# Patient Record
Sex: FEMALE | Race: WHITE | NOT HISPANIC OR LATINO | Employment: OTHER | URBAN - METROPOLITAN AREA
[De-identification: names, ages, dates, MRNs, and addresses within clinical notes are randomized per-mention and may not be internally consistent; named-entity substitution may affect disease eponyms.]

---

## 2020-08-20 ENCOUNTER — RESULTS ONLY (OUTPATIENT)
Dept: OTHER | Facility: CLINIC | Age: 75
End: 2020-08-20

## 2020-08-20 ENCOUNTER — VIRTUAL VISIT (OUTPATIENT)
Dept: TRANSPLANT | Facility: CLINIC | Age: 75
End: 2020-08-20
Attending: INTERNAL MEDICINE
Payer: COMMERCIAL

## 2020-08-20 ENCOUNTER — MEDICAL CORRESPONDENCE (OUTPATIENT)
Dept: TRANSPLANT | Facility: CLINIC | Age: 75
End: 2020-08-20

## 2020-08-20 VITALS
DIASTOLIC BLOOD PRESSURE: 78 MMHG | OXYGEN SATURATION: 96 % | WEIGHT: 167.5 LBS | TEMPERATURE: 97.6 F | SYSTOLIC BLOOD PRESSURE: 124 MMHG | HEART RATE: 90 BPM

## 2020-08-20 DIAGNOSIS — Z71.9 VISIT FOR COUNSELING: Primary | ICD-10-CM

## 2020-08-20 DIAGNOSIS — C92.01 ACUTE MYELOID LEUKEMIA IN REMISSION (H): Primary | ICD-10-CM

## 2020-08-20 PROCEDURE — 36415 COLL VENOUS BLD VENIPUNCTURE: CPT

## 2020-08-20 PROCEDURE — 81376 HLA II TYPING 1 LOCUS LR: CPT | Mod: XU | Performed by: INTERNAL MEDICINE

## 2020-08-20 PROCEDURE — 86828 HLA CLASS I&II ANTIBODY QUAL: CPT | Mod: XU | Performed by: INTERNAL MEDICINE

## 2020-08-20 PROCEDURE — 81378 HLA I & II TYPING HR: CPT | Performed by: INTERNAL MEDICINE

## 2020-08-20 PROCEDURE — G0463 HOSPITAL OUTPT CLINIC VISIT: HCPCS

## 2020-08-20 PROCEDURE — 86644 CMV ANTIBODY: CPT | Performed by: INTERNAL MEDICINE

## 2020-08-20 PROCEDURE — 86828 HLA CLASS I&II ANTIBODY QUAL: CPT | Performed by: INTERNAL MEDICINE

## 2020-08-20 PROCEDURE — 81378 HLA I & II TYPING HR: CPT | Mod: 59 | Performed by: INTERNAL MEDICINE

## 2020-08-20 PROCEDURE — 81370 HLA I & II TYPING LR: CPT | Performed by: INTERNAL MEDICINE

## 2020-08-20 PROCEDURE — 81382 HLA II TYPING 1 LOC HR: CPT | Mod: XU | Performed by: INTERNAL MEDICINE

## 2020-08-20 PROCEDURE — 81382 HLA II TYPING 1 LOC HR: CPT | Performed by: INTERNAL MEDICINE

## 2020-08-20 PROCEDURE — 86832 HLA CLASS I HIGH DEFIN QUAL: CPT | Performed by: INTERNAL MEDICINE

## 2020-08-20 RX ORDER — ATENOLOL 25 MG/1
TABLET ORAL
COMMUNITY
Start: 2020-03-27 | End: 2020-08-20

## 2020-08-20 RX ORDER — LEVOTHYROXINE SODIUM 88 UG/1
88 TABLET ORAL DAILY
Status: ON HOLD | COMMUNITY
Start: 2020-06-26 | End: 2020-10-28

## 2020-08-20 RX ORDER — POLYETHYLENE GLYCOL 3350 17 G/17G
1 POWDER, FOR SOLUTION ORAL DAILY
Status: ON HOLD | COMMUNITY
End: 2020-10-27

## 2020-08-20 RX ORDER — VENLAFAXINE 75 MG/1
75 TABLET ORAL DAILY
Status: ON HOLD | COMMUNITY
Start: 2019-04-09 | End: 2020-10-28

## 2020-08-20 RX ORDER — LANOLIN ALCOHOL/MO/W.PET/CERES
1000 CREAM (GRAM) TOPICAL DAILY
Status: ON HOLD | COMMUNITY
Start: 2020-06-19 | End: 2020-10-27

## 2020-08-20 ASSESSMENT — PAIN SCALES - GENERAL: PAINLEVEL: NO PAIN (0)

## 2020-08-20 NOTE — LETTER
8/20/2020         RE: Florencia Gao  [address] [place] [address]        Dear Colleague,    Thank you for referring your patient, Florencia Gao, to the [facility] BLOOD AND MARROW TRANSPLANT. Please see a copy of my visit note below.    /78   Pulse 90   Temp 97.6  F (36.4  C)   Wt 76 kg (167 lb 8 oz)   SpO2 96%   Wt Readings from Last 4 Encounters:   08/20/20 76 kg (167 lb 8 oz)     This is the first [facility] visit for this 75-year-old woman with a new diagnosis of AML.  Details are summarized from her outside records and information she provided today.  Briefly she was well until April or May of this year when she developed progressive fatigue and some easy bruising.  By mid June, pancytopenia and the diagnosis of AML was recognized. Initially Hb 8.1  WBC 1.4, Plts 29,000.    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  Flt 3 and NPM1 and CEBPalpha mutations were negative.    She was treated with idarubicin plus cytarabine (7+3) and achieved complete remission.  A day 14 marrow was empty and recovery of her counts at roughly 6 weeks --July 31 bone marrow was cellular with no morphologic signs of leukemia and the trisomy 8 was no longer detectable.  I do not have molecular data results from that remission marrow.    During her time in hospital she had strep mitis bacteremia but no other notable complications.  She had no other infections; did not lose much weight and had no mouth sores GI  respiratory or cardiac symptoms.  She now feels quite well.  Her current review of systems has no bone pain rash bleeding bruising ENT GI  respiratory cardiac symptoms.    Her past medical history includes hypothyroidism, sleep apnea with CPAP for the last 3 years.  A fracture of her right hip in 2010; a fracture of her right ankle in 2017; prediabetes and long-term use of antidepressants. There is also a notation of  granulomatous lung disease--not pursued today and no details are available to me now.    She smoked briefly (<2 pack years) and doesn't or rarely drink alcohol.    Her family history includes no one else with blood disorders or leukemia.  Her father  with prostate cancer; her mother and several other relatives had heart disease.    She has 2 living healthy sons; 1 other son  of a motorcycle accident and perhaps some excess use of alcohol.    She has 6 siblings :  4 brothers age 54-62 she reports as being healthy; and 2 sisters in their mid 70s are also healthy.  Tissue typing has not yet been completed.    Her current medications include Vitamin B12 given to her at hospital discharge for unclear reasons, levothyroxine  MiraLAX and venlafexine.    She is allergic to Bactrim (hives and itching) and Chlorhexidine (itchy rash).  She had extended sleepiness with codeine in the past,  but not a tiffanie allergy.    Her exam shows her comfortable conversant and in no distress.  Her vital signs are acceptable.  She had no skin rash or peripheral lymphadenopathy.  Her oropharynx was clear without mucosal changes.  Her lungs were clear and her heart tones are regular without a gallop.  Her abdomen was soft and nontender without rebound or hepatosplenomegaly.  She had no peripheral edema or bone tenderness at any site.  Cranial nerves were intact; deep tendon reflexes were 1-2+ in upper and lower extremities and heel toe gait were normal. Romberg testing was negative.    We spent an extended (total 60 minutes) time discussing the options of therapy for her now first remission AML with intermediate cytogenetic and molecular risk.  I told her that while serial cycles of consolidation therapy with cytarabine would be possible, the use of allotransplantation if she had an available matched donor, would likely provide incremental 15 to 20% greater leukemia free survival at 2 years or beyond; approaching 40%.  She has no  contraindications to proceeding with transplantation and many potential family donor options.    We reviewed the rationale and risks associated with allotransplantation including the hazards of reduced intensity conditioning with GI upset, infections, bleeding and need for transfusions and discussed at some length wumfy-wfsbam-vspr disease with its associated antileukemia effect.  I told her that approximately 20% of patients do not survive the first 6 months following an allo transplant but overall the chances of extended leukemia free of survival are improved with a matched sibling donor transplant than compared to other approaches.    She seemed strongly interested in transplantation.  We will try to identify  a family donor and all review the recommendations with her outside oncologist.  If a donor is found, we will likely bring her for formal transplant evaluation soon.    Yong Moncada MD    Professor of medicine    Again, thank you for allowing me to participate in the care of your patient.        Sincerely,        Yong Moncada MD

## 2020-08-20 NOTE — PROGRESS NOTES
/78   Pulse 90   Temp 97.6  F (36.4  C)   Wt 76 kg (167 lb 8 oz)   SpO2 96%   Wt Readings from Last 4 Encounters:   20 76 kg (167 lb 8 oz)     This is the first [facility] visit for this 75-year-old woman with a new diagnosis of AML.  Details are summarized from her outside records and information she provided today.  Briefly she was well until April or May of this year when she developed progressive fatigue and some easy bruising.  By mid , pancytopenia and the diagnosis of AML was recognized. Initially Hb 8.1  WBC 1.4, Plts 29,000.    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  Flt 3 and NPM1 and CEBPalpha mutations were negative.    She was treated with idarubicin plus cytarabine (7+3) and achieved complete remission.  A day 14 marrow was empty and recovery of her counts at roughly 6 weeks -- bone marrow was cellular with no morphologic signs of leukemia and the trisomy 8 was no longer detectable.  I do not have molecular data results from that remission marrow.    During her time in hospital she had strep mitis bacteremia but no other notable complications.  She had no other infections; did not lose much weight and had no mouth sores GI  respiratory or cardiac symptoms.  She now feels quite well.  Her current review of systems has no bone pain rash bleeding bruising ENT GI  respiratory cardiac symptoms.    Her past medical history includes hypothyroidism, sleep apnea with CPAP for the last 3 years.  A fracture of her right hip in ; a fracture of her right ankle in ; prediabetes and long-term use of antidepressants. There is also a notation of granulomatous lung disease--not pursued today and no details are available to me now.    She smoked briefly (<2 pack years) and doesn't or rarely drink alcohol.    Her family history includes no one else with blood disorders or leukemia.  Her father  with  prostate cancer; her mother and several other relatives had heart disease.    She has 2 living healthy sons; 1 other son  of a motorcycle accident and perhaps some excess use of alcohol.    She has 6 siblings :  4 brothers age 54-62 she reports as being healthy; and 2 sisters in their mid 70s are also healthy.  Tissue typing has not yet been completed.    Her current medications include Vitamin B12 given to her at hospital discharge for unclear reasons, levothyroxine  MiraLAX and venlafexine.    She is allergic to Bactrim (hives and itching) and Chlorhexidine (itchy rash).  She had extended sleepiness with codeine in the past,  but not a tiffanie allergy.    Her exam shows her comfortable conversant and in no distress.  Her vital signs are acceptable.  She had no skin rash or peripheral lymphadenopathy.  Her oropharynx was clear without mucosal changes.  Her lungs were clear and her heart tones are regular without a gallop.  Her abdomen was soft and nontender without rebound or hepatosplenomegaly.  She had no peripheral edema or bone tenderness at any site.  Cranial nerves were intact; deep tendon reflexes were 1-2+ in upper and lower extremities and heel toe gait were normal. Romberg testing was negative.    We spent an extended (total 60 minutes) time discussing the options of therapy for her now first remission AML with intermediate cytogenetic and molecular risk.  I told her that while serial cycles of consolidation therapy with cytarabine would be possible, the use of allotransplantation if she had an available matched donor, would likely provide incremental 15 to 20% greater leukemia free survival at 2 years or beyond; approaching 40%.  She has no contraindications to proceeding with transplantation and many potential family donor options.    We reviewed the rationale and risks associated with allotransplantation including the hazards of reduced intensity conditioning with GI upset, infections, bleeding and  need for transfusions and discussed at some length lfcfz-utxbfn-udny disease with its associated antileukemia effect.  I told her that approximately 20% of patients do not survive the first 6 months following an allo transplant but overall the chances of extended leukemia free of survival are improved with a matched sibling donor transplant than compared to other approaches.    She seemed strongly interested in transplantation.  We will try to identify  a family donor and all review the recommendations with her outside oncologist.  If a donor is found, we will likely bring her for formal transplant evaluation soon.    Yong Moncada MD    Professor of medicine

## 2020-08-20 NOTE — LETTER
8/20/2020         RE: Florencia Gao  [address] [place] [address]      /78   Pulse 90   Temp 97.6  F (36.4  C)   Wt 76 kg (167 lb 8 oz)   SpO2 96%   Wt Readings from Last 4 Encounters:   08/20/20 76 kg (167 lb 8 oz)     This is the first [facility] visit for this 75-year-old woman with a new diagnosis of AML.  Details are summarized from her outside records and information she provided today.  Briefly she was well until April or May of this year when she developed progressive fatigue and some easy bruising.  By mid June, pancytopenia and the diagnosis of AML was recognized. Initially Hb 8.1  WBC 1.4, Plts 29,000.    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  Flt 3 and NPM1 and CEBPalpha mutations were negative.    She was treated with idarubicin plus cytarabine (7+3) and achieved complete remission.  A day 14 marrow was empty and recovery of her counts at roughly 6 weeks --July 31 bone marrow was cellular with no morphologic signs of leukemia and the trisomy 8 was no longer detectable.  I do not have molecular data results from that remission marrow.    During her time in hospital she had strep mitis bacteremia but no other notable complications.  She had no other infections; did not lose much weight and had no mouth sores GI  respiratory or cardiac symptoms.  She now feels quite well.  Her current review of systems has no bone pain rash bleeding bruising ENT GI  respiratory cardiac symptoms.    Her past medical history includes hypothyroidism, sleep apnea with CPAP for the last 3 years.  A fracture of her right hip in 2010; a fracture of her right ankle in 2017; prediabetes and long-term use of antidepressants. There is also a notation of granulomatous lung disease--not pursued today and no details are available to me now.    She smoked briefly (<2 pack years) and doesn't or rarely drink alcohol.    Her family  history includes no one else with blood disorders or leukemia.  Her father  with prostate cancer; her mother and several other relatives had heart disease.    She has 2 living healthy sons; 1 other son  of a motorcycle accident and perhaps some excess use of alcohol.    She has 6 siblings :  4 brothers age 54-62 she reports as being healthy; and 2 sisters in their mid 70s are also healthy.  Tissue typing has not yet been completed.    Her current medications include Vitamin B12 given to her at hospital discharge for unclear reasons, levothyroxine  MiraLAX and venlafexine.    She is allergic to Bactrim (hives and itching) and Chlorhexidine (itchy rash).  She had extended sleepiness with codeine in the past,  but not a tiffanie allergy.    Her exam shows her comfortable conversant and in no distress.  Her vital signs are acceptable.  She had no skin rash or peripheral lymphadenopathy.  Her oropharynx was clear without mucosal changes.  Her lungs were clear and her heart tones are regular without a gallop.  Her abdomen was soft and nontender without rebound or hepatosplenomegaly.  She had no peripheral edema or bone tenderness at any site.  Cranial nerves were intact; deep tendon reflexes were 1-2+ in upper and lower extremities and heel toe gait were normal. Romberg testing was negative.    We spent an extended (total 60 minutes) time discussing the options of therapy for her now first remission AML with intermediate cytogenetic and molecular risk.  I told her that while serial cycles of consolidation therapy with cytarabine would be possible, the use of allotransplantation if she had an available matched donor, would likely provide incremental 15 to 20% greater leukemia free survival at 2 years or beyond; approaching 40%.  She has no contraindications to proceeding with transplantation and many potential family donor options.    We reviewed the rationale and risks associated with allotransplantation including  the hazards of reduced intensity conditioning with GI upset, infections, bleeding and need for transfusions and discussed at some length nmxru-bwsdtn-sfgx disease with its associated antileukemia effect.  I told her that approximately 20% of patients do not survive the first 6 months following an allo transplant but overall the chances of extended leukemia free of survival are improved with a matched sibling donor transplant than compared to other approaches.    She seemed strongly interested in transplantation.  We will try to identify  a family donor and all review the recommendations with her outside oncologist.  If a donor is found, we will likely bring her for formal transplant evaluation soon.    Yong Moncada MD    Professor of medicine

## 2020-08-20 NOTE — PROGRESS NOTES
"Blood and Marrow Transplant   New Transplant Visit with   Clinical     Florencia Gao  8/20/2020    New Transplant MD: Yong Moncada MD  New Transplant NC: Britany Finch RN    With whom do you live: alone    Relocation Requirement:   Florencia lives 36 minutes / 30.2 miles from [facility] and MD stated that patient will be required to relocate post-transplant.     Diagnosis: AML    Transplant Type: Allogeneic    Family Information  Next of Kin: Parmjit / Robles - sons    Siblings: 2 older sisters and 4 younger brothers; all live in [place] except for 1 brother in [place], [place]    Children: Parmjit (son; lives in [place], [place]) and Robles (son; lives in [place], [place]); per patient her sons do not communicate with each other - they \"hardly speak\"    Son's Girlfriend - Gaye Colvin [phone]; this is Robles's girlfriend of 12 years; patient would like Gaye to be her decision-maker in her HCD; she plans to complete a HCD with this information.     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     Source of Income: group home savings    Do you have concerns or questions about finances or insurance related to BMT?: None at this time. Florencia has a [employer] Advant[employer] product.     IMM will be required during hospitalization?: Yes    Have you completed a health care directive?: No - education provided and will send blank document in her NT packet.     Support:  Is there a network of people who are available to support you and/or your family?: yes - she did share several times during our discussion that she felt the caregiver requirement was a big ask of patients and their families.     Education Provided:   Caregiver Requirement/Role  BMT Packet provided  BMT Book provided  HCD information and blank document  Local lodging  [place]  Support resources  Description of Inpatient Unit    Comments: Florencia was alone on her BMT New " "transplant phone call. She asked that writer called her son's girlfriend after we finished our conversation stating that Gaye was not able to listen in when writer spoke with patient. Florencia shared that she was surprised to hear that she would have to have a 24 hour caregiver for 100 days post-transplant. She will need to work out who will be helping with this. It is of note that her 2 sons \"hardly speak\" and that patient will be listing her son Robles's girlfriend of 12 years as her HCD. Encouraged Florencia and Gaye to call with questions or concerns as they arise.     Ranjana RICHEY [facility]    Clinical   8/20/2020  [facility]  Adult Blood and Marrow Transplant Program  [address] [place] [id number]  [email or web address]  [email or web address]  Office: [phone]   Pager: [phone]        "

## 2020-08-20 NOTE — NURSING NOTE
Oncology Rooming Note    August 20, 2020 12:12 PM   Florencia Gao is a 75 year old female who presents for:    Chief Complaint   Patient presents with     New Patient     AML (acute myeloblastic leukemia)     Initial Vitals: /78   Pulse 90   Temp 97.6  F (36.4  C)   Wt 76 kg (167 lb 8 oz)   SpO2 96%  There is no height or weight on file to calculate BMI. There is no height or weight on file to calculate BSA.  No Pain (0) Comment: Data Unavailable   No LMP recorded. Patient is postmenopausal.  Allergies reviewed: Yes  Medications reviewed: Yes    Medications: Medication refills not needed today.  Pharmacy name entered into [facility]: [facility] [address] [place] [address] AT [facility] [address]    Clinical concerns: New patient Dr. Moncada was notified.      Santo Kenney MA

## 2020-08-20 NOTE — LETTER
"    8/20/2020         RE: Florencia Gao  [address] [place] [address]        Dear Colleague,    Thank you for referring your patient, Florencia Gao, to the [facility] BLOOD AND MARROW TRANSPLANT. Please see a copy of my visit note below.    Blood and Marrow Transplant   New Transplant Visit with   Clinical     Florencia Gao  8/20/2020    New Transplant MD: Yong Moncada MD  New Transplant NC: Britany Finch RN    With whom do you live: alone    Relocation Requirement:   Florencia lives [place] / 30.2 miles from [facility] and MD stated that patient will be required to relocate post-transplant.     Diagnosis: AML    Transplant Type: Allogeneic    Family Information  Next of Kin: Parmjit / Robles - sons    Siblings: 2 older sisters and 4 younger brothers; all live in [place] except for 1 brother in [place], [place]    Children: Parmjit (son; lives in [place], [place]) and Robles (son; lives in [place], [place]); per patient her sons do not communicate with each other - they \"hardly speak\"    Son's Girlfriend - Gaye Colvin [phone]; this is Robles's girlfriend of 12 years; patient would like Gaye to be her decision-maker in her HCD; she plans to complete a HCD with this information.     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     Source of Income: intermediate savings    Do you have concerns or questions about finances or insurance related to BMT?: None at this time. Florencia has a [employer] product.     IMM will be required during hospitalization?: Yes    Have you completed a health care directive?: No - education provided and will send blank document in her NT packet.     Support:  Is there a network of people who are available to support you and/or your family?: yes - she did share several times during our discussion that she felt the caregiver requirement was a big ask of patients and their families. " "    Education Provided:   Caregiver Requirement/Role  BMT Packet provided  BMT Book provided  HCD information and blank document  Local lodging  [place]  Support resources  Description of Inpatient Unit    Comments: Florencia was alone on her BMT New transplant phone call. She asked that writer called her son's girlfriend after we finished our conversation stating that Gaye was not able to listen in when writer spoke with patient. Florencia shared that she was surprised to hear that she would have to have a 24 hour caregiver for 100 days post-transplant. She will need to work out who will be helping with this. It is of note that her 2 sons \"hardly speak\" and that patient will be listing her son Robles's girlfriend of 12 years as her HCD. Encouraged Florencia and Gaye to call with questions or concerns as they arise.     Ranjana NOLASCO    Clinical   8/20/2020  [facility]  Adult Blood and Marrow Transplant Program  [address] [place] [address]  [email or web address]  [email or web address]  Office: [phone]   Pager: [phone]          Again, thank you for allowing me to participate in the care of your patient.        Sincerely,        GAURAV Bowman    "

## 2020-08-21 DIAGNOSIS — C92.01 ACUTE MYELOID LEUKEMIA IN REMISSION (H): Primary | ICD-10-CM

## 2020-08-21 LAB — CMV IGG SERPL QL IA: >8 AI (ref 0–0.8)

## 2020-08-25 LAB
A* LOCUS: NORMAL
ABTEST METHOD: NORMAL
B* LOCUS: NORMAL
B*: NORMAL
BW-1: NORMAL
C* LOCUS: NORMAL
C*: NORMAL
DPA1* LOCUS NMDP: NORMAL
DPA1* NMDP: NORMAL
DPA1*: NORMAL
DPA1*LOCUS: NORMAL
DPB1* LOCUS NMDP: NORMAL
DPB1* NMDP: NORMAL
DPB1*: NORMAL
DPB1*LOCUS: NORMAL
DQA1*LOCUS: NORMAL
DQB1* LOCUS: NORMAL
DQB1*: NORMAL
DRB1* LOCUS: NORMAL
DRB1*: NORMAL
DRB3* LOCUS: NORMAL
DRSSO TEST METHOD: NORMAL

## 2020-08-26 ENCOUNTER — APPOINTMENT (OUTPATIENT)
Dept: LAB | Facility: CLINIC | Age: 75
End: 2020-08-26
Attending: INTERNAL MEDICINE
Payer: COMMERCIAL

## 2020-08-26 LAB
SA1 CELL: NORMAL
SA1 COMMENTS: NORMAL
SA1 HI RISK ABY: NORMAL
SA1 MOD RISK ABY: NORMAL
SA1 TEST METHOD: NORMAL
SCR 1 TEST METHOD: NORMAL
SCR1 CELL: NORMAL
SCR1 COMMENTS: NORMAL
SCR1 RESULT: NORMAL
SCR2 CELL: NORMAL
SCR2 COMMENTS: NORMAL
SCR2 RESULT: NORMAL
SCR2 TEST METHOD: NORMAL

## 2020-08-26 PROCEDURE — 81370 HLA I & II TYPING LR: CPT | Mod: 59 | Performed by: INTERNAL MEDICINE

## 2020-08-26 PROCEDURE — 81376 HLA II TYPING 1 LOCUS LR: CPT | Mod: XU | Performed by: INTERNAL MEDICINE

## 2020-08-31 NOTE — PROGRESS NOTES
Met  With pt in clinic and reviewed with her the role of the nurse coordinator. I also gave her contact information for the bmt office if she has any further questions. Pt to have typing of the  Sibs done, hla done on pt as well. Cr

## 2020-09-01 ENCOUNTER — APPOINTMENT (OUTPATIENT)
Dept: LAB | Facility: CLINIC | Age: 75
End: 2020-09-01
Attending: INTERNAL MEDICINE
Payer: COMMERCIAL

## 2020-09-01 LAB
ASBTTEST METHOD: NORMAL
DRSBTTEST METHOD: NORMAL
SBTA* LOCUS NMDP: NORMAL
SBTA* LOCUS: NORMAL
SBTB* LOCUS NMDP: NORMAL
SBTB* LOCUS: NORMAL
SBTB*: NORMAL
SBTC* LOCUS NMDP: NORMAL
SBTC* LOCUS: NORMAL
SBTC* NMDP: NORMAL
SBTC*: NORMAL
SBTDQB1*: NORMAL
SBTDQB1*LOCUS NMDP: NORMAL
SBTDQB1*LOCUS: NORMAL
SBTDQB1*NMDP: NORMAL
SBTDRB1* LOCUS - FCIS: NORMAL
SBTDRB1* LOCUS NMDP: NORMAL
SBTDRB1*: NORMAL

## 2020-09-01 PROCEDURE — 81376 HLA II TYPING 1 LOCUS LR: CPT | Performed by: INTERNAL MEDICINE

## 2020-09-01 PROCEDURE — 81370 HLA I & II TYPING LR: CPT | Performed by: INTERNAL MEDICINE

## 2020-09-02 DIAGNOSIS — Z94.81 BONE MARROW TRANSPLANT STATUS (H): ICD-10-CM

## 2020-09-02 DIAGNOSIS — Z86.2 PERSONAL HISTORY OF DISEASES OF THE BLOOD AND BLOOD-FORMING ORGANS AND CERTAIN DISORDERS INVOLVING THE IMMUNE MECHANISM: ICD-10-CM

## 2020-09-02 DIAGNOSIS — C92.01 ACUTE MYELOID LEUKEMIA IN REMISSION (H): Primary | ICD-10-CM

## 2020-09-02 DIAGNOSIS — Z01.818 EXAMINATION PRIOR TO CHEMOTHERAPY: ICD-10-CM

## 2020-09-02 DIAGNOSIS — D70.4 CYCLIC NEUTROPENIA (H): ICD-10-CM

## 2020-09-02 DIAGNOSIS — Z11.59 ENCOUNTER FOR SCREENING FOR OTHER VIRAL DISEASES: ICD-10-CM

## 2020-09-04 ENCOUNTER — TELEPHONE (OUTPATIENT)
Dept: TRANSPLANT | Facility: CLINIC | Age: 75
End: 2020-09-04

## 2020-09-04 NOTE — TELEPHONE ENCOUNTER
Clinical   Blood and Marrow Transplant Service    Spoke with Florencia in preparation for her Work-up starting 9.8.2020. She is interested in being placed on the waiting list for [facility] apartments when she starts her Work-Up. We talked about how there are 14 people on the waiting list currently so it may be awhile before she is able to get an apartment - although it will also be awhile before she needs the apartment. Her #1 preference is [facility] [address] but this is not open yet and we are not sure when this will occur. Writer encouraged her to have a Plan B if there are no apartments available at [facility]. She said that she would think about options and talk with her family.      Ranjana RICHEY [facility]    Clinical   9/4/2020  [facility]  Adult Blood and Marrow Transplant Program  [address] [place] [address]  [email or web address]  [email or web address]  Office: [phone]   Pager: [phone]

## 2020-09-08 ENCOUNTER — OFFICE VISIT (OUTPATIENT)
Dept: TRANSPLANT | Facility: CLINIC | Age: 75
End: 2020-09-08
Attending: INTERNAL MEDICINE
Payer: COMMERCIAL

## 2020-09-08 ENCOUNTER — APPOINTMENT (OUTPATIENT)
Dept: LAB | Facility: CLINIC | Age: 75
End: 2020-09-08
Attending: INTERNAL MEDICINE
Payer: COMMERCIAL

## 2020-09-08 ENCOUNTER — MEDICAL CORRESPONDENCE (OUTPATIENT)
Dept: TRANSPLANT | Facility: CLINIC | Age: 75
End: 2020-09-08

## 2020-09-08 ENCOUNTER — ONCOLOGY VISIT (OUTPATIENT)
Dept: TRANSPLANT | Facility: CLINIC | Age: 75
End: 2020-09-08
Attending: PHYSICIAN ASSISTANT
Payer: COMMERCIAL

## 2020-09-08 ENCOUNTER — ANCILLARY PROCEDURE (OUTPATIENT)
Dept: CT IMAGING | Facility: CLINIC | Age: 75
End: 2020-09-08
Attending: INTERNAL MEDICINE

## 2020-09-08 ENCOUNTER — ANCILLARY PROCEDURE (OUTPATIENT)
Dept: GENERAL RADIOLOGY | Facility: CLINIC | Age: 75
End: 2020-09-08
Attending: INTERNAL MEDICINE

## 2020-09-08 ENCOUNTER — ANCILLARY PROCEDURE (OUTPATIENT)
Dept: CARDIOLOGY | Facility: CLINIC | Age: 75
End: 2020-09-08
Attending: INTERNAL MEDICINE

## 2020-09-08 VITALS
SYSTOLIC BLOOD PRESSURE: 139 MMHG | HEART RATE: 99 BPM | HEIGHT: 67 IN | OXYGEN SATURATION: 98 % | RESPIRATION RATE: 16 BRPM | DIASTOLIC BLOOD PRESSURE: 83 MMHG | TEMPERATURE: 97.8 F | WEIGHT: 171.3 LBS | BODY MASS INDEX: 26.89 KG/M2

## 2020-09-08 VITALS
OXYGEN SATURATION: 98 % | HEIGHT: 67 IN | TEMPERATURE: 97.8 F | SYSTOLIC BLOOD PRESSURE: 139 MMHG | BODY MASS INDEX: 26.87 KG/M2 | RESPIRATION RATE: 16 BRPM | HEART RATE: 99 BPM | WEIGHT: 171.2 LBS | DIASTOLIC BLOOD PRESSURE: 83 MMHG

## 2020-09-08 DIAGNOSIS — C92.01 ACUTE MYELOID LEUKEMIA IN REMISSION (H): Primary | ICD-10-CM

## 2020-09-08 DIAGNOSIS — C92.01 ACUTE MYELOID LEUKEMIA IN REMISSION (H): ICD-10-CM

## 2020-09-08 DIAGNOSIS — Z11.59 ENCOUNTER FOR SCREENING FOR OTHER VIRAL DISEASES: ICD-10-CM

## 2020-09-08 DIAGNOSIS — D70.4 CYCLIC NEUTROPENIA (H): ICD-10-CM

## 2020-09-08 DIAGNOSIS — Z86.2 PERSONAL HISTORY OF DISEASES OF THE BLOOD AND BLOOD-FORMING ORGANS AND CERTAIN DISORDERS INVOLVING THE IMMUNE MECHANISM: ICD-10-CM

## 2020-09-08 DIAGNOSIS — Z94.81 BONE MARROW TRANSPLANT STATUS (H): ICD-10-CM

## 2020-09-08 DIAGNOSIS — Z01.818 EXAMINATION PRIOR TO CHEMOTHERAPY: ICD-10-CM

## 2020-09-08 DIAGNOSIS — R82.90 UNSPECIFIED ABNORMAL FINDINGS IN URINE: ICD-10-CM

## 2020-09-08 LAB
ALBUMIN SERPL-MCNC: 3.9 G/DL (ref 3.4–5)
ALBUMIN UR-MCNC: NEGATIVE MG/DL
ALP SERPL-CCNC: 75 U/L (ref 40–150)
ALT SERPL W P-5'-P-CCNC: 29 U/L (ref 0–50)
ANION GAP SERPL CALCULATED.3IONS-SCNC: 6 MMOL/L (ref 3–14)
APPEARANCE UR: ABNORMAL
APTT PPP: 29 SEC (ref 22–37)
AST SERPL W P-5'-P-CCNC: 22 U/L (ref 0–45)
BACTERIA #/AREA URNS HPF: ABNORMAL /HPF
BASOPHILS # BLD AUTO: 0 10E9/L (ref 0–0.2)
BASOPHILS NFR BLD AUTO: 0.6 %
BILIRUB SERPL-MCNC: 0.4 MG/DL (ref 0.2–1.3)
BILIRUB UR QL STRIP: NEGATIVE
BUN SERPL-MCNC: 14 MG/DL (ref 7–30)
CALCIUM SERPL-MCNC: 9.3 MG/DL (ref 8.5–10.1)
CHLORIDE SERPL-SCNC: 101 MMOL/L (ref 94–109)
CO2 SERPL-SCNC: 26 MMOL/L (ref 20–32)
COLOR UR AUTO: YELLOW
CREAT SERPL-MCNC: 0.75 MG/DL (ref 0.52–1.04)
DIFFERENTIAL METHOD BLD: NORMAL
EOSINOPHIL # BLD AUTO: 0.3 10E9/L (ref 0–0.7)
EOSINOPHIL NFR BLD AUTO: 4.8 %
ERYTHROCYTE [DISTWIDTH] IN BLOOD BY AUTOMATED COUNT: 14.2 % (ref 10–15)
FERRITIN SERPL-MCNC: 285 NG/ML (ref 8–252)
GFR SERPL CREATININE-BSD FRML MDRD: 80 ML/MIN/{1.73_M2}
GLUCOSE SERPL-MCNC: 87 MG/DL (ref 70–99)
GLUCOSE UR STRIP-MCNC: NEGATIVE MG/DL
HCT VFR BLD AUTO: 41.1 % (ref 35–47)
HGB BLD-MCNC: 13.1 G/DL (ref 11.7–15.7)
HGB UR QL STRIP: ABNORMAL
HYALINE CASTS #/AREA URNS LPF: 3 /LPF (ref 0–2)
IMM GRANULOCYTES # BLD: 0 10E9/L (ref 0–0.4)
IMM GRANULOCYTES NFR BLD: 0.3 %
INR PPP: 0.98 (ref 0.86–1.14)
KETONES UR STRIP-MCNC: NEGATIVE MG/DL
LEUKOCYTE ESTERASE UR QL STRIP: ABNORMAL
LYMPHOCYTES # BLD AUTO: 1.5 10E9/L (ref 0.8–5.3)
LYMPHOCYTES NFR BLD AUTO: 24.4 %
MCH RBC QN AUTO: 31.2 PG (ref 26.5–33)
MCHC RBC AUTO-ENTMCNC: 31.9 G/DL (ref 31.5–36.5)
MCV RBC AUTO: 98 FL (ref 78–100)
MONOCYTES # BLD AUTO: 0.4 10E9/L (ref 0–1.3)
MONOCYTES NFR BLD AUTO: 5.7 %
MUCOUS THREADS #/AREA URNS LPF: PRESENT /LPF
NEUTROPHILS # BLD AUTO: 4 10E9/L (ref 1.6–8.3)
NEUTROPHILS NFR BLD AUTO: 64.2 %
NITRATE UR QL: NEGATIVE
NRBC # BLD AUTO: 0 10*3/UL
NRBC BLD AUTO-RTO: 0 /100
PH UR STRIP: 6 PH (ref 5–7)
PLATELET # BLD AUTO: 229 10E9/L (ref 150–450)
POTASSIUM SERPL-SCNC: 4 MMOL/L (ref 3.4–5.3)
PROT SERPL-MCNC: 8 G/DL (ref 6.8–8.8)
RBC # BLD AUTO: 4.2 10E12/L (ref 3.8–5.2)
RBC #/AREA URNS AUTO: 4 /HPF (ref 0–2)
SODIUM SERPL-SCNC: 134 MMOL/L (ref 133–144)
SOURCE: ABNORMAL
SP GR UR STRIP: 1.01 (ref 1–1.03)
SQUAMOUS #/AREA URNS AUTO: 1 /HPF (ref 0–1)
URATE SERPL-MCNC: 4.3 MG/DL (ref 2.6–6)
UROBILINOGEN UR STRIP-MCNC: 0 MG/DL (ref 0–2)
WBC # BLD AUTO: 6.3 10E9/L (ref 4–11)
WBC #/AREA URNS AUTO: 7 /HPF (ref 0–5)

## 2020-09-08 PROCEDURE — 87340 HEPATITIS B SURFACE AG IA: CPT | Performed by: INTERNAL MEDICINE

## 2020-09-08 PROCEDURE — 87086 URINE CULTURE/COLONY COUNT: CPT | Performed by: INTERNAL MEDICINE

## 2020-09-08 PROCEDURE — 86803 HEPATITIS C AB TEST: CPT | Performed by: INTERNAL MEDICINE

## 2020-09-08 PROCEDURE — 87389 HIV-1 AG W/HIV-1&-2 AB AG IA: CPT | Performed by: INTERNAL MEDICINE

## 2020-09-08 PROCEDURE — 93010 ELECTROCARDIOGRAM REPORT: CPT | Mod: ZP | Performed by: INTERNAL MEDICINE

## 2020-09-08 PROCEDURE — 86850 RBC ANTIBODY SCREEN: CPT | Performed by: INTERNAL MEDICINE

## 2020-09-08 PROCEDURE — 86900 BLOOD TYPING SEROLOGIC ABO: CPT | Performed by: INTERNAL MEDICINE

## 2020-09-08 PROCEDURE — 86696 HERPES SIMPLEX TYPE 2 TEST: CPT | Performed by: INTERNAL MEDICINE

## 2020-09-08 PROCEDURE — 86790 VIRUS ANTIBODY NOS: CPT | Performed by: INTERNAL MEDICINE

## 2020-09-08 PROCEDURE — 86870 RBC ANTIBODY IDENTIFICATION: CPT | Performed by: INTERNAL MEDICINE

## 2020-09-08 PROCEDURE — 81376 HLA II TYPING 1 LOCUS LR: CPT | Performed by: INTERNAL MEDICINE

## 2020-09-08 PROCEDURE — 86704 HEP B CORE ANTIBODY TOTAL: CPT | Performed by: INTERNAL MEDICINE

## 2020-09-08 PROCEDURE — 84550 ASSAY OF BLOOD/URIC ACID: CPT | Performed by: INTERNAL MEDICINE

## 2020-09-08 PROCEDURE — 87516 HEPATITIS B DNA AMP PROBE: CPT | Performed by: INTERNAL MEDICINE

## 2020-09-08 PROCEDURE — 87521 HEPATITIS C PROBE&RVRS TRNSC: CPT | Performed by: INTERNAL MEDICINE

## 2020-09-08 PROCEDURE — 81001 URINALYSIS AUTO W/SCOPE: CPT | Performed by: INTERNAL MEDICINE

## 2020-09-08 PROCEDURE — 82728 ASSAY OF FERRITIN: CPT | Performed by: INTERNAL MEDICINE

## 2020-09-08 PROCEDURE — 85025 COMPLETE CBC W/AUTO DIFF WBC: CPT | Performed by: INTERNAL MEDICINE

## 2020-09-08 PROCEDURE — 81265 STR MARKERS SPECIMEN ANAL: CPT | Performed by: INTERNAL MEDICINE

## 2020-09-08 PROCEDURE — 93005 ELECTROCARDIOGRAM TRACING: CPT

## 2020-09-08 PROCEDURE — 86780 TREPONEMA PALLIDUM: CPT | Performed by: INTERNAL MEDICINE

## 2020-09-08 PROCEDURE — 83021 HEMOGLOBIN CHROMOTOGRAPHY: CPT | Performed by: INTERNAL MEDICINE

## 2020-09-08 PROCEDURE — 86695 HERPES SIMPLEX TYPE 1 TEST: CPT | Performed by: INTERNAL MEDICINE

## 2020-09-08 PROCEDURE — 80053 COMPREHEN METABOLIC PANEL: CPT | Performed by: INTERNAL MEDICINE

## 2020-09-08 PROCEDURE — 86901 BLOOD TYPING SEROLOGIC RH(D): CPT | Performed by: INTERNAL MEDICINE

## 2020-09-08 PROCEDURE — 86644 CMV ANTIBODY: CPT | Performed by: INTERNAL MEDICINE

## 2020-09-08 PROCEDURE — 87798 DETECT AGENT NOS DNA AMP: CPT | Performed by: INTERNAL MEDICINE

## 2020-09-08 PROCEDURE — 81370 HLA I & II TYPING LR: CPT | Performed by: INTERNAL MEDICINE

## 2020-09-08 PROCEDURE — 87535 HIV-1 PROBE&REVERSE TRNSCRPJ: CPT | Performed by: INTERNAL MEDICINE

## 2020-09-08 PROCEDURE — 86665 EPSTEIN-BARR CAPSID VCA: CPT | Performed by: INTERNAL MEDICINE

## 2020-09-08 PROCEDURE — 85730 THROMBOPLASTIN TIME PARTIAL: CPT | Performed by: INTERNAL MEDICINE

## 2020-09-08 PROCEDURE — 85610 PROTHROMBIN TIME: CPT | Performed by: INTERNAL MEDICINE

## 2020-09-08 PROCEDURE — G0463 HOSPITAL OUTPT CLINIC VISIT: HCPCS | Mod: 25

## 2020-09-08 ASSESSMENT — PAIN SCALES - GENERAL
PAINLEVEL: NO PAIN (0)
PAINLEVEL: NO PAIN (0)

## 2020-09-08 ASSESSMENT — MIFFLIN-ST. JEOR
SCORE: 1313.06
SCORE: 1313.51

## 2020-09-08 NOTE — PROGRESS NOTES
[facility]  BMTCT OPEN VISIT    September 8, 2020    Florencia Gao is a 75 year old female undergoing evaluation prior to hematopoietic cell transplant or immune effector cell therapy.    Reason for BMTCT: AML    Recent chemotherapy: completed 7/31    Recent infections: no    Blood thinner use? If yes, why? No    Treatment for diabetes? No      Today, the patient notes the following symptoms:  Review Of Systems  Skin: positive for negative   Eyes: cataracts  Ears/Nose/Throat: negative  Respiratory: No shortness of breath, dyspnea on exertion, cough, or hemoptysis  Cardiovascular: negative  Gastrointestinal: negative  Genitourinary: urinary tract infection  Musculoskeletal: negative  Neurologic: negative  Psychiatric: anxiety, panic attacks--most recent 1 month ago  Hematologic/Lymphatic/Immunologic: positive for positive for AML  Endocrine: negative and thyroid disorder      Florencia Gao's History    No past medical history on file.    No past surgical history on file.    No family history on file.    Social History     Tobacco Use     Smoking status: Never Smoker     Smokeless tobacco: Never Used   Substance Use Topics     Alcohol use: Not on file           Florencia Gao's Medications and Allergies    Current Outpatient Medications   Medication     cyanocobalamin (VITAMIN B-12) 1000 MCG tablet     levothyroxine (SYNTHROID/LEVOTHROID) 88 MCG tablet     polyethylene glycol (MIRALAX) 17 g packet     venlafaxine (EFFEXOR) 75 MG tablet     No current facility-administered medications for this visit.           Allergies   Allergen Reactions     Sulfa Drugs Rash     Acetaminophen-Codeine Dizziness     could not sit up after surgery until off all narcotics, even Tylenol #3     Other Drug Allergy (See Comments) Rash     Chlorahexidine-surgical prep/rash           Physical Examination    Blood pressure 139/83, pulse 99, temperature 97.8  F (36.6  C), temperature source Oral, resp. rate 16, height 1.7 m  "(5' 6.93\"), weight 77.7 kg (171 lb 4.8 oz), SpO2 98 %.    Constitutional: healthy, alert and no distress  Head: Normocephalic. No masses, lesions, tenderness or abnormalities  ENT: ENT exam normal, no neck nodes or sinus tenderness  Cardiovascular: negative, PMI normal. No lifts, heaves, or thrills. RRR. No murmurs, clicks gallops or rub  Respiratory: negative, Percussion normal. Good diaphragmatic excursion. Lungs clear  Gastrointestinal: Abdomen soft, non-tender. BS normal. No masses, organomegaly  : Deferred  Musculoskeletal: extremities normal- no gross deformities noted, gait normal and normal muscle tone  Skin: no suspicious lesions or rashes  Neurologic: Gait normal. Reflexes normal and symmetric. Sensation grossly WNL.  Psychiatric: mentation appears normal and affect normal/bright  Hematologic/Lymphatic/Immunologic: Normal cervical lymph nodes        Frailty Screening    1. Weight loss: Have you lost >10 pounds (or >5% body weight) unintentionally over the last year? No      2. Exhaustion: How often in the past week did you feel that:  o I feel that everything I do is an effort : .Exhaustion: 0 = rarely or none of the time (<1 day)  o I feel I cannot get going: Exhaustion: 0 = rarely or none of the time (<1 day)    3. Weakness:  Hand  strength (measured by MA; calculate average): 23kg     Male BMI Frailty Criteria for  Male  Strength Female BMI Frailty Criteria for  Female  Strength   ?24 ?29 ?23 ?17   24.1 - 26 ?30 23.1 - 26 ?17.3   26.1 - 28 ?30 26.1 - 29 ?18   >28 ?32 >29 ?21     4. Slowness:  15 foot walk time (measured by MA): 5 seconds    Height Frailty Criteria for  15 Foot Walk Time   Men   ?173 cm ? 7 seconds    >173 cm  ? 6 seconds   Women   ?159 cm ? 7 seconds   >159 cm  ? 6 seconds     5. Physical activity:     *Please complete 2 calculations for kcal (see frailty worksheet for equations)     Energy expenditure for frailty: 485.6 kcal expended per week     Gender Frailty Criteria " for Low Physical Activity   Male <383 kcal/week   Female <270 kcal/weel     IPAQ score: 375 MET minutes per week       Florencia Gao met the following criteria for prefrailty (score 1-2) or frailty (score 3+): not frail  Frailty Score is: 0      Additional assessments not to be used in frailty calculation:   What types of physical activity can you tolerate? walking    Sit to stand test (time to complete 5 reps): 5 seconds    Standing balance in 10 seconds:  Record the Total number of seconds(0-30)--add a+b+c ( take best attempt for each)    First attempt: 10 seconds    Second attempt: 10 seconds    I have reviewed the diagnostic data, medications, frailty screening, and general processes prior to BMTCT.  I have notified the Primary BMT Physician/and or Attending Physician in the clinic of any issues. We also discussed in detail the database and biorepository research for which Florencia Gao is eligible. We discussed the potential risks and potential benefits of each of these protocols individually. We explained potential alternatives to the protocols discussed. We explained to the patient that participation is voluntary and that consent may be withdrawn at any time.       Consents Signed:    Blood transfusion consent form    Ethnicity form    [facility] database    ACE BMT study    N BMTCT Database    Present during the discussion was her son's girlfriend. Copies of the signed consent forms will be provided to the patient on admission. No procedures specific to any studies were performed prior to the patient signing the consent form.    Florencia Gao had the opportunity to ask questions, and I answered all of the questions to the best of my ability.      HAI Joel

## 2020-09-08 NOTE — PROGRESS NOTES
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    Teaching Topic: RN met with patient and completed rooming process including vitals, med and allergy rec. RN then also had patient sign the hippa form and then screening consent and these were placed in the bmt office folder. RN then also went through calender with patient and caregiver and provided copy. RN then also collected labs via venipuncture and sent to lab for processing. RN also had patient collect a UA and this was sent to lab as well. RN provided a 24 hour urine jug and instructions as well for patient and she verbalized understanding and agreed with plan. RN then had patient complete frailty assessment and MA completed the exercises with patient and completed EKG.     Person(s) involved in teaching: Patient  Motivation Level  Asks Questions: Yes  Eager to Learn: Yes  Cooperative: Yes  Receptive (willing/able to accept information): Yes  Any cultural factors/Protestant beliefs that may influence understanding or compliance? No    Patient demonstrates understanding of the following:  - Reason for the appointment, diagnosis and treatment plan: Yes  - Knowledge of proper use of medications and conditions for which they are ordered (with special attention to potential side effects or drug interactions): Yes  - Which situations necessitate calling provider and whom to contact: Yes    Teaching concerns addressed: all questions and concerns were answered for the patient    Proper use and care of (medical equipment, care aids, etc.) Yes  Pain management techniques: Yes  Patient instructed on hand hygiene: Yes  How and/when to  access community resources: Yes      Instructional Materials Used/Given:   All instructions were given to patient verbally and she agreed with the plan

## 2020-09-08 NOTE — NURSING NOTE
"Oncology Rooming Note    September 8, 2020 11:13 AM   Florencia Gao is a 75 year old female who presents for:    Chief Complaint   Patient presents with     RECHECK     pt here for work up pre-bmt for AML     Initial Vitals: /83   Pulse 99   Temp 97.8  F (36.6  C) (Oral)   Resp 16   Ht 1.7 m (5' 6.93\")   Wt 77.7 kg (171 lb 3.2 oz)   SpO2 98%   BMI 26.87 kg/m   Estimated body mass index is 26.87 kg/m  as calculated from the following:    Height as of this encounter: 1.7 m (5' 6.93\").    Weight as of this encounter: 77.7 kg (171 lb 3.2 oz). Body surface area is 1.92 meters squared.  No Pain (0) Comment: Data Unavailable   No LMP recorded. Patient is postmenopausal.  Allergies reviewed: Yes  Medications reviewed: Yes    Medications: Medication refills not needed today.  Pharmacy name entered into [facility]: [facility][id number] [address] AT [facility] [address]    Clinical concerns: none     GWEN MANCIA RN              "

## 2020-09-08 NOTE — LETTER
9/8/2020     RE: Florencia Gao  [address] [place] [address]    Dear Colleague,    Thank you for referring your patient, Florencia Gao, to the [facility] BLOOD AND MARROW TRANSPLANT. Please see a copy of my visit note below.    [facility]  BMTCT OPEN VISIT    September 8, 2020    Florencia Gao is a 75 year old female undergoing evaluation prior to hematopoietic cell transplant or immune effector cell therapy.    Reason for BMTCT: AML    Recent chemotherapy: completed 7/31    Recent infections: no    Blood thinner use? If yes, why? No    Treatment for diabetes? No      Today, the patient notes the following symptoms:  Review Of Systems  Skin: positive for negative   Eyes: cataracts  Ears/Nose/Throat: negative  Respiratory: No shortness of breath, dyspnea on exertion, cough, or hemoptysis  Cardiovascular: negative  Gastrointestinal: negative  Genitourinary: urinary tract infection  Musculoskeletal: negative  Neurologic: negative  Psychiatric: anxiety, panic attacks--most recent 1 month ago  Hematologic/Lymphatic/Immunologic: positive for positive for AML  Endocrine: negative and thyroid disorder      Florencia Gao's History    No past medical history on file.    No past surgical history on file.    No family history on file.    Social History     Tobacco Use     Smoking status: Never Smoker     Smokeless tobacco: Never Used   Substance Use Topics     Alcohol use: Not on file           Florencia Gao's Medications and Allergies    Current Outpatient Medications   Medication     cyanocobalamin (VITAMIN B-12) 1000 MCG tablet     levothyroxine (SYNTHROID/LEVOTHROID) 88 MCG tablet     polyethylene glycol (MIRALAX) 17 g packet     venlafaxine (EFFEXOR) 75 MG tablet     No current facility-administered medications for this visit.           Allergies   Allergen Reactions     Sulfa Drugs Rash     Acetaminophen-Codeine Dizziness     could not sit up after surgery until off all narcotics, even Tylenol  "#3     Other Drug Allergy (See Comments) Rash     Chlorahexidine-surgical prep/rash           Physical Examination    Blood pressure 139/83, pulse 99, temperature 97.8  F (36.6  C), temperature source Oral, resp. rate 16, height 1.7 m (5' 6.93\"), weight 77.7 kg (171 lb 4.8 oz), SpO2 98 %.    Constitutional: healthy, alert and no distress  Head: Normocephalic. No masses, lesions, tenderness or abnormalities  ENT: ENT exam normal, no neck nodes or sinus tenderness  Cardiovascular: negative, PMI normal. No lifts, heaves, or thrills. RRR. No murmurs, clicks gallops or rub  Respiratory: negative, Percussion normal. Good diaphragmatic excursion. Lungs clear  Gastrointestinal: Abdomen soft, non-tender. BS normal. No masses, organomegaly  : Deferred  Musculoskeletal: extremities normal- no gross deformities noted, gait normal and normal muscle tone  Skin: no suspicious lesions or rashes  Neurologic: Gait normal. Reflexes normal and symmetric. Sensation grossly WNL.  Psychiatric: mentation appears normal and affect normal/bright  Hematologic/Lymphatic/Immunologic: Normal cervical lymph nodes        Frailty Screening    1. Weight loss: Have you lost >10 pounds (or >5% body weight) unintentionally over the last year? No      2. Exhaustion: How often in the past week did you feel that:  o I feel that everything I do is an effort : .Exhaustion: 0 = rarely or none of the time (<1 day)  o I feel I cannot get going: Exhaustion: 0 = rarely or none of the time (<1 day)    3. Weakness:  Hand  strength (measured by MA; calculate average): 23kg     Male BMI Frailty Criteria for  Male  Strength Female BMI Frailty Criteria for  Female  Strength   ?24 ?29 ?23 ?17   24.1 - 26 ?30 23.1 - 26 ?17.3   26.1 - 28 ?30 26.1 - 29 ?18   >28 ?32 >29 ?21     4. Slowness:  15 foot walk time (measured by MA): 5 seconds    Height Frailty Criteria for  15 Foot Walk Time   Men   ?173 cm ? 7 seconds    >173 cm  ? 6 seconds   Women   ?159 cm ? 7 " seconds   >159 cm  ? 6 seconds     5. Physical activity:     *Please complete 2 calculations for kcal (see frailty worksheet for equations)     Energy expenditure for frailty: 485.6 kcal expended per week     Gender Frailty Criteria for Low Physical Activity   Male <383 kcal/week   Female <270 kcal/weel     IPAQ score: 375 MET minutes per week       Florencia Gao met the following criteria for prefrailty (score 1-2) or frailty (score 3+): not frail  Frailty Score is: 0      Additional assessments not to be used in frailty calculation:   What types of physical activity can you tolerate? walking    Sit to stand test (time to complete 5 reps): 5 seconds    Standing balance in 10 seconds:  Record the Total number of seconds(0-30)--add a+b+c ( take best attempt for each)    First attempt: 10 seconds    Second attempt: 10 seconds    I have reviewed the diagnostic data, medications, frailty screening, and general processes prior to BMTCT.  I have notified the Primary BMT Physician/and or Attending Physician in the clinic of any issues. We also discussed in detail the database and biorepository research for which Florencia Gao is eligible. We discussed the potential risks and potential benefits of each of these protocols individually. We explained potential alternatives to the protocols discussed. We explained to the patient that participation is voluntary and that consent may be withdrawn at any time.       Consents Signed:    Blood transfusion consent form    Ethnicity form    [facility] database    ACE BMT study    [facility] BMTCT Database    Present during the discussion was her son's girlfriend. Copies of the signed consent forms will be provided to the patient on admission. No procedures specific to any studies were performed prior to the patient signing the consent form.    Florencia Gao had the opportunity to ask questions, and I answered all of the questions to the best of my ability.    HAI Joel

## 2020-09-08 NOTE — PROGRESS NOTES
Radiation Oncology Consult  Patient comes in for initial consultation in the Radiation Oncology Department at the request of Dr. Moncada to determine eligibility for TBI prior to transplant.     History of Present Illness:  Florencia is a pleasant 75 year old female in no acute distress and is accompanied by her caregiver (son's girlfriend).  She noticed progressive bruising in April 2020.  She finally decided to get it checked out in June and was noted to be pancytopenic with hgb at 8.1, white count at 1.4 and platelets at 29,000.  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  She was negative for FLT3, NPM1 and CEBPA.  She started induction with 7+3.  Day 14 showed empty marrow.  When her counts recovered at about 6 weeks, bone marrow biopsy on 7/31 showed no leukemia and negative trisomy 8.  She tolerated treatment well.  She feels great now.  She will have a bone marrow biopsy today.    Her brother will be here donor.        Previous Radiation:  None    Previous Chemotherapy:  As above per HPI.      Pregnant: Not Applicable  No results found for: HCGQUANT  Implanted Cardiac Devices: No    Medications:  Current Outpatient Medications   Medication     cyanocobalamin (VITAMIN B-12) 1000 MCG tablet     levothyroxine (SYNTHROID/LEVOTHROID) 88 MCG tablet     polyethylene glycol (MIRALAX) 17 g packet     venlafaxine (EFFEXOR) 75 MG tablet     No current facility-administered medications for this visit.        Allergies:     Allergies   Allergen Reactions     Blood Transfusion Related (Informational Only)      Patient has a history of a clinically significant antibody against RBC antigens.  A delay in compatible RBCs may occur.     Sulfa Drugs Rash     Acetaminophen-Codeine Dizziness     could not sit up after surgery until off all narcotics, even Tylenol #3     Chlorhexidine Rash       Past Medical History:  Past Medical History:   Diagnosis Date     AML (acute myeloblastic  leukemia) (H)      Anxiety      Hypothyroidism 2000     Osteoporosis        Past Surgical History:  Past Surgical History:   Procedure Laterality Date     GYN SURGERY           ORTHOPEDIC SURGERY      back     ORTHOPEDIC SURGERY      ankle     ORTHOPEDIC SURGERY      hips     ORTHOPEDIC SURGERY      wrists       Family History:  family history includes Cerebrovascular Disease in her mother; Prostate Cancer in her father.        Social History:  Patient is  and lives in [place], [place].  Retired .  Has 3 children.    Pain Assessment 0-10:  Patient rates pain at a 0.    Review of Symptoms:  Constitutional: Negative for fever, chills, weight loss and malaise/fatigue.   Overall patient feels well.  HENT: Negative for nosebleeds, congestion, sore throat and neck pain.   Respiratory: Negative for cough, hemoptysis, sputum production, shortness of breath and wheezing.   Cardiovascular: Negative for chest pain, palpitations, claudication, leg swelling and PND.   Gastrointestinal: Negative for heartburn, nausea, vomiting, abdominal pain, diarrhea, constipation and blood in stool.   Genitourinary: Negative for dysuria.   Musculoskeletal: Negative for myalgias and joint pain.   Skin: Negative for itching and rash.   Neurological: Negative for dizziness, tingling, weakness and headaches.   Endo/Heme/Allergies: Does not bruise/bleed easily.   Psychiatric/Behavioral: Negative for depression and suicidal ideas. The patient is not nervous/anxious.     Physical Exam:  Pulse 80   Resp 18   Wt 77.1 kg (170 lb)   BMI 26.68 kg/m      GENERAL: Well-developed, well-nourished, globally oriented.   LUNGS: Clear to auscultation bilaterally.   CARDIOVASCULAR: Regular rate and rhythm without murmur.   ABDOMEN: Soft, nondistended, nontender, no hepatosplenomegaly.  EXTREMITIES: Without cyanosis, clubbing or edema. .   LYMPHATICS: No palpable supraclavicular, axillary, inguinal, femoral adenopathy.   NEUROLOGIC:  Alert and oriented.  Gait normal.     Labs:  CBC RESULTS:   Recent Labs   Lab Test 09/08/20  1151   WBC 6.3   RBC 4.20   HGB 13.1   HCT 41.1   MCV 98   MCH 31.2   MCHC 31.9   RDW 14.2          All pertinent labs, scans, and test results have been reviewed.    EDUCATION:  Patient given verbal and written education on TBI side effects, purpose of treatment and what the radiation experience will be like.  Patient expressed understanding and asked appropriate questions.        Assessment/Plan:  Patient currently undergoing work-up for sibling NMA transplant per protocol 2019-10 which calls for  cGy.    STAFF RADIATION ONCOLOGIST    I saw the patient who appears to be a suitable candidate for TBI prior to hematopoietic transplant per protocol.  Conditioning for this protocol is nonmyeloablative and includes chemotherapy and total body irradiation given in 1 treatment pf 200 cGy.      The total body will be treated with patient in incumbent position with compensators.    Patient has no radiation history.    Risks and benefits of TBI were discussed including the potential short term side effects but not limited to nausea, vomiting, mucositis, alopecia, and potential organ damage.  Also discussed potential long term side effects including but not limited to cataracts, sterility, chronic organ dysfunction, neurological effects, hormone insufficiencies, and secondary malignancies.    Patient and family were given a chance to ask questions.  They seem to understand risks and benefits of radiation conditioning prior to transplant.  Informed consent was obtained.  Simulation and measurements were performed under my direction.    If you have any questions or concerns please do not hesitate to contact me. Patient will be followed by BMT staff after transplant.      Jimmy Ntahan M.D.  Department of Radiation Oncology  [facility]     CC  Patient Care Team:  Yong Moncada MD as  PCP - General (Internal Medicine)  Ranjana Pinto LICSW as  ( - Clinical)  GILDA DUFFY

## 2020-09-08 NOTE — LETTER
9/8/2020    RE: Florencia Gao  [address] [place] [address]    Dear Colleague,    Thank you for referring your patient, Florencia Gao, to the [facility] BLOOD AND MARROW TRANSPLANT. Please see a copy of my visit note below.    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    Teaching Topic: RN met with patient and completed rooming process including vitals, med and allergy rec. RN then also had patient sign the hippa form and then screening consent and these were placed in the bmt office folder. RN then also went through calender with patient and caregiver and provided copy. RN then also collected labs via venipuncture and sent to lab for processing. RN also had patient collect a UA and this was sent to lab as well. RN provided a 24 hour urine jug and instructions as well for patient and she verbalized understanding and agreed with plan. RN then had patient complete frailty assessment and MA completed the exercises with patient and completed EKG.     Person(s) involved in teaching: Patient  Motivation Level  Asks Questions: Yes  Eager to Learn: Yes  Cooperative: Yes  Receptive (willing/able to accept information): Yes  Any cultural factors/Bahai beliefs that may influence understanding or compliance? No    Patient demonstrates understanding of the following:  - Reason for the appointment, diagnosis and treatment plan: Yes  - Knowledge of proper use of medications and conditions for which they are ordered (with special attention to potential side effects or drug interactions): Yes  - Which situations necessitate calling provider and whom to contact: Yes    Teaching  concerns addressed: all questions and concerns were answered for the patient    Proper use and care of (medical equipment, care aids, etc.) Yes  Pain management techniques: Yes  Patient instructed on hand hygiene: Yes  How and/when to access community resources: Yes    Instructional Materials Used/Given:   All instructions were given to patient verbally and she agreed with the plan    Again, thank you for allowing me to participate in the care of your patient.      Sincerely,      BMT Nurse

## 2020-09-08 NOTE — LETTER
9/8/2020     RE: Florencia JIANG Gao  [address] [place] [address]    Dear Colleague,    Thank you for referring your patient, Florencia Gao, to the [facility] BLOOD AND MARROW TRANSPLANT. Please see a copy of my visit note below.    .Please see Nursing Note.     Zenobia De Leon MA      Again, thank you for allowing me to participate in the care of your patient.        Sincerely,        BMT Nurse

## 2020-09-09 ENCOUNTER — VIRTUAL VISIT (OUTPATIENT)
Dept: TRANSPLANT | Facility: CLINIC | Age: 75
End: 2020-09-09
Attending: INTERNAL MEDICINE
Payer: COMMERCIAL

## 2020-09-09 DIAGNOSIS — C92.01 ACUTE MYELOID LEUKEMIA IN REMISSION (H): Primary | ICD-10-CM

## 2020-09-09 DIAGNOSIS — C92.01 ACUTE MYELOID LEUKEMIA IN REMISSION (H): ICD-10-CM

## 2020-09-09 LAB
ABO + RH BLD: ABNORMAL
ABO + RH BLD: ABNORMAL
BLD GP AB INVEST PLASRBC-IMP: ABNORMAL
BLD GP AB SCN SERPL QL: ABNORMAL
BLOOD BANK CMNT PATIENT-IMP: ABNORMAL
BLOOD BANK CMNT PATIENT-IMP: ABNORMAL
CMV IGG SERPL QL IA: >8 AI (ref 0–0.8)
EBV VCA IGG SER QL IA: >8 AI (ref 0–0.8)
HBV CORE AB SERPL QL IA: NONREACTIVE
HBV SURFACE AG SERPL QL IA: NONREACTIVE
HCV AB SERPL QL IA: NONREACTIVE
HIV 1+2 AB+HIV1 P24 AG SERPL QL IA: NONREACTIVE
HSV1 IGG SERPL QL IA: >8 AI (ref 0–0.8)
HSV2 IGG SERPL QL IA: <0.2 AI (ref 0–0.8)
HTLV I+II AB PATRN SER IB-IMP: NEGATIVE
INTERPRETATION ECG - MUSE: NORMAL
SPECIMEN EXP DATE BLD: ABNORMAL
T PALLIDUM AB SER QL: NONREACTIVE

## 2020-09-09 PROCEDURE — 81370 HLA I & II TYPING LR: CPT | Mod: TEL | Performed by: INTERNAL MEDICINE

## 2020-09-09 PROCEDURE — 81376 HLA II TYPING 1 LOCUS LR: CPT | Mod: TEL | Performed by: INTERNAL MEDICINE

## 2020-09-09 PROCEDURE — 40000268 ZZH STATISTIC NO CHARGES: Mod: TEL,ZF

## 2020-09-09 ASSESSMENT — ANXIETY QUESTIONNAIRES
2. NOT BEING ABLE TO STOP OR CONTROL WORRYING: NOT AT ALL
6. BECOMING EASILY ANNOYED OR IRRITABLE: NOT AT ALL
5. BEING SO RESTLESS THAT IT IS HARD TO SIT STILL: NOT AT ALL
3. WORRYING TOO MUCH ABOUT DIFFERENT THINGS: NOT AT ALL
7. FEELING AFRAID AS IF SOMETHING AWFUL MIGHT HAPPEN: NOT AT ALL
1. FEELING NERVOUS, ANXIOUS, OR ON EDGE: SEVERAL DAYS
GAD7 TOTAL SCORE: 1
IF YOU CHECKED OFF ANY PROBLEMS ON THIS QUESTIONNAIRE, HOW DIFFICULT HAVE THESE PROBLEMS MADE IT FOR YOU TO DO YOUR WORK, TAKE CARE OF THINGS AT HOME, OR GET ALONG WITH OTHER PEOPLE: NOT DIFFICULT AT ALL

## 2020-09-09 ASSESSMENT — PATIENT HEALTH QUESTIONNAIRE - PHQ9
SUM OF ALL RESPONSES TO PHQ QUESTIONS 1-9: 0
5. POOR APPETITE OR OVEREATING: NOT AT ALL

## 2020-09-09 NOTE — PROGRESS NOTES
Pharmacy Assessment - Pre-Stem Cell Transplant    Assessments & Recommendations:  1) CrCl estimated as 72 ml/min, assess if Fludarabine dose reduction is needed and allowed per protocol (recommend to reduce 20% for crcl <70 ml/min if allowed).  2) Per chart review patient reported to have pre-diabetes, not currently on medication. Monitor blood sugar closely with steroid use and utilize sliding scale insulin if needed.  3) If patient is randomized to Tac/MMF/Post transplant Cy arm, avoid systemic immunosuppressive agents from day -7 until 24 hours after the completion of the post-transplant cyclophosphamide (Day +5). This includes corticosteroids as anti-emetics.    History of Present Illness:  Florencia Gao is a 75 year old year old female diagnosed with AML.  She has been treated with 7+3 (Idarubicin).  She is now being work up for NMA Allo-Sib Stem Cell Transplant on protocol 2019-10, which utilizes Flu/Cy/TBI as a conditioning regimen and randomization for aGVH prevention option.    Pertinent labs/tests:  Viral Testing:  CMV(+) / HSV(+) / EBV(+)  Ejection Fraction: 60% (9/8/2020)  QTc: 424msec (9/8/2020)    Weights:   Wt Readings from Last 3 Encounters:   09/08/20 77.7 kg (171 lb 4.8 oz)   09/08/20 77.7 kg (171 lb 3.2 oz)   08/20/20 76 kg (167 lb 8 oz)   Ideal body weight: 61.4 kg (135 lb 7.2 oz)  Adjusted ideal body weight: 67.9 kg (149 lb 12.6 oz)  % IBW:  127%  There is no height or weight on file to calculate BMI.    Primary BMT Physician: Dr Moncada  BMT RN Coordinator: Snehal Reza    Past Medical History:  No past medical history on file.    Medication Allergies:  Allergies   Allergen Reactions     Blood Transfusion Related (Informational Only)      Patient has a history of a clinically significant antibody against RBC antigens.  A delay in compatible RBCs may occur.     Sulfa Drugs Rash     Acetaminophen-Codeine Dizziness     could not sit up after surgery until off all narcotics, even Tylenol #3      Chlorhexidine Rash       Current Medications (pre-admit):  Current Outpatient Medications   Medication Sig Dispense Refill     cyanocobalamin (VITAMIN B-12) 1000 MCG tablet Take 1,000 mcg by mouth daily        levothyroxine (SYNTHROID/LEVOTHROID) 88 MCG tablet Take 88 mcg by mouth daily        polyethylene glycol (MIRALAX) 17 g packet Take 1 packet by mouth daily       venlafaxine (EFFEXOR) 75 MG tablet Take 75 mg by mouth daily          Herbal Medication/Nutritional Supplements:  None reported    Smoking/Past Drug Use:  Denied    Nausea/Vomiting, Pain, or other issues:  Patient reports nausea well controlled in the past but does not remember which medication was most effective.  Patient denies pain and is not currently taking pain medications for breakthrough relief.    Summary:  I met with Florencia Gao and caregiver Gaye via telephone visit for approximately 40 minutes.  We discussed her current medications, transplant chemotherapy, immunosuppression, risk of infection/prophylactic antibiotics, and supportive care. We also reviewed allergies, future vaccinations, and pharmacy expectations post transplant. Florencia Gao and her caregiver participated in our conversation and voiced their understanding of the topics discussed. Thank you for allowing me to participate in the care of this patient.    Suha Seymour, AbimaelD

## 2020-09-09 NOTE — LETTER
9/9/2020     RE: Florencia Gao  [address] [place] [address]    Dear Colleague,    Thank you for referring your patient, Florencia Gao, to the [facility] BLOOD AND MARROW TRANSPLANT. Please see a copy of my visit note below.    Pharmacy Assessment - Pre-Stem Cell Transplant    Assessments & Recommendations:  1) CrCl estimated as 72 ml/min, assess if Fludarabine dose reduction is needed and allowed per protocol (recommend to reduce 20% for crcl <70 ml/min if allowed).  2) Per chart review patient reported to have pre-diabetes, not currently on medication. Monitor blood sugar closely with steroid use and utilize sliding scale insulin if needed.  3) If patient is randomized to Tac/MMF/Post transplant Cy arm, avoid systemic immunosuppressive agents from day -7 until 24 hours after the completion of the post-transplant cyclophosphamide (Day +5). This includes corticosteroids as anti-emetics.    History of Present Illness:  Florencia Gao is a 75 year old year old female diagnosed with AML.  She has been treated with 7+3 (Idarubicin).  She is now being work up for NMA Allo-Sib Stem Cell Transplant on protocol 2019-10, which utilizes Flu/Cy/TBI as a conditioning regimen and randomization for aGVH prevention option.    Pertinent labs/tests:  Viral Testing:  CMV(+) / HSV(+) / EBV(+)  Ejection Fraction: 60% (9/8/2020)  QTc: 424msec (9/8/2020)    Weights:   Wt Readings from Last 3 Encounters:   09/08/20 77.7 kg (171 lb 4.8 oz)   09/08/20 77.7 kg (171 lb 3.2 oz)   08/20/20 76 kg (167 lb 8 oz)   Ideal body weight: 61.4 kg (135 lb 7.2 oz)  Adjusted ideal body weight: 67.9 kg (149 lb 12.6 oz)  % IBW:  127%  There is no height or weight on file to calculate BMI.    Primary BMT Physician: Dr Moncada  BMT RN Coordinator: Snehal Reza    Past Medical History:  No past medical history on file.    Medication Allergies:  Allergies   Allergen Reactions     Blood Transfusion Related (Informational Only)      Patient has a  history of a clinically significant antibody against RBC antigens.  A delay in compatible RBCs may occur.     Sulfa Drugs Rash     Acetaminophen-Codeine Dizziness     could not sit up after surgery until off all narcotics, even Tylenol #3     Chlorhexidine Rash       Current Medications (pre-admit):  Current Outpatient Medications   Medication Sig Dispense Refill     cyanocobalamin (VITAMIN B-12) 1000 MCG tablet Take 1,000 mcg by mouth daily        levothyroxine (SYNTHROID/LEVOTHROID) 88 MCG tablet Take 88 mcg by mouth daily        polyethylene glycol (MIRALAX) 17 g packet Take 1 packet by mouth daily       venlafaxine (EFFEXOR) 75 MG tablet Take 75 mg by mouth daily          Herbal Medication/Nutritional Supplements:  None reported    Smoking/Past Drug Use:  Denied    Nausea/Vomiting, Pain, or other issues:  Patient reports nausea well controlled in the past but does not remember which medication was most effective.  Patient denies pain and is not currently taking pain medications for breakthrough relief.    Summary:  I met with Florencia APOLINAR Gao and caregiver Gaye via telephone visit for approximately 40 minutes.  We discussed her current medications, transplant chemotherapy, immunosuppression, risk of infection/prophylactic antibiotics, and supportive care. We also reviewed allergies, future vaccinations, and pharmacy expectations post transplant. Florencia Gao and her caregiver participated in our conversation and voiced their understanding of the topics discussed. Thank you for allowing me to participate in the care of this patient.    Suha Seymour, PharmD      Again, thank you for allowing me to participate in the care of your patient.        Sincerely,        BMT Pharm D, [facility]

## 2020-09-09 NOTE — LETTER
9/9/2020     RE: Florencia Gao  [address] [place] [address]    Dear Colleague,    Thank you for referring your patient, Florencia Gao, to the [facility] BLOOD AND MARROW TRANSPLANT. Please see a copy of my visit note below.    BMT Teaching Flowsheet    Florencia Gao is a 75 year old female with AML.    Teaching Topic: Allogenic Stem Cell Transplant Work Up Teaching    Person(s) involved in teaching: Patient and Caregiver  Motivation Level  Asks Questions: Yes  Eager to Learn: Yes  Cooperative: Yes  Receptive (willing/able to accept information): Yes  Any cultural factors/Worship beliefs that may influence understanding or compliance? No    Patient and Caregiver demonstrates understanding of the following:  - Reason for the appointment, diagnosis and treatment plan: Yes  - Knowledge of proper use of medications and conditions for which they are ordered (with special attention to potential side effects or drug interactions): Yes  - Which situations necessitate calling provider and whom to contact: Yes    Teaching concerns addressed: None were identifed.     Proper use and care of (medical equipment, care aids, etc.) NA  Pain management techniques: Yes  Patient instructed on hand hygiene: Yes  How and/when to access community resources: Yes    Infection Control:  Patient and Caregiver demonstrates understanding of the following:  Surgical procedure site care taught NA  Signs and symptoms of infection taught Yes  Wound care taught NA  Central venous catheter care taught NA    Instructional Materials Used/Given:   Patient was given and reviewed BMT Teaching Binder, including medication pamphlets, sample treatment calendars, consents, contact phone numbers, hospital and discharge guidelines.  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 Presented consents for 2019-10, 2019-31, and [facility] BMT CTN 1704. Patient verbalizes understanding of the material and was encouraged to call  with any additional questions.  She stated interest in all 3 studies.     Time spent with patient: 90 minutes.    Specific Concerns: NA    Again, thank you for allowing me to participate in the care of your patient.        Sincerely,        BMT Nurse Coordinator

## 2020-09-09 NOTE — PROGRESS NOTES
BMT Teaching Flowsheet    Florencia Gao is a 75 year old female with AML.    Teaching Topic: Allogenic Stem Cell Transplant Work Up Teaching    Person(s) involved in teaching: Patient and Caregiver  Motivation Level  Asks Questions: Yes  Eager to Learn: Yes  Cooperative: Yes  Receptive (willing/able to accept information): Yes  Any cultural factors/Zoroastrianism beliefs that may influence understanding or compliance? No    Patient and Caregiver demonstrates understanding of the following:  - Reason for the appointment, diagnosis and treatment plan: Yes  - Knowledge of proper use of medications and conditions for which they are ordered (with special attention to potential side effects or drug interactions): Yes  - Which situations necessitate calling provider and whom to contact: Yes    Teaching concerns addressed: None were identifed.     Proper use and care of (medical equipment, care aids, etc.) NA  Pain management techniques: Yes  Patient instructed on hand hygiene: Yes  How and/when to access community resources: Yes    Infection Control:  Patient and Caregiver demonstrates understanding of the following:  Surgical procedure site care taught NA  Signs and symptoms of infection taught Yes  Wound care taught NA  Central venous catheter care taught NA    Instructional Materials Used/Given:   Patient was given and reviewed BMT Teaching Binder, including medication pamphlets, sample treatment calendars, consents, contact phone numbers, hospital and discharge guidelines.  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 Presented consents for 2019-10, 2019-31, and [facility] BMT CTN 1704. Patient verbalizes understanding of the material and was encouraged to call with any additional questions.  She stated interest in all 3 studies.     Time spent with patient: 90 minutes.    Specific Concerns: NA

## 2020-09-09 NOTE — PROGRESS NOTES
Blood and Marrow Transplant   Psychosocial Assessment with   Clinical     Assessment completed on 9/9/20 via phone as part of the COVID 19 protocol. Assessment of living situation, support system, financial status, functional status, coping, stressors, need for resources and social work intervention provided as needed. Information for this assessment was provided by pt and pt's son's (Robles) long-term significant other (Gaye) report in addition to medical chart review and consultation with medical team.     Present at Assessment:   Patient: Florencia Gao  Son's (Robles) long-term (14yrs) significant other: Gaye Colvin  : ROSSI Pabon, LG  : ROSSI Sawant, [facility]    Diagnosis: Acute Myeloid Leukemia (AML)    Date of Diagnosis: 6/2020    Transplant type: Allogeneic     Donor: Related allogeneic donor stem cell transplant  Donor: Alfonso Law (brother)    Physician: Yong Moncada MD    Workup Nurse Coordinator: Snehal Reza RN  Primary Nurse Coordinator: Britany Finch RN    Social Workers: ROSSI Pabon, [facility]    Permanent Address:   [address] [place] [address] [place] [address]    Living Situation: Lives alone in [place], [place] (36 minutes/ 30.2 miles from [facility]). MD stated that patient will be required to relocate post-transplant.     Local Address:   [facility] or [facility].    Pt is on the [facility] wait list (currently #15) & is looking into other local lodging options. She is also interested in the [facility] if this becomes available. Florencia & family are agreeable to relocation request by MD, though they have also expressed they'd be interested in discussing further with MD a plan to potentially return closer to home w/ family in [place], [place] father out from transplant pending Pt's condition/recovery.   Addendum: On 9/9/20, SW talked w/ Dr. Moncada re: above relocation. Dr. Moncada is open to Pt returning home, but not before day 60 and will  "continue to assess pending Pt's recovery. Pt will continue to have these conversations w/ Dr. Moncada. SW notified patient.     Contact Information:  Pt's Cell Phone: [phone]  Son's sig other/Gaye's Cell Phone: [phone]  Son Robles Garber Cell Phone: [phone]    Presenting Information:  Florencia is a 75 year old female diagnosed with AML who presents for evaluation for Allogenic transplant at the [facility] Pt was accompanied to today's visit by her son's (Robles) long-term (14 yrs) significant other Gaye Colvin.     Decision Making: Self    Health Care Directive: Pt reports she has the form and is having discussions with their family regarding their health care wishes. Pt said she wants to complete her Healthcare Directive in the next few days. Pt reports she would like to designate Gaye Colvin to be her Health Care Agent and plans to complete this prior to admission. SW explained that since she does not have a healthcare directive, legally her two living sons would make decisions on her behalf, if she did not have capacity to make her own medical decisions. Pt understood. Pt has previously expressed concern that her sons do not communicate with each other (stating \"they hardly speak\").    Relationship Status:     Special Needs: Local Lodging Needed.     Family/Support System: Pt endorsed a strong support system including family, close friends, and her sanjeev community who will be available to support pt throughout transplant process.     Spouse: NA  Children: 2 living sons Robles ([place], [place]) & Parmjit ([place], [place]); Pt has a son Jacoby ( last year following a motorcycle accident)  Grandchildren: 3 grandchildren (ages 24, 13, 7)  Parents:   Siblings: 2 older sisters & 4 younger brothers; all live in [facility] [place] except for 1 brother lives in [place], [place].   Friends: Pt endorsed a good friend support system.    Caregiver: " LIANNA discussed with pt and pt's son's significant other the caregiver role and expectation at length. Pt is agreeable to having a full time caregiver for a minimum of 100 days until cleared by the BMT physician. Pt and pt's son's significant other(Gaye) confirmed understanding of the caregiver requirement. Pt's primary caregiver will be Son (Robles) and his long-term significant other (Gyae Colvin) with Pt's sister Martha and brother Silverio as a secondary caregivers. Caregiver education and resources provided. No caregiver concerns identified.     Caregiver Contact Information:  Son's sig other/Gaye's Cell Phone: [phone]  Son Robles Gao's Cell Phone: [phone]    Transportation Mode: Private vehicle. Pt is aware of driving restrictions post-BMT and the need for the caregiver is to drive until cleared to drive by the BMT physician. SW provided information on parking info and monthly parking pass options.     Insurance: Pt has [employer]vantage health insurance. Pt denied specific insurance concerns at this time. LIANNA reiterated information about the [employer]  should specific insurance questions arise as pt moves through transplant process. Future Insurance questions referred to [employer] : Natalia Goncalves (P: [phone]).    Sources of Income: Pt is supported by her custodial income. Pt denied anticipation of financial hardship related to BMT at this time. SW provided information on neelam options and encouraged pt to contact this SW for support should financial situation change.     Employment: Retired (10 yrs) .     Mental Health:  Pt reported a hx of anxiety and panic attacks. Pt is currently taking medication (for the last 2 years) and pt feels the medication is working well. LIANNA explained that it's not uncommon for patients going through transplant to experience symptoms of depression/anxiety. Pt endorsed history of panic attacks that have  "been experienced in times of high stress such as going through cancer treatment. She identified what that looks like for her and states she experiences chest pain. Pt reports coping mechanisms for panic attacks and anxiety includes her paul chi routine, deep breathing, and \"putting my head down.\" Pt believes she would be able to identify symptoms of anxiety throughout the transplant process.     PHQ-9:  Pt scored a 0/27 which indicates \"none\" on the depression severity scale. Pt endorses this is an accurate reflection of her emotional state.    GAD7:  Pt scored a 1/21 which indicates no sign of anxiety on the Generalized Anxiety Disorder Questionnaire. Pt endorses this is an accurate reflection of her emotional state. Pt discussed her hx of anxiety and feels that she has been able to effectively cope and manage her symptoms as they arise.     Chemical Use:   Tobacco: No  Alcohol: No current use; no concerns w/ abstinence from alcohol post-transplant  Marijuana: No  Other Drugs: No  Based on the information provided, there appear to be no specific risks or concerns identified at this time.     Trauma/Loss/Abuse History: Multiple losses associated with cancer diagnosis and treatment, including health, changes to physical appearance, etc.     Spirituality: Pt identified as Baptism and is a part of the [facility] in [place], [place]. She endorses that they are aware of her diagnosis and are supportive. SW explained that there are Chaplains on the unit and pt can request to meet with a  at anytime. Florencia is interested in meeting regularly with unit  and having reiki services. Pt would also like a blessing ceremony with the unit  on the day of her transplant.    Coping: Pt noted that she is currently feeling \"hopeful\". Pt shared that her main coping mechanisms are talking with family/friends, prayer/spiritual practices, crossword puzzles, playing games on her phone, and watching " "movies. SW and pt discussed additional positive coping mechanisms that pt can utilize while in the hospital. While hospitalized, pt plans to continue her paul chi routine, watch movies, and play games on her phone. Florencia also endorsed that she has found it \"helpful\" that her son's significant other (Gaye) is a nurse and her medical knowledge has been \"useful\" during this time.    Caregiver Coping: Caregiver did not note any concerns and endorsed that she has all the education needed at this time.     Education Provided: Transplant process expectations, Caregiver requirements, Caregiver self-care, Financial issues related to transplant, Financial resources/grants available, Common psychosocial stressors pre/post transplant, Support group(s) available, Hospital resources available, Web site information, Social Work role and Resources for grandchildren.    Interventions Provided: Psychosocial Support and Education     Assessment and Recommendations for Team:  Pt is a 75 year old female diagnosed with AML who is here undergoing preparation for a planned Allogenic transplant.     Pt is pleasant, calm and able to articulate concerns/coping mechanisms in an appropriate manner. During our meeting pt was alert, was interactive, and she presented appropriate memory and thought processes. Pt feels comfortable communicating with the medical team. Pt has a strong supportive network of family and friends who are involved. Pt has developed strong coping mechanisms. Pt and pt's caregivers will benefit from ongoing psychosocial support in regards to coping with the adjustment to the BMT process.     Pt has a strong support system and a confirmed caregiver plan. Pt verbalizes understanding of the transplant process and wanting to proceed. SW provided contact information and encouraged pt to contact SW with questions, concerns, resources and for support.    Per this assessment, SW did not identify any barriers to this patient " moving forward with transplant.    Important Information:   -Hx of anxiety and panic attacks; see mental section.    Follow up Planned:   Psychosocial support  Lodging referrals - Pt is on the [facility] Wait List (Currently #15)  Healthcare Directive - Follow-up inpatient  Spiritual Health referral - Pt would like a blessing ceremony day of transplant, regular  visits and Reiki while inpatient.    ROSSI Pabon, [facility]  Adult Blood & Marrow Transplant   Phone: [phone]  Pager: [phone]

## 2020-09-10 ENCOUNTER — APPOINTMENT (OUTPATIENT)
Dept: LAB | Facility: CLINIC | Age: 75
End: 2020-09-10
Attending: INTERNAL MEDICINE
Payer: COMMERCIAL

## 2020-09-10 ENCOUNTER — OFFICE VISIT (OUTPATIENT)
Dept: RADIATION ONCOLOGY | Facility: CLINIC | Age: 75
End: 2020-09-10
Attending: RADIOLOGY
Payer: COMMERCIAL

## 2020-09-10 ENCOUNTER — MEDICAL CORRESPONDENCE (OUTPATIENT)
Dept: TRANSPLANT | Facility: CLINIC | Age: 75
End: 2020-09-10

## 2020-09-10 ENCOUNTER — OFFICE VISIT (OUTPATIENT)
Dept: TRANSPLANT | Facility: CLINIC | Age: 75
End: 2020-09-10
Attending: PHYSICIAN ASSISTANT
Payer: COMMERCIAL

## 2020-09-10 ENCOUNTER — OFFICE VISIT (OUTPATIENT)
Dept: TRANSPLANT | Facility: CLINIC | Age: 75
End: 2020-09-10
Attending: NURSE PRACTITIONER
Payer: COMMERCIAL

## 2020-09-10 ENCOUNTER — ONCOLOGY VISIT (OUTPATIENT)
Dept: TRANSPLANT | Facility: CLINIC | Age: 75
End: 2020-09-10
Attending: INTERNAL MEDICINE
Payer: COMMERCIAL

## 2020-09-10 VITALS
HEART RATE: 78 BPM | WEIGHT: 170.19 LBS | OXYGEN SATURATION: 97 % | RESPIRATION RATE: 14 BRPM | SYSTOLIC BLOOD PRESSURE: 127 MMHG | TEMPERATURE: 98.2 F | DIASTOLIC BLOOD PRESSURE: 74 MMHG | BODY MASS INDEX: 26.71 KG/M2

## 2020-09-10 VITALS — WEIGHT: 170 LBS | HEART RATE: 80 BPM | BODY MASS INDEX: 26.68 KG/M2 | RESPIRATION RATE: 18 BRPM

## 2020-09-10 VITALS
OXYGEN SATURATION: 97 % | DIASTOLIC BLOOD PRESSURE: 80 MMHG | TEMPERATURE: 98.2 F | BODY MASS INDEX: 26.7 KG/M2 | HEART RATE: 94 BPM | RESPIRATION RATE: 16 BRPM | WEIGHT: 170.1 LBS | SYSTOLIC BLOOD PRESSURE: 124 MMHG

## 2020-09-10 DIAGNOSIS — C92.01 ACUTE MYELOID LEUKEMIA IN REMISSION (H): ICD-10-CM

## 2020-09-10 DIAGNOSIS — C92.01 ACUTE MYELOID LEUKEMIA IN REMISSION (H): Primary | ICD-10-CM

## 2020-09-10 DIAGNOSIS — Z94.81 BONE MARROW TRANSPLANT STATUS (H): ICD-10-CM

## 2020-09-10 DIAGNOSIS — Z11.59 ENCOUNTER FOR SCREENING FOR OTHER VIRAL DISEASES: ICD-10-CM

## 2020-09-10 DIAGNOSIS — Z86.2 PERSONAL HISTORY OF DISEASES OF THE BLOOD AND BLOOD-FORMING ORGANS AND CERTAIN DISORDERS INVOLVING THE IMMUNE MECHANISM: ICD-10-CM

## 2020-09-10 DIAGNOSIS — C92.00 AML (ACUTE MYELOBLASTIC LEUKEMIA) (H): Primary | ICD-10-CM

## 2020-09-10 DIAGNOSIS — D70.4 CYCLIC NEUTROPENIA (H): ICD-10-CM

## 2020-09-10 DIAGNOSIS — Z01.818 EXAMINATION PRIOR TO CHEMOTHERAPY: ICD-10-CM

## 2020-09-10 LAB
A* LOCUS: NORMAL
ABSSOP COMMENTS: NORMAL
ABTEST METHOD: NORMAL
B* LOCUS: NORMAL
B*: NORMAL
BACTERIA SPEC CULT: NO GROWTH
BASOPHILS # BLD AUTO: 0 10E9/L (ref 0–0.2)
BASOPHILS NFR BLD AUTO: 0.4 %
BW-1: NORMAL
C* LOCUS: NORMAL
C*: NORMAL
COLLECT DURATION TIME UR: 24 H
CREAT 24H UR-MRATE: 1.03 G/(24.H) (ref 0.8–1.8)
CREAT CL 24H UR+SERPL-VRATE: 99 ML/MIN
CREAT CL/1.73 SQ M 24H UR+SERPL-ARVRAT: 91 ML/MIN/1.7M2 (ref 100–160)
CREAT SERPL-MCNC: 0.72 MG/DL (ref 0.52–1.04)
CREAT SERPL-MCNC: 0.72 MG/DL (ref 0.52–1.04)
CREAT UR-MCNC: 51 MG/DL
DIFFERENTIAL METHOD BLD: NORMAL
DPA1* LOCUS NMDP: NORMAL
DPA1* NMDP: NORMAL
DPA1*: NORMAL
DPA1*LOCUS: NORMAL
DPB1* LOCUS NMDP: NORMAL
DPB1* NMDP: NORMAL
DPB1*: NORMAL
DPB1*LOCUS: NORMAL
DQA1*LOCUS: NORMAL
DQB1* LOCUS: NORMAL
DQB1*: NORMAL
DRB1* LOCUS: NORMAL
DRB1*: NORMAL
DRB3* LOCUS: NORMAL
DRSSO COMMENTS: NORMAL
DRSSO TEST METHOD: NORMAL
EOSINOPHIL # BLD AUTO: 0.3 10E9/L (ref 0–0.7)
EOSINOPHIL NFR BLD AUTO: 3.6 %
ERYTHROCYTE [DISTWIDTH] IN BLOOD BY AUTOMATED COUNT: 14.1 % (ref 10–15)
GFR SERPL CREATININE-BSD FRML MDRD: 82 ML/MIN/{1.73_M2}
HCT VFR BLD AUTO: 39.8 % (ref 35–47)
HEIGHT IN CM: 170 CM
HGB BLD-MCNC: 12.8 G/DL (ref 11.7–15.7)
IMM GRANULOCYTES # BLD: 0 10E9/L (ref 0–0.4)
IMM GRANULOCYTES NFR BLD: 0.3 %
LAB SCANNED RESULT: NORMAL
LYMPHOCYTES # BLD AUTO: 1.8 10E9/L (ref 0.8–5.3)
LYMPHOCYTES NFR BLD AUTO: 24.7 %
MCH RBC QN AUTO: 30.9 PG (ref 26.5–33)
MCHC RBC AUTO-ENTMCNC: 32.2 G/DL (ref 31.5–36.5)
MCV RBC AUTO: 96 FL (ref 78–100)
MONOCYTES # BLD AUTO: 0.5 10E9/L (ref 0–1.3)
MONOCYTES NFR BLD AUTO: 6.2 %
NEUTROPHILS # BLD AUTO: 4.8 10E9/L (ref 1.6–8.3)
NEUTROPHILS NFR BLD AUTO: 64.8 %
NRBC # BLD AUTO: 0 10*3/UL
NRBC BLD AUTO-RTO: 0 /100
PLATELET # BLD AUTO: 240 10E9/L (ref 150–450)
RBC # BLD AUTO: 4.14 10E12/L (ref 3.8–5.2)
SPECIMEN SOURCE: NORMAL
SPECIMEN VOL UR: 2010 ML
WBC # BLD AUTO: 7.4 10E9/L (ref 4–11)

## 2020-09-10 PROCEDURE — 88275 CYTOGENETICS 100-300: CPT | Performed by: INTERNAL MEDICINE

## 2020-09-10 PROCEDURE — 88237 TISSUE CULTURE BONE MARROW: CPT | Performed by: INTERNAL MEDICINE

## 2020-09-10 PROCEDURE — 40001005 ZZHCL STATISTIC FLOW >15 ABY TC 88189: Performed by: INTERNAL MEDICINE

## 2020-09-10 PROCEDURE — G0463 HOSPITAL OUTPT CLINIC VISIT: HCPCS | Mod: ZF

## 2020-09-10 PROCEDURE — 82570 ASSAY OF URINE CREATININE: CPT | Performed by: INTERNAL MEDICINE

## 2020-09-10 PROCEDURE — 82565 ASSAY OF CREATININE: CPT | Performed by: INTERNAL MEDICINE

## 2020-09-10 PROCEDURE — 88305 TISSUE EXAM BY PATHOLOGIST: CPT | Performed by: INTERNAL MEDICINE

## 2020-09-10 PROCEDURE — 86141 C-REACTIVE PROTEIN HS: CPT | Performed by: INTERNAL MEDICINE

## 2020-09-10 PROCEDURE — 00000161 ZZHCL STATISTIC H-SPHEME PROCESS B/S: Performed by: INTERNAL MEDICINE

## 2020-09-10 PROCEDURE — 88311 DECALCIFY TISSUE: CPT | Performed by: INTERNAL MEDICINE

## 2020-09-10 PROCEDURE — 40000611 ZZHCL STATISTIC MORPHOLOGY W/INTERP HEMEPATH TC 85060: Performed by: INTERNAL MEDICINE

## 2020-09-10 PROCEDURE — 00000095 ZZHCL STATISTIC CREATININE CLEARANCE: Performed by: INTERNAL MEDICINE

## 2020-09-10 PROCEDURE — 88280 CHROMOSOME KARYOTYPE STUDY: CPT | Performed by: INTERNAL MEDICINE

## 2020-09-10 PROCEDURE — 77290 THER RAD SIMULAJ FIELD CPLX: CPT | Performed by: NURSE PRACTITIONER

## 2020-09-10 PROCEDURE — 88313 SPECIAL STAINS GROUP 2: CPT | Performed by: INTERNAL MEDICINE

## 2020-09-10 PROCEDURE — 40000556 ZZH STATISTIC PERIPHERAL IV START W US GUIDANCE: Mod: ZF

## 2020-09-10 PROCEDURE — 00000345 ZZHCL STATISTIC REV BONE MARROW OUTSIDE SLIDES TC 88321: Performed by: INTERNAL MEDICINE

## 2020-09-10 PROCEDURE — 88185 FLOWCYTOMETRY/TC ADD-ON: CPT | Performed by: INTERNAL MEDICINE

## 2020-09-10 PROCEDURE — G0463 HOSPITAL OUTPT CLINIC VISIT: HCPCS

## 2020-09-10 PROCEDURE — 88161 CYTOPATH SMEAR OTHER SOURCE: CPT | Performed by: INTERNAL MEDICINE

## 2020-09-10 PROCEDURE — 81050 URINALYSIS VOLUME MEASURE: CPT | Performed by: INTERNAL MEDICINE

## 2020-09-10 PROCEDURE — 25000128 H RX IP 250 OP 636: Mod: ZF | Performed by: NURSE PRACTITIONER

## 2020-09-10 PROCEDURE — 38222 DX BONE MARROW BX & ASPIR: CPT | Mod: ZF

## 2020-09-10 PROCEDURE — 40000951 ZZHCL STATISTIC BONE MARROW INTERP TC 85097: Performed by: INTERNAL MEDICINE

## 2020-09-10 PROCEDURE — 88264 CHROMOSOME ANALYSIS 20-25: CPT | Performed by: INTERNAL MEDICINE

## 2020-09-10 PROCEDURE — 88271 CYTOGENETICS DNA PROBE: CPT | Performed by: INTERNAL MEDICINE

## 2020-09-10 PROCEDURE — 40000795 ZZHCL STATISTIC DNA PROCESS AND HOLD: Performed by: INTERNAL MEDICINE

## 2020-09-10 PROCEDURE — 85025 COMPLETE CBC W/AUTO DIFF WBC: CPT | Performed by: NURSE PRACTITIONER

## 2020-09-10 PROCEDURE — 88184 FLOWCYTOMETRY/ TC 1 MARKER: CPT | Performed by: INTERNAL MEDICINE

## 2020-09-10 RX ADMIN — MIDAZOLAM HYDROCHLORIDE 2 MG: 2 INJECTION, SOLUTION INTRAMUSCULAR; INTRAVENOUS at 13:05

## 2020-09-10 ASSESSMENT — PAIN SCALES - GENERAL
PAINLEVEL: NO PAIN (0)

## 2020-09-10 ASSESSMENT — ANXIETY QUESTIONNAIRES: GAD7 TOTAL SCORE: 1

## 2020-09-10 NOTE — LETTER
9/10/2020         RE: Florencia Gao  [address] [place] [address]        Dear Colleague,    Thank you for referring your patient, Florencia Gao, to the [facility] BLOOD AND MARROW TRANSPLANT. Please see a copy of my visit note below.    Unable to obtain LP.  Pt with hx of discectomy L4-5.  Reschedule under fluroscopy.  NC & schedulers notified    Mague Winters    Again, thank you for allowing me to participate in the care of your patient.        Sincerely,        BMT Advanced Practice Provider

## 2020-09-10 NOTE — PROGRESS NOTES
I saw Florencia Hesterson to present the ACE BMT study (MT 2019-31). I discussed the study in detail with her, and discussed the randomization in one of 4 arms of the study. Each arm of the study was then discussed in detail. I explained to her that participation in the study is voluntary and that she could withdraw at any time. She expressed interest in participation in the study and all questions were answered. The patient expressed understanding the study which was confirmed using the teach back method. She signed the consent for the study and the screening consent for the 4 m walk test was already signed (4.33 seconds).    Maximiliano Mendes MD on 9/10/2020 at 12:39 PM

## 2020-09-10 NOTE — NURSING NOTE
"Oncology Rooming Note    September 10, 2020 11:41 AM   Florencia Gao is a 75 year old female who presents for:    No chief complaint on file.    Initial Vitals: /80   Pulse 94   Temp 98.2  F (36.8  C)   Resp 16   Wt 77.2 kg (170 lb 1.6 oz)   SpO2 97%   BMI 26.70 kg/m   Estimated body mass index is 26.7 kg/m  as calculated from the following:    Height as of 9/8/20: 1.7 m (5' 6.93\").    Weight as of this encounter: 77.2 kg (170 lb 1.6 oz). Body surface area is 1.91 meters squared.  No Pain (0) Comment: Data Unavailable   No LMP recorded. Patient is postmenopausal.  Allergies reviewed: Yes  Medications reviewed: Yes    Medications: Medication refills not needed today.  Pharmacy name entered into [employer] [place]: [id number] [address] AT [facility] [address]    Clinical concerns: No new concerns today. Maximiliano Mendes was notified.      Santo Kenney MA              "

## 2020-09-10 NOTE — NURSING NOTE
Provider unable to preform LP during this visit.  Pt remains stable, VSS.  PIV removed and pt discharged to home, ambulatory, in care of female caregiver.

## 2020-09-10 NOTE — LETTER
9/10/2020         RE: Florencia Gao  [address] [place] [address]        Dear Colleague,    Thank you for referring your patient, Florencia Gao, to the [facility] BLOOD AND MARROW TRANSPLANT. Please see a copy of my visit note below.    I saw Florencia Gao to present the ACE BMT study (MT 2019-31). I discussed the study in detail with her, and discussed the randomization in one of 4 arms of the study. Each arm of the study was then discussed in detail. I explained to her that participation in the study is voluntary and that she could withdraw at any time. She expressed interest in participation in the study and all questions were answered. The patient expressed understanding the study which was confirmed using the teach back method. She signed the consent for the study and the screening consent for the 4 m walk test was already signed (4.33 seconds).    Maximiliano Mendes MD on 9/10/2020 at 12:39 PM              Again, thank you for allowing me to participate in the care of your patient.        Sincerely,        BMT DOM

## 2020-09-10 NOTE — PROGRESS NOTES
Unable to obtain LP.  Pt with hx of discectomy L4-5.  Reschedule under fluroscopy.  NC & schedulers notified    Mague Winters

## 2020-09-10 NOTE — LETTER
9/10/2020         RE: Florencia Gao  [address] [place] [address]        Dear Colleague,    Thank you for referring your patient, Florencia Gao, to the [facility] BLOOD AND MARROW TRANSPLANT. Please see a copy of my visit note below.    BMT ONC Adult Bone Marrow Biopsy Procedure Note  September 10, 2020  /80   Pulse 94   Temp 98.2  F (36.8  C)   Resp 16   Wt 77.2 kg (170 lb 3.1 oz)   SpO2 97%   BMI 26.71 kg/m       Learning needs assessment complete within 12 months? YES    DIAGNOSIS: AML     PROCEDURE: Unilateral Bone Marrow Biopsy and Unilateral Aspirate    [place] [facility] [place]    Patient s identification was positively verified by verbal identification and invasive procedure safety checklist was completed. Informed consent was obtained. Following the administration of Midazolam 2mg as pre-medication, patient was placed in the prone position and prepped and draped in a sterile manner. Approximately 10 cc of 1% Lidocaine was used over the left posterior iliac spine. Following this a 3 mm incision was made. Trephine bone marrow core(s) was (were) obtained from the IC. Bone marrow aspirates were obtained from the LPIC. Aspirates were sent for morphology, immunophenotyping, cytogenetics and molecular diagnostics . A total of approximately 20 ml of marrow was aspirated. Following this procedure a sterile dressing was applied to the bone marrow biopsy site(s). The patient was placed in the supine position to maintain pressure on the biopsy site. Post-procedure wound care instructions were given.     Complications: NO    Pre-procedural pain: 0 out of 10 on the numeric pain rating scale.     Procedural pain: 2 out of 10 on the numeric pain rating scale.     Post-procedural pain assessment: 0 out of 10 on the numeric pain rating scale.     Interventions: NO    Length of procedure:20 minutes or less      Procedure performed by: Mague Winters      Again, thank you for allowing me to  participate in the care of your patient.        Sincerely,        [facility] BONE MARROW BIOPSY

## 2020-09-10 NOTE — PROGRESS NOTES
BMT ONC Adult Bone Marrow Biopsy Procedure Note  September 10, 2020  /80   Pulse 94   Temp 98.2  F (36.8  C)   Resp 16   Wt 77.2 kg (170 lb 3.1 oz)   SpO2 97%   BMI 26.71 kg/m       Learning needs assessment complete within 12 months? YES    DIAGNOSIS: AML     PROCEDURE: Unilateral Bone Marrow Biopsy and Unilateral Aspirate    LOCATION: [facility] [place] [facility]nd [facility]    Patient s identification was positively verified by verbal identification and invasive procedure safety checklist was completed. Informed consent was obtained. Following the administration of Midazolam 2mg as pre-medication, patient was placed in the prone position and prepped and draped in a sterile manner. Approximately 10 cc of 1% Lidocaine was used over the left posterior iliac spine. Following this a 3 mm incision was made. Trephine bone marrow core(s) was (were) obtained from the LPIC. Bone marrow aspirates were obtained from the LPIC. Aspirates were sent for morphology, immunophenotyping, cytogenetics and molecular diagnostics . A total of approximately 20 ml of marrow was aspirated. Following this procedure a sterile dressing was applied to the bone marrow biopsy site(s). The patient was placed in the supine position to maintain pressure on the biopsy site. Post-procedure wound care instructions were given.     Complications: NO    Pre-procedural pain: 0 out of 10 on the numeric pain rating scale.     Procedural pain: 2 out of 10 on the numeric pain rating scale.     Post-procedural pain assessment: 0 out of 10 on the numeric pain rating scale.     Interventions: NO    Length of procedure:20 minutes or less      Procedure performed by: Mague Winters

## 2020-09-10 NOTE — NURSING NOTE
Procedural consents discussed and pt's signature obtained.  Provider order received to administer Versed 2mg IVP as premed for BMBX.  Pt currently alert and oriented to plan of care.  Pt with new PIV placed, by Vascular Access RN.  Pt lying prone in stretcher.  Provider at bedside, call light w/in reach.  Caregiver at bedside.

## 2020-09-10 NOTE — NURSING NOTE
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 Diagnoses of Examination prior to chemotherapy , Bone marrow transplant status (H) , Personal history of diseases of the blood and blood-forming organs and certain disorders involving the immune mechanism , Encounter for screening for other viral diseases , and Cyclic neutropenia (H)  were also pertinent to this visit.    Teaching Topic: post Lumbar Puncture [id number]  Person(s) involved in teaching: Patient  Motivation Level  Asks Questions: Yes  Eager to Learn: Yes  Cooperative: Yes  Receptive (willing/able to accept information): Yes    Patient demonstrates understanding of the following:  - Reason for the appointment, diagnosis and treatment plan: Yes  - Knowledge of proper use of medications and conditions for which they are ordered (with special attention to potential side effects or drug interactions): NA  - Which situations necessitate calling provider and whom to contact: Yes    Teaching concerns addressed: reviewed post procedure instructions and potential adverse effects and actions to take if develops headache    Proper use and care of (medical equipment, care aids, etc.) NA  Pain management techniques: NA  Patient instructed on hand hygiene: Yes  How and/when to access community resources: Yes    Infection Control:  Patient demonstrates understanding of the following:  Surgical procedure site care taught NA  Signs and symptoms of infection taught NA  Wound care taught NA  Central venous catheter care taught NA    Instructional Materials Used/Given: verbal    Time spent with patient: 10 minutes.    Specific Concerns: NA

## 2020-09-10 NOTE — NURSING NOTE
Upon completion of BMBX, pt placed supine in stretcher.  PT awake and alert.  Denies acute pain at procedural site.  Pt provided with juice.  Will have pt lie on back for 30 min and RN to assess BMBX procedure site and dressing.  Pt with call light w/in reach.  Caregiver at bedside.

## 2020-09-10 NOTE — LETTER
9/10/2020         RE: Florencia Gao  [address] [place] [address]        Dear Colleague,    Thank you for referring your patient, Florencia Gao, to the RADIATION ONCOLOGY CLINIC. Please see a copy of my visit note below.      Radiation Oncology Consult  Patient comes in for initial consultation in the Radiation Oncology Department at the request of Dr. Moncada to determine eligibility for TBI prior to transplant.     History of Present Illness:  Florencia is a pleasant 75 year old female in no acute distress and is accompanied by her caregiver (son's girlfriend).  She noticed progressive bruising in April 2020.  She finally decided to get it checked out in June and was noted to be pancytopenic with hgb at 8.1, white count at 1.4 and platelets at 29,000.  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  She was negative for FLT3, NPM1 and CEBPA.  She started induction with 7+3.  Day 14 showed empty marrow.  When her counts recovered at about 6 weeks, bone marrow biopsy on 7/31 showed no leukemia and negative trisomy 8.  She tolerated treatment well.  She feels great now.  She will have a bone marrow biopsy today.    Her brother will be here donor.        Previous Radiation:  None    Previous Chemotherapy:  As above per HPI.      Pregnant: Not Applicable  No results found for: HCGQUANT  Implanted Cardiac Devices: No    Medications:  Current Outpatient Medications   Medication     cyanocobalamin (VITAMIN B-12) 1000 MCG tablet     levothyroxine (SYNTHROID/LEVOTHROID) 88 MCG tablet     polyethylene glycol (MIRALAX) 17 g packet     venlafaxine (EFFEXOR) 75 MG tablet     No current facility-administered medications for this visit.        Allergies:     Allergies   Allergen Reactions     Blood Transfusion Related (Informational Only)      Patient has a history of a clinically significant antibody against RBC antigens.  A delay in compatible RBCs may occur.     Sulfa Drugs  Rash     Acetaminophen-Codeine Dizziness     could not sit up after surgery until off all narcotics, even Tylenol #3     Chlorhexidine Rash       Past Medical History:  Past Medical History:   Diagnosis Date     AML (acute myeloblastic leukemia) (H)      Anxiety      Hypothyroidism 2000     Osteoporosis        Past Surgical History:  Past Surgical History:   Procedure Laterality Date     GYN SURGERY           ORTHOPEDIC SURGERY      back     ORTHOPEDIC SURGERY      ankle     ORTHOPEDIC SURGERY      hips     ORTHOPEDIC SURGERY      wrists       Family History:  family history includes Cerebrovascular Disease in her mother; Prostate Cancer in her father.        Social History:  Patient is  and lives in [place], [place].  Retired .  Has 3 children.    Pain Assessment 0-10:  Patient rates pain at a 0.    Review of Symptoms:  Constitutional: Negative for fever, chills, weight loss and malaise/fatigue.   Overall patient feels well.  HENT: Negative for nosebleeds, congestion, sore throat and neck pain.   Respiratory: Negative for cough, hemoptysis, sputum production, shortness of breath and wheezing.   Cardiovascular: Negative for chest pain, palpitations, claudication, leg swelling and PND.   Gastrointestinal: Negative for heartburn, nausea, vomiting, abdominal pain, diarrhea, constipation and blood in stool.   Genitourinary: Negative for dysuria.   Musculoskeletal: Negative for myalgias and joint pain.   Skin: Negative for itching and rash.   Neurological: Negative for dizziness, tingling, weakness and headaches.   Endo/Heme/Allergies: Does not bruise/bleed easily.   Psychiatric/Behavioral: Negative for depression and suicidal ideas. The patient is not nervous/anxious.     Physical Exam:  Pulse 80   Resp 18   Wt 77.1 kg (170 lb)   BMI 26.68 kg/m      GENERAL: Well-developed, well-nourished, globally oriented.   LUNGS: Clear to auscultation bilaterally.   CARDIOVASCULAR: Regular rate and  rhythm without murmur.   ABDOMEN: Soft, nondistended, nontender, no hepatosplenomegaly.  EXTREMITIES: Without cyanosis, clubbing or edema. .   LYMPHATICS: No palpable supraclavicular, axillary, inguinal, femoral adenopathy.   NEUROLOGIC: Alert and oriented.  Gait normal.     Labs:  CBC RESULTS:   Recent Labs   Lab Test 09/08/20  1151   WBC 6.3   RBC 4.20   HGB 13.1   HCT 41.1   MCV 98   MCH 31.2   MCHC 31.9   RDW 14.2          All pertinent labs, scans, and test results have been reviewed.    EDUCATION:  Patient given verbal and written education on TBI side effects, purpose of treatment and what the radiation experience will be like.  Patient expressed understanding and asked appropriate questions.        Assessment/Plan:  Patient currently undergoing work-up for sibling NMA transplant per protocol 2019-10 which calls for  cGy.    STAFF RADIATION ONCOLOGIST    I saw the patient who appears to be a suitable candidate for TBI prior to hematopoietic transplant per protocol.  Conditioning for this protocol is nonmyeloablative and includes chemotherapy and total body irradiation given in 1 treatment pf 200 cGy.      The total body will be treated with patient in incumbent position with compensators.    Patient has no radiation history.    Risks and benefits of TBI were discussed including the potential short term side effects but not limited to nausea, vomiting, mucositis, alopecia, and potential organ damage.  Also discussed potential long term side effects including but not limited to cataracts, sterility, chronic organ dysfunction, neurological effects, hormone insufficiencies, and secondary malignancies.    Patient and family were given a chance to ask questions.  They seem to understand risks and benefits of radiation conditioning prior to transplant.  Informed consent was obtained.  Simulation and measurements were performed under my direction.    If you have any questions or concerns please do not  hesitate to contact me. Patient will be followed by BMT staff after transplant.      Jimmy Nathan M.D.  Department of Radiation Oncology  [facility]     CC  Patient Care Team:  Yong Duffy MD as PCP - General (Internal Medicine)  Ranjana Pinto LICSW as  ( - Clinical)  YONG DUFFY      Again, thank you for allowing me to participate in the care of your patient.        Sincerely,        Jimmy Nathan MD

## 2020-09-10 NOTE — NURSING NOTE
PT remained supine for >30 min following completion of BMBX. PT alert and oriented upon this RN entering the room.  Vitals stable at this time.  Left illiac crest procedure site assessed, dressing CDI.  Plan to proceed with LP.  Provider at bedside.  PT able to follow commands and comply with verbal directions.

## 2020-09-11 ENCOUNTER — AMBULATORY - HEALTHEAST (OUTPATIENT)
Dept: INTERNAL MEDICINE | Facility: CLINIC | Age: 75
End: 2020-09-11

## 2020-09-11 DIAGNOSIS — C92.01 ACUTE MYELOID LEUKEMIA IN REMISSION (H): ICD-10-CM

## 2020-09-11 DIAGNOSIS — Z11.59 ENCOUNTER FOR SCREENING FOR OTHER VIRAL DISEASES: Primary | ICD-10-CM

## 2020-09-11 DIAGNOSIS — Z11.59 ENCOUNTER FOR SCREENING FOR OTHER VIRAL DISEASES: ICD-10-CM

## 2020-09-11 LAB
COPATH REPORT: NORMAL
CRP SERPL HS-MCNC: 4.8 MG/L
MPX SERIES: NONREACTIVE
T CRUZI AB SER DONR QL: NONREACTIVE
WNV RNA SPEC QL NAA+PROBE: NONREACTIVE

## 2020-09-12 ENCOUNTER — AMBULATORY - HEALTHEAST (OUTPATIENT)
Dept: FAMILY MEDICINE | Facility: CLINIC | Age: 75
End: 2020-09-12

## 2020-09-12 DIAGNOSIS — Z11.59 ENCOUNTER FOR SCREENING FOR OTHER VIRAL DISEASES: ICD-10-CM

## 2020-09-13 NOTE — TELEPHONE ENCOUNTER
DIAGNOSIS: ALLO DOUBLE LUMEN TUNEELED CATH TELEPHONE VISIT   DATE RECEIVED: 9.14.20   NOTES STATUS DETAILS   OFFICE NOTE from referring provider Internal 9.2.20, 8.20.20  Dr. Yong Moncada  [facility] BMT   OFFICE NOTE from other specialist N/A    DISCHARGE SUMMARY from hospital N/A    DISCHARGE REPORT from the ER N/A    OPERATIVE REPORT N/A    MEDICATION LIST Internal    XRAYS (IMAGES & REPORTS) Internal 9.8.20  CT Chest  Xray Chest   PATHOLOGY  Slides & report N/A

## 2020-09-14 ENCOUNTER — VIRTUAL VISIT (OUTPATIENT)
Dept: TRANSPLANT | Facility: CLINIC | Age: 75
End: 2020-09-14
Attending: INTERNAL MEDICINE
Payer: COMMERCIAL

## 2020-09-14 ENCOUNTER — COMMUNICATION - HEALTHEAST (OUTPATIENT)
Dept: SCHEDULING | Facility: CLINIC | Age: 75
End: 2020-09-14

## 2020-09-14 ENCOUNTER — PRE VISIT (OUTPATIENT)
Dept: INTERVENTIONAL RADIOLOGY/VASCULAR | Facility: CLINIC | Age: 75
End: 2020-09-14

## 2020-09-14 ENCOUNTER — VIRTUAL VISIT (OUTPATIENT)
Dept: INTERVENTIONAL RADIOLOGY/VASCULAR | Facility: CLINIC | Age: 75
End: 2020-09-14
Attending: INTERNAL MEDICINE

## 2020-09-14 DIAGNOSIS — C92.01 ACUTE MYELOID LEUKEMIA IN REMISSION (H): ICD-10-CM

## 2020-09-14 NOTE — LETTER
2020       RE: Florencia Gao  [address] [place] [address]     Dear Colleague,    Thank you for referring your patient, Florencia Gao, to the [facility] INTERVENTIONAL RADIOLOGY at [facility]. Please see a copy of my visit note below.  __________________________________________________  Interventional Radiology Consult    First Name: Florencia  Age: 75 year old   Referring Physician: Dr. Bloom   REASON FOR REFERRAL: education and evaluation for tunneled catheter placement  Patient is undergoing w/u for allogeneic BMT using  related donor (brother)    HPI:  This is a patient who noticed progressive bruising in 2020.  She finally decided to get it checked out in  and was noted to be pancytopenic with hgb at 8.1, white count at 1.4 and platelets at 29,000.  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  She was negative for FLT3, NPM1 and CEBPA.  She started induction with 7+3.  Day 14 showed empty marrow.  When her counts recovered at about 6 weeks, bone marrow biopsy on  showed no leukemia and negative trisomy 8.   She is now here undergoing work-up for allogeneic BMT, using a related donor.    LINE HX:  2020:  PICC x 2.  One in each arm.  The first one got infected.    PAST MEDICAL HISTORY:   Past Medical History:   Diagnosis Date     AML (acute myeloblastic leukemia) (H)      Anxiety      Hypothyroidism 2000     Osteoporosis        PAST SURGICAL HISTORY:   Past Surgical History:   Procedure Laterality Date     GYN SURGERY           ORTHOPEDIC SURGERY      back     ORTHOPEDIC SURGERY      ankle     ORTHOPEDIC SURGERY      hips     ORTHOPEDIC SURGERY      wrists     FAMILY HISTORY:   Family History   Problem Relation Age of Onset     Cerebrovascular Disease Mother      Prostate Cancer Father      SOCIAL HISTORY:   Social History     Tobacco Use     Smoking status: Never Smoker     Smokeless tobacco:  Never Used   Substance Use Topics     Alcohol use: Not on file     PROBLEM LIST:   Patient Active Problem List    Diagnosis Date Noted     AML (acute myeloblastic leukemia) (H) 06/24/2020     Priority: Medium     MEDICATIONS:   Prescription Medications as of 9/14/2020       Rx Number Disp Refills Start End Last Dispensed Date Next Fill Date Owning Pharmacy    cyanocobalamin (VITAMIN B-12) 1000 MCG tablet    6/19/2020    [id number] - [place], [place] - [address] [facility] [address]    Sig: Take 1,000 mcg by mouth daily     Class: Historical    Route: Oral    levothyroxine (SYNTHROID/LEVOTHROID) 88 MCG tablet    6/26/2020    [id number] - [place], [place] - [address] AT [facility] [address]    Sig: Take 88 mcg by mouth daily     Class: Historical    Route: Oral    polyethylene glycol (MIRALAX) 17 g packet        [id number] - [place], [place] - [address] [facility] [address]    Sig: Take 1 packet by mouth daily    Class: Historical    Route: Oral    venlafaxine (EFFEXOR) 75 MG tablet    4/9/2019    [id number] - [place], [place] - [address] [facility] [address]    Sig: Take 75 mg by mouth daily     Class: Historical    Route: Oral        ALLERGIES:    Allergies   Allergen Reactions     Blood Transfusion Related (Informational Only)      Patient has a history of a clinically significant antibody against RBC antigens.  A delay in compatible RBCs may occur.     Sulfa Drugs Rash     Acetaminophen-Codeine Dizziness     could not sit up after surgery until off all narcotics, even Tylenol #3     Chlorhexidine Rash     VITALS: There were no vitals taken for this visit.    ROS:negativeSkin: negative  Musculoskeletal: negative  Neurologic: negative  Psychiatric: negative    Physical Examination: virtual visit  Pleasant, oriented    RESULTS:  BMP RESULTS:  Lab Results   Component Value Date     09/08/2020    POTASSIUM 4.0  09/08/2020    CHLORIDE 101 09/08/2020    CO2 26 09/08/2020    ANIONGAP 6 09/08/2020    GLC 87 09/08/2020    BUN 14 09/08/2020    CR 0.72 09/10/2020    GFRESTIMATED 82 09/10/2020    GFRESTBLACK >90 09/10/2020    SANDRA 9.3 09/08/2020        CBC RESULTS:  Lab Results   Component Value Date    WBC 7.4 09/10/2020    RBC 4.14 09/10/2020    HGB 12.8 09/10/2020    HCT 39.8 09/10/2020    MCV 96 09/10/2020    MCH 30.9 09/10/2020    MCHC 32.2 09/10/2020    RDW 14.1 09/10/2020     09/10/2020       INR/PTT:  Lab Results   Component Value Date    INR 0.98 09/08/2020    PTT 29 09/08/2020         ASSESSMENT/PLAN:  Type of catheter: 9.5 Fr.  Double lumen tunneled power Padilla     Preferred Location: Right  Internal Jugular Vein     Platelet count is   Lab Results   Component Value Date     09/10/2020    , and coagulation factors are   Lab Results   Component Value Date    INR 0.98 09/08/2020    ,  Lab Results   Component Value Date    PTT 29 09/08/2020   . The patient is at low risk for bleeding during the procedure.    PROVIDER NOTE:  The tunneled catheter placement procedure and its risks including but not limited to bleeding, infection, fibrin sheath formation and blood clots was explained to Florencia and her friend, Gaye..    CONSENT: Affirmation of informed written consent was not obtained.     INSTRUCTIONS/GUIDELINES:   Eating and drinking restriction guidelines were reviewed., Anti-bacterial scrub was given and instructions for its use were reviewed to decrease the risk of infection on the day of the procedure., I explained the expected length of time the tunneled catheter could remain in place., I explained that when they went to the [facility] they would be taught about exit site dressing care, flushing of the lumens and how to care for the catheter when bathing., The role of Interventional Radiology was reviewed. and The principles of infection control were reviewed.     Again, thank you for  allowing me to participate in the care of your patient.  Sincerely,    Talya Caceres MS, APRN, CNS, CRN  Clinical Nurse Specialist  Interventional Radiology  [phone] (voice mail)  [phone] (pager)    CC  Patient Care Team:  Yong Moncada MD as PCP - General (Internal Medicine)  FRANCISCO BOND

## 2020-09-14 NOTE — PATIENT INSTRUCTIONS
Tunneled Catheter Placement Instructions    1. The BMT clinic will let you know the day of the procedure, where to report to and the time to arrive.    2. No solid foods or milk products for 6 hours prior to the procedure    3. You may have clear liquids up to 2 hours prior to the procedure (water, apple juice, broth, coffee or tea without milk or sugar, jell-o, white grape juice)    4. Anti-bacterial scrub  -Two times the day before the procedure, and once the day of the  procedure  -Think of a big square:  mid chest to ear lobes, both sides of the chest and neck area  -Use a clean washcloth each time.  Wet the washcloth.  Place a capful of the scrub on the wet washcloth and rub it all in, wash the area and leave it on for 5 minutes    -After 5 minutes, rinse off the washcloth, then rinse off the skin and pat the skin dry with a clean towel  -Or if you prefer, you may wash the scrub off in the shower  -No lotion or powders on the neck or chest area the day before or the day of the procedure.  But you may use deodorant.    Talya Caceres, CNS  Interventional Radiology

## 2020-09-14 NOTE — PROGRESS NOTES
Florencia is a 74 yo female who is being evaluated via a billable telephone visit.       Please call patient on Home phone.      The patient has been notified of following:      This telephone visit will be conducted via a call between you and your physician/provider. We have found that certain health care needs can be provided without the need for a physical exam.  This service lets us provide the care you need with a short phone conversation.  If a prescription is necessary we can send it directly to your pharmacy.  If lab work is needed we can place an order for that and you can then stop by our lab to have the test done at a later time.     Telephone visits are billed at different rates depending on your insurance coverage. During this emergency period, for some insurers they may be billed the same as an in-person visit.  Please reach out to your insurance provider with any questions.     If during the course of the call the physician/provider feels a telephone visit is not appropriate, you will not be charged for this service.     Physician has received verbal consent for a Telephone Visit from the patient? Yes     How would you like to obtain your AVS?  Mail to home  And send e-mail to Gaye    Call started at  9:10 AM  Call ended at  9:30 AM  __________________________________________________  Interventional Radiology Consult    First Name: Florencia  Age: 75 year old   Referring Physician: Dr. 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  She finally decided to get it checked out in June and was noted to be pancytopenic with hgb at 8.1, white count at 1.4 and platelets at 29,000.  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  She was negative for FLT3, NPM1 and CEBPA.  She started  induction with 7+3.  Day 14 showed empty marrow.  When her counts recovered at about 6 weeks, bone marrow biopsy on  showed no leukemia and negative trisomy 8.   She is now here undergoing work-up for allogeneic BMT, using a related donor.    LINE HX:  2020:  PICC x 2.  One in each arm.  The first one got infected.    PAST MEDICAL HISTORY:   Past Medical History:   Diagnosis Date     AML (acute myeloblastic leukemia) (H)      Anxiety      Hypothyroidism      Osteoporosis      PAST SURGICAL HISTORY:   Past Surgical History:   Procedure Laterality Date     GYN SURGERY           ORTHOPEDIC SURGERY      back     ORTHOPEDIC SURGERY      ankle     ORTHOPEDIC SURGERY      hips     ORTHOPEDIC SURGERY      wrists     FAMILY HISTORY:   Family History   Problem Relation Age of Onset     Cerebrovascular Disease Mother      Prostate Cancer Father      SOCIAL HISTORY:   Social History     Tobacco Use     Smoking status: Never Smoker     Smokeless tobacco: Never Used   Substance Use Topics     Alcohol use: Not on file     PROBLEM LIST:   Patient Active Problem List    Diagnosis Date Noted     AML (acute myeloblastic leukemia) (H) 2020     Priority: Medium     MEDICATIONS:   Prescription Medications as of 2020       Rx Number Disp Refills Start End Last Dispensed Date Next Fill Date Owning Pharmacy    cyanocobalamin (VITAMIN B-12) 1000 MCG tablet    2020    [id number]  [place], [place] - [address] AT [facility] [address]    Sig: Take 1,000 mcg by mouth daily     Class: Historical    Route: Oral    levothyroxine (SYNTHROID/LEVOTHROID) 88 MCG tablet    2020    [facility] #[id number]  [place], [place] - [address] AT [facility] [address]    Sig: Take 88 mcg by mouth daily     Class: Historical    Route: Oral    polyethylene glycol (MIRALAX) 17 g packet        [facility] #[id number]  [place], [place] - [address] AT [facility] [address]    Sig:  Take 1 packet by mouth daily    Class: Historical    Route: Oral    venlafaxine (EFFEXOR) 75 MG tablet    4/9/2019    [id number] [place] - [facility] AT [facility] [address]    Sig: Take 75 mg by mouth daily     Class: Historical    Route: Oral        ALLERGIES:    Allergies   Allergen Reactions     Blood Transfusion Related (Informational Only)      Patient has a history of a clinically significant antibody against RBC antigens.  A delay in compatible RBCs may occur.     Sulfa Drugs Rash     Acetaminophen-Codeine Dizziness     could not sit up after surgery until off all narcotics, even Tylenol #3     Chlorhexidine Rash     VITALS: There were no vitals taken for this visit.    ROS:negativeSkin: negative  Musculoskeletal: negative  Neurologic: negative  Psychiatric: negative    Physical Examination: virtual visit  Pleasant, oriented    RESULTS:  BMP RESULTS:  Lab Results   Component Value Date     09/08/2020    POTASSIUM 4.0 09/08/2020    CHLORIDE 101 09/08/2020    CO2 26 09/08/2020    ANIONGAP 6 09/08/2020    GLC 87 09/08/2020    BUN 14 09/08/2020    CR 0.72 09/10/2020    GFRESTIMATED 82 09/10/2020    GFRESTBLACK >90 09/10/2020    SANDRA 9.3 09/08/2020        CBC RESULTS:  Lab Results   Component Value Date    WBC 7.4 09/10/2020    RBC 4.14 09/10/2020    HGB 12.8 09/10/2020    HCT 39.8 09/10/2020    MCV 96 09/10/2020    MCH 30.9 09/10/2020    MCHC 32.2 09/10/2020    RDW 14.1 09/10/2020     09/10/2020       INR/PTT:  Lab Results   Component Value Date    INR 0.98 09/08/2020    PTT 29 09/08/2020         ASSESSMENT/PLAN:  Type of catheter: 9.5 Fr.  Double lumen tunneled power Padilla     Preferred Location: Right  Internal Jugular Vein     Platelet count is   Lab Results   Component Value Date     09/10/2020    , and coagulation factors are   Lab Results   Component Value Date    INR 0.98 09/08/2020    ,  Lab Results   Component Value Date    PTT 29 09/08/2020   . The  patient is at low risk for bleeding during the procedure.    PROVIDER NOTE:  The tunneled catheter placement procedure and its risks including but not limited to bleeding, infection, fibrin sheath formation and blood clots was explained to Florencia and her friend, Gaye..    CONSENT: Affirmation of informed written consent was not obtained.     INSTRUCTIONS/GUIDELINES:   Eating and drinking restriction guidelines were reviewed., Anti-bacterial scrub was given and instructions for its use were reviewed to decrease the risk of infection on the day of the procedure., I explained the expected length of time the tunneled catheter could remain in place., I explained that when they [facility] they would be taught about exit site dressing care, flushing of the lumens and how to care for the catheter when bathing., The role of Interventional Radiology was reviewed. and The principles of infection control were reviewed.     Talya Caceres MS, APRN, CNS, CRN  Clinical Nurse Specialist  Interventional Radiology  [phone] (voice mail)  [phone] (pager)    CC  Patient Care Team:  Yong Moncada MD as PCP - General (Internal Medicine)  Ranjana Pinto LICSW as  ( - Clinical)  FRANCISCO BOND

## 2020-09-14 NOTE — LETTER
"9/14/2020     RE: Florencia Gao  [address] [place] [address]    Dear Colleague,    Thank you for referring your patient, Florencia Gao, to the [facility] BLOOD AND MARROW TRANSPLANT. Please see a copy of my visit note below.    CLINICAL NUTRITION SERVICES    Florencia is a 75 year old who is being evaluated via a billable telephone visit.       The patient has been notified of following:      \"This telephone visit will be conducted via a call between you and your physician/provider. We have found that certain health care needs can be provided without the need for a physical exam.  This service lets us provide the care you need with a short phone conversation.  If a prescription is necessary we can send it directly to your pharmacy.  If lab work is needed we can place an order for that and you can then stop by our lab to have the test done at a later time.     Telephone visits are billed at different rates depending on your insurance coverage. During this emergency period, for some insurers they may be billed the same as an in-person visit.  Please reach out to your insurance provider with any questions.     If during the course of the call the physician/provider feels a telephone visit is not appropriate, you will not be charged for this service.\"     Patient has given verbal consent for Telephone visit?  Yes    REASON FOR ASSESSMENT  Florencia Gao is a/an 75 year old female assessed by the dietitian for a nutrition consult for education prior to a bone marrow transplant.   Referring Provider: Yong Moncada MD    PMH: AML, plan for related allogeneic donor stem cell transplant    NUTRITION HISTORY  Information obtained from patient.   -Following a regular diet at home.   -Pt reports that her appetite has been very good. She usually eats 2-3 meals a day and has a couple snacks. She reports trying to eat less red meat recently.      Recall:   B: Eggs and toast or oatmeal, blueberries, and nuts  L: " "Leftovers or canned salmon/tuna sandwich   D: Spaghetti w/ spaghetti squash and veggie burger or other easy to make dinners   Sn: Lays, hummus w/ carrots, ice cream, cheese sticks     LABS   Ketones urine 9/8: negative     MEDICATIONS  Vit B12 1,000 mcg daily     ANTHROPOMETRICS  Height: 1.7 m (5' 6.93\")   IBW: 61 kg (127%)   BMI: Overweight BMI 25-29.9  Weight History: Pt reports a UBW of 170 lbs and no changes in the past year.   Wt Readings from Last 10 Encounters:   09/10/20 77.2 kg (170 lb 3.1 oz)   09/10/20 77.2 kg (170 lb 1.6 oz)   09/10/20 77.1 kg (170 lb)   09/08/20 77.7 kg (171 lb 4.8 oz)   09/08/20 77.7 kg (171 lb 3.2 oz)   08/20/20 76 kg (167 lb 8 oz)     Dosing Weight: 65 kg (adjusted based on most recent wt of 77.2 kg on 9/10 and IBW of 61 kg)     ASSESSED NUTRITION NEEDS  Estimated Energy Needs: 7643-7904 kcals/day (25 - 30 kcals/kg)  Justification: Maintenance  Estimated Protein Needs: 50-65 grams protein/day (0.8 - 1 grams of pro/kg)  Justification: Maintenance  Estimated Fluid Needs: (1 mL/kcal)   Justification: Maintenance    PHYSICAL FINDINGS  See malnutrition section below.    MALNUTRITION  % Intake: Decreased intake does not meet criteria  % Weight Loss: None noted  Subcutaneous Fat Loss: Unable to assess  Muscle Loss: Unable to assess  Fluid Accumulation/Edema: Unable to assess  Malnutrition Diagnosis: Unable to determine due to no NFPA.     NUTRITION DIAGNOSIS  Food- and nutrition-related knowledge deficit related to no previous education on nutrition changes associated with a bone marrow transplant as evidenced by MD consult and pt verbalization      INTERVENTIONS  Implementation  Nutrition Education:   Provided education on how to cope with the side effects of potential upcoming bone marrow transplant. Encouraged optimize current healthy state to maintain weight prior to transplant. Discussed how to fortify meals and snacks with more calories and protein. Reviewed oral nutrition supplements " and snack options available for consumption during inpatient stay. Reviewed guidelines for food safety during neutropenia phase and while taking immunosuppression medications. Answered all pt's current questions about nutrition.     Provided handouts (via mail per pt request):  Nutrition Care and Bone Marrow Transplant and Food Safety for Transplant Recipients (booklet)    Goals  1. Maintain weight >170 Ibs throughout SCT course.   2. Verbalized 2-3 ways to maintain nutrition status throughout SCT course.     Monitoring/Evaluation  Progress toward goals will be monitored and evaluated per protocol.    Patient Understanding: Good  Expected Compliance: Good  Follow Up: PRN-pt provided with RD contact information if needs arise.     Phone call duration: 15 minutes    Dayanara Aquino RD, LD  5C/BMT RD pager [phone]

## 2020-09-14 NOTE — PROGRESS NOTES
"CLINICAL NUTRITION SERVICES    Florencia is a 75 year old who is being evaluated via a billable telephone visit.       The patient has been notified of following:      \"This telephone visit will be conducted via a call between you and your physician/provider. We have found that certain health care needs can be provided without the need for a physical exam.  This service lets us provide the care you need with a short phone conversation.  If a prescription is necessary we can send it directly to your pharmacy.  If lab work is needed we can place an order for that and you can then stop by our lab to have the test done at a later time.     Telephone visits are billed at different rates depending on your insurance coverage. During this emergency period, for some insurers they may be billed the same as an in-person visit.  Please reach out to your insurance provider with any questions.     If during the course of the call the physician/provider feels a telephone visit is not appropriate, you will not be charged for this service.\"     Patient has given verbal consent for Telephone visit?  Yes    REASON FOR ASSESSMENT  Florencia Gao is a/an 75 year old female assessed by the dietitian for a nutrition consult for education prior to a bone marrow transplant.   Referring Provider: Yong Moncada MD    PMH: AML, plan for related allogeneic donor stem cell transplant    NUTRITION HISTORY  Information obtained from patient.   -Following a regular diet at home.   -Pt reports that her appetite has been very good. She usually eats 2-3 meals a day and has a couple snacks. She reports trying to eat less red meat recently.      Recall:   B: Eggs and toast or oatmeal, blueberries, and nuts  L: Leftovers or canned salmon/tuna sandwich   D: Spaghetti w/ spaghetti squash and veggie burger or other easy to make dinners   Sn: Lays, hummus w/ carrots, ice cream, cheese sticks     LABS   Ketones urine 9/8: negative     MEDICATIONS  Vit B12 " "1,000 mcg daily     ANTHROPOMETRICS  Height: 1.7 m (5' 6.93\")   IBW: 61 kg (127%)   BMI: Overweight BMI 25-29.9  Weight History: Pt reports a UBW of 170 lbs and no changes in the past year.   Wt Readings from Last 10 Encounters:   09/10/20 77.2 kg (170 lb 3.1 oz)   09/10/20 77.2 kg (170 lb 1.6 oz)   09/10/20 77.1 kg (170 lb)   09/08/20 77.7 kg (171 lb 4.8 oz)   09/08/20 77.7 kg (171 lb 3.2 oz)   08/20/20 76 kg (167 lb 8 oz)     Dosing Weight: 65 kg (adjusted based on most recent wt of 77.2 kg on 9/10 and IBW of 61 kg)     ASSESSED NUTRITION NEEDS  Estimated Energy Needs: 1177-5451 kcals/day (25 - 30 kcals/kg)  Justification: Maintenance  Estimated Protein Needs: 50-65 grams protein/day (0.8 - 1 grams of pro/kg)  Justification: Maintenance  Estimated Fluid Needs: (1 mL/kcal)   Justification: Maintenance    PHYSICAL FINDINGS  See malnutrition section below.    MALNUTRITION  % Intake: Decreased intake does not meet criteria  % Weight Loss: None noted  Subcutaneous Fat Loss: Unable to assess  Muscle Loss: Unable to assess  Fluid Accumulation/Edema: Unable to assess  Malnutrition Diagnosis: Unable to determine due to no NFPA.     NUTRITION DIAGNOSIS  Food- and nutrition-related knowledge deficit related to no previous education on nutrition changes associated with a bone marrow transplant as evidenced by MD consult and pt verbalization      INTERVENTIONS  Implementation  Nutrition Education:   Provided education on how to cope with the side effects of potential upcoming bone marrow transplant. Encouraged optimize current healthy state to maintain weight prior to transplant. Discussed how to fortify meals and snacks with more calories and protein. Reviewed oral nutrition supplements and snack options available for consumption during inpatient stay. Reviewed guidelines for food safety during neutropenia phase and while taking immunosuppression medications. Answered all pt's current questions about nutrition.     Provided " handouts (via mail per pt request):  Nutrition Care and Bone Marrow Transplant and Food Safety for Transplant Recipients (booklet)    Goals  1. Maintain weight >170 Ibs throughout SCT course.   2. Verbalized 2-3 ways to maintain nutrition status throughout SCT course.     Monitoring/Evaluation  Progress toward goals will be monitored and evaluated per protocol.    Patient Understanding: Good  Expected Compliance: Good  Follow Up: PRN-pt provided with RD contact information if needs arise.     Phone call duration: 15 minutes    Dayanara Aquino RD, LD  5C/BMT RD pager [phone]

## 2020-09-14 NOTE — LETTER
9/14/2020       RE: Florencia Gao  [address] [place] [address]     Dear Colleague,    Thank you for referring your patient, Florencia Gao, to the [facility] INTERVENTIONAL RADIOLOGY at [facility]. Please see a copy of my visit note below.    Florencia is a 76 yo female who is being evaluated via a billable telephone visit.       Please call patient on Home phone.      The patient has been notified of following:      This telephone visit will be conducted via a call between you and your physician/provider. We have found that certain health care needs can be provided without the need for a physical exam.  This service lets us provide the care you need with a short phone conversation.  If a prescription is necessary we can send it directly to your pharmacy.  If lab work is needed we can place an order for that and you can then stop by our lab to have the test done at a later time.     Telephone visits are billed at different rates depending on your insurance coverage. During this emergency period, for some insurers they may be billed the same as an in-person visit.  Please reach out to your insurance provider with any questions.     If during the course of the call the physician/provider feels a telephone visit is not appropriate, you will not be charged for this service.     Physician has received verbal consent for a Telephone Visit from the patient? Yes     How would you like to obtain your AVS?  Mail to home  And send e-mail to Gaye    Call started at  9:10 AM  Call ended at  9:30 AM  __________________________________________________  Interventional Radiology Consult    First Name: Florencia  Age: 75 year old   Referring Physician: Dr. 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  She finally decided to get it checked out in  and was noted to be pancytopenic with hgb at 8.1, white count at 1.4 and platelets at 29,000.  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  She was negative for FLT3, NPM1 and CEBPA.  She started induction with 7+3.  Day 14 showed empty marrow.  When her counts recovered at about 6 weeks, bone marrow biopsy on  showed no leukemia and negative trisomy 8.   She is now here undergoing work-up for allogeneic BMT, using a related donor.    LINE HX:  2020:  PICC x 2.  One in each arm.  The first one got infected.    PAST MEDICAL HISTORY:   Past Medical History:   Diagnosis Date     AML (acute myeloblastic leukemia) (H)      Anxiety      Hypothyroidism      Osteoporosis      PAST SURGICAL HISTORY:   Past Surgical History:   Procedure Laterality Date     GYN SURGERY           ORTHOPEDIC SURGERY      back     ORTHOPEDIC SURGERY      ankle     ORTHOPEDIC SURGERY      hips     ORTHOPEDIC SURGERY      wrists     FAMILY HISTORY:   Family History   Problem Relation Age of Onset     Cerebrovascular Disease Mother      Prostate Cancer Father      SOCIAL HISTORY:   Social History     Tobacco Use     Smoking status: Never Smoker     Smokeless tobacco: Never Used   Substance Use Topics     Alcohol use: Not on file     PROBLEM LIST:   Patient Active Problem List    Diagnosis Date Noted     AML (acute myeloblastic leukemia) (H) 2020     Priority: Medium     MEDICATIONS:   Prescription Medications as of 2020       Rx Number Disp Refills Start End Last Dispensed Date Next Fill Date Owning Pharmacy    cyanocobalamin (VITAMIN B-12) 1000 MCG tablet    2020    [id number] - [place], [place] - [address] [place] [address] AT [facility] [address]    Sig: Take 1,000 mcg by mouth daily     Class: Historical    Route: Oral    levothyroxine (SYNTHROID/LEVOTHROID) 88 MCG tablet    2020    [facility]  #[id number] - [place], [place] - [address] AT [facility] [address]    Sig: Take 88 mcg by mouth daily     Class: Historical    Route: Oral    polyethylene glycol (MIRALAX) 17 g packet        [id number] - [place], [place] - [address] AT [facility] [address]    Sig: Take 1 packet by mouth daily    Class: Historical    Route: Oral    venlafaxine (EFFEXOR) 75 MG tablet    4/9/2019    [facility][id number] - [place], [place] - [address] AT [facility] [address]    Sig: Take 75 mg by mouth daily     Class: Historical    Route: Oral        ALLERGIES:    Allergies   Allergen Reactions     Blood Transfusion Related (Informational Only)      Patient has a history of a clinically significant antibody against RBC antigens.  A delay in compatible RBCs may occur.     Sulfa Drugs Rash     Acetaminophen-Codeine Dizziness     could not sit up after surgery until off all narcotics, even Tylenol #3     Chlorhexidine Rash     VITALS: There were no vitals taken for this visit.    ROS:negativeSkin: negative  Musculoskeletal: negative  Neurologic: negative  Psychiatric: negative    Physical Examination: virtual visit  Pleasant, oriented    RESULTS:  BMP RESULTS:  Lab Results   Component Value Date     09/08/2020    POTASSIUM 4.0 09/08/2020    CHLORIDE 101 09/08/2020    CO2 26 09/08/2020    ANIONGAP 6 09/08/2020    GLC 87 09/08/2020    BUN 14 09/08/2020    CR 0.72 09/10/2020    GFRESTIMATED 82 09/10/2020    GFRESTBLACK >90 09/10/2020    SANDRA 9.3 09/08/2020        CBC RESULTS:  Lab Results   Component Value Date    WBC 7.4 09/10/2020    RBC 4.14 09/10/2020    HGB 12.8 09/10/2020    HCT 39.8 09/10/2020    MCV 96 09/10/2020    MCH 30.9 09/10/2020    MCHC 32.2 09/10/2020    RDW 14.1 09/10/2020     09/10/2020       INR/PTT:  Lab Results   Component Value Date    INR 0.98 09/08/2020    PTT 29 09/08/2020         ASSESSMENT/PLAN:  Type of catheter: 9.5 Fr.  Double lumen tunneled power Padilla      Preferred Location: Right  Internal Jugular Vein     Platelet count is   Lab Results   Component Value Date     09/10/2020    , and coagulation factors are   Lab Results   Component Value Date    INR 0.98 09/08/2020    ,  Lab Results   Component Value Date    PTT 29 09/08/2020   . The patient is at low risk for bleeding during the procedure.    PROVIDER NOTE:  The tunneled catheter placement procedure and its risks including but not limited to bleeding, infection, fibrin sheath formation and blood clots was explained to Florencia and her friend, Gaye..    CONSENT: Affirmation of informed written consent was not obtained.     INSTRUCTIONS/GUIDELINES:   Eating and drinking restriction guidelines were reviewed., Anti-bacterial scrub was given and instructions for its use were reviewed to decrease the risk of infection on the day of the procedure., I explained the expected length of time the tunneled catheter could remain in place., I explained that when they went to the [facility] they would be taught about exit site dressing care, flushing of the lumens and how to care for the catheter when bathing., The role of Interventional Radiology was reviewed. and The principles of infection control were reviewed.     Talya Caceres MS, APRN, CNS, CRN  Clinical Nurse Specialist  Interventional Radiology  [phone] (voice mail)  [phone] (pager)    CC  Patient Care Team:  Yong Moncada MD as PCP - General (Internal Medicine)  Ranjana Pinto LICSW as  ( - Clinical)  FRANCISCO BOND    Again, thank you for allowing me to participate in the care of your patient.      Sincerely,    MARY Schofield CNS

## 2020-09-15 ENCOUNTER — TELEPHONE (OUTPATIENT)
Dept: INFECTIOUS DISEASES | Facility: CLINIC | Age: 75
End: 2020-09-15

## 2020-09-15 DIAGNOSIS — C92.00 AML (ACUTE MYELOBLASTIC LEUKEMIA) (H): Primary | ICD-10-CM

## 2020-09-15 LAB
COPATH REPORT: NORMAL
DLCOCOR-%PRED-PRE: 102 %
DLCOCOR-PRE: 21.73 ML/MIN/MMHG
DLCOUNC-%PRED-PRE: 101 %
DLCOUNC-PRE: 21.32 ML/MIN/MMHG
DLCOUNC-PRED: 21.09 ML/MIN/MMHG
ERV-%PRED-PRE: 154 %
ERV-PRE: 1.07 L
ERV-PRED: 0.69 L
EXPTIME-PRE: 6.66 SEC
FEF2575-%PRED-PRE: 185 %
FEF2575-PRE: 3.48 L/SEC
FEF2575-PRED: 1.88 L/SEC
FEFMAX-%PRED-PRE: 124 %
FEFMAX-PRE: 7.3 L/SEC
FEFMAX-PRED: 5.84 L/SEC
FEV1-%PRED-PRE: 133 %
FEV1-PRE: 3.09 L
FEV1FEV6-PRE: 82 %
FEV1FEV6-PRED: 79 %
FEV1FVC-PRE: 83 %
FEV1FVC-PRED: 77 %
FEV1SVC-PRE: 79 %
FEV1SVC-PRED: 69 %
FIFMAX-PRE: 4.46 L/SEC
FRCPLETH-%PRED-PRE: 111 %
FRCPLETH-PRE: 3.21 L
FRCPLETH-PRED: 2.87 L
FVC-%PRED-PRE: 123 %
FVC-PRE: 3.73 L
FVC-PRED: 3.03 L
IC-%PRED-PRE: 107 %
IC-PRE: 2.86 L
IC-PRED: 2.65 L
RVPLETH-%PRED-PRE: 95 %
RVPLETH-PRE: 2.13 L
RVPLETH-PRED: 2.24 L
TLCPLETH-%PRED-PRE: 112 %
TLCPLETH-PRE: 6.07 L
TLCPLETH-PRED: 5.4 L
VA-%PRED-PRE: 102 %
VA-PRE: 5.33 L
VC-%PRED-PRE: 117 %
VC-PRE: 3.94 L
VC-PRED: 3.35 L

## 2020-09-15 NOTE — PROGRESS NOTES
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 Pretransplant marrow on 9/10/2020 shows a stable CR1. She will receive a JCARLOS MRD allo-HCT on 2019-10, randomized to GVHD prophylaxis on CTN 1703.    Her past medical history  1. Hypothyroidism  2. Sleep apnea with CPAP  3. Prediabetes  4. Depression  5. Granulomatous lung disease     She smoked briefly (<2 pack years) and doesn't or rarely drink alcohol.     Her family history includes no one else with blood disorders or leukemia.  Her father  with prostate cancer; her mother and several other relatives had heart disease.    She has 2 living healthy sons; 1 other son  of a motorcycle accident and perhaps some excess use of alcohol.    She has 6 siblings:  4 brothers age 54-62 she reports as being healthy; and 2 sisters in their mid 70s are also healthy.      Bone Marrow Workup Results:  Blood Counts Recent Labs   Lab Test 09/10/20  1311   WBC 7.4   ANEU 4.8   ALYM 1.8   ANA ROSA 0.5   AEOS 0.3   HGB 12.8   HCT 39.8         Bone Marrow CR1   Blood Type Recent Labs   Lab Test 20  1151   ABO B      Chemistries Recent Labs   Lab Test 09/10/20  1311  20  1151   NA  --   --  134   POTASSIUM  --   --  4.0   CHLORIDE  --   --  101   CO2  --   --  26   BUN  --   --  14   CR 0.72   < > 0.75    < > = values in this interval not displayed.      Liver Tests Recent Labs   Lab Test 20  1151   BILITOTAL 0.4   ALKPHOS 75   AST 22   ALT 29      Chest X-Ray: No acute cardiopulmonary disease.      Chest CT 1. No acute airspace disease.  2. Sequela of prior granulomatous disease.   PFTs FVC%  FVC-%Pred-Pre  123        FEV1%  FEV1-%Pred-Pre  133        DLCO%  DLCOcor-%Pred-Pre  102       ECHO or MUGA: ECHO:  60-65%     EKG NSR   Serologies: CMV +, EBV +, HSV +    HBV  HCV HIV WNV by VERONICA nonreactive     Family History:   Family History   Problem Relation Age of Onset     Cerebrovascular Disease Mother      Prostate Cancer Father        Social History:   Social History     Socioeconomic History     Marital status:      Spouse name: Not on file     Number of children: Not on file     Years of education: Not on file     Highest education level: Not on file   Occupational History     Not on file   Social Needs     Financial resource strain: Not on file     Food insecurity     Worry: Not on file     Inability: Not on file     Transportation needs     Medical: Not on file     Non-medical: Not on file   Tobacco Use     Smoking status: Never Smoker     Smokeless tobacco: Never Used   Substance and Sexual Activity     Alcohol use: Not on file     Drug use: Not on file     Sexual activity: Not on file   Lifestyle     Physical activity     Days per week: Not on file     Minutes per session: Not on file     Stress: Not on file   Relationships     Social connections     Talks on phone: Not on file     Gets together: Not on file     Attends Rastafari service: Not on file     Active member of club or organization: Not on file     Attends meetings of clubs or organizations: Not on file     Relationship status: Not on file     Intimate partner violence     Fear of current or ex partner: Not on file     Emotionally abused: Not on file     Physically abused: Not on file     Forced sexual activity: Not on file   Other Topics Concern     Parent/sibling w/ CABG, MI or angioplasty before 65F 55M? Not Asked   Social History Narrative     Not on file       Past Medical History:   Past Medical History:   Diagnosis Date     AML (acute myeloblastic leukemia) (H)      Anxiety      Hypothyroidism      Osteoporosis         Past Surgical History:   Past Surgical History:   Procedure Laterality Date     GYN SURGERY           ORTHOPEDIC SURGERY      back     ORTHOPEDIC SURGERY      ankle      ORTHOPEDIC SURGERY      hips     ORTHOPEDIC SURGERY      wrists       Allergies:   Allergies   Allergen Reactions     Blood Transfusion Related (Informational Only)      Patient has a history of a clinically significant antibody against RBC antigens.  A delay in compatible RBCs may occur.     Sulfa Drugs Rash     Acetaminophen-Codeine Dizziness     could not sit up after surgery until off all narcotics, even Tylenol #3     Chlorhexidine Rash       Home Medications      Prior to Admission medications    Medication Sig Start Date End Date Taking? Authorizing Provider   cyanocobalamin (VITAMIN B-12) 1000 MCG tablet Take 1,000 mcg by mouth daily  6/19/20   Reported, Patient   levothyroxine (SYNTHROID/LEVOTHROID) 88 MCG tablet Take 88 mcg by mouth daily  6/26/20   Reported, Patient   polyethylene glycol (MIRALAX) 17 g packet Take 1 packet by mouth daily    Reported, Patient   venlafaxine (EFFEXOR) 75 MG tablet Take 75 mg by mouth daily  4/9/19   Reported, Patient       Review of Systems    Review of Systems:  CONSTITUTIONAL: NEGATIVE for fever, chills, change in weight  INTEGUMENTARY/SKIN: NEGATIVE for worrisome rashes, moles or lesions  EYES: NEGATIVE for vision changes or irritation  ENT/MOUTH: NEGATIVE for ear, mouth and throat problems  RESP: NEGATIVE for significant cough or SOB  BREAST: NEGATIVE for masses, tenderness or discharge  CV: NEGATIVE for chest pain, palpitations or peripheral edema  GI: NEGATIVE for nausea, abdominal pain, heartburn, or change in bowel habits  : NEGATIVE for frequency, dysuria, or hematuria  MUSCULOSKELETAL: NEGATIVE for significant arthralgias or myalgia  NEURO: NEGATIVE for weakness, dizziness or paresthesias  ENDOCRINE: NEGATIVE for temperature intolerance, skin/hair changes  HEME/ALLERGY: NEGATIVE for bleeding problems  PSYCHIATRIC: NEGATIVE for changes in mood or affect    PHYSICAL EXAM      Weight     Wt Readings from Last 3 Encounters:   09/10/20 77.2 kg (170 lb 3.1 oz)    09/10/20 77.2 kg (170 lb 1.6 oz)   09/10/20 77.1 kg (170 lb)          There were no vitals taken for this visit.     General: NAD   Eyes: LINH, sclera anicteric   Nose/Mouth/Throat: OP clear, buccal mucosa moist, no ulcerations   Lungs: CTA bilaterally  Cardiovascular: RRR, no M/R/G   Abdominal/Rectal: +BS, soft, NT, ND, No HSM   Lymphatics: No edema  Skin: No rashes or petechaie  Neuro: A&O   Additional Findings: Padilla site NT, no drainage.    ASSESSMENT BY SYSTEMS   Florencia Gao is a 75 year old female with AML in CR1, to proceed with a JCARLOS MRD allo-HCT on 2019-10, randomized to GVHD prophylaxis on CTN 1703. Granulomatous nodules (subcm) seen on CT chest. Awaiting follow up labs from ID, including a quant Gold. No history of TB or relevant exposures to TB. 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 No active infections. Otherwise, clear to proceed while awaiting recs from ID.      BMT and Cell Therapy Informed Consent Discussion  In today's visit, we discussed in detail the research for which Florencia Gao is eligible. We discussed the potential risks and potential benefits of each protocol individually. We explained potential alternatives to the protocols discussed. We explained to the patient that participation is voluntary and that consent may be withdrawn at any time.     The patient completed the last round of treatment on 9/2020.    HCT-CI score: 2 (DM and depression). We counseled the patient about the impact of this on the risk of treatment related and overall mortality. The score fit within treatment protocol eligibility criteria.    Karnofsky performance score: 100       Active infections:  PENDING.  Granulomatous nodules (subcm) seen on CT chest. Awaiting follow up labs from ID, including a quant Gold. No history of TB or relevant exposures to TB. 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 No active infections.    Reproductive status: What methods of birth control does the patient plan to use during the treatment period  beginning with conditioning and ending with the discontinuation of immune suppression (indicate with an X all that apply):  _X_ The patient is confirmed to be sterile or post-menopausal  __ Sexual abstinence  __ Condoms  __ Implants  __ Injectables  __ Oral contraceptives  __ Intrauterine devices (IUD)  __ Other (describe)    The patient received appropriate reproductive counseling and agreed with the need for effective contraception during the treatment procedures.      Dental health suitable to proceed: Yes    After our detailed discussion above, the patient signed the following consents for treatment and protocols:    [id number]    [facility]     The patient will receive a signed copy of the consents. The patient had opportunity to ask questions that were answered to the best of my ability and to the patient's apparent satisfaction.    Maximiliano Mendes MD

## 2020-09-15 NOTE — TELEPHONE ENCOUNTER
[facility] Call Center    Phone Message    May a detailed message be left on voicemail: yes     Reason for Call: Appointment Intake    Referring Provider Name: Yong Moncada MD in  BMT    Diagnosis and/or Symptoms: AML (acute myeloblastic leukemia) (H) [C92.00]      Please call back Matthew at BMT Dept so he can coordinate the appt. BMT wants the pt to be seen tomorrow afternoon or Thursday before 2:30 for virtual or in clinic appt for transplant clearance.     Per guideline send encounter for review.     Action Taken: Message routed to:  [facility] ([facility]): id    Travel Screening: Not Applicable

## 2020-09-16 ENCOUNTER — ANCILLARY PROCEDURE (OUTPATIENT)
Dept: GENERAL RADIOLOGY | Facility: CLINIC | Age: 75
End: 2020-09-16
Attending: NURSE PRACTITIONER

## 2020-09-16 VITALS
HEART RATE: 75 BPM | DIASTOLIC BLOOD PRESSURE: 82 MMHG | OXYGEN SATURATION: 97 % | TEMPERATURE: 97.9 F | RESPIRATION RATE: 18 BRPM | SYSTOLIC BLOOD PRESSURE: 118 MMHG

## 2020-09-16 DIAGNOSIS — C92.01 ACUTE MYELOID LEUKEMIA IN REMISSION (H): ICD-10-CM

## 2020-09-16 DIAGNOSIS — C92.00 AML (ACUTE MYELOBLASTIC LEUKEMIA) (H): ICD-10-CM

## 2020-09-16 LAB
GLUCOSE CSF-MCNC: 49 MG/DL (ref 40–70)
PROT CSF-MCNC: 53 MG/DL (ref 15–60)

## 2020-09-16 PROCEDURE — 88184 FLOWCYTOMETRY/ TC 1 MARKER: CPT | Performed by: INTERNAL MEDICINE

## 2020-09-16 PROCEDURE — 88185 FLOWCYTOMETRY/TC ADD-ON: CPT | Performed by: INTERNAL MEDICINE

## 2020-09-16 PROCEDURE — 00000102 ZZHCL STATISTIC CYTO WRIGHT STAIN TC: Performed by: INTERNAL MEDICINE

## 2020-09-16 PROCEDURE — 40001004 ZZHCL STATISTIC FLOW INT 9-15 ABY TC 88188: Performed by: INTERNAL MEDICINE

## 2020-09-16 PROCEDURE — 88108 CYTOPATH CONCENTRATE TECH: CPT | Mod: XU | Performed by: INTERNAL MEDICINE

## 2020-09-16 PROCEDURE — 84157 ASSAY OF PROTEIN OTHER: CPT | Performed by: INTERNAL MEDICINE

## 2020-09-16 PROCEDURE — 82945 GLUCOSE OTHER FLUID: CPT | Performed by: INTERNAL MEDICINE

## 2020-09-16 PROCEDURE — 89050 BODY FLUID CELL COUNT: CPT | Performed by: INTERNAL MEDICINE

## 2020-09-16 RX ORDER — LIDOCAINE HYDROCHLORIDE 10 MG/ML
30 INJECTION, SOLUTION EPIDURAL; INFILTRATION; INTRACAUDAL; PERINEURAL ONCE
Status: COMPLETED | OUTPATIENT
Start: 2020-09-16 | End: 2020-09-16

## 2020-09-16 RX ADMIN — LIDOCAINE HYDROCHLORIDE 5 ML: 10 INJECTION, SOLUTION EPIDURAL; INFILTRATION; INTRACAUDAL; PERINEURAL at 13:55

## 2020-09-16 NOTE — PROGRESS NOTES
Pt did well with the procedure, no complications.  AVSS.  Pt escorted to waiting room.    Aundrea Nolasco, BS, BSN, RN, PHN

## 2020-09-16 NOTE — DISCHARGE INSTRUCTIONS
Radiology Discharge Instructions After Your Lumbar Puncture         AFTER YOU GO HOME      Relax and take it easy for 24 hours    You may resume normal activity tomorrow    You may remove the bandage on your back in the evening or the next morning    Drink at least 4 glasses of extra fluid today if not on a fluid restriction    Do not submerge injection site in water for 24 hours, (no baths. pools, hot tubs).  Showers are OK.           DO NOT drive or operate machinery at home or at work for at least 24 hours    If you develop a headache you can take over the counter medicines and lay down.  You can try drinking caffeine-coffee, soda.                   CALL YOU PRIMARY PHYSICIAN IF:    You develop a severe headache    You develop nausea or vomiting     You develop a temperature of 101  or greater

## 2020-09-16 NOTE — PROGRESS NOTES
Interventional Radiology Brief Post Procedure Note    Procedure: IR Lumbar Puncture    Proceduralist: Jorge William PA-C    Assistant: None    Time Out: Prior to the start of the procedure and with procedural staff participation, I verbally confirmed the patient s identity using two indicators, relevant allergies, that the procedure was appropriate and matched the consent or emergent situation, and that the correct equipment/implants were available. Immediately prior to starting the procedure I conducted the Time Out with the procedural staff and re-confirmed the patient s name, procedure, and site/side. (The Joint Commission universal protocol was followed.)  Yes    Medications   Medication Event Details Admin User Admin Time       Sedation: None. Local Anesthestic used    Findings: Completed image guided diagnostic lumbar puncture. Primary puncture was performed at L5/S1 and a total of 4 ml was collected before flow stopped. Several techniques were performed to restore flow without success. Lab was called and we needed additional fluid to run all the tests. A second puncture was performed at L3/L4 and an additional 8 ml of fluid was collected. Patient tolerated the procedure well. No immediate complication.     Estimated Blood Loss: Minimal    Fluoroscopy Time:  2.0 minute(s)    SPECIMENS: Fluid and/or tissue for laboratory analysis    Complications: 1. None     Condition: Stable    Plan: Follow-up per primary team.     Comments: See dictated procedure note for full details.    Jorge William PA-C

## 2020-09-17 ENCOUNTER — MEDICAL CORRESPONDENCE (OUTPATIENT)
Dept: TRANSPLANT | Facility: CLINIC | Age: 75
End: 2020-09-17

## 2020-09-17 ENCOUNTER — RESEARCH ENCOUNTER (OUTPATIENT)
Dept: TRANSPLANT | Facility: CLINIC | Age: 75
End: 2020-09-17

## 2020-09-17 ENCOUNTER — VIRTUAL VISIT (OUTPATIENT)
Dept: INFECTIOUS DISEASES | Facility: CLINIC | Age: 75
End: 2020-09-17
Attending: STUDENT IN AN ORGANIZED HEALTH CARE EDUCATION/TRAINING PROGRAM
Payer: COMMERCIAL

## 2020-09-17 ENCOUNTER — ALLIED HEALTH/NURSE VISIT (OUTPATIENT)
Dept: TRANSPLANT | Facility: CLINIC | Age: 75
End: 2020-09-17
Attending: INTERNAL MEDICINE
Payer: COMMERCIAL

## 2020-09-17 ENCOUNTER — PRE VISIT (OUTPATIENT)
Dept: INFECTIOUS DISEASES | Facility: CLINIC | Age: 75
End: 2020-09-17

## 2020-09-17 VITALS
HEIGHT: 67 IN | DIASTOLIC BLOOD PRESSURE: 72 MMHG | TEMPERATURE: 97.5 F | WEIGHT: 171.5 LBS | RESPIRATION RATE: 16 BRPM | OXYGEN SATURATION: 96 % | HEART RATE: 103 BPM | BODY MASS INDEX: 26.92 KG/M2 | SYSTOLIC BLOOD PRESSURE: 127 MMHG

## 2020-09-17 DIAGNOSIS — C92.01 ACUTE MYELOID LEUKEMIA IN REMISSION (H): ICD-10-CM

## 2020-09-17 DIAGNOSIS — C92.01 ACUTE MYELOID LEUKEMIA IN REMISSION (H): Primary | ICD-10-CM

## 2020-09-17 DIAGNOSIS — Z01.818 ENCOUNTER FOR PRE-TRANSPLANT EVALUATION FOR STEM CELL TRANSPLANT: ICD-10-CM

## 2020-09-17 LAB
COPATH REPORT: NORMAL
CRYPTOC AG SPEC QL: NORMAL
SPECIMEN SOURCE: NORMAL

## 2020-09-17 PROCEDURE — 36415 COLL VENOUS BLD VENIPUNCTURE: CPT | Mod: ZF

## 2020-09-17 PROCEDURE — G0463 HOSPITAL OUTPT CLINIC VISIT: HCPCS | Mod: ZF

## 2020-09-17 PROCEDURE — 87305 ASPERGILLUS AG IA: CPT | Performed by: STUDENT IN AN ORGANIZED HEALTH CARE EDUCATION/TRAINING PROGRAM

## 2020-09-17 PROCEDURE — 86612 BLASTOMYCES ANTIBODY: CPT | Performed by: STUDENT IN AN ORGANIZED HEALTH CARE EDUCATION/TRAINING PROGRAM

## 2020-09-17 PROCEDURE — 87899 AGENT NOS ASSAY W/OPTIC: CPT | Performed by: STUDENT IN AN ORGANIZED HEALTH CARE EDUCATION/TRAINING PROGRAM

## 2020-09-17 PROCEDURE — 86481 TB AG RESPONSE T-CELL SUSP: CPT | Performed by: STUDENT IN AN ORGANIZED HEALTH CARE EDUCATION/TRAINING PROGRAM

## 2020-09-17 ASSESSMENT — PAIN SCALES - GENERAL
PAINLEVEL: NO PAIN (0)
PAINLEVEL: NO PAIN (0)

## 2020-09-17 ASSESSMENT — MIFFLIN-ST. JEOR: SCORE: 1314.43

## 2020-09-17 NOTE — NURSING NOTE
"Oncology Rooming Note    September 17, 2020 2:31 PM   Florencia Gao is a 75 year old female who presents for:    Chief Complaint   Patient presents with     RECHECK     BRYAN CLOSE- AML (acute myeloblastic leukemia)     Initial Vitals: /72 (BP Location: Right arm, Patient Position: Sitting, Cuff Size: Adult Regular)   Pulse 103   Temp 97.5  F (36.4  C) (Oral)   Resp 16   Ht 1.7 m (5' 6.93\")   Wt 77.8 kg (171 lb 8 oz)   SpO2 96%   BMI 26.92 kg/m   Estimated body mass index is 26.92 kg/m  as calculated from the following:    Height as of this encounter: 1.7 m (5' 6.93\").    Weight as of this encounter: 77.8 kg (171 lb 8 oz). Body surface area is 1.92 meters squared.  No Pain (0) Comment: Data Unavailable   No LMP recorded. Patient is postmenopausal.  Allergies reviewed: Yes  Medications reviewed: Yes    Medications: Medication refills not needed today.  Pharm[facility] entered into [facility]: [id number] - [place], [place] - [address] [place] [address] AT [facility] [address]    Clinical concerns: N/A      Shey Hernandez CMA              "

## 2020-09-17 NOTE — LETTER
9/17/2020       RE: Florencia Gao  [address] [place] [address]     Dear Colleague,    Thank you for referring your patient, Florencia Gao, to the [facility] AND INFECTIOUS DISEASES at [facility]. Please see a copy of my visit note below.    [facility]  Transplant Infectious Disease Clinic Note:  New Patient     Patient:  Florencia Gao, Date of birth 1945, Medical record number [id number]  Date of Visit:  09/17/2020  Consult requested by Dr. Mendes for evaluation of calcified granulomas on Chest CT/pre-transplant eval.         Assessment and Recommendations:   Recommendations:  - Check Quantiferon TB test  - Check for Histoplasma antibodies and Blastomyces antibodies  - Check serum Cryptococcal antigen  - Check Aspergillus Galactomannan  - Provided test results (Crypto, Quant TB and Galactomannan) negative, no objections from ID standpoint in proceeding for transplant     Assessment:  74 y/o female with recent diagnosis of AML s/p 7+3, now in remission, who is being worked up for allogeneic BMT. Referred to ID for pre-transplant evaluation/clearance because of calcified granulomas seen on Chest imaging.    Infectious Disease issues include:    #Calcified Granulomas on Chest CT/Concern for Previous Granulomatous Infectious Process:  Patient's pre-transplant Chest CT with numerous scattered calcified granulomas in the lung fields, along with calcified hilar lymph nodes and calcified granulomas in the non-enlarged spleen. No evidence of acute airspace disease. Patient asymptomatic from a respiratory standpoint, has no fevers/weight loss or systemic symptoms to suggest active/ongoing infection. No history of TB exposure or risk factors for TB or diagnosis/treatment for latent TB. No Hx travel to areas endemic for Cocci. Differentials include old histoplasmosis vs TB vs other endemic mycoses such as Blasto. Would evaluate  for TB with Quantiferon Gold TB test and rule out Cryptococcal disease (although low suspicion). Would also check for fungal biomarkers, although patient has not had history of prolonged neutropenia  Although specific primary prophylaxis for histoplasmosis is not recommended, patient likely to be on azole therapy such as Posaconazole in the post transplant setting which should provide prophylactic coverage    - QTc interval: 424 msec (20)  - Pneumocystis prophylaxis: None at present  - Bacterial prophylaxis: Non-neutropenic, will need prophylaxis when neutropenic  - Fungal prophylaxis: Per protocol post-transplant  - Serostatus & viral prophylaxis: CMV R+ (higher risk of reactivation), EBV +. Consider Letermovir prophylaxis in high risk patients post transplant  - Immunization status: Has received Tdap, Pneumococcal and Zoster vaccination  - Isolation status:  Good hand hygiene.    Dejan Wesley MD. Pager [phone]         History of the Infectious Disease lllness:     76 y/o female, PMHx hypothyroidism, ABEL (on CPAP x 3 years), depression, extensive surgical history (R hip fracture , R ankle fracture , . Cataract surgery), with recent diagnosis of AML s/p 7+3 who is being worked up for allogeneic BMT. Referred to ID for pre-transplant evaluation/clearance.     Patient was apparently healthy until Apri/May 2020, started noticing progressive fatigue and bruising. After evaluation in , diagnosed with pancytopenia and AML after a bone marrow biopsy. Treated with 7+3 (Idarubicin and Cytarabine) and achieved complete remission, with recovery of her counts. Hospitalization was c/b strep mitis bacteremia, neutropenic fever but no other infections complications. Now planned for allogeneic BMT.    As part of pre-transplant work-up, patient underwent Chest CT on 20 which showed no evidence of acute airspace disease but numerous scattered calcified granulomas in the lung fields, along with  calcified hilar lymph nodes and calcified granulomas in the non-enlarged spleen - sequelae of prior granulomatous disease. Referred to ID for evaluation.    At present, patient reports to be doing well overall. Denies fevers, chills, rigors. No weight loss, night sweats. No sinus pressure. No cough/SOB. NO GI/ symptoms. No infectious complications since her episode of bacteremia.    ID exposure/social history:  - [place] in [place], has lived near [place], [place] for the last 35 years. Has not consistently lived in any other state in the country, although she mentions going to [place] in the luis the last 2 years  - International travel - [place], [place], [place]. Has not traveled to [place], [place] or  [place]  - Has not travelled to or lived in [place]  ([facility], [place], [place], [place])  - Spent most of her life working as a . Also held jobs including being a , food stamps officer etc. No jobs involving dust/construction sites  - Had a dog as a pert previously (over 10 years ago). No birds as pets. No exotic animals/reptiles as pets  - Lives home by herself  - Hobbies involve reading, alexandra. Does not have a yard - does not garden, have indoor potted plants  - Smoked for around 2 years when she was in her 20s.  None since. Alcohol use very rarely on social occasions. No use of recreational drugs  - No sick contacts  - No exposure to anyone with TB. No family members diagnosed with or treated for TB. Patient reports having TB skin tests in past, presumably negative. Never been treated for/diagnosed with latent TB. Never incarcerated.    She is allergic to Bactrim (hives and itching) and Chlorhexidine (itchy rash). She had extended sleepiness with codeine in the past,  but not a tiffanie allergy.    Review of Systems:  CONSTITUTIONAL:  No fevers or chills. No night sweats. No weight loss.   EYES: negative for  icterus or acute vision changes.   ENT:  negative for hearing loss, tinnitus or sore throat. No sinus pain/pressure. No post-nasal drip.  RESPIRATORY:  negative for cough, sputum, dyspnea  CARDIOVASCULAR:  negative for chest pain, heart palpitations  GASTROINTESTINAL:  negative for nausea, vomiting, diarrhea or constipation  GENITOURINARY:  negative for dysuria or hematuria. Negative for frequency, urgency, flank pain.  HEME:  No easy bruising or bleeding  INTEGUMENT:  negative for rash or pruritus  NEURO:  Negative for headache or tremor.    Past Medical History:   Diagnosis Date     AML (acute myeloblastic leukemia) (H)      Anxiety      Hypothyroidism      Osteoporosis        Past Surgical History:   Procedure Laterality Date     GYN SURGERY           ORTHOPEDIC SURGERY      back     ORTHOPEDIC SURGERY      ankle     ORTHOPEDIC SURGERY      hips     ORTHOPEDIC SURGERY      wrists       Family History   Problem Relation Age of Onset     Cerebrovascular Disease Mother      Prostate Cancer Father      Her family history includes no one else with blood disorders or leukemia.  Her father  with prostate cancer; her mother and several other relatives had heart disease.    She has 2 living healthy sons; 1 other son  of a motorcycle accident and perhaps some excess use of alcohol.    She has 6 siblings :  4 brothers age 54-62 she reports as being healthy; and 2 sisters in their mid 70s are also healthy.  Tissue typing has not yet been completed.    Social History     Social History Narrative     Not on file     Social History     Tobacco Use     Smoking status: Never Smoker     Smokeless tobacco: Never Used   Substance Use Topics     Alcohol use: None     Drug use: None       Immunization history reviewed.  Up to date with annual influenza vaccines, Hepatitis A, PCV13, PPSV23, Tdap and Zoster vaccines (incl Shingrix)    Patient Active Problem List   Diagnosis     AML (acute myeloblastic leukemia) (H)        Outpatient Medications Marked as Taking for the 9/17/20 encounter (Virtual Visit) with Dejan Wesley MD   Medication Sig     cyanocobalamin (VITAMIN B-12) 1000 MCG tablet Take 1,000 mcg by mouth daily      levothyroxine (SYNTHROID/LEVOTHROID) 88 MCG tablet Take 88 mcg by mouth daily      polyethylene glycol (MIRALAX) 17 g packet Take 1 packet by mouth daily     venlafaxine (EFFEXOR) 75 MG tablet Take 75 mg by mouth daily        Allergies   Allergen Reactions     Blood Transfusion Related (Informational Only)      Patient has a history of a clinically significant antibody against RBC antigens.  A delay in compatible RBCs may occur.     Sulfa Drugs Rash     Acetaminophen-Codeine Dizziness     could not sit up after surgery until off all narcotics, even Tylenol #3     Chlorhexidine Rash              Physical Exam:     There were no vitals taken for this visit.  Wt Readings from Last 4 Encounters:   09/10/20 77.2 kg (170 lb 3.1 oz)   09/10/20 77.2 kg (170 lb 1.6 oz)   09/10/20 77.1 kg (170 lb)   09/08/20 77.7 kg (171 lb 4.8 oz)       Exam:  Unable to perform physical exam as visit was conducted via Telephone         Laboratory Data:     Inflammatory Markers    Recent Labs   Lab Test 09/10/20  1311   CRP 4.8       Immune Globulin Studies   No lab results found.    Metabolic Studies    Recent Labs   Lab Test 09/10/20  1311 09/10/20  0823 09/08/20  1151   NA  --   --  134   POTASSIUM  --   --  4.0   CHLORIDE  --   --  101   CO2  --   --  26   ANIONGAP  --   --  6   BUN  --   --  14   CR 0.72 0.72 0.75   GFRESTIMATED 82  --  80   GLC  --   --  87   SANDRA  --   --  9.3   URIC  --   --  4.3       Hepatic Studies    Recent Labs   Lab Test 09/08/20  1151   BILITOTAL 0.4   ALKPHOS 75   PROTTOTAL 8.0   ALBUMIN 3.9   AST 22   ALT 29     Hematology Studies     Recent Labs   Lab Test 09/10/20  1311 09/08/20  1151   WBC 7.4 6.3   ANEU 4.8 4.0   ALYM 1.8 1.5   ANA ROSA 0.5 0.4   AEOS 0.3 0.3   HGB 12.8 13.1   HCT 39.8 41.1   PLT  240 229       Urine Studies     Recent Labs   Lab Test 09/08/20  1110   URINEPH 6.0   NITRITE Negative   LEUKEST Small*   WBCU 7*       Medication levels  No lab results found.    Invalid input(s): AMIK    CSF testing     Recent Labs   Lab Test 09/16/20  1417   CWBC 2   CRBC 20*   CGLU 49   CTP 53       Microbiology:  Last Culture results with specimen source  Culture Micro   Date Value Ref Range Status   09/08/2020 No growth  Final    Specimen Description   Date Value Ref Range Status   09/08/2020 Midstream Urine  Final        Quantiferon testing   Recent Labs   Lab Test 09/10/20  1311 09/08/20  1151   LYMPH 24.7 24.4       Hepatitis B Testing     Recent Labs   Lab Test 09/08/20  1151   HBCAB Nonreactive   HEPBANG Nonreactive     Was the last Hepatitis B E antigen positive?   No results found for: HBEAGN     Hepatitis C Antibody   Date Value Ref Range Status   09/08/2020 Nonreactive NR^Nonreactive Final     Comment:     Assay performance characteristics have not been established for newborns,   infants, and children         CMV Antibody IgG   Date Value Ref Range Status   09/08/2020 >8.0 (H) 0.0 - 0.8 AI Final     Comment:     Positive  Antibody index (AI) values reflect qualitative changes in antibody   concentration that cannot be directly associated with clinical condition or   disease state.     08/20/2020 >8.0 (H) 0.0 - 0.8 AI Final     Comment:     Positive  Antibody index (AI) values reflect qualitative changes in antibody   concentration that cannot be directly associated with clinical condition or   disease state.       EBV Capsid Antibody IgG   Date Value Ref Range Status   09/08/2020 >8.0 (H) 0.0 - 0.8 AI Final     Comment:     Positive, suggests recent or past exposure  Antibody index (AI) values reflect qualitative changes in antibody   concentration that cannot be directly associated with clinical condition or   disease state.       Herpes Simplex Virus Type 1 IgG   Date Value Ref Range Status  "  09/08/2020 >8.0 (H) 0.0 - 0.8 AI Final     Comment:     Positive.  IgG antibody to HSV-1 detected.  Antibody index (AI) values reflect qualitative changes in antibody   concentration that cannot be directly associated with clinical condition or   disease state.       Herpes Simplex Virus Type 2 IgG   Date Value Ref Range Status   09/08/2020 <0.2 0.0 - 0.8 AI Final     Comment:     No HSV-2 IgG antibodies detected.  Antibody index (AI) values reflect qualitative changes in antibody   concentration that cannot be directly associated with clinical condition or   disease state.       Imaging:    CT Chest w/o Contrast (9/8/20):  FINDINGS:      Lungs:  No pleural effusion or pneumothorax. No focal consolidation. Numerous  scattered calcified granulomas. Linear pleural-parenchymal scarring in  the lateral left upper lobe and lateral right upper lobe. The central  tracheobronchial tree is clear. No bronchial wall thickening or  bronchiectasis.     Chest:   Normal heart size. No pericardial effusion. Normal caliber ascending  aorta and main pulmonary artery. No thoracic lymphadenopathy.  Calcified hilar lymph nodes.     Upper abdomen: Limited evaluation of the upper abdomen. Calcified  granuloma in the nonenlarged spleen.     Bones: No acute or aggressive osseus abnormality.                                                                      IMPRESSION:   1. No acute airspace disease.  2. Sequela of prior granulomatous disease.        Florencia Gao is a 75 year old female who is being evaluated via a billable telephone visit.      The patient has been notified of following:     \"This telephone visit will be conducted via a call between you and your physician/provider. We have found that certain health care needs can be provided without the need for a physical exam.  This service lets us provide the care you need with a short phone conversation.  If a prescription is necessary we can send it directly to your pharmacy.  " "If lab work is needed we can place an order for that and you can then stop by our lab to have the test done at a later time.    Telephone visits are billed at different rates depending on your insurance coverage. During this emergency period, for some insurers they may be billed the same as an in-person visit.  Please reach out to your insurance provider with any questions.    If during the course of the call the physician/provider feels a telephone visit is not appropriate, you will not be charged for this service.\"    Patient has given verbal consent for Telephone visit?  Yes    What phone number would you like to be contacted at? [phone]    How would you like to obtain your AVS? Mail a copy    Phone call duration: 25 minutes    MICHAEL Rodrigez      "

## 2020-09-17 NOTE — LETTER
2020         RE: Florencia Gao  [address] [place] [address]        Dear Colleague,    Thank you for referring your patient, Florencia Gao, to the [facility] BLOOD AND MARROW TRANSPLANT. Please see a copy of my visit note below.      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 Pretransplant marrow on 9/10/2020 shows a stable CR1. She will receive a JCARLOS MRD allo-HCT on 2019-10, randomized to GVHD prophylaxis on CTN 1703.    Her past medical history  1. Hypothyroidism  2. Sleep apnea with CPAP  3. Prediabetes  4. Depression  5. Granulomatous lung disease     She smoked briefly (<2 pack years) and doesn't or rarely drink alcohol.     Her family history includes no one else with blood disorders or leukemia.  Her father  with prostate cancer; her mother and several other relatives had heart disease.    She has 2 living healthy sons; 1 other son  of a motorcycle accident and perhaps some excess use of alcohol.    She has 6 siblings:  4 brothers age 54-62 she reports as being healthy; and 2 sisters in their mid 70s are also healthy.      Bone Marrow Workup Results:  Blood Counts Recent Labs   Lab Test 09/10/20  1311   WBC 7.4   ANEU 4.8   ALYM 1.8   ANA ROSA 0.5   AEOS 0.3   HGB 12.8   HCT 39.8         Bone Marrow CR1   Blood Type Recent Labs   Lab Test 20  1151   ABO B      Chemistries Recent Labs   Lab Test 09/10/20  1311  20  1151   NA  --   --  134   POTASSIUM  --   --  4.0   CHLORIDE  --   --  101   CO2  --   --  26   BUN  --   --  14   CR 0.72   < > 0.75    < > = values in this interval not displayed.      Liver Tests Recent Labs   Lab Test 20  1151   BILITOTAL 0.4   ALKPHOS 75   AST 22   ALT 29      Chest X-Ray: No acute cardiopulmonary disease.      Chest CT 1. No acute  airspace disease.  2. Sequela of prior granulomatous disease.   PFTs FVC%  FVC-%Pred-Pre  123        FEV1%  FEV1-%Pred-Pre  133        DLCO%  DLCOcor-%Pred-Pre  102       ECHO or MUGA: ECHO:  60-65%     EKG NSR   Serologies: CMV +, EBV +, HSV +    HBV HCV HIV WNV by VERONICA nonreactive     Family History:   Family History   Problem Relation Age of Onset     Cerebrovascular Disease Mother      Prostate Cancer Father        Social History:   Social History     Socioeconomic History     Marital status:      Spouse name: Not on file     Number of children: Not on file     Years of education: Not on file     Highest education level: Not on file   Occupational History     Not on file   Social Needs     Financial resource strain: Not on file     Food insecurity     Worry: Not on file     Inability: Not on file     Transportation needs     Medical: Not on file     Non-medical: Not on file   Tobacco Use     Smoking status: Never Smoker     Smokeless tobacco: Never Used   Substance and Sexual Activity     Alcohol use: Not on file     Drug use: Not on file     Sexual activity: Not on file   Lifestyle     Physical activity     Days per week: Not on file     Minutes per session: Not on file     Stress: Not on file   Relationships     Social connections     Talks on phone: Not on file     Gets together: Not on file     Attends Religion service: Not on file     Active member of club or organization: Not on file     Attends meetings of clubs or organizations: Not on file     Relationship status: Not on file     Intimate partner violence     Fear of current or ex partner: Not on file     Emotionally abused: Not on file     Physically abused: Not on file     Forced sexual activity: Not on file   Other Topics Concern     Parent/sibling w/ CABG, MI or angioplasty before 65F 55M? Not Asked   Social History Narrative     Not on file       Past Medical History:   Past Medical History:   Diagnosis Date     AML (acute myeloblastic  leukemia) (H)      Anxiety      Hypothyroidism      Osteoporosis         Past Surgical History:   Past Surgical History:   Procedure Laterality Date     GYN SURGERY           ORTHOPEDIC SURGERY      back     ORTHOPEDIC SURGERY      ankle     ORTHOPEDIC SURGERY      hips     ORTHOPEDIC SURGERY      wrists       Allergies:   Allergies   Allergen Reactions     Blood Transfusion Related (Informational Only)      Patient has a history of a clinically significant antibody against RBC antigens.  A delay in compatible RBCs may occur.     Sulfa Drugs Rash     Acetaminophen-Codeine Dizziness     could not sit up after surgery until off all narcotics, even Tylenol #3     Chlorhexidine Rash       Home Medications      Prior to Admission medications    Medication Sig Start Date End Date Taking? Authorizing Provider   cyanocobalamin (VITAMIN B-12) 1000 MCG tablet Take 1,000 mcg by mouth daily  20   Reported, Patient   levothyroxine (SYNTHROID/LEVOTHROID) 88 MCG tablet Take 88 mcg by mouth daily  20   Reported, Patient   polyethylene glycol (MIRALAX) 17 g packet Take 1 packet by mouth daily    Reported, Patient   venlafaxine (EFFEXOR) 75 MG tablet Take 75 mg by mouth daily  19   Reported, Patient       Review of Systems    Review of Systems:  CONSTITUTIONAL: NEGATIVE for fever, chills, change in weight  INTEGUMENTARY/SKIN: NEGATIVE for worrisome rashes, moles or lesions  EYES: NEGATIVE for vision changes or irritation  ENT/MOUTH: NEGATIVE for ear, mouth and throat problems  RESP: NEGATIVE for significant cough or SOB  BREAST: NEGATIVE for masses, tenderness or discharge  CV: NEGATIVE for chest pain, palpitations or peripheral edema  GI: NEGATIVE for nausea, abdominal pain, heartburn, or change in bowel habits  : NEGATIVE for frequency, dysuria, or hematuria  MUSCULOSKELETAL: NEGATIVE for significant arthralgias or myalgia  NEURO: NEGATIVE for weakness, dizziness or paresthesias  ENDOCRINE: NEGATIVE for  temperature intolerance, skin/hair changes  HEME/ALLERGY: NEGATIVE for bleeding problems  PSYCHIATRIC: NEGATIVE for changes in mood or affect    PHYSICAL EXAM      Weight     Wt Readings from Last 3 Encounters:   09/10/20 77.2 kg (170 lb 3.1 oz)   09/10/20 77.2 kg (170 lb 1.6 oz)   09/10/20 77.1 kg (170 lb)          There were no vitals taken for this visit.     General: NAD   Eyes: LINH, sclera anicteric   Nose/Mouth/Throat: OP clear, buccal mucosa moist, no ulcerations   Lungs: CTA bilaterally  Cardiovascular: RRR, no M/R/G   Abdominal/Rectal: +BS, soft, NT, ND, No HSM   Lymphatics: No edema  Skin: No rashes or petechaie  Neuro: A&O   Additional Findings: Padilla site NT, no drainage.    ASSESSMENT BY SYSTEMS   Florencia Gao is a 75 year old female with AML in CR1, to proceed with a JCARLOS MRD allo-HCT on 2019-10, randomized to GVHD prophylaxis on CTN 1703. Granulomatous nodules (subcm) seen on CT chest. Awaiting follow up labs from ID, including a quant Gold. No history of TB or relevant exposures to TB. 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 No active infections. Otherwise, clear to proceed while awaiting recs from ID.      BMT and Cell Therapy Informed Consent Discussion  In today's visit, we discussed in detail the research for which Florencia Gao is eligible. We discussed the potential risks and potential benefits of each protocol individually. We explained potential alternatives to the protocols discussed. We explained to the patient that participation is voluntary and that consent may be withdrawn at any time.     The patient completed the last round of treatment on 9/2020.    HCT-CI score: 2 (DM and depression). We counseled the patient about the impact of this on the risk of treatment related and overall mortality. The score fit within treatment protocol eligibility criteria.    Karnofsky performance score: 100       Active infections:  PENDING.  Granulomatous nodules (subcm) seen on CT chest. Awaiting follow up  labs from ID, including a quant Gold. No history of TB or relevant exposures to TB. 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 No active infections.    Reproductive status: What methods of birth control does the patient plan to use during the treatment period beginning with conditioning and ending with the discontinuation of immune suppression (indicate with an X all that apply):  _X_ The patient is confirmed to be sterile or post-menopausal  __ Sexual abstinence  __ Condoms  __ Implants  __ Injectables  __ Oral contraceptives  __ Intrauterine devices (IUD)  __ Other (describe)    The patient received appropriate reproductive counseling and agreed with the need for effective contraception during the treatment procedures.      Dental health suitable to proceed: Yes    After our detailed discussion above, the patient signed the following consents for treatment and protocols:    1801     1703    Local     The patient will receive a signed copy of the consents. The patient had opportunity to ask questions that were answered to the best of my ability and to the patient's apparent satisfaction.    Maximiliano Mendes MD

## 2020-09-17 NOTE — PROGRESS NOTES
[facility]  Transplant Infectious Disease Clinic Note:  New Patient     Patient:  Florencia Gao, Date of birth 1945, Medical record number [id number]  Date of Visit:  09/17/2020  Consult requested by Dr. Mendes for evaluation of calcified granulomas on Chest CT/pre-transplant eval.         Assessment and Recommendations:   Recommendations:  - Check Quantiferon TB test  - Check for Histoplasma antibodies and Blastomyces antibodies  - Check serum Cryptococcal antigen  - Check Aspergillus Galactomannan  - Provided test results (Crypto, Quant TB and Galactomannan) negative, no objections from ID standpoint in proceeding for transplant     Assessment:  74 y/o female with recent diagnosis of AML s/p 7+3, now in remission, who is being worked up for allogeneic BMT. Referred to ID for pre-transplant evaluation/clearance because of calcified granulomas seen on Chest imaging.    Infectious Disease issues include:    #Calcified Granulomas on Chest CT/Concern for Previous Granulomatous Infectious Process:  Patient's pre-transplant Chest CT with numerous scattered calcified granulomas in the lung fields, along with calcified hilar lymph nodes and calcified granulomas in the non-enlarged spleen. No evidence of acute airspace disease. Patient asymptomatic from a respiratory standpoint, has no fevers/weight loss or systemic symptoms to suggest active/ongoing infection. No history of TB exposure or risk factors for TB or diagnosis/treatment for latent TB. No Hx travel to areas endemic for Cocci. Differentials include old histoplasmosis vs TB vs other endemic mycoses such as Blasto. Would evaluate for TB with Quantiferon Gold TB test and rule out Cryptococcal disease (although low suspicion). Would also check for fungal biomarkers, although patient has not had history of prolonged neutropenia  Specific primary prophylaxis for histoplasmosis is not recommended, incidence of reactivation even in  post-transplant setting low    - QTc interval: 424 msec (20)  - Pneumocystis prophylaxis: None at present  - Bacterial prophylaxis: Non-neutropenic, will need prophylaxis when neutropenic  - Fungal prophylaxis: Per protocol post-transplant  - Serostatus & viral prophylaxis: CMV R+ (higher risk of reactivation), EBV +. Consider Letermovir prophylaxis in high risk patients post transplant  - Immunization status: Has received Tdap, Pneumococcal and Zoster vaccination  - Isolation status:  Good hand hygiene.    Dejan Wesley MD. Pager [phone]         History of the Infectious Disease lllness:     74 y/o female, PMHx hypothyroidism, ABEL (on CPAP x 3 years), depression, extensive surgical history (R hip fracture , R ankle fracture , . Cataract surgery), with recent diagnosis of AML s/p 7+3 who is being worked up for allogeneic BMT. Referred to ID for pre-transplant evaluation/clearance.     Patient was apparently healthy until Apri/May 2020, started noticing progressive fatigue and bruising. After evaluation in , diagnosed with pancytopenia and AML after a bone marrow biopsy. Treated with 7+3 (Idarubicin and Cytarabine) and achieved complete remission, with recovery of her counts. Hospitalization was c/b strep mitis bacteremia, neutropenic fever but no other infections complications. Now planned for allogeneic BMT.    As part of pre-transplant work-up, patient underwent Chest CT on 20 which showed no evidence of acute airspace disease but numerous scattered calcified granulomas in the lung fields, along with calcified hilar lymph nodes and calcified granulomas in the non-enlarged spleen - sequelae of prior granulomatous disease. Referred to ID for evaluation.    At present, patient reports to be doing well overall. Denies fevers, chills, rigors. No weight loss, night sweats. No sinus pressure. No cough/SOB. NO GI/ symptoms. No infectious complications since her episode of  bacteremia.    ID exposure/social history:  - [place] in [place], has lived near [place], [place] for the last 35 years. Has not consistently lived in any other state in the country, although she mentions going to [place] in the luis the last 2 years  - International travel - [place], [place], [place]. Has not traveled to [place], [place] or  [place]  - Has not travelled to or lived in [place] ([facility], [place], [place], [place]  - Spent most of her life working as a . Also held jobs including being a , food stamps officer etc. No jobs involving dust/construction sites  - Had a dog as a pert previously (over 10 years ago). No birds as pets. No exotic animals/reptiles as pets  - Lives home by herself  - Hobbies involve reading, alexandra. Does not have a yard - does not garden, have indoor potted plants  - Smoked for around 2 years when she was in her 20s.  None since. Alcohol use very rarely on social occasions. No use of recreational drugs  - No sick contacts  - No exposure to anyone with TB. No family members diagnosed with or treated for TB. Patient reports having TB skin tests in past, presumably negative. Never been treated for/diagnosed with latent TB. Never incarcerated.    She is allergic to Bactrim (hives and itching) and Chlorhexidine (itchy rash). She had extended sleepiness with codeine in the past,  but not a tiffanie allergy.    Review of Systems:  CONSTITUTIONAL:  No fevers or chills. No night sweats. No weight loss.   EYES: negative for icterus or acute vision changes.   ENT:  negative for hearing loss, tinnitus or sore throat. No sinus pain/pressure. No post-nasal drip.  RESPIRATORY:  negative for cough, sputum, dyspnea  CARDIOVASCULAR:  negative for chest pain, heart palpitations  GASTROINTESTINAL:  negative for nausea, vomiting, diarrhea or constipation  GENITOURINARY:  negative for dysuria or  hematuria. Negative for frequency, urgency, flank pain.  HEME:  No easy bruising or bleeding  INTEGUMENT:  negative for rash or pruritus  NEURO:  Negative for headache or tremor.    Past Medical History:   Diagnosis Date     AML (acute myeloblastic leukemia) (H)      Anxiety      Hypothyroidism      Osteoporosis        Past Surgical History:   Procedure Laterality Date     GYN SURGERY           ORTHOPEDIC SURGERY      back     ORTHOPEDIC SURGERY      ankle     ORTHOPEDIC SURGERY      hips     ORTHOPEDIC SURGERY      wrists       Family History   Problem Relation Age of Onset     Cerebrovascular Disease Mother      Prostate Cancer Father      Her family history includes no one else with blood disorders or leukemia.  Her father  with prostate cancer; her mother and several other relatives had heart disease.    She has 2 living healthy sons; 1 other son  of a motorcycle accident and perhaps some excess use of alcohol.    She has 6 siblings :  4 brothers age 54-62 she reports as being healthy; and 2 sisters in their mid 70s are also healthy.  Tissue typing has not yet been completed.    Social History     Social History Narrative     Not on file     Social History     Tobacco Use     Smoking status: Never Smoker     Smokeless tobacco: Never Used   Substance Use Topics     Alcohol use: None     Drug use: None       Immunization history reviewed.  Up to date with annual influenza vaccines, Hepatitis A, PCV13, PPSV23, Tdap and Zoster vaccines (incl Shingrix)    Patient Active Problem List   Diagnosis     AML (acute myeloblastic leukemia) (H)       Outpatient Medications Marked as Taking for the 20 encounter (Virtual Visit) with Dejan Wesley MD   Medication Sig     cyanocobalamin (VITAMIN B-12) 1000 MCG tablet Take 1,000 mcg by mouth daily      levothyroxine (SYNTHROID/LEVOTHROID) 88 MCG tablet Take 88 mcg by mouth daily      polyethylene glycol (MIRALAX) 17 g packet Take 1 packet by mouth  daily     venlafaxine (EFFEXOR) 75 MG tablet Take 75 mg by mouth daily        Allergies   Allergen Reactions     Blood Transfusion Related (Informational Only)      Patient has a history of a clinically significant antibody against RBC antigens.  A delay in compatible RBCs may occur.     Sulfa Drugs Rash     Acetaminophen-Codeine Dizziness     could not sit up after surgery until off all narcotics, even Tylenol #3     Chlorhexidine Rash              Physical Exam:     There were no vitals taken for this visit.  Wt Readings from Last 4 Encounters:   09/10/20 77.2 kg (170 lb 3.1 oz)   09/10/20 77.2 kg (170 lb 1.6 oz)   09/10/20 77.1 kg (170 lb)   09/08/20 77.7 kg (171 lb 4.8 oz)       Exam:  Unable to perform physical exam as visit was conducted via Telephone         Laboratory Data:     Inflammatory Markers    Recent Labs   Lab Test 09/10/20  1311   CRP 4.8       Immune Globulin Studies   No lab results found.    Metabolic Studies    Recent Labs   Lab Test 09/10/20  1311 09/10/20  0823 09/08/20  1151   NA  --   --  134   POTASSIUM  --   --  4.0   CHLORIDE  --   --  101   CO2  --   --  26   ANIONGAP  --   --  6   BUN  --   --  14   CR 0.72 0.72 0.75   GFRESTIMATED 82  --  80   GLC  --   --  87   SANDRA  --   --  9.3   URIC  --   --  4.3       Hepatic Studies    Recent Labs   Lab Test 09/08/20  1151   BILITOTAL 0.4   ALKPHOS 75   PROTTOTAL 8.0   ALBUMIN 3.9   AST 22   ALT 29     Hematology Studies     Recent Labs   Lab Test 09/10/20  1311 09/08/20  1151   WBC 7.4 6.3   ANEU 4.8 4.0   ALYM 1.8 1.5   ANA ROSA 0.5 0.4   AEOS 0.3 0.3   HGB 12.8 13.1   HCT 39.8 41.1    229       Urine Studies     Recent Labs   Lab Test 09/08/20  1110   URINEPH 6.0   NITRITE Negative   LEUKEST Small*   WBCU 7*       Medication levels  No lab results found.    Invalid input(s): AMIK    CSF testing     Recent Labs   Lab Test 09/16/20  1417   CWBC 2   CRBC 20*   CGLU 49   CTP 53       Microbiology:  Last Culture results with specimen  source  Culture Micro   Date Value Ref Range Status   09/08/2020 No growth  Final    Specimen Description   Date Value Ref Range Status   09/08/2020 Midstream Urine  Final        Quantiferon testing   Recent Labs   Lab Test 09/10/20  1311 09/08/20  1151   LYMPH 24.7 24.4       Hepatitis B Testing     Recent Labs   Lab Test 09/08/20  1151   HBCAB Nonreactive   HEPBANG Nonreactive     Was the last Hepatitis B E antigen positive?   No results found for: HBEAGN     Hepatitis C Antibody   Date Value Ref Range Status   09/08/2020 Nonreactive NR^Nonreactive Final     Comment:     Assay performance characteristics have not been established for newborns,   infants, and children         CMV Antibody IgG   Date Value Ref Range Status   09/08/2020 >8.0 (H) 0.0 - 0.8 AI Final     Comment:     Positive  Antibody index (AI) values reflect qualitative changes in antibody   concentration that cannot be directly associated with clinical condition or   disease state.     08/20/2020 >8.0 (H) 0.0 - 0.8 AI Final     Comment:     Positive  Antibody index (AI) values reflect qualitative changes in antibody   concentration that cannot be directly associated with clinical condition or   disease state.       EBV Capsid Antibody IgG   Date Value Ref Range Status   09/08/2020 >8.0 (H) 0.0 - 0.8 AI Final     Comment:     Positive, suggests recent or past exposure  Antibody index (AI) values reflect qualitative changes in antibody   concentration that cannot be directly associated with clinical condition or   disease state.       Herpes Simplex Virus Type 1 IgG   Date Value Ref Range Status   09/08/2020 >8.0 (H) 0.0 - 0.8 AI Final     Comment:     Positive.  IgG antibody to HSV-1 detected.  Antibody index (AI) values reflect qualitative changes in antibody   concentration that cannot be directly associated with clinical condition or   disease state.       Herpes Simplex Virus Type 2 IgG   Date Value Ref Range Status   09/08/2020 <0.2 0.0 - 0.8 AI  "Final     Comment:     No HSV-2 IgG antibodies detected.  Antibody index (AI) values reflect qualitative changes in antibody   concentration that cannot be directly associated with clinical condition or   disease state.       Imaging:    CT Chest w/o Contrast (9/8/20):  FINDINGS:      Lungs:  No pleural effusion or pneumothorax. No focal consolidation. Numerous  scattered calcified granulomas. Linear pleural-parenchymal scarring in  the lateral left upper lobe and lateral right upper lobe. The central  tracheobronchial tree is clear. No bronchial wall thickening or  bronchiectasis.     Chest:   Normal heart size. No pericardial effusion. Normal caliber ascending  aorta and main pulmonary artery. No thoracic lymphadenopathy.  Calcified hilar lymph nodes.     Upper abdomen: Limited evaluation of the upper abdomen. Calcified  granuloma in the nonenlarged spleen.     Bones: No acute or aggressive osseus abnormality.                                                                      IMPRESSION:   1. No acute airspace disease.  2. Sequela of prior granulomatous disease.        Florencia Gao is a 75 year old female who is being evaluated via a billable telephone visit.      The patient has been notified of following:     \"This telephone visit will be conducted via a call between you and your physician/provider. We have found that certain health care needs can be provided without the need for a physical exam.  This service lets us provide the care you need with a short phone conversation.  If a prescription is necessary we can send it directly to your pharmacy.  If lab work is needed we can place an order for that and you can then stop by our lab to have the test done at a later time.    Telephone visits are billed at different rates depending on your insurance coverage. During this emergency period, for some insurers they may be billed the same as an in-person visit.  Please reach out to your insurance provider with " "any questions.    If during the course of the call the physician/provider feels a telephone visit is not appropriate, you will not be charged for this service.\"    Patient has given verbal consent for Telephone visit?  Yes    What phone number would you like to be contacted at? [phone]    How would you like to obtain your AVS? Mail a copy    Phone call duration: 25 minutes    MICHAEL Rodrigez        "

## 2020-09-18 LAB
APPEARANCE CSF: CLEAR
COLOR CSF: COLORLESS
COPATH REPORT: NORMAL
GALACTOMANNAN AG SERPL QL IA: NEGATIVE
GALACTOMANNAN AG SERPL-ACNC: 0.04
RBC # CSF MANUAL: 20 /UL (ref 0–2)
TUBE # CSF: 4 #
WBC # CSF MANUAL: 2 /UL (ref 0–5)

## 2020-09-18 NOTE — PATIENT INSTRUCTIONS
Have ordered blood tests to check for exposure to certain infections    Please call us if any questions or concerns

## 2020-09-18 NOTE — PROGRESS NOTES
[id number] / [id number]: Study Visit Note   Subject name: Florencia Gao     Visit: Baseline    Did the study visit occur within the appropriate window allowed by the protocol? yes    Assessments performed in person:   - MOCA version 2 - score 29/30   - 4 meter walk test (seconds) - 5.7, 5.6   -  strength (kg) - 26.6, 23.8, 25.7      I have personally interviewed Florencia Gao. Florencia Gao was given the opportunity to ask any trial related questions. Please see provider progress note for physical exam and other clinical information.    Brook HAYS Arcadio    [id number]: Study Visit Note   Subject name: Florencia Gao     Visit: Baseline    Did the study visit occur within the appropriate window allowed by the protocol? Yes    Assessments:  - [facility] - all questions answered correctly  - 6 minute walk test - 335 meters; baseline dyspnea & fatigue 6 each emily scale, baseline hr 68bpm; end dyspnea & fatigue 13 each emily scale, end hr 106bpm  - 4 meter walk test (seconds) - 5.7, 5.6 (practice 5.35)  -  strength (kg) - 26.6, 23.8, 25.7  - up and go - 9.90s    I have personally interviewed Florencia Gao. Florencia Gao was given the opportunity to ask any trial related questions. Please see provider progress note for physical exam and other clinical information.    Brook Ervin

## 2020-09-20 LAB
GAMMA INTERFERON BACKGROUND BLD IA-ACNC: 0.05 IU/ML
M TB IFN-G CD4+ BCKGRND COR BLD-ACNC: 9.95 IU/ML
M TB TUBERC IFN-G BLD QL: NEGATIVE
MITOGEN IGNF BCKGRD COR BLD-ACNC: 0.02 IU/ML
MITOGEN IGNF BCKGRD COR BLD-ACNC: 0.05 IU/ML

## 2020-09-22 DIAGNOSIS — C92.01 ACUTE MYELOID LEUKEMIA IN REMISSION (H): Primary | ICD-10-CM

## 2020-09-22 LAB — B DERMAT AB SER QL ID: NORMAL

## 2020-09-28 DIAGNOSIS — C92.01 ACUTE MYELOID LEUKEMIA IN REMISSION (H): Primary | ICD-10-CM

## 2020-09-29 ENCOUNTER — CARE COORDINATION (OUTPATIENT)
Dept: TRANSPLANT | Facility: CLINIC | Age: 75
End: 2020-09-29

## 2020-09-29 ENCOUNTER — TELEPHONE (OUTPATIENT)
Dept: INTERVENTIONAL RADIOLOGY/VASCULAR | Facility: CLINIC | Age: 75
End: 2020-09-29

## 2020-09-29 DIAGNOSIS — C92.01 ACUTE MYELOID LEUKEMIA IN REMISSION (H): ICD-10-CM

## 2020-09-29 LAB
COPATH REPORT: NORMAL
LABORATORY COMMENT REPORT: NORMAL
SARS-COV-2 RNA SPEC QL NAA+PROBE: NEGATIVE
SARS-COV-2 RNA SPEC QL NAA+PROBE: NORMAL
SPECIMEN SOURCE: NORMAL
SPECIMEN SOURCE: NORMAL

## 2020-09-29 PROCEDURE — 38207 CRYOPRESERVE STEM CELLS: CPT | Performed by: INTERNAL MEDICINE

## 2020-09-29 PROCEDURE — U0003 INFECTIOUS AGENT DETECTION BY NUCLEIC ACID (DNA OR RNA); SEVERE ACUTE RESPIRATORY SYNDROME CORONAVIRUS 2 (SARS-COV-2) (CORONAVIRUS DISEASE [COVID-19]), AMPLIFIED PROBE TECHNIQUE, MAKING USE OF HIGH THROUGHPUT TECHNOLOGIES AS DESCRIBED BY CMS-2020-01-R: HCPCS | Performed by: INTERNAL MEDICINE

## 2020-09-29 NOTE — PROGRESS NOTES
Notified patient of admission for BMT on 9/30. Discussed NPO status for line placement tomorrow and use of surgical scrub. She has an allergy to hibicleanse. She was given povidone iodine and asked to use that x2 today and x1 tomorrow. She will report to [facility] waiting room @ 0700 with admission to 5C after. She states she feels well, covid testing is negative. Denies any questions.

## 2020-09-30 ENCOUNTER — APPOINTMENT (OUTPATIENT)
Dept: MEDSURG UNIT | Facility: CLINIC | Age: 75
DRG: 014 | End: 2020-09-30
Attending: INTERNAL MEDICINE
Payer: COMMERCIAL

## 2020-09-30 ENCOUNTER — HOSPITAL ENCOUNTER (INPATIENT)
Facility: CLINIC | Age: 75
LOS: 28 days | Discharge: HOME | DRG: 014 | End: 2020-10-28
Attending: INTERNAL MEDICINE | Admitting: INTERNAL MEDICINE
Payer: COMMERCIAL

## 2020-09-30 ENCOUNTER — APPOINTMENT (OUTPATIENT)
Dept: INTERVENTIONAL RADIOLOGY/VASCULAR | Facility: CLINIC | Age: 75
DRG: 014 | End: 2020-09-30
Attending: INTERNAL MEDICINE
Payer: COMMERCIAL

## 2020-09-30 DIAGNOSIS — C92.01 AML (ACUTE MYELOID LEUKEMIA) IN REMISSION (H): ICD-10-CM

## 2020-09-30 DIAGNOSIS — Z94.81 STATUS POST BONE MARROW TRANSPLANT (H): ICD-10-CM

## 2020-09-30 DIAGNOSIS — C92.01 ACUTE MYELOID LEUKEMIA IN REMISSION (H): Primary | ICD-10-CM

## 2020-09-30 LAB
ABO + RH BLD: ABNORMAL
ABO + RH BLD: ABNORMAL
ALBUMIN SERPL-MCNC: 4.1 G/DL (ref 3.4–5)
ALP SERPL-CCNC: 79 U/L (ref 40–150)
ALT SERPL W P-5'-P-CCNC: 38 U/L (ref 0–50)
ANION GAP SERPL CALCULATED.3IONS-SCNC: 4 MMOL/L (ref 3–14)
ANION GAP SERPL CALCULATED.3IONS-SCNC: 4 MMOL/L (ref 3–14)
APTT PPP: 29 SEC (ref 22–37)
AST SERPL W P-5'-P-CCNC: 27 U/L (ref 0–45)
BASOPHILS # BLD AUTO: 0 10E9/L (ref 0–0.2)
BASOPHILS NFR BLD AUTO: 0.5 %
BILIRUB SERPL-MCNC: 0.3 MG/DL (ref 0.2–1.3)
BLD GP AB INVEST PLASRBC-IMP: ABNORMAL
BLD GP AB SCN SERPL QL: ABNORMAL
BLOOD BANK CMNT PATIENT-IMP: ABNORMAL
BLOOD BANK CMNT PATIENT-IMP: ABNORMAL
BUN SERPL-MCNC: 11 MG/DL (ref 7–30)
BUN SERPL-MCNC: 14 MG/DL (ref 7–30)
CALCIUM SERPL-MCNC: 9.2 MG/DL (ref 8.5–10.1)
CALCIUM SERPL-MCNC: 9.4 MG/DL (ref 8.5–10.1)
CHLORIDE SERPL-SCNC: 103 MMOL/L (ref 94–109)
CHLORIDE SERPL-SCNC: 104 MMOL/L (ref 94–109)
CO2 SERPL-SCNC: 27 MMOL/L (ref 20–32)
CO2 SERPL-SCNC: 29 MMOL/L (ref 20–32)
COPATH REPORT: NORMAL
COPATH REPORT: NORMAL
CREAT SERPL-MCNC: 0.75 MG/DL (ref 0.52–1.04)
CREAT SERPL-MCNC: 0.75 MG/DL (ref 0.52–1.04)
DIFFERENTIAL METHOD BLD: NORMAL
EOSINOPHIL # BLD AUTO: 0.1 10E9/L (ref 0–0.7)
EOSINOPHIL NFR BLD AUTO: 1.7 %
ERYTHROCYTE [DISTWIDTH] IN BLOOD BY AUTOMATED COUNT: 13.2 % (ref 10–15)
ERYTHROCYTE [DISTWIDTH] IN BLOOD BY AUTOMATED COUNT: 13.2 % (ref 10–15)
GFR SERPL CREATININE-BSD FRML MDRD: 79 ML/MIN/{1.73_M2}
GFR SERPL CREATININE-BSD FRML MDRD: 79 ML/MIN/{1.73_M2}
GLUCOSE SERPL-MCNC: 102 MG/DL (ref 70–99)
GLUCOSE SERPL-MCNC: 107 MG/DL (ref 70–99)
HCT VFR BLD AUTO: 38.3 % (ref 35–47)
HCT VFR BLD AUTO: 39 % (ref 35–47)
HGB BLD-MCNC: 12.6 G/DL (ref 11.7–15.7)
HGB BLD-MCNC: 12.8 G/DL (ref 11.7–15.7)
IMM GRANULOCYTES # BLD: 0 10E9/L (ref 0–0.4)
IMM GRANULOCYTES NFR BLD: 0.2 %
INR PPP: 0.97 (ref 0.86–1.14)
INR PPP: 0.98 (ref 0.86–1.14)
LYMPHOCYTES # BLD AUTO: 1.7 10E9/L (ref 0.8–5.3)
LYMPHOCYTES NFR BLD AUTO: 29.2 %
MAGNESIUM SERPL-MCNC: 2.2 MG/DL (ref 1.6–2.3)
MCH RBC QN AUTO: 30.6 PG (ref 26.5–33)
MCH RBC QN AUTO: 30.8 PG (ref 26.5–33)
MCHC RBC AUTO-ENTMCNC: 32.8 G/DL (ref 31.5–36.5)
MCHC RBC AUTO-ENTMCNC: 32.9 G/DL (ref 31.5–36.5)
MCV RBC AUTO: 93 FL (ref 78–100)
MCV RBC AUTO: 94 FL (ref 78–100)
MONOCYTES # BLD AUTO: 0.5 10E9/L (ref 0–1.3)
MONOCYTES NFR BLD AUTO: 8.2 %
NEUTROPHILS # BLD AUTO: 3.5 10E9/L (ref 1.6–8.3)
NEUTROPHILS NFR BLD AUTO: 60.2 %
NRBC # BLD AUTO: 0 10*3/UL
NRBC BLD AUTO-RTO: 0 /100
PLATELET # BLD AUTO: 209 10E9/L (ref 150–450)
PLATELET # BLD AUTO: 233 10E9/L (ref 150–450)
POTASSIUM SERPL-SCNC: 3.9 MMOL/L (ref 3.4–5.3)
POTASSIUM SERPL-SCNC: 4 MMOL/L (ref 3.4–5.3)
PROT SERPL-MCNC: 8.4 G/DL (ref 6.8–8.8)
RBC # BLD AUTO: 4.09 10E12/L (ref 3.8–5.2)
RBC # BLD AUTO: 4.18 10E12/L (ref 3.8–5.2)
SODIUM SERPL-SCNC: 134 MMOL/L (ref 133–144)
SODIUM SERPL-SCNC: 137 MMOL/L (ref 133–144)
SPECIMEN EXP DATE BLD: ABNORMAL
URATE SERPL-MCNC: 4.6 MG/DL (ref 2.6–6)
WBC # BLD AUTO: 4.5 10E9/L (ref 4–11)
WBC # BLD AUTO: 5.9 10E9/L (ref 4–11)

## 2020-09-30 PROCEDURE — 25000128 H RX IP 250 OP 636: Performed by: PHYSICIAN ASSISTANT

## 2020-09-30 PROCEDURE — 87081 CULTURE SCREEN ONLY: CPT | Performed by: PHYSICIAN ASSISTANT

## 2020-09-30 PROCEDURE — 27210732 ZZH ACCESSORY CR1

## 2020-09-30 PROCEDURE — 36558 INSERT TUNNELED CV CATH: CPT | Mod: LT

## 2020-09-30 PROCEDURE — 99001 SPECIMEN HANDLING PT-LAB: CPT | Performed by: INTERNAL MEDICINE

## 2020-09-30 PROCEDURE — 0JH63XZ INSERTION OF TUNNELED VASCULAR ACCESS DEVICE INTO CHEST SUBCUTANEOUS TISSUE AND FASCIA, PERCUTANEOUS APPROACH: ICD-10-PCS | Performed by: RADIOLOGY

## 2020-09-30 PROCEDURE — 86901 BLOOD TYPING SEROLOGIC RH(D): CPT | Performed by: PHYSICIAN ASSISTANT

## 2020-09-30 PROCEDURE — 25000125 ZZHC RX 250: Performed by: PHYSICIAN ASSISTANT

## 2020-09-30 PROCEDURE — C1751 CATH, INF, PER/CENT/MIDLINE: HCPCS

## 2020-09-30 PROCEDURE — 40000172 ZZH STATISTIC PROCEDURE PREP ONLY

## 2020-09-30 PROCEDURE — 85025 COMPLETE CBC W/AUTO DIFF WBC: CPT | Performed by: PHYSICIAN ASSISTANT

## 2020-09-30 PROCEDURE — 86900 BLOOD TYPING SEROLOGIC ABO: CPT | Performed by: PHYSICIAN ASSISTANT

## 2020-09-30 PROCEDURE — 76937 US GUIDE VASCULAR ACCESS: CPT

## 2020-09-30 PROCEDURE — 25000128 H RX IP 250 OP 636: Performed by: RADIOLOGY

## 2020-09-30 PROCEDURE — 02H633Z INSERTION OF INFUSION DEVICE INTO RIGHT ATRIUM, PERCUTANEOUS APPROACH: ICD-10-PCS | Performed by: RADIOLOGY

## 2020-09-30 PROCEDURE — 25000125 ZZHC RX 250: Performed by: RADIOLOGY

## 2020-09-30 PROCEDURE — 86870 RBC ANTIBODY IDENTIFICATION: CPT | Performed by: PHYSICIAN ASSISTANT

## 2020-09-30 PROCEDURE — 84550 ASSAY OF BLOOD/URIC ACID: CPT | Performed by: PHYSICIAN ASSISTANT

## 2020-09-30 PROCEDURE — 85730 THROMBOPLASTIN TIME PARTIAL: CPT | Performed by: PHYSICIAN ASSISTANT

## 2020-09-30 PROCEDURE — 27211193 ZZ H WOUND GLUE CR1

## 2020-09-30 PROCEDURE — 3E04305 INTRODUCTION OF OTHER ANTINEOPLASTIC INTO CENTRAL VEIN, PERCUTANEOUS APPROACH: ICD-10-PCS | Performed by: INTERNAL MEDICINE

## 2020-09-30 PROCEDURE — 25800030 ZZH RX IP 258 OP 636: Performed by: PHYSICIAN ASSISTANT

## 2020-09-30 PROCEDURE — 25800030 ZZH RX IP 258 OP 636: Performed by: INTERNAL MEDICINE

## 2020-09-30 PROCEDURE — 80048 BASIC METABOLIC PNL TOTAL CA: CPT | Performed by: RADIOLOGY

## 2020-09-30 PROCEDURE — 86850 RBC ANTIBODY SCREEN: CPT | Performed by: PHYSICIAN ASSISTANT

## 2020-09-30 PROCEDURE — 20600000 ZZH R&B BMT

## 2020-09-30 PROCEDURE — 85610 PROTHROMBIN TIME: CPT | Performed by: RADIOLOGY

## 2020-09-30 PROCEDURE — 25000132 ZZH RX MED GY IP 250 OP 250 PS 637: Performed by: PHYSICIAN ASSISTANT

## 2020-09-30 PROCEDURE — 83735 ASSAY OF MAGNESIUM: CPT | Performed by: PHYSICIAN ASSISTANT

## 2020-09-30 PROCEDURE — 25000128 H RX IP 250 OP 636: Performed by: INTERNAL MEDICINE

## 2020-09-30 PROCEDURE — 27210908 ZZH NEEDLE CR4

## 2020-09-30 PROCEDURE — 40000937 ZZHCL STATISTIC RESEARCH KIT COLLECTION: Performed by: INTERNAL MEDICINE

## 2020-09-30 PROCEDURE — 85027 COMPLETE CBC AUTOMATED: CPT | Performed by: RADIOLOGY

## 2020-09-30 PROCEDURE — 25800025 ZZH RX 258: Performed by: PHYSICIAN ASSISTANT

## 2020-09-30 PROCEDURE — 85610 PROTHROMBIN TIME: CPT | Performed by: PHYSICIAN ASSISTANT

## 2020-09-30 PROCEDURE — 80053 COMPREHEN METABOLIC PANEL: CPT | Performed by: PHYSICIAN ASSISTANT

## 2020-09-30 PROCEDURE — C1769 GUIDE WIRE: HCPCS

## 2020-09-30 RX ORDER — DEXTROSE MONOHYDRATE 25 G/50ML
25-50 INJECTION, SOLUTION INTRAVENOUS
Status: DISCONTINUED | OUTPATIENT
Start: 2020-09-30 | End: 2020-10-05

## 2020-09-30 RX ORDER — CEFAZOLIN SODIUM 2 G/100ML
2 INJECTION, SOLUTION INTRAVENOUS
Status: COMPLETED | OUTPATIENT
Start: 2020-09-30 | End: 2020-09-30

## 2020-09-30 RX ORDER — POTASSIUM CHLORIDE 29.8 MG/ML
20 INJECTION INTRAVENOUS
Status: DISCONTINUED | OUTPATIENT
Start: 2020-09-30 | End: 2020-10-28

## 2020-09-30 RX ORDER — POTASSIUM CHLORIDE 750 MG/1
20-40 TABLET, EXTENDED RELEASE ORAL
Status: DISCONTINUED | OUTPATIENT
Start: 2020-09-30 | End: 2020-10-28

## 2020-09-30 RX ORDER — POTASSIUM CL/LIDO/0.9 % NACL 10MEQ/0.1L
10 INTRAVENOUS SOLUTION, PIGGYBACK (ML) INTRAVENOUS
Status: DISCONTINUED | OUTPATIENT
Start: 2020-09-30 | End: 2020-10-28

## 2020-09-30 RX ORDER — NICOTINE POLACRILEX 4 MG
15-30 LOZENGE BUCCAL
Status: DISCONTINUED | OUTPATIENT
Start: 2020-09-30 | End: 2020-10-05

## 2020-09-30 RX ORDER — FUROSEMIDE 10 MG/ML
10 INJECTION INTRAMUSCULAR; INTRAVENOUS
Status: DISPENSED | OUTPATIENT
Start: 2020-10-10 | End: 2020-10-12

## 2020-09-30 RX ORDER — HEPARIN SODIUM,PORCINE 10 UNIT/ML
5-10 VIAL (ML) INTRAVENOUS
Status: DISCONTINUED | OUTPATIENT
Start: 2020-09-30 | End: 2020-09-30

## 2020-09-30 RX ORDER — MAGNESIUM SULFATE HEPTAHYDRATE 40 MG/ML
2 INJECTION, SOLUTION INTRAVENOUS DAILY PRN
Status: DISCONTINUED | OUTPATIENT
Start: 2020-09-30 | End: 2020-10-28

## 2020-09-30 RX ORDER — DEXTROSE MONOHYDRATE 50 MG/ML
10-20 INJECTION, SOLUTION INTRAVENOUS
Status: DISCONTINUED | OUTPATIENT
Start: 2020-10-12 | End: 2020-10-28 | Stop reason: HOSPADM

## 2020-09-30 RX ORDER — DIPHENHYDRAMINE HCL 25 MG
25-50 CAPSULE ORAL EVERY 6 HOURS PRN
Status: DISCONTINUED | OUTPATIENT
Start: 2020-09-30 | End: 2020-10-28 | Stop reason: HOSPADM

## 2020-09-30 RX ORDER — ONDANSETRON 8 MG/1
8 TABLET, FILM COATED ORAL EVERY 8 HOURS
Status: DISCONTINUED | OUTPATIENT
Start: 2020-10-10 | End: 2020-10-03

## 2020-09-30 RX ORDER — VORICONAZOLE 200 MG/1
200 TABLET, FILM COATED ORAL EVERY 12 HOURS SCHEDULED
Status: DISCONTINUED | OUTPATIENT
Start: 2020-10-14 | End: 2020-10-28 | Stop reason: HOSPADM

## 2020-09-30 RX ORDER — SODIUM CHLORIDE 9 MG/ML
INJECTION, SOLUTION INTRAVENOUS CONTINUOUS
Status: DISCONTINUED | OUTPATIENT
Start: 2020-09-30 | End: 2020-10-05

## 2020-09-30 RX ORDER — PANTOPRAZOLE SODIUM 40 MG/1
40 TABLET, DELAYED RELEASE ORAL DAILY
Status: DISCONTINUED | OUTPATIENT
Start: 2020-09-30 | End: 2020-10-12

## 2020-09-30 RX ORDER — HEPARIN SODIUM,PORCINE 10 UNIT/ML
5-10 VIAL (ML) INTRAVENOUS
Status: DISCONTINUED | OUTPATIENT
Start: 2020-09-30 | End: 2020-10-28 | Stop reason: HOSPADM

## 2020-09-30 RX ORDER — ACETAMINOPHEN 325 MG/1
325-650 TABLET ORAL EVERY 4 HOURS PRN
Status: DISCONTINUED | OUTPATIENT
Start: 2020-09-30 | End: 2020-10-28 | Stop reason: HOSPADM

## 2020-09-30 RX ORDER — POTASSIUM CHLORIDE 1.5 G/1.58G
20-40 POWDER, FOR SOLUTION ORAL
Status: DISCONTINUED | OUTPATIENT
Start: 2020-09-30 | End: 2020-10-28

## 2020-09-30 RX ORDER — VORICONAZOLE 200 MG/1
200 TABLET, FILM COATED ORAL EVERY 12 HOURS SCHEDULED
Status: DISCONTINUED | OUTPATIENT
Start: 2020-10-07 | End: 2020-09-30

## 2020-09-30 RX ORDER — HEPARIN SODIUM,PORCINE 10 UNIT/ML
5-10 VIAL (ML) INTRAVENOUS EVERY 24 HOURS
Status: DISCONTINUED | OUTPATIENT
Start: 2020-09-30 | End: 2020-09-30

## 2020-09-30 RX ORDER — POTASSIUM CHLORIDE 7.45 MG/ML
10 INJECTION INTRAVENOUS
Status: DISCONTINUED | OUTPATIENT
Start: 2020-09-30 | End: 2020-10-28

## 2020-09-30 RX ORDER — PROCHLORPERAZINE MALEATE 5 MG
5-10 TABLET ORAL EVERY 6 HOURS PRN
Status: DISCONTINUED | OUTPATIENT
Start: 2020-09-30 | End: 2020-10-09

## 2020-09-30 RX ORDER — DIPHENHYDRAMINE HCL 25 MG
25 CAPSULE ORAL ONCE
Status: DISCONTINUED | OUTPATIENT
Start: 2020-10-07 | End: 2020-10-07

## 2020-09-30 RX ORDER — LANOLIN ALCOHOL/MO/W.PET/CERES
1000 CREAM (GRAM) TOPICAL DAILY
Status: DISCONTINUED | OUTPATIENT
Start: 2020-09-30 | End: 2020-10-08

## 2020-09-30 RX ORDER — NALOXONE HYDROCHLORIDE 0.4 MG/ML
.1-.4 INJECTION, SOLUTION INTRAMUSCULAR; INTRAVENOUS; SUBCUTANEOUS
Status: DISCONTINUED | OUTPATIENT
Start: 2020-09-30 | End: 2020-10-28 | Stop reason: HOSPADM

## 2020-09-30 RX ORDER — FUROSEMIDE 10 MG/ML
20 INJECTION INTRAMUSCULAR; INTRAVENOUS EVERY 8 HOURS PRN
Status: DISPENSED | OUTPATIENT
Start: 2020-10-10 | End: 2020-10-12

## 2020-09-30 RX ORDER — POLYETHYLENE GLYCOL 3350 17 G/17G
17 POWDER, FOR SOLUTION ORAL DAILY
Status: DISCONTINUED | OUTPATIENT
Start: 2020-09-30 | End: 2020-10-07

## 2020-09-30 RX ORDER — HEPARIN SODIUM (PORCINE) LOCK FLUSH IV SOLN 100 UNIT/ML 100 UNIT/ML
500 SOLUTION INTRAVENOUS ONCE
Status: COMPLETED | OUTPATIENT
Start: 2020-09-30 | End: 2020-09-30

## 2020-09-30 RX ORDER — LIDOCAINE 40 MG/G
CREAM TOPICAL
Status: DISCONTINUED | OUTPATIENT
Start: 2020-09-30 | End: 2020-10-28 | Stop reason: HOSPADM

## 2020-09-30 RX ORDER — URSODIOL 300 MG/1
300 CAPSULE ORAL 3 TIMES DAILY
Status: DISCONTINUED | OUTPATIENT
Start: 2020-09-30 | End: 2020-10-28 | Stop reason: HOSPADM

## 2020-09-30 RX ORDER — ACETAMINOPHEN 325 MG/1
650 TABLET ORAL ONCE
Status: DISCONTINUED | OUTPATIENT
Start: 2020-10-07 | End: 2020-10-07

## 2020-09-30 RX ORDER — FUROSEMIDE 10 MG/ML
20 INJECTION INTRAMUSCULAR; INTRAVENOUS EVERY 8 HOURS PRN
Status: DISPENSED | OUTPATIENT
Start: 2020-10-01 | End: 2020-10-02

## 2020-09-30 RX ORDER — FLUMAZENIL 0.1 MG/ML
0.2 INJECTION, SOLUTION INTRAVENOUS
Status: DISCONTINUED | OUTPATIENT
Start: 2020-09-30 | End: 2020-10-05

## 2020-09-30 RX ORDER — ACETAMINOPHEN 325 MG/1
650 TABLET ORAL EVERY 6 HOURS PRN
Status: DISCONTINUED | OUTPATIENT
Start: 2020-09-30 | End: 2020-10-28 | Stop reason: HOSPADM

## 2020-09-30 RX ORDER — LORAZEPAM 0.5 MG/1
.5-1 TABLET ORAL EVERY 4 HOURS PRN
Status: DISCONTINUED | OUTPATIENT
Start: 2020-09-30 | End: 2020-10-28 | Stop reason: HOSPADM

## 2020-09-30 RX ORDER — HEPARIN SODIUM,PORCINE 10 UNIT/ML
5 VIAL (ML) INTRAVENOUS EVERY 24 HOURS
Status: DISCONTINUED | OUTPATIENT
Start: 2020-09-30 | End: 2020-10-28 | Stop reason: HOSPADM

## 2020-09-30 RX ORDER — FUROSEMIDE 10 MG/ML
10 INJECTION INTRAMUSCULAR; INTRAVENOUS
Status: DISPENSED | OUTPATIENT
Start: 2020-10-01 | End: 2020-10-02

## 2020-09-30 RX ORDER — LEVOTHYROXINE SODIUM 88 UG/1
88 TABLET ORAL DAILY
Status: DISCONTINUED | OUTPATIENT
Start: 2020-09-30 | End: 2020-10-28 | Stop reason: HOSPADM

## 2020-09-30 RX ORDER — FENTANYL CITRATE 50 UG/ML
25-50 INJECTION, SOLUTION INTRAMUSCULAR; INTRAVENOUS EVERY 5 MIN PRN
Status: DISCONTINUED | OUTPATIENT
Start: 2020-09-30 | End: 2020-10-05

## 2020-09-30 RX ORDER — MAGNESIUM SULFATE HEPTAHYDRATE 40 MG/ML
4 INJECTION, SOLUTION INTRAVENOUS EVERY 4 HOURS PRN
Status: DISCONTINUED | OUTPATIENT
Start: 2020-09-30 | End: 2020-10-28

## 2020-09-30 RX ORDER — LEVOFLOXACIN 250 MG/1
250 TABLET, FILM COATED ORAL
Status: DISCONTINUED | OUTPATIENT
Start: 2020-10-06 | End: 2020-10-27

## 2020-09-30 RX ORDER — ACYCLOVIR 800 MG/1
800 TABLET ORAL
Status: DISCONTINUED | OUTPATIENT
Start: 2020-10-03 | End: 2020-10-12

## 2020-09-30 RX ORDER — VENLAFAXINE 75 MG/1
75 TABLET ORAL DAILY
Status: DISCONTINUED | OUTPATIENT
Start: 2020-09-30 | End: 2020-10-28 | Stop reason: HOSPADM

## 2020-09-30 RX ORDER — LORAZEPAM 2 MG/ML
.5-1 INJECTION INTRAMUSCULAR EVERY 4 HOURS PRN
Status: DISCONTINUED | OUTPATIENT
Start: 2020-09-30 | End: 2020-10-28 | Stop reason: HOSPADM

## 2020-09-30 RX ORDER — ONDANSETRON 8 MG/1
8 TABLET, FILM COATED ORAL EVERY 8 HOURS
Status: DISCONTINUED | OUTPATIENT
Start: 2020-10-01 | End: 2020-10-03

## 2020-09-30 RX ORDER — MEPERIDINE HYDROCHLORIDE 25 MG/ML
25-50 INJECTION INTRAMUSCULAR; INTRAVENOUS; SUBCUTANEOUS
Status: ACTIVE | OUTPATIENT
Start: 2020-10-07 | End: 2020-10-08

## 2020-09-30 RX ADMIN — SODIUM CHLORIDE: 9 INJECTION, SOLUTION INTRAVENOUS at 07:27

## 2020-09-30 RX ADMIN — POLYETHYLENE GLYCOL 3350 17 G: 17 POWDER, FOR SOLUTION ORAL at 20:18

## 2020-09-30 RX ADMIN — DEXTROSE AND SODIUM CHLORIDE 1000 ML: 5; 450 INJECTION, SOLUTION INTRAVENOUS at 22:04

## 2020-09-30 RX ADMIN — MICAFUNGIN SODIUM 100 MG: 50 INJECTION, POWDER, LYOPHILIZED, FOR SOLUTION INTRAVENOUS at 22:04

## 2020-09-30 RX ADMIN — VENLAFAXINE 75 MG: 75 TABLET ORAL at 13:57

## 2020-09-30 RX ADMIN — URSODIOL 300 MG: 300 CAPSULE ORAL at 13:57

## 2020-09-30 RX ADMIN — MIDAZOLAM 2 MG: 1 INJECTION INTRAMUSCULAR; INTRAVENOUS at 08:41

## 2020-09-30 RX ADMIN — SODIUM CHLORIDE, PRESERVATIVE FREE 40 UNITS: 5 INJECTION INTRAVENOUS at 08:50

## 2020-09-30 RX ADMIN — URSODIOL 300 MG: 300 CAPSULE ORAL at 20:18

## 2020-09-30 RX ADMIN — LIDOCAINE HYDROCHLORIDE 10 ML: 10 INJECTION, SOLUTION EPIDURAL; INFILTRATION; INTRACAUDAL; PERINEURAL at 08:41

## 2020-09-30 RX ADMIN — Medication 5 ML: at 10:31

## 2020-09-30 RX ADMIN — POTASSIUM CHLORIDE 20 MEQ: 750 TABLET, EXTENDED RELEASE ORAL at 14:53

## 2020-09-30 RX ADMIN — PANTOPRAZOLE SODIUM 40 MG: 40 TABLET, DELAYED RELEASE ORAL at 13:57

## 2020-09-30 RX ADMIN — SODIUM CHLORIDE 1 BAG: 0.9 INJECTION, SOLUTION INTRAVENOUS at 08:42

## 2020-09-30 RX ADMIN — Medication 5 ML: at 13:58

## 2020-09-30 RX ADMIN — CEFAZOLIN SODIUM 2 G: 2 INJECTION, SOLUTION INTRAVENOUS at 07:27

## 2020-09-30 RX ADMIN — FENTANYL CITRATE 50 MCG: 50 INJECTION, SOLUTION INTRAMUSCULAR; INTRAVENOUS at 08:41

## 2020-09-30 ASSESSMENT — PAIN DESCRIPTION - DESCRIPTORS
DESCRIPTORS: ACHING;TENDER
DESCRIPTORS: ACHING;TENDER

## 2020-09-30 ASSESSMENT — MIFFLIN-ST. JEOR
SCORE: 1293.3
SCORE: 1313.85

## 2020-09-30 ASSESSMENT — ACTIVITIES OF DAILY LIVING (ADL)
ADLS_ACUITY_SCORE: 10

## 2020-09-30 NOTE — PROGRESS NOTES
"SPIRITUAL HEALTH SERVICES  [facility] ([place]) 5C  REFERRAL SOURCE: Pt request with admission     Pt shared that she is [place] in [place] and is \"[place] in [place].\" Florencia speaks of a [place] of family and friends that lovingly support her journey, offering prayers and encouragement. Florencia shares feelings of being \"blessed\" in her life, adding \"its important to have a positive attitude.\" She would like a blessing for her transplant and has various templates from  to guide her blessing request for transplant day.     PLAN:  will continue to follow, support and arrange blessing for her transplant.     Rev. Chel Claudio MDiv, [facility]  Staff    Pager [phone]  * [facility] remains available 24/7 for emergent requests/referrals, either by having the switchboard page the on-call  or by entering an ASAP/STAT consult in Epic (this will also page the on-call ).*   "

## 2020-09-30 NOTE — PROGRESS NOTES
Pt arrives to 2a, with family member, for tunneled line placement. H&P is up to date. Consent is signed.

## 2020-09-30 NOTE — H&P
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     Transplant Essential Data:  Diagnosis AMLM1 Acute myelogeneous leukemia, M1, myeloblastic  HCT Type Allogeneic    Prep Regimen Fludarabine  Cytoxan  TBI   Donor Source Sibling     GVHD Prophylaxis Post transplant cytoxan  Tac/MMF  Clinical Trials ACE     Oncology Treatment History:   Florencia Gao was well until April or May 2020 when she developed progressive fatigue and some easy bruising.  By mid June, pancytopenia and the diagnosis of AML was recognized. Initially Hb 8.1  WBC 1.4, Plts 29,000.    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  Flt 3 and NPM1 and CEBPalpha mutations were negative.     She was treated with idarubicin plus cytarabine (7+3) and achieved complete remission.  A day 14 marrow was empty and recovery of her counts at roughly 6 weeks --July 31 bone marrow was cellular with no morphologic signs of leukemia and the trisomy 8 was no longer detectable.   Treatment/Chemotherapy Number of Cycles Date Range Outcomes & Complications   7+3 1 6/18- Day 14 bmbx no blast but no count recovery. Dya+28 bmbx CR       Bone Marrow Workup Results:  Blood Counts Recent Labs   Lab Test 09/30/20  1033   WBC 5.9   ANEU 3.5   ALYM 1.7   ANA ROSA 0.5   AEOS 0.1   HGB 12.8   HCT 39.0         Bone Marrow 9/16/20: Bone marrow, posterior iliac crest, left decalcified trephine biopsy:    - No morphologic or immunophenotypic evidence of recurrent/persistent   acute myeloid leukemia (see comment)     - Normocellular marrow for age (20-30%) with trilineage hematopoiesis     - No increase in blasts and no overt dysplasia     fferential     COMMENT:   Flow  was negative as well.    Blood Type Recent Labs   Lab Test 20  1033   ABO B neg      Chemistries Recent Labs   Lab Test 20  1033      POTASSIUM 3.9   CHLORIDE 104   CO2 29   BUN 11   CR 0.75      Liver Tests Recent Labs   Lab Test 20  1033   BILITOTAL 0.3   ALKPHOS 79   AST 27   ALT 38      Chest X-Ray: 20:                                                                  IMPRESSION: No acute cardiopulmonary disease.   Chest CT 20: IMPRESSION:   1. No acute airspace disease.  2. Sequela of prior granulomatous disease   PFTs FVC%  Recent Labs   Lab Test 20  0835    123       FEV1%  Recent Labs   Lab Test 20  0835    133       DLCO%  Recent Labs   Lab Test 20  0835   [id number] 102      ECHO or MUGA: ECHO: 20  Left Ventricle  Global and regional left ventricular function is normal with an EF of 60-65%.  Left ventricular size is normal. Relative wall thickness is increased  consistent with concentric remodeling. Left ventricular diastolic function is  normal. Global peak LV longitudinal strain is averaged at -21.6%. This is  within reported normal limits (normal <-18%).   EKG 20: NSR   Serologies: CMV +, EBV +, HSV+ Hep b/C neg, HIV neg, HTLV1 and II neg, MPX neg, [place] neg, Trypanosoma cruzi neg.          I have assessed all abnormal lab values for their clinical significance and any values considered clinically significant have been addressed in the assessment and plan    Family History:   Family History   Problem Relation Age of Onset     Cerebrovascular Disease Mother      Prostate Cancer Father    She has 2 living healthy sons; 1 other son  of a motorcycle accident and perhaps some excess use of alcohol.    She has 6 siblings:  4 brothers age 54-62 she reports as being healthy; and 2 sisters in their mid 70s are also healthy.    Social History:   Social History     Socioeconomic History     Marital status:      Spouse name: Not on file      Number of children: Not on file     Years of education: Not on file     Highest education level: Not on file   Occupational History     Not on file   Social Needs     Financial resource strain: Not on file     Food insecurity     Worry: Not on file     Inability: Not on file     Transportation needs     Medical: Not on file     Non-medical: Not on file   Tobacco Use     Smoking status: Never Smoker     Smokeless tobacco: Never Used   Substance and Sexual Activity     Alcohol use: Not Currently     Drug use: Never     Sexual activity: Not on file   Lifestyle     Physical activity     Days per week: Not on file     Minutes per session: Not on file     Stress: Not on file   Relationships     Social connections     Talks on phone: Not on file     Gets together: Not on file     Attends Scientologist service: Not on file     Active member of club or organization: Not on file     Attends meetings of clubs or organizations: Not on file     Relationship status: Not on file     Intimate partner violence     Fear of current or ex partner: Not on file     Emotionally abused: Not on file     Physically abused: Not on file     Forced sexual activity: Not on file   Other Topics Concern     Parent/sibling w/ CABG, MI or angioplasty before 65F 55M? Not Asked   Social History Narrative     Not on file       Past Medical History:   Past Medical History:   Diagnosis Date     AML (acute myeloblastic leukemia) (H)      Anxiety      Hypothyroidism      Osteoporosis         Past Surgical History:   Past Surgical History:   Procedure Laterality Date     GYN SURGERY           IR CVC TUNNEL PLACEMENT > 5 YRS OF AGE  2020     ORTHOPEDIC SURGERY      back     ORTHOPEDIC SURGERY      ankle     ORTHOPEDIC SURGERY      hips     ORTHOPEDIC SURGERY      wrists       Allergies:   Allergies   Allergen Reactions     Blood Transfusion Related (Informational Only)      Patient has a history of a clinically significant antibody against  "RBC antigens.  A delay in compatible RBCs may occur.     Sulfa Drugs Rash     Acetaminophen-Codeine Dizziness     could not sit up after surgery until off all narcotics, even Tylenol #3     Chlorhexidine Rash       Home Medications      Prior to Admission medications    Medication Sig Start Date End Date Taking? Authorizing Provider   cyanocobalamin (VITAMIN B-12) 1000 MCG tablet Take 1,000 mcg by mouth daily  6/19/20  Yes Reported, Patient   levothyroxine (SYNTHROID/LEVOTHROID) 88 MCG tablet Take 88 mcg by mouth daily  6/26/20  Yes Reported, Patient   polyethylene glycol (MIRALAX) 17 g packet Take 1 packet by mouth daily   Yes Reported, Patient   venlafaxine (EFFEXOR) 75 MG tablet Take 75 mg by mouth daily  4/9/19  Yes Reported, Patient       Review of Systems    Review of Systems:  CONSTITUTIONAL: NEGATIVE for fever, chills, change in weight  INTEGUMENTARY/SKIN: NEGATIVE for worrisome rashes, moles or lesions  EYES: NEGATIVE for vision changes or irritation  ENT/MOUTH: NEGATIVE for ear, mouth and throat problems  RESP: NEGATIVE for significant cough or SOB  BREAST: NEGATIVE for masses, tenderness or discharge  CV: NEGATIVE for chest pain, palpitations or peripheral edema  GI: NEGATIVE for nausea, abdominal pain, heartburn, or change in bowel habits  : NEGATIVE for frequency, dysuria, or hematuria  MUSCULOSKELETAL: NEGATIVE for significant arthralgias or myalgia  NEURO: NEGATIVE for weakness, dizziness or paresthesias  ENDOCRINE: NEGATIVE for temperature intolerance, skin/hair changes  HEME/ALLERGY: NEGATIVE for bleeding problems  PSYCHIATRIC: NEGATIVE for changes in mood or affect    PHYSICAL EXAM      Weight     Wt Readings from Last 3 Encounters:   09/30/20 76.9 kg (169 lb 9.6 oz)   09/17/20 77.8 kg (171 lb 8 oz)   09/10/20 77.2 kg (170 lb 3.1 oz)        KPS: 100    /70   Pulse 80   Temp 98.6  F (37  C) (Oral)   Resp 16   Ht 1.68 m (5' 6.14\")   Wt 76.9 kg (169 lb 9.6 oz)   SpO2 95%   BMI 27.26 " kg/m       General: NAD   Eyes: LINH, sclera anicteric   Nose/Mouth/Throat: OP clear, buccal mucosa moist, no ulcerations   Lungs: CTA bilaterally  Cardiovascular: RRR, no M/R/G   Abdominal/Rectal: +BS, soft, NT, ND, No HSM   Lymphatics: No edema  Skin: No rashes or petechaie  Neuro: A&O   Additional Findings: Padilla site NT, no drainage. Bruising and swelling at guide wire site.     Acute GVHD Score:  No relevant yet.     ASSESSMENT BY SYSTEMS   Florencia Gao is a 75 year old female with AML in CR1, admitted for JCARLOS Allo sib pbsct per protocol 2019-10, randomized to Post transplant cytoxan/Tac/MMF/    1.  BMT:   Day -7 - line placement.   Day -6: cytoxan and fludarabine  Day -5 to day -2: Fludarabine  Day -1: TBI  Day 0: Transplant     Allopurinol through day 0. Flush and pre-meds prior to transplant. GCSF starts d+5 and cont to until ANC>1500 x3 consecutive days. Re-stage per protocol.    2.  HEME: Keep Hgb>8 and plts>10K.   - Of note has significant antibodies to RBCs- cane cause delay in transfusion availability. Premeds tylenol and benadryl.                             3.  ID: Afebrile.   Cont Levo,Linda/vori, and HD ACV (CMV+, HSV+, EBV+) prophy.   Pjp prophy to start day+28 - IV pentamdine vs dapsone as sulfa allergy.                                         4.  GI:   Miralax daily   Zofran and dexamethasone prior to chemo to prevent N/V. Ativan and compazine available PRN break-through symptoms.   Protonix for GI prophy.   Ursodiol for VOD prophy.    5.  GVHD Prophylaxis:   Day +3 and day+4 cytoxan  Day+5 start tac and MMF     6.  FEN/Renal: Monitor creat and lytes. Replete lytes PRN per SS. Monitor weight, I+O, lytes per protocol with IVF flush.    7. Hypothyroidism  Continue synthroid 88mcg     8.) Psych: Situational Depression continue effexor 75mg daily.     Florencia Gao received signed copies of her consent forms in printed format.    Violeta Barone     The patient was seen and examined by  me separately from the midlevel provider. The note reflects our mutual assessment and plans and were approved by me personally.   I personally reviewed today's lab results vital signs and radiology results.    Each point of the assessment and plan were reviewed by the midlevel and me and either endorsed by me or were my added decisions.    My pertinent physical findings today are:  Afebrile, clear lungs and no rash. No URI sxs.    My assessment and plan are:  74 yo admitted for NMA allo PBSC for first remission AML.  Chemo orders for preparative regimen and post tx cytoxan reviewed with pharm D and signed. Monitor for SIADH.    Hernando Alcala M.D.

## 2020-09-30 NOTE — PROGRESS NOTES
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     Interventions: Clinical  (CSW) met with Pt to assess coping, provide supportive counseling and assist with resources as needed. Pt shared that she is currently doing well and settling into her room. Pt's primary caregiver Gaye was present at her bedside. Her primary concern for SW at this time is the [facility] waitlist. SW communicated w/ them that she is currently #5 on the waitlist. Gaye reports she has several other options for local housing that they have looked into if they do not get into the [facility] upon discharge.   CSW provided empathic listening, validation of concerns, and encouragement. CSW encouraged Pt to contact CSW for support, questions and/or resources.     Assessment: Pt presented as pleasant and open to SW visit.  Pt appears to be coping appropriately at this time. Pt processed that she is concerned about local housing plans upon discharge, but is appreciative of Gaye's assistance/taking lead on this concern. Pt continues to be supported by her son's long-term significant other Gaye.     Plan: CSW will continue to provide supportive counseling and assistance with resources as needed. CSW will continue to collaborate with multidisciplinary team regarding Pt's plan of care.     ROSSI Pabon, [facility]  Adult Blood & Marrow Transplant   Phone: [phone]  Pager: [phone]

## 2020-09-30 NOTE — PROCEDURES
[facility], [place]    Procedure: IR Procedure Note    Date/Time: 9/30/2020 8:52 AM  Performed by: Salomon Kraus MD  Authorized by: Salomon Kraus MD     UNIVERSAL PROTOCOL   Site Marked: NA  Prior Images Obtained and Reviewed:  Yes  Required items: Required blood products, implants, devices and special equipment available    Patient identity confirmed:  Verbally with patient, arm band, provided demographic data and hospital-assigned identification number  Patient was reevaluated immediately before administering moderate or deep sedation or anesthesia  Confirmation Checklist:  Patient's identity using two indicators, relevant allergies, procedure was appropriate and matched the consent or emergent situation and correct equipment/implants were available  Time out: Immediately prior to the procedure a time out was called    Universal Protocol: the Joint Commission Universal Protocol was followed    Preparation: Patient was prepped and draped in usual sterile fashion           ANESTHESIA    Anesthesia: Local infiltration  Local Anesthetic:  Lidocaine 1% without epinephrine      SEDATION    Patient Sedated: Yes    Sedation Type:  Moderate (conscious) sedation  Vital signs: Vital signs monitored during sedation    Fluoroscopy Time: 0 minute(s)  See dictated procedure note for full details.  Findings: -successful placement of a rij DL sim.     Specimens: none    Complications: None    Condition: Stable    Plan: -CVC ready for immediate use.     PROCEDURE   Patient Tolerance:  Patient tolerated the procedure well with no immediate complications    Length of time physician/provider present for 1:1 monitoring during sedation: 15

## 2020-09-30 NOTE — PROGRESS NOTES
Patient Name: Florencia Gao  Medical Record Number: [id number]  Today's Date: 9/30/2020    Procedure: tunnel line placement  Proceduralist:     Procedure Start: 0830  Procedure end: 0855  Sedation medications administered: 2mg versed 50mcg fentanyl     Report given to: LUZ ELENA Hwang RN     Pt arrived to IR room 2 from 2A accompanied by Makenna /daughter in law. Consent reviewed. Pt denies any questions or concerns regarding procedure. Pt positioned supine and monitored per protocol. Pt tolerated procedure without any noted complications. Pt transferred to LUZ ELENA/Makenna took pt belongings to room on 5C from 2A.

## 2020-09-30 NOTE — PLAN OF CARE
Patient admitted to: 5C  Admitted from: Home  Arrived by: Private car  Reason for admission: PBSCT  Patient accompanied by: Family  Belongings: With patient  Teaching: Unit routine/expectations  Skin double check completed by: Mally Dawkins    Cytoxan IVF flush to begin tonight.  KCL 20meq replaced.  Randy has small amount of blood, patient denies any discomfort.      Problem: Adult Inpatient Plan of Care  Goal: Plan of Care Review  Outcome: No Change     Problem: Adult Inpatient Plan of Care  Goal: Optimal Comfort and Wellbeing  Outcome: No Change     Problem: Adjustment to Transplant (Stem Cell/Bone Marrow Transplant)  Goal: Optimal Coping with Transplant  Outcome: No Change     Problem: Fatigue (Stem Cell/Bone Marrow Transplant)  Goal: Energy Level Supports Daily Activity  Outcome: No Change     Problem: Hematologic Alteration (Stem Cell/Bone Marrow Transplant)  Goal: Blood Counts Within Acceptable Range  Outcome: No Change     Problem: Infection Risk (Stem Cell/Bone Marrow Transplant)  Goal: Absence of Infection Signs/Symptoms  Outcome: No Change     Problem: Nutrition Intake Altered (Stem Cell/Bone Marrow Transplant)  Goal: Optimal Nutrition Intake  Outcome: No Change

## 2020-10-01 ENCOUNTER — APPOINTMENT (OUTPATIENT)
Dept: PHYSICAL THERAPY | Facility: CLINIC | Age: 75
DRG: 014 | End: 2020-10-01
Attending: PHYSICIAN ASSISTANT
Payer: COMMERCIAL

## 2020-10-01 LAB
ALBUMIN SERPL-MCNC: 3.4 G/DL (ref 3.4–5)
ALP SERPL-CCNC: 72 U/L (ref 40–150)
ALT SERPL W P-5'-P-CCNC: 22 U/L (ref 0–50)
ANION GAP SERPL CALCULATED.3IONS-SCNC: 1 MMOL/L (ref 3–14)
AST SERPL W P-5'-P-CCNC: 16 U/L (ref 0–45)
BASOPHILS # BLD AUTO: 0 10E9/L (ref 0–0.2)
BASOPHILS NFR BLD AUTO: 0.6 %
BILIRUB SERPL-MCNC: 0.2 MG/DL (ref 0.2–1.3)
BUN SERPL-MCNC: 14 MG/DL (ref 7–30)
CALCIUM SERPL-MCNC: 8.7 MG/DL (ref 8.5–10.1)
CHLORIDE SERPL-SCNC: 105 MMOL/L (ref 94–109)
CMV DNA SPEC NAA+PROBE-ACNC: NORMAL [IU]/ML
CMV DNA SPEC NAA+PROBE-LOG#: NORMAL {LOG_IU}/ML
CO2 SERPL-SCNC: 31 MMOL/L (ref 20–32)
CREAT SERPL-MCNC: 0.74 MG/DL (ref 0.52–1.04)
DIFFERENTIAL METHOD BLD: NORMAL
EOSINOPHIL # BLD AUTO: 0.2 10E9/L (ref 0–0.7)
EOSINOPHIL NFR BLD AUTO: 3 %
ERYTHROCYTE [DISTWIDTH] IN BLOOD BY AUTOMATED COUNT: 13.2 % (ref 10–15)
GFR SERPL CREATININE-BSD FRML MDRD: 81 ML/MIN/{1.73_M2}
GLUCOSE SERPL-MCNC: 105 MG/DL (ref 70–99)
HCT VFR BLD AUTO: 35.9 % (ref 35–47)
HGB BLD-MCNC: 11.7 G/DL (ref 11.7–15.7)
IMM GRANULOCYTES # BLD: 0 10E9/L (ref 0–0.4)
IMM GRANULOCYTES NFR BLD: 0.4 %
LYMPHOCYTES # BLD AUTO: 1.7 10E9/L (ref 0.8–5.3)
LYMPHOCYTES NFR BLD AUTO: 32.1 %
MCH RBC QN AUTO: 30.8 PG (ref 26.5–33)
MCHC RBC AUTO-ENTMCNC: 32.6 G/DL (ref 31.5–36.5)
MCV RBC AUTO: 95 FL (ref 78–100)
MONOCYTES # BLD AUTO: 0.6 10E9/L (ref 0–1.3)
MONOCYTES NFR BLD AUTO: 10.9 %
NEUTROPHILS # BLD AUTO: 2.8 10E9/L (ref 1.6–8.3)
NEUTROPHILS NFR BLD AUTO: 53 %
NRBC # BLD AUTO: 0 10*3/UL
NRBC BLD AUTO-RTO: 0 /100
PLATELET # BLD AUTO: 198 10E9/L (ref 150–450)
POTASSIUM SERPL-SCNC: 3.6 MMOL/L (ref 3.4–5.3)
POTASSIUM SERPL-SCNC: 4 MMOL/L (ref 3.4–5.3)
PROT SERPL-MCNC: 6.8 G/DL (ref 6.8–8.8)
RBC # BLD AUTO: 3.8 10E12/L (ref 3.8–5.2)
SODIUM SERPL-SCNC: 137 MMOL/L (ref 133–144)
SODIUM SERPL-SCNC: 137 MMOL/L (ref 133–144)
SPECIMEN SOURCE: NORMAL
WBC # BLD AUTO: 5.3 10E9/L (ref 4–11)

## 2020-10-01 PROCEDURE — 97530 THERAPEUTIC ACTIVITIES: CPT | Mod: GP

## 2020-10-01 PROCEDURE — 250N000013 HC RX MED GY IP 250 OP 250 PS 637: Performed by: PHYSICIAN ASSISTANT

## 2020-10-01 PROCEDURE — 85025 COMPLETE CBC W/AUTO DIFF WBC: CPT | Performed by: PHYSICIAN ASSISTANT

## 2020-10-01 PROCEDURE — 250N000011 HC RX IP 250 OP 636: Performed by: INTERNAL MEDICINE

## 2020-10-01 PROCEDURE — 97110 THERAPEUTIC EXERCISES: CPT | Mod: GP

## 2020-10-01 PROCEDURE — 99233 SBSQ HOSP IP/OBS HIGH 50: CPT | Performed by: INTERNAL MEDICINE

## 2020-10-01 PROCEDURE — 84132 ASSAY OF SERUM POTASSIUM: CPT | Performed by: PHYSICIAN ASSISTANT

## 2020-10-01 PROCEDURE — 250N000011 HC RX IP 250 OP 636: Performed by: PHYSICIAN ASSISTANT

## 2020-10-01 PROCEDURE — 84295 ASSAY OF SERUM SODIUM: CPT | Performed by: PHYSICIAN ASSISTANT

## 2020-10-01 PROCEDURE — 80053 COMPREHEN METABOLIC PANEL: CPT | Performed by: PHYSICIAN ASSISTANT

## 2020-10-01 PROCEDURE — 258N000001 HC RX 258: Performed by: PHYSICIAN ASSISTANT

## 2020-10-01 PROCEDURE — 206N000001 HC R&B BMT UMMC

## 2020-10-01 PROCEDURE — 258N000003 HC RX IP 258 OP 636: Performed by: INTERNAL MEDICINE

## 2020-10-01 PROCEDURE — 97161 PT EVAL LOW COMPLEX 20 MIN: CPT | Mod: GP

## 2020-10-01 RX ADMIN — POTASSIUM CHLORIDE 20 MEQ: 750 TABLET, EXTENDED RELEASE ORAL at 09:34

## 2020-10-01 RX ADMIN — URSODIOL 300 MG: 300 CAPSULE ORAL at 09:34

## 2020-10-01 RX ADMIN — ONDANSETRON HYDROCHLORIDE 8 MG: 8 TABLET, FILM COATED ORAL at 09:35

## 2020-10-01 RX ADMIN — LEVOTHYROXINE SODIUM 88 MCG: 0.09 TABLET ORAL at 09:35

## 2020-10-01 RX ADMIN — LORAZEPAM 0.5 MG: 0.5 TABLET ORAL at 22:00

## 2020-10-01 RX ADMIN — DEXTROSE AND SODIUM CHLORIDE 1000 ML: 5; 450 INJECTION, SOLUTION INTRAVENOUS at 06:50

## 2020-10-01 RX ADMIN — URSODIOL 300 MG: 300 CAPSULE ORAL at 20:18

## 2020-10-01 RX ADMIN — DEXTROSE AND SODIUM CHLORIDE 1000 ML: 5; 450 INJECTION, SOLUTION INTRAVENOUS at 14:11

## 2020-10-01 RX ADMIN — ONDANSETRON HYDROCHLORIDE 8 MG: 8 TABLET, FILM COATED ORAL at 15:56

## 2020-10-01 RX ADMIN — DEXTROSE AND SODIUM CHLORIDE 1000 ML: 5; 450 INJECTION, SOLUTION INTRAVENOUS at 19:33

## 2020-10-01 RX ADMIN — FUROSEMIDE 20 MG: 10 INJECTION, SOLUTION INTRAVENOUS at 19:31

## 2020-10-01 RX ADMIN — MICAFUNGIN SODIUM 100 MG: 50 INJECTION, POWDER, LYOPHILIZED, FOR SOLUTION INTRAVENOUS at 21:52

## 2020-10-01 RX ADMIN — URSODIOL 300 MG: 300 CAPSULE ORAL at 14:10

## 2020-10-01 RX ADMIN — DEXTROSE AND SODIUM CHLORIDE 1000 ML: 5; 450 INJECTION, SOLUTION INTRAVENOUS at 06:49

## 2020-10-01 RX ADMIN — MESNA 3000 MG: 100 INJECTION, SOLUTION INTRAVENOUS at 09:40

## 2020-10-01 RX ADMIN — VENLAFAXINE 75 MG: 75 TABLET ORAL at 14:10

## 2020-10-01 RX ADMIN — FLUDARABINE PHOSPHATE 50 MG: 25 INJECTION, SOLUTION INTRAVENOUS at 09:57

## 2020-10-01 RX ADMIN — PANTOPRAZOLE SODIUM 40 MG: 40 TABLET, DELAYED RELEASE ORAL at 09:34

## 2020-10-01 RX ADMIN — FUROSEMIDE 10 MG: 10 INJECTION, SOLUTION INTRAVENOUS at 14:20

## 2020-10-01 RX ADMIN — CYCLOPHOSPHAMIDE 3000 MG: 2 INJECTION, POWDER, FOR SOLUTION INTRAVENOUS; ORAL at 11:28

## 2020-10-01 RX ADMIN — CYANOCOBALAMIN TAB 1000 MCG 1000 MCG: 1000 TAB at 09:35

## 2020-10-01 RX ADMIN — ONDANSETRON HYDROCHLORIDE 8 MG: 8 TABLET, FILM COATED ORAL at 23:19

## 2020-10-01 RX ADMIN — LORAZEPAM 0.5 MG: 0.5 TABLET ORAL at 21:52

## 2020-10-01 RX ADMIN — POLYETHYLENE GLYCOL 3350 17 G: 17 POWDER, FOR SOLUTION ORAL at 20:18

## 2020-10-01 RX ADMIN — POTASSIUM CHLORIDE 20 MEQ: 750 TABLET, EXTENDED RELEASE ORAL at 17:40

## 2020-10-01 ASSESSMENT — ACTIVITIES OF DAILY LIVING (ADL)
ADLS_ACUITY_SCORE: 10

## 2020-10-01 ASSESSMENT — MIFFLIN-ST. JEOR
SCORE: 1320.52
SCORE: 1298.29

## 2020-10-01 NOTE — PLAN OF CARE
"1900-0730: /79 (BP Location: Right arm)   Pulse 72   Temp 96.2  F (35.7  C) (Axillary)   Resp 16   Ht 1.68 m (5' 6.14\")   Wt 76.9 kg (169 lb 9.6 oz)   SpO2 97%   BMI 27.26 kg/m      VSS, wears home CPAP at night. Cytoxan flush started at 2200 yesterday. CVC site tender, scant amount dried blood at site. Declined intervention. Needs potassium 20 mEq po. Fludarabine and Cytoxan this morning.      Problem: Adult Inpatient Plan of Care  Goal: Rounds/Family Conference  Outcome: No Change     Problem: Adjustment to Transplant (Stem Cell/Bone Marrow Transplant)  Goal: Optimal Coping with Transplant  Outcome: No Change     Problem: Bladder Irritation (Stem Cell/Bone Marrow Transplant)  Goal: Symptom-Free Urinary Elimination  Outcome: No Change     Problem: Diarrhea (Stem Cell/Bone Marrow Transplant)  Goal: Diarrhea Symptom Control  Outcome: No Change     Problem: Fatigue (Stem Cell/Bone Marrow Transplant)  Goal: Energy Level Supports Daily Activity  Outcome: No Change     Problem: Hematologic Alteration (Stem Cell/Bone Marrow Transplant)  Goal: Blood Counts Within Acceptable Range  Outcome: No Change     "

## 2020-10-01 NOTE — PLAN OF CARE
Florencia received her day -6 Fludarabine and Cytoxan through her central line with positive blood return.  She tolerated them well.  No nausea or emesis.  Continue post Cytoxan flush until 10/2/20 at 1330.  I>O by 1700 at shift change, 10 mg lasix given as this is a new medication for her.  Please reassess in a couple hours and give an additional 10 mg if needed.      Problem: Adult Inpatient Plan of Care  Goal: Plan of Care Review  Outcome: No Change

## 2020-10-01 NOTE — PLAN OF CARE
OT/5C: HOLD. Per PT, pt is mobilizing IND and performing all ADLs IND. OT will hold per BMT protocol and PT will follow for strength/endurance. OT will follow from periphery and initiate as indicated.

## 2020-10-01 NOTE — PROGRESS NOTES
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      Diagnosis AMLM1 Acute myelogeneous leukemia, M1, myeloblastic  HCT Type Allogeneic    Prep Regimen Fludarabine  Cytoxan  TBI   Donor Source Sibling    GVHD Prophylaxis Post transplant cytoxan  Tac/MMF  Primary BMT Provider ACE      INTERVAL  HISTORY     Florencia is feeling good- anxious but ready to start chemo. Feeling well. No complaints. Line is mildly sore but not too bothersome- she slept well.     Review of Systems: 10 point ROS negative except as noted above.  # Pain Assessment:  Current Pain Score 10/1/2020   Patient currently in pain? denies   Pain descriptors -   - Florencia is experiencing pain due to central line placement. Pain management was discussed and the plan was created in a collaborative fashion.  Florencia's response to the current recommendations: engaged  - PRn tylenol +/- ice/heat packs.     Scheduled Medications  See MAR    PHYSICAL EXAM     Weight In/Out     Wt Readings from Last 3 Encounters:   10/01/20 77.4 kg (170 lb 11.2 oz)   09/17/20 77.8 kg (171 lb 8 oz)   09/10/20 77.2 kg (170 lb 3.1 oz)      I/O last 3 completed shifts:  In: 3129 [P.O.:1100; I.V.:2029]  Out: 3250 [Urine:3250]       KPS:  100    /57 (BP Location: Right arm)   Pulse 70   Temp 97.2  F (36.2  C) (Axillary)   Resp 16   Ht 1.68 m (5' 6.14\")   Wt 77.4 kg (170 lb 11.2 oz)   SpO2 100%   BMI 27.43 kg/m     General: NAD   Eyes: : LINH, sclera anicteric   Nose/Mouth/Throat: OP clear, buccal mucosa moist, no ulcerations   Lungs: CTA bilaterally  Cardiovascular: RRR, no M/R/G   Abdominal/Rectal: +BS, soft, NT, ND, No HSM   Lymphatics: no edema  Skin: no rashes or petechaie  Neuro: A&O   Additional Findings: Padilla site NT, no drainage.    Current aGVHD staging:  Skin 0, UGI 0, LGI 0, Liver 0 (keep in note through day +180, delete if auto)    LABS AND IMAGING - " PAST 24 HOURS     Results for orders placed or performed during the hospital encounter of 09/30/20 (from the past 24 hour(s))   Vancomycin Resistant Enterococcus (VRE) Culture    Specimen: Stool; Feces   Result Value Ref Range    Specimen Description Feces     Special Requests Specimen collected in eSwab transport (white cap)     Culture Micro Culture negative monitoring continues    CBC with platelets differential   Result Value Ref Range    WBC 5.3 4.0 - 11.0 10e9/L    RBC Count 3.80 3.8 - 5.2 10e12/L    Hemoglobin 11.7 11.7 - 15.7 g/dL    Hematocrit 35.9 35.0 - 47.0 %    MCV 95 78 - 100 fl    MCH 30.8 26.5 - 33.0 pg    MCHC 32.6 31.5 - 36.5 g/dL    RDW 13.2 10.0 - 15.0 %    Platelet Count 198 150 - 450 10e9/L    Diff Method Automated Method     % Neutrophils 53.0 %    % Lymphocytes 32.1 %    % Monocytes 10.9 %    % Eosinophils 3.0 %    % Basophils 0.6 %    % Immature Granulocytes 0.4 %    Nucleated RBCs 0 0 /100    Absolute Neutrophil 2.8 1.6 - 8.3 10e9/L    Absolute Lymphocytes 1.7 0.8 - 5.3 10e9/L    Absolute Monocytes 0.6 0.0 - 1.3 10e9/L    Absolute Eosinophils 0.2 0.0 - 0.7 10e9/L    Absolute Basophils 0.0 0.0 - 0.2 10e9/L    Abs Immature Granulocytes 0.0 0 - 0.4 10e9/L    Absolute Nucleated RBC 0.0    Comprehensive metabolic panel   Result Value Ref Range    Sodium 137 133 - 144 mmol/L    Potassium 4.0 3.4 - 5.3 mmol/L    Chloride 105 94 - 109 mmol/L    Carbon Dioxide 31 20 - 32 mmol/L    Anion Gap 1 (L) 3 - 14 mmol/L    Glucose 105 (H) 70 - 99 mg/dL    Urea Nitrogen 14 7 - 30 mg/dL    Creatinine 0.74 0.52 - 1.04 mg/dL    GFR Estimate 81 >60 mL/min/[1.73_m2]    GFR Estimate If Black >90 >60 mL/min/[1.73_m2]    Calcium 8.7 8.5 - 10.1 mg/dL    Bilirubin Total 0.2 0.2 - 1.3 mg/dL    Albumin 3.4 3.4 - 5.0 g/dL    Protein Total 6.8 6.8 - 8.8 g/dL    Alkaline Phosphatase 72 40 - 150 U/L    ALT 22 0 - 50 U/L    AST 16 0 - 45 U/L       I have assessed all abnormal lab values for their clinical significance and any  values considered clinically significant have been addressed in the assessment and plan  OVERALL PLAN   Florencia Gao is a 75 year old female with AML in CR1, admitted for JCARLOS Allo sib pbsct per protocol 2019-10, randomized to Post transplant cytoxan/Tac/MMF.     1.  BMT:   Day -7 - line placement.   Day -6: cytoxan and fludarabine  Day -5 to day -2: Fludarabine  Day -1: TBI  Day 0: Transplant      Allopurinol through day 0. Flush and pre-meds prior to transplant. GCSF starts d+5 and cont to until ANC>1500 x3 consecutive days. Re-stage per protocol.     2.  HEME: Keep Hgb>8 and plts>10K.   - Of note has significant antibodies to RBCs- cane cause delay in transfusion availability. Premeds tylenol and benadryl.                             3.  ID: Afebrile.   Cont Levo,Linda/vori, and HD ACV (CMV+, HSV+, EBV+) prophy.   Pjp prophy to start day+28 - IV pentamdine vs dapsone as sulfa allergy.                                         4.  GI:   Miralax daily- monitor for loose stools  - Per protocol no dex. Monitor closely for nausea and vomiting.    Zofran  to chemo to prevent N/V. Ativan and compazine available PRN break-through symptoms.   Protonix for GI prophy.   Ursodiol for VOD prophy.     5.  GVHD Prophylaxis:   Day +3 and day+4 cytoxan  Day+5 start tac and MMF      6.  FEN/Renal: Monitor creat and lytes. Replete lytes PRN per SS. Monitor weight, I+O, lytes per protocol with IVF flush.     7. Hypothyroidism  Continue synthroid 88mcg      8.) Psych: Situational Depression continue effexor 75mg daily.      Flornecia Gao received signed copies of her consent forms in printed format.     Violeta Barone PA-C     The patient was seen and examined by me separately from the midlevel provider. The note reflects our mutual assessment and plans and were approved by me personally.   I personally reviewed today's lab results vital signs and radiology results.     Each point of the assessment and plan were reviewed by  the midlevel and me and either endorsed by me or were my added decisions.     My pertinent physical findings today are:  Afebrile, clear lungs and no rash. No URI sxs.     My assessment and plan are:  74 yo admitted for NMA allo PBSC for first remission AML.  Chemo orders for preparative regimen and post tx cytoxan reviewed with pharm D and signed. Monitor for SIADH. Monitor oral intake.     Hernando Alcala M.D.

## 2020-10-01 NOTE — PROGRESS NOTES
"   10/01/20 1500   Below is a list of statements that other people with your illness have said are important. Please [place] or steff one number per line to indicate your response as it applies to the past 7 days.   I feel fatigued 0 - Not at all   I feel weak all over 0 - Not at all   I feel listless (\"washed out\") 0 - Not at all   I feel tired 1 - A little bit   I have trouble starting things because I am tired 1 - A little bit   I have trouble finishing things because I am tired 1 - A little bit   I have energy 3 - Quite a bit   I am able to do my usual activities 3 - Quite a bit   I need to sleep during the day 1 - A little bit   I am too tired to eat 0 - Not at all   I need help doing my usual activities 0 - Not at all   I am frustrated by being too tired to do the things I want to do 0 - Not at all   I have to limit my social activity because I am tired  0 - Not at all   FACIT-Fatigue Subscale Scoring   HI7 Item calculation 4   HI12 Item Calculation 4   An1 Item Calculation 4   An2 Item Calculation 3   An3 Item Calculation 3   An4 Item Calculation 3   An5 Item Calculation 3   An7 Item Calculation 3   An8 Item Calculation 3   An12 Item Calculation 4   An14 Item Calculation 4   An15 Item Calculation 4   An16 Item Calculation 4   Total Item Calculations Sum 46   Item Calculated Sum multiplied by 13 598   FACIT Fatigue Subscale (score out of 52). The higher the score, the better the QOL. 46     "

## 2020-10-01 NOTE — PROGRESS NOTES
10/01/20 1400   Quick Adds   Type of Visit Initial PT Evaluation   Living Environment   People in home alone   Current Living Arrangements   (duplex)   Home Accessibility stairs to enter home   Number of Stairs, Main Entrance   (2 + 6 stairs to enter with single rail)   Transportation Anticipated car, drives self   Living Environment Comments Pt lives in [place], upper level of duplex   Self-Care   Usual Activity Tolerance good   Current Activity Tolerance good   Regular Exercise Yes  (Used to go to [facility] prior to covid, avid Silver Sneakers)   Equipment Currently Used at Home none   Activity/Exercise/Self-Care Comment IND   General Information   Onset of Illness/Injury or Date of Surgery 09/30/20   Referring Physician Violeta Barone PA-C   Patient/Family Therapy Goals Statement (PT) Maintain strength during admission   Pertinent History of Current Problem (include personal factors and/or comorbidities that impact the POC) Day -6 allo BMT for AML dx   Existing Precautions/Restrictions immunosuppressed   Cognition   Orientation Status (Cognition) oriented x 4   Affect/Mental Status (Cognition) WNL   Follows Commands (Cognition) WNL   Pain Assessment   Patient Currently in Pain No   Integumentary/Edema   Integumentary/Edema no deficits were identifed   Posture    Posture Forward head position   Range of Motion (ROM)   ROM Quick Adds ROM WNL   Strength   Manual Muscle Testing Quick Adds Strength WNL   Bed Mobility   Comment (Bed Mobility) IND   Transfers   Transfer Safety Comments IND   Gait/Stairs (Locomotion)   E[place] Level (Gait) independent   Balance   Balance no deficits were identified   Clinical Impression   Criteria for Skilled Therapeutic Intervention yes, treatment indicated   PT Diagnosis (PT) At risk for deconditioning 2/2 medical/BMT admission   Influenced by the following impairments Medical   Functional limitations due to impairments Generalized weakness/fatigue   Clinical Presentation  "Stable/Uncomplicated   Clinical Presentation Rationale Clinical judgement   Clinical Decision Making (Complexity) low complexity   Therapy Frequency (PT) 2x/week   Predicted Duration of Therapy Intervention (days/wks) 5 weeks   Planned Therapy Interventions (PT) gait training;balance training;strengthening   Anticipated Equipment Needs at Discharge (PT)   (none)   Risk & Benefits of therapy have been explained evaluation/treatment results reviewed;care plan/treatment goals reviewed;risks/benefits reviewed;patient   Clinical Impression Comments Upcoming BMT, will follow while IP to prevent medical related deconditioning   PT Discharge Planning    PT Discharge Recommendation (DC Rec) home   PT Rationale for DC Rec Level of assist pending progress post BMT   PT Brief overview of current status  Currently IND, upcoming BMT   [facility] AM-PAC TM \"6 Clicks\"   2016, Trustees of [facility], under license to [employer].  All rights reserved.   6 Clicks Short Forms Basic Mobility Inpatient Short Form   [facility] AM-PAC  \"6 Clicks\" V.2 Basic Mobility Inpatient Short Form   1. Turning from your back to your side while in a flat bed without using bedrails? 4 - None   2. Moving from lying on your back to sitting on the side of a flat bed without using bedrails? 4 - None   3. Moving to and from a bed to a chair (including a wheelchair)? 4 - None   4. Standing up from a chair using your arms (e.g., wheelchair, or bedside chair)? 4 - None   5. To walk in hospital room? 4 - None   6. Climbing 3-5 steps with a railing? 4 - None   Basic Mobility Raw Score (Score out of 24.Lower scores equate to lower levels of function) 24   Total Evaluation Time   Total Evaluation Time (Minutes) 7     "

## 2020-10-01 NOTE — PLAN OF CARE
30-Second Sit to Stand Test:  The test is conducted with a straight back chair, without armrests, with a 17-inch seat height.    Patient Score (score =0 if must use arms) 7 reps    The 30 Second Sit to Stand Test is considered a test of fall risk.  Data from [place] [employer], cosponsored by [place] Dept of Health:  If must use arms = High Fall Risk regardless of reps  8 or less times = High Fall Risk   9 to 12 times = Moderate Risk  13 or more times = Low Risk    The 30 Second Sit to Stand Test is also considered a test of leg strength and endurance.   Normative Data from Gary et al,. 2001  Age                 Reps: Men        Reps: Women  60-64                14-19                       12-17                               65-69                12-18                       11-16                    70-74                12-17                       10-15              75-79                11-17                       10-15                    80-84                10-15                         9-14  85-89                 8-14                          8-13  90-94                 7-12                          4-11    Assessment (rationale for performing, application to patient s function & care plan): Pre-auto BMT  (Physical Therapist: Minutes billed as physical performance)

## 2020-10-02 ENCOUNTER — RESEARCH ENCOUNTER (OUTPATIENT)
Dept: TRANSPLANT | Facility: CLINIC | Age: 75
End: 2020-10-02

## 2020-10-02 LAB
ANION GAP SERPL CALCULATED.3IONS-SCNC: 7 MMOL/L (ref 3–14)
BACTERIA SPEC CULT: NORMAL
BASOPHILS # BLD AUTO: 0 10E9/L (ref 0–0.2)
BASOPHILS NFR BLD AUTO: 0.3 %
BUN SERPL-MCNC: 7 MG/DL (ref 7–30)
CALCIUM SERPL-MCNC: 8.2 MG/DL (ref 8.5–10.1)
CHLORIDE SERPL-SCNC: 96 MMOL/L (ref 94–109)
CO2 SERPL-SCNC: 27 MMOL/L (ref 20–32)
CREAT SERPL-MCNC: 0.62 MG/DL (ref 0.52–1.04)
DIFFERENTIAL METHOD BLD: ABNORMAL
EOSINOPHIL # BLD AUTO: 0.1 10E9/L (ref 0–0.7)
EOSINOPHIL NFR BLD AUTO: 0.9 %
ERYTHROCYTE [DISTWIDTH] IN BLOOD BY AUTOMATED COUNT: 12.8 % (ref 10–15)
GFR SERPL CREATININE-BSD FRML MDRD: 89 ML/MIN/{1.73_M2}
GLUCOSE SERPL-MCNC: 129 MG/DL (ref 70–99)
HCT VFR BLD AUTO: 33.9 % (ref 35–47)
HGB BLD-MCNC: 11.3 G/DL (ref 11.7–15.7)
IMM GRANULOCYTES # BLD: 0 10E9/L (ref 0–0.4)
IMM GRANULOCYTES NFR BLD: 0.4 %
LYMPHOCYTES # BLD AUTO: 0.6 10E9/L (ref 0.8–5.3)
LYMPHOCYTES NFR BLD AUTO: 8.1 %
Lab: NORMAL
MAGNESIUM SERPL-MCNC: 1.6 MG/DL (ref 1.6–2.3)
MCH RBC QN AUTO: 30.9 PG (ref 26.5–33)
MCHC RBC AUTO-ENTMCNC: 33.3 G/DL (ref 31.5–36.5)
MCV RBC AUTO: 93 FL (ref 78–100)
MONOCYTES # BLD AUTO: 0.5 10E9/L (ref 0–1.3)
MONOCYTES NFR BLD AUTO: 6.6 %
NEUTROPHILS # BLD AUTO: 6.5 10E9/L (ref 1.6–8.3)
NEUTROPHILS NFR BLD AUTO: 83.7 %
NRBC # BLD AUTO: 0 10*3/UL
NRBC BLD AUTO-RTO: 0 /100
PLATELET # BLD AUTO: 185 10E9/L (ref 150–450)
POTASSIUM SERPL-SCNC: 3.7 MMOL/L (ref 3.4–5.3)
POTASSIUM SERPL-SCNC: 3.7 MMOL/L (ref 3.4–5.3)
RBC # BLD AUTO: 3.66 10E12/L (ref 3.8–5.2)
SODIUM SERPL-SCNC: 130 MMOL/L (ref 133–144)
SODIUM SERPL-SCNC: 136 MMOL/L (ref 133–144)
SPECIMEN SOURCE: NORMAL
WBC # BLD AUTO: 7.8 10E9/L (ref 4–11)

## 2020-10-02 PROCEDURE — 258N000001 HC RX 258: Performed by: PHYSICIAN ASSISTANT

## 2020-10-02 PROCEDURE — 250N000013 HC RX MED GY IP 250 OP 250 PS 637: Performed by: PHYSICIAN ASSISTANT

## 2020-10-02 PROCEDURE — 258N000003 HC RX IP 258 OP 636: Performed by: PHYSICIAN ASSISTANT

## 2020-10-02 PROCEDURE — 84132 ASSAY OF SERUM POTASSIUM: CPT | Performed by: PHYSICIAN ASSISTANT

## 2020-10-02 PROCEDURE — 84295 ASSAY OF SERUM SODIUM: CPT | Performed by: PHYSICIAN ASSISTANT

## 2020-10-02 PROCEDURE — 99233 SBSQ HOSP IP/OBS HIGH 50: CPT | Performed by: INTERNAL MEDICINE

## 2020-10-02 PROCEDURE — 85025 COMPLETE CBC W/AUTO DIFF WBC: CPT | Performed by: PHYSICIAN ASSISTANT

## 2020-10-02 PROCEDURE — 80048 BASIC METABOLIC PNL TOTAL CA: CPT | Performed by: PHYSICIAN ASSISTANT

## 2020-10-02 PROCEDURE — 83735 ASSAY OF MAGNESIUM: CPT | Performed by: PHYSICIAN ASSISTANT

## 2020-10-02 PROCEDURE — 250N000011 HC RX IP 250 OP 636: Performed by: PHYSICIAN ASSISTANT

## 2020-10-02 PROCEDURE — 258N000003 HC RX IP 258 OP 636: Performed by: INTERNAL MEDICINE

## 2020-10-02 PROCEDURE — 206N000001 HC R&B BMT UMMC

## 2020-10-02 PROCEDURE — 250N000011 HC RX IP 250 OP 636: Performed by: INTERNAL MEDICINE

## 2020-10-02 PROCEDURE — 250N000011 HC RX IP 250 OP 636: Performed by: RADIOLOGY

## 2020-10-02 RX ORDER — SODIUM CHLORIDE 9 MG/ML
INJECTION, SOLUTION INTRAVENOUS CONTINUOUS
Status: ACTIVE | OUTPATIENT
Start: 2020-10-02 | End: 2020-10-02

## 2020-10-02 RX ADMIN — MICAFUNGIN SODIUM 100 MG: 50 INJECTION, POWDER, LYOPHILIZED, FOR SOLUTION INTRAVENOUS at 20:35

## 2020-10-02 RX ADMIN — POLYETHYLENE GLYCOL 3350 17 G: 17 POWDER, FOR SOLUTION ORAL at 20:12

## 2020-10-02 RX ADMIN — FLUDARABINE PHOSPHATE 50 MG: 25 INJECTION, SOLUTION INTRAVENOUS at 09:39

## 2020-10-02 RX ADMIN — FUROSEMIDE 10 MG: 10 INJECTION, SOLUTION INTRAVENOUS at 01:05

## 2020-10-02 RX ADMIN — ONDANSETRON HYDROCHLORIDE 8 MG: 8 TABLET, FILM COATED ORAL at 17:00

## 2020-10-02 RX ADMIN — LEVOTHYROXINE SODIUM 88 MCG: 0.09 TABLET ORAL at 09:40

## 2020-10-02 RX ADMIN — URSODIOL 300 MG: 300 CAPSULE ORAL at 14:51

## 2020-10-02 RX ADMIN — VENLAFAXINE 75 MG: 75 TABLET ORAL at 14:51

## 2020-10-02 RX ADMIN — ONDANSETRON HYDROCHLORIDE 8 MG: 8 TABLET, FILM COATED ORAL at 09:40

## 2020-10-02 RX ADMIN — DEXTROSE AND SODIUM CHLORIDE 1000 ML: 5; 450 INJECTION, SOLUTION INTRAVENOUS at 01:41

## 2020-10-02 RX ADMIN — MAGNESIUM SULFATE 2 G: 2 INJECTION INTRAVENOUS at 05:16

## 2020-10-02 RX ADMIN — POTASSIUM CHLORIDE 20 MEQ: 29.8 INJECTION, SOLUTION INTRAVENOUS at 06:16

## 2020-10-02 RX ADMIN — URSODIOL 300 MG: 300 CAPSULE ORAL at 20:12

## 2020-10-02 RX ADMIN — SODIUM CHLORIDE: 9 INJECTION, SOLUTION INTRAVENOUS at 11:41

## 2020-10-02 RX ADMIN — ONDANSETRON HYDROCHLORIDE 8 MG: 8 TABLET, FILM COATED ORAL at 23:44

## 2020-10-02 RX ADMIN — URSODIOL 300 MG: 300 CAPSULE ORAL at 09:40

## 2020-10-02 RX ADMIN — PANTOPRAZOLE SODIUM 40 MG: 40 TABLET, DELAYED RELEASE ORAL at 09:40

## 2020-10-02 RX ADMIN — PROCHLORPERAZINE EDISYLATE 5 MG: 5 INJECTION INTRAMUSCULAR; INTRAVENOUS at 01:40

## 2020-10-02 RX ADMIN — POTASSIUM CHLORIDE 20 MEQ: 750 TABLET, EXTENDED RELEASE ORAL at 19:03

## 2020-10-02 RX ADMIN — CYANOCOBALAMIN TAB 1000 MCG 1000 MCG: 1000 TAB at 09:40

## 2020-10-02 RX ADMIN — Medication 5 ML: at 23:45

## 2020-10-02 ASSESSMENT — ACTIVITIES OF DAILY LIVING (ADL)
ADLS_ACUITY_SCORE: 10

## 2020-10-02 ASSESSMENT — MIFFLIN-ST. JEOR: SCORE: 1306.91

## 2020-10-02 NOTE — PROGRESS NOTES
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      Diagnosis AMLM1 Acute myelogeneous leukemia, M1, myeloblastic  HCT Type Allogeneic    Prep Regimen Fludarabine  Cytoxan  TBI   Donor Source Sibling    GVHD Prophylaxis Post transplant cytoxan  Tac/MMF  Primary BMT Provider ACE      INTERVAL  HISTORY     Florencia had some sudden on set nausea/emesis overnight. Feel much better this morning. She is not looking forward to post transplant cytoxan. She does think she'll be able to eat breakfast. No cough, sob or throat pain. No infectious symptoms.     Review of Systems: 10 point ROS negative except as noted above.  # Pain Assessment:  Current Pain Score 10/2/2020   Patient currently in pain? denies   Pain descriptors -   - Florencia is experiencing pain due to central line placement. Pain management was discussed and the plan was created in a collaborative fashion.  Florencia's response to the current recommendations: engaged  - PRn tylenol +/- ice/heat packs.     Scheduled Medications  See MAR    PHYSICAL EXAM     Weight In/Out     Wt Readings from Last 3 Encounters:   10/02/20 78.3 kg (172 lb 9.6 oz)   09/17/20 77.8 kg (171 lb 8 oz)   09/10/20 77.2 kg (170 lb 3.1 oz)      I/O last 3 completed shifts:  In: 6977 [P.O.:1140; I.V.:4235; IV Piggyback:1602]  Out: 5630 [Urine:5550; Emesis/NG output:80]       KPS:  100    /63   Pulse 72   Temp 97.9  F (36.6  C) (Oral)   Resp 16   Ht 1.68 m (5' 6.14\")   Wt 78.3 kg (172 lb 9.6 oz)   SpO2 99%   BMI 27.74 kg/m     General: NAD   Eyes:  LINH, sclera anicteric   Nose/Mouth/Throat: OP clear, buccal mucosa moist, no ulcerations   Lungs: CTA bilaterally  Cardiovascular: RRR, no M/R/G   Abdominal/Rectal: +BS, soft, NT, ND, No HSM   Lymphatics: no edema  Skin: no rashes or petechaie  Neuro: A&O   Additional Findings: Padilla site NT, no drainage.    10/2/20 Current " aGVHD staging:  Skin 0, UGI 0, LGI 0, Liver 0 (keep in note through day +180, delete if auto)    LABS AND IMAGING - PAST 24 HOURS     Results for orders placed or performed during the hospital encounter of 09/30/20 (from the past 24 hour(s))   Sodium   Result Value Ref Range    Sodium 137 133 - 144 mmol/L   Potassium   Result Value Ref Range    Potassium 3.6 3.4 - 5.3 mmol/L   Basic metabolic panel   Result Value Ref Range    Sodium 130 (L) 133 - 144 mmol/L    Potassium 3.7 3.4 - 5.3 mmol/L    Chloride 96 94 - 109 mmol/L    Carbon Dioxide 27 20 - 32 mmol/L    Anion Gap 7 3 - 14 mmol/L    Glucose 129 (H) 70 - 99 mg/dL    Urea Nitrogen 7 7 - 30 mg/dL    Creatinine 0.62 0.52 - 1.04 mg/dL    GFR Estimate 89 >60 mL/min/[1.73_m2]    GFR Estimate If Black >90 >60 mL/min/[1.73_m2]    Calcium 8.2 (L) 8.5 - 10.1 mg/dL   CBC with platelets differential   Result Value Ref Range    WBC 7.8 4.0 - 11.0 10e9/L    RBC Count 3.66 (L) 3.8 - 5.2 10e12/L    Hemoglobin 11.3 (L) 11.7 - 15.7 g/dL    Hematocrit 33.9 (L) 35.0 - 47.0 %    MCV 93 78 - 100 fl    MCH 30.9 26.5 - 33.0 pg    MCHC 33.3 31.5 - 36.5 g/dL    RDW 12.8 10.0 - 15.0 %    Platelet Count 185 150 - 450 10e9/L    Diff Method Automated Method     % Neutrophils 83.7 %    % Lymphocytes 8.1 %    % Monocytes 6.6 %    % Eosinophils 0.9 %    % Basophils 0.3 %    % Immature Granulocytes 0.4 %    Nucleated RBCs 0 0 /100    Absolute Neutrophil 6.5 1.6 - 8.3 10e9/L    Absolute Lymphocytes 0.6 (L) 0.8 - 5.3 10e9/L    Absolute Monocytes 0.5 0.0 - 1.3 10e9/L    Absolute Eosinophils 0.1 0.0 - 0.7 10e9/L    Absolute Basophils 0.0 0.0 - 0.2 10e9/L    Abs Immature Granulocytes 0.0 0 - 0.4 10e9/L    Absolute Nucleated RBC 0.0    Magnesium   Result Value Ref Range    Magnesium 1.6 1.6 - 2.3 mg/dL     I have assessed all abnormal lab values for their clinical significance and any values considered clinically significant have been addressed in the assessment and plan  OVERALL PLAN   Florencia JIANG  Murray is a 75 year old female with AML in CR1, admitted for JCARLOS Allo sib pbsct per protocol 2019-10, randomized to Post transplant cytoxan/Tac/MMF.      1.  BMT:   Day -7 - line placement.   Day -6: cytoxan and fludarabine  Day -5 to day -2: Fludarabine  Day -1: TBI  Day 0: Transplant   Allopurinol through day 0. Flush and pre-meds prior to transplant. GCSF starts d+5 and cont to until ANC>1500 x3 consecutive days. Re-stage per protocol.     2.  HEME: Keep Hgb>8 and plts>10K.   - Of note has significant antibodies to RBCs- cane cause delay in transfusion availability. Premeds tylenol and benadryl.                             3.  ID: Afebrile.   Cont Levo,Linda/vori, and HD ACV (CMV+, HSV+, EBV+) prophy.   Pjp prophy to start day+28 - IV pentamdine vs dapsone as sulfa allergy.                                         4.  GI: N/V overnight- hopeful now that completed cytoxan this will be improved.   Miralax daily- monitor for loose stools- no complaint of this to date.   - Per protocol no dex/steroids**   Zofran  to chemo to prevent N/V. Ativan and compazine available PRN break-through symptoms.   Protonix for GI prophy.   Ursodiol for VOD prophy.     5.  GVHD Prophylaxis:   Day +3 and day+4 cytoxan  Day+5 start tac and MMF      6.  FEN/Renal: Monitor creat and lytes.   NA: 130 (137) - likely SIADH due to post transplant cytoxan  Replete lytes PRN per SS. Monitor weight, I+O, lytes per protocol with IVF flush.     7. Hypothyroidism  Continue synthroid 88mcg      8.) Psych: Situational Depression continue effexor 75mg daily.      Florencia Gao received signed copies of her consent forms in printed format.     Violeta Barone PA-C     The patient was seen and examined by me separately from the midlevel provider. The note reflects our mutual assessment and plans and were approved by me personally.   I personally reviewed today's lab results vital signs and radiology results.     Each point of the assessment and  plan were reviewed by the midlevel and me and either endorsed by me or were my added decisions.     My pertinent physical findings today are:  Afebrile, clear lungs and no rash. No URI sxs.     My assessment and plan are:  74 yo admitted for NMA allo PBSC for first remission AML.  Chemo orders for preparative regimen and post tx cytoxan reviewed with pharm D and signed. Monitor for SIADH. Monitor oral intake. Na 130: switch infusion to NS.   Hernando Alcala M.D.

## 2020-10-02 NOTE — PLAN OF CARE
VSS. Uses CPAP during sleep. Had 2 small emesis last night. Ativan given and immediately patient vomitted including the Ativan. Another dose of Ativan given. Compazine given x1 and provided relief from nausea. On Cytoxan flush. Lasix 20mg was given during shift change due to increase in weight= 2.3kgs. Had good response. Lasix 10mg given x1. Had 1x incontinent stool while vomiting. Complained of lightheadedness when she was vomiting. Bedside commode provided and on bed alarm. Up with stand-by assist to commode. [place] infusing. Patient denies lightheadedness or dizziness and reports that she feels much better this morning. Magnesium 2g given. Potassium infusing.  Problem: Adult Inpatient Plan of Care  Goal: Plan of Care Review  Outcome: No Change     Problem: Adult Inpatient Plan of Care  Goal: Patient-Specific Goal (Individualization)  Outcome: No Change     Problem: Adult Inpatient Plan of Care  Goal: Absence of Hospital-Acquired Illness or Injury  Outcome: No Change     Problem: Adult Inpatient Plan of Care  Goal: Absence of Hospital-Acquired Illness or Injury  Intervention: Identify and Manage Fall Risk  Recent Flowsheet Documentation  Taken 10/1/2020 2000 by Princess Cassie Parra RN  Safety Promotion/Fall Prevention:    clutter free environment maintained    fall prevention program maintained    lighting adjusted    nonskid shoes/slippers when out of bed    safety round/check completed    supervised activity    toileting scheduled     Problem: Adult Inpatient Plan of Care  Goal: Absence of Hospital-Acquired Illness or Injury  Intervention: Prevent VTE (venous thromboembolism)  Recent Flowsheet Documentation  Taken 10/1/2020 2000 by Princess Cassie Parra RN  VTE Prevention/Management:    ambulation promoted    bleeding precautions maintained    bleeding risk assessed    fluids promoted     Problem: Adult Inpatient Plan of Care  Goal: Optimal Comfort and Wellbeing  Outcome: No Change     Problem: Adult Inpatient  Plan of Care  Goal: Readiness for Transition of Care  Outcome: No Change     Problem: Adult Inpatient Plan of Care  Goal: Rounds/Family Conference  Outcome: No Change     Problem: Adjustment to Transplant (Stem Cell/Bone Marrow Transplant)  Goal: Optimal Coping with Transplant  Outcome: No Change     Problem: Bladder Irritation (Stem Cell/Bone Marrow Transplant)  Goal: Symptom-Free Urinary Elimination  Outcome: No Change  Intervention: Monitor and Manage Bladder Irritation  Recent Flowsheet Documentation  Taken 10/1/2020 2000 by Princess Cassie Parra RN  Hyperhydration Management: fluids provided     Problem: Bladder Irritation (Stem Cell/Bone Marrow Transplant)  Goal: Symptom-Free Urinary Elimination  Intervention: Monitor and Manage Bladder Irritation  Recent Flowsheet Documentation  Taken 10/1/2020 2000 by Princess Cassie Parra RN  Hyperhydration Management: fluids provided     Problem: Diarrhea (Stem Cell/Bone Marrow Transplant)  Goal: Diarrhea Symptom Control  Outcome: No Change  Intervention: Manage Diarrhea  Recent Flowsheet Documentation  Taken 10/1/2020 2000 by Princess Cassie Parra RN  Fluid/Electrolyte Management: fluids provided     Problem: Diarrhea (Stem Cell/Bone Marrow Transplant)  Goal: Diarrhea Symptom Control  Intervention: Manage Diarrhea  Recent Flowsheet Documentation  Taken 10/1/2020 2000 by Princess Cassie Parra RN  Fluid/Electrolyte Management: fluids provided     Problem: Fatigue (Stem Cell/Bone Marrow Transplant)  Goal: Energy Level Supports Daily Activity  Outcome: No Change     Problem: Hematologic Alteration (Stem Cell/Bone Marrow Transplant)  Goal: Blood Counts Within Acceptable Range  Outcome: No Change     Problem: Hypersensitivity Reaction (Stem Cell/Bone Marrow Transplant)  Goal: Absence of Hypersensitivity Reaction  Outcome: No Change     Problem: Infection Risk (Stem Cell/Bone Marrow Transplant)  Goal: Absence of Infection Signs/Symptoms  Outcome: No Change     Problem:  Mucositis (Stem Cell/Bone Marrow Transplant)  Goal: Mucous Membrane Health and Integrity  Outcome: No Change  Intervention: Maintain Oral Mucous Membrane Integrity  Recent Flowsheet Documentation  Taken 10/1/2020 2000 by Princess Cassie Parra RN  Oral Care: teeth brushed     Problem: Mucositis (Stem Cell/Bone Marrow Transplant)  Goal: Mucous Membrane Health and Integrity  Intervention: Maintain Oral Mucous Membrane Integrity  Recent Flowsheet Documentation  Taken 10/1/2020 2000 by Princess Cassie Parra RN  Oral Care: teeth brushed     Problem: Nausea and Vomiting (Stem Cell/Bone Marrow Transplant)  Goal: Nausea and Vomiting Symptom Relief  Outcome: No Change     Problem: Nutrition Intake Altered (Stem Cell/Bone Marrow Transplant)  Goal: Optimal Nutrition Intake  Outcome: No Change

## 2020-10-02 NOTE — CONSULTS
BRIEF SOCIAL WORK NOTE:    Social work consulted for housing/lodging needs. SW met w/ Pt and primary contact Gaye on 9/30/20 to discuss local lodging needs (please see notes for further details). As of 10/2/20, Pt is currently #3 on the [place] waitlist. SW will continue to follow and communicate w/ Pt/family re: housing/lodging needs. SW consult acknowledged and completed at this time. Please re-consult SW as needed.    ROSSI Pabon, [facility]  Adult Blood & Marrow Transplant   Phone: [phone]  Pager: [phone]

## 2020-10-02 NOTE — PLAN OF CARE
Patient afebrile, OVSS. Denies n/v this shift. On scheduled Zofran. Reports being very tired today. Denies any dizziness/lightheadedness. She is SBA. Uses bedside commode at hs. Q 2 hrs voiding parameters for cytoxan  met. No lasix given. Flush ended at 1330 this afternoon. Na was low this am. Changed flush to NS from D51/2NS. Awaiting for Na/K results this afternoon. Received Fludarabine and tolerated the infusion well. Continue with POC.      1600 Lab Results:  Na - 137  K+ - 3.7 - replaced.    Problem: Adult Inpatient Plan of Care  Goal: Plan of Care Review  Outcome: No Change     Problem: Adult Inpatient Plan of Care  Goal: Patient-Specific Goal (Individualization)  Outcome: No Change     Problem: Adult Inpatient Plan of Care  Goal: Absence of Hospital-Acquired Illness or Injury  Outcome: No Change

## 2020-10-02 NOTE — PROGRESS NOTES
[id number] - ACE-BMT/Seamless: Study Visit Note   Subject name: Florencia Gao     Visit: baseline (post-randomization)    Did the study visit occur within the appropriate window allowed by the protocol? yes    Since the last study visit, she has been doing well.     I communicated with the patient that she was randomized to the Clinical Management of Comorbidities (CMC) and Supportive Palliative Care (SPC) arm of the study. I dispensed appropriate study materials to Florencia.    Her enrollments SPC NEST13+ survey was administered and the results were communicated to the palliative care team.    Florencia Gao was given the opportunity to ask any trial related questions.    Please see provider progress note for physical exam and other clinical information. Labs were reviewed - any significant lab values were addressed and reviewed.    Brook Ervin

## 2020-10-03 LAB
ABO + RH BLD: ABNORMAL
ABO + RH BLD: ABNORMAL
ANION GAP SERPL CALCULATED.3IONS-SCNC: 3 MMOL/L (ref 3–14)
BASOPHILS # BLD AUTO: 0 10E9/L (ref 0–0.2)
BASOPHILS NFR BLD AUTO: 0.2 %
BLD GP AB SCN SERPL QL: ABNORMAL
BLOOD BANK CMNT PATIENT-IMP: ABNORMAL
BLOOD BANK CMNT PATIENT-IMP: ABNORMAL
BUN SERPL-MCNC: 5 MG/DL (ref 7–30)
CALCIUM SERPL-MCNC: 8.6 MG/DL (ref 8.5–10.1)
CHLORIDE SERPL-SCNC: 104 MMOL/L (ref 94–109)
CO2 SERPL-SCNC: 28 MMOL/L (ref 20–32)
CREAT SERPL-MCNC: 0.68 MG/DL (ref 0.52–1.04)
DIFFERENTIAL METHOD BLD: ABNORMAL
EOSINOPHIL # BLD AUTO: 0.2 10E9/L (ref 0–0.7)
EOSINOPHIL NFR BLD AUTO: 3.3 %
ERYTHROCYTE [DISTWIDTH] IN BLOOD BY AUTOMATED COUNT: 12.9 % (ref 10–15)
GFR SERPL CREATININE-BSD FRML MDRD: 87 ML/MIN/{1.73_M2}
GLUCOSE SERPL-MCNC: 99 MG/DL (ref 70–99)
HCT VFR BLD AUTO: 35.5 % (ref 35–47)
HGB BLD-MCNC: 11.7 G/DL (ref 11.7–15.7)
IMM GRANULOCYTES # BLD: 0 10E9/L (ref 0–0.4)
IMM GRANULOCYTES NFR BLD: 0.2 %
LYMPHOCYTES # BLD AUTO: 0.1 10E9/L (ref 0.8–5.3)
LYMPHOCYTES NFR BLD AUTO: 1.2 %
MAGNESIUM SERPL-MCNC: 2.3 MG/DL (ref 1.6–2.3)
MCH RBC QN AUTO: 30.7 PG (ref 26.5–33)
MCHC RBC AUTO-ENTMCNC: 33 G/DL (ref 31.5–36.5)
MCV RBC AUTO: 93 FL (ref 78–100)
MONOCYTES # BLD AUTO: 0.2 10E9/L (ref 0–1.3)
MONOCYTES NFR BLD AUTO: 2.6 %
NEUTROPHILS # BLD AUTO: 5.3 10E9/L (ref 1.6–8.3)
NEUTROPHILS NFR BLD AUTO: 92.5 %
NRBC # BLD AUTO: 0 10*3/UL
NRBC BLD AUTO-RTO: 0 /100
PLATELET # BLD AUTO: 161 10E9/L (ref 150–450)
POTASSIUM SERPL-SCNC: 4.2 MMOL/L (ref 3.4–5.3)
RBC # BLD AUTO: 3.81 10E12/L (ref 3.8–5.2)
RESEARCH KIT COLLECTION: NORMAL
RESEARCH KIT COLLECTION: NORMAL
SODIUM SERPL-SCNC: 135 MMOL/L (ref 133–144)
SPECIMEN EXP DATE BLD: ABNORMAL
WBC # BLD AUTO: 5.7 10E9/L (ref 4–11)

## 2020-10-03 PROCEDURE — 85025 COMPLETE CBC W/AUTO DIFF WBC: CPT | Performed by: PHYSICIAN ASSISTANT

## 2020-10-03 PROCEDURE — 250N000013 HC RX MED GY IP 250 OP 250 PS 637: Performed by: PHYSICIAN ASSISTANT

## 2020-10-03 PROCEDURE — 86850 RBC ANTIBODY SCREEN: CPT | Performed by: PHYSICIAN ASSISTANT

## 2020-10-03 PROCEDURE — 250N000011 HC RX IP 250 OP 636: Performed by: INTERNAL MEDICINE

## 2020-10-03 PROCEDURE — 206N000001 HC R&B BMT UMMC

## 2020-10-03 PROCEDURE — 86900 BLOOD TYPING SEROLOGIC ABO: CPT | Performed by: PHYSICIAN ASSISTANT

## 2020-10-03 PROCEDURE — 83735 ASSAY OF MAGNESIUM: CPT | Performed by: PHYSICIAN ASSISTANT

## 2020-10-03 PROCEDURE — 258N000003 HC RX IP 258 OP 636: Performed by: INTERNAL MEDICINE

## 2020-10-03 PROCEDURE — 80048 BASIC METABOLIC PNL TOTAL CA: CPT | Performed by: PHYSICIAN ASSISTANT

## 2020-10-03 PROCEDURE — 99233 SBSQ HOSP IP/OBS HIGH 50: CPT | Performed by: INTERNAL MEDICINE

## 2020-10-03 PROCEDURE — 250N000011 HC RX IP 250 OP 636: Performed by: RADIOLOGY

## 2020-10-03 PROCEDURE — 86901 BLOOD TYPING SEROLOGIC RH(D): CPT | Performed by: PHYSICIAN ASSISTANT

## 2020-10-03 RX ORDER — GRANISETRON HYDROCHLORIDE 1 MG/1
1 TABLET, FILM COATED ORAL EVERY 12 HOURS SCHEDULED
Status: COMPLETED | OUTPATIENT
Start: 2020-10-03 | End: 2020-10-07

## 2020-10-03 RX ORDER — LORATADINE 10 MG/1
10 TABLET ORAL ONCE
Status: DISCONTINUED | OUTPATIENT
Start: 2020-10-03 | End: 2020-10-17

## 2020-10-03 RX ORDER — GRANISETRON HYDROCHLORIDE 1 MG/1
1 TABLET, FILM COATED ORAL EVERY 12 HOURS SCHEDULED
Status: DISCONTINUED | OUTPATIENT
Start: 2020-10-10 | End: 2020-10-08

## 2020-10-03 RX ADMIN — URSODIOL 300 MG: 300 CAPSULE ORAL at 14:21

## 2020-10-03 RX ADMIN — GRANISETRON HYDROCHLORIDE 1 MG: 1 TABLET, FILM COATED ORAL at 20:20

## 2020-10-03 RX ADMIN — PANTOPRAZOLE SODIUM 40 MG: 40 TABLET, DELAYED RELEASE ORAL at 09:00

## 2020-10-03 RX ADMIN — ACYCLOVIR 800 MG: 800 TABLET ORAL at 22:06

## 2020-10-03 RX ADMIN — PROCHLORPERAZINE MALEATE 10 MG: 5 TABLET ORAL at 09:00

## 2020-10-03 RX ADMIN — MICAFUNGIN SODIUM 100 MG: 50 INJECTION, POWDER, LYOPHILIZED, FOR SOLUTION INTRAVENOUS at 20:21

## 2020-10-03 RX ADMIN — URSODIOL 300 MG: 300 CAPSULE ORAL at 09:00

## 2020-10-03 RX ADMIN — ACYCLOVIR 800 MG: 800 TABLET ORAL at 18:17

## 2020-10-03 RX ADMIN — CYANOCOBALAMIN TAB 1000 MCG 1000 MCG: 1000 TAB at 09:00

## 2020-10-03 RX ADMIN — VENLAFAXINE 75 MG: 75 TABLET ORAL at 14:21

## 2020-10-03 RX ADMIN — Medication 5 ML: at 22:06

## 2020-10-03 RX ADMIN — POLYETHYLENE GLYCOL 3350 17 G: 17 POWDER, FOR SOLUTION ORAL at 20:20

## 2020-10-03 RX ADMIN — URSODIOL 300 MG: 300 CAPSULE ORAL at 20:20

## 2020-10-03 RX ADMIN — GRANISETRON HYDROCHLORIDE 1 MG: 1 TABLET, FILM COATED ORAL at 10:28

## 2020-10-03 RX ADMIN — ACYCLOVIR 800 MG: 800 TABLET ORAL at 11:34

## 2020-10-03 RX ADMIN — Medication 5 ML: at 11:34

## 2020-10-03 RX ADMIN — FLUDARABINE PHOSPHATE 50 MG: 25 INJECTION, SOLUTION INTRAVENOUS at 08:56

## 2020-10-03 RX ADMIN — ACYCLOVIR 800 MG: 800 TABLET ORAL at 09:00

## 2020-10-03 RX ADMIN — LEVOTHYROXINE SODIUM 88 MCG: 0.09 TABLET ORAL at 09:00

## 2020-10-03 RX ADMIN — ACYCLOVIR 800 MG: 800 TABLET ORAL at 14:21

## 2020-10-03 ASSESSMENT — ACTIVITIES OF DAILY LIVING (ADL)
ADLS_ACUITY_SCORE: 10

## 2020-10-03 ASSESSMENT — PAIN DESCRIPTION - DESCRIPTORS: DESCRIPTORS: DULL;HEADACHE

## 2020-10-03 ASSESSMENT — MIFFLIN-ST. JEOR: SCORE: 1291.03

## 2020-10-03 NOTE — PROGRESS NOTES
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      Diagnosis AMLM1 Acute myelogeneous leukemia, M1, myeloblastic  HCT Type Allogeneic    Prep Regimen Fludarabine  Cytoxan  TBI   Donor Source Sibling    GVHD Prophylaxis Post transplant cytoxan  Tac/MMF  Primary BMT Provider ACE      INTERVAL  HISTORY     Florencia feels pretty well this morning. No further nausea/emesis since completion of cytoxan. No diarrhea. Has a mild dull headache. No hx of headaches. No red flag symptoms. Has some mild PND which is not new. No fevers, chills or other infectious symptoms.     Review of Systems: 8 point ROS negative except as noted above.  # Pain Assessment:  Current Pain Score 10/3/2020   Patient currently in pain? yes   Pain descriptors Dull;Headache     PHYSICAL EXAM     Weight In/Out     Wt Readings from Last 3 Encounters:   10/03/20 76.7 kg (169 lb 1.6 oz)   09/17/20 77.8 kg (171 lb 8 oz)   09/10/20 77.2 kg (170 lb 3.1 oz)      I/O last 3 completed shifts:  In: 2467 [P.O.:950; I.V.:1360; IV Piggyback:157]  Out: 4550 [Urine:4550]       KPS:  100    /67 (BP Location: Right arm)   Pulse 114   Temp 96.4  F (35.8  C) (Axillary)   Resp 16   Ht 1.68 m (5' 6.14\")   Wt 76.7 kg (169 lb 1.6 oz)   SpO2 96%   BMI 27.18 kg/m     General: NAD   Eyes:  LINH, sclera anicteric   Lungs: CTA bilaterally  Cardiovascular: RRR, no M/R/G   Abdominal/Rectal: +BS, soft, NT, ND  Lymphatics: no edema  Skin: no rashes or petechaie  Neuro: A&O   Additional Findings: Padilla site NT, no drainage.    10/2/20 Current aGVHD staging:  Skin 0, UGI 0, LGI 0, Liver 0 (keep in note through day +180, delete if auto)    LABS AND IMAGING - PAST 24 HOURS     Results for orders placed or performed during the hospital encounter of 09/30/20 (from the past 24 hour(s))   Sodium   Result Value Ref Range    Sodium 136 133 - 144 mmol/L   Potassium "   Result Value Ref Range    Potassium 3.7 3.4 - 5.3 mmol/L   Basic metabolic panel   Result Value Ref Range    Sodium 135 133 - 144 mmol/L    Potassium 4.2 3.4 - 5.3 mmol/L    Chloride 104 94 - 109 mmol/L    Carbon Dioxide 28 20 - 32 mmol/L    Anion Gap 3 3 - 14 mmol/L    Glucose 99 70 - 99 mg/dL    Urea Nitrogen 5 (L) 7 - 30 mg/dL    Creatinine 0.68 0.52 - 1.04 mg/dL    GFR Estimate 87 >60 mL/min/[1.73_m2]    GFR Estimate If Black >90 >60 mL/min/[1.73_m2]    Calcium 8.6 8.5 - 10.1 mg/dL   CBC with platelets differential   Result Value Ref Range    WBC 5.7 4.0 - 11.0 10e9/L    RBC Count 3.81 3.8 - 5.2 10e12/L    Hemoglobin 11.7 11.7 - 15.7 g/dL    Hematocrit 35.5 35.0 - 47.0 %    MCV 93 78 - 100 fl    MCH 30.7 26.5 - 33.0 pg    MCHC 33.0 31.5 - 36.5 g/dL    RDW 12.9 10.0 - 15.0 %    Platelet Count 161 150 - 450 10e9/L    Diff Method Automated Method     % Neutrophils 92.5 %    % Lymphocytes 1.2 %    % Monocytes 2.6 %    % Eosinophils 3.3 %    % Basophils 0.2 %    % Immature Granulocytes 0.2 %    Nucleated RBCs 0 0 /100    Absolute Neutrophil 5.3 1.6 - 8.3 10e9/L    Absolute Lymphocytes 0.1 (L) 0.8 - 5.3 10e9/L    Absolute Monocytes 0.2 0.0 - 1.3 10e9/L    Absolute Eosinophils 0.2 0.0 - 0.7 10e9/L    Absolute Basophils 0.0 0.0 - 0.2 10e9/L    Abs Immature Granulocytes 0.0 0 - 0.4 10e9/L    Absolute Nucleated RBC 0.0    ABO/Rh type and screen   Result Value Ref Range    ABO B     RH(D) Neg     Antibody Screen Pos (A)     Test Valid Only At          [facility]    Specimen Expires 10/06/2020    Magnesium   Result Value Ref Range    Magnesium 2.3 1.6 - 2.3 mg/dL     I have assessed all abnormal lab values for their clinical significance and any values considered clinically significant have been addressed in the assessment and plan  OVERALL PLAN   Florencia Gao is a 75 year old female with AML in CR1, admitted for JCARLOS Allo sib pbsct per protocol 2019-10, randomized to Post  transplant cytoxan/Tac/MMF.      1.  BMT:   Day -7 - line placement.   Day -6: cytoxan and fludarabine  Day -5 to day -2: Fludarabine  Day -1: TBI  Day 0: Transplant   Allopurinol through day 0. Flush and pre-meds prior to transplant. GCSF starts d+5 and cont to until ANC>1500 x3 consecutive days. Re-stage per protocol.     2.  HEME: Keep Hgb>8 and plts>10K.   - Of note has significant antibodies to RBCs- can cause delay in transfusion availability. Premeds tylenol and benadryl.                             3.  ID: Afebrile.   Cont Levo,Linda/vori, and HD ACV (CMV+, HSV+, EBV+) prophy.   Pjp prophy to start day+28 - IV pentamdine vs dapsone as sulfa allergy.                                         4.  GI:   N/V overnight following cytoxan. Resolved.   Zofran  to chemo to prevent N/V. Change to kytril d/t mild headaches possibly related to zofran.    Ativan and compazine available PRN break-through symptoms.   Miralax daily- monitor for loose stools- no complaint of this to date.   - Per protocol no dex/steroids**   Protonix for GI prophy.   Ursodiol for VOD prophy.     5.  GVHD Prophylaxis:   Day +3 and day+4 cytoxan  Day+5 start tac and MMF      6.  FEN/Renal: Monitor creat and lytes.   NA: 130 (137) - likely SIADH due to pre-transplant cytoxan. Now resolved.   Replete lytes PRN per SS. Monitor weight, I+O, lytes per protocol with IVF flush.     7. Hypothyroidism  Continue synthroid 88mcg      8.) Psych: Situational Depression continue effexor 75mg daily.      Denzel Magallon PA-C  [phone]     The patient was seen and examined by me separately from the midlevel provider. The note reflects our mutual assessment and plans and were approved by me personally.   I personally reviewed today's lab results vital signs and radiology results.     Each point of the assessment and plan were reviewed by the midlevel and me and either endorsed by me or were my added decisions.     My pertinent physical findings today are:  Afebrile,  clear lungs and no rash. No URI sxs.     My assessment and plan are:  74 yo admitted for NMA allo PBSC for first remission AML.  Chemo orders for preparative regimen and post tx cytoxan reviewed with pharm D and signed. Monitor for SIADH. Monitor oral intake. Na 130: switch infusion to NS. Stop zofran, start Kytril. fo lin.  Hernando Alcala M.D.

## 2020-10-03 NOTE — PLAN OF CARE
"Patient reports feeling nauseous this morning. Compazine given with relief. Zofran changed to Kytril due to patient complaining of headache since last night. Tolerated Fludarabine infusion. She complains of having increasing nasal drainage this afternoon. Lung sounds clear, denies cough, no other symptoms reported. Kevyn paged and ordered Claritin but pt refused stating \"I'm feeling a little better now\". Afebrile. OVSS. Fludarabine again tomorrow. Continue with POC.       Problem: Adult Inpatient Plan of Care  Goal: Plan of Care Review  Outcome: No Change     Problem: Adult Inpatient Plan of Care  Goal: Patient-Specific Goal (Individualization)  Outcome: No Change     Problem: Adult Inpatient Plan of Care  Goal: Absence of Hospital-Acquired Illness or Injury  Outcome: No Change     Problem: Adult Inpatient Plan of Care  Goal: Absence of Hospital-Acquired Illness or Injury  Intervention: Identify and Manage Fall Risk  Recent Flowsheet Documentation  Taken 10/3/2020 0800 by Juan Knott RN  Safety Promotion/Fall Prevention:   clutter free environment maintained   fall prevention program maintained     Problem: Adult Inpatient Plan of Care  Goal: Absence of Hospital-Acquired Illness or Injury  Intervention: Prevent VTE (venous thromboembolism)  Recent Flowsheet Documentation  Taken 10/3/2020 0800 by Juan Knott RN  VTE Prevention/Management:   ambulation promoted   bleeding precautions maintained     Problem: Adult Inpatient Plan of Care  Goal: Optimal Comfort and Wellbeing  Outcome: No Change     "

## 2020-10-03 NOTE — PLAN OF CARE
VSS. Uses CPAP during sleep. Saturating well. Denies nausea, vomiting and SOB. Had some dull headache but patient declined any medication. Patient is eating well. Up independently and voiding freely. For Fludarabine today.  Problem: Adult Inpatient Plan of Care  Goal: Plan of Care Review  Outcome: No Change     Problem: Adult Inpatient Plan of Care  Goal: Patient-Specific Goal (Individualization)  Outcome: No Change     Problem: Adult Inpatient Plan of Care  Goal: Absence of Hospital-Acquired Illness or Injury  Outcome: No Change     Problem: Adult Inpatient Plan of Care  Goal: Absence of Hospital-Acquired Illness or Injury  Intervention: Identify and Manage Fall Risk  Recent Flowsheet Documentation  Taken 10/2/2020 2000 by Princess Cassie Parra RN  Safety Promotion/Fall Prevention:    clutter free environment maintained    fall prevention program maintained    lighting adjusted    nonskid shoes/slippers when out of bed    safety round/check completed    supervised activity     Problem: Adult Inpatient Plan of Care  Goal: Absence of Hospital-Acquired Illness or Injury  Intervention: Prevent VTE (venous thromboembolism)  Recent Flowsheet Documentation  Taken 10/2/2020 2000 by Princess Cassie Parra RN  VTE Prevention/Management:    ambulation promoted    bleeding precautions maintained    bleeding risk assessed    fluids promoted     Problem: Adult Inpatient Plan of Care  Goal: Optimal Comfort and Wellbeing  Outcome: No Change     Problem: Adult Inpatient Plan of Care  Goal: Readiness for Transition of Care  Outcome: No Change     Problem: Adult Inpatient Plan of Care  Goal: Rounds/Family Conference  Outcome: No Change     Problem: Adjustment to Transplant (Stem Cell/Bone Marrow Transplant)  Goal: Optimal Coping with Transplant  Outcome: No Change     Problem: Bladder Irritation (Stem Cell/Bone Marrow Transplant)  Goal: Symptom-Free Urinary Elimination  Outcome: No Change  Intervention: Monitor and Manage Bladder  Irritation  Recent Flowsheet Documentation  Taken 10/3/2020 0336 by Princess Cassie Parra, RN  Pain Management Interventions: medication offered but refused  Taken 10/2/2020 2000 by Princess Cassie Parra RN  Hyperhydration Management: fluids provided     Problem: Bladder Irritation (Stem Cell/Bone Marrow Transplant)  Goal: Symptom-Free Urinary Elimination  Intervention: Monitor and Manage Bladder Irritation  Recent Flowsheet Documentation  Taken 10/3/2020 0336 by Princess Cassie Parra RN  Pain Management Interventions: medication offered but refused  Taken 10/2/2020 2000 by Princess Cassie Parra RN  Hyperhydration Management: fluids provided     Problem: Diarrhea (Stem Cell/Bone Marrow Transplant)  Goal: Diarrhea Symptom Control  Outcome: No Change  Intervention: Manage Diarrhea  Recent Flowsheet Documentation  Taken 10/2/2020 2000 by Princess Cassie Parra RN  Fluid/Electrolyte Management: fluids provided     Problem: Diarrhea (Stem Cell/Bone Marrow Transplant)  Goal: Diarrhea Symptom Control  Intervention: Manage Diarrhea  Recent Flowsheet Documentation  Taken 10/2/2020 2000 by Princess Cassie Parra RN  Fluid/Electrolyte Management: fluids provided     Problem: Fatigue (Stem Cell/Bone Marrow Transplant)  Goal: Energy Level Supports Daily Activity  Outcome: No Change     Problem: Hematologic Alteration (Stem Cell/Bone Marrow Transplant)  Goal: Blood Counts Within Acceptable Range  Outcome: No Change     Problem: Hypersensitivity Reaction (Stem Cell/Bone Marrow Transplant)  Goal: Absence of Hypersensitivity Reaction  Outcome: No Change     Problem: Infection Risk (Stem Cell/Bone Marrow Transplant)  Goal: Absence of Infection Signs/Symptoms  Outcome: No Change     Problem: Infection Risk (Stem Cell/Bone Marrow Transplant)  Goal: Absence of Infection Signs/Symptoms  Outcome: No Change     Problem: Mucositis (Stem Cell/Bone Marrow Transplant)  Goal: Mucous Membrane Health and Integrity  Outcome: No  Change  Intervention: Maintain Oral Mucous Membrane Integrity  Recent Flowsheet Documentation  Taken 10/2/2020 2000 by Princess Cassie Parra, RN  Oral Care: oral rinse provided     Problem: Mucositis (Stem Cell/Bone Marrow Transplant)  Goal: Mucous Membrane Health and Integrity  Intervention: Maintain Oral Mucous Membrane Integrity  Recent Flowsheet Documentation  Taken 10/2/2020 2000 by Princess Cassie Parra, RN  Oral Care: oral rinse provided     Problem: Nausea and Vomiting (Stem Cell/Bone Marrow Transplant)  Goal: Nausea and Vomiting Symptom Relief  Outcome: No Change     Problem: Nutrition Intake Altered (Stem Cell/Bone Marrow Transplant)  Goal: Optimal Nutrition Intake  Outcome: No Change

## 2020-10-04 LAB
ANION GAP SERPL CALCULATED.3IONS-SCNC: 4 MMOL/L (ref 3–14)
BASOPHILS # BLD AUTO: 0 10E9/L (ref 0–0.2)
BASOPHILS NFR BLD AUTO: 0.4 %
BUN SERPL-MCNC: 9 MG/DL (ref 7–30)
CALCIUM SERPL-MCNC: 9 MG/DL (ref 8.5–10.1)
CHLORIDE SERPL-SCNC: 101 MMOL/L (ref 94–109)
CO2 SERPL-SCNC: 30 MMOL/L (ref 20–32)
CREAT SERPL-MCNC: 0.65 MG/DL (ref 0.52–1.04)
DIFFERENTIAL METHOD BLD: ABNORMAL
EOSINOPHIL # BLD AUTO: 0.2 10E9/L (ref 0–0.7)
EOSINOPHIL NFR BLD AUTO: 8 %
ERYTHROCYTE [DISTWIDTH] IN BLOOD BY AUTOMATED COUNT: 13 % (ref 10–15)
GFR SERPL CREATININE-BSD FRML MDRD: 88 ML/MIN/{1.73_M2}
GLUCOSE SERPL-MCNC: 92 MG/DL (ref 70–99)
HCT VFR BLD AUTO: 36.2 % (ref 35–47)
HGB BLD-MCNC: 11.7 G/DL (ref 11.7–15.7)
IMM GRANULOCYTES # BLD: 0 10E9/L (ref 0–0.4)
IMM GRANULOCYTES NFR BLD: 0.4 %
LYMPHOCYTES # BLD AUTO: 0 10E9/L (ref 0.8–5.3)
LYMPHOCYTES NFR BLD AUTO: 1.7 %
MCH RBC QN AUTO: 30.3 PG (ref 26.5–33)
MCHC RBC AUTO-ENTMCNC: 32.3 G/DL (ref 31.5–36.5)
MCV RBC AUTO: 94 FL (ref 78–100)
MONOCYTES # BLD AUTO: 0 10E9/L (ref 0–1.3)
MONOCYTES NFR BLD AUTO: 0.4 %
NEUTROPHILS # BLD AUTO: 2.1 10E9/L (ref 1.6–8.3)
NEUTROPHILS NFR BLD AUTO: 89.1 %
NRBC # BLD AUTO: 0 10*3/UL
NRBC BLD AUTO-RTO: 0 /100
PLATELET # BLD AUTO: 156 10E9/L (ref 150–450)
POTASSIUM SERPL-SCNC: 3.9 MMOL/L (ref 3.4–5.3)
RBC # BLD AUTO: 3.86 10E12/L (ref 3.8–5.2)
SODIUM SERPL-SCNC: 135 MMOL/L (ref 133–144)
WBC # BLD AUTO: 2.4 10E9/L (ref 4–11)

## 2020-10-04 PROCEDURE — 250N000013 HC RX MED GY IP 250 OP 250 PS 637: Performed by: PHYSICIAN ASSISTANT

## 2020-10-04 PROCEDURE — 99233 SBSQ HOSP IP/OBS HIGH 50: CPT | Performed by: INTERNAL MEDICINE

## 2020-10-04 PROCEDURE — 206N000001 HC R&B BMT UMMC

## 2020-10-04 PROCEDURE — 250N000011 HC RX IP 250 OP 636: Performed by: RADIOLOGY

## 2020-10-04 PROCEDURE — 80048 BASIC METABOLIC PNL TOTAL CA: CPT | Performed by: PHYSICIAN ASSISTANT

## 2020-10-04 PROCEDURE — 258N000003 HC RX IP 258 OP 636: Performed by: INTERNAL MEDICINE

## 2020-10-04 PROCEDURE — 85025 COMPLETE CBC W/AUTO DIFF WBC: CPT | Performed by: PHYSICIAN ASSISTANT

## 2020-10-04 PROCEDURE — 250N000011 HC RX IP 250 OP 636: Performed by: INTERNAL MEDICINE

## 2020-10-04 RX ADMIN — CYANOCOBALAMIN TAB 1000 MCG 1000 MCG: 1000 TAB at 08:54

## 2020-10-04 RX ADMIN — ACYCLOVIR 800 MG: 800 TABLET ORAL at 11:51

## 2020-10-04 RX ADMIN — VENLAFAXINE 75 MG: 75 TABLET ORAL at 14:58

## 2020-10-04 RX ADMIN — PROCHLORPERAZINE MALEATE 10 MG: 5 TABLET ORAL at 14:56

## 2020-10-04 RX ADMIN — POLYETHYLENE GLYCOL 3350 17 G: 17 POWDER, FOR SOLUTION ORAL at 19:51

## 2020-10-04 RX ADMIN — URSODIOL 300 MG: 300 CAPSULE ORAL at 19:51

## 2020-10-04 RX ADMIN — ACYCLOVIR 800 MG: 800 TABLET ORAL at 08:54

## 2020-10-04 RX ADMIN — GRANISETRON HYDROCHLORIDE 1 MG: 1 TABLET, FILM COATED ORAL at 19:53

## 2020-10-04 RX ADMIN — Medication 5 ML: at 11:51

## 2020-10-04 RX ADMIN — FLUDARABINE PHOSPHATE 50 MG: 25 INJECTION, SOLUTION INTRAVENOUS at 08:56

## 2020-10-04 RX ADMIN — ACYCLOVIR 800 MG: 800 TABLET ORAL at 14:56

## 2020-10-04 RX ADMIN — ACYCLOVIR 800 MG: 800 TABLET ORAL at 17:58

## 2020-10-04 RX ADMIN — GRANISETRON HYDROCHLORIDE 1 MG: 1 TABLET, FILM COATED ORAL at 08:54

## 2020-10-04 RX ADMIN — URSODIOL 300 MG: 300 CAPSULE ORAL at 08:54

## 2020-10-04 RX ADMIN — MICAFUNGIN SODIUM 100 MG: 50 INJECTION, POWDER, LYOPHILIZED, FOR SOLUTION INTRAVENOUS at 19:51

## 2020-10-04 RX ADMIN — LEVOTHYROXINE SODIUM 88 MCG: 0.09 TABLET ORAL at 08:54

## 2020-10-04 RX ADMIN — PANTOPRAZOLE SODIUM 40 MG: 40 TABLET, DELAYED RELEASE ORAL at 08:54

## 2020-10-04 RX ADMIN — ACYCLOVIR 800 MG: 800 TABLET ORAL at 21:49

## 2020-10-04 RX ADMIN — URSODIOL 300 MG: 300 CAPSULE ORAL at 14:56

## 2020-10-04 RX ADMIN — POTASSIUM CHLORIDE 20 MEQ: 750 TABLET, EXTENDED RELEASE ORAL at 08:54

## 2020-10-04 RX ADMIN — PROCHLORPERAZINE MALEATE 5 MG: 5 TABLET ORAL at 21:48

## 2020-10-04 RX ADMIN — Medication 5 ML: at 21:48

## 2020-10-04 RX ADMIN — Medication 5 ML: at 03:38

## 2020-10-04 ASSESSMENT — ACTIVITIES OF DAILY LIVING (ADL)
ADLS_ACUITY_SCORE: 10

## 2020-10-04 ASSESSMENT — MIFFLIN-ST. JEOR: SCORE: 1286.04

## 2020-10-04 NOTE — PLAN OF CARE
"/61 (BP Location: Right arm)   Pulse 78   Temp 98.1  F (36.7  C) (Axillary)   Resp 16   Ht 1.68 m (5' 6.14\")   Wt 76.7 kg (169 lb 1.6 oz)   SpO2 94%   BMI 27.18 kg/m    Pt's AVSS, no c/o pain or nausea all shift. Pt is up independently and voiding good amount. Padilla intact and hep locked. Fludarabine to be given today. Pt had uneventful night. Am lab stable with Hgb 11.7, Plt 156 and WBC of 2.4. Pt does need oral K+ with morning medication results was 3.9. Keep monitoring pt as ordered and notify MD with any new changes.    Problem: Adult Inpatient Plan of Care  Goal: Plan of Care Review  Outcome: No Change  Goal: Patient-Specific Goal (Individualization)  Outcome: No Change  Goal: Absence of Hospital-Acquired Illness or Injury  Outcome: No Change  Intervention: Identify and Manage Fall Risk  Recent Flowsheet Documentation  Taken 10/4/2020 0000 by Dave Denny RN  Safety Promotion/Fall Prevention: lighting adjusted  Taken 10/3/2020 2026 by Dave Denny RN  Safety Promotion/Fall Prevention: lighting adjusted  Goal: Optimal Comfort and Wellbeing  Outcome: No Change  Goal: Rounds/Family Conference  Outcome: No Change     "

## 2020-10-04 NOTE — PROGRESS NOTES
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      Diagnosis AMLM1 Acute myelogeneous leukemia, M1, myeloblastic  HCT Type Allogeneic    Prep Regimen Fludarabine  Cytoxan  TBI   Donor Source Sibling    GVHD Prophylaxis Post transplant cytoxan  Tac/MMF  Primary BMT Provider ACE      INTERVAL  HISTORY     Florencia feels better today compared to yesterday. Less nausea as she gets further out from cytoxan and headaches better with switching zofran to kytril. Appetite is lower than normal but eating small amounts throughout the day. Taking miralax for some constipation. Having a reaction to dressing adhesive.     Review of Systems: 8 point ROS negative except as noted above.  # Pain Assessment:  Current Pain Score 10/4/2020   Patient currently in pain? denies   Pain descriptors -     PHYSICAL EXAM     Weight In/Out     Wt Readings from Last 3 Encounters:   10/04/20 76.2 kg (168 lb)   09/17/20 77.8 kg (171 lb 8 oz)   09/10/20 77.2 kg (170 lb 3.1 oz)      I/O last 3 completed shifts:  In: 292 [P.O.:120; I.V.:60; IV Piggyback:112]  Out: 2050 [Urine:2050]       KPS:  100    /70 (BP Location: Right arm)   Pulse 85   Temp 97.9  F (36.6  C) (Axillary)   Resp 18   Ht 1.68 m (5' 6.14\")   Wt 76.2 kg (168 lb)   SpO2 95%   BMI 27.00 kg/m     General: NAD   Eyes:  LINH, sclera anicteric   Mouth: Oral mucosa moist without ulceration.   Lungs: CTA bilaterally  Cardiovascular: RRR, no M/R/G   Abdominal/Rectal: +BS, soft, NT, ND  Lymphatics: no edema  Skin: erythematous rash under dressing on right chest wall, no tenderness. No other rashes or petechaie  Neuro: A&O   Additional Findings: Padilla site NT, no drainage.    10/2/20 Current aGVHD staging:  Skin 0, UGI 0, LGI 0, Liver 0 (keep in note through day +180, delete if auto)    LABS AND IMAGING - PAST 24 HOURS     Results for orders placed or performed during the " hospital encounter of 09/30/20 (from the past 24 hour(s))   Basic metabolic panel   Result Value Ref Range    Sodium 135 133 - 144 mmol/L    Potassium 3.9 3.4 - 5.3 mmol/L    Chloride 101 94 - 109 mmol/L    Carbon Dioxide 30 20 - 32 mmol/L    Anion Gap 4 3 - 14 mmol/L    Glucose 92 70 - 99 mg/dL    Urea Nitrogen 9 7 - 30 mg/dL    Creatinine 0.65 0.52 - 1.04 mg/dL    GFR Estimate 88 >60 mL/min/[1.73_m2]    GFR Estimate If Black >90 >60 mL/min/[1.73_m2]    Calcium 9.0 8.5 - 10.1 mg/dL   CBC with platelets differential   Result Value Ref Range    WBC 2.4 (L) 4.0 - 11.0 10e9/L    RBC Count 3.86 3.8 - 5.2 10e12/L    Hemoglobin 11.7 11.7 - 15.7 g/dL    Hematocrit 36.2 35.0 - 47.0 %    MCV 94 78 - 100 fl    MCH 30.3 26.5 - 33.0 pg    MCHC 32.3 31.5 - 36.5 g/dL    RDW 13.0 10.0 - 15.0 %    Platelet Count 156 150 - 450 10e9/L    Diff Method Automated Method     % Neutrophils 89.1 %    % Lymphocytes 1.7 %    % Monocytes 0.4 %    % Eosinophils 8.0 %    % Basophils 0.4 %    % Immature Granulocytes 0.4 %    Nucleated RBCs 0 0 /100    Absolute Neutrophil 2.1 1.6 - 8.3 10e9/L    Absolute Lymphocytes 0.0 (L) 0.8 - 5.3 10e9/L    Absolute Monocytes 0.0 0.0 - 1.3 10e9/L    Absolute Eosinophils 0.2 0.0 - 0.7 10e9/L    Absolute Basophils 0.0 0.0 - 0.2 10e9/L    Abs Immature Granulocytes 0.0 0 - 0.4 10e9/L    Absolute Nucleated RBC 0.0      I have assessed all abnormal lab values for their clinical significance and any values considered clinically significant have been addressed in the assessment and plan  OVERALL PLAN   Florencia Gao is a 75 year old female with AML in CR1, admitted for JCARLOS Allo sib pbsct per protocol 2019-10, randomized to Post transplant cytoxan/Tac/MMF.      1.  BMT:   Day -7 - line placement.   Day -6: cytoxan and fludarabine  Day -5 to day -2: Fludarabine  Day -1: TBI  Day 0: Transplant   Allopurinol through day 0. Flush and pre-meds prior to transplant. GCSF starts d+5 and cont to until ANC>1500 x3 consecutive  days. Re-stage per protocol.     2.  HEME: Keep Hgb>8 and plts>10K.   - Of note has significant antibodies to RBCs- can cause delay in transfusion availability. Premeds tylenol and benadryl.                             3.  ID: Afebrile.   Cont Levo,Linda/vori, and HD ACV (CMV+, HSV+, EBV+) prophy.   Pjp prophy to start day+28 - IV pentamdine vs dapsone as sulfa allergy.                                         4.  GI:   N/V overnight following cytoxan. Resolved.   Zofran  to chemo to prevent N/V. Changed zofran to kytril d/t mild headaches 10/3    Ativan and compazine available PRN break-through symptoms.   Miralax daily- monitor for loose stools- no complaint of this to date.   - Per protocol no dex/steroids**   Protonix for GI prophy.   Ursodiol for VOD prophy.     5.  GVHD Prophylaxis:   Day +3 and day+4 cytoxan  Day+5 start tac and MMF      6.  FEN/Renal: Monitor creat and lytes.   NA: 130 (137) - likely SIADH due to pre-transplant cytoxan. Now resolved.   Replete lytes PRN per SS. Monitor weight, I+O, lytes per protocol with IVF flush.     7. Hypothyroidism  Continue synthroid 88mcg      8.) Psych: Situational Depression continue effexor 75mg daily.     Line: change dressing 10/4 and use alternative dressing d/t reaction to adhesive. Biopatch contains chlorhexidine and pt does have chlorhexidine allergy however most pts with chlorhexidine allergy still tolerate this. Monitor witch change in adhesive dressing. May need different biopatch if continues to be an issue.      Denzel Magallon PA-C  [phone]     The patient was seen and examined by me separately from the midlevel provider. The note reflects our mutual assessment and plans and were approved by me personally.   I personally reviewed today's lab results vital signs and radiology results.     Each point of the assessment and plan were reviewed by the midlevel and me and either endorsed by me or were my added decisions.     My pertinent physical findings today  are:  Afebrile, clear lungs and no rash. No URI sxs.     My assessment and plan are:  74 yo admitted for NMA allo PBSC for first remission AML.  Chemo orders for preparative regimen and post tx cytoxan reviewed with pharm D and signed. Monitor for SIADH. Monitor oral intake. Na 130: switch infusion to NS. Stop zofran, start Kytril. for ha. Dressing chenge for Padilla exit site for rash.    Hernando Alcala M.D.

## 2020-10-04 NOTE — PLAN OF CARE
AOVSS. Potassium replaced. No recheck needed. C/o nausea- compazine x1 with relief. Tolerated Fludarabine infusion well. Had medium BM. Ate some few slices of peaches, soda crackers (15 packets), Boost and applesauce. Dressing changed. Had some redness on corners. Up independent. Voiding adequately. Continue with POC.        Problem: Adult Inpatient Plan of Care  Goal: Plan of Care Review  Outcome: No Change     Problem: Adult Inpatient Plan of Care  Goal: Patient-Specific Goal (Individualization)  Outcome: No Change     Problem: Adult Inpatient Plan of Care  Goal: Absence of Hospital-Acquired Illness or Injury  Outcome: No Change     Problem: Adult Inpatient Plan of Care  Goal: Absence of Hospital-Acquired Illness or Injury  Intervention: Identify and Manage Fall Risk  Recent Flowsheet Documentation  Taken 10/4/2020 0900 by Juan Knott, RN  Safety Promotion/Fall Prevention:   clutter free environment maintained   fall prevention program maintained

## 2020-10-05 ENCOUNTER — AMBULATORY - HEALTHEAST (OUTPATIENT)
Dept: OTHER | Facility: CLINIC | Age: 75
End: 2020-10-05

## 2020-10-05 ENCOUNTER — DOCUMENTATION ONLY (OUTPATIENT)
Dept: OTHER | Facility: CLINIC | Age: 75
End: 2020-10-05

## 2020-10-05 ENCOUNTER — APPOINTMENT (OUTPATIENT)
Dept: PHYSICAL THERAPY | Facility: CLINIC | Age: 75
DRG: 014 | End: 2020-10-05
Attending: INTERNAL MEDICINE
Payer: COMMERCIAL

## 2020-10-05 LAB
ALBUMIN SERPL-MCNC: 3.2 G/DL (ref 3.4–5)
ALP SERPL-CCNC: 72 U/L (ref 40–150)
ALT SERPL W P-5'-P-CCNC: 19 U/L (ref 0–50)
ANION GAP SERPL CALCULATED.3IONS-SCNC: 6 MMOL/L (ref 3–14)
AST SERPL W P-5'-P-CCNC: 16 U/L (ref 0–45)
BASOPHILS # BLD AUTO: 0.1 10E9/L (ref 0–0.2)
BASOPHILS NFR BLD AUTO: 4.3 %
BILIRUB DIRECT SERPL-MCNC: <0.1 MG/DL (ref 0–0.2)
BILIRUB SERPL-MCNC: 0.3 MG/DL (ref 0.2–1.3)
BUN SERPL-MCNC: 9 MG/DL (ref 7–30)
CALCIUM SERPL-MCNC: 9 MG/DL (ref 8.5–10.1)
CHLORIDE SERPL-SCNC: 98 MMOL/L (ref 94–109)
CO2 SERPL-SCNC: 30 MMOL/L (ref 20–32)
CREAT SERPL-MCNC: 0.58 MG/DL (ref 0.52–1.04)
DIFFERENTIAL METHOD BLD: ABNORMAL
EOSINOPHIL # BLD AUTO: 0.2 10E9/L (ref 0–0.7)
EOSINOPHIL NFR BLD AUTO: 13 %
ERYTHROCYTE [DISTWIDTH] IN BLOOD BY AUTOMATED COUNT: 12.8 % (ref 10–15)
GFR SERPL CREATININE-BSD FRML MDRD: >90 ML/MIN/{1.73_M2}
GLUCOSE SERPL-MCNC: 97 MG/DL (ref 70–99)
HCT VFR BLD AUTO: 35.2 % (ref 35–47)
HGB BLD-MCNC: 11.6 G/DL (ref 11.7–15.7)
INR PPP: 0.95 (ref 0.86–1.14)
LYMPHOCYTES # BLD AUTO: 0 10E9/L (ref 0.8–5.3)
LYMPHOCYTES NFR BLD AUTO: 0 %
MAGNESIUM SERPL-MCNC: 2.1 MG/DL (ref 1.6–2.3)
MCH RBC QN AUTO: 30.6 PG (ref 26.5–33)
MCHC RBC AUTO-ENTMCNC: 33 G/DL (ref 31.5–36.5)
MCV RBC AUTO: 93 FL (ref 78–100)
MONOCYTES # BLD AUTO: 0 10E9/L (ref 0–1.3)
MONOCYTES NFR BLD AUTO: 0 %
NEUTROPHILS # BLD AUTO: 1.3 10E9/L (ref 1.6–8.3)
NEUTROPHILS NFR BLD AUTO: 82.7 %
PHOSPHATE SERPL-MCNC: 3.6 MG/DL (ref 2.5–4.5)
PLATELET # BLD AUTO: 142 10E9/L (ref 150–450)
POTASSIUM SERPL-SCNC: 3.9 MMOL/L (ref 3.4–5.3)
PROT SERPL-MCNC: 6.7 G/DL (ref 6.8–8.8)
RBC # BLD AUTO: 3.79 10E12/L (ref 3.8–5.2)
RBC MORPH BLD: NORMAL
SODIUM SERPL-SCNC: 134 MMOL/L (ref 133–144)
WBC # BLD AUTO: 1.6 10E9/L (ref 4–11)

## 2020-10-05 PROCEDURE — 250N000013 HC RX MED GY IP 250 OP 250 PS 637: Performed by: PHYSICIAN ASSISTANT

## 2020-10-05 PROCEDURE — 99233 SBSQ HOSP IP/OBS HIGH 50: CPT | Performed by: INTERNAL MEDICINE

## 2020-10-05 PROCEDURE — 250N000011 HC RX IP 250 OP 636: Performed by: INTERNAL MEDICINE

## 2020-10-05 PROCEDURE — 206N000001 HC R&B BMT UMMC

## 2020-10-05 PROCEDURE — 97110 THERAPEUTIC EXERCISES: CPT | Mod: GP

## 2020-10-05 PROCEDURE — 258N000003 HC RX IP 258 OP 636: Performed by: INTERNAL MEDICINE

## 2020-10-05 PROCEDURE — 84100 ASSAY OF PHOSPHORUS: CPT | Performed by: PHYSICIAN ASSISTANT

## 2020-10-05 PROCEDURE — 85025 COMPLETE CBC W/AUTO DIFF WBC: CPT | Performed by: PHYSICIAN ASSISTANT

## 2020-10-05 PROCEDURE — 83735 ASSAY OF MAGNESIUM: CPT | Performed by: PHYSICIAN ASSISTANT

## 2020-10-05 PROCEDURE — 85610 PROTHROMBIN TIME: CPT | Performed by: PHYSICIAN ASSISTANT

## 2020-10-05 PROCEDURE — 82248 BILIRUBIN DIRECT: CPT | Performed by: PHYSICIAN ASSISTANT

## 2020-10-05 PROCEDURE — 250N000011 HC RX IP 250 OP 636: Performed by: RADIOLOGY

## 2020-10-05 PROCEDURE — 80053 COMPREHEN METABOLIC PANEL: CPT | Performed by: PHYSICIAN ASSISTANT

## 2020-10-05 RX ADMIN — PROCHLORPERAZINE MALEATE 5 MG: 5 TABLET ORAL at 09:33

## 2020-10-05 RX ADMIN — URSODIOL 300 MG: 300 CAPSULE ORAL at 14:15

## 2020-10-05 RX ADMIN — PANTOPRAZOLE SODIUM 40 MG: 40 TABLET, DELAYED RELEASE ORAL at 09:34

## 2020-10-05 RX ADMIN — ACYCLOVIR 800 MG: 800 TABLET ORAL at 18:19

## 2020-10-05 RX ADMIN — MICAFUNGIN SODIUM 100 MG: 50 INJECTION, POWDER, LYOPHILIZED, FOR SOLUTION INTRAVENOUS at 21:05

## 2020-10-05 RX ADMIN — PROCHLORPERAZINE MALEATE 5 MG: 5 TABLET ORAL at 15:52

## 2020-10-05 RX ADMIN — URSODIOL 300 MG: 300 CAPSULE ORAL at 21:05

## 2020-10-05 RX ADMIN — URSODIOL 300 MG: 300 CAPSULE ORAL at 09:34

## 2020-10-05 RX ADMIN — Medication 5 ML: at 03:33

## 2020-10-05 RX ADMIN — POTASSIUM CHLORIDE 20 MEQ: 750 TABLET, EXTENDED RELEASE ORAL at 09:36

## 2020-10-05 RX ADMIN — GRANISETRON HYDROCHLORIDE 1 MG: 1 TABLET, FILM COATED ORAL at 09:34

## 2020-10-05 RX ADMIN — FLUDARABINE PHOSPHATE 50 MG: 25 INJECTION, SOLUTION INTRAVENOUS at 10:11

## 2020-10-05 RX ADMIN — Medication 5 ML: at 09:41

## 2020-10-05 RX ADMIN — LEVOTHYROXINE SODIUM 88 MCG: 0.09 TABLET ORAL at 09:34

## 2020-10-05 RX ADMIN — POLYETHYLENE GLYCOL 3350 17 G: 17 POWDER, FOR SOLUTION ORAL at 21:05

## 2020-10-05 RX ADMIN — CYANOCOBALAMIN TAB 1000 MCG 1000 MCG: 1000 TAB at 09:34

## 2020-10-05 RX ADMIN — VENLAFAXINE 75 MG: 75 TABLET ORAL at 14:15

## 2020-10-05 RX ADMIN — GRANISETRON HYDROCHLORIDE 1 MG: 1 TABLET, FILM COATED ORAL at 21:05

## 2020-10-05 RX ADMIN — ACYCLOVIR 800 MG: 800 TABLET ORAL at 14:15

## 2020-10-05 RX ADMIN — ACYCLOVIR 800 MG: 800 TABLET ORAL at 21:05

## 2020-10-05 RX ADMIN — ACYCLOVIR 800 MG: 800 TABLET ORAL at 12:03

## 2020-10-05 RX ADMIN — PROCHLORPERAZINE MALEATE 5 MG: 5 TABLET ORAL at 21:24

## 2020-10-05 RX ADMIN — ACYCLOVIR 800 MG: 800 TABLET ORAL at 09:35

## 2020-10-05 ASSESSMENT — MIFFLIN-ST. JEOR: SCORE: 1282.87

## 2020-10-05 ASSESSMENT — ACTIVITIES OF DAILY LIVING (ADL)
ADLS_ACUITY_SCORE: 10

## 2020-10-05 NOTE — PROGRESS NOTES
"SPIRITUAL HEALTH SERVICES  [facility] ([place]) 5C  REFERRAL SOURCE: Follow-up     Pt shared \"I'm doing well. We've made some adjustments and I'm doing well.\" She talked about her positive outlook on life and the power of prayer as 2 factors that support her ability to cope with her disease and hospital stay. She chose her blessing for Wed.    Provided pt with supportive listening and conversation.     PLAN: Will return on Wed and provide Florencia with her transplant blessing.     Rev. Chel Claudio MDiv, [facility]  Staff    Pager [phone]  * [facility] remains available 24/7 for emergent requests/referrals, either by having the switchboard page the on-call  or by entering an ASAP/STAT consult in Epic (this will also page the on-call ).*   "

## 2020-10-05 NOTE — PLAN OF CARE
"/56 (BP Location: Right arm)   Pulse 75   Temp 96.9  F (36.1  C) (Axillary)   Resp 16   Ht 1.68 m (5' 6.14\")   Wt 76.2 kg (168 lb)   SpO2 98%   BMI 27.00 kg/m    Pt's AVSS, no c/o pain or nausea during the shift. Compazine x1 was given. Pt is up independently in room and voiding good amount. Padilla intact and and hep locked. Am lab stable with Hgb at 11.6, Plt 142, WBC 1.6 and K+ of 3.9. Pt does need oral K+ with morning meds. Pt had uneventful night. Keep monitoring and notify MD with any new changes.   Problem: Adult Inpatient Plan of Care  Goal: Plan of Care Review  Outcome: No Change  Goal: Patient-Specific Goal (Individualization)  Outcome: No Change  Goal: Absence of Hospital-Acquired Illness or Injury  Outcome: No Change  Goal: Optimal Comfort and Wellbeing  Outcome: No Change  Goal: Readiness for Transition of Care  Outcome: No Change  Goal: Rounds/Family Conference  Outcome: No Change     "

## 2020-10-05 NOTE — PROGRESS NOTES
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      Diagnosis AMLM1 Acute myelogeneous leukemia, M1, myeloblastic  HCT Type Allogeneic    Prep Regimen Fludarabine  Cytoxan  TBI   Donor Source Sibling    GVHD Prophylaxis Post transplant cytoxan  Tac/MMF  Primary BMT Provider ACE      INTERVAL  HISTORY     Florencia has nausea today.  No vomiting.  Her skin near/under RAC dressing is still inflamed.  No other rash.  No bleeding.  No fever. Taking miralax for some constipation.     Review of Systems: 8 point ROS negative except as noted above.    PHYSICAL EXAM     Weight In/Out     Wt Readings from Last 3 Encounters:   10/04/20 76.2 kg (168 lb)   09/17/20 77.8 kg (171 lb 8 oz)   09/10/20 77.2 kg (170 lb 3.1 oz)      I/O last 3 completed shifts:  In: 1422 [P.O.:1310; IV Piggyback:112]  Out: 2975 [Urine:2975]       KPS:  100    /56 (BP Location: Right arm)   Pulse 75   Temp 96.9  F (36.1  C) (Axillary)   Resp 16   Ht 1.68 m (5' 6.14\")   Wt 76.2 kg (168 lb)   SpO2 98%   BMI 27.00 kg/m     General: NAD   Eyes:  LINH, sclera anicteric   Mouth: Oral mucosa moist without ulceration.   Lungs: CTA bilaterally  Cardiovascular: RRR, no M/R/G   Abdominal/Rectal: +BS, soft, NT, ND  Lymphatics: no edema  Skin: erythematous rash under dressing on right chest wall, no tenderness, c/w contact dermatitis.  Area at insertion site (site of bio-glue) also appears to be inflamed--non-tender, no drainage. No other rashes or petechaie  Neuro: A&O   Additional Findings: Padilla site NT, no drainage.    10/5/20 Current aGVHD staging:  Skin 0, UGI 0, LGI 0, Liver 0 (keep in note through day +180, delete if auto)    LABS AND IMAGING - PAST 24 HOURS     Lab Results   Component Value Date    WBC 1.6 (L) 10/05/2020    ANEU 1.3 (L) 10/05/2020    HGB 11.6 (L) 10/05/2020    HCT 35.2 10/05/2020     (L) 10/05/2020     " 10/05/2020    POTASSIUM 3.9 10/05/2020    CHLORIDE 98 10/05/2020    CO2 30 10/05/2020    GLC 97 10/05/2020    BUN 9 10/05/2020    CR 0.58 10/05/2020    MAG 2.1 10/05/2020    INR 0.95 10/05/2020     I have assessed all abnormal lab values for their clinical significance and any values considered clinically significant have been addressed in the assessment and plan  OVERALL PLAN   Florencia Gao is a 75 year old female with AML in CR1, admitted for JCARLOS Allo sib pbsct per protocol 2019-10, randomized to Post transplant cytoxan/Tac/MMF.  Day -2     1.  BMT:   Day -7 - line placement.   Day -6: cytoxan and fludarabine  Day -5 to day -2: Fludarabine  Day -1: TBI   Day 0: Transplant   Allopurinol through day 0. Flush and pre-meds prior to transplant. GCSF starts d+5 and cont to until ANC>1500 x3 consecutive days. Re-stage per protocol.     2.  HEME: Keep Hgb>8 and plts>10K.   - Of note has significant antibodies to RBCs- can cause delay in transfusion availability. Premeds tylenol and benadryl.                             3.  ID: Afebrile.   Cont Levo, Linda/vori, and HD ACV (CMV+, HSV+, EBV+) prophy.   Pjp prophy to start day+28 - IV pentamdine vs dapsone as sulfa allergy.                                         4.  GI: nausea.  Zofran  to chemo to prevent N/V. Changed zofran to kytril d/t mild headaches 10/3    - Ativan and compazine available PRN break-through symptoms.   Miralax daily- monitor for loose stools- no complaint of this to date.   - Per protocol no dex/steroids**   Protonix for GI prophy.    Ursodiol for VOD prophy.     5.  GVHD Prophylaxis:   Day +3 and day+4 cytoxan  Day+5 start tac and MMF      6.  FEN/Renal: Monitor creat and lytes.   - HypoNa:  Resolved.  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   - Replete lytes PRN per SS. Monitor weight, I+O, lytes per protocol with IVF flush.     7. Hypothyroidism  Continue synthroid 88mcg      8. Psych: Situational Depression continue effexor 75mg daily.      9.  Line: change dressing 10/4 and use alternative dressing d/t reaction to adhesive. Biopatch contains chlorhexidine and pt does have chlorhexidine allergy however most pts with chlorhexidine allergy still tolerate this. Monitor witch change in adhesive dressing. May need different biopatch if continues to be an issue.   - try paper dressing today  - monitor area of insertion (under bio-glue)     Nina Wyatt pa-c  [phone]       The patient was seen and examined by me separately from the midlevel provider. The note reflects our mutual assessment and plans and were approved by me personally.   I personally reviewed today's lab results vital signs and radiology results.     Each point of the assessment and plan were reviewed by the midlevel and me and either endorsed by me or were my added decisions.     My pertinent physical findings today are:  Afebrile, clear lungs and no rash. No URI sxs.     My assessment and plan are:  74 yo admitted for NMA allo PBSC for first remission AML.  Chemo orders for preparative regimen and post tx cytoxan reviewed with pharm D and signed. Monitor for SIADH. Monitor oral intake. Na 130: switch infusion to NS. Stop zofran, start Kytril. for ha. Dressing chenge for Padilla exit site for rash. EOC consult. Paper ddressing.    Hernando Alcala M.D.

## 2020-10-05 NOTE — PLAN OF CARE
"/70 (BP Location: Right arm)   Pulse 74   Temp 96.7  F (35.9  C) (Axillary)   Resp 16   Ht 1.68 m (5' 6.14\")   Wt 75.9 kg (167 lb 4.8 oz)   SpO2 96%   BMI 26.89 kg/m      Neuro: A&Ox4.   Cardiac: Afebrile. AVSS.   Respiratory: Sats 96% on RA.  GI/: Voiding spontaneously. No BM  Diet/appetite: C/O nausea this morning. No emesis. Requested po compazine before breakfast. Ate a bowl of cereal and drank 1 carton of milk after.   Activity: Up in room independently   Pain: Denies   Skin: No new deficits noted.  LDA's: Right chest CVC double lumens HL    Replaced K+ 3.9 with po potassium 20meq.  Re check tomorrow am.     Plan: Continue with POC. Notify primary team with changes.      "

## 2020-10-06 ENCOUNTER — APPOINTMENT (OUTPATIENT)
Dept: RADIATION ONCOLOGY | Facility: CLINIC | Age: 75
DRG: 014 | End: 2020-10-06
Attending: RADIOLOGY
Payer: COMMERCIAL

## 2020-10-06 ENCOUNTER — ONCOLOGY VISIT (OUTPATIENT)
Dept: RADIATION ONCOLOGY | Facility: CLINIC | Age: 75
End: 2020-10-06

## 2020-10-06 LAB
ABO + RH BLD: ABNORMAL
ABO + RH BLD: ABNORMAL
ALBUMIN SERPL-MCNC: 3.3 G/DL (ref 3.4–5)
ALP SERPL-CCNC: 80 U/L (ref 40–150)
ALT SERPL W P-5'-P-CCNC: 20 U/L (ref 0–50)
ANION GAP SERPL CALCULATED.3IONS-SCNC: 4 MMOL/L (ref 3–14)
AST SERPL W P-5'-P-CCNC: 18 U/L (ref 0–45)
BASOPHILS # BLD AUTO: 0 10E9/L (ref 0–0.2)
BASOPHILS NFR BLD AUTO: 2 %
BILIRUB SERPL-MCNC: 0.3 MG/DL (ref 0.2–1.3)
BLD GP AB SCN SERPL QL: ABNORMAL
BLOOD BANK CMNT PATIENT-IMP: ABNORMAL
BLOOD BANK CMNT PATIENT-IMP: ABNORMAL
BUN SERPL-MCNC: 11 MG/DL (ref 7–30)
CALCIUM SERPL-MCNC: 9 MG/DL (ref 8.5–10.1)
CHLORIDE SERPL-SCNC: 99 MMOL/L (ref 94–109)
CO2 SERPL-SCNC: 32 MMOL/L (ref 20–32)
CREAT SERPL-MCNC: 0.64 MG/DL (ref 0.52–1.04)
DIFFERENTIAL METHOD BLD: ABNORMAL
EOSINOPHIL # BLD AUTO: 0.1 10E9/L (ref 0–0.7)
EOSINOPHIL NFR BLD AUTO: 13.3 %
ERYTHROCYTE [DISTWIDTH] IN BLOOD BY AUTOMATED COUNT: 12.7 % (ref 10–15)
GFR SERPL CREATININE-BSD FRML MDRD: 88 ML/MIN/{1.73_M2}
GLUCOSE SERPL-MCNC: 87 MG/DL (ref 70–99)
HCT VFR BLD AUTO: 35.6 % (ref 35–47)
HGB BLD-MCNC: 11.7 G/DL (ref 11.7–15.7)
IMM GRANULOCYTES # BLD: 0 10E9/L (ref 0–0.4)
IMM GRANULOCYTES NFR BLD: 0 %
LYMPHOCYTES # BLD AUTO: 0 10E9/L (ref 0.8–5.3)
LYMPHOCYTES NFR BLD AUTO: 3.1 %
MAGNESIUM SERPL-MCNC: 2.2 MG/DL (ref 1.6–2.3)
MCH RBC QN AUTO: 30.3 PG (ref 26.5–33)
MCHC RBC AUTO-ENTMCNC: 32.9 G/DL (ref 31.5–36.5)
MCV RBC AUTO: 92 FL (ref 78–100)
MONOCYTES # BLD AUTO: 0 10E9/L (ref 0–1.3)
MONOCYTES NFR BLD AUTO: 1 %
NEUTROPHILS # BLD AUTO: 0.8 10E9/L (ref 1.6–8.3)
NEUTROPHILS NFR BLD AUTO: 80.6 %
NRBC # BLD AUTO: 0 10*3/UL
NRBC BLD AUTO-RTO: 0 /100
PHOSPHATE SERPL-MCNC: 6.2 MG/DL (ref 2.5–4.5)
PLATELET # BLD AUTO: 137 10E9/L (ref 150–450)
PLATELET # BLD EST: ABNORMAL 10*3/UL
POTASSIUM SERPL-SCNC: 4.1 MMOL/L (ref 3.4–5.3)
PROT SERPL-MCNC: 6.7 G/DL (ref 6.8–8.8)
RBC # BLD AUTO: 3.86 10E12/L (ref 3.8–5.2)
SODIUM SERPL-SCNC: 134 MMOL/L (ref 133–144)
SPECIMEN EXP DATE BLD: ABNORMAL
WBC # BLD AUTO: 1 10E9/L (ref 4–11)

## 2020-10-06 PROCEDURE — 77331 SPECIAL RADIATION DOSIMETRY: CPT | Performed by: RADIOLOGY

## 2020-10-06 PROCEDURE — 84100 ASSAY OF PHOSPHORUS: CPT | Performed by: PHYSICIAN ASSISTANT

## 2020-10-06 PROCEDURE — 77431 RADIATION THERAPY MANAGEMENT: CPT | Performed by: RADIOLOGY

## 2020-10-06 PROCEDURE — 77412 RADIATION TX DELIVERY LVL 3: CPT | Performed by: RADIOLOGY

## 2020-10-06 PROCEDURE — 250N000013 HC RX MED GY IP 250 OP 250 PS 637: Performed by: PHYSICIAN ASSISTANT

## 2020-10-06 PROCEDURE — 80053 COMPREHEN METABOLIC PANEL: CPT | Performed by: PHYSICIAN ASSISTANT

## 2020-10-06 PROCEDURE — 77332 RADIATION TREATMENT AID(S): CPT | Performed by: RADIOLOGY

## 2020-10-06 PROCEDURE — 87081 CULTURE SCREEN ONLY: CPT | Performed by: PHYSICIAN ASSISTANT

## 2020-10-06 PROCEDURE — 85025 COMPLETE CBC W/AUTO DIFF WBC: CPT | Performed by: PHYSICIAN ASSISTANT

## 2020-10-06 PROCEDURE — 77336 RADIATION PHYSICS CONSULT: CPT | Performed by: RADIOLOGY

## 2020-10-06 PROCEDURE — 86900 BLOOD TYPING SEROLOGIC ABO: CPT | Performed by: PHYSICIAN ASSISTANT

## 2020-10-06 PROCEDURE — 83735 ASSAY OF MAGNESIUM: CPT | Performed by: PHYSICIAN ASSISTANT

## 2020-10-06 PROCEDURE — 86901 BLOOD TYPING SEROLOGIC RH(D): CPT | Performed by: PHYSICIAN ASSISTANT

## 2020-10-06 PROCEDURE — 258N000003 HC RX IP 258 OP 636: Performed by: INTERNAL MEDICINE

## 2020-10-06 PROCEDURE — 99233 SBSQ HOSP IP/OBS HIGH 50: CPT | Performed by: INTERNAL MEDICINE

## 2020-10-06 PROCEDURE — 206N000001 HC R&B BMT UMMC

## 2020-10-06 PROCEDURE — 999N001121 HC STATISTIC RESEARCH KIT COLLECTION: Performed by: INTERNAL MEDICINE

## 2020-10-06 PROCEDURE — 250N000011 HC RX IP 250 OP 636: Performed by: RADIOLOGY

## 2020-10-06 PROCEDURE — 250N000011 HC RX IP 250 OP 636: Performed by: INTERNAL MEDICINE

## 2020-10-06 PROCEDURE — 86850 RBC ANTIBODY SCREEN: CPT | Performed by: PHYSICIAN ASSISTANT

## 2020-10-06 RX ORDER — SODIUM CHLORIDE 9 MG/ML
INJECTION, SOLUTION INTRAVENOUS CONTINUOUS
Status: DISCONTINUED | OUTPATIENT
Start: 2020-10-07 | End: 2020-10-12

## 2020-10-06 RX ADMIN — ACYCLOVIR 800 MG: 800 TABLET ORAL at 17:04

## 2020-10-06 RX ADMIN — Medication 5 ML: at 00:46

## 2020-10-06 RX ADMIN — VENLAFAXINE 75 MG: 75 TABLET ORAL at 14:05

## 2020-10-06 RX ADMIN — CYANOCOBALAMIN TAB 1000 MCG 1000 MCG: 1000 TAB at 08:19

## 2020-10-06 RX ADMIN — Medication 5 ML: at 21:58

## 2020-10-06 RX ADMIN — PROCHLORPERAZINE MALEATE 5 MG: 5 TABLET ORAL at 11:34

## 2020-10-06 RX ADMIN — LEVOTHYROXINE SODIUM 88 MCG: 0.09 TABLET ORAL at 08:19

## 2020-10-06 RX ADMIN — Medication 5 ML: at 03:46

## 2020-10-06 RX ADMIN — URSODIOL 300 MG: 300 CAPSULE ORAL at 08:15

## 2020-10-06 RX ADMIN — URSODIOL 300 MG: 300 CAPSULE ORAL at 19:47

## 2020-10-06 RX ADMIN — ACYCLOVIR 800 MG: 800 TABLET ORAL at 21:56

## 2020-10-06 RX ADMIN — MICAFUNGIN SODIUM 100 MG: 50 INJECTION, POWDER, LYOPHILIZED, FOR SOLUTION INTRAVENOUS at 19:45

## 2020-10-06 RX ADMIN — ACYCLOVIR 800 MG: 800 TABLET ORAL at 11:29

## 2020-10-06 RX ADMIN — GRANISETRON HYDROCHLORIDE 1 MG: 1 TABLET, FILM COATED ORAL at 19:48

## 2020-10-06 RX ADMIN — PANTOPRAZOLE SODIUM 40 MG: 40 TABLET, DELAYED RELEASE ORAL at 08:14

## 2020-10-06 RX ADMIN — LEVOFLOXACIN 250 MG: 250 TABLET, FILM COATED ORAL at 11:29

## 2020-10-06 RX ADMIN — PROCHLORPERAZINE MALEATE 5 MG: 5 TABLET ORAL at 21:56

## 2020-10-06 RX ADMIN — ACYCLOVIR 800 MG: 800 TABLET ORAL at 08:14

## 2020-10-06 RX ADMIN — POLYETHYLENE GLYCOL 3350 17 G: 17 POWDER, FOR SOLUTION ORAL at 19:48

## 2020-10-06 RX ADMIN — URSODIOL 300 MG: 300 CAPSULE ORAL at 14:05

## 2020-10-06 RX ADMIN — PROCHLORPERAZINE MALEATE 5 MG: 5 TABLET ORAL at 15:52

## 2020-10-06 RX ADMIN — GRANISETRON HYDROCHLORIDE 1 MG: 1 TABLET, FILM COATED ORAL at 08:19

## 2020-10-06 RX ADMIN — ACYCLOVIR 800 MG: 800 TABLET ORAL at 14:05

## 2020-10-06 ASSESSMENT — ACTIVITIES OF DAILY LIVING (ADL)
ADLS_ACUITY_SCORE: 10

## 2020-10-06 ASSESSMENT — MIFFLIN-ST. JEOR: SCORE: 1283.78

## 2020-10-06 NOTE — PROGRESS NOTES
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      Diagnosis AMLM1 Acute myelogeneous leukemia, M1, myeloblastic  HCT Type Allogeneic    Prep Regimen Fludarabine  Cytoxan  TBI   Donor Source Sibling    GVHD Prophylaxis Post transplant cytoxan  Tac/MMF  Primary BMT Provider ACE      INTERVAL  HISTORY     Florencia has a queasy stomach but better after Kytril. Irritation under and around dressing is continuing to be bothersome whenever anything touching it. No vomiting or diarrhea. No fevers, chills, or infectious symptoms.     Review of Systems: 8 point ROS negative except as noted above.    PHYSICAL EXAM     Weight In/Out     Wt Readings from Last 3 Encounters:   10/06/20 76 kg (167 lb 8 oz)   09/17/20 77.8 kg (171 lb 8 oz)   09/10/20 77.2 kg (170 lb 3.1 oz)      I/O last 3 completed shifts:  In: 560 [P.O.:460; I.V.:100]  Out: 2180 [Urine:2180]       KPS:  100    /70 (BP Location: Right arm)   Pulse 75   Temp 97.4  F (36.3  C) (Axillary)   Resp 16   Ht 1.68 m (5' 6.14\")   Wt 76 kg (167 lb 8 oz)   SpO2 93%   BMI 26.92 kg/m     General: NAD   Eyes:  LINH, sclera anicteric   Mouth: Oral mucosa moist without ulceration.   Lungs: CTA bilaterally  Cardiovascular: RRR, no M/R/G   Abdominal/Rectal: +BS, soft, NT, ND  Lymphatics: no edema  Skin: erythematous rash under dressing on right chest wall, no tenderness, c/w contact dermatitis.  Area at insertion site (site of bio-glue) also appears to be inflamed--non-tender, no drainage. No other rashes or petechaie  Neuro: A&O   Additional Findings: Padilla site NT, no drainage.    10/5/20 Current aGVHD staging:  Skin 0, UGI 0, LGI 0, Liver 0 (keep in note through day +180, delete if auto)    LABS AND IMAGING - PAST 24 HOURS     Lab Results   Component Value Date    WBC 1.0 (L) 10/06/2020    ANEU 0.8 (L) 10/06/2020    HGB 11.7 10/06/2020    HCT 35.6 10/06/2020 "     (L) 10/06/2020     10/06/2020    POTASSIUM 4.1 10/06/2020    CHLORIDE 99 10/06/2020    CO2 32 10/06/2020    GLC 87 10/06/2020    BUN 11 10/06/2020    CR 0.64 10/06/2020    MAG 2.2 10/06/2020    INR 0.95 10/05/2020     I have assessed all abnormal lab values for their clinical significance and any values considered clinically significant have been addressed in the assessment and plan  OVERALL PLAN   Florencia Gao is a 75 year old female with AML in CR1, admitted for JCARLOS Allo sib pbsct per protocol 2019-10, randomized to Post transplant cytoxan/Tac/MMF.  Day -1     1.  BMT:   Day -7 - line placement.   Day -6: cytoxan and fludarabine  Day -5 to day -2: Fludarabine  Day -1: TBI   Day 0: Transplant   Allopurinol through day 0.   Flush and pre-meds prior to transplant. Donor B pos and recipient B neg. Transplant at 10:30am tomorrow.   GCSF starts d+5 and cont to until ANC>1500 x3 consecutive days. Re-stage per protocol.     2.  HEME: Keep Hgb>8 and plts>10K.   - Of note has significant antibodies to RBCs- can cause delay in transfusion availability. Premeds tylenol and benadryl.                             3.  ID: Afebrile.   Cont Levo, Linda/vori, and HD ACV (CMV+, HSV+, EBV+) prophy.   Pjp prophy to start day+28 - IV pentamdine vs dapsone as sulfa allergy.                                         4.  GI: nausea.  Zofran  to chemo to prevent N/V. Changed zofran to kytril d/t mild headaches 10/3    - Ativan and compazine available PRN break-through symptoms.   Miralax daily- monitor for loose stools- no complaint of this to date.   - Per protocol no dex/steroids**   Protonix for GI prophy.    Ursodiol for VOD prophy.     5.  GVHD Prophylaxis:   Day +3 and day+4 cytoxan  Day+5 start tac and MMF      6.  FEN/Renal: Monitor creat and lytes.   - HypoNa:  Resolved.  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 Consider changing flush to NS for post transplant cytoxan.   - Replete lytes PRN per SS. Monitor  weight, I+O, lytes per protocol with IVF flush.     7. Hypothyroidism  Continue synthroid 88mcg      8. Psych: Situational Depression continue effexor 75mg daily.     9.  Line: change dressing 10/4 and use alternative dressing d/t reaction to adhesive. Biopatch contains chlorhexidine and pt does have chlorhexidine allergy however most pts with chlorhexidine allergy still tolerate this. Monitor witch change in adhesive dressing. May need different biopatch if continues to be an issue.   - trying paper dressing  - monitor area of insertion (under bio-glue)  - wound consult today. Consider steroid cream or benadryl cream (pt wants to wait until wound nurse weighs in)    Denzel Magallon PA-C  [phone]       The patient was seen and examined by me separately from the midlevel provider. The note reflects our mutual assessment and plans and were approved by me personally.   I personally reviewed today's lab results vital signs and radiology results.     Each point of the assessment and plan were reviewed by the midlevel and me and either endorsed by me or were my added decisions.     My pertinent physical findings today are:  Afebrile, clear lungs and no rash. No URI sxs.     My assessment and plan are:  74 yo admitted for NMA allo PBSC for first remission AML.  Chemo orders for preparative regimen and post tx cytoxan reviewed with pharm D and signed. Monitor for SIADH. Monitor oral intake. Na 130: switch infusion to NS. Stop zofran, start Kytril. for ha. Dressing chenge for Padilla exit site for rash. EOC consult. Paper ddressing.    Hernando Alcala M.D.

## 2020-10-06 NOTE — PLAN OF CARE
Problem: Adult Inpatient Plan of Care  Goal: Plan of Care Review  Outcome: No Change  Flowsheets (Taken 10/6/2020 3797)  Plan of Care Reviewed With: patient    TBI this morning, jose enrique well. Has been resting comfortably, up ab marissa in her room this afternoon. Garvin site bruised and skin irritated, wound consult sent. Denies pain. No nausea but taking antiemetics as prevention. VSS Transplant tomorrow with flush to start 2hr before. Stool/urine for research sent. VRE sent. Continue POC.

## 2020-10-06 NOTE — PLAN OF CARE
"/66   Pulse 70   Temp 96.7  F (35.9  C) (Axillary)   Resp 16   Ht 1.68 m (5' 6.14\")   Wt 75.9 kg (167 lb 4.8 oz)   SpO2 95%   BMI 26.89 kg/m       VSS on RA sating>95%. Good UO, BMx0, denies n/v and pain. Up independently, contact dermatitis at CVC site, WOC consult needs to be ordered. Research labs (blood) sent, still needing urine and stool. No replacements this morning, Continue with POC.      Problem: Adult Inpatient Plan of Care  Goal: Plan of Care Review  10/6/2020 0530 by Melanie Centeno, RN  Outcome: No Change     Problem: Adult Inpatient Plan of Care  Goal: Patient-Specific Goal (Individualization)  Outcome: No Change     Problem: Adult Inpatient Plan of Care  Goal: Absence of Hospital-Acquired Illness or Injury  10/6/2020 0530 by Melanie Centeno, RN  Outcome: No Change     Problem: Adult Inpatient Plan of Care  Goal: Absence of Hospital-Acquired Illness or Injury  Intervention: Identify and Manage Fall Risk  Recent Flowsheet Documentation  Taken 10/5/2020 2100 by Melanie Centeno, CASSIDY  Safety Promotion/Fall Prevention:   clutter free environment maintained   fall prevention program maintained   lighting adjusted   nonskid shoes/slippers when out of bed   safety round/check completed     Problem: Adult Inpatient Plan of Care  Goal: Absence of Hospital-Acquired Illness or Injury  Intervention: Prevent VTE (venous thromboembolism)  Recent Flowsheet Documentation  Taken 10/5/2020 2100 by Melanie Centeno, RN  VTE Prevention/Management:   ambulation promoted   bleeding risk assessed   dorsiflexion/plantar flexion performed   fluids promoted   AROM (active range of motion) performed     Problem: Adult Inpatient Plan of Care  Goal: Optimal Comfort and Wellbeing  10/6/2020 0530 by Melanie Centeno, RN  Outcome: No Change     Problem: Adult Inpatient Plan of Care  Goal: Readiness for Transition of Care  10/6/2020 0530 by Melanie Centeno, RN  Outcome: No Change     Problem: Adult Inpatient Plan of Care  Goal: " Rounds/Family Conference  10/6/2020 0530 by Melanie Centeno RN  Outcome: No Change     Problem: Adjustment to Transplant (Stem Cell/Bone Marrow Transplant)  Goal: Optimal Coping with Transplant  10/6/2020 0530 by Melanie Centeno RN  Outcome: No Change     Problem: Bladder Irritation (Stem Cell/Bone Marrow Transplant)  Goal: Symptom-Free Urinary Elimination  10/6/2020 0530 by Melanie Centeno, RN  Outcome: No Change     Problem: Diarrhea (Stem Cell/Bone Marrow Transplant)  Goal: Diarrhea Symptom Control  10/6/2020 0530 by Melanie Centeno RN  Outcome: No Change     Problem: Fatigue (Stem Cell/Bone Marrow Transplant)  Goal: Energy Level Supports Daily Activity  10/6/2020 0530 by Melanie Centeno RN  Outcome: No Change     Problem: Hematologic Alteration (Stem Cell/Bone Marrow Transplant)  Goal: Blood Counts Within Acceptable Range  10/6/2020 0525 by Melanie Centeno RN  Outcome: No Change     Problem: Hypersensitivity Reaction (Stem Cell/Bone Marrow Transplant)  Goal: Absence of Hypersensitivity Reaction  10/6/2020 0530 by Melanie Centeno RN  Outcome: No Change     Problem: Infection Risk (Stem Cell/Bone Marrow Transplant)  Goal: Absence of Infection Signs/Symptoms  10/6/2020 0530 by Melanie Centeno RN  Outcome: No Change     Problem: Mucositis (Stem Cell/Bone Marrow Transplant)  Goal: Mucous Membrane Health and Integrity  10/6/2020 0530 by Melanie Centeno RN  Outcome: No Change     Problem: Nausea and Vomiting (Stem Cell/Bone Marrow Transplant)  Goal: Nausea and Vomiting Symptom Relief  10/6/2020 0530 by Melanie Centeno RN  Outcome: No Change     Problem: Nutrition Intake Altered (Stem Cell/Bone Marrow Transplant)  Goal: Optimal Nutrition Intake  10/6/2020 0530 by Melanie Centeno, RN  Outcome: No Change

## 2020-10-06 NOTE — PROGRESS NOTES
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    Interventions: Clinical  (CSW) met with Pt to assess coping, provide supportive counseling and assist with resources as needed. Pt shared that she is overall doing well.  Pt processed her experience with radiation. Pt denies current symptoms of anxiety or depression. Pt identifies a strong support system and reflected on feeling thankful for them. SW reviewed w/ Pt that she is currently #1 on the [employer] waitlist and Pt anticipates she will likely take that apartment when it opens as it is her top preference for relocation.  Pt reports she doesn't mind check-in visits via phone to decrease exposure. CSW provided empathic listening, validation of concerns, and encouragement. CSW encouraged Pt to contact CSW for support, questions and/or resources.     Assessment: Pt presented as pleasant and engaged.  Pt appears to be coping appropriately at this time. Pt continues to be supported by her family.     Plan: CSW will continue to provide supportive counseling and assistance with resources as needed. CSW will continue to collaborate with multidisciplinary team regarding Pt's plan of care.     ROSSI Pabon, [facility]  Adult Blood & Marrow Transplant   Phone: [phone]  Pager: [phone]

## 2020-10-07 LAB
ANION GAP SERPL CALCULATED.3IONS-SCNC: 3 MMOL/L (ref 3–14)
BASOPHILS # BLD AUTO: 0 10E9/L (ref 0–0.2)
BASOPHILS NFR BLD AUTO: 2 %
BUN SERPL-MCNC: 11 MG/DL (ref 7–30)
CALCIUM SERPL-MCNC: 8.8 MG/DL (ref 8.5–10.1)
CHLORIDE SERPL-SCNC: 98 MMOL/L (ref 94–109)
CO2 SERPL-SCNC: 31 MMOL/L (ref 20–32)
CREAT SERPL-MCNC: 0.65 MG/DL (ref 0.52–1.04)
DIFFERENTIAL METHOD BLD: ABNORMAL
EOSINOPHIL # BLD AUTO: 0.1 10E9/L (ref 0–0.7)
EOSINOPHIL NFR BLD AUTO: 13 %
ERYTHROCYTE [DISTWIDTH] IN BLOOD BY AUTOMATED COUNT: 12.5 % (ref 10–15)
GFR SERPL CREATININE-BSD FRML MDRD: 88 ML/MIN/{1.73_M2}
GLUCOSE SERPL-MCNC: 89 MG/DL (ref 70–99)
HCT VFR BLD AUTO: 34.7 % (ref 35–47)
HGB BLD-MCNC: 11.5 G/DL (ref 11.7–15.7)
LYMPHOCYTES # BLD AUTO: 0 10E9/L (ref 0.8–5.3)
LYMPHOCYTES NFR BLD AUTO: 4 %
MAGNESIUM SERPL-MCNC: 2.1 MG/DL (ref 1.6–2.3)
MCH RBC QN AUTO: 30.2 PG (ref 26.5–33)
MCHC RBC AUTO-ENTMCNC: 33.1 G/DL (ref 31.5–36.5)
MCV RBC AUTO: 91 FL (ref 78–100)
MONOCYTES # BLD AUTO: 0 10E9/L (ref 0–1.3)
MONOCYTES NFR BLD AUTO: 0 %
NEUTROPHILS # BLD AUTO: 0.4 10E9/L (ref 1.6–8.3)
NEUTROPHILS NFR BLD AUTO: 81 %
PLATELET # BLD AUTO: 119 10E9/L (ref 150–450)
PLATELET # BLD EST: ABNORMAL 10*3/UL
POTASSIUM SERPL-SCNC: 4 MMOL/L (ref 3.4–5.3)
RBC # BLD AUTO: 3.81 10E12/L (ref 3.8–5.2)
RBC MORPH BLD: NORMAL
SODIUM SERPL-SCNC: 132 MMOL/L (ref 133–144)
WBC # BLD AUTO: 0.5 10E9/L (ref 4–11)

## 2020-10-07 PROCEDURE — 30243Y2 TRANSFUSION OF ALLOGENEIC RELATED HEMATOPOIETIC STEM CELLS INTO CENTRAL VEIN, PERCUTANEOUS APPROACH: ICD-10-PCS | Performed by: INTERNAL MEDICINE

## 2020-10-07 PROCEDURE — 815N000001 HC ACQUISITION BONE MARROW RELATED DONOR

## 2020-10-07 PROCEDURE — 85025 COMPLETE CBC W/AUTO DIFF WBC: CPT | Performed by: PHYSICIAN ASSISTANT

## 2020-10-07 PROCEDURE — 250N000011 HC RX IP 250 OP 636: Performed by: PHYSICIAN ASSISTANT

## 2020-10-07 PROCEDURE — 250N000011 HC RX IP 250 OP 636: Performed by: INTERNAL MEDICINE

## 2020-10-07 PROCEDURE — 99233 SBSQ HOSP IP/OBS HIGH 50: CPT | Performed by: INTERNAL MEDICINE

## 2020-10-07 PROCEDURE — 258N000003 HC RX IP 258 OP 636: Performed by: PHYSICIAN ASSISTANT

## 2020-10-07 PROCEDURE — 258N000003 HC RX IP 258 OP 636: Performed by: INTERNAL MEDICINE

## 2020-10-07 PROCEDURE — 250N000011 HC RX IP 250 OP 636: Performed by: RADIOLOGY

## 2020-10-07 PROCEDURE — 83735 ASSAY OF MAGNESIUM: CPT | Performed by: PHYSICIAN ASSISTANT

## 2020-10-07 PROCEDURE — 80048 BASIC METABOLIC PNL TOTAL CA: CPT | Performed by: PHYSICIAN ASSISTANT

## 2020-10-07 PROCEDURE — 250N000013 HC RX MED GY IP 250 OP 250 PS 637: Performed by: PHYSICIAN ASSISTANT

## 2020-10-07 PROCEDURE — 206N000001 HC R&B BMT UMMC

## 2020-10-07 RX ORDER — ACETAMINOPHEN 325 MG/1
650 TABLET ORAL ONCE
Status: COMPLETED | OUTPATIENT
Start: 2020-10-07 | End: 2020-10-07

## 2020-10-07 RX ORDER — DIPHENHYDRAMINE HCL 25 MG
25 CAPSULE ORAL ONCE
Status: COMPLETED | OUTPATIENT
Start: 2020-10-07 | End: 2020-10-07

## 2020-10-07 RX ORDER — POLYETHYLENE GLYCOL 3350 17 G/17G
17 POWDER, FOR SOLUTION ORAL DAILY PRN
Status: DISCONTINUED | OUTPATIENT
Start: 2020-10-07 | End: 2020-10-28 | Stop reason: HOSPADM

## 2020-10-07 RX ADMIN — URSODIOL 300 MG: 300 CAPSULE ORAL at 14:36

## 2020-10-07 RX ADMIN — PROCHLORPERAZINE MALEATE 10 MG: 5 TABLET ORAL at 09:29

## 2020-10-07 RX ADMIN — ACYCLOVIR 800 MG: 800 TABLET ORAL at 22:13

## 2020-10-07 RX ADMIN — MICAFUNGIN SODIUM 100 MG: 50 INJECTION, POWDER, LYOPHILIZED, FOR SOLUTION INTRAVENOUS at 20:41

## 2020-10-07 RX ADMIN — ACYCLOVIR 800 MG: 800 TABLET ORAL at 17:14

## 2020-10-07 RX ADMIN — GRANISETRON HYDROCHLORIDE 1 MG: 1 TABLET, FILM COATED ORAL at 20:41

## 2020-10-07 RX ADMIN — ACYCLOVIR 800 MG: 800 TABLET ORAL at 09:30

## 2020-10-07 RX ADMIN — LORAZEPAM 0.5 MG: 2 INJECTION INTRAMUSCULAR; INTRAVENOUS at 11:29

## 2020-10-07 RX ADMIN — DIPHENHYDRAMINE HYDROCHLORIDE 25 MG: 25 CAPSULE ORAL at 10:43

## 2020-10-07 RX ADMIN — Medication 5 ML: at 05:08

## 2020-10-07 RX ADMIN — LEVOFLOXACIN 250 MG: 250 TABLET, FILM COATED ORAL at 14:36

## 2020-10-07 RX ADMIN — VENLAFAXINE 75 MG: 75 TABLET ORAL at 14:37

## 2020-10-07 RX ADMIN — GRANISETRON HYDROCHLORIDE 1 MG: 1 TABLET, FILM COATED ORAL at 09:31

## 2020-10-07 RX ADMIN — Medication 5 ML: at 14:48

## 2020-10-07 RX ADMIN — POTASSIUM CHLORIDE 20 MEQ: 29.8 INJECTION, SOLUTION INTRAVENOUS at 14:37

## 2020-10-07 RX ADMIN — Medication 5 ML: at 22:13

## 2020-10-07 RX ADMIN — LEVOTHYROXINE SODIUM 88 MCG: 0.09 TABLET ORAL at 09:31

## 2020-10-07 RX ADMIN — PANTOPRAZOLE SODIUM 40 MG: 40 TABLET, DELAYED RELEASE ORAL at 09:31

## 2020-10-07 RX ADMIN — PROCHLORPERAZINE MALEATE 5 MG: 5 TABLET ORAL at 17:14

## 2020-10-07 RX ADMIN — URSODIOL 300 MG: 300 CAPSULE ORAL at 09:30

## 2020-10-07 RX ADMIN — SODIUM CHLORIDE: 9 INJECTION, SOLUTION INTRAVENOUS at 09:33

## 2020-10-07 RX ADMIN — URSODIOL 300 MG: 300 CAPSULE ORAL at 20:41

## 2020-10-07 RX ADMIN — ACETAMINOPHEN 650 MG: 325 TABLET, FILM COATED ORAL at 10:43

## 2020-10-07 RX ADMIN — ACYCLOVIR 800 MG: 800 TABLET ORAL at 14:37

## 2020-10-07 ASSESSMENT — ACTIVITIES OF DAILY LIVING (ADL)
ADLS_ACUITY_SCORE: 10

## 2020-10-07 ASSESSMENT — MIFFLIN-ST. JEOR: SCORE: 1282.41

## 2020-10-07 NOTE — PLAN OF CARE
Pt complained of nausea before meal and requested and took Compazine 5 mg po; afebrile; vital signs are stable; she gets her transplant tomorrow and gets iv flush 2 hours before transplant. Denied any pain; monitor pt closely and continue plan of care.    Problem: Adult Inpatient Plan of Care  Goal: Plan of Care Review  Outcome: No Change  Flowsheets (Taken 10/6/2020 6335)  Plan of Care Reviewed With: patient     Problem: Adult Inpatient Plan of Care  Goal: Patient-Specific Goal (Individualization)  Outcome: No Change     Problem: Adult Inpatient Plan of Care  Goal: Absence of Hospital-Acquired Illness or Injury  Intervention: Identify and Manage Fall Risk  Recent Flowsheet Documentation  Taken 10/6/2020 6404 by Shital Martinez, RN  Safety Promotion/Fall Prevention: activity supervised     Problem: Adult Inpatient Plan of Care  Goal: Optimal Comfort and Wellbeing  Outcome: No Change     Problem: Adjustment to Transplant (Stem Cell/Bone Marrow Transplant)  Goal: Optimal Coping with Transplant  Outcome: No Change     Problem: Bladder Irritation (Stem Cell/Bone Marrow Transplant)  Goal: Symptom-Free Urinary Elimination  Intervention: Monitor and Manage Bladder Irritation  Recent Flowsheet Documentation  Taken 10/6/2020 1658 by Shital Martinez, RN  Hyperhydration Management: fluids provided     Problem: Nausea and Vomiting (Stem Cell/Bone Marrow Transplant)  Goal: Nausea and Vomiting Symptom Relief  Outcome: No Change     Problem: Nutrition Intake Altered (Stem Cell/Bone Marrow Transplant)  Goal: Optimal Nutrition Intake  Outcome: No Change

## 2020-10-07 NOTE — PLAN OF CARE
AVSS on room air. Pt denies pain. Given compazine this morning for nausea before breakfast with good relief. Given ativan during transplant for nausea. Given compazine before dinner to prevent nausea. Independent in room and cooperative of cares. Transplant finished at noon, flush until 2200.  Pt was able to eat a bowl of cereal for breakfast; had saltines, a cup of cashews, and applesauce for lunch; and had 2 cups of chicken noodle soup with saltines for dinner.    Problem: Adult Inpatient Plan of Care  Goal: Plan of Care Review  Outcome: No Change  Flowsheets (Taken 10/7/2020 2881)  Plan of Care Reviewed With:   patient   caregiver  Progress: no change  Goal: Absence of Hospital-Acquired Illness or Injury  Outcome: No Change  Goal: Optimal Comfort and Wellbeing  Outcome: No Change     Problem: Adjustment to Transplant (Stem Cell/Bone Marrow Transplant)  Goal: Optimal Coping with Transplant  Outcome: Improving     Problem: Fatigue (Stem Cell/Bone Marrow Transplant)  Goal: Energy Level Supports Daily Activity  Outcome: Declining     Problem: Nausea and Vomiting (Stem Cell/Bone Marrow Transplant)  Goal: Nausea and Vomiting Symptom Relief  Outcome: Improving     Problem: Nutrition Intake Altered (Stem Cell/Bone Marrow Transplant)  Goal: Optimal Nutrition Intake  Outcome: No Change

## 2020-10-07 NOTE — PROCEDURES
Radiation Oncology:  [facility] [address] [place] [address]     AME ROSENTHAL  [facility] Rec #: [id number]  Diagnosis: C92.01 Acute myeloblastic leukemia, in remission        Total Body Irradiation Physician CHAN Note  October 06, 2020             Under my direction a Single fraction of 200cGy TBI was delivered after the set up   parameters were checked in the treatment position in the treatment room.  The chart and   set up information were reviewed.  The patient's questions were answered.     Objective:  Patient appeared relaxed and ready for TBI.  Nausea well controlled  Assessment:    Tolerated the  TBI.    Plan:       Proceed as per BMT program      Treatment Summary:  Radiation Oncology - Course: 1 Protocol: 2019-10  Treatment Site Current Dose Modality From To Elapsed Days Fx.  1 TBI      200 cGy 18 X 10/06/2020 10/06/2020   1                I have checked the following parameters prior to the first treatment:  Patient Identification  Protocol  SSD, Correct placement of the field, correct body position  Prescription thickness  Compensator and beam spoiler placement  Dose prescription, dose rate and energy  OSLs were ordered for verification during the treatment     SONIDO Brooks MD

## 2020-10-07 NOTE — PROGRESS NOTES
Type of transplant: Donor: Allogeneic - Related  Product:      BMT INFUSION DOCUMENTATION (last 48 hours)      BMT/Cellular Product Infusion     Row Name 10/07/20 0700                [REMOVED] Product 10/07/20 0830 HPC, Apheresis    Product Details Product Release Date: 10/07/20  -NB Product Release Time: 0830 -JL Product Type: HPC, Apheresis  -NB DIN: [id number]  -NB Product Description Code: [id number]  -NB Volume Dispensed (mL): 136 mL  -NB Completion Date (RN to complete): 10/07/20  -AD Completion Time (RN to complete): 1135  -AD, start 1118     Checked by (Patient RN)  Eloisa Jane RN  -JF       Checked by (Witness)  Annabelle COTTER       Product Volume Infused (mL)  136 mL  -JF       Flush Volume (mL)  40 mL  -JF       Volume Dispensed (mL)  --          [REMOVED] Product 10/07/20 0830 HPC, Apheresis    Product Details Product Release Date: 10/07/20  -NB Product Release Time: 0830 -JL Product Type: HPC, Apheresis  -NB DIN: [id number]  -NB Product Description Code: [id number]  -NB Volume Dispensed (mL): 136 mL  -NB Completion Date (RN to complete): 10/07/20  -AD Completion Time (RN to complete): 1147  -AD, start 1139     Checked by (Patient RN)  Eloisa Jane RN  -JF       Checked by (Witness)  Annabelle COTTER       Product Volume Infused (mL)  136 mL  -JF       Flush Volume (mL)  50 mL  -JF       Volume Dispensed (mL)  --          [REMOVED] Product 10/07/20 0830 HPC, Apheresis    Product Details Product Release Date: 10/07/20  -NB Product Release Time: 0830 -JL Product Type: HPC, Apheresis  -NB DIN: [id number]  -NB Product Description Code: [id number]  -NB Volume Dispensed (mL): 136 mL  -NB Completion Date (RN to complete): 10/07/20  -AD Completion Time (RN to complete): 1203  -AD, start 1153     Checked by (Patient RN)  Maia Obrien RN  -AD       Checked by (Witness)  Annabelle COTTER       Product Volume Infused (mL)  136 mL  -JF       Flush Volume (mL)  80 mL  -AD       Volume Dispensed (mL)  --          User Key  (r) = Recorded By, (t) = Taken By, (c) = Cosigned By    Initials Name Effective Dates    Maia Zaragoza RN 09/16/20 -     Eloisa Joe RN 04/30/15 -     Annabelle Randhawa 04/29/14 -     Miracle Sullivan 04/29/14 -         Preparation: RN Documentation  Patient was premedicated as ordered: yes  Line Type: central line, right  Patient Stable Prior to Infusion: yes  Time Infusion Started: 1118  Teaching: educated on signs/symptoms of reaction and what to expect.  Tolerated/Reaction: had nausea during first bag and given 0.5mg ativan with good relief  Immediate suspected transfusion reaction to the product: none  Did patient have prior history of similar signs/symptoms during this hospitalization?: no  Symptoms during/after infusion: other (comment)(nausea)  Did the patient tolerate the infusion well: yes  Medications and treatment for symptoms: ativan 0.5mg  Did the symptoms resolve?: yes  Enter comments if clots, leaks, broken bag, infusion delays, other issues with bag/infusion: NA  Flush until: 2200  Plan: continue to monitor

## 2020-10-07 NOTE — PROGRESS NOTES
CLINICAL NUTRITION SERVICES - ASSESSMENT NOTE     Nutrition Prescription    RECOMMENDATIONS FOR MDs/PROVIDERS TO ORDER:  Encourage PO intake     Malnutrition Status:    Unable to determine due to no nutrition focused physical assessment at this time. Pt is at risk for malnutrition.     Recommendations already ordered by Registered Dietitian (RD):  - Calorie counts  - Nutrition education     Future/Additional Recommendations:  1. Monitor PO intake and need for scheduled nutrition supplements.     2. If TPN becomes POC s/p BMT course recommend:  --Use dosing weight 64 kg  --Begin CPN, goal volume 960 ml/day (or max concentrated per PharmD) with initial 135g Dex daily (459kcal, GIR 1.5), 95g AA daily (380 kcal), and 250 ml 20% IV lipids daily.  Micro/Rx: Infuvite and MTE5  --ONLY once pt receives ~100% of initial continuous PN volume with K+/Mg++/Phos WNL, advance PN dex by 50-55 g every 1-2 days (pending lytes/Glu and Pharm D/RD discretion) to initial goal of 250g Dex (850 kcal) to increase provisions to 1730 kcals (27 kcal/kg/day), 1.5 g PRO/kg/day, GIR 2.7 with 29% kcals from Fat.     REASON FOR ASSESSMENT  Florencia Gao is a/an 75 year old female assessed by the dietitian for LOS    PMH: AML, admit for JCARLOS Allo Sib PBSCT     NUTRITION HISTORY  Pt last seen by outpatient RD on 9/14. At that time pt reported a good appetite and was eating 2-3 meals a day + snacks.        Recall from 9/14:   B: Eggs and toast or oatmeal, blueberries, and nuts  L: Leftovers or canned salmon/tuna sandwich   D: Spaghetti w/ spaghetti squash and veggie burger or other easy to make dinners   Sn: Lays, hummus w/ carrots, ice cream, cheese sticks    Writer spoke w/ Florencia briefly. She reports that her appetite has decreased recently and she has had nausea.      CURRENT NUTRITION ORDERS  Diet: High Kcal/High Protein, PRN supplements   Intake/Tolerance: Florencia reports eating cereal, milk, muffin, and peaches today. Variable intake per  "flowsheet. Per meal ordering system pt's meals meeting <75% nutrition needs.     LABS  Labs reviewed  Na 132 (L)  Phos 6.2 (H)    MEDICATIONS  Medications reviewed  [place]  Vit B12  Cellcept  Protonix  Miralax    ANTHROPOMETRICS  Height: 168 cm (5' 6.142\")  Most Recent Weight: 75.8 kg (167 lb 3.2 oz)    IBW: 59.4 kg (128%)   BMI: Overweight BMI 25-29.9  Weight History: Weight stable.   Wt Readings from Last 10 Encounters:   10/07/20 75.8 kg (167 lb 3.2 oz)   09/17/20 77.8 kg (171 lb 8 oz)   09/10/20 77.2 kg (170 lb 3.1 oz)   09/10/20 77.2 kg (170 lb 1.6 oz)   09/10/20 77.1 kg (170 lb)   09/08/20 77.7 kg (171 lb 4.8 oz)   09/08/20 77.7 kg (171 lb 3.2 oz)   08/20/20 76 kg (167 lb 8 oz)     Dosing Weight: 64 kg (adjusted based on dry weight of 75.8 kg on 10/7 and IBW of 59.4 kg)     ASSESSED NUTRITION NEEDS  Estimated Energy Needs: 5063-7709 kcals/day (25 - 30 kcals/kg)  Justification: Maintenance  Estimated Protein Needs: 75-95 grams protein/day (1.2 - 1.5 grams of pro/kg)  Justification: Increased needs w/ BMT  Estimated Fluid Needs: (1 mL/kcal)   Justification: Maintenance    PHYSICAL FINDINGS  See malnutrition section below.    MALNUTRITION  % Intake: < 75% for > 7 days (non-severe)  % Weight Loss: None noted  Subcutaneous Fat Loss: Unable to assess- pt curled under blankets, writer did not disturb   Muscle Loss: Unable to assess- pt curled under blankets, writer did not disturb   Fluid Accumulation/Edema: None noted per flowsheet   Malnutrition Diagnosis: Unable to determine due to no nutrition focused physical assessment at this time. Pt at risk for malnutrition.     NUTRITION DIAGNOSIS  Inadequate oral intake related to decreased appetite 2/2 nausea as evidenced by pt report.        INTERVENTIONS  Implementation  Nutrition Education: Discussed current PO/appetite and role of RD. Encouraged small frequent meals and soft/bland foods given nausea. Discussed menu and supplement options.   Collaboration with other " providers: 5C rounds  Medical food supplement therapy: Provided nutrition supplement handout. Pt is planning on ordering and trying a magic cup later today. Pt would like to keep nutrition supplement orders PRN.      Goals  Patient to consume % of nutritionally adequate meal trays TID, or the equivalent with supplements/snacks.     Monitoring/Evaluation  Progress toward goals will be monitored and evaluated per protocol.    Dayanara Aquino RD, LD  5C/BMT RD pager [phone]

## 2020-10-07 NOTE — PROGRESS NOTES
SPIRITUAL HEALTH SERVICES  [facility] ([place]) 5C  REFERRAL SOURCE: Pt request    Provided pt with a blessing for her transplant as requested by Florencia. Florecnia's dtr-in-law was present.      PLAN: Will continue to follow and support as needed.     Rev. Chel Claudio MDiv, [facility]  Staff    Pager [phone]  * [facility] remains available 24/7 for emergent requests/referrals, either by having the switchboard page the on-call  or by entering an ASAP/STAT consult in Epic (this will also page the on-call ).*

## 2020-10-07 NOTE — PLAN OF CARE
"Assumed cares from 23:00 to 07:30    Blood pressure 127/63, pulse 72, temperature 98  F (36.7  C), temperature source Axillary, resp. rate 16, height 1.68 m (5' 6.14\"), weight 76 kg (167 lb 8 oz), SpO2 95 %.    A/O x 4. Pt denies any pain/N/V/D. Pt had uneventful night and offered no complaints. Low potassium level this morning, 4.0 and she said she would to take po with morning medications. Pt is Day 0 today and she will need pre transplant flush 2  hours before transplant at 10:00 AM.Continue to monitor pt, give support and follow plan of care.      Problem: Adult Inpatient Plan of Care  Goal: Plan of Care Review  Outcome: No Change     Problem: Adult Inpatient Plan of Care  Goal: Patient-Specific Goal (Individualization)  Outcome: No Change     Problem: Adult Inpatient Plan of Care  Goal: Absence of Hospital-Acquired Illness or Injury  Outcome: No Change  Intervention: Identify and Manage Fall Risk  Recent Flowsheet Documentation  Taken 10/7/2020 0100 by Priya Koch RN  Safety Promotion/Fall Prevention:    nonskid shoes/slippers when out of bed    lighting adjusted  Intervention: Prevent VTE (venous thromboembolism)  Recent Flowsheet Documentation  Taken 10/7/2020 0100 by Priya Koch RN  VTE Prevention/Management: ambulation promoted     Problem: Adult Inpatient Plan of Care  Goal: Absence of Hospital-Acquired Illness or Injury  Intervention: Prevent VTE (venous thromboembolism)  Recent Flowsheet Documentation  Taken 10/7/2020 0100 by Priya Koch RN  VTE Prevention/Management: ambulation promoted     Problem: Fatigue (Stem Cell/Bone Marrow Transplant)  Goal: Energy Level Supports Daily Activity  Outcome: No Change     Problem: Hematologic Alteration (Stem Cell/Bone Marrow Transplant)  Goal: Blood Counts Within Acceptable Range  Outcome: No Change     Problem: Nausea and Vomiting (Stem Cell/Bone Marrow Transplant)  Goal: Nausea and Vomiting Symptom Relief  Outcome: No Change     Problem: " Nutrition Intake Altered (Stem Cell/Bone Marrow Transplant)  Goal: Optimal Nutrition Intake  Outcome: No Change

## 2020-10-07 NOTE — PROCEDURES
BMT/Cellular Allogeneic Product Infusion       Patient Vitals for the past 24 hrs:   Temp Temp src Pulse Resp BP   10/06/20 1220 96.3  F (35.7  C) Axillary 79 16 125/70   10/06/20 1539 96.1  F (35.6  C) Axillary 95 16 126/81   10/06/20 1930 97.9  F (36.6  C) Axillary 87 16 126/67   10/07/20 0020 97.4  F (36.3  C) Axillary 80 16 122/83   10/07/20 0335 98  F (36.7  C) Axillary 72 16 127/63   10/07/20 0822 96.7  F (35.9  C) Axillary 88 16 127/72         BMT INFUSION DOCUMENTATION (last 48 hours)      BMT/Cellular Product Infusion     Row Name                  Product 10/07/20 0830 HPC, Apheresis    Product Details Product Release Date: 10/07/20  -NB Product Release Time: 0830 -JL Product Type: HPC, Apheresis  -NB DIN: [id number]  -NB Product Description Code: [id number]  -NB Volume Dispensed (mL): 136 mL  -NB    Checked by (Patient RN)  --       Checked by (Witness)  --       Product Volume Infused (mL)  --       Flush Volume (mL)  --       Volume Dispensed (mL)  --          Product 10/07/20 0830 HPC, Apheresis    Product Details Product Release Date: 10/07/20  -NB Product Release Time: 0830 -JL Product Type: HPC, Apheresis  -NB DIN: [id number]  -NB Product Description Code: [id number]  -NB Volume Dispensed (mL): 136 mL  -NB    Checked by (Patient RN)  --       Checked by (Witness)  --       Product Volume Infused (mL)  --       Flush Volume (mL)  --       Volume Dispensed (mL)  --          Product 10/07/20 0830 HPC, Apheresis    Product Details Product Release Date: 10/07/20  -NB Product Release Time: 0830 -JL Product Type: HPC, Apheresis  -NB DIN: [id number]  -NB Product Description Code: [id number]  -NB Volume Dispensed (mL): 136 mL  -NB    Checked by (Patient RN)  --       Checked by (Witness)  --       Product Volume Infused (mL)  --       Flush Volume (mL)  --       Volume Dispensed (mL)  --         User Key  (r) = Recorded By, (t) = Taken By, (c) = Cosigned By    Initials Name Effective Dates    JL  StacyAnnabelle APOLINAR 04/29/14 -     NB Miracle Rider APOLINAR 04/29/14 -         Allogeneic Donor Eligibility Determination and Summary of Records: Eligible        Type of Infusion: Allogeneic      Baseline Pre-Infusion Evaluation (to be completed by Provider):   Dyspnea: Grade 0 - none  Hypoxia: Grade 0 - not present  Fever: Grade 0 - afebrile  Chills: Grade 0 - none  Febrile Neutropenia: Grade 0 - not present  Sinus Bradycardia: Grade 0 - none  Hypertension: Grade 0 - none  Hypotension: Grade 0 - none  Chest Pain: Grade 0 - none  Bronchospasm: Grade 0 - none  Pain: Grade 0 - none  Rash: Grade 0 - None  Neurologic Specify: none    If adverse reactions, events or complications occur (fever greater than 2 degrees fahrenheit increase, and severe reactions of the following types: chills, dyspnea, bronchospasm, hyper/hypotension, hypoxia, bradycardia, chest pain, back/flank pain, hypoxia, and any other reaction deemed severe or life threatening; any instance of product bag breakage or unusual product appearance)    Any other events that are >= grade 3, then immediately contact the BMT Attending physician, the Cell Therapy Laboratory Medical Director (pager [phone]) and the Cell Therapy Laboratory ([phone]).  After midnight, holidays & weekends contact the [facility] on the appropriate campus ([facility] [place]: [phone]; [facility] [place]: [phone]).    Denzel Magallon PA-C

## 2020-10-07 NOTE — PROGRESS NOTES
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      Diagnosis AMLM1 Acute myelogeneous leukemia, M1, myeloblastic  HCT Type Allogeneic    Prep Regimen Fludarabine  Cytoxan  TBI   Donor Source Sibling    GVHD Prophylaxis Post transplant cytoxan  Tac/MMF  Primary BMT Provider ACE      INTERVAL  HISTORY     Florencia feels well this morning. Is slightly nervous about transplant. Had some nausea yesterday but managed with compazine as needed. Stools are loosening. Area around dressing still irritated but not worse.   No fevers, chills, or infectious symptoms.     Review of Systems: 8 point ROS negative except as noted above.    PHYSICAL EXAM     Weight In/Out     Wt Readings from Last 3 Encounters:   10/07/20 75.8 kg (167 lb 3.2 oz)   09/17/20 77.8 kg (171 lb 8 oz)   09/10/20 77.2 kg (170 lb 3.1 oz)      I/O last 3 completed shifts:  In: 580 [P.O.:580]  Out: 1000 [Urine:1000]       KPS:  100    /72 (BP Location: Right arm)   Pulse 88   Temp 96.7  F (35.9  C) (Axillary)   Resp 16   Ht 1.68 m (5' 6.14\")   Wt 75.8 kg (167 lb 3.2 oz)   SpO2 94%   BMI 26.87 kg/m     General: NAD   Eyes:  LINH, sclera anicteric   Lungs: CTA bilaterally  Cardiovascular: RRR, no M/R/G   Abdominal/Rectal: +BS, soft, NT, ND  Lymphatics: no edema  Skin: erythematous rash under/around dressing on right chest wall, no tenderness, c/w contact dermatitis.  Area at insertion site (site of bio-glue) also appears to be inflamed--non-tender, no drainage. No other rashes or petechaie  Neuro: A&O   Additional Findings: Padilla site NT, no drainage.    10/5/20 Current aGVHD staging:  Skin 0, UGI 0, LGI 0, Liver 0 (keep in note through day +180, delete if auto)    LABS AND IMAGING - PAST 24 HOURS     Lab Results   Component Value Date    WBC 0.5 (LL) 10/07/2020    ANEU 0.4 (LL) 10/07/2020    HGB 11.5 (L) 10/07/2020    HCT 34.7 (L) 10/07/2020 "     (L) 10/07/2020     (L) 10/07/2020    POTASSIUM 4.0 10/07/2020    CHLORIDE 98 10/07/2020    CO2 31 10/07/2020    GLC 89 10/07/2020    BUN 11 10/07/2020    CR 0.65 10/07/2020    MAG 2.1 10/07/2020    INR 0.95 10/05/2020     I have assessed all abnormal lab values for their clinical significance and any values considered clinically significant have been addressed in the assessment and plan  OVERALL PLAN   Florencia Gao is a 75 year old female with AML in CR1, admitted for JCARLOS Allo sib pbsct per protocol 2019-10, randomized to Post transplant cytoxan/Tac/MMF.  Day 0     1.  BMT:   Day -7 - line placement.   Day -6: cytoxan and fludarabine  Day -5 to day -2: Fludarabine  Day -1: TBI   Day 0: Transplant   Allopurinol through day 0.   Flush and pre-meds prior to transplant. Donor B pos and recipient B neg. Transplant at 10:30am today.   GCSF starts d+5 and cont to until ANC>1500 x3 consecutive days. Re-stage per protocol.     2.  HEME: Keep Hgb>8 and plts>10K.   - Of note has significant antibodies to RBCs- can cause delay in transfusion availability. Premeds tylenol and benadryl.                             3.  ID: Afebrile.   Cont Levo, Linda/vori, and HD ACV (CMV+, HSV+, EBV+) prophy.   Pjp prophy to start day+28 - IV pentamdine vs dapsone as sulfa allergy.                                         4.  GI: nausea.  Zofran  to chemo to prevent N/V. Changed zofran to kytril d/t mild headaches 10/3    - Ativan and compazine available PRN break-through symptoms.   - Change miralax to prn d/t stools loosening.   - Per protocol no dex/steroids**   Protonix for GI prophy.    Ursodiol for VOD prophy.     5.  GVHD Prophylaxis:   Day +3 and day+4 cytoxan  Day+5 start tac and MMF      6.  FEN/Renal: Monitor creat and lytes.   - HypoNa:  Resolved.  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 Consider changing flush to NS for post transplant cytoxan.   - Replete lytes PRN per SS. Monitor weight, I+O, lytes per  protocol with IVF flush.     7. Hypothyroidism  Continue synthroid 88mcg      8. Psych: Situational Depression continue effexor 75mg daily.     9.  Line: irritation/rash under and around dressing. Pt with allergy to chlorhexidine. Removed biopatch and trial of different dressing.   - Wound consulted 10/6. Awaiting recs  - pt would like to wait until wound sees her to consider topical steroid or benadryl cream.    Dispo: patient will remain admitted through neutropenia and subsequent engraftment.     Denzel Magallon PA-C  [phone]    Bone Marrow Transplant (5C) Attending Progress Note    I am assuming care for this patient after receiving sign-out from Dr. Alcala yesterday. He updated me on all issues and plan for smooth transition of care.      The patient was discussed on morning rounds with the nurses, and mid-level provider, and was seen and examined by me. All labs and imaging were reviewed. I reviewed events over the last 24 hours including vitals and flow sheets. I agree with the above note and have been responsible for the care plan and interpretation of progress. Overall, the patient is a 76 y/o with AML/MDS and today is her transplant day.    Procedure: I was present this morning for unit identification, education regarding the infusion including side effects. The allo sib graft was infused without complications at a dose of ~4.2 x 10**6 cells/kg.    There is no evidence of mucositis. There is no adenopathy on exam and lungs are clear. There is no LE edema and no rash.Some irritation over the catheter site. Labs show a creatinine of 0.65, hg 11.5,  and . Platelets are still adequate.    I review the risks and benefits of chemotherapy and the goals for transplant including toxicities. He is well informed, and consent was signed in clinic. We discussed expectations for the next several days.    Dr Chito Bautista will be assuming care tomorrow and I updated her regarding this patient and the plan  for a smooth transition of care.      Richy Quigley MD

## 2020-10-08 ENCOUNTER — ONCOLOGY VISIT (OUTPATIENT)
Dept: RADIATION ONCOLOGY | Facility: CLINIC | Age: 75
End: 2020-10-08

## 2020-10-08 LAB
ALBUMIN SERPL-MCNC: 3.2 G/DL (ref 3.4–5)
ALP SERPL-CCNC: 69 U/L (ref 40–150)
ALT SERPL W P-5'-P-CCNC: 22 U/L (ref 0–50)
ANION GAP SERPL CALCULATED.3IONS-SCNC: 3 MMOL/L (ref 3–14)
AST SERPL W P-5'-P-CCNC: 38 U/L (ref 0–45)
BACTERIA SPEC CULT: NORMAL
BASOPHILS # BLD AUTO: 0 10E9/L (ref 0–0.2)
BASOPHILS NFR BLD AUTO: 0 %
BILIRUB SERPL-MCNC: 0.2 MG/DL (ref 0.2–1.3)
BUN SERPL-MCNC: 8 MG/DL (ref 7–30)
CALCIUM SERPL-MCNC: 8.4 MG/DL (ref 8.5–10.1)
CHLORIDE SERPL-SCNC: 106 MMOL/L (ref 94–109)
CMV DNA SPEC NAA+PROBE-ACNC: NORMAL [IU]/ML
CMV DNA SPEC NAA+PROBE-LOG#: NORMAL {LOG_IU}/ML
CO2 SERPL-SCNC: 28 MMOL/L (ref 20–32)
CREAT SERPL-MCNC: 0.57 MG/DL (ref 0.52–1.04)
DIFFERENTIAL METHOD BLD: ABNORMAL
EOSINOPHIL # BLD AUTO: 0.1 10E9/L (ref 0–0.7)
EOSINOPHIL NFR BLD AUTO: 15.1 %
ERYTHROCYTE [DISTWIDTH] IN BLOOD BY AUTOMATED COUNT: 12.4 % (ref 10–15)
GFR SERPL CREATININE-BSD FRML MDRD: >90 ML/MIN/{1.73_M2}
GLUCOSE SERPL-MCNC: 86 MG/DL (ref 70–99)
HCT VFR BLD AUTO: 32.5 % (ref 35–47)
HGB BLD-MCNC: 10.9 G/DL (ref 11.7–15.7)
LYMPHOCYTES # BLD AUTO: 0.1 10E9/L (ref 0.8–5.3)
LYMPHOCYTES NFR BLD AUTO: 20.6 %
Lab: NORMAL
MAGNESIUM SERPL-MCNC: 2.2 MG/DL (ref 1.6–2.3)
MCH RBC QN AUTO: 30.8 PG (ref 26.5–33)
MCHC RBC AUTO-ENTMCNC: 33.5 G/DL (ref 31.5–36.5)
MCV RBC AUTO: 92 FL (ref 78–100)
MONOCYTES # BLD AUTO: 0 10E9/L (ref 0–1.3)
MONOCYTES NFR BLD AUTO: 3.2 %
NEUTROPHILS # BLD AUTO: 0.4 10E9/L (ref 1.6–8.3)
NEUTROPHILS NFR BLD AUTO: 61.1 %
PHOSPHATE SERPL-MCNC: 2.8 MG/DL (ref 2.5–4.5)
PLATELET # BLD AUTO: 83 10E9/L (ref 150–450)
POTASSIUM SERPL-SCNC: 4.2 MMOL/L (ref 3.4–5.3)
PROT SERPL-MCNC: 6.3 G/DL (ref 6.8–8.8)
RBC # BLD AUTO: 3.54 10E12/L (ref 3.8–5.2)
RBC MORPH BLD: NORMAL
SODIUM SERPL-SCNC: 138 MMOL/L (ref 133–144)
SPECIMEN SOURCE: NORMAL
SPECIMEN SOURCE: NORMAL
WBC # BLD AUTO: 0.6 10E9/L (ref 4–11)

## 2020-10-08 PROCEDURE — 206N000001 HC R&B BMT UMMC

## 2020-10-08 PROCEDURE — 250N000013 HC RX MED GY IP 250 OP 250 PS 637: Performed by: PHYSICIAN ASSISTANT

## 2020-10-08 PROCEDURE — 99233 SBSQ HOSP IP/OBS HIGH 50: CPT | Performed by: INTERNAL MEDICINE

## 2020-10-08 PROCEDURE — 84100 ASSAY OF PHOSPHORUS: CPT | Performed by: PHYSICIAN ASSISTANT

## 2020-10-08 PROCEDURE — 80053 COMPREHEN METABOLIC PANEL: CPT | Performed by: PHYSICIAN ASSISTANT

## 2020-10-08 PROCEDURE — 250N000011 HC RX IP 250 OP 636: Performed by: RADIOLOGY

## 2020-10-08 PROCEDURE — 99254 IP/OBS CNSLTJ NEW/EST MOD 60: CPT | Performed by: PEDIATRICS

## 2020-10-08 PROCEDURE — 85025 COMPLETE CBC W/AUTO DIFF WBC: CPT | Performed by: PHYSICIAN ASSISTANT

## 2020-10-08 PROCEDURE — 258N000003 HC RX IP 258 OP 636: Performed by: INTERNAL MEDICINE

## 2020-10-08 PROCEDURE — 83735 ASSAY OF MAGNESIUM: CPT | Performed by: PHYSICIAN ASSISTANT

## 2020-10-08 PROCEDURE — 250N000011 HC RX IP 250 OP 636: Performed by: INTERNAL MEDICINE

## 2020-10-08 RX ORDER — GRANISETRON HYDROCHLORIDE 1 MG/1
1 TABLET, FILM COATED ORAL EVERY 12 HOURS SCHEDULED
Status: DISCONTINUED | OUTPATIENT
Start: 2020-10-08 | End: 2020-10-12

## 2020-10-08 RX ORDER — PROCHLORPERAZINE MALEATE 5 MG
5 TABLET ORAL 3 TIMES DAILY
Status: DISCONTINUED | OUTPATIENT
Start: 2020-10-09 | End: 2020-10-12

## 2020-10-08 RX ADMIN — GRANISETRON HYDROCHLORIDE 1 MG: 1 TABLET ORAL at 19:41

## 2020-10-08 RX ADMIN — URSODIOL 300 MG: 300 CAPSULE ORAL at 13:36

## 2020-10-08 RX ADMIN — Medication 5 ML: at 22:54

## 2020-10-08 RX ADMIN — Medication 5 ML: at 09:15

## 2020-10-08 RX ADMIN — LEVOTHYROXINE SODIUM 88 MCG: 0.09 TABLET ORAL at 09:15

## 2020-10-08 RX ADMIN — URSODIOL 300 MG: 300 CAPSULE ORAL at 19:41

## 2020-10-08 RX ADMIN — PANTOPRAZOLE SODIUM 40 MG: 40 TABLET, DELAYED RELEASE ORAL at 09:15

## 2020-10-08 RX ADMIN — ACYCLOVIR 800 MG: 800 TABLET ORAL at 22:54

## 2020-10-08 RX ADMIN — ACYCLOVIR 800 MG: 800 TABLET ORAL at 13:36

## 2020-10-08 RX ADMIN — ACYCLOVIR 800 MG: 800 TABLET ORAL at 09:15

## 2020-10-08 RX ADMIN — Medication 5 ML: at 03:38

## 2020-10-08 RX ADMIN — VENLAFAXINE 75 MG: 75 TABLET ORAL at 13:36

## 2020-10-08 RX ADMIN — LEVOFLOXACIN 250 MG: 250 TABLET, FILM COATED ORAL at 09:15

## 2020-10-08 RX ADMIN — MICAFUNGIN SODIUM 100 MG: 50 INJECTION, POWDER, LYOPHILIZED, FOR SOLUTION INTRAVENOUS at 19:41

## 2020-10-08 RX ADMIN — ACYCLOVIR 800 MG: 800 TABLET ORAL at 18:23

## 2020-10-08 RX ADMIN — URSODIOL 300 MG: 300 CAPSULE ORAL at 09:15

## 2020-10-08 RX ADMIN — PROCHLORPERAZINE MALEATE 5 MG: 5 TABLET ORAL at 09:15

## 2020-10-08 RX ADMIN — ACYCLOVIR 800 MG: 800 TABLET ORAL at 11:31

## 2020-10-08 RX ADMIN — GRANISETRON HYDROCHLORIDE 1 MG: 1 TABLET ORAL at 11:31

## 2020-10-08 ASSESSMENT — ACTIVITIES OF DAILY LIVING (ADL)
ADLS_ACUITY_SCORE: 10

## 2020-10-08 ASSESSMENT — MIFFLIN-ST. JEOR: SCORE: 1283.32

## 2020-10-08 NOTE — CONSULTS
[facility]  Palliative Care Consultation Note    Patient: Florencia Gao  Date of Admission:  9/30/2020    Requesting Clinician / Team: BMT  Reason for consult: Symptom management  Patient and family support    Recommendations:    Patient reports spiritual support to be very key for her, and very appreciative of chaplaincy support she is already received; would be very helpful to continue      Patient's symptoms including anxiety, nausea, and constipation appear well managed currently both in the medical care she is receiving from her providers and from support she is receiving including from social work.  No new current recommendations      Pt's health care directive has not yet been uploaded; if does not upload in coming days may need to repeat process      Thank you for the opportunity to participate in the care of this patient and family. Our team: will continue to follow. the patient at least weekly both inpatient and outpatient, but are very happy to see her as frequently as would be useful for symptom management or any other reason.  Please do not hesitate to reach out to our team pager below.  We note that she already has excellent  and social work support; and so will not plan to have these team members participate in care unless this would be helpful.         Yong Villela MD  Palliative Care  pager [phone]      During regular M-F work hours -- if you are not sure who specifically to contact -- please contact us by sending a text page to our team consult pager at [phone].    After regular work hours and on weekends/holidays, you can call our answering service at [phone]. Also, who's on call for us is available in [facility] [employer].       I paged HAI Ya to discuss late in day, and will plan to re-page in AM    Assessments:  Florencia Gao is a 75 year old female with a history of general anxiety disorder, chronic  constipation, was recently diagnosed with AML.  She received and R ICE alow sib PB SCT, day +1.  She has has had some nausea, which seems to be responding well to prescribed meds and is manageable.  She reports her anxiety is well controlled.  She is appreciative of support from the unit  and reports this to be quite helpful.   She voices an appreciation for all of the great support she has already received    Today, the patient was seen for:  AML  Emotional support  Comprehensive palliative care initial evaluation  General anxiety disorder  Constipation    Prognosis, Goals, & Planning:      Functional Status just prior to hospitalization: 0 (Fully active, able to carry on all activities without restriction)      Prognosis, Goals, and/or Advance Care Planning were addressed today: Yes.   Reports that has ACP documents on file (validation is uploaded, ACP documents not yet uploaded.  Notes that her daughter in law is her HCA, as she is a PICU nurse.          Summary/Comments:       Patient's decision making preferences: shared with support from loved ones          Patient has decision-making capacity today for complex decisions: Yes            I have concerns about the patient/family's health literacy today: No           Patient has a completed Health Care Directive: Reportedly yes, but not available to us currently.      Code status: Full Code    Coping, Meaning, & Spirituality:   Mood, coping, and/or meaning in the context of serious illness were addressed today: Yes  Summary/Comments:     Social:     Living situation: Lives in a duplex in [facility] where she has resided for 35 years.  Lives in the upper unit her landlord lives below her.  Miner in [place] for 4 months here.  Her friend Chel Milligan also has a unit down there.    Key family / caregivers: She has 2 to surviving sons.  One lives in [place] and another lives elsewhere in [place].  Daughter in law is a nurse in PICU; and a good resource.   Close friend Chel also a goode resource.      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  taught for 20 years.   Masters degree    Substance use history: smoked x 2 years in 20s, none since.  No illiciits.   Rare EtOH use    Financial concerns: denies    Current in-home services: none    Spiritual history:Has become much more spiritual and Confucianist the last several years thanks to her Friend Chel, who is a .  Attends a [employer] while in [place], and a [employer] while in [place].  Finds this to a key, and increasingly important, source.    Enjoys reaching, crocheting.   No difficulty with her ADLs        History of Present Illness:  History gathered today from: patient, EMR    Florencia Gao is a 75-year-old woman with a history of anxiety and longstanding issues of constipation who is admitted here for a bone marrow transplant.   She felt very well until late spring 2020 when she noticed increasing fatigue and some easy bruising.  At first she felt this was simply due to lifestyle changes related to the COVID-19 pandemic but pursued work-up at the urging of her friend and was recognized to have pancytopenia and AML in June 2020 with 45% bone marrow blasts.  She was treated with idarubicin plus cytarabine and achieved a complete remission.      She presented 7 days ago to start her transplant admission and has been treated with Cytoxan and fludarabine and had a line placed.  Yesterday she received her BMT.  Generally thinks she thinks things have been going well and have been been more tolerable than she had feared.  Her main symptom has been nausea.  She was initially started on Zofran but did have some mild headaches and so was switched to Kytril scheduled.  She seemed to have some significant nausea and vomiting with Cytoxan.  She felt that Compazine is been very helpful for this.  She did have some nausea prior to receiving chemotherapy and reports that Ativan seem to be  quite helpful for this.  Overall she feels her nausea is very well controlled and does not feel change is needed.    Reports that she is been diagnosed with anxiety for some time and previously said that depression that she does not feel that she is depressed.  She notes that she has many activities that she enjoys.  She notes that her diagnosis of nausea actually started when she started having musculoskeletal pains in her left shoulder and with some probing a physician felt that anxiety was likely main contributor.  When she was started on Effexor she noted that the symptoms seem to resolve.  She generally feels her anxiety has been well controlled even through this difficult process.  She has not take any PRN medications for anxiety.    Reports that otherwise her body feels really good.  Sleep has been good.  Does still have some fatigue but finds it to be tolerable and does not majorly interfere with ADLs.  She has been working on the treadmill that is in the room.  Bowels have been moving well and if not been problematic.  No pain.  Sleep has been good.      Key Palliative Symptom Data:  # Pain severity the last 12 hours: none  # Dyspnea severity the last 12 hours: none  # Nausea severity the last 12 hours: low  # Anxiety severity the last 12 hours: low    Patient is on opioids: assessed and bowels ok/no needed changes to plan of care today.    ROS:  Comprehensive ROS is reviewed and is negative except as here & per HPI:      Past Medical History:  Past Medical History:   Diagnosis Date     AML (acute myeloblastic leukemia) (H)      Anxiety      Hypothyroidism 2000     Osteoporosis    History of being diagnosed with depression, feels well managed on Effexor.  Aches and pains have been a key indicator of her mental health not being well in past; but has felt well for some time  History of constipation; on medications chronically; more recently has had loose stools, and so now this is a prn.      Past Surgical  History:  Past Surgical History:   Procedure Laterality Date     GYN SURGERY           IR CVC TUNNEL PLACEMENT > 5 YRS OF AGE  2020     ORTHOPEDIC SURGERY      back     ORTHOPEDIC SURGERY      ankle     ORTHOPEDIC SURGERY      hips     ORTHOPEDIC SURGERY      wrists         Family History:  Family History   Problem Relation Age of Onset     Cerebrovascular Disease Mother      Prostate Cancer Father         Allergies:  Allergies   Allergen Reactions     Blood Transfusion Related (Informational Only)      Patient has a history of a clinically significant antibody against RBC antigens.  A delay in compatible RBCs may occur.     Sulfa Drugs Rash     Acetaminophen-Codeine Dizziness     could not sit up after surgery until off all narcotics, even Tylenol #3     Chlorhexidine Rash        Medications:  I have reviewed this patient's medication profile and medications from this hospitalization.   Noted:  Kytril 1 mg every 12 hours  Compazine 5 mg orally 3 times daily  Effexor 75 mg daily  Protonix 40mg PO daily    Tylenol as needed -- given x 1 yesterday  Lorazepam 0.5 to 1 mg every 4 hours anxiety, vomiting, sleep (noted to be first-line for nausea vomiting).  Used for sleep when Ambien has not been ordered-- given x 1 yesterday  Meperidine 25 to 50 mg once PRN chills, rigors  MiraLAX 17 g daily PRN -- given on 10/5 and 10/6  Compazine 5 to 10 mg every 6 hours as needed orally or IV-- given x 1 today, x 2 yesterday, x 3 two days ago    Physical Exam:  Vital Signs: Temp: 98.2  F (36.8  C) Temp src: Axillary BP: 125/68 Pulse: 85   Resp: 16 SpO2: 98 % O2 Device: None (Room air)    Weight: 167 lbs 6.4 oz  Exam limited per patient request.  She noted that she is both neutropenic and in the context of COVID she prefers as few examinations as possible.  We agree that interview should be from more than 6 feet away, and masked    General: Reading the newspaper entering the sun when I enter the room.  Throughout  discussion she is sitting on the edge of her bed, perhaps mildly anxious   H EENT: No scleral icterus.  No obvious oral lesions; patient places on a mask when I enter the room.  Cardiovascular: Patient appears pink, well-perfused  Pulmonary: Normal work of breathing, normal rate of breathing  Abdomen: No obvious distention  Neuro: Alert, oriented, fully participates in discussion.  No obvious focal lesions  Skin: No visible rashes or lesions  MSK: No visible abnormalities    Data reviewed:  Recent imaging reviewed, my comments on pertinents:   No recent pertinent imaging    Recent lab data reviewed, my comments on pertinents:   WBC 0.6, 0.4 absolute neutrophils  CMP notable for total protein 6.3, albumin 3.2.  Total calcium low but corrects to normal ionized calcium

## 2020-10-08 NOTE — PLAN OF CARE
"/68 (BP Location: Right arm)   Pulse 85   Temp 98.2  F (36.8  C) (Axillary)   Resp 16   Ht 1.68 m (5' 6.14\")   Wt 75.9 kg (167 lb 6.4 oz)   SpO2 98%   BMI 26.90 kg/m      0700 - 1930: Afebrile, VSS. No c/o pain, vomiting or diarrhea. Nausea managed well with scheduled kytril and compazine X1. Ambulating in room, showered and linens changed. PO intake good. Currently Hep locked and caps changed. Continue to monitor, following plan of care.     Problem: Adult Inpatient Plan of Care  Goal: Plan of Care Review  Outcome: No Change     Problem: Fatigue (Stem Cell/Bone Marrow Transplant)  Goal: Energy Level Supports Daily Activity  Outcome: No Change     Problem: Nausea and Vomiting (Stem Cell/Bone Marrow Transplant)  Goal: Nausea and Vomiting Symptom Relief  Outcome: No Change     "

## 2020-10-08 NOTE — PLAN OF CARE
"/51 (BP Location: Left arm)   Pulse 83   Temp 97.6  F (36.4  C) (Axillary)   Resp 16   Ht 1.68 m (5' 6.14\")   Wt 75.8 kg (167 lb 3.2 oz)   SpO2 96%   BMI 26.87 kg/m       VSS on RA sating>95%. Good UO, BMx0, denies n/v and pain. Up independently, post transplant flush stopped at 2200. No replacements this morning, will start asa counts today, continue with POC.      Problem: Adult Inpatient Plan of Care  Goal: Plan of Care Review  Outcome: No Change     Problem: Adult Inpatient Plan of Care  Goal: Patient-Specific Goal (Individualization)  Outcome: No Change     Problem: Adult Inpatient Plan of Care  Goal: Absence of Hospital-Acquired Illness or Injury  Outcome: No Change     Problem: Adult Inpatient Plan of Care  Goal: Absence of Hospital-Acquired Illness or Injury  Intervention: Identify and Manage Fall Risk  Recent Flowsheet Documentation  Taken 10/7/2020 2036 by Melanie Centeno RN  Safety Promotion/Fall Prevention:   clutter free environment maintained   fall prevention program maintained   nonskid shoes/slippers when out of bed   safety round/check completed     Problem: Adult Inpatient Plan of Care  Goal: Optimal Comfort and Wellbeing  Outcome: No Change     Problem: Adult Inpatient Plan of Care  Goal: Readiness for Transition of Care  Outcome: No Change     Problem: Adult Inpatient Plan of Care  Goal: Rounds/Family Conference  Outcome: No Change     Problem: Adjustment to Transplant (Stem Cell/Bone Marrow Transplant)  Goal: Optimal Coping with Transplant  Outcome: No Change     Problem: Bladder Irritation (Stem Cell/Bone Marrow Transplant)  Goal: Symptom-Free Urinary Elimination  Outcome: No Change     Problem: Diarrhea (Stem Cell/Bone Marrow Transplant)  Goal: Diarrhea Symptom Control  Outcome: No Change     Problem: Fatigue (Stem Cell/Bone Marrow Transplant)  Goal: Energy Level Supports Daily Activity  Outcome: No Change     Problem: Hematologic Alteration (Stem Cell/Bone Marrow " Transplant)  Goal: Blood Counts Within Acceptable Range  Outcome: No Change     Problem: Hypersensitivity Reaction (Stem Cell/Bone Marrow Transplant)  Goal: Absence of Hypersensitivity Reaction  Outcome: No Change     Problem: Infection Risk (Stem Cell/Bone Marrow Transplant)  Goal: Absence of Infection Signs/Symptoms  Outcome: No Change     Problem: Mucositis (Stem Cell/Bone Marrow Transplant)  Goal: Mucous Membrane Health and Integrity  Outcome: No Change     Problem: Nausea and Vomiting (Stem Cell/Bone Marrow Transplant)  Goal: Nausea and Vomiting Symptom Relief  Outcome: No Change     Problem: Nutrition Intake Altered (Stem Cell/Bone Marrow Transplant)  Goal: Optimal Nutrition Intake  Outcome: No Change

## 2020-10-08 NOTE — PROCEDURES
BMT Post Infusion Documentation    Data   Patient Vitals for the past 72 hrs:   Temp Temp src Pulse Resp BP   10/05/20 1200 96.7  F (35.9  C) Axillary 74 16 134/70   10/05/20 1547 97.5  F (36.4  C) Axillary 85 16 133/82   10/05/20 1642 -- -- 76 -- 137/72   10/05/20 1935 98.1  F (36.7  C) Axillary 76 16 135/74   10/06/20 0042 97.6  F (36.4  C) Axillary 80 16 118/73   10/06/20 0347 96.7  F (35.9  C) Axillary 70 16 118/66   10/06/20 0725 97.4  F (36.3  C) Axillary 75 16 118/70   10/06/20 1220 96.3  F (35.7  C) Axillary 79 16 125/70   10/06/20 1539 96.1  F (35.6  C) Axillary 95 16 126/81   10/06/20 1930 97.9  F (36.6  C) Axillary 87 16 126/67   10/07/20 0020 97.4  F (36.3  C) Axillary 80 16 122/83   10/07/20 0335 98  F (36.7  C) Axillary 72 16 127/63   10/07/20 0822 96.7  F (35.9  C) Axillary 88 16 127/72   10/07/20 1110 97.9  F (36.6  C) Oral 89 16 122/65   10/07/20 1139 97  F (36.1  C) Axillary 78 16 132/70   10/07/20 1148 96.4  F (35.8  C) Axillary 73 16 133/66   10/07/20 1200 96  F (35.6  C) Axillary 80 16 133/67   10/07/20 1309 97.6  F (36.4  C) Axillary 71 16 121/63   10/07/20 1533 97.5  F (36.4  C) Axillary 72 16 127/58   10/07/20 1910 98.2  F (36.8  C) Axillary 86 16 116/59   10/07/20 2215 96.8  F (36  C) Axillary 73 16 127/67   10/08/20 0336 97.6  F (36.4  C) Axillary 83 16 119/51   10/08/20 0749 98  F (36.7  C) Axillary 95 16 130/72        BMT INFUSION DOCUMENTATION (last 24 hours)      BMT/Cellular Product Infusion     Row Name                  Cell Therapy Documentation    Product Release Date  --       Recipient Study ID  --       Donor  --       Donor MRN/ID  --       Donor ABO/Rh  --       Allogeneic Donor Eligibility Determination and Summary of Records  --       Type of Infusion  --       Total Volume Dispensed (mL)  --       Total NC Dose  --       Total CD34 Dose  --       Total CD3 dose  --       Total NC Dose Left in Storage  --       Comments for Product Issues  --       Donor ABO/Rh  --        Product Types  --       Product Numbers  --       Product Types and Numbers  --       Volume  --       ABO Mismatch  --       ZZTotal NC Dose  --       ZZTotal CD34 Dose  --       ZZTotal NC Dose Left in Storage  --          [REMOVED] Product 10/07/20 0830 HPC, Apheresis    Product Details Product Release Date: 10/07/20  -NB Product Release Time: 0830 -JL Product Type: HPC, Apheresis  -NB DIN: [id number]  -NB Product Description Code: [id number]  -NB Volume Dispensed (mL): 136 mL  -NB Completion Date (RN to complete): 10/07/20  -AD Completion Time (RN to complete): 1135  -AD, start 1118     Checked by (Patient RN)  --       Checked by (Witness)  --       Product Volume Infused (mL)  --       Flush Volume (mL)  --       Volume Dispensed (mL)  --          [REMOVED] Product 10/07/20 0830 HPC, Apheresis    Product Details Product Release Date: 10/07/20  -NB Product Release Time: 0830 -JL Product Type: HPC, Apheresis  -NB DIN: [id number]  -NB Product Description Code: [id number]  -NB Volume Dispensed (mL): 136 mL  -NB Completion Date (RN to complete): 10/07/20  -AD Completion Time (RN to complete): 1147  -AD, start 1139     Checked by (Patient RN)  --       Checked by (Witness)  --       Product Volume Infused (mL)  --       Flush Volume (mL)  --       Volume Dispensed (mL)  --          [REMOVED] Product 10/07/20 0830 HPC, Apheresis    Product Details Product Release Date: 10/07/20  -NB Product Release Time: 0830 -JL Product Type: HPC, Apheresis  -NB DIN: [id number]  -NB Product Description Code: [id number]  -NB Volume Dispensed (mL): 136 mL  -NB Completion Date (RN to complete): 10/07/20  -AD Completion Time (RN to complete): 1203  -AD, start 1153     Checked by (Patient RN)  --       Checked by (Witness)  --       Product Volume Infused (mL)  --       Flush Volume (mL)  --       Volume Dispensed (mL)  --          RN Documentation    Patient was premedicated as ordered  --       Line Type  --       Patient  Stable Prior to Infusion  --       Time Infusion Started  --       Checked by (Patient RN)  --       Checked by (RN 2)  --       Broken Bag?  --       Immediate suspected transfusion reaction to the product  --       Time Infusion Stopped  --       Total Flush Volume (mL)  --       Checked by (Witness)  --       Date Infusion Started  --       Date Infusion Stopped  --       Volume Infused (mL)  --       Total Volume Infused (cc)  --          Patient tolerance of product infusion    Immediate suspected transfusion reaction to the product  --       Did patient have prior history of similar signs/symptoms during this hospitalization?  --       Symptoms during/after infusion  --       Did the patient tolerate the infusion well  --       Medications and treatment for symptoms  --       Did the symptoms resolve?  --       Enter comments if clots, leaks, broken bag, infusion delays, other issues with bag/infusion  --       Describe symptoms  --         User Key  (r) = Recorded By, (t) = Taken By, (c) = Cosigned By    Initials Name Effective Dates    Maia Zaragoza RN 09/16/20 -     Annabelle Randhawa 04/29/14 -     Miracle Sullivan 04/29/14 -             Post-Infusion Evaluation:   Infusion Related Reaction: Grade 0 - none  Dyspnea: Grade 0 - none  Hypoxia: Grade 0 - not present  Fever: Grade 0 - afebrile  Chills: Grade 0 - none  Febrile Neutropenia: Grade 0 - not present  Sinus Bradycardia: Grade 0 - none  Hypertension: Grade 1 - prehypertension (systolic -139 mm Hg or diastolic BP 80-89 mm Hg)  Hypotension: Grade 0 - none  Chest Pain: Grade 0 - none  Bronchospasm: Grade 0 - none  Pain: Grade 0 - none  Rash: Grade 0 - None  Neurologic Specify: none    If this was a cord blood transplant, was more than one cord blood unit infused? N/A    Denzel Magallon PA-C

## 2020-10-08 NOTE — PROGRESS NOTES
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      Diagnosis AMLM1 Acute myelogeneous leukemia, M1, myeloblastic  HCT Type Allogeneic    Prep Regimen Fludarabine  Cytoxan  TBI   Donor Source Sibling    GVHD Prophylaxis Post transplant cytoxan  Tac/MMF  Primary BMT Provider ACE      INTERVAL  HISTORY     Tolerated transplant well with minimal complications. Got some ativan during the transplant which helped for nausea. Also received some compazine yesterday for nausea. No vomiting. Area under dressing is improving but still red and irritative. No fevers, chills, or infectious symptoms.      Review of Systems: 8 point ROS negative except as noted above.    PHYSICAL EXAM     Weight In/Out     Wt Readings from Last 3 Encounters:   10/08/20 75.9 kg (167 lb 6.4 oz)   09/17/20 77.8 kg (171 lb 8 oz)   09/10/20 77.2 kg (170 lb 3.1 oz)      I/O last 3 completed shifts:  In: 2835.5 [P.O.:390; I.V.:2037.5; Blood:408]  Out: 4250 [Urine:4250]       KPS:  100    /72 (BP Location: Right arm)   Pulse 95   Temp 98  F (36.7  C) (Axillary)   Resp 16   Ht 1.68 m (5' 6.14\")   Wt 75.9 kg (167 lb 6.4 oz)   SpO2 95%   BMI 26.90 kg/m     General: NAD   Eyes:  LINH, sclera anicteric   Lungs: CTA bilaterally  Cardiovascular: RRR, no M/R/G   Abdominal/Rectal: +BS, soft, NT, ND  Lymphatics: no edema  Skin: erythematous rash under/around dressing on right chest wall, no tenderness, c/w contact dermatitis.  Area at insertion site (site of bio-glue) also appears to be inflamed--non-tender, no drainage. No other rashes or petechaie  Neuro: A&O   Additional Findings: Padilla site NT, no drainage.    10/5/20 Current aGVHD staging:  Skin 0, UGI 0, LGI 0, Liver 0 (keep in note through day +180, delete if auto)    LABS AND IMAGING - PAST 24 HOURS     Lab Results   Component Value Date    WBC 0.6 (LL) 10/08/2020    ANEU 0.4 (LL) " 10/08/2020    HGB 10.9 (L) 10/08/2020    HCT 32.5 (L) 10/08/2020    PLT 83 (L) 10/08/2020     10/08/2020    POTASSIUM 4.2 10/08/2020    CHLORIDE 106 10/08/2020    CO2 28 10/08/2020    GLC 86 10/08/2020    BUN 8 10/08/2020    CR 0.57 10/08/2020    MAG 2.2 10/08/2020    INR 0.95 10/05/2020     I have assessed all abnormal lab values for their clinical significance and any values considered clinically significant have been addressed in the assessment and plan  OVERALL PLAN   Florencia Gao is a 75 year old female with AML in CR1, admitted for JCARLOS Allo sib pbsct per protocol 2019-10, randomized to Post transplant cytoxan/Tac/MMF.  Day +1     1.  BMT: Day +1  Day -7 - line placement.   Day -6: cytoxan and fludarabine  Day -5 to day -2: Fludarabine  Day -1: TBI   Day 0: Transplant   Allopurinol through day 0.   Flush and pre-meds prior to transplant. Donor B pos and recipient B neg.   GCSF starts d+5 and cont to until ANC>1500 x3 consecutive days. Re-stage per protocol.     2.  HEME: Keep Hgb>8 and plts>10K.   - Of note has significant antibodies to RBCs- can cause delay in transfusion availability. Premeds tylenol and benadryl.                             3.  ID: Afebrile.   Cont Levo, Linda/vori, and HD ACV (CMV+, HSV+, EBV+) prophy.   Pjp prophy to start day+28 - IV pentamdine vs dapsone as sulfa allergy.                                         4.  GI: nausea.  Zofran to chemo to prevent N/V. Changed zofran to kytril d/t mild headaches 10/3. Continue scheduled kytril and add compazine tid tomorrow as pt had significant nausea/vomiting with cytoxan.    - Ativan and compazine available PRN break-through symptoms.   - Change miralax to prn d/t stools loosening.   - Per protocol no dex/steroids**   Protonix for GI prophy.    Ursodiol for VOD prophy.     5.  GVHD Prophylaxis:   Day +3 and day+4 cytoxan  Day+5 start tac and MMF      6.  FEN/Renal: Monitor creat and lytes.   - HypoNa:  Resolved.  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 Consider changing flush to NS for post transplant cytoxan 10/10-10/11.   - Replete lytes PRN per SS. Monitor weight, I+O, lytes per protocol with IVF flush.  - Calorie counts starting 10/8     7. Hypothyroidism  Continue synthroid 88mcg      8. Psych: Situational Depression continue effexor 75mg daily.     9.  Line: irritation/rash under and around dressing. Pt with allergy to chlorhexidine. Removed biopatch and trial of different dressing.   - Wound consulted 10/6. Awaiting recs  - pt would like to wait until wound sees her to consider topical steroid or benadryl cream.    10. Research: Pt consented to MT2019-31 (Seamless randomized tiral to alleviate morbidity and mortality) for which she was randomized to the palliative care management arm. Palliative care consult placed 10/8 per research recs.     Dispo: patient will remain admitted through neutropenia and subsequent engraftment.     Denzel Magallon PA-C  [phone]    BMT Staff:  The patient was discussed on morning rounds with the nurses, mid level provider, and house staff and seen and examined by me. All labs and imaging were reviewed. I reviewed events over the last 24 hours including vitals and flow sheets. I agree with the above note and have been responsible for the care plan and interpretation of progress.     Subjective:  Reports feeling overall well, mild nausea that is well controlled on Compazine and Kytril, overall decreased p.o. intake and reports starting to have low stools, no abd pain, no F/C/NS, no  complaints, no URI or other respiratory symptoms, no HA/LH/Dizziness.      Vitals reviewed, labs reviewed  PE:  NAD, Alert, oriented x3  HEENT: moist mmm, no oral ulcers  Heart: RRR  Lungs: CTAB  Abdomen: +BS, soft non tender, non distended  LE: no edema  Skin: no rashes, erythema bruising and skin irritation at the right upper chest wall at the site of Padilla insertion and old dressing.     Assessment and Plan:    75-year-old  female with AML in CR 1 admitted for break allogeneic matched sibling donor peripheral blood allogeneic metabolic cell transplantation on CTN 1703 randomized to PtCy/Tac/MMF, prep with fludarabine Cytoxan and TBI.  1. Heme/BMT: Day +1, transfusion as needed  2. ID: Afebrile  3. CV: no active issues  4. GI/ FEN: monitor regimen related toxicity, adjusting antiemetics, encourage fluid PO intake, I/O and daily weights  5. Renal: euvolemic, hyponatremia resolved  6. PPx: Levo, donato/vori, ACV  7.  Hypothyroidism on replacement, situational depression on Effexor, PT: encourage ambulation      Chito Bautista MD

## 2020-10-08 NOTE — PLAN OF CARE
PT 5C: Cancel, pt declines session this afternoon despite encouragement due to fatigue, PT will reschedule.

## 2020-10-09 ENCOUNTER — TELEPHONE (OUTPATIENT)
Dept: TRANSPLANT | Facility: CLINIC | Age: 75
End: 2020-10-09

## 2020-10-09 LAB
ABO + RH BLD: ABNORMAL
ABO + RH BLD: ABNORMAL
ANION GAP SERPL CALCULATED.3IONS-SCNC: 3 MMOL/L (ref 3–14)
BLD GP AB SCN SERPL QL: ABNORMAL
BLOOD BANK CMNT PATIENT-IMP: ABNORMAL
BLOOD BANK CMNT PATIENT-IMP: ABNORMAL
BUN SERPL-MCNC: 5 MG/DL (ref 7–30)
CALCIUM SERPL-MCNC: 8.8 MG/DL (ref 8.5–10.1)
CHLORIDE SERPL-SCNC: 103 MMOL/L (ref 94–109)
CO2 SERPL-SCNC: 29 MMOL/L (ref 20–32)
CREAT SERPL-MCNC: 0.54 MG/DL (ref 0.52–1.04)
DIFFERENTIAL METHOD BLD: ABNORMAL
ERYTHROCYTE [DISTWIDTH] IN BLOOD BY AUTOMATED COUNT: 12.2 % (ref 10–15)
GFR SERPL CREATININE-BSD FRML MDRD: >90 ML/MIN/{1.73_M2}
GLUCOSE SERPL-MCNC: 92 MG/DL (ref 70–99)
HCT VFR BLD AUTO: 32.1 % (ref 35–47)
HGB BLD-MCNC: 10.7 G/DL (ref 11.7–15.7)
MAGNESIUM SERPL-MCNC: 2 MG/DL (ref 1.6–2.3)
MCH RBC QN AUTO: 30.3 PG (ref 26.5–33)
MCHC RBC AUTO-ENTMCNC: 33.3 G/DL (ref 31.5–36.5)
MCV RBC AUTO: 91 FL (ref 78–100)
PLATELET # BLD AUTO: 60 10E9/L (ref 150–450)
POTASSIUM SERPL-SCNC: 3.9 MMOL/L (ref 3.4–5.3)
RBC # BLD AUTO: 3.53 10E12/L (ref 3.8–5.2)
SODIUM SERPL-SCNC: 135 MMOL/L (ref 133–144)
SPECIMEN EXP DATE BLD: ABNORMAL
WBC # BLD AUTO: 0.3 10E9/L (ref 4–11)

## 2020-10-09 PROCEDURE — 250N000013 HC RX MED GY IP 250 OP 250 PS 637: Performed by: PHYSICIAN ASSISTANT

## 2020-10-09 PROCEDURE — 250N000011 HC RX IP 250 OP 636: Performed by: PHYSICIAN ASSISTANT

## 2020-10-09 PROCEDURE — 85027 COMPLETE CBC AUTOMATED: CPT

## 2020-10-09 PROCEDURE — 250N000011 HC RX IP 250 OP 636: Performed by: RADIOLOGY

## 2020-10-09 PROCEDURE — 258N000003 HC RX IP 258 OP 636: Performed by: INTERNAL MEDICINE

## 2020-10-09 PROCEDURE — 86900 BLOOD TYPING SEROLOGIC ABO: CPT | Performed by: PHYSICIAN ASSISTANT

## 2020-10-09 PROCEDURE — 258N000003 HC RX IP 258 OP 636: Performed by: PHYSICIAN ASSISTANT

## 2020-10-09 PROCEDURE — 206N000001 HC R&B BMT UMMC

## 2020-10-09 PROCEDURE — 99233 SBSQ HOSP IP/OBS HIGH 50: CPT | Performed by: INTERNAL MEDICINE

## 2020-10-09 PROCEDURE — 86901 BLOOD TYPING SEROLOGIC RH(D): CPT | Performed by: PHYSICIAN ASSISTANT

## 2020-10-09 PROCEDURE — 250N000011 HC RX IP 250 OP 636: Performed by: INTERNAL MEDICINE

## 2020-10-09 PROCEDURE — 80048 BASIC METABOLIC PNL TOTAL CA: CPT | Performed by: PHYSICIAN ASSISTANT

## 2020-10-09 PROCEDURE — 83735 ASSAY OF MAGNESIUM: CPT | Performed by: PHYSICIAN ASSISTANT

## 2020-10-09 PROCEDURE — 86850 RBC ANTIBODY SCREEN: CPT | Performed by: PHYSICIAN ASSISTANT

## 2020-10-09 RX ORDER — SODIUM CHLORIDE 9 MG/ML
INJECTION, SOLUTION INTRAVENOUS CONTINUOUS
Status: ACTIVE | OUTPATIENT
Start: 2020-10-09 | End: 2020-10-12

## 2020-10-09 RX ADMIN — URSODIOL 300 MG: 300 CAPSULE ORAL at 13:54

## 2020-10-09 RX ADMIN — ACYCLOVIR 800 MG: 800 TABLET ORAL at 18:18

## 2020-10-09 RX ADMIN — SODIUM CHLORIDE: 9 INJECTION, SOLUTION INTRAVENOUS at 22:03

## 2020-10-09 RX ADMIN — LEVOFLOXACIN 250 MG: 250 TABLET, FILM COATED ORAL at 11:08

## 2020-10-09 RX ADMIN — GRANISETRON HYDROCHLORIDE 1 MG: 1 TABLET ORAL at 20:09

## 2020-10-09 RX ADMIN — Medication 5 ML: at 17:26

## 2020-10-09 RX ADMIN — MAGNESIUM SULFATE 2 G: 2 INJECTION INTRAVENOUS at 04:45

## 2020-10-09 RX ADMIN — PROCHLORPERAZINE MALEATE 5 MG: 5 TABLET ORAL at 13:54

## 2020-10-09 RX ADMIN — VENLAFAXINE 75 MG: 75 TABLET ORAL at 13:54

## 2020-10-09 RX ADMIN — GRANISETRON HYDROCHLORIDE 1 MG: 1 TABLET ORAL at 08:33

## 2020-10-09 RX ADMIN — PROCHLORPERAZINE MALEATE 5 MG: 5 TABLET ORAL at 20:08

## 2020-10-09 RX ADMIN — ACYCLOVIR 800 MG: 800 TABLET ORAL at 08:33

## 2020-10-09 RX ADMIN — Medication 5 ML: at 03:13

## 2020-10-09 RX ADMIN — ACYCLOVIR 800 MG: 800 TABLET ORAL at 11:08

## 2020-10-09 RX ADMIN — URSODIOL 300 MG: 300 CAPSULE ORAL at 20:08

## 2020-10-09 RX ADMIN — ACYCLOVIR 800 MG: 800 TABLET ORAL at 23:12

## 2020-10-09 RX ADMIN — URSODIOL 300 MG: 300 CAPSULE ORAL at 08:33

## 2020-10-09 RX ADMIN — LEVOTHYROXINE SODIUM 88 MCG: 0.09 TABLET ORAL at 08:30

## 2020-10-09 RX ADMIN — POTASSIUM CHLORIDE 20 MEQ: 750 TABLET, EXTENDED RELEASE ORAL at 04:44

## 2020-10-09 RX ADMIN — PANTOPRAZOLE SODIUM 40 MG: 40 TABLET, DELAYED RELEASE ORAL at 08:33

## 2020-10-09 RX ADMIN — LORAZEPAM 0.5 MG: 2 INJECTION INTRAMUSCULAR; INTRAVENOUS at 17:17

## 2020-10-09 RX ADMIN — PROCHLORPERAZINE MALEATE 5 MG: 5 TABLET ORAL at 08:33

## 2020-10-09 RX ADMIN — MICAFUNGIN SODIUM 100 MG: 50 INJECTION, POWDER, LYOPHILIZED, FOR SOLUTION INTRAVENOUS at 20:04

## 2020-10-09 RX ADMIN — Medication 5 ML: at 05:50

## 2020-10-09 RX ADMIN — ACYCLOVIR 800 MG: 800 TABLET ORAL at 13:54

## 2020-10-09 ASSESSMENT — ACTIVITIES OF DAILY LIVING (ADL)
ADLS_ACUITY_SCORE: 10

## 2020-10-09 ASSESSMENT — MIFFLIN-ST. JEOR: SCORE: 1280.6

## 2020-10-09 NOTE — PROGRESS NOTES
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      Diagnosis AMLM1 Acute myelogeneous leukemia, M1, myeloblastic  HCT Type Allogeneic    Prep Regimen Fludarabine  Cytoxan  TBI   Donor Source Sibling    GVHD Prophylaxis Post transplant cytoxan  Tac/MMF  Primary BMT Provider ACE      INTERVAL  HISTORY     Still with some intermittent nausea that is controlled with compazine. Stools are somewhat loose but not diarrhea, did not take miralax yesterday. Had a twinge of stomach pain this morning but spontaneously subsided. No fevers, chills, or infectious symptoms. No other new complaints.     Review of Systems: 8 point ROS negative except as noted above.    PHYSICAL EXAM     Weight In/Out     Wt Readings from Last 3 Encounters:   10/09/20 75.7 kg (166 lb 12.8 oz)   09/17/20 77.8 kg (171 lb 8 oz)   09/10/20 77.2 kg (170 lb 3.1 oz)      I/O last 3 completed shifts:  In: 500 [P.O.:350; I.V.:150]  Out: 3200 [Urine:3200]       KPS:  100    /73 (BP Location: Right arm)   Pulse 86   Temp 97.9  F (36.6  C) (Axillary)   Resp 16   Ht 1.68 m (5' 6.14\")   Wt 75.7 kg (166 lb 12.8 oz)   SpO2 96%   BMI 26.81 kg/m     General: NAD   Eyes:  LINH, sclera anicteric   Lungs: CTA bilaterally  Cardiovascular: RRR, no M/R/G   Abdominal/Rectal: +BS, soft, NT, ND  Lymphatics: no edema  Skin: erythematous rash under/around dressing on right chest wall, no tenderness, c/w contact dermatitis.  Area at insertion site (site of bio-glue) also appears to be inflamed--non-tender, no drainage. No other rashes or petechaie  Neuro: A&O   Additional Findings: Padilla site NT, no drainage.    10/5/20 Current aGVHD staging:  Skin 0, UGI 0, LGI 0, Liver 0 (keep in note through day +180, delete if auto)    LABS AND IMAGING - PAST 24 HOURS     Lab Results   Component Value Date    WBC 0.3 (LL) 10/09/2020    ANEU 0.4 (LL) 10/08/2020    HGB " 10.7 (L) 10/09/2020    HCT 32.1 (L) 10/09/2020    PLT 60 (L) 10/09/2020     10/09/2020    POTASSIUM 3.9 10/09/2020    CHLORIDE 103 10/09/2020    CO2 29 10/09/2020    GLC 92 10/09/2020    BUN 5 (L) 10/09/2020    CR 0.54 10/09/2020    MAG 2.0 10/09/2020    INR 0.95 10/05/2020     I have assessed all abnormal lab values for their clinical significance and any values considered clinically significant have been addressed in the assessment and plan  OVERALL PLAN   Florencia Gao is a 75 year old female with AML in CR1, admitted for JCARLOS Allo sib pbsct per protocol 2019-10, randomized to Post transplant cytoxan/Tac/MMF.  Day +2     1.  BMT: Cy/flu/TBI prep.   Allopurinol given through day 0.   Received 4.19 x10(6) CD34/kg. Donor B pos and recipient B neg.   GCSF starts d+5 and cont to until ANC>1500 x3 consecutive days. Re-stage per protocol.     2.  HEME: Keep Hgb>8 and plts>10K.   - Of note has significant antibodies to RBCs- can cause delay in transfusion availability. Premeds tylenol and benadryl.                             3.  ID: Afebrile.   Cont Levo, Linda (vori to start day +7), and HD ACV (CMV+, HSV+, EBV+) prophy.   Pjp prophy to start day+28 - IV pentamdine vs dapsone as sulfa allergy.                                         4.  GI: nausea.  Zofran to chemo to prevent N/V. Changed zofran to kytril d/t headaches. Continue scheduled kytril and add compazine tid today in preparation for cytoxan over the weekend (had significant nausea/vomiting with it during prep).    - Ativan available PRN break-through symptoms.   - Change miralax to prn d/t stools loosening.   - Per protocol no dex/steroids**   Protonix for GI prophy.    Ursodiol for VOD prophy.     5.  GVHD Prophylaxis:   Day +3 and day+4 cytoxan  Day+5 start tac and MMF      6.  FEN/Renal: Monitor creat and lytes.   - HypoNa:  Resolved.  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 Will empirically horn flush to NS for post transplant cytoxan  10/10-10/11.   - Replete lytes PRN per SS. Monitor weight, I+O, lytes per protocol with IVF flush.  - Calorie counts starting 10/8     7. Hypothyroidism  Continue synthroid 88mcg      8. Psych: Situational Depression continue effexor 75mg daily.     9.  Line: irritation/rash under and around dressing. Pt with allergy to chlorhexidine. Removed biopatch and trial of different dressing.   - Wound consulted 10/6. Awaiting recs  - pt would like to wait until wound sees her to consider topical steroid or benadryl cream.    10. Research: Pt consented to MT2019-31 (Seamless randomized tiral to alleviate morbidity and mortality) for which she was randomized to the palliative care management arm. Palliative care consult placed 10/8 per research recs- see note dated 10/8.     Dispo: patient will remain admitted through neutropenia and subsequent engraftment.     Denzel Magallon PA-C  [phone]    BMT Staff:  The patient was discussed on morning rounds with the nurses, mid level provider, and house staff and seen and examined by me. All labs and imaging were reviewed. I reviewed events over the last 24 hours including vitals and flow sheets. I agree with the above note and have been responsible for the care plan and interpretation of progress.     Subjective:  Reports feeling overall well, mild nausea that is well controlled on current antiemetics, overall decreased p.o. intake and reports starting to have loose stools, no abd pain, no F/C/NS, no  complaints, no URI or other respiratory symptoms, no HA/LH/Dizziness.      Vitals reviewed, labs reviewed  PE:  NAD, Alert, oriented x3  HEENT: moist mmm, no oral ulcers  Heart: RRR  Lungs: CTAB  Abdomen: +BS, soft non tender, non distended  LE: no edema  Skin: no rashes, erythema bruising and skin irritation at the right upper chest wall at the site of Padilla insertion and old dressing.     Assessment and Plan:    75-year-old female with AML in CR 1 admitted for break allogeneic matched  sibling donor peripheral blood allogeneic metabolic cell transplantation on CTN 1703 randomized to PtCy/Tac/MMF, prep with fludarabine Cytoxan and TBI.  1. Heme/BMT: Day +2, transfusion as needed  2. ID: Afebrile  3. CV: no active issues  4. GI/ FEN: monitor regimen related toxicity, adjusting antiemetics, encourage fluid PO intake, I/O and daily weights  5. Renal: euvolemic, hyponatremia resolved  6. PPx: Levo, donato/vori, ACV  7.  Hypothyroidism on replacement, situational depression on Effexor, PT: encourage ambulation, wound care consult    Chito Bautista MD

## 2020-10-09 NOTE — TELEPHONE ENCOUNTER
[id number] / ACE-BMT: Study Visit Note   Subject name: Florencia Gao     Visit type: follow-up phone call regarding ACE-BMT palliative care study intervention.    Since the last study visit, Florencia has been doing well.     Today I clarified with lForencia and her caregiver, Gaye, that the NEST13+ can be collected remotely. The NEST13+ is a survey with 13 questions that will serve as a guide care for the palliative care team. I informed Florencia that I will call on Thursday for the next 9 weeks to administer the survey.    Florencia Gao was given the opportunity to ask any trial related questions, and her questions were addressed.    Please see provider progress note for physical exam and other clinical information.     Brook Ervin

## 2020-10-09 NOTE — PROGRESS NOTES
"Calorie Count    Intake recorded for: 10/8/2020  Total Kcals: 479 Total Protein: 12g    Kcals from Hospital Food: 479   Protein: 12g    Kcals from Outside Food (average):0 Protein: 0g    # Meals Ordered from Kitchen: 2 meals     # Meals Recorded: 1 recorded (100% butter, Cheerios, 8oz whole milk, peaches, blueberry muffin)    # Supplements Recorded: no intake recorded     Epic food record shows \"75%\" intake @ 1645, but no note stating what was eaten at this time. Not enough information to accurately calculate nutritionals.           "

## 2020-10-09 NOTE — PLAN OF CARE
Pt afebrile, OVSS on RA. Pt denies pain, SOB. Pt reports stomach discomfort, intermittent nausea. 1 episode of sudden nausea and vomiting, IV ativan given with good effect. Increased frequency if stools and per pt, consistency is looser. Oral intake is fair. Plan for cytoxan tomorrow. Pt is up ind.     Problem: Adult Inpatient Plan of Care  Goal: Plan of Care Review  Outcome: No Change     Problem: Adult Inpatient Plan of Care  Goal: Optimal Comfort and Wellbeing  Outcome: No Change     Problem: Adjustment to Transplant (Stem Cell/Bone Marrow Transplant)  Goal: Optimal Coping with Transplant  Outcome: No Change     Problem: Diarrhea (Stem Cell/Bone Marrow Transplant)  Goal: Diarrhea Symptom Control  Outcome: No Change     Problem: Fatigue (Stem Cell/Bone Marrow Transplant)  Goal: Energy Level Supports Daily Activity  Outcome: No Change     Problem: Hematologic Alteration (Stem Cell/Bone Marrow Transplant)  Goal: Blood Counts Within Acceptable Range  Outcome: No Change     Problem: Infection Risk (Stem Cell/Bone Marrow Transplant)  Goal: Absence of Infection Signs/Symptoms  Outcome: No Change     Problem: Nausea and Vomiting (Stem Cell/Bone Marrow Transplant)  Goal: Nausea and Vomiting Symptom Relief  Outcome: No Change     Problem: Adult Inpatient Plan of Care  Goal: Absence of Hospital-Acquired Illness or Injury  Intervention: Identify and Manage Fall Risk  Recent Flowsheet Documentation  Taken 10/9/2020 0800 by Sana Petersen RN  Safety Promotion/Fall Prevention: fall prevention program maintained  Intervention: Prevent VTE (venous thromboembolism)  Recent Flowsheet Documentation  Taken 10/9/2020 0800 by Sana Petersen RN  VTE Prevention/Management:   ambulation promoted   bleeding risk assessed   fluids promoted     Problem: Bladder Irritation (Stem Cell/Bone Marrow Transplant)  Goal: Symptom-Free Urinary Elimination  Intervention: Monitor and Manage Bladder Irritation  Recent Flowsheet Documentation  Taken  10/9/2020 0800 by Sana Petersen RN  Hyperhydration Management: fluids provided

## 2020-10-09 NOTE — CONSULTS
SBAR: WOC asked to assess irritation from biopatch and dressing.  Vascular access can assess and answer questions regarding IV and central line dressings.  Spoke with medical team. WIll sign off.

## 2020-10-10 LAB
ANION GAP SERPL CALCULATED.3IONS-SCNC: 5 MMOL/L (ref 3–14)
BUN SERPL-MCNC: 4 MG/DL (ref 7–30)
C DIFF TOX B STL QL: NEGATIVE
CALCIUM SERPL-MCNC: 8.1 MG/DL (ref 8.5–10.1)
CHLORIDE SERPL-SCNC: 106 MMOL/L (ref 94–109)
CO2 SERPL-SCNC: 26 MMOL/L (ref 20–32)
CREAT SERPL-MCNC: 0.54 MG/DL (ref 0.52–1.04)
DIFFERENTIAL METHOD BLD: ABNORMAL
ERYTHROCYTE [DISTWIDTH] IN BLOOD BY AUTOMATED COUNT: 12.1 % (ref 10–15)
GFR SERPL CREATININE-BSD FRML MDRD: >90 ML/MIN/{1.73_M2}
GLUCOSE SERPL-MCNC: 87 MG/DL (ref 70–99)
HCT VFR BLD AUTO: 29.7 % (ref 35–47)
HGB BLD-MCNC: 10 G/DL (ref 11.7–15.7)
MAGNESIUM SERPL-MCNC: 2.2 MG/DL (ref 1.6–2.3)
MCH RBC QN AUTO: 30.8 PG (ref 26.5–33)
MCHC RBC AUTO-ENTMCNC: 33.7 G/DL (ref 31.5–36.5)
MCV RBC AUTO: 91 FL (ref 78–100)
PLATELET # BLD AUTO: 57 10E9/L (ref 150–450)
POTASSIUM SERPL-SCNC: 3.5 MMOL/L (ref 3.4–5.3)
POTASSIUM SERPL-SCNC: 3.8 MMOL/L (ref 3.4–5.3)
RBC # BLD AUTO: 3.25 10E12/L (ref 3.8–5.2)
SODIUM SERPL-SCNC: 136 MMOL/L (ref 133–144)
SODIUM SERPL-SCNC: 137 MMOL/L (ref 133–144)
SPECIMEN SOURCE: NORMAL
WBC # BLD AUTO: 0.3 10E9/L (ref 4–11)

## 2020-10-10 PROCEDURE — 80048 BASIC METABOLIC PNL TOTAL CA: CPT | Performed by: PHYSICIAN ASSISTANT

## 2020-10-10 PROCEDURE — 83735 ASSAY OF MAGNESIUM: CPT | Performed by: PHYSICIAN ASSISTANT

## 2020-10-10 PROCEDURE — 84132 ASSAY OF SERUM POTASSIUM: CPT | Performed by: PHYSICIAN ASSISTANT

## 2020-10-10 PROCEDURE — 250N000011 HC RX IP 250 OP 636: Performed by: PHYSICIAN ASSISTANT

## 2020-10-10 PROCEDURE — 85027 COMPLETE CBC AUTOMATED: CPT

## 2020-10-10 PROCEDURE — 258N000003 HC RX IP 258 OP 636: Performed by: PHYSICIAN ASSISTANT

## 2020-10-10 PROCEDURE — 99233 SBSQ HOSP IP/OBS HIGH 50: CPT | Performed by: INTERNAL MEDICINE

## 2020-10-10 PROCEDURE — 258N000003 HC RX IP 258 OP 636: Performed by: INTERNAL MEDICINE

## 2020-10-10 PROCEDURE — 250N000011 HC RX IP 250 OP 636: Performed by: INTERNAL MEDICINE

## 2020-10-10 PROCEDURE — 84295 ASSAY OF SERUM SODIUM: CPT | Performed by: PHYSICIAN ASSISTANT

## 2020-10-10 PROCEDURE — 250N000013 HC RX MED GY IP 250 OP 250 PS 637: Performed by: PHYSICIAN ASSISTANT

## 2020-10-10 PROCEDURE — 206N000001 HC R&B BMT UMMC

## 2020-10-10 PROCEDURE — 87493 C DIFF AMPLIFIED PROBE: CPT | Performed by: HOSPITALIST

## 2020-10-10 RX ADMIN — PROCHLORPERAZINE MALEATE 5 MG: 5 TABLET ORAL at 19:57

## 2020-10-10 RX ADMIN — POTASSIUM CHLORIDE 20 MEQ: 29.8 INJECTION, SOLUTION INTRAVENOUS at 05:57

## 2020-10-10 RX ADMIN — CYCLOPHOSPHAMIDE 3000 MG: 2 INJECTION, POWDER, FOR SOLUTION INTRAVENOUS; ORAL at 09:56

## 2020-10-10 RX ADMIN — URSODIOL 300 MG: 300 CAPSULE ORAL at 14:09

## 2020-10-10 RX ADMIN — GRANISETRON HYDROCHLORIDE 1 MG: 1 TABLET ORAL at 08:15

## 2020-10-10 RX ADMIN — SODIUM CHLORIDE: 9 INJECTION, SOLUTION INTRAVENOUS at 05:57

## 2020-10-10 RX ADMIN — ACYCLOVIR 800 MG: 800 TABLET ORAL at 14:09

## 2020-10-10 RX ADMIN — URSODIOL 300 MG: 300 CAPSULE ORAL at 08:14

## 2020-10-10 RX ADMIN — SODIUM CHLORIDE: 9 INJECTION, SOLUTION INTRAVENOUS at 17:42

## 2020-10-10 RX ADMIN — MICAFUNGIN SODIUM 100 MG: 50 INJECTION, POWDER, LYOPHILIZED, FOR SOLUTION INTRAVENOUS at 19:57

## 2020-10-10 RX ADMIN — LEVOTHYROXINE SODIUM 88 MCG: 0.09 TABLET ORAL at 08:14

## 2020-10-10 RX ADMIN — PROCHLORPERAZINE MALEATE 5 MG: 5 TABLET ORAL at 14:09

## 2020-10-10 RX ADMIN — ACYCLOVIR 800 MG: 800 TABLET ORAL at 22:29

## 2020-10-10 RX ADMIN — LEVOFLOXACIN 250 MG: 250 TABLET, FILM COATED ORAL at 11:15

## 2020-10-10 RX ADMIN — ACYCLOVIR 800 MG: 800 TABLET ORAL at 08:14

## 2020-10-10 RX ADMIN — PROCHLORPERAZINE MALEATE 5 MG: 5 TABLET ORAL at 08:14

## 2020-10-10 RX ADMIN — MESNA 3000 MG: 100 INJECTION, SOLUTION INTRAVENOUS at 08:11

## 2020-10-10 RX ADMIN — URSODIOL 300 MG: 300 CAPSULE ORAL at 19:57

## 2020-10-10 RX ADMIN — FUROSEMIDE 20 MG: 10 INJECTION, SOLUTION INTRAVENOUS at 17:02

## 2020-10-10 RX ADMIN — GRANISETRON HYDROCHLORIDE 1 MG: 1 TABLET ORAL at 19:57

## 2020-10-10 RX ADMIN — PANTOPRAZOLE SODIUM 40 MG: 40 TABLET, DELAYED RELEASE ORAL at 08:14

## 2020-10-10 RX ADMIN — ACYCLOVIR 800 MG: 800 TABLET ORAL at 18:04

## 2020-10-10 RX ADMIN — VENLAFAXINE 75 MG: 75 TABLET ORAL at 14:09

## 2020-10-10 RX ADMIN — POTASSIUM CHLORIDE 20 MEQ: 29.8 INJECTION, SOLUTION INTRAVENOUS at 18:36

## 2020-10-10 RX ADMIN — ACYCLOVIR 800 MG: 800 TABLET ORAL at 11:15

## 2020-10-10 ASSESSMENT — ACTIVITIES OF DAILY LIVING (ADL)
ADLS_ACUITY_SCORE: 12

## 2020-10-10 ASSESSMENT — MIFFLIN-ST. JEOR
SCORE: 1302.83
SCORE: 1289.22

## 2020-10-10 NOTE — PLAN OF CARE
Pt afebrile, OVSS on RA. Pt denies pain except for abdominal discomfort and cramping before a bowel movement. Multiple loose stools this shift, they have increased in frequency and urgency. C diff test ordered, still needs to be collected. Cytoxan infused and pt tolerated well. Flush is infusing at 225 ml/hr, including mesna. Flush ends at 1200 tomorrow. Pt is meeting voiding parameters. 20 mg lasix given for shift total I>O, great UOP response. 1600 K: 3.5, 20 meq replaced.  Fair intake. Pt denies nausea, SOB. Pt is up ind.   Problem: Adult Inpatient Plan of Care  Goal: Plan of Care Review  Outcome: No Change     Problem: Adult Inpatient Plan of Care  Goal: Optimal Comfort and Wellbeing  Outcome: No Change     Problem: Adjustment to Transplant (Stem Cell/Bone Marrow Transplant)  Goal: Optimal Coping with Transplant  Outcome: No Change     Problem: Hematologic Alteration (Stem Cell/Bone Marrow Transplant)  Goal: Blood Counts Within Acceptable Range  Outcome: No Change     Problem: Nausea and Vomiting (Stem Cell/Bone Marrow Transplant)  Goal: Nausea and Vomiting Symptom Relief  Outcome: No Change     Problem: Nutrition Intake Altered (Stem Cell/Bone Marrow Transplant)  Goal: Optimal Nutrition Intake  Outcome: No Change     Problem: Diarrhea (Stem Cell/Bone Marrow Transplant)  Goal: Diarrhea Symptom Control  Outcome: Declining  Intervention: Manage Diarrhea  Recent Flowsheet Documentation  Taken 10/10/2020 0800 by Sana Petersen, RN  Fluid/Electrolyte Management: fluids provided

## 2020-10-10 NOTE — PROGRESS NOTES
The patient was discussed on morning rounds with the nurses, mid level provider, and house staff and seen and examined by me. All labs and imaging were reviewed. I reviewed events over the last 24 hours including vitals and flow sheets. I agree with the above note and have been responsible for the care plan and interpretation of progress.     Subjective:  Reports feeling overall well, controlled nausea, no abd pain, no F/C/NS, no  complaints, no URI or other respiratory symptoms, no HA/LH/Dizziness.      Vitals reviewed, labs reviewed  PE:  NAD, Alert, oriented x3  HEENT: moist mmm, no oral ulcers  Heart: RRR  Lungs: CTAB  Abdomen: +BS, soft non tender, non distended  LE: no edema  Skin: no rashes, erythema bruising and skin irritation at the right upper chest wall at the site of Padilla insertion and old dressing.     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  1. Heme/BMT: Day +3, transfusion as needed  2. ID: Afebrile  3. CV: no active issues  4. GI/ FEN: monitor regimen related toxicity, adjusting antiemetics, encourage fluid PO intake, I/O and daily weights  5. Renal: euvolemic, hyponatremia resolved  6. PPx: Levo, donato/vori, ACV  7.  Hypothyroidism on replacement, situational depression on Effexor, PT: encourage ambulation     Chito Bautista MD

## 2020-10-10 NOTE — PROGRESS NOTES
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      Diagnosis AMLM1 Acute myelogeneous leukemia, M1, myeloblastic  HCT Type Allogeneic    Prep Regimen Fludarabine  Cytoxan  TBI   Donor Source Sibling    GVHD Prophylaxis Post transplant cytoxan  Tac/MMF  Primary BMT Provider ACE      INTERVAL  HISTORY     Patient had nausea and vomiting yesterday. Today, feeling better and will attempt to eat breakfast. Continues to have slight itching around the port side. Mentions that she is allergic to multiple agents.    No fevers, chills, or infectious symptoms. No other new complaints.     Review of Systems: 8 point ROS negative except as noted above.    PHYSICAL EXAM     Weight In/Out     Wt Readings from Last 3 Encounters:   10/09/20 75.7 kg (166 lb 12.8 oz)   09/17/20 77.8 kg (171 lb 8 oz)   09/10/20 77.2 kg (170 lb 3.1 oz)      I/O last 3 completed shifts:  In: 2410 [P.O.:800; I.V.:1610]  Out: 2300 [Urine:2300]       KPS:  100    /60 (BP Location: Right arm)   Pulse 67   Temp 97.7  F (36.5  C) (Axillary)   Resp 18   Ht 1.68 m (5' 6.14\")   Wt 75.7 kg (166 lb 12.8 oz)   SpO2 96%   BMI 26.81 kg/m     General: NAD   Eyes:  LINH, sclera anicteric   Lungs: CTA bilaterally  Cardiovascular: RRR, no M/R/G   Abdominal/Rectal: +BS, soft, NT, ND  Lymphatics: no edema  Skin: erythematous rash under/around dressing on right chest wall, no tenderness, c/w contact dermatitis.   no drainage Not tender to palpation. . No other rashes or petechaie  Neuro: A&O       10/5/20 Current aGVHD staging:  Skin 0, UGI 0, LGI 0, Liver 0 (keep in note through day +180, delete if auto)    LABS AND IMAGING - PAST 24 HOURS     Lab Results   Component Value Date    WBC 0.3 (LL) 10/10/2020    ANEU 0.4 (LL) 10/08/2020    HGB 10.0 (L) 10/10/2020    HCT 29.7 (L) 10/10/2020    PLT 57 (L) 10/10/2020     10/10/2020    POTASSIUM 3.8 " 10/10/2020    CHLORIDE 106 10/10/2020    CO2 26 10/10/2020    GLC 87 10/10/2020    BUN 4 (L) 10/10/2020    CR 0.54 10/10/2020    MAG 2.2 10/10/2020    INR 0.95 10/05/2020     I have assessed all abnormal lab values for their clinical significance and any values considered clinically significant have been addressed in the assessment and plan  OVERALL PLAN   Florencia Gao is a 75 year old female with AML in CR1, admitted for JCARLOS Allo sib pbsct per protocol 2019-10, randomized to Post transplant cytoxan/Tac/MMF.  Day +3     1.  BMT: Cy/flu/TBI prep.   Allopurinol given through day 0.   Received 4.19 x10(6) CD34/kg. Donor B pos and recipient B neg.   GCSF starts d+5 and cont to until ANC>1500 x3 consecutive days. Re-stage per protocol.     2.  HEME: Keep Hgb>8 and plts>10K.   - Of note has significant antibodies to RBCs- can cause delay in transfusion availability. Premeds tylenol and benadryl.                             3.  ID: Afebrile.   Cont Levo, Linda (vori to start day +7), and HD ACV (CMV+, HSV+, EBV+) prophy.   Pjp prophy to start day+28 - IV pentamdine vs dapsone as sulfa allergy.                                         4.  GI: nausea.  Zofran to chemo to prevent N/V. Changed zofran to kytril d/t headaches. Continue scheduled kytril and add compazine tid  in preparation for cytoxan over the weekend (had significant nausea/vomiting with it during prep).    - Ativan available PRN break-through symptoms.   - Change miralax to prn d/t stools loosening.   - Per protocol no dex/steroids**   Protonix for GI prophy.    Ursodiol for VOD prophy.     5.  GVHD Prophylaxis:   Day +3 and day+4 cytoxan  Day+5 start tac and MMF      6.  FEN/Renal: Monitor creat and lytes.   - HypoNa:  Resolved.  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 Flush changed to NS for post transplant cytoxan 10/10-10/11.   - Replete lytes PRN per SS. Monitor weight, I+O, lytes per protocol with IVF flush.  - Calorie counts starting 10/8     7.  Hypothyroidism  Continue synthroid 88mcg      8. Psych: Situational Depression continue effexor 75mg daily.     9.  Line: irritation/rash under and around dressing. Pt with allergy to chlorhexidine. Removed biopatch and trial of different dressing.   - Wound consulted 10/6. Awaiting recs. Nursing will follow up with wound care.   - pt would like to wait until wound sees her to consider topical steroid or benadryl cream.    10. Research: Pt consented to MT2019-31 (Seamless randomized tiral to alleviate morbidity and mortality) for which she was randomized to the palliative care management arm. Palliative care consult placed 10/8 per research recs- see note dated 10/8.     Dispo: patient will remain admitted through neutropenia and subsequent engraftment.       BMT Staff:  The patient was discussed on morning rounds with the nurses, mid level provider, and house staff and seen and examined by me. All labs and imaging were reviewed. I reviewed events over the last 24 hours including vitals and flow sheets. I agree with the above note and have been responsible for the care plan and interpretation of progress.     Subjective:  Reports feeling overall well, controlled nausea, no abd pain, no F/C/NS, no  complaints, no URI or other respiratory symptoms, no HA/LH/Dizziness.      Vitals reviewed, labs reviewed  PE:  NAD, Alert, oriented x3  HEENT: moist mmm, no oral ulcers  Heart: RRR  Lungs: CTAB  Abdomen: +BS, soft non tender, non distended  LE: no edema  Skin: no rashes, erythema bruising and skin irritation at the right upper chest wall at the site of Padilla insertion and old dressing.     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  1. Heme/BMT: Day +3, transfusion as needed  2. ID: Afebrile  3. CV: no active  issues  4. GI/ FEN: monitor regimen related toxicity, adjusting antiemetics, encourage fluid PO intake, I/O and daily weights  5. Renal: euvolemic, hyponatremia resolved  6. PPx: Levo, donato/vori, ACV  7.  Hypothyroidism on replacement, situational depression on Effexor, PT: encourage ambulation     Chito Bautista MD

## 2020-10-10 NOTE — PLAN OF CARE
Afebrile. OVSS. Denies pain. Nausea managed with scheduled kytril & compazine, no prns needed. Cytoxan flush started at 2200, infusing at 225 ml/hr. Potassium 3.8, 20 mEq IV replaced. No other replacements. Planning for cytoxan today. Ate some apple sauce and crackers last night. Voiding adequately. No loose stools overnight. Up independently. Continue plan of care.         Problem: Adult Inpatient Plan of Care  Goal: Plan of Care Review  Outcome: No Change  Flowsheets (Taken 10/10/2020 0660)  Plan of Care Reviewed With: patient  Progress: no change     Problem: Adult Inpatient Plan of Care  Goal: Patient-Specific Goal (Individualization)  Outcome: No Change     Problem: Adult Inpatient Plan of Care  Goal: Absence of Hospital-Acquired Illness or Injury  Outcome: No Change     Problem: Adult Inpatient Plan of Care  Goal: Optimal Comfort and Wellbeing  Outcome: No Change     Problem: Adult Inpatient Plan of Care  Goal: Readiness for Transition of Care  Outcome: No Change     Problem: Adult Inpatient Plan of Care  Goal: Rounds/Family Conference  Outcome: No Change     Problem: Adjustment to Transplant (Stem Cell/Bone Marrow Transplant)  Goal: Optimal Coping with Transplant  Outcome: No Change     Problem: Bladder Irritation (Stem Cell/Bone Marrow Transplant)  Goal: Symptom-Free Urinary Elimination  Outcome: No Change     Problem: Diarrhea (Stem Cell/Bone Marrow Transplant)  Goal: Diarrhea Symptom Control  Outcome: No Change     Problem: Fatigue (Stem Cell/Bone Marrow Transplant)  Goal: Energy Level Supports Daily Activity  Outcome: No Change     Problem: Hematologic Alteration (Stem Cell/Bone Marrow Transplant)  Goal: Blood Counts Within Acceptable Range  Outcome: No Change     Problem: Hypersensitivity Reaction (Stem Cell/Bone Marrow Transplant)  Goal: Absence of Hypersensitivity Reaction  Outcome: No Change     Problem: Infection Risk (Stem Cell/Bone Marrow Transplant)  Goal: Absence of Infection  Signs/Symptoms  Outcome: No Change     Problem: Mucositis (Stem Cell/Bone Marrow Transplant)  Goal: Mucous Membrane Health and Integrity  Outcome: No Change     Problem: Nausea and Vomiting (Stem Cell/Bone Marrow Transplant)  Goal: Nausea and Vomiting Symptom Relief  Outcome: No Change     Problem: Nutrition Intake Altered (Stem Cell/Bone Marrow Transplant)  Goal: Optimal Nutrition Intake  Outcome: No Change

## 2020-10-10 NOTE — PROGRESS NOTES
Calorie Count  Intake recorded for: 10/9  Total Kcals: 526 Total Protein: 19g  Kcals from Hospital Food: 0   Protein: 0g  Kcals from Outside Food (average):526 Protein: 19g  # Meals Ordered from Kitchen: 1 meal ordered from kitchen.  # Meals Recorded: 1 meal (100% cashews, chicken noodle soup, applesauce - all from outside of hospital)  # Supplements Recorded: No intake recorded.

## 2020-10-10 NOTE — PROGRESS NOTES
Stop time on MAR & chart I & O  Chemo drug: Cytoxan  Tolerated: pt tolerated infusion well, blood return present before and after infusion.   Plan: Continue to flush.  Lab: Na, K: Na: 136, K: 3.5  Wt/intervention: weight up to 77.8 kg    Cytoxan Primer  Cytoxan infused at 1000  Mesna infusing at _45_ and ends_0800 tomorrow_  Flush infusing at _180_ and ends _1200 tomorrow _

## 2020-10-10 NOTE — PROCEDURES
Radiotherapy Treatment Summary          Date of Report:  2020             PATIENT: AME ROSENTHAL  MEDICAL RECORD NO: [id number]    : 1945     DIAGNOSIS: C92.01 Acute myeloblastic leukemia, in remission     INTENT OF RADIOTHERAPY:  Part of conditioning regimen for stem cell transplantation        Details of the treatments summarized below are found in records kept in the Department of Radiation Oncology at [facility].  Treatment Summary:  Radiation Oncology - Course: 1 Protocol: 2019-10  Treatment Site     Dose           Modality From To Elapsed Days Fx.  1 TBI           200 cGy 18 X 10/06/2020 10/06/2020   1             Dose per Fraction:  200 cGy    Total Dose:  200 cGy                  COMMENTS:   Patient tolerated total body radiation without any acute toxicity.     PAIN MANAGEMENT:   Patient did not require any pain management related to total body radiation.  Pain otherwise managed   by inpatient team.     FOLLOW UP PLAN: Total Body Irradiation was well tolerated.  After discharge from the hospital   the patient will be followed in the Bone Marrow Transplant Clinic.        Nurse Practitioner    Staff Physician  MICHELLE Cole M.D.     Physicist   Lilliana Roe, PhD     CC:   Yong Moncada MD                                    Radiation Oncology:  [facility] [address] [place] [address]

## 2020-10-11 LAB
ANION GAP SERPL CALCULATED.3IONS-SCNC: 6 MMOL/L (ref 3–14)
BUN SERPL-MCNC: 3 MG/DL (ref 7–30)
CALCIUM SERPL-MCNC: 7.7 MG/DL (ref 8.5–10.1)
CHLORIDE SERPL-SCNC: 101 MMOL/L (ref 94–109)
CO2 SERPL-SCNC: 25 MMOL/L (ref 20–32)
CREAT SERPL-MCNC: 0.52 MG/DL (ref 0.52–1.04)
DIFFERENTIAL METHOD BLD: ABNORMAL
ERYTHROCYTE [DISTWIDTH] IN BLOOD BY AUTOMATED COUNT: 11.9 % (ref 10–15)
GFR SERPL CREATININE-BSD FRML MDRD: >90 ML/MIN/{1.73_M2}
GLUCOSE SERPL-MCNC: 88 MG/DL (ref 70–99)
HCT VFR BLD AUTO: 29.9 % (ref 35–47)
HGB BLD-MCNC: 10.1 G/DL (ref 11.7–15.7)
MCH RBC QN AUTO: 30.1 PG (ref 26.5–33)
MCHC RBC AUTO-ENTMCNC: 33.8 G/DL (ref 31.5–36.5)
MCV RBC AUTO: 89 FL (ref 78–100)
PLATELET # BLD AUTO: 41 10E9/L (ref 150–450)
POTASSIUM SERPL-SCNC: 3.1 MMOL/L (ref 3.4–5.3)
POTASSIUM SERPL-SCNC: 3.4 MMOL/L (ref 3.4–5.3)
RBC # BLD AUTO: 3.35 10E12/L (ref 3.8–5.2)
RESEARCH KIT COLLECTION: NORMAL
SODIUM SERPL-SCNC: 132 MMOL/L (ref 133–144)
SODIUM SERPL-SCNC: 132 MMOL/L (ref 133–144)
WBC # BLD AUTO: 0.3 10E9/L (ref 4–11)

## 2020-10-11 PROCEDURE — 80048 BASIC METABOLIC PNL TOTAL CA: CPT | Performed by: PHYSICIAN ASSISTANT

## 2020-10-11 PROCEDURE — 206N000001 HC R&B BMT UMMC

## 2020-10-11 PROCEDURE — 258N000003 HC RX IP 258 OP 636: Performed by: PHYSICIAN ASSISTANT

## 2020-10-11 PROCEDURE — 250N000013 HC RX MED GY IP 250 OP 250 PS 637: Performed by: PHYSICIAN ASSISTANT

## 2020-10-11 PROCEDURE — 258N000003 HC RX IP 258 OP 636: Performed by: INTERNAL MEDICINE

## 2020-10-11 PROCEDURE — 84132 ASSAY OF SERUM POTASSIUM: CPT | Performed by: PHYSICIAN ASSISTANT

## 2020-10-11 PROCEDURE — 250N000013 HC RX MED GY IP 250 OP 250 PS 637: Performed by: INTERNAL MEDICINE

## 2020-10-11 PROCEDURE — 85027 COMPLETE CBC AUTOMATED: CPT

## 2020-10-11 PROCEDURE — 99233 SBSQ HOSP IP/OBS HIGH 50: CPT | Mod: GC | Performed by: INTERNAL MEDICINE

## 2020-10-11 PROCEDURE — 250N000011 HC RX IP 250 OP 636: Performed by: INTERNAL MEDICINE

## 2020-10-11 PROCEDURE — 250N000011 HC RX IP 250 OP 636: Performed by: PHYSICIAN ASSISTANT

## 2020-10-11 PROCEDURE — 84295 ASSAY OF SERUM SODIUM: CPT | Performed by: PHYSICIAN ASSISTANT

## 2020-10-11 RX ORDER — LOPERAMIDE HCL 2 MG
2 CAPSULE ORAL 4 TIMES DAILY PRN
Status: DISCONTINUED | OUTPATIENT
Start: 2020-10-11 | End: 2020-10-28 | Stop reason: HOSPADM

## 2020-10-11 RX ADMIN — MESNA 3000 MG: 100 INJECTION, SOLUTION INTRAVENOUS at 07:53

## 2020-10-11 RX ADMIN — LORAZEPAM 0.5 MG: 2 INJECTION INTRAMUSCULAR; INTRAVENOUS at 00:30

## 2020-10-11 RX ADMIN — PROCHLORPERAZINE MALEATE 5 MG: 5 TABLET ORAL at 20:15

## 2020-10-11 RX ADMIN — SODIUM CHLORIDE: 9 INJECTION, SOLUTION INTRAVENOUS at 14:33

## 2020-10-11 RX ADMIN — LORAZEPAM 0.5 MG: 2 INJECTION INTRAMUSCULAR; INTRAVENOUS at 10:28

## 2020-10-11 RX ADMIN — PROCHLORPERAZINE MALEATE 5 MG: 5 TABLET ORAL at 14:24

## 2020-10-11 RX ADMIN — ACYCLOVIR 800 MG: 800 TABLET ORAL at 11:44

## 2020-10-11 RX ADMIN — LEVOTHYROXINE SODIUM 88 MCG: 0.09 TABLET ORAL at 07:53

## 2020-10-11 RX ADMIN — LEVOFLOXACIN 250 MG: 250 TABLET, FILM COATED ORAL at 11:44

## 2020-10-11 RX ADMIN — URSODIOL 300 MG: 300 CAPSULE ORAL at 20:15

## 2020-10-11 RX ADMIN — LORAZEPAM 0.5 MG: 2 INJECTION INTRAMUSCULAR; INTRAVENOUS at 22:13

## 2020-10-11 RX ADMIN — LOPERAMIDE HYDROCHLORIDE 2 MG: 2 CAPSULE ORAL at 00:25

## 2020-10-11 RX ADMIN — SODIUM CHLORIDE: 9 INJECTION, SOLUTION INTRAVENOUS at 04:29

## 2020-10-11 RX ADMIN — CYCLOPHOSPHAMIDE 3000 MG: 2 INJECTION, POWDER, FOR SOLUTION INTRAVENOUS; ORAL at 10:29

## 2020-10-11 RX ADMIN — MICAFUNGIN SODIUM 100 MG: 50 INJECTION, POWDER, LYOPHILIZED, FOR SOLUTION INTRAVENOUS at 20:15

## 2020-10-11 RX ADMIN — VENLAFAXINE 75 MG: 75 TABLET ORAL at 14:24

## 2020-10-11 RX ADMIN — PANTOPRAZOLE SODIUM 40 MG: 40 TABLET, DELAYED RELEASE ORAL at 07:53

## 2020-10-11 RX ADMIN — FUROSEMIDE 10 MG: 10 INJECTION, SOLUTION INTRAVENOUS at 10:20

## 2020-10-11 RX ADMIN — ACYCLOVIR 800 MG: 800 TABLET ORAL at 18:18

## 2020-10-11 RX ADMIN — GRANISETRON HYDROCHLORIDE 1 MG: 1 TABLET ORAL at 20:15

## 2020-10-11 RX ADMIN — PROCHLORPERAZINE MALEATE 5 MG: 5 TABLET ORAL at 07:53

## 2020-10-11 RX ADMIN — ACYCLOVIR 800 MG: 800 TABLET ORAL at 22:08

## 2020-10-11 RX ADMIN — LOPERAMIDE HYDROCHLORIDE 2 MG: 2 CAPSULE ORAL at 17:07

## 2020-10-11 RX ADMIN — POTASSIUM CHLORIDE 20 MEQ: 29.8 INJECTION, SOLUTION INTRAVENOUS at 19:00

## 2020-10-11 RX ADMIN — LOPERAMIDE HYDROCHLORIDE 2 MG: 2 CAPSULE ORAL at 09:22

## 2020-10-11 RX ADMIN — GRANISETRON HYDROCHLORIDE 1 MG: 1 TABLET ORAL at 07:53

## 2020-10-11 RX ADMIN — URSODIOL 300 MG: 300 CAPSULE ORAL at 07:53

## 2020-10-11 RX ADMIN — ACYCLOVIR 800 MG: 800 TABLET ORAL at 07:53

## 2020-10-11 RX ADMIN — LORAZEPAM 0.5 MG: 2 INJECTION INTRAMUSCULAR; INTRAVENOUS at 14:24

## 2020-10-11 RX ADMIN — FUROSEMIDE 10 MG: 10 INJECTION, SOLUTION INTRAVENOUS at 20:17

## 2020-10-11 RX ADMIN — URSODIOL 300 MG: 300 CAPSULE ORAL at 14:24

## 2020-10-11 RX ADMIN — POTASSIUM CHLORIDE 20 MEQ: 29.8 INJECTION, SOLUTION INTRAVENOUS at 22:08

## 2020-10-11 RX ADMIN — ACYCLOVIR 800 MG: 800 TABLET ORAL at 14:24

## 2020-10-11 RX ADMIN — POTASSIUM CHLORIDE 20 MEQ: 29.8 INJECTION, SOLUTION INTRAVENOUS at 04:30

## 2020-10-11 ASSESSMENT — ACTIVITIES OF DAILY LIVING (ADL)
ADLS_ACUITY_SCORE: 12

## 2020-10-11 ASSESSMENT — MIFFLIN-ST. JEOR
SCORE: 1291.94
SCORE: 1302

## 2020-10-11 NOTE — PLAN OF CARE
Afebrile. OVSS. Denies pain. Intermittently nauseated, controlled with scheduled antiemetics and ativan x1. Continues to have loose stools, c. Diff negative, imodium given x1. Cytoxan flush, patient has been meeting voiding parameters. She will receive one more dose of cytoxan this morning. Potassium 3.4, 20 mEq infused. Sodium lower, 132. Using bedside commode overnight. Up independently. Continue plan of care.           Problem: Adult Inpatient Plan of Care  Goal: Plan of Care Review  Outcome: No Change  Flowsheets (Taken 10/11/2020 0607)  Plan of Care Reviewed With: patient  Progress: no change     Problem: Adult Inpatient Plan of Care  Goal: Patient-Specific Goal (Individualization)  Outcome: No Change     Problem: Adult Inpatient Plan of Care  Goal: Absence of Hospital-Acquired Illness or Injury  Outcome: No Change     Problem: Adult Inpatient Plan of Care  Goal: Optimal Comfort and Wellbeing  Outcome: No Change     Problem: Adult Inpatient Plan of Care  Goal: Readiness for Transition of Care  Outcome: No Change     Problem: Adult Inpatient Plan of Care  Goal: Rounds/Family Conference  Outcome: No Change     Problem: Adjustment to Transplant (Stem Cell/Bone Marrow Transplant)  Goal: Optimal Coping with Transplant  Outcome: No Change     Problem: Bladder Irritation (Stem Cell/Bone Marrow Transplant)  Goal: Symptom-Free Urinary Elimination  Outcome: No Change     Problem: Diarrhea (Stem Cell/Bone Marrow Transplant)  Goal: Diarrhea Symptom Control  Outcome: No Change     Problem: Fatigue (Stem Cell/Bone Marrow Transplant)  Goal: Energy Level Supports Daily Activity  Outcome: No Change     Problem: Hematologic Alteration (Stem Cell/Bone Marrow Transplant)  Goal: Blood Counts Within Acceptable Range  Outcome: No Change     Problem: Hypersensitivity Reaction (Stem Cell/Bone Marrow Transplant)  Goal: Absence of Hypersensitivity Reaction  Outcome: No Change     Problem: Infection Risk (Stem Cell/Bone Marrow  Transplant)  Goal: Absence of Infection Signs/Symptoms  Outcome: No Change     Problem: Mucositis (Stem Cell/Bone Marrow Transplant)  Goal: Mucous Membrane Health and Integrity  Outcome: No Change     Problem: Nausea and Vomiting (Stem Cell/Bone Marrow Transplant)  Goal: Nausea and Vomiting Symptom Relief  Outcome: No Change     Problem: Nutrition Intake Altered (Stem Cell/Bone Marrow Transplant)  Goal: Optimal Nutrition Intake  Outcome: No Change

## 2020-10-11 NOTE — PROGRESS NOTES
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      Diagnosis AMLM1 Acute myelogeneous leukemia, M1, myeloblastic  HCT Type Allogeneic    Prep Regimen Fludarabine  Cytoxan  TBI   Donor Source Sibling    GVHD Prophylaxis Post transplant cytoxan  Tac/MMF  Primary BMT Provider ACE      INTERVAL  HISTORY     Feeling better today.  Itching around port site and incision.  No fevers, chills, nausea, vomiting, SOB, or abdominal pain.  Ordering food.  No LE swelling.  No mouth sores.       Review of Systems: 8 point ROS negative except as noted above.    PHYSICAL EXAM     Weight In/Out     Wt Readings from Last 3 Encounters:   10/10/20 77.9 kg (171 lb 11.2 oz)   09/17/20 77.8 kg (171 lb 8 oz)   09/10/20 77.2 kg (170 lb 3.1 oz)      I/O last 3 completed shifts:  In: 6370 [P.O.:600; I.V.:4235; IV Piggyback:1535]  Out: 6375 [Urine:6325; Stool:50]       KPS:  100    /67 (BP Location: Right arm)   Pulse 71   Temp 97.6  F (36.4  C) (Axillary)   Resp 16   Ht 1.68 m (5' 6.14\")   Wt 77.9 kg (171 lb 11.2 oz)   SpO2 96%   BMI 27.59 kg/m     General: NAD   Eyes:  LINH, sclera anicteric   Lungs: CTA bilaterally, normal WOB on RA   Cardiovascular: RRR, no M/R/G   Abdominal/Rectal: +BS, soft, NT, ND  Lymphatics: no edema b/l LE   Skin: erythematous rash under/around dressing on right chest wall, no tenderness, c/w contact dermatitis.   no drainage Not tender to palpation. No other rashes or petechaie  Neuro: A&O, CNII-XII grossly intact, normal speech and mentation  10/5/20 Current aGVHD staging:  Skin 0, UGI 0, LGI 0, Liver 0 (keep in note through day +180, delete if auto)    LABS AND IMAGING - PAST 24 HOURS     Lab Results   Component Value Date    WBC 0.3 (LL) 10/11/2020    ANEU 0.4 (LL) 10/08/2020    HGB 10.1 (L) 10/11/2020    HCT 29.9 (L) 10/11/2020    PLT 41 (LL) 10/11/2020     (L) 10/11/2020    POTASSIUM " 3.4 10/11/2020    CHLORIDE 101 10/11/2020    CO2 25 10/11/2020    GLC 88 10/11/2020    BUN 3 (L) 10/11/2020    CR 0.52 10/11/2020    MAG 2.2 10/10/2020    INR 0.95 10/05/2020     I have assessed all abnormal lab values for their clinical significance and any values considered clinically significant have been addressed in the assessment and plan  OVERALL PLAN   Florencia Gao is a 75 year old female with AML in CR1, admitted for JCARLOS Allo sib pbsct per protocol 2019-10, randomized to Post transplant cytoxan/Tac/MMF.  Day +4.      1.  BMT: Cy/flu/TBI prep.   Allopurinol given through day 0.   Received 4.19 x10(6) CD34/kg. Donor B pos and recipient B neg.   GCSF starts d+5 and cont to until ANC>1500 x3 consecutive days. Re-stage per protocol.     2.  HEME: Keep Hgb>8 and plts>10K.   - Of note has significant antibodies to RBCs- can cause delay in transfusion availability. Premeds tylenol and benadryl.                             3.  ID: Afebrile.   Cont Levo, Linda (vori to start day +7), and HD ACV (CMV+, HSV+, EBV+) prophy.   Pjp prophy to start day+28 - IV pentamdine vs dapsone as sulfa allergy.                                         4.  GI: nausea.  Zofran to chemo to prevent N/V. Changed zofran to kytril d/t headaches. Continue scheduled kytril and add compazine tid  in preparation for cytoxan over the weekend (had significant nausea/vomiting with it during prep).    - Ativan available PRN break-through symptoms.   - Change miralax to prn d/t stools loosening.   - **Per protocol no dex/steroids**   Protonix for GI prophy.    Ursodiol for VOD prophy.     5.  GVHD Prophylaxis:   Day +3 and day+4 cytoxan  Day+5 start tac and MMF      6.  FEN/Renal: Monitor creat and lytes.   - HypoNa:  Resolved.  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 Flush changed to NS for post transplant cytoxan 10/10-10/11.   - Replete lytes PRN per SS. Monitor weight, I+O, lytes per protocol with IVF flush.  - Calorie counts starting  10/8     7. Hypothyroidism  Continue synthroid 88mcg      8. Psych: Situational Depression continue effexor 75mg daily.     9.  Line: irritation/rash under and around dressing. Pt with allergy to chlorhexidine. Removed biopatch and trial of different dressing.   - Wound consulted 10/6. Awaiting recs. Nursing will follow up with wound care.   - pt would like to wait until wound sees her to consider topical steroid or benadryl cream.    10. Research: Pt consented to MT2019-31 (Seamless randomized tiral to alleviate morbidity and mortality) for which she was randomized to the palliative care management arm. Palliative care consult placed 10/8 per research recs- see note dated 10/8.     Dispo: patient will remain admitted through neutropenia and subsequent engraftment.     Patient seen and discussed with Dr. Nilay Bautista.     Nader Aponte MD, PhD  Hematology/Oncology Fellow  Pager: [phone]    BMT Staff:  The patient was discussed on morning rounds with the nurses, mid level provider, and house staff and seen and examined by me. All labs and imaging were reviewed. I reviewed events over the last 24 hours including vitals and flow sheets. I agree with the above note and have been responsible for the care plan and interpretation of progress.     Subjective:  Reports feeling overall well, controlled nausea, no abd pain, no F/C/NS, no  complaints, no URI or other respiratory symptoms, no HA/LH/Dizziness.      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  1. Heme/BMT: Day +4, transfusion as needed  2. ID: Afebrile  3. CV: no active issues  4. GI/ FEN: monitor regimen related toxicity, adjusting antiemetics, encourage fluid PO intake, I/O and daily weights  5. Renal: euvolemic, mild hyponatremia  6. PPx: Levo, donato/vori, ACV  7.  Hypothyroidism on replacement, situational depression on Effexor, PT: encourage ambulation     Chito Bautista MD

## 2020-10-11 NOTE — PROGRESS NOTES
Calorie Count  Intake recorded for: 10/10  Total Kcals: 736 Total Protein: 27g  Kcals from Hospital Food: 736  Protein: 27g  Kcals from Outside Food (average):0 Protein: 0g  # Meals Ordered from Kitchen: 1 meal ordered.  # Meals Recorded: 1 meal recorded. (100% cherrios w/ 1% milk, blueberry muffin, 25% pancake)  # Supplements Recorded: 100% Boost Plus

## 2020-10-12 ENCOUNTER — DOCUMENTATION ONLY (OUTPATIENT)
Dept: PHARMACY | Facility: CLINIC | Age: 75
End: 2020-10-12

## 2020-10-12 ENCOUNTER — DOCUMENTATION ONLY (OUTPATIENT)
Dept: OTHER | Facility: CLINIC | Age: 75
End: 2020-10-12

## 2020-10-12 ENCOUNTER — AMBULATORY - HEALTHEAST (OUTPATIENT)
Dept: OTHER | Facility: CLINIC | Age: 75
End: 2020-10-12

## 2020-10-12 LAB
ABO + RH BLD: ABNORMAL
ABO + RH BLD: ABNORMAL
ALBUMIN SERPL-MCNC: 2.8 G/DL (ref 3.4–5)
ALP SERPL-CCNC: 62 U/L (ref 40–150)
ALT SERPL W P-5'-P-CCNC: 15 U/L (ref 0–50)
ANION GAP SERPL CALCULATED.3IONS-SCNC: 4 MMOL/L (ref 3–14)
AST SERPL W P-5'-P-CCNC: 14 U/L (ref 0–45)
BILIRUB DIRECT SERPL-MCNC: <0.1 MG/DL (ref 0–0.2)
BILIRUB SERPL-MCNC: 0.2 MG/DL (ref 0.2–1.3)
BLD GP AB SCN SERPL QL: ABNORMAL
BLOOD BANK CMNT PATIENT-IMP: ABNORMAL
BLOOD BANK CMNT PATIENT-IMP: ABNORMAL
BUN SERPL-MCNC: 2 MG/DL (ref 7–30)
CALCIUM SERPL-MCNC: 7.8 MG/DL (ref 8.5–10.1)
CHLORIDE SERPL-SCNC: 105 MMOL/L (ref 94–109)
CO2 SERPL-SCNC: 26 MMOL/L (ref 20–32)
CREAT SERPL-MCNC: 0.55 MG/DL (ref 0.52–1.04)
DIFFERENTIAL METHOD BLD: ABNORMAL
ERYTHROCYTE [DISTWIDTH] IN BLOOD BY AUTOMATED COUNT: 11.9 % (ref 10–15)
GFR SERPL CREATININE-BSD FRML MDRD: >90 ML/MIN/{1.73_M2}
GLUCOSE SERPL-MCNC: 91 MG/DL (ref 70–99)
HCT VFR BLD AUTO: 27.4 % (ref 35–47)
HGB BLD-MCNC: 9.5 G/DL (ref 11.7–15.7)
INR PPP: 1.09 (ref 0.86–1.14)
LACTATE BLD-SCNC: 0.7 MMOL/L (ref 0.7–2)
MAGNESIUM SERPL-MCNC: 1.5 MG/DL (ref 1.6–2.3)
MAGNESIUM SERPL-MCNC: 2.8 MG/DL (ref 1.6–2.3)
MCH RBC QN AUTO: 30.6 PG (ref 26.5–33)
MCHC RBC AUTO-ENTMCNC: 34.7 G/DL (ref 31.5–36.5)
MCV RBC AUTO: 88 FL (ref 78–100)
PHOSPHATE SERPL-MCNC: 1.7 MG/DL (ref 2.5–4.5)
PHOSPHATE SERPL-MCNC: 2 MG/DL (ref 2.5–4.5)
PLATELET # BLD AUTO: 30 10E9/L (ref 150–450)
POTASSIUM SERPL-SCNC: 3.6 MMOL/L (ref 3.4–5.3)
POTASSIUM SERPL-SCNC: 3.8 MMOL/L (ref 3.4–5.3)
PROT SERPL-MCNC: 5.9 G/DL (ref 6.8–8.8)
RBC # BLD AUTO: 3.1 10E12/L (ref 3.8–5.2)
SODIUM SERPL-SCNC: 136 MMOL/L (ref 133–144)
SODIUM SERPL-SCNC: 136 MMOL/L (ref 133–144)
SPECIMEN EXP DATE BLD: ABNORMAL
WBC # BLD AUTO: 0.1 10E9/L (ref 4–11)

## 2020-10-12 PROCEDURE — 99233 SBSQ HOSP IP/OBS HIGH 50: CPT | Performed by: INTERNAL MEDICINE

## 2020-10-12 PROCEDURE — 250N000013 HC RX MED GY IP 250 OP 250 PS 637: Performed by: PHYSICIAN ASSISTANT

## 2020-10-12 PROCEDURE — 84295 ASSAY OF SERUM SODIUM: CPT | Performed by: PHYSICIAN ASSISTANT

## 2020-10-12 PROCEDURE — 85027 COMPLETE CBC AUTOMATED: CPT

## 2020-10-12 PROCEDURE — 86850 RBC ANTIBODY SCREEN: CPT | Performed by: PHYSICIAN ASSISTANT

## 2020-10-12 PROCEDURE — 83735 ASSAY OF MAGNESIUM: CPT | Performed by: INTERNAL MEDICINE

## 2020-10-12 PROCEDURE — 250N000009 HC RX 250: Performed by: PHYSICIAN ASSISTANT

## 2020-10-12 PROCEDURE — 258N000003 HC RX IP 258 OP 636: Performed by: INTERNAL MEDICINE

## 2020-10-12 PROCEDURE — 250N000011 HC RX IP 250 OP 636: Performed by: PHYSICIAN ASSISTANT

## 2020-10-12 PROCEDURE — 250N000011 HC RX IP 250 OP 636: Performed by: INTERNAL MEDICINE

## 2020-10-12 PROCEDURE — 80053 COMPREHEN METABOLIC PANEL: CPT | Performed by: PHYSICIAN ASSISTANT

## 2020-10-12 PROCEDURE — 84132 ASSAY OF SERUM POTASSIUM: CPT | Performed by: PHYSICIAN ASSISTANT

## 2020-10-12 PROCEDURE — 82248 BILIRUBIN DIRECT: CPT | Performed by: PHYSICIAN ASSISTANT

## 2020-10-12 PROCEDURE — 258N000003 HC RX IP 258 OP 636: Performed by: PHYSICIAN ASSISTANT

## 2020-10-12 PROCEDURE — 85025 COMPLETE CBC W/AUTO DIFF WBC: CPT | Performed by: PHYSICIAN ASSISTANT

## 2020-10-12 PROCEDURE — 206N000001 HC R&B BMT UMMC

## 2020-10-12 PROCEDURE — 86901 BLOOD TYPING SEROLOGIC RH(D): CPT | Performed by: PHYSICIAN ASSISTANT

## 2020-10-12 PROCEDURE — C9113 INJ PANTOPRAZOLE SODIUM, VIA: HCPCS | Performed by: PHYSICIAN ASSISTANT

## 2020-10-12 PROCEDURE — 84100 ASSAY OF PHOSPHORUS: CPT | Performed by: PHYSICIAN ASSISTANT

## 2020-10-12 PROCEDURE — 85610 PROTHROMBIN TIME: CPT | Performed by: PHYSICIAN ASSISTANT

## 2020-10-12 PROCEDURE — 83605 ASSAY OF LACTIC ACID: CPT | Performed by: INTERNAL MEDICINE

## 2020-10-12 PROCEDURE — 86900 BLOOD TYPING SEROLOGIC ABO: CPT | Performed by: PHYSICIAN ASSISTANT

## 2020-10-12 PROCEDURE — 83735 ASSAY OF MAGNESIUM: CPT | Performed by: PHYSICIAN ASSISTANT

## 2020-10-12 RX ORDER — GRANISETRON HYDROCHLORIDE 1 MG/ML
1 INJECTION INTRAVENOUS 2 TIMES DAILY
Status: DISCONTINUED | OUTPATIENT
Start: 2020-10-12 | End: 2020-10-21

## 2020-10-12 RX ORDER — OLANZAPINE 2.5 MG/1
2.5 TABLET, FILM COATED ORAL AT BEDTIME
Status: DISCONTINUED | OUTPATIENT
Start: 2020-10-12 | End: 2020-10-12

## 2020-10-12 RX ADMIN — POTASSIUM CHLORIDE 20 MEQ: 29.8 INJECTION, SOLUTION INTRAVENOUS at 04:48

## 2020-10-12 RX ADMIN — SODIUM CHLORIDE: 9 INJECTION, SOLUTION INTRAVENOUS at 03:08

## 2020-10-12 RX ADMIN — LORAZEPAM 0.5 MG: 2 INJECTION INTRAMUSCULAR; INTRAVENOUS at 13:18

## 2020-10-12 RX ADMIN — Medication 400 MCG: at 20:24

## 2020-10-12 RX ADMIN — LORAZEPAM 0.5 MG: 2 INJECTION INTRAMUSCULAR; INTRAVENOUS at 20:51

## 2020-10-12 RX ADMIN — DEXTROSE MONOHYDRATE 20 ML: 50 INJECTION, SOLUTION INTRAVENOUS at 20:24

## 2020-10-12 RX ADMIN — PROCHLORPERAZINE EDISYLATE 5 MG: 5 INJECTION INTRAMUSCULAR; INTRAVENOUS at 23:25

## 2020-10-12 RX ADMIN — LEVOFLOXACIN 250 MG: 250 TABLET, FILM COATED ORAL at 10:05

## 2020-10-12 RX ADMIN — POTASSIUM PHOSPHATE, MONOBASIC AND POTASSIUM PHOSPHATE, DIBASIC 20 MMOL: 224; 236 INJECTION, SOLUTION INTRAVENOUS at 10:05

## 2020-10-12 RX ADMIN — Medication 2.5 MG: at 21:15

## 2020-10-12 RX ADMIN — PROCHLORPERAZINE EDISYLATE 5 MG: 5 INJECTION INTRAMUSCULAR; INTRAVENOUS at 14:43

## 2020-10-12 RX ADMIN — URSODIOL 300 MG: 300 CAPSULE ORAL at 08:47

## 2020-10-12 RX ADMIN — PROCHLORPERAZINE EDISYLATE 5 MG: 5 INJECTION INTRAMUSCULAR; INTRAVENOUS at 10:04

## 2020-10-12 RX ADMIN — ACYCLOVIR SODIUM 700 MG: 50 INJECTION, SOLUTION INTRAVENOUS at 18:05

## 2020-10-12 RX ADMIN — GRANISETRON HYDROCHLORIDE 1 MG: 1 INJECTION INTRAVENOUS at 08:45

## 2020-10-12 RX ADMIN — PANTOPRAZOLE SODIUM 40 MG: 40 INJECTION, POWDER, FOR SOLUTION INTRAVENOUS at 10:04

## 2020-10-12 RX ADMIN — POTASSIUM CHLORIDE 20 MEQ: 29.8 INJECTION, SOLUTION INTRAVENOUS at 18:04

## 2020-10-12 RX ADMIN — POTASSIUM PHOSPHATE, MONOBASIC AND POTASSIUM PHOSPHATE, DIBASIC 15 MMOL: 224; 236 INJECTION, SOLUTION INTRAVENOUS at 21:25

## 2020-10-12 RX ADMIN — URSODIOL 300 MG: 300 CAPSULE ORAL at 20:24

## 2020-10-12 RX ADMIN — TACROLIMUS 97 MCG/HR: 5 INJECTION, SOLUTION INTRAVENOUS at 08:49

## 2020-10-12 RX ADMIN — VENLAFAXINE 75 MG: 75 TABLET ORAL at 14:32

## 2020-10-12 RX ADMIN — MAGNESIUM SULFATE IN WATER 4 G: 40 INJECTION, SOLUTION INTRAVENOUS at 06:16

## 2020-10-12 RX ADMIN — LEVOTHYROXINE SODIUM 88 MCG: 0.09 TABLET ORAL at 08:47

## 2020-10-12 RX ADMIN — GRANISETRON HYDROCHLORIDE 1 MG: 1 INJECTION INTRAVENOUS at 20:24

## 2020-10-12 RX ADMIN — TACROLIMUS 97 MCG/HR: 5 INJECTION, SOLUTION INTRAVENOUS at 20:24

## 2020-10-12 RX ADMIN — MYCOPHENOLATE MOFETIL 1000 MG: 500 INJECTION, POWDER, LYOPHILIZED, FOR SOLUTION INTRAVENOUS at 06:15

## 2020-10-12 RX ADMIN — MYCOPHENOLATE MOFETIL 1000 MG: 500 INJECTION, POWDER, LYOPHILIZED, FOR SOLUTION INTRAVENOUS at 14:32

## 2020-10-12 RX ADMIN — ACYCLOVIR SODIUM 700 MG: 50 INJECTION, SOLUTION INTRAVENOUS at 11:14

## 2020-10-12 RX ADMIN — MICAFUNGIN SODIUM 100 MG: 50 INJECTION, POWDER, LYOPHILIZED, FOR SOLUTION INTRAVENOUS at 20:24

## 2020-10-12 RX ADMIN — URSODIOL 300 MG: 300 CAPSULE ORAL at 14:32

## 2020-10-12 RX ADMIN — MYCOPHENOLATE MOFETIL 1000 MG: 500 INJECTION, POWDER, LYOPHILIZED, FOR SOLUTION INTRAVENOUS at 21:12

## 2020-10-12 ASSESSMENT — ACTIVITIES OF DAILY LIVING (ADL)
ADLS_ACUITY_SCORE: 12

## 2020-10-12 NOTE — PLAN OF CARE
Pt afebrile, OVSS on RA. Cytoxan infused, tolerated well. Flush and mesna continuously infusing, pt is meeting voiding parameters. 10 mg lasix this morning for 1 missed void. Ativan x2, emesis x1. Imodium x2, diarrhea improving. K 3.1, 20 meq infusing and will need 20 meq more. Na 132. Pt is up ind. No further complaints.     Problem: Adult Inpatient Plan of Care  Goal: Plan of Care Review  Outcome: No Change  Goal: Optimal Comfort and Wellbeing  Outcome: No Change     Problem: Diarrhea (Stem Cell/Bone Marrow Transplant)  Goal: Diarrhea Symptom Control  Outcome: No Change  Intervention: Manage Diarrhea  Recent Flowsheet Documentation  Taken 10/11/2020 0800 by Sana Petersen RN  Fluid/Electrolyte Management: fluids provided     Problem: Hematologic Alteration (Stem Cell/Bone Marrow Transplant)  Goal: Blood Counts Within Acceptable Range  Outcome: No Change     Problem: Nausea and Vomiting (Stem Cell/Bone Marrow Transplant)  Goal: Nausea and Vomiting Symptom Relief  Outcome: No Change     Problem: Nutrition Intake Altered (Stem Cell/Bone Marrow Transplant)  Goal: Optimal Nutrition Intake  Outcome: No Change

## 2020-10-12 NOTE — PROGRESS NOTES
Prior Authorization Approval    mycophenolate 500mg tablets  Date Initiated: 10/12/2020  Date Completed: 10/12/2020  Prior Auth Type: B vs D                Status: Approved    Effective Date: 09/12/2020 - 12/31/2099  Copay: 18.00     Filling Pharmacy: [facility] - [place], [place] - [address] [place] [address]    Insurance: MED[employer] - Phone [phone] Fax [phone]  ID: [id number]  Case Number: [id number]   Submitted Via: Abi Jon  [facility] Liaison  Ph: [phone] Page: [phone]

## 2020-10-12 NOTE — PROGRESS NOTES
Stop time on MAR & chart I & O  Chemo drug: Cytoxan  Tolerated: tolerated well. Blood return present before and after.   Plan: Continue to flush   Lab: Na: 132  K: 3.1      Cytoxan Primer  Cytoxan infused at 1000  Mesna infusing at _45_ and ends_0800 tomorrow _  Flush infusing at _225_ and ends 1200 tomrrow __

## 2020-10-12 NOTE — PROGRESS NOTES
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      Diagnosis AMLM1 Acute myelogeneous leukemia, M1, myeloblastic  HCT Type Allogeneic    Prep Regimen Fludarabine  Cytoxan  TBI   Donor Source Sibling    GVHD Prophylaxis Post transplant cytoxan  Tac/MMF  Primary BMT Provider ACE      INTERVAL  HISTORY   Quite nauseated this morning, would like some pills switched to IV. No vomiting. Loose stools seem better to resolved. No abdominal pain. Blowing her nose due to PND this morning. No SOB or congestions. No mouth pain or throat pain.   Review of Systems: 8 point ROS negative except as noted above.    PHYSICAL EXAM     Weight In/Out     Wt Readings from Last 3 Encounters:   10/11/20 77.8 kg (171 lb 8.3 oz)   09/17/20 77.8 kg (171 lb 8 oz)   09/10/20 77.2 kg (170 lb 3.1 oz)      I/O last 3 completed shifts:  In: 5318 [I.V.:4238; IV Piggyback:1080]  Out: 5650 [Urine:5650]       KPS:  100    /72 (BP Location: Right arm)   Pulse 83   Temp 98.1  F (36.7  C) (Axillary)   Resp 18   Ht 1.68 m (5' 6.14\")   Wt 77.8 kg (171 lb 8.3 oz)   SpO2 95%   BMI 27.57 kg/m     General: NAD   Eyes:  LINH, sclera anicteric   Lungs: CTA bilaterally, normal WOB on RA   Cardiovascular: RRR, no M/R/G   Abdominal/Rectal: +BS, soft, NT, ND  Lymphatics: no edema b/l LE   Skin: erythematous rash under/around dressing on right chest wall, no tenderness, c/w contact dermatitis.  Not tender to palpation. No other rashes or petechaie (not examined 10/12)  Neuro: A&O x3  10/5/20 Current aGVHD staging:  Skin 0, UGI 0, LGI 0, Liver 0 (keep in note through day +180, delete if auto)    LABS AND IMAGING - PAST 24 HOURS     Lab Results   Component Value Date    WBC 0.1 (LL) 10/12/2020    ANEU 0.4 (LL) 10/08/2020    HGB 9.5 (L) 10/12/2020    HCT 27.4 (L) 10/12/2020    PLT 30 (LL) 10/12/2020     10/12/2020    POTASSIUM 3.6 10/12/2020    " CHLORIDE 105 10/12/2020    CO2 26 10/12/2020    GLC 91 10/12/2020    BUN 2 (L) 10/12/2020    CR 0.55 10/12/2020    MAG 1.5 (L) 10/12/2020    INR 1.09 10/12/2020     I have assessed all abnormal lab values for their clinical significance and any values considered clinically significant have been addressed in the assessment and plan  OVERALL PLAN   Florencia Gao is a 75 year old female with AML in CR1, admitted for JCARLOS Allo sib pbsct per protocol 2019-10, randomized to Post transplant cytoxan/Tac/MMF.  Day +5.      1.  BMT: Cy/flu/TBI prep.   Allopurinol given through day 0.   Received 4.19 x10(6) CD34/kg. Donor B pos and recipient B neg.   GCSF starts d+5 and cont to until ANC>1500 x3 consecutive days. Re-stage per protocol.     2.  HEME: Keep Hgb>8 and plts>10K.   - Of note has significant antibodies to RBCs- can cause delay in transfusion availability. Premeds tylenol and benadryl.                             3.  ID: Afebrile.   - Post nasal drip 10/12- consider RVP if develops other symptoms or spikes a fever.   Cont Levo, Linda (vori to start day +7), and HD ACV (CMV+, HSV+, EBV+) prophy. delay starting voriconazole if pt gets emend for nausea tomorrow.   Pjp prophy to start day+28 - IV pentamdine vs dapsone as sulfa allergy.                                         4.  GI: nausea.  Nausea: headaches with zofran. Significant nausea/vomiting with cytoxan. Scheduled kytril and comopazine tid. Changed some meds to IV 10/12 d/t nausea with pills. Add zyprexa 2.5mg ODT at bedtime.    - Ativan available PRN break-through symptoms.   - Change miralax to prn d/t stools loosening.   - **Per protocol no dex/steroids**   Protonix for GI prophy.    Ursodiol for VOD prophy.     5.  GVHD Prophylaxis:   Day +3 and day+4 cytoxan  Day+5 start tac and MMF, tac level 10/14/20     6.  FEN/Renal:   - Cr and lytes stable.    - HypoNa secondary to cytoxan. Resolved.   - Replete lytes PRN per SS. Monitor weight, I+O, lytes per protocol  with IVF flush.  - Calorie counts starting 10/8- approximately 700 calories. Monitor for the need for TPN.      7. Hypothyroidism  Continue synthroid 88mcg      8. Psych: Situational Depression continue effexor 75mg daily.     9.  Line: irritation/rash under and around dressing. Pt with allergy to chlorhexidine. Removed biopatch and trial of different dressing.     10. Research: Pt consented to [id number] (Seamless randomized tiral to alleviate morbidity and mortality) for which she was randomized to the palliative care management arm. Palliative care consult placed 10/8 per research recs- see note dated 10/8.     Dispo: patient will remain admitted through neutropenia and subsequent engraftment.     Denzel Magallon PA-C  [phone]    BMT Staff:  The patient was discussed on morning rounds with the nurses, mid level provider, and house staff and seen and examined by me. All labs and imaging were reviewed. I reviewed events over the last 24 hours including vitals and flow sheets. I agree with the above note and have been responsible for the care plan and interpretation of progress.     Subjective:  Reports feeling overall tired, reports nausea without vomiting, no abd pain, no F/C/NS, no  complaints, no URI or other respiratory symptoms, no HA/LH/Dizziness.      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  1. Heme/BMT: Day +5, transfusion as needed  2. ID: Afebrile  3. CV: no active  issues  4. GI/ FEN: monitor regimen related toxicity, adjusting antiemetics, encourage fluid PO intake, I/O and daily weights  5. Renal: euvolemic, mild hyponatremia resolved, mild hypokalemia replacement as needed  6. PPx: Levo, donato/vori, ACV  7.  Hypothyroidism on replacement, situational depression on Effexor, PT: encourage ambulation     Chito Bautista MD

## 2020-10-12 NOTE — PLAN OF CARE
"BP (!) 143/86 (BP Location: Right arm)   Pulse 110   Temp 98.3  F (36.8  C) (Axillary)   Resp 18   Ht 1.68 m (5' 6.14\")   Wt 77.8 kg (171 lb 8.3 oz)   SpO2 97%   BMI 27.57 kg/m      S- Admitted for preparative regimen prior to transplant.   B- AML s/p allo sib tx day +5.   A- Avss on RA. Alert & oriented. Pt up independently to BR. Pt reports that her nausea has become worse today. All antiemetics have been changed to IV - other meds that can be changed to IV have been changed. Continues to use aromatherapy and sea bands. Nausea only slightly improved and decreased with this med regimen. PRN ativan 0.5mg IV x 1, scheduled compazine 5mg x 2, scheduled kytril 1mg x 1. Calorie counts continue - only a few bites of applesauce today d/t nausea. Tac gtt started today @ 96mcg/hr (4.9ml/hr). Mag recheck - 2.8. NaK this after noon 136 & 3.8 - will receive 20mEq of K. Phos replaced with 20mmol - 2hr post recheck_____ - will need phos and mag rechecked in the AM again.   R/plan- Explore other options for management of nausea. Continue with POC.     Problem: Adult Inpatient Plan of Care  Goal: Plan of Care Review  Outcome: No Change     Problem: Adult Inpatient Plan of Care  Goal: Patient-Specific Goal (Individualization)  Outcome: No Change     Problem: Adjustment to Transplant (Stem Cell/Bone Marrow Transplant)  Goal: Optimal Coping with Transplant  Outcome: Change based on patient need/priority     Problem: Diarrhea (Stem Cell/Bone Marrow Transplant)  Goal: Diarrhea Symptom Control  Intervention: Manage Diarrhea  Recent Flowsheet Documentation  Taken 10/12/2020 0900 by Concetta Nicolas RN  Fluid/Electrolyte Management: fluids provided     "

## 2020-10-12 NOTE — PLAN OF CARE
Afebrile. OVSS. Denies pain. Ativan x1 for nausea. Cytoxan flush infusing. Lasix given x1 but otherwise has been meeting voiding parameters. Flush will finish today. Potassium replaced last night, recheck this morning 3.6, 20 meq infused. Magnesium 1.5, 4g infusing recheck at 10 am. K+ phos 1.7, 20 mmol ordered (in med fridge). Did not eat dinner last night due to no appetite. Diarrhea improving, no stools during shift. Up independently. Continue plan of care.         Problem: Adult Inpatient Plan of Care  Goal: Plan of Care Review  Outcome: No Change  Flowsheets (Taken 10/12/2020 0635)  Plan of Care Reviewed With: patient  Progress: no change     Problem: Adult Inpatient Plan of Care  Goal: Patient-Specific Goal (Individualization)  Outcome: No Change     Problem: Adult Inpatient Plan of Care  Goal: Absence of Hospital-Acquired Illness or Injury  Outcome: No Change     Problem: Adult Inpatient Plan of Care  Goal: Optimal Comfort and Wellbeing  Outcome: No Change     Problem: Adult Inpatient Plan of Care  Goal: Readiness for Transition of Care  Outcome: No Change     Problem: Adult Inpatient Plan of Care  Goal: Rounds/Family Conference  Outcome: No Change     Problem: Adjustment to Transplant (Stem Cell/Bone Marrow Transplant)  Goal: Optimal Coping with Transplant  Outcome: No Change     Problem: Bladder Irritation (Stem Cell/Bone Marrow Transplant)  Goal: Symptom-Free Urinary Elimination  Outcome: No Change     Problem: Fatigue (Stem Cell/Bone Marrow Transplant)  Goal: Energy Level Supports Daily Activity  Outcome: No Change     Problem: Hematologic Alteration (Stem Cell/Bone Marrow Transplant)  Goal: Blood Counts Within Acceptable Range  Outcome: No Change     Problem: Hypersensitivity Reaction (Stem Cell/Bone Marrow Transplant)  Goal: Absence of Hypersensitivity Reaction  Outcome: No Change     Problem: Infection Risk (Stem Cell/Bone Marrow Transplant)  Goal: Absence of Infection Signs/Symptoms  Outcome: No  Change     Problem: Mucositis (Stem Cell/Bone Marrow Transplant)  Goal: Mucous Membrane Health and Integrity  Outcome: No Change     Problem: Nausea and Vomiting (Stem Cell/Bone Marrow Transplant)  Goal: Nausea and Vomiting Symptom Relief  Outcome: No Change     Problem: Nutrition Intake Altered (Stem Cell/Bone Marrow Transplant)  Goal: Optimal Nutrition Intake  Outcome: No Change     Problem: Diarrhea (Stem Cell/Bone Marrow Transplant)  Goal: Diarrhea Symptom Control  Outcome: Improving

## 2020-10-12 NOTE — PLAN OF CARE
PT: Cx, attempted to see pt this morning however pt declining due to not feeling well, requesting PT check back tomorrow.

## 2020-10-12 NOTE — PROGRESS NOTES
Prior Authorization Approval    tacrolimus 1mg capsules  Date Initiated: 10/12/2020  Date Completed: 10/12/2020  Prior Auth Type: B vs D                Status: Approved    Effective Date: 09/12/2020 - 12/31/2099  Copay: 18.00     Filling Pharmacy: [facility] - [place], [place] - [address] [place] [address]    Insurance: MEDICA - Phone [phone] Fax [phone]  ID: [id number]  Case Number: [id number]   Submitted Via: Abi Jon  [facility] Liaison  Ph: [phone] Page: [phone]

## 2020-10-13 LAB
ANION GAP SERPL CALCULATED.3IONS-SCNC: 5 MMOL/L (ref 3–14)
BUN SERPL-MCNC: 2 MG/DL (ref 7–30)
CALCIUM SERPL-MCNC: 8.5 MG/DL (ref 8.5–10.1)
CHLORIDE SERPL-SCNC: 106 MMOL/L (ref 94–109)
CO2 SERPL-SCNC: 25 MMOL/L (ref 20–32)
CREAT SERPL-MCNC: 0.5 MG/DL (ref 0.52–1.04)
DIFFERENTIAL METHOD BLD: ABNORMAL
ERYTHROCYTE [DISTWIDTH] IN BLOOD BY AUTOMATED COUNT: 12 % (ref 10–15)
GFR SERPL CREATININE-BSD FRML MDRD: >90 ML/MIN/{1.73_M2}
GLUCOSE SERPL-MCNC: 92 MG/DL (ref 70–99)
HCT VFR BLD AUTO: 29.7 % (ref 35–47)
HGB BLD-MCNC: 10.2 G/DL (ref 11.7–15.7)
MAGNESIUM SERPL-MCNC: 2.3 MG/DL (ref 1.6–2.3)
MCH RBC QN AUTO: 30.6 PG (ref 26.5–33)
MCHC RBC AUTO-ENTMCNC: 34.3 G/DL (ref 31.5–36.5)
MCV RBC AUTO: 89 FL (ref 78–100)
PHOSPHATE SERPL-MCNC: 2.3 MG/DL (ref 2.5–4.5)
PLATELET # BLD AUTO: 21 10E9/L (ref 150–450)
POTASSIUM SERPL-SCNC: 3.8 MMOL/L (ref 3.4–5.3)
RBC # BLD AUTO: 3.33 10E12/L (ref 3.8–5.2)
SODIUM SERPL-SCNC: 136 MMOL/L (ref 133–144)
WBC # BLD AUTO: 0.1 10E9/L (ref 4–11)

## 2020-10-13 PROCEDURE — 99233 SBSQ HOSP IP/OBS HIGH 50: CPT | Performed by: INTERNAL MEDICINE

## 2020-10-13 PROCEDURE — 250N000011 HC RX IP 250 OP 636: Performed by: INTERNAL MEDICINE

## 2020-10-13 PROCEDURE — 83735 ASSAY OF MAGNESIUM: CPT | Performed by: PHYSICIAN ASSISTANT

## 2020-10-13 PROCEDURE — 85027 COMPLETE CBC AUTOMATED: CPT

## 2020-10-13 PROCEDURE — C9113 INJ PANTOPRAZOLE SODIUM, VIA: HCPCS | Performed by: PHYSICIAN ASSISTANT

## 2020-10-13 PROCEDURE — 84100 ASSAY OF PHOSPHORUS: CPT | Performed by: PHYSICIAN ASSISTANT

## 2020-10-13 PROCEDURE — 250N000013 HC RX MED GY IP 250 OP 250 PS 637: Performed by: PHYSICIAN ASSISTANT

## 2020-10-13 PROCEDURE — 250N000011 HC RX IP 250 OP 636: Performed by: PHYSICIAN ASSISTANT

## 2020-10-13 PROCEDURE — 250N000009 HC RX 250: Performed by: PHYSICIAN ASSISTANT

## 2020-10-13 PROCEDURE — 206N000001 HC R&B BMT UMMC

## 2020-10-13 PROCEDURE — 250N000013 HC RX MED GY IP 250 OP 250 PS 637: Performed by: INTERNAL MEDICINE

## 2020-10-13 PROCEDURE — 258N000003 HC RX IP 258 OP 636: Performed by: PHYSICIAN ASSISTANT

## 2020-10-13 PROCEDURE — 258N000003 HC RX IP 258 OP 636: Performed by: INTERNAL MEDICINE

## 2020-10-13 PROCEDURE — 80048 BASIC METABOLIC PNL TOTAL CA: CPT | Performed by: PHYSICIAN ASSISTANT

## 2020-10-13 RX ADMIN — PANTOPRAZOLE SODIUM 40 MG: 40 INJECTION, POWDER, FOR SOLUTION INTRAVENOUS at 08:37

## 2020-10-13 RX ADMIN — POTASSIUM CHLORIDE 20 MEQ: 29.8 INJECTION, SOLUTION INTRAVENOUS at 06:30

## 2020-10-13 RX ADMIN — LEVOTHYROXINE SODIUM 88 MCG: 0.09 TABLET ORAL at 08:37

## 2020-10-13 RX ADMIN — LEVOFLOXACIN 250 MG: 250 TABLET, FILM COATED ORAL at 10:11

## 2020-10-13 RX ADMIN — LORAZEPAM 1 MG: 2 INJECTION INTRAMUSCULAR; INTRAVENOUS at 06:30

## 2020-10-13 RX ADMIN — LORAZEPAM 0.5 MG: 2 INJECTION INTRAMUSCULAR; INTRAVENOUS at 20:58

## 2020-10-13 RX ADMIN — FOSAPREPITANT 150 MG: 150 INJECTION, POWDER, LYOPHILIZED, FOR SOLUTION INTRAVENOUS at 12:49

## 2020-10-13 RX ADMIN — TACROLIMUS 97 MCG/HR: 5 INJECTION, SOLUTION INTRAVENOUS at 20:58

## 2020-10-13 RX ADMIN — ACYCLOVIR SODIUM 700 MG: 50 INJECTION, SOLUTION INTRAVENOUS at 04:13

## 2020-10-13 RX ADMIN — DEXTROSE MONOHYDRATE 20 ML: 50 INJECTION, SOLUTION INTRAVENOUS at 20:58

## 2020-10-13 RX ADMIN — LORAZEPAM 1 MG: 2 INJECTION INTRAMUSCULAR; INTRAVENOUS at 01:20

## 2020-10-13 RX ADMIN — GRANISETRON HYDROCHLORIDE 1 MG: 1 INJECTION INTRAVENOUS at 19:52

## 2020-10-13 RX ADMIN — Medication 400 MCG: at 20:58

## 2020-10-13 RX ADMIN — MYCOPHENOLATE MOFETIL 1000 MG: 500 INJECTION, POWDER, LYOPHILIZED, FOR SOLUTION INTRAVENOUS at 14:47

## 2020-10-13 RX ADMIN — DEXTROSE MONOHYDRATE 20 ML: 50 INJECTION, SOLUTION INTRAVENOUS at 20:57

## 2020-10-13 RX ADMIN — MYCOPHENOLATE MOFETIL 1000 MG: 500 INJECTION, POWDER, LYOPHILIZED, FOR SOLUTION INTRAVENOUS at 22:20

## 2020-10-13 RX ADMIN — VENLAFAXINE 75 MG: 75 TABLET ORAL at 14:47

## 2020-10-13 RX ADMIN — MYCOPHENOLATE MOFETIL 1000 MG: 500 INJECTION, POWDER, LYOPHILIZED, FOR SOLUTION INTRAVENOUS at 06:30

## 2020-10-13 RX ADMIN — GRANISETRON HYDROCHLORIDE 1 MG: 1 INJECTION INTRAVENOUS at 08:37

## 2020-10-13 RX ADMIN — POTASSIUM PHOSPHATE, MONOBASIC AND POTASSIUM PHOSPHATE, DIBASIC 15 MMOL: 224; 236 INJECTION, SOLUTION INTRAVENOUS at 10:11

## 2020-10-13 RX ADMIN — ACYCLOVIR SODIUM 700 MG: 50 INJECTION, SOLUTION INTRAVENOUS at 10:11

## 2020-10-13 RX ADMIN — LOPERAMIDE HYDROCHLORIDE 2 MG: 2 CAPSULE ORAL at 21:33

## 2020-10-13 RX ADMIN — ACYCLOVIR SODIUM 700 MG: 50 INJECTION, SOLUTION INTRAVENOUS at 19:52

## 2020-10-13 RX ADMIN — URSODIOL 300 MG: 300 CAPSULE ORAL at 14:47

## 2020-10-13 RX ADMIN — Medication 2.5 MG: at 22:19

## 2020-10-13 RX ADMIN — URSODIOL 300 MG: 300 CAPSULE ORAL at 08:37

## 2020-10-13 RX ADMIN — URSODIOL 300 MG: 300 CAPSULE ORAL at 19:52

## 2020-10-13 RX ADMIN — MICAFUNGIN SODIUM 100 MG: 50 INJECTION, POWDER, LYOPHILIZED, FOR SOLUTION INTRAVENOUS at 20:58

## 2020-10-13 RX ADMIN — PROCHLORPERAZINE EDISYLATE 5 MG: 5 INJECTION INTRAMUSCULAR; INTRAVENOUS at 22:20

## 2020-10-13 ASSESSMENT — ACTIVITIES OF DAILY LIVING (ADL)
ADLS_ACUITY_SCORE: 12
ADLS_ACUITY_SCORE: 13
ADLS_ACUITY_SCORE: 12
ADLS_ACUITY_SCORE: 13
ADLS_ACUITY_SCORE: 12
ADLS_ACUITY_SCORE: 13

## 2020-10-13 ASSESSMENT — MIFFLIN-ST. JEOR: SCORE: 1278.79

## 2020-10-13 NOTE — PLAN OF CARE
"AVSS on home CPAP. Pt reports continuous nausea, requested ativan \"round the clock,\" given x3 overnight. Pt slept well between cares and reports minimal breakthrough nausea with interventions. 15mmol phos replaced overnight, recheck requires an additional 15mmol. 20mEq potassium infusing. No other replacements needed. Continue to monitor.     Problem: Nutrition Intake Altered (Stem Cell/Bone Marrow Transplant)  Goal: Optimal Nutrition Intake  Outcome: No Change     Problem: Adult Inpatient Plan of Care  Goal: Optimal Comfort and Wellbeing  Outcome: Improving     Problem: Nausea and Vomiting (Stem Cell/Bone Marrow Transplant)  Goal: Nausea and Vomiting Symptom Relief  Outcome: Improving     "

## 2020-10-13 NOTE — PROGRESS NOTES
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      Diagnosis AMLM1 Acute myelogeneous leukemia, M1, myeloblastic  HCT Type Allogeneic    Prep Regimen Fludarabine  Cytoxan  TBI   Donor Source Sibling    GVHD Prophylaxis Post transplant cytoxan  Tac/MMF  Primary BMT Provider ACE      INTERVAL  HISTORY   Per nursing notes nausea was very bothersome yesterday. Florencia is sleepy this morning after getting IV ativan but states her nausea is \"better\". No abdominal pain or diarrhea. Did not eat anything yesterday.     Review of Systems: 8 point ROS negative except as noted above.    PHYSICAL EXAM     Weight In/Out     Wt Readings from Last 3 Encounters:   10/13/20 75.5 kg (166 lb 6.4 oz)   09/17/20 77.8 kg (171 lb 8 oz)   09/10/20 77.2 kg (170 lb 3.1 oz)      I/O last 3 completed shifts:  In: 3991.3 [I.V.:3991.3]  Out: 4775 [Urine:4775]       KPS:  100    /66 (BP Location: Right arm)   Pulse 82   Temp 97  F (36.1  C) (Axillary)   Resp 18   Ht 1.68 m (5' 6.14\")   Wt 75.5 kg (166 lb 6.4 oz)   SpO2 95%   BMI 26.74 kg/m     General: NAD   Eyes:  LINH, sclera anicteric. Oral mucosa moist without ulceration.   Lungs: CTA bilaterally, normal WOB on RA   Cardiovascular: RRR, no M/R/G   Abdominal/Rectal: +BS, soft, NT, ND  Lymphatics: no edema b/l LE   Skin: mild residual irritation on superior border of dressing from previous reaction. Non- tender.   Neuro: A&O x3  Line: right CVC.   10/5/20 Current aGVHD staging:  Skin 0, UGI 0, LGI 0, Liver 0 (keep in note through day +180, delete if auto)    LABS AND IMAGING - PAST 24 HOURS     Lab Results   Component Value Date    WBC 0.1 (LL) 10/13/2020    ANEU 0.4 (LL) 10/08/2020    HGB 10.2 (L) 10/13/2020    HCT 29.7 (L) 10/13/2020    PLT 21 (LL) 10/13/2020     10/13/2020    POTASSIUM 3.8 10/13/2020    CHLORIDE 106 10/13/2020    CO2 25 10/13/2020    GLC 92 " 10/13/2020    BUN 2 (L) 10/13/2020    CR 0.50 (L) 10/13/2020    MAG 2.3 10/13/2020    INR 1.09 10/12/2020     I have assessed all abnormal lab values for their clinical significance and any values considered clinically significant have been addressed in the assessment and plan  OVERALL PLAN   Florencia Gao is a 75 year old female with AML in CR1, admitted for JCARLOS Allo sib pbsct per protocol 2019-10, randomized to Post transplant cytoxan/Tac/MMF.  Day +6.      1.  BMT: Cy/flu/TBI prep.   Allopurinol given through day 0.   Received 4.19 x10(6) CD34/kg. Donor B pos and recipient B neg.   GCSF started d+5 and cont to until ANC>1500 x3 consecutive days. Re-stage per protocol.     2.  HEME: Keep Hgb>8 and plts>10K.   - Of note has significant antibodies to RBCs- can cause delay in transfusion availability. Premeds tylenol and benadryl.                             3.  ID: Afebrile.   Cont Levo, Linda (vori to start day +7), and HD ACV (CMV+, HSV+, EBV+) prophy.   Pjp prophy to start day+28 - IV pentamdine vs dapsone as sulfa allergy.                                         4.  GI: nausea.  Nausea: headaches with zofran. Significant nausea/vomiting with cytoxan. Scheduled kytril and comopazine tid. Changed some meds to IV 10/12 d/t nausea with pills. Added zyprexa 2.5mg ODT at bedtime. Give emend 10/13.   - Ativan available PRN break-through symptoms.   - **Per protocol no dex/steroids**   Protonix for GI prophy.    Ursodiol for VOD prophy.     5.  GVHD Prophylaxis:   Day +3 and day+4 cytoxan  Day+5 start tac and MMF, tac level 10/14/20 prior to starting voriconazole.      6.  FEN/Renal:   - Cr and lytes stable.    - HypoNa secondary to cytoxan. Resolved.   - Replete lytes PRN per SS. Monitor weight, I+O, lytes per protocol with IVF flush.  - Anorexia and malnutrition in the context of acute illness: Calorie counts starting 10/8- eating minimally. Anticipate pt will require TPN.      7. Hypothyroidism  Continue synthroid  88mcg      8. Psych: Situational Depression continue effexor 75mg daily.     9.  Line: irritation/rash under and around dressing. Pt with allergy to chlorhexidine. Removed biopatch and trial of different dressing. Improved.      10. Research: Pt consented to [id number] (Seamless randomized tiral to alleviate morbidity and mortality) for which she was randomized to the palliative care management arm. Palliative care consult placed 10/8 per research recs- see note dated 10/8.     Dispo: patient will remain admitted through neutropenia and subsequent engraftment.     Denzel Magallon PA-C  [phone]    BMT Staff:  The patient was discussed on morning rounds with the nurses, mid level provider, and house staff and seen and examined by me. All labs and imaging were reviewed. I reviewed events over the last 24 hours including vitals and flow sheets. I agree with the above note and have been responsible for the care plan and interpretation of progress.     Subjective:  Reports better control of nausea this afternoon, no vomiting, no abd pain, no F/C/NS, no  complaints, no URI or other respiratory symptoms, no HA/LH/Dizziness.      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  1. Heme/BMT: Day +6, transfusion as needed  2. ID: Afebrile  3. CV: no active issues  4. GI/ FEN: monitor regimen related toxicity, adjusting antiemetics, encourage fluid PO intake, I/O and daily  weights, start calorie counts and consider TPN  5. Renal: euvolemic, mild hyponatremia resolved, mild hypokalemia replacement as needed  6. PPx: Levo, donato/vori, ACV  7.  Hypothyroidism on replacement, situational depression on Effexor, PT: encourage ambulation     Chito Bautista MD

## 2020-10-13 NOTE — PLAN OF CARE
"/77 (BP Location: Right arm)   Pulse 94   Temp 97.6  F (36.4  C) (Axillary)   Resp 18   Ht 1.68 m (5' 6.14\")   Wt 75.5 kg (166 lb 6.4 oz)   SpO2 96%   BMI 26.74 kg/m      S- Admitted for preparative regimen prior to transplant.   B- AML s/p allo sib tx day +6.   A- Avss on RA. Pt oriented but extremely sleepy all day today - likely from ativan/zyprexa at HS which has had lasting residual effects. Bed alarm on for safety - pt verbalized understanding d/t her drowsiness/general weakness. Pt up with SBA to shower. Emend added to med regimen today. Pt reports that nausea is better controlled today. Minimal food intake (1 pudding, 1 apple sauce, 1/4 of her chicken noodle soup). No BM today. Voiding spontaneously with good UOP. Tac gtt continues at 97mcg/hr (4.9ml/hr). Phos 15mmol replaced - recheck in the AM.   R/plan- Continue with POC.         Problem: Adult Inpatient Plan of Care  Goal: Plan of Care Review  Outcome: No Change     Problem: Adult Inpatient Plan of Care  Goal: Patient-Specific Goal (Individualization)  Outcome: No Change     Problem: Adult Inpatient Plan of Care  Goal: Absence of Hospital-Acquired Illness or Injury  Outcome: No Change     Problem: Adult Inpatient Plan of Care  Goal: Optimal Comfort and Wellbeing  Outcome: No Change     Problem: Fatigue (Stem Cell/Bone Marrow Transplant)  Goal: Energy Level Supports Daily Activity  Outcome: No Change     "

## 2020-10-13 NOTE — PROGRESS NOTES
Calorie Count  Intake recorded for: 10/12  Total Kcals: 0 Total Protein: 0g  Kcals from Hospital Food: 0   Protein: 0g  Kcals from Outside Food (average):0 Protein: 0g  # Meals Recorded: no meals ordered from kitchen, no intake recorded.   # Supplements Recorded: no intake recorded.

## 2020-10-14 LAB
ALBUMIN SERPL-MCNC: 2.8 G/DL (ref 3.4–5)
ALP SERPL-CCNC: 59 U/L (ref 40–150)
ALT SERPL W P-5'-P-CCNC: 15 U/L (ref 0–50)
ANION GAP SERPL CALCULATED.3IONS-SCNC: 5 MMOL/L (ref 3–14)
AST SERPL W P-5'-P-CCNC: 16 U/L (ref 0–45)
BILIRUB SERPL-MCNC: 0.3 MG/DL (ref 0.2–1.3)
BUN SERPL-MCNC: 4 MG/DL (ref 7–30)
CALCIUM SERPL-MCNC: 8.2 MG/DL (ref 8.5–10.1)
CHLORIDE SERPL-SCNC: 105 MMOL/L (ref 94–109)
CMV DNA SPEC NAA+PROBE-ACNC: NORMAL [IU]/ML
CMV DNA SPEC NAA+PROBE-LOG#: NORMAL {LOG_IU}/ML
CO2 SERPL-SCNC: 27 MMOL/L (ref 20–32)
CREAT SERPL-MCNC: 0.54 MG/DL (ref 0.52–1.04)
DIFFERENTIAL METHOD BLD: ABNORMAL
ERYTHROCYTE [DISTWIDTH] IN BLOOD BY AUTOMATED COUNT: 11.9 % (ref 10–15)
GFR SERPL CREATININE-BSD FRML MDRD: >90 ML/MIN/{1.73_M2}
GLUCOSE SERPL-MCNC: 87 MG/DL (ref 70–99)
HCT VFR BLD AUTO: 28.2 % (ref 35–47)
HGB BLD-MCNC: 9.5 G/DL (ref 11.7–15.7)
LACTATE BLD-SCNC: 1.3 MMOL/L (ref 0.7–2)
MAGNESIUM SERPL-MCNC: 1.6 MG/DL (ref 1.6–2.3)
MCH RBC QN AUTO: 30.2 PG (ref 26.5–33)
MCHC RBC AUTO-ENTMCNC: 33.7 G/DL (ref 31.5–36.5)
MCV RBC AUTO: 90 FL (ref 78–100)
PHOSPHATE SERPL-MCNC: 2.7 MG/DL (ref 2.5–4.5)
PLATELET # BLD AUTO: 16 10E9/L (ref 150–450)
POTASSIUM SERPL-SCNC: 3.7 MMOL/L (ref 3.4–5.3)
PROT SERPL-MCNC: 6 G/DL (ref 6.8–8.8)
RBC # BLD AUTO: 3.15 10E12/L (ref 3.8–5.2)
SODIUM SERPL-SCNC: 136 MMOL/L (ref 133–144)
SPECIMEN SOURCE: NORMAL
TACROLIMUS BLD-MCNC: 10 UG/L (ref 5–15)
TME LAST DOSE: NORMAL H
WBC # BLD AUTO: 0.1 10E9/L (ref 4–11)

## 2020-10-14 PROCEDURE — 250N000013 HC RX MED GY IP 250 OP 250 PS 637: Performed by: PHYSICIAN ASSISTANT

## 2020-10-14 PROCEDURE — 85027 COMPLETE CBC AUTOMATED: CPT

## 2020-10-14 PROCEDURE — 258N000003 HC RX IP 258 OP 636: Performed by: STUDENT IN AN ORGANIZED HEALTH CARE EDUCATION/TRAINING PROGRAM

## 2020-10-14 PROCEDURE — 99233 SBSQ HOSP IP/OBS HIGH 50: CPT | Performed by: INTERNAL MEDICINE

## 2020-10-14 PROCEDURE — C9113 INJ PANTOPRAZOLE SODIUM, VIA: HCPCS | Performed by: PHYSICIAN ASSISTANT

## 2020-10-14 PROCEDURE — 258N000003 HC RX IP 258 OP 636: Performed by: PHYSICIAN ASSISTANT

## 2020-10-14 PROCEDURE — 250N000011 HC RX IP 250 OP 636: Performed by: INTERNAL MEDICINE

## 2020-10-14 PROCEDURE — 250N000011 HC RX IP 250 OP 636: Performed by: STUDENT IN AN ORGANIZED HEALTH CARE EDUCATION/TRAINING PROGRAM

## 2020-10-14 PROCEDURE — 83605 ASSAY OF LACTIC ACID: CPT | Performed by: INTERNAL MEDICINE

## 2020-10-14 PROCEDURE — 84100 ASSAY OF PHOSPHORUS: CPT | Performed by: PHYSICIAN ASSISTANT

## 2020-10-14 PROCEDURE — 83735 ASSAY OF MAGNESIUM: CPT | Performed by: PHYSICIAN ASSISTANT

## 2020-10-14 PROCEDURE — 250N000011 HC RX IP 250 OP 636: Performed by: PHYSICIAN ASSISTANT

## 2020-10-14 PROCEDURE — 206N000001 HC R&B BMT UMMC

## 2020-10-14 PROCEDURE — 87581 M.PNEUMON DNA AMP PROBE: CPT | Performed by: PHYSICIAN ASSISTANT

## 2020-10-14 PROCEDURE — 250N000013 HC RX MED GY IP 250 OP 250 PS 637: Performed by: INTERNAL MEDICINE

## 2020-10-14 PROCEDURE — 80053 COMPREHEN METABOLIC PANEL: CPT | Performed by: PHYSICIAN ASSISTANT

## 2020-10-14 PROCEDURE — 87486 CHLMYD PNEUM DNA AMP PROBE: CPT | Performed by: PHYSICIAN ASSISTANT

## 2020-10-14 PROCEDURE — 87633 RESP VIRUS 12-25 TARGETS: CPT | Performed by: PHYSICIAN ASSISTANT

## 2020-10-14 PROCEDURE — 250N000009 HC RX 250: Performed by: PHYSICIAN ASSISTANT

## 2020-10-14 PROCEDURE — 80197 ASSAY OF TACROLIMUS: CPT | Performed by: PHYSICIAN ASSISTANT

## 2020-10-14 RX ORDER — DIPHENHYDRAMINE HCL 25 MG
25 CAPSULE ORAL
Status: DISCONTINUED | OUTPATIENT
Start: 2020-10-14 | End: 2020-10-28 | Stop reason: HOSPADM

## 2020-10-14 RX ORDER — LORATADINE 10 MG/1
10 TABLET ORAL DAILY
Status: DISCONTINUED | OUTPATIENT
Start: 2020-10-14 | End: 2020-10-28 | Stop reason: HOSPADM

## 2020-10-14 RX ADMIN — MYCOPHENOLATE MOFETIL 1000 MG: 500 INJECTION, POWDER, LYOPHILIZED, FOR SOLUTION INTRAVENOUS at 13:52

## 2020-10-14 RX ADMIN — VENLAFAXINE 75 MG: 75 TABLET ORAL at 13:52

## 2020-10-14 RX ADMIN — LEVOTHYROXINE SODIUM 88 MCG: 0.09 TABLET ORAL at 08:32

## 2020-10-14 RX ADMIN — URSODIOL 300 MG: 300 CAPSULE ORAL at 13:52

## 2020-10-14 RX ADMIN — LORATADINE 10 MG: 10 TABLET ORAL at 10:47

## 2020-10-14 RX ADMIN — GRANISETRON HYDROCHLORIDE 1 MG: 1 INJECTION INTRAVENOUS at 19:43

## 2020-10-14 RX ADMIN — PROCHLORPERAZINE EDISYLATE 5 MG: 5 INJECTION INTRAMUSCULAR; INTRAVENOUS at 22:08

## 2020-10-14 RX ADMIN — ACYCLOVIR SODIUM 700 MG: 50 INJECTION, SOLUTION INTRAVENOUS at 18:12

## 2020-10-14 RX ADMIN — LORAZEPAM 0.5 MG: 2 INJECTION INTRAMUSCULAR; INTRAVENOUS at 16:07

## 2020-10-14 RX ADMIN — URSODIOL 300 MG: 300 CAPSULE ORAL at 19:43

## 2020-10-14 RX ADMIN — VORICONAZOLE 200 MG: 200 TABLET, FILM COATED ORAL at 19:43

## 2020-10-14 RX ADMIN — GRANISETRON HYDROCHLORIDE 1 MG: 1 INJECTION INTRAVENOUS at 08:32

## 2020-10-14 RX ADMIN — LEVOFLOXACIN 250 MG: 250 TABLET, FILM COATED ORAL at 10:47

## 2020-10-14 RX ADMIN — DIPHENHYDRAMINE HYDROCHLORIDE 25 MG: 25 CAPSULE ORAL at 18:17

## 2020-10-14 RX ADMIN — TACROLIMUS 88 MCG/HR: 5 INJECTION, SOLUTION INTRAVENOUS at 19:43

## 2020-10-14 RX ADMIN — Medication 400 MCG: at 19:43

## 2020-10-14 RX ADMIN — MYCOPHENOLATE MOFETIL 1000 MG: 500 INJECTION, POWDER, LYOPHILIZED, FOR SOLUTION INTRAVENOUS at 22:08

## 2020-10-14 RX ADMIN — ACYCLOVIR SODIUM 700 MG: 50 INJECTION, SOLUTION INTRAVENOUS at 10:47

## 2020-10-14 RX ADMIN — MAGNESIUM SULFATE 2 G: 2 INJECTION INTRAVENOUS at 05:57

## 2020-10-14 RX ADMIN — PANTOPRAZOLE SODIUM 40 MG: 40 INJECTION, POWDER, FOR SOLUTION INTRAVENOUS at 08:32

## 2020-10-14 RX ADMIN — Medication 2.5 MG: at 22:08

## 2020-10-14 RX ADMIN — DEXTROSE MONOHYDRATE 20 ML: 50 INJECTION, SOLUTION INTRAVENOUS at 19:43

## 2020-10-14 RX ADMIN — MYCOPHENOLATE MOFETIL 1000 MG: 500 INJECTION, POWDER, LYOPHILIZED, FOR SOLUTION INTRAVENOUS at 05:57

## 2020-10-14 RX ADMIN — URSODIOL 300 MG: 300 CAPSULE ORAL at 08:32

## 2020-10-14 RX ADMIN — PROCHLORPERAZINE EDISYLATE 5 MG: 5 INJECTION INTRAMUSCULAR; INTRAVENOUS at 10:47

## 2020-10-14 RX ADMIN — ACYCLOVIR SODIUM 700 MG: 50 INJECTION, SOLUTION INTRAVENOUS at 03:16

## 2020-10-14 RX ADMIN — MAGNESIUM SULFATE 2 G: 2 INJECTION INTRAVENOUS at 16:10

## 2020-10-14 ASSESSMENT — ACTIVITIES OF DAILY LIVING (ADL)
ADLS_ACUITY_SCORE: 12

## 2020-10-14 ASSESSMENT — MIFFLIN-ST. JEOR: SCORE: 1288.76

## 2020-10-14 NOTE — PROGRESS NOTES
Calorie Count  Intake recorded for: 10/13  Total Kcals: 210 Total Protein: 7g  Kcals from Hospital Food: 210  Protein: 7g  Kcals from Outside Food (average):0 Protein: 0g  # Meals Ordered from Kitchen: 2 meals  # Meals Recorded: 1 meal (First - 100% pudding, 25% applesauce, chicken noodle soup w/ crackers, wheat roll w/ butter)  # Supplements Recorded: 0

## 2020-10-14 NOTE — PROGRESS NOTES
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     Interventions: Clinical  (CSW) met with Pt to assess coping, provide supportive counseling and assist with resources as needed. Pt shared that is doing well.  Pt processed that she had severe nausea the past several days, but is feeling better this morning. She discussed limiting getting out of bed on those days, but plans to \"make the most of the days\" she's feeling well. SW discussed mental health and coping. Pt denies symptoms of anxiety and reports her paul chi routine has been very helpful for her throughout her hospitalization. Pt discussed trying to limit the amount of visitors in her room for COVID precautions and declined reiki therapy at this time.   CSW provided empathic listening, validation of concerns, and encouragement. CSW encouraged Pt to contact CSW for support, questions and/or resources.     Assessment: Pt presented as pleasant and calm.  Pt appears to be coping appropriately at this time. Pt continues to be supported by her family.     Plan: CSW will continue to provide supportive counseling and assistance with resources as needed. CSW will continue to collaborate with multidisciplinary team regarding Pt's plan of care.     ROSSI Pabon, [facility]  Adult Blood & Marrow Transplant   Phone: [phone]  Pager: [phone]  "

## 2020-10-14 NOTE — PROGRESS NOTES
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      Diagnosis AMLM1 Acute myelogeneous leukemia, M1, myeloblastic  HCT Type Allogeneic    Prep Regimen Fludarabine  Cytoxan  TBI   Donor Source Sibling    GVHD Prophylaxis Post transplant cytoxan  Tac/MMF  Primary BMT Provider ACE      INTERVAL  HISTORY   Pt is up and in the chair this morning. Nausea is much better following emend yesterday. Actually ate tomato soup and peanut butter crackers yesterday which sat really well. Diarrhea is currently resolved. No abdominal pain. Has some PND for the last couple days. No hx of allergies. No sore throat, or cough.   Review of Systems: 8 point ROS negative except as noted above.    PHYSICAL EXAM     Weight In/Out     Wt Readings from Last 3 Encounters:   10/14/20 76.5 kg (168 lb 9.6 oz)   09/17/20 77.8 kg (171 lb 8 oz)   09/10/20 77.2 kg (170 lb 3.1 oz)      I/O last 3 completed shifts:  In: 2224.6 [P.O.:320; I.V.:1904.6]  Out: 2450 [Urine:2450]       KPS:  100    /75 (BP Location: Right arm)   Pulse 86   Temp 97.2  F (36.2  C) (Axillary)   Resp 16   Ht 1.68 m (5' 6.14\")   Wt 76.5 kg (168 lb 9.6 oz)   SpO2 97%   BMI 27.10 kg/m     General: NAD   Eyes:  LINH, sclera anicteric. Oral mucosa moist without ulceration or erythema.   Lungs: CTA bilaterally, normal WOB on RA   Cardiovascular: RRR, no M/R/G   Abdominal/Rectal: +BS, soft, NT, ND  Lymphatics: no edema b/l LE   Skin: mild residual irritation on superior border of dressing from previous reaction. Non- tender.   Neuro: A&O x3  Line: right CVC.   10/5/20 Current aGVHD staging:  Skin 0, UGI 0, LGI 0, Liver 0 (keep in note through day +180, delete if auto)    LABS AND IMAGING - PAST 24 HOURS     Lab Results   Component Value Date    WBC 0.1 (LL) 10/14/2020    ANEU 0.4 (LL) 10/08/2020    HGB 9.5 (L) 10/14/2020    HCT 28.2 (L) 10/14/2020    PLT 16 (LL) " 10/14/2020     10/14/2020    POTASSIUM 3.7 10/14/2020    CHLORIDE 105 10/14/2020    CO2 27 10/14/2020    GLC 87 10/14/2020    BUN 4 (L) 10/14/2020    CR 0.54 10/14/2020    MAG 1.6 10/14/2020    INR 1.09 10/12/2020     I have assessed all abnormal lab values for their clinical significance and any values considered clinically significant have been addressed in the assessment and plan  OVERALL PLAN   Florencia Gao is a 75 year old female with AML in CR1, admitted for JCARLOS Allo sib pbsct per protocol 2019-10, randomized to Post transplant cytoxan/Tac/MMF.  Day +7.      1.  BMT: Cy/flu/TBI prep.   Allopurinol given through day 0.   Received 4.19 x10(6) CD34/kg. Donor B pos and recipient B neg.   GCSF started d+5 and cont to until ANC>1500 x3 consecutive days. Re-stage per protocol.     2.  HEME: Keep Hgb>8 and plts>10K.   - Of note has significant antibodies to RBCs- can cause delay in transfusion availability. Premeds tylenol and benadryl.                             3.  ID: Afebrile.   RVP 10/14 d/t post-nasal drip. Start Claritin.   Cont Levo, Linda (vori to start day +7), and HD ACV (CMV+, HSV+, EBV+) prophy.   Pjp prophy to start day+28 - IV pentamdine vs dapsone as sulfa allergy.                                         4.  GI: nausea.  Nausea: headaches with zofran. Significant nausea/vomiting with cytoxan. Scheduled kytril and comopazine tid. Changed some meds to IV 10/12 d/t nausea with pills. Added zyprexa 2.5mg ODT at bedtime. s/p emend 10/13.   - Ativan available PRN break-through symptoms.   - **Per protocol no dex/steroids**   Protonix for GI prophy.    Ursodiol for VOD prophy.     5.  GVHD Prophylaxis:   Day +3 and day+4 cytoxan  Day+5 start tac and MMF, tac level today, will adjust as needed for starting voriconazole.      6.  FEN/Renal:   - Cr and lytes stable.    - HypoNa secondary to cytoxan. Resolved.   - Replete lytes PRN per SS. Monitor weight, I+O, lytes per protocol with IVF flush.  -  Anorexia and malnutrition in the context of acute illness: Calorie counts starting 10/8- eating minimally. Slightly better 10/14- monitor.      7. Hypothyroidism  Continue synthroid 88mcg      8. Psych: Situational Depression continue effexor 75mg daily.     9.  Line: irritation/rash under and around dressing. Pt with allergy to chlorhexidine. Removed biopatch and trial of different dressing. Improved.      10. Research: Pt consented to MT2019-31 (Seamless randomized tiral to alleviate morbidity and mortality) for which she was randomized to the palliative care management arm. Palliative care consult placed 10/8 per research recs- see note dated 10/8.     Dispo: patient will remain admitted through neutropenia and subsequent engraftment.     Denzel Magallon PA-C  [phone]    BMT Staff:  The patient was discussed on morning rounds with the nurses, mid level provider, and house staff and seen and examined by me. All labs and imaging were reviewed. I reviewed events over the last 24 hours including vitals and flow sheets. I agree with the above note and have been responsible for the care plan and interpretation of progress.     Subjective:  Reports feeling well today, better control of nausea today, no vomiting, no abd pain, no F/C/NS, no  complaints, new hip/bony pains mild, mild post nasal drip but no other respiratory symptoms, no HA/LH/Dizziness.      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  1. Heme/BMT: Day +7, transfusion as needed  2. ID: Afebrile  3. CV: no active issues  4. GI/ FEN: monitor regimen related toxicity, adjusting antiemetics, encourage fluid PO intake, I/O and daily weights, start calorie counts and consider TPN  5. Renal: euvolemic, mild hyponatremia resolved, mild hypokalemia replacement as needed  6. PPx: Levo, donato/vori, ACV  7.  Hypothyroidism on replacement, situational depression on Effexor, PT: encourage ambulation     Chito Bautista MD

## 2020-10-14 NOTE — PROGRESS NOTES
CLINICAL NUTRITION SERVICES - REASSESSMENT NOTE     Nutrition Prescription    RECOMMENDATIONS FOR MDs/PROVIDERS TO ORDER:  If pt unable to meet 60% minimum nutrition needs (945 kcal and 45 g PRO) over the next 3 days recommend starting TPN.     Malnutrition Status:    Unable to determine due to no nutrition focused physical assessment at this time. Pt is at risk for malnutrition.    Recommendations already ordered by Registered Dietitian (RD):  Reordered calorie counts     Future/Additional Recommendations:  1. Monitor PO intake.    2. If TPN becomes POC s/p BMT course recommend:  --Use dosing weight 63 kg  --Begin CPN, goal volume 960 ml/day (or max concentrated per PharmD) with initial 135g Dex daily (459kcal, GIR 1.5), 95g AA daily (380 kcal), and 250 ml 20% IV lipids daily.  Micro/Rx: Infuvite and MTE5  --ONLY once pt receives ~100% of initial continuous PN volume with K+/Mg++/Phos WNL, advance PN dex by 50-55 g every 1-2 days (pending lytes/Glu and Pharm D/RD discretion) to initial goal of 250g Dex (850 kcal) to increase provisions to 1730 kcals (27 kcal/kg/day), 1.5 g PRO/kg/day, GIR 2.7 with 29% kcals from Fat.     EVALUATION OF THE PROGRESS TOWARD GOALS   Diet: High Kcal/High Protein, PRN supplements  Calorie counts     Intake:   5-day calorie count average providing 390 kcals (6 kcal/kg) and 13 g PRO (0.2 g/kg), which meets 24% of estimated energy needs & 17% of estimated protein needs.   10/13: 210 kcal and 7 g PRO  10/12: 0 kcal and 0 g PRO  10/11: Not recorded   10/10: 736 kcal and 27 g PRO  10/9: 526 kcal and 19 g PRO  10/8: 479 kcal and 12 g PRO    Florencia reports that she is feeling a little better today although she still feels nauseous. She had just started to eat her breakfast while writer visited. Florencia reports that she tried Boost and would like to continue the PRN nutrition supplement order but doesn't want any scheduled supplements.      NEW FINDINGS   Weight: 76.5 kg on 10/14, weight loss of  1.5 kg (1.9%) since admit. Dry weight of 75.5 kg on 10/13, updated dosing weight.     Labs: reviewed    Meds:   Cellcept   Protonix  Compazine   Tacrolimus     GI: Nausea improving per chart review     MALNUTRITION  % Intake: </= 50% for >/= 5 days (severe)  % Weight Loss: Weight loss does not meet criteria  Subcutaneous Fat Loss: Unable to assess  Muscle Loss: Unable to assess  Fluid Accumulation/Edema: Unable to assess  Malnutrition Diagnosis: Unable to determine due to no NFPA. Pt at risk for malnutrition.   *Pt declined nutrition focused physical assessment at this time.     Previous Goals   Patient to consume % of nutritionally adequate meal trays TID, or the equivalent with supplements/snacks.  Evaluation: Not met    Previous Nutrition Diagnosis  Inadequate oral intake related to decreased appetite 2/2 nausea as evidenced by pt report.      Evaluation: No change    CURRENT NUTRITION DIAGNOSIS  Inadequate oral intake related to decreased appetite 2/2 nausea as evidenced by pt report and calorie counts showing intake meeting <50% nutrition needs.     INTERVENTIONS  Implementation  Nutrition Education: Discussed current PO intake and nausea. Encouraged small frequent meals of soft/bland foods. Discussed nutrition supplement options.   Collaboration with other providers: 5C rounds     Goals  Total avg nutritional intake to meet a minimum of 25 kcal/kg and 1.2 g PRO/kg daily (per dosing wt 63 kg).    Monitoring/Evaluation  Progress toward goals will be monitored and evaluated per protocol.    Dayanara Aquino RD, LD  5C/BMT RD pager [phone]

## 2020-10-14 NOTE — PLAN OF CARE
Afebrile, VSS. Using home CPAP overnight. Pt denies N/V and SOB. Pt had 1 loose stool and abdominal cramping last evening, PRN imodium x1. Tac infusing at 4.9ml/hr, red lumen TKO. Cap and line changed on purple line last evening. Pt on asa counts, nothing to eat or drink this shift. Continue with plan of care.       Problem: Adult Inpatient Plan of Care  Goal: Plan of Care Review  Outcome: No Change     Problem: Adult Inpatient Plan of Care  Goal: Patient-Specific Goal (Individualization)  Outcome: No Change     Problem: Adult Inpatient Plan of Care  Goal: Absence of Hospital-Acquired Illness or Injury  Outcome: No Change

## 2020-10-14 NOTE — PLAN OF CARE
Pt afebrile, OVSS on RA. Pt denies pain, SOB. Nausea intermittent but well controlled with the scheduled antiemetics, IV ativan x1. Pt reports post nasal drip, claritin started. Resp panel sent to lab, pending results. Mag replacement was clamped, restarted this shift. Sepsis triggered, Lactic acid 1.3. Pt is up ind.      Problem: Adult Inpatient Plan of Care  Goal: Plan of Care Review  Outcome: No Change  Goal: Optimal Comfort and Wellbeing  Outcome: No Change     Problem: Nausea and Vomiting (Stem Cell/Bone Marrow Transplant)  Goal: Nausea and Vomiting Symptom Relief  Outcome: No Change  Intervention: Prevent and Manage Nausea and Vomiting  Recent Flowsheet Documentation  Taken 10/14/2020 0800 by Sana Petersen RN  Nausea/Vomiting Interventions: antiemetic     Problem: Nutrition Intake Altered (Stem Cell/Bone Marrow Transplant)  Goal: Optimal Nutrition Intake  Outcome: No Change

## 2020-10-14 NOTE — PROGRESS NOTES
"Discussed with Pharmacy regarding decreasing Tacrolimus infusion to 88 mcg/hr as pending initiation of Voriconazole will increase circulating Tacorlimus. Last Tacrolimus level 10 and dubbed \"sweet spot\" per pharmacy recommendations. Order changed to 88 mcg/hr.      "

## 2020-10-15 ENCOUNTER — TELEPHONE (OUTPATIENT)
Dept: TRANSPLANT | Facility: CLINIC | Age: 75
End: 2020-10-15

## 2020-10-15 LAB
ABO + RH BLD: ABNORMAL
ABO + RH BLD: ABNORMAL
ALBUMIN SERPL-MCNC: 2.8 G/DL (ref 3.4–5)
ALP SERPL-CCNC: 57 U/L (ref 40–150)
ALT SERPL W P-5'-P-CCNC: 12 U/L (ref 0–50)
ANION GAP SERPL CALCULATED.3IONS-SCNC: 4 MMOL/L (ref 3–14)
AST SERPL W P-5'-P-CCNC: 11 U/L (ref 0–45)
BILIRUB SERPL-MCNC: 0.3 MG/DL (ref 0.2–1.3)
BLD GP AB SCN SERPL QL: ABNORMAL
BLOOD BANK CMNT PATIENT-IMP: ABNORMAL
BLOOD BANK CMNT PATIENT-IMP: ABNORMAL
BUN SERPL-MCNC: 2 MG/DL (ref 7–30)
C PNEUM DNA SPEC QL NAA+PROBE: NOT DETECTED
CALCIUM SERPL-MCNC: 8.5 MG/DL (ref 8.5–10.1)
CHLORIDE SERPL-SCNC: 103 MMOL/L (ref 94–109)
CO2 SERPL-SCNC: 29 MMOL/L (ref 20–32)
CREAT SERPL-MCNC: 0.54 MG/DL (ref 0.52–1.04)
DIFFERENTIAL METHOD BLD: ABNORMAL
ERYTHROCYTE [DISTWIDTH] IN BLOOD BY AUTOMATED COUNT: 11.8 % (ref 10–15)
FLUAV H1 2009 PAND RNA SPEC QL NAA+PROBE: NOT DETECTED
FLUAV H1 RNA SPEC QL NAA+PROBE: NOT DETECTED
FLUAV H3 RNA SPEC QL NAA+PROBE: NOT DETECTED
FLUAV RNA SPEC QL NAA+PROBE: NOT DETECTED
FLUBV RNA SPEC QL NAA+PROBE: NOT DETECTED
GFR SERPL CREATININE-BSD FRML MDRD: >90 ML/MIN/{1.73_M2}
GLUCOSE SERPL-MCNC: 81 MG/DL (ref 70–99)
HADV DNA SPEC QL NAA+PROBE: NOT DETECTED
HCOV PNL SPEC NAA+PROBE: NOT DETECTED
HCT VFR BLD AUTO: 26.8 % (ref 35–47)
HGB BLD-MCNC: 8.9 G/DL (ref 11.7–15.7)
HMPV RNA SPEC QL NAA+PROBE: NOT DETECTED
HPIV1 RNA SPEC QL NAA+PROBE: NOT DETECTED
HPIV2 RNA SPEC QL NAA+PROBE: NOT DETECTED
HPIV3 RNA SPEC QL NAA+PROBE: NOT DETECTED
HPIV4 RNA SPEC QL NAA+PROBE: NOT DETECTED
M PNEUMO DNA SPEC QL NAA+PROBE: NOT DETECTED
MAGNESIUM SERPL-MCNC: 1.8 MG/DL (ref 1.6–2.3)
MCH RBC QN AUTO: 29.2 PG (ref 26.5–33)
MCHC RBC AUTO-ENTMCNC: 33.2 G/DL (ref 31.5–36.5)
MCV RBC AUTO: 88 FL (ref 78–100)
MICROBIOLOGIST REVIEW: NORMAL
PLATELET # BLD AUTO: 12 10E9/L (ref 150–450)
POTASSIUM SERPL-SCNC: 3.6 MMOL/L (ref 3.4–5.3)
PROT SERPL-MCNC: 5.6 G/DL (ref 6.8–8.8)
RBC # BLD AUTO: 3.05 10E12/L (ref 3.8–5.2)
RSV RNA SPEC QL NAA+PROBE: NOT DETECTED
RSV RNA SPEC QL NAA+PROBE: NOT DETECTED
RV+EV RNA SPEC QL NAA+PROBE: NOT DETECTED
SODIUM SERPL-SCNC: 136 MMOL/L (ref 133–144)
SPECIMEN EXP DATE BLD: ABNORMAL
TACROLIMUS BLD-MCNC: 15.2 UG/L (ref 5–15)
TME LAST DOSE: ABNORMAL H
WBC # BLD AUTO: 0.1 10E9/L (ref 4–11)

## 2020-10-15 PROCEDURE — 250N000009 HC RX 250: Performed by: PHYSICIAN ASSISTANT

## 2020-10-15 PROCEDURE — 250N000011 HC RX IP 250 OP 636: Performed by: PHYSICIAN ASSISTANT

## 2020-10-15 PROCEDURE — 250N000013 HC RX MED GY IP 250 OP 250 PS 637: Performed by: PHYSICIAN ASSISTANT

## 2020-10-15 PROCEDURE — 80197 ASSAY OF TACROLIMUS: CPT | Performed by: PHYSICIAN ASSISTANT

## 2020-10-15 PROCEDURE — 258N000003 HC RX IP 258 OP 636: Performed by: INTERNAL MEDICINE

## 2020-10-15 PROCEDURE — 83735 ASSAY OF MAGNESIUM: CPT | Performed by: INTERNAL MEDICINE

## 2020-10-15 PROCEDURE — 85025 COMPLETE CBC W/AUTO DIFF WBC: CPT | Performed by: PHYSICIAN ASSISTANT

## 2020-10-15 PROCEDURE — C9113 INJ PANTOPRAZOLE SODIUM, VIA: HCPCS | Performed by: PHYSICIAN ASSISTANT

## 2020-10-15 PROCEDURE — 99001 SPECIMEN HANDLING PT-LAB: CPT | Performed by: INTERNAL MEDICINE

## 2020-10-15 PROCEDURE — 99233 SBSQ HOSP IP/OBS HIGH 50: CPT | Performed by: INTERNAL MEDICINE

## 2020-10-15 PROCEDURE — 258N000003 HC RX IP 258 OP 636: Performed by: PHYSICIAN ASSISTANT

## 2020-10-15 PROCEDURE — 250N000011 HC RX IP 250 OP 636: Performed by: INTERNAL MEDICINE

## 2020-10-15 PROCEDURE — 85027 COMPLETE CBC AUTOMATED: CPT

## 2020-10-15 PROCEDURE — 86901 BLOOD TYPING SEROLOGIC RH(D): CPT | Performed by: PHYSICIAN ASSISTANT

## 2020-10-15 PROCEDURE — 80053 COMPREHEN METABOLIC PANEL: CPT | Performed by: PHYSICIAN ASSISTANT

## 2020-10-15 PROCEDURE — 206N000001 HC R&B BMT UMMC

## 2020-10-15 PROCEDURE — 86900 BLOOD TYPING SEROLOGIC ABO: CPT | Performed by: PHYSICIAN ASSISTANT

## 2020-10-15 PROCEDURE — 86850 RBC ANTIBODY SCREEN: CPT | Performed by: PHYSICIAN ASSISTANT

## 2020-10-15 PROCEDURE — 83735 ASSAY OF MAGNESIUM: CPT | Performed by: PHYSICIAN ASSISTANT

## 2020-10-15 PROCEDURE — 999N001121 HC STATISTIC RESEARCH KIT COLLECTION: Performed by: INTERNAL MEDICINE

## 2020-10-15 PROCEDURE — 250N000013 HC RX MED GY IP 250 OP 250 PS 637: Performed by: INTERNAL MEDICINE

## 2020-10-15 RX ORDER — CLINDAMYCIN PHOSPHATE 10 MG/G
GEL TOPICAL 2 TIMES DAILY
Status: DISCONTINUED | OUTPATIENT
Start: 2020-10-15 | End: 2020-10-19

## 2020-10-15 RX ADMIN — CLINDAMYCIN PHOSPHATE: 10 GEL TOPICAL at 11:29

## 2020-10-15 RX ADMIN — PROCHLORPERAZINE EDISYLATE 5 MG: 5 INJECTION INTRAMUSCULAR; INTRAVENOUS at 15:39

## 2020-10-15 RX ADMIN — GRANISETRON HYDROCHLORIDE 1 MG: 1 INJECTION INTRAVENOUS at 20:29

## 2020-10-15 RX ADMIN — VORICONAZOLE 200 MG: 200 TABLET, FILM COATED ORAL at 07:54

## 2020-10-15 RX ADMIN — GRANISETRON HYDROCHLORIDE 1 MG: 1 INJECTION INTRAVENOUS at 07:54

## 2020-10-15 RX ADMIN — MAGNESIUM SULFATE 2 G: 2 INJECTION INTRAVENOUS at 07:07

## 2020-10-15 RX ADMIN — LEVOTHYROXINE SODIUM 88 MCG: 0.09 TABLET ORAL at 07:54

## 2020-10-15 RX ADMIN — PROCHLORPERAZINE EDISYLATE 5 MG: 5 INJECTION INTRAMUSCULAR; INTRAVENOUS at 10:27

## 2020-10-15 RX ADMIN — MYCOPHENOLATE MOFETIL 1000 MG: 500 INJECTION, POWDER, LYOPHILIZED, FOR SOLUTION INTRAVENOUS at 13:42

## 2020-10-15 RX ADMIN — URSODIOL 300 MG: 300 CAPSULE ORAL at 07:54

## 2020-10-15 RX ADMIN — ACYCLOVIR SODIUM 700 MG: 50 INJECTION, SOLUTION INTRAVENOUS at 03:05

## 2020-10-15 RX ADMIN — LEVOFLOXACIN 250 MG: 250 TABLET, FILM COATED ORAL at 10:27

## 2020-10-15 RX ADMIN — URSODIOL 300 MG: 300 CAPSULE ORAL at 20:29

## 2020-10-15 RX ADMIN — VORICONAZOLE 200 MG: 200 TABLET, FILM COATED ORAL at 20:29

## 2020-10-15 RX ADMIN — PROCHLORPERAZINE EDISYLATE 5 MG: 5 INJECTION INTRAMUSCULAR; INTRAVENOUS at 21:49

## 2020-10-15 RX ADMIN — Medication 2.5 MG: at 21:48

## 2020-10-15 RX ADMIN — PANTOPRAZOLE SODIUM 40 MG: 40 INJECTION, POWDER, FOR SOLUTION INTRAVENOUS at 07:54

## 2020-10-15 RX ADMIN — VENLAFAXINE 75 MG: 75 TABLET ORAL at 13:46

## 2020-10-15 RX ADMIN — URSODIOL 300 MG: 300 CAPSULE ORAL at 13:46

## 2020-10-15 RX ADMIN — MYCOPHENOLATE MOFETIL 1000 MG: 500 INJECTION, POWDER, LYOPHILIZED, FOR SOLUTION INTRAVENOUS at 05:59

## 2020-10-15 RX ADMIN — ACYCLOVIR SODIUM 700 MG: 50 INJECTION, SOLUTION INTRAVENOUS at 17:34

## 2020-10-15 RX ADMIN — DEXTROSE MONOHYDRATE 20 ML: 50 INJECTION, SOLUTION INTRAVENOUS at 21:06

## 2020-10-15 RX ADMIN — CLINDAMYCIN PHOSPHATE: 10 GEL TOPICAL at 20:32

## 2020-10-15 RX ADMIN — MYCOPHENOLATE MOFETIL 1000 MG: 500 INJECTION, POWDER, LYOPHILIZED, FOR SOLUTION INTRAVENOUS at 21:49

## 2020-10-15 RX ADMIN — LORATADINE 10 MG: 10 TABLET ORAL at 07:54

## 2020-10-15 RX ADMIN — DEXTROSE MONOHYDRATE 20 ML: 50 INJECTION, SOLUTION INTRAVENOUS at 20:13

## 2020-10-15 RX ADMIN — Medication 400 MCG: at 20:24

## 2020-10-15 RX ADMIN — TACROLIMUS 80 MCG/HR: 5 INJECTION, SOLUTION INTRAVENOUS at 20:16

## 2020-10-15 RX ADMIN — POTASSIUM CHLORIDE 20 MEQ: 29.8 INJECTION, SOLUTION INTRAVENOUS at 05:59

## 2020-10-15 RX ADMIN — ACYCLOVIR SODIUM 700 MG: 50 INJECTION, SOLUTION INTRAVENOUS at 11:26

## 2020-10-15 ASSESSMENT — ACTIVITIES OF DAILY LIVING (ADL)
ADLS_ACUITY_SCORE: 12

## 2020-10-15 ASSESSMENT — MIFFLIN-ST. JEOR: SCORE: 1301.01

## 2020-10-15 NOTE — PROGRESS NOTES
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      Diagnosis AMLM1 Acute myelogeneous leukemia, M1, myeloblastic  HCT Type Allogeneic    Prep Regimen Fludarabine  Cytoxan  TBI   Donor Source Sibling    GVHD Prophylaxis Post transplant cytoxan  Tac/MMF  Primary BMT Provider ACE      INTERVAL  HISTORY   Feels well this morning. The zyprexa seems to really help her nausea. Was able to eat a decent amount the last two days. No vomiting, diarrhea, or abdominal discomfort. PND better with claritin. Has a rash on her chest that showed up yesterday. Rash is itchy.      Review of Systems: 8 point ROS negative except as noted above.    PHYSICAL EXAM     Weight In/Out     Wt Readings from Last 3 Encounters:   10/14/20 76.5 kg (168 lb 9.6 oz)   09/17/20 77.8 kg (171 lb 8 oz)   09/10/20 77.2 kg (170 lb 3.1 oz)      I/O last 3 completed shifts:  In: 1304.9 [P.O.:400; I.V.:904.9]  Out: 3275 [Urine:3275]       KPS:  100    /64 (BP Location: Right arm)   Pulse 80   Temp 97.4  F (36.3  C) (Axillary)   Resp 18   Ht 1.68 m (5' 6.14\")   Wt 76.5 kg (168 lb 9.6 oz)   SpO2 96%   BMI 27.10 kg/m     General: NAD   Eyes:  LINH, sclera anicteric. Oral mucosa moist without ulceration or erythema.   Lungs: CTA bilaterally, normal WOB on RA   Cardiovascular: RRR, no M/R/G   Abdominal/Rectal: +BS, soft, NT, ND  Lymphatics: no edema b/l LE   Skin: rash on central chest, appears follicular, not raised. Small area of involvement on central back. No other rashes or petechiae. Non- tender.   Neuro: A&O x3  Line: right CVC.   10/5/20 Current aGVHD staging:  Skin 0, UGI 0, LGI 0, Liver 0 (keep in note through day +180, delete if auto)    LABS AND IMAGING - PAST 24 HOURS     Lab Results   Component Value Date    WBC 0.1 (LL) 10/15/2020    ANEU 0.4 (LL) 10/08/2020    HGB 8.9 (L) 10/15/2020    HCT 26.8 (L) 10/15/2020    PLT 12 (LL) " 10/15/2020     10/15/2020    POTASSIUM 3.6 10/15/2020    CHLORIDE 103 10/15/2020    CO2 29 10/15/2020    GLC 81 10/15/2020    BUN 2 (L) 10/15/2020    CR 0.54 10/15/2020    MAG 1.8 10/15/2020    INR 1.09 10/12/2020     I have assessed all abnormal lab values for their clinical significance and any values considered clinically significant have been addressed in the assessment and plan  OVERALL PLAN   Florencia Gao is a 75 year old female with AML in CR1, admitted for JCARLOS Allo sib pbsct per protocol 2019-10, randomized to Post transplant cytoxan/Tac/MMF.  Day +8.      1.  BMT: Cy/flu/TBI prep.   Allopurinol given through day 0.   Received 4.19 x10(6) CD34/kg. Donor B pos and recipient B neg.   GCSF started d+5 and cont to until ANC>1500 x3 consecutive days. Re-stage per protocol.     2.  HEME: Keep Hgb>8 and plts>10K.   - Of note has significant antibodies to RBCs- can cause delay in transfusion availability. Premeds tylenol and benadryl.                             3.  ID: Afebrile.   RVP 10/14 d/t post-nasal drip-pending. Started Claritin.   Cont Levo, Vori (started 10/14), and HD ACV (CMV+, HSV+, EBV+) prophy.   Pjp prophy to start day+28 - IV pentamdine vs dapsone as sulfa allergy.                                         4.  GI: nausea.  Nausea: headaches with zofran. Significant nausea/vomiting with cytoxan. Scheduled kytril and comopazine tid. Changed some meds to IV 10/12 d/t nausea with pills. Added zyprexa 2.5mg ODT at bedtime 10/12. s/p emend 10/13.   - Ativan available PRN break-through symptoms.   - **Per protocol no dex/steroids**   Protonix for GI prophy.    Ursodiol for VOD prophy.     5.  GVHD Prophylaxis:   Day +3 and day+4 cytoxan  Day+5 start tac and MMF, tac level 10.0, decreased to 88mcg/hr d/t voriconazole starting. Repeat level tomorrow.       6.  FEN/Renal:   - Cr and lytes stable.    - HypoNa secondary to cytoxan. Resolved.   - Replete lytes PRN per SS. Monitor weight, I+O, lytes per  protocol with IVF flush.  - Anorexia and malnutrition in the context of acute illness: Calorie counts starting 10/8- eating minimally. Slightly better- monitor.      7. Hypothyroidism  Continue synthroid 88mcg      8. Psych: Situational Depression continue effexor 75mg daily.     9.  Skin:   - Follicular rash 10/15 on chest: start clindamycin gel   - Previous irritation/rash under and around CVC dressing. Pt with allergy to chlorhexidine. Removed biopatch and trial of different dressing. Improved.      10. Research: Pt consented to MT2019-31 (Seamless randomized tiral to alleviate morbidity and mortality) for which she was randomized to the palliative care management arm. Palliative care consult placed 10/8 per research recs- see note dated 10/8.     Dispo: patient will remain admitted through neutropenia and subsequent engraftment.     Denzel Magallon PA-C  [phone]    BMT Staff:  The patient was discussed on morning rounds with the nurses, mid level provider, and house staff and seen and examined by me. All labs and imaging were reviewed. I reviewed events over the last 24 hours including vitals and flow sheets. I agree with the above note and have been responsible for the care plan and interpretation of progress.     Subjective:  Reports feeling well today, better control of nausea, no vomiting, no abd pain, no F/C/NS, no  complaints, hip/bony pains mild, mild post nasal drip but no other respiratory symptoms, no HA/LH/Dizziness.      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  1. Heme/BMT: Day +8, transfusion as needed, Pancytopenia secondary to Chemo   2. ID: Afebrile  3. CV: no active issues  4. GI/ FEN: monitor regimen related toxicity, adjusting antiemetics, encourage fluid PO intake, I/O and daily weights, start calorie counts and consider TPN, Mild Protein-Calorie Malnutrition   5. Renal: euvolemic, mild hyponatremia resolved, mild hypokalemia replacement as needed  6. PPx: Levo, donato/vori, ACV  7.  Hypothyroidism on replacement, situational depression on Effexor, PT: encourage ambulation     Chito Bautista MD

## 2020-10-15 NOTE — PROGRESS NOTES
[facility]  Palliative Care Daily Progress Note       Recommendations & Counseling       Nausea - well controlled with zyprexa. Continue compazine, zyprexa and Ganisetron. When nausea well controlled for several days in a row, would try to stop the compazine first as this could be making her tired during the day.     Anxiety: Florencia feels her anxiety is well controlled. Continue PTA effexor 75mg every day.     Recommend changing lorazepam to PO only and changing from q4hrs PRN to TID PRN. Pt not using very often and because she is eating well, no need for IV.     Continue spiritual support with . Florencia finds this very helpful.         Assessments          Florencia Gao is a 75 year old female with a history of general anxiety disorder, chronic constipation, was recently diagnosed with AML.  She received reduced intensity conditioning followed by allo sib peripheral blood stem cell transplantation and randomized to post transplant cytoxan/Tac/MMF/.  palliative care following for support.      Today, the patient was seen for:  AML  Emotional support  General anxiety disorder  nausea  Anxiety     Prognosis, Goals, or Advance Care Planning was addressed today with: No.     Mood, coping, and/or meaning in the context of serious illness were addressed today: Yes.              Interval History:     Chart review/discussion with unit or clinical team members:   No acute events over night.     Per patient or family/caregivers today:  Did not sleep well last night but catching naps. Feels tired.  Nausea controlled well. Eating well. Enjoying food here.   treatment - platelets low so just got a platelet transfusion. Hasn't been doing PT as much as she should but trying.   Spirits and mood is good. Talks to her sister everyday and best friend who is a  and has a brother she can talk with and many friends she talks with and texts with.   Her daughter-in-law is  an RN and is her designated visitor and wonderful   Has  visits with Chel and that has been wonderful.   Anxiety is controlled. Taking effexor everyday and that control anxiety.   Naturally optimistic and feels that is helping her tremendously and the power of prayer and medical team is so supportive.     Key Palliative Symptoms:   Pain location: had hemorrhoid issue and that is still a problem, with pain.  Sore today in rectum and cream helping.   # Pain severity the last 12 hours: low  # Dyspnea severity the last 12 hours: none  # Nausea severity the last 12 hours: none  # Anxiety severity the last 12 hours: low             Review of Systems:     Besides above, a complete 10+ ROS was reviewed and is unremarkable           Medications:     I have reviewed this patient's medication profile and medications during this hospitalization.    Noted meds:    Granisetron 1mg IV BID  Olanzapine 2.5mg at bedtime  Compazine 5mg TID    Effexor 75mg every day    lorazepam 0.5-1mg IV or PO q4hrs PRN              Physical Exam:   Vitals were reviewed  Temp: 98.4  F (36.9  C) Temp src: Oral BP: 111/57 Pulse: 86   Resp: 16 SpO2: 97 % O2 Device: None (Room air)    Gen: voice clear, strong, appropriate, engaged, sensorium intact             Data Reviewed:     Reviewed recent labs and pertinent imaging        Thank you for the opportunity to continue to participate in the care of this patient and family.  Please feel free to contact on-call palliative provider with any emergent needs.  We can be reached via team pager [phone] (answered 8-4:30 Monday-Friday); after-hours answering service ([phone]);     Violeta Rodriguez MD / Palliative Medicine / Pager [phone] / [facility] Inpatient Team Consult Pager [phone] (answered 8am-430pm M-F) - ok to text page via [place] / After-Hours Answering Service [phone] / Palliative Clinic in the [facility] [place] [facility] [place] [phone] (scheduling); [phone]  (triage).  Total time spent was 20 minutes by telephone.   >50% of time was spent counseling and/or coordination of care regarding symptom management and support.

## 2020-10-15 NOTE — PLAN OF CARE
Afebrile. OVSS. Denies pain. Nausea controlled with scheduled antiemetics. Tac gtt infusing at 4.4 ml/hr. Patient has a new pink/red rash across her upper chest, complaints of itchiness, MD notified and prn benadryl ordered. Potassium 3.6, 20 meq infused. Magnesium 1.8, 2g infusing. Research blood/urine collected and sent to lab. Research stool still needs to be collected. Ate some vanilla ice cream last night. Up independently. Continue plan of care.         Problem: Adult Inpatient Plan of Care  Goal: Plan of Care Review  Outcome: No Change  Flowsheets (Taken 10/15/2020 0537)  Plan of Care Reviewed With: patient     Problem: Adult Inpatient Plan of Care  Goal: Patient-Specific Goal (Individualization)  Outcome: No Change     Problem: Adult Inpatient Plan of Care  Goal: Absence of Hospital-Acquired Illness or Injury  Outcome: No Change     Problem: Adult Inpatient Plan of Care  Goal: Optimal Comfort and Wellbeing  Outcome: No Change     Problem: Adult Inpatient Plan of Care  Goal: Readiness for Transition of Care  Outcome: No Change     Problem: Adult Inpatient Plan of Care  Goal: Rounds/Family Conference  Outcome: No Change     Problem: Diarrhea (Stem Cell/Bone Marrow Transplant)  Goal: Diarrhea Symptom Control  Outcome: No Change     Problem: Fatigue (Stem Cell/Bone Marrow Transplant)  Goal: Energy Level Supports Daily Activity  Outcome: No Change     Problem: Hematologic Alteration (Stem Cell/Bone Marrow Transplant)  Goal: Blood Counts Within Acceptable Range  Outcome: No Change     Problem: Hypersensitivity Reaction (Stem Cell/Bone Marrow Transplant)  Goal: Absence of Hypersensitivity Reaction  Outcome: No Change     Problem: Infection Risk (Stem Cell/Bone Marrow Transplant)  Goal: Absence of Infection Signs/Symptoms  Outcome: No Change     Problem: Mucositis (Stem Cell/Bone Marrow Transplant)  Goal: Mucous Membrane Health and Integrity  Outcome: No Change     Problem: Nausea and Vomiting (Stem Cell/Bone Marrow  Transplant)  Goal: Nausea and Vomiting Symptom Relief  Outcome: No Change     Problem: Nutrition Intake Altered (Stem Cell/Bone Marrow Transplant)  Goal: Optimal Nutrition Intake  Outcome: No Change

## 2020-10-15 NOTE — TELEPHONE ENCOUNTER
MT2019-31: Study Visit Note   Subject name: Florencia Gao     Visit: week 1 SPC/[facility] intervention    Did the study visit occur within the appropriate window allowed by the protocol? yes    Since the last study visit, She has been doing well.    Today Florencia and I reviewed the NEST13+ for the [facility]PC/[facility] interventional arm of ACE-BMT/MT2019-19.     NEXT 13+ Responses as follows:  1. How much of a financial hardship is your illness for you or your family? 0 - none  2. How much trouble do you have getting the medical care you need? 0 - none  3. How often is there someone to confide in? 10 - always  4. How much do you need help with things like getting meals or getting to the doctor? 0 - not at all  5. How much does this illness seem senseless and meaningless? 7 - somewhat meaningless  6. How much does [employer]an belief or your spiritual life contribute to your sense of purpose? 10 - a great deal  7. How much have you settled your relationship with the people close to you? 7 - mostly  8. Since your illness, how much do you live life with a special sense of purpose? 7 - mostly  9. How much do you suffer from physical symptoms such as pain, shortness of breath, fatigue, bowel or urination problems? 5 - about half the time  10. How often do you feel confused, anxious, or depressed? 3 - sometimes  11. How much do you feel your doctors and nurses respects yo as an individual? 9 - a lot, but not not completely  12. How clear is the information from us about what to expect regarding your illness? 9 - a lot, but not not completely  13. How much do you feel that the medical care you are getting fits with your goals? 10 - completely    Florencia Gao was given the opportunity to ask any trial related questions.    Please see provider progress note for physical exam and other clinical information. Labs were reviewed - any significant lab values were addressed and reviewed per [facility] arm.    Brook Ervin

## 2020-10-15 NOTE — PROGRESS NOTES
Calorie Count  Intake recorded for: 10/14  Total Kcals: 595 Total Protein: 15g  Kcals from Hospital Food: 595  Protein: 15g  Kcals from Outside Food (average):0 Protein: 0g  # Meals Ordered from Kitchen: 2 meals   # Meals Recorded: 2 meals (First - 50% milk, banana, cream of wheat w/ sugar, blueberry muffin)      (Second - 100% ice cream, less than 25% tuna salad sandwich, potato soup, chocolate chip cookie)  # Supplements Recorded: 0

## 2020-10-15 NOTE — PLAN OF CARE
Florencia says this is the first day she has not been nauseated since getting her chemotherapy.  She ate a big breakfast (pancake, peaches, yogurt, cereal and milk) and is now on calorie counts.   No stool.  Newer chest rash is less itchy today, a few spots were noted on her back and upper thighs, started on clinda gel BID.  Tacrolimus drip infusing as ordered, level pending.      Problem: Adult Inpatient Plan of Care  Goal: Plan of Care Review  Outcome: Improving

## 2020-10-16 LAB
ANION GAP SERPL CALCULATED.3IONS-SCNC: 7 MMOL/L (ref 3–14)
BLD PROD TYP BPU: NORMAL
BLD PROD TYP BPU: NORMAL
BLD UNIT ID BPU: 0
BLOOD PRODUCT CODE: NORMAL
BPU ID: NORMAL
BUN SERPL-MCNC: 5 MG/DL (ref 7–30)
CALCIUM SERPL-MCNC: 8.8 MG/DL (ref 8.5–10.1)
CHLORIDE SERPL-SCNC: 104 MMOL/L (ref 94–109)
CO2 SERPL-SCNC: 25 MMOL/L (ref 20–32)
CREAT SERPL-MCNC: 0.49 MG/DL (ref 0.52–1.04)
DIFFERENTIAL METHOD BLD: ABNORMAL
ERYTHROCYTE [DISTWIDTH] IN BLOOD BY AUTOMATED COUNT: 11.7 % (ref 10–15)
GFR SERPL CREATININE-BSD FRML MDRD: >90 ML/MIN/{1.73_M2}
GLUCOSE SERPL-MCNC: 98 MG/DL (ref 70–99)
HCT VFR BLD AUTO: 30.6 % (ref 35–47)
HGB BLD-MCNC: 10.1 G/DL (ref 11.7–15.7)
LACTATE BLD-SCNC: 2.5 MMOL/L (ref 0.7–2)
MAGNESIUM SERPL-MCNC: 1.4 MG/DL (ref 1.6–2.3)
MAGNESIUM SERPL-MCNC: 2.9 MG/DL (ref 1.6–2.3)
MCH RBC QN AUTO: 29.2 PG (ref 26.5–33)
MCHC RBC AUTO-ENTMCNC: 33 G/DL (ref 31.5–36.5)
MCV RBC AUTO: 88 FL (ref 78–100)
NUM BPU REQUESTED: 1
PLATELET # BLD AUTO: 4 10E9/L (ref 150–450)
POTASSIUM SERPL-SCNC: 3.9 MMOL/L (ref 3.4–5.3)
RBC # BLD AUTO: 3.46 10E12/L (ref 3.8–5.2)
SODIUM SERPL-SCNC: 136 MMOL/L (ref 133–144)
TACROLIMUS BLD-MCNC: 12.6 UG/L (ref 5–15)
TME LAST DOSE: NORMAL H
TRANSFUSION STATUS PATIENT QL: NORMAL
TRANSFUSION STATUS PATIENT QL: NORMAL
WBC # BLD AUTO: 0.1 10E9/L (ref 4–11)

## 2020-10-16 PROCEDURE — 258N000003 HC RX IP 258 OP 636: Performed by: PHYSICIAN ASSISTANT

## 2020-10-16 PROCEDURE — 250N000011 HC RX IP 250 OP 636: Performed by: PHYSICIAN ASSISTANT

## 2020-10-16 PROCEDURE — P9037 PLATE PHERES LEUKOREDU IRRAD: HCPCS | Performed by: PHYSICIAN ASSISTANT

## 2020-10-16 PROCEDURE — 999N000157 HC STATISTIC RCP TIME EA 10 MIN

## 2020-10-16 PROCEDURE — C9113 INJ PANTOPRAZOLE SODIUM, VIA: HCPCS | Performed by: PHYSICIAN ASSISTANT

## 2020-10-16 PROCEDURE — 85027 COMPLETE CBC AUTOMATED: CPT

## 2020-10-16 PROCEDURE — 250N000011 HC RX IP 250 OP 636: Performed by: INTERNAL MEDICINE

## 2020-10-16 PROCEDURE — 83605 ASSAY OF LACTIC ACID: CPT | Performed by: INTERNAL MEDICINE

## 2020-10-16 PROCEDURE — 80197 ASSAY OF TACROLIMUS: CPT | Performed by: PHYSICIAN ASSISTANT

## 2020-10-16 PROCEDURE — 83735 ASSAY OF MAGNESIUM: CPT | Performed by: INTERNAL MEDICINE

## 2020-10-16 PROCEDURE — 206N000001 HC R&B BMT UMMC

## 2020-10-16 PROCEDURE — 250N000013 HC RX MED GY IP 250 OP 250 PS 637: Performed by: PHYSICIAN ASSISTANT

## 2020-10-16 PROCEDURE — 87081 CULTURE SCREEN ONLY: CPT | Performed by: PHYSICIAN ASSISTANT

## 2020-10-16 PROCEDURE — 250N000013 HC RX MED GY IP 250 OP 250 PS 637: Performed by: INTERNAL MEDICINE

## 2020-10-16 PROCEDURE — 99233 SBSQ HOSP IP/OBS HIGH 50: CPT | Performed by: INTERNAL MEDICINE

## 2020-10-16 PROCEDURE — 99232 SBSQ HOSP IP/OBS MODERATE 35: CPT | Performed by: INTERNAL MEDICINE

## 2020-10-16 PROCEDURE — 83735 ASSAY OF MAGNESIUM: CPT | Performed by: PHYSICIAN ASSISTANT

## 2020-10-16 PROCEDURE — 258N000003 HC RX IP 258 OP 636: Performed by: INTERNAL MEDICINE

## 2020-10-16 PROCEDURE — 80048 BASIC METABOLIC PNL TOTAL CA: CPT | Performed by: PHYSICIAN ASSISTANT

## 2020-10-16 PROCEDURE — 999N001121 HC STATISTIC RESEARCH KIT COLLECTION: Performed by: INTERNAL MEDICINE

## 2020-10-16 PROCEDURE — 99233 SBSQ HOSP IP/OBS HIGH 50: CPT | Performed by: PHYSICIAN ASSISTANT

## 2020-10-16 PROCEDURE — 250N000009 HC RX 250: Performed by: PHYSICIAN ASSISTANT

## 2020-10-16 RX ADMIN — CLINDAMYCIN PHOSPHATE: 10 GEL TOPICAL at 21:12

## 2020-10-16 RX ADMIN — TACROLIMUS 80 MCG/HR: 5 INJECTION, SOLUTION INTRAVENOUS at 20:24

## 2020-10-16 RX ADMIN — POTASSIUM CHLORIDE 10 MEQ: 7.46 INJECTION, SOLUTION INTRAVENOUS at 09:28

## 2020-10-16 RX ADMIN — CLINDAMYCIN PHOSPHATE: 10 GEL TOPICAL at 07:51

## 2020-10-16 RX ADMIN — PROCHLORPERAZINE EDISYLATE 5 MG: 5 INJECTION INTRAMUSCULAR; INTRAVENOUS at 09:43

## 2020-10-16 RX ADMIN — VENLAFAXINE 75 MG: 75 TABLET ORAL at 13:12

## 2020-10-16 RX ADMIN — MYCOPHENOLATE MOFETIL 1000 MG: 500 INJECTION, POWDER, LYOPHILIZED, FOR SOLUTION INTRAVENOUS at 05:45

## 2020-10-16 RX ADMIN — URSODIOL 300 MG: 300 CAPSULE ORAL at 13:12

## 2020-10-16 RX ADMIN — GRANISETRON HYDROCHLORIDE 1 MG: 1 INJECTION INTRAVENOUS at 07:44

## 2020-10-16 RX ADMIN — MYCOPHENOLATE MOFETIL 1000 MG: 500 INJECTION, POWDER, LYOPHILIZED, FOR SOLUTION INTRAVENOUS at 22:18

## 2020-10-16 RX ADMIN — ACYCLOVIR SODIUM 700 MG: 50 INJECTION, SOLUTION INTRAVENOUS at 10:56

## 2020-10-16 RX ADMIN — PANTOPRAZOLE SODIUM 40 MG: 40 INJECTION, POWDER, FOR SOLUTION INTRAVENOUS at 07:44

## 2020-10-16 RX ADMIN — ACYCLOVIR SODIUM 700 MG: 50 INJECTION, SOLUTION INTRAVENOUS at 01:39

## 2020-10-16 RX ADMIN — LORATADINE 10 MG: 10 TABLET ORAL at 07:43

## 2020-10-16 RX ADMIN — Medication 2.5 MG: at 21:23

## 2020-10-16 RX ADMIN — GRANISETRON HYDROCHLORIDE 1 MG: 1 INJECTION INTRAVENOUS at 20:05

## 2020-10-16 RX ADMIN — MYCOPHENOLATE MOFETIL 1000 MG: 500 INJECTION, POWDER, LYOPHILIZED, FOR SOLUTION INTRAVENOUS at 13:12

## 2020-10-16 RX ADMIN — DIPHENHYDRAMINE HYDROCHLORIDE 25 MG: 25 CAPSULE ORAL at 08:13

## 2020-10-16 RX ADMIN — ACYCLOVIR SODIUM 700 MG: 50 INJECTION, SOLUTION INTRAVENOUS at 17:04

## 2020-10-16 RX ADMIN — ACETAMINOPHEN 650 MG: 325 TABLET, FILM COATED ORAL at 08:12

## 2020-10-16 RX ADMIN — PROCHLORPERAZINE EDISYLATE 5 MG: 5 INJECTION INTRAMUSCULAR; INTRAVENOUS at 22:18

## 2020-10-16 RX ADMIN — SODIUM CHLORIDE 1000 ML: 9 INJECTION, SOLUTION INTRAVENOUS at 10:53

## 2020-10-16 RX ADMIN — DEXTROSE MONOHYDRATE 10 ML: 50 INJECTION, SOLUTION INTRAVENOUS at 20:02

## 2020-10-16 RX ADMIN — LEVOFLOXACIN 250 MG: 250 TABLET, FILM COATED ORAL at 09:43

## 2020-10-16 RX ADMIN — PROCHLORPERAZINE EDISYLATE 5 MG: 5 INJECTION INTRAMUSCULAR; INTRAVENOUS at 15:35

## 2020-10-16 RX ADMIN — DEXTROSE MONOHYDRATE 20 ML: 50 INJECTION, SOLUTION INTRAVENOUS at 21:12

## 2020-10-16 RX ADMIN — LEVOTHYROXINE SODIUM 88 MCG: 0.09 TABLET ORAL at 07:43

## 2020-10-16 RX ADMIN — POLYETHYLENE GLYCOL 3350 17 G: 17 POWDER, FOR SOLUTION ORAL at 21:19

## 2020-10-16 RX ADMIN — URSODIOL 300 MG: 300 CAPSULE ORAL at 07:44

## 2020-10-16 RX ADMIN — VORICONAZOLE 200 MG: 200 TABLET, FILM COATED ORAL at 20:05

## 2020-10-16 RX ADMIN — POTASSIUM CHLORIDE 10 MEQ: 7.46 INJECTION, SOLUTION INTRAVENOUS at 10:36

## 2020-10-16 RX ADMIN — POLYETHYLENE GLYCOL 3350 17 G: 17 POWDER, FOR SOLUTION ORAL at 01:39

## 2020-10-16 RX ADMIN — URSODIOL 300 MG: 300 CAPSULE ORAL at 20:05

## 2020-10-16 RX ADMIN — Medication 400 MCG: at 20:02

## 2020-10-16 RX ADMIN — MAGNESIUM SULFATE IN WATER 4 G: 40 INJECTION, SOLUTION INTRAVENOUS at 07:44

## 2020-10-16 RX ADMIN — VORICONAZOLE 200 MG: 200 TABLET, FILM COATED ORAL at 07:44

## 2020-10-16 ASSESSMENT — ACTIVITIES OF DAILY LIVING (ADL)
ADLS_ACUITY_SCORE: 12
ADLS_ACUITY_SCORE: 12
ADLS_ACUITY_SCORE: 10
ADLS_ACUITY_SCORE: 12

## 2020-10-16 ASSESSMENT — MIFFLIN-ST. JEOR: SCORE: 1297.38

## 2020-10-16 NOTE — PROGRESS NOTES
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      Diagnosis AMLM1 Acute myelogeneous leukemia, M1, myeloblastic  HCT Type Allogeneic    Prep Regimen Fludarabine  Cytoxan  TBI   Donor Source Sibling    GVHD Prophylaxis Post transplant cytoxan  Tac/MMF  Primary BMT Provider ACE      INTERVAL  HISTORY   Feels well this morning. Strained to have bowel movement overnight and had a small amount of bleeding from her hemorrhoid afterwards. Nausea was very well controlled yesterday. Had some abdominal cramping last night but none this morning. Eating and drinking okay. Rash on her chest is improving and itching is better with benadryl.     Review of Systems: 8 point ROS negative except as noted above.    PHYSICAL EXAM     Weight In/Out     Wt Readings from Last 3 Encounters:   10/16/20 77.3 kg (170 lb 8 oz)   09/17/20 77.8 kg (171 lb 8 oz)   09/10/20 77.2 kg (170 lb 3.1 oz)      I/O last 3 completed shifts:  In: 2631.4 [P.O.:1420; I.V.:1211.4]  Out: 3950 [Urine:3950]       KPS:  100    /57   Pulse 86   Temp 98.4  F (36.9  C) (Oral)   Resp 16   Ht 1.68 m (5' 6.14\")   Wt 77.3 kg (170 lb 8 oz)   SpO2 97%   BMI 27.40 kg/m     General: NAD   Eyes:  LINH, sclera anicteric.   Lungs: CTA bilaterally, normal WOB on RA   Cardiovascular: RRR, no M/R/G   Abdominal/Rectal: +BS, soft, NT, ND  Lymphatics: no edema b/l LE   Skin: follicular rash on central chest. Small area of involvement on central back. No other rashes or petechiae. Non- tender.   Neuro: A&O x3  Line: right CVC.   10/5/20 Current aGVHD staging:  Skin 0, UGI 0, LGI 0, Liver 0 (keep in note through day +180, delete if auto)    LABS AND IMAGING - PAST 24 HOURS     Lab Results   Component Value Date    WBC 0.1 (LL) 10/16/2020    ANEU 0.4 (LL) 10/08/2020    HGB 10.1 (L) 10/16/2020    HCT 30.6 (L) 10/16/2020    PLT 4 (LL) 10/16/2020     10/16/2020 "    POTASSIUM 3.9 10/16/2020    CHLORIDE 104 10/16/2020    CO2 25 10/16/2020    GLC 98 10/16/2020    BUN 5 (L) 10/16/2020    CR 0.49 (L) 10/16/2020    MAG 1.4 (L) 10/16/2020    INR 1.09 10/12/2020     I have assessed all abnormal lab values for their clinical significance and any values considered clinically significant have been addressed in the assessment and plan  OVERALL PLAN   Florencia Gao is a 75 year old female with AML in CR1, admitted for JCARLOS Allo sib pbsct per protocol 2019-10, randomized to Post transplant cytoxan/Tac/MMF.  Day +9.      1.  BMT: Cy/flu/TBI prep.   Allopurinol given through day 0.   Received 4.19 x10(6) CD34/kg. Donor B pos and recipient B neg.   GCSF started d+5 and cont to until ANC>1500 x3 consecutive days. Re-stage per protocol.     2.  HEME: Keep Hgb>8 and plts>10K.   - 1u plts. Monitor hemorrhoidal bleeding and increase as indicated.   - Of note has significant antibodies to RBCs- can cause delay in transfusion availability. Premeds tylenol and benadryl.                             3.  ID: Afebrile.   RVP 10/14 d/t post-nasal drip-pending. Started Claritin.   Cont Levo, Vori (started 10/14), and HD ACV (CMV+, HSV+, EBV+) prophy.   Pjp prophy to start day+28 - IV pentamdine vs dapsone as sulfa allergy.                                         4.  GI: nausea.  Nausea: headaches with zofran. Significant nausea/vomiting with cytoxan. Scheduled kytril and comopazine tid. Changed some meds to IV 10/12 d/t nausea with pills. Added zyprexa 2.5mg ODT at bedtime 10/12. s/p emend 10/13.   - Ativan available PRN break-through symptoms.   - **Per protocol no dex/steroids**   Protonix for GI prophy.    Ursodiol for VOD prophy.     5.  GVHD Prophylaxis:   Day +3 and day+4 cytoxan  Day+5 start tac and MMF, tac level 10.0, decreased to 88mcg/hr d/t voriconazole starting. Repeat level 15.2, level decreased to 80 mcg/hr 10/15.       6.  FEN/Renal: 1L NS d/t elevated lactic acid. No evidence of  infection.   - Cr and lytes stable.    - HypoNa secondary to cytoxan. Resolved.   - Replete lytes PRN per SS. Monitor weight, I+O, lytes per protocol with IVF flush.  - Anorexia and malnutrition in the context of acute illness: Calorie counts starting 10/8- eating minimally. Slightly better- monitor.      7. Hypothyroidism  Continue synthroid 88mcg      8. Psych: Situational Depression continue effexor 75mg daily.     9.  Skin:   - Follicular rash 10/15 on chest: started clindamycin gel. Benadryl prn for itching.   - Previous irritation/rash under and around CVC dressing. Pt with allergy to chlorhexidine. Removed biopatch and trial of different dressing. Improved.      10. Research: Pt consented to MT2019-31 (Seamless randomized tiral to alleviate morbidity and mortality) for which she was randomized to the palliative care management arm. Palliative care consult placed 10/8 per research recs- see note dated 10/8.     Dispo: patient will remain admitted through neutropenia and subsequent engraftment.     Denzel Magallon PA-C  [phone]    BMT Staff:  The patient was discussed on morning rounds with the nurses, mid level provider, and house staff and seen and examined by me. All labs and imaging were reviewed. I reviewed events over the last 24 hours including vitals and flow sheets. I agree with the above note and have been responsible for the care plan and interpretation of progress.     Subjective:  Reports feeling well today, has good appetite as of yesterday, good control of nausea, no vomiting, no abd pain, no F/C/NS, no  complaints, hip/bony pains mild, resolved post nasal drip with no other respiratory symptoms, no HA/LH/Dizziness.      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  1. Heme/BMT: Day +9, transfusion as needed, Pancytopenia secondary to Chemo   2. ID: Afebrile  3. CV: no active issues  4. GI/ FEN: monitor regimen related toxicity, adjusting antiemetics, encourage fluid PO intake, I/O and daily weights, start calorie counts and consider TPN, Mild Protein-Calorie Malnutrition   5. Renal: euvolemic, mild hyponatremia resolved, mild hypokalemia replacement as needed  6. PPx: Levo, donato/vori, ACV  7.  Hypothyroidism on replacement, situational depression on Effexor, PT: encourage ambulation     Chito Bautista MD

## 2020-10-16 NOTE — PLAN OF CARE
2254-2950  VSS. A&Ox4. Up independently in room. Voiding. No BM. Tolerating reg diet, on asa counts. R internal jugular CVC. Tac level 15.2, Tac gtt decreased to 80mcg/hr. Chest rash improving per pt, less itchy. Clindamycin gel applied.

## 2020-10-16 NOTE — PLAN OF CARE
"/68 (BP Location: Right arm)   Pulse 102   Temp 97.9  F (36.6  C) (Axillary)   Resp 16   Ht 1.68 m (5' 6.14\")   Wt 77.3 kg (170 lb 8 oz)   SpO2 97%   BMI 27.40 kg/m      Shift: 5294-9120  Reason for Admission: AML Day +9 allo transplant   VS: VSS on RA  Neuros: A/Ox4, able to make needs known  GI/: No BMs this shift, voiding adequately   Nutrition: Tolerating high asa/protein diet. On calorie counts  Drains/Lines: R. CVC 2L- TKO  Activity: Pt up ad marissa. Pt has been refusing PT for a week now, states she will work with PT on Monday.  Pain/Nausea: Denies pain. C/o of intermittent nausea- scheduled IV Compazine given  Skin: Rash on chest is improving- applied clindamycin gel  Labs: Replaced K+, awaiting AM recheck. Replaced Mg++, recheck was 2.9. Gave 1 unit platelets, pre-medicated with Tylenol and Benadryl   New this shift: RRT called d/t Lactic Acid of 2.5, 1L NS bolus given  Plan of care: Will continue to monitor and follow POC.       Problem: Adult Inpatient Plan of Care  Goal: Plan of Care Review  Outcome: No Change     Problem: Adult Inpatient Plan of Care  Goal: Absence of Hospital-Acquired Illness or Injury  Outcome: No Change     Problem: Adult Inpatient Plan of Care  Goal: Optimal Comfort and Wellbeing  Outcome: No Change     Problem: Adult Inpatient Plan of Care  Goal: Readiness for Transition of Care  Outcome: No Change     Problem: Diarrhea (Stem Cell/Bone Marrow Transplant)  Goal: Diarrhea Symptom Control  Outcome: No Change        "

## 2020-10-16 NOTE — PROGRESS NOTES
Calorie Count  Intake recorded for: 10/15  Total Kcals: 1475 Total Protein: 51g  Kcals from Hospital Food: 1335  Protein: 43g  Kcals from Outside Food (average):140 Protein: 8g  # Meals Ordered from Kitchen: 2 meals + food from outside the hospital   # Meals Recorded: 3 meals (First - 100% chicken florentine pizza, apple pie, ice cream)      (Second - 100% raisin bran w/ whole milk, 75% peaches, 50% pancake w/ butter & syrup)      (Third - 75% Noosa yogurt from outside the yogurt)  # Supplements Recorded: 0

## 2020-10-16 NOTE — PLAN OF CARE
Shift: 7986-6061    Status: Day +9 s/p Allo PBSCT  Neuro: Alert and oriented x4, able to make needs known, calls appropriately  Cardiac/VS: VSS ex tachycardia after ambulating to and from bathroom this morning   Respiratory: RA, home CPAP overnight   GI: Denies N/V, LBM 10/16  Diet/Appetite: Regular with asa counts  : Voids spontaneously, adequate urine output overnight   Activity: Up independently   Pain: Denies pain  Skin: No new deficits   LDA(s): R chest port w/ TKO fluids in red lumen, Tac gtt and IVF in purple lumen      Changes this shift: triggered SIRS this AM, lactic pending       Plan: Continue plan of care

## 2020-10-16 NOTE — PLAN OF CARE
5C-PT: CXL: Pt declining therapy today. CXL PT. Has not been seen by PT since 10.5. Please encourage OOB mobility as appropriate.

## 2020-10-16 NOTE — PROGRESS NOTES
10/16/20 1000   Call Information   Date of Call 10/16/20   Time of Call 0953   Name of person requesting the team Brandon SALAS   Title of person requesting team RN   RRT Arrival time 0957   Time RRT ended 1003   Reason for call   Type of RRT Adult   Primary reason for call Sepsis suspected   Sepsis Suspected Elevated Lactate level;BMT/Oncology patient with WBC<0.5 or >12 or ANC <500   Was patient transferred from the ED, ICU, or PACU within last 24 hours prior to RRT call? No   SBAR   Situation LA 2.5   Background recent BMT for AML   Notable History/Conditions Cancer   Assessment AVSS. pt is alert and oriented   Interventions Fluid bolus   Patient Outcome   Patient Outcome Stabilized on unit   RRT Team   Attending/Primary/Covering Physician Valeria Bustillos   Date Attending Physician notified 10/16/20   Time Attending Physician notified 0953   Lead RN Myriam SALAS   RT Екатерина   Post RRT Intervention Assessment   Post RRT Assessment Stable/Improved   Date Follow Up Done 10/16/20   Time Follow Up Done 1200

## 2020-10-16 NOTE — PROGRESS NOTES
Rapid Response Team Note    Assessment   In assessment a rapid response was called on Florencia Gao due to lactic acidosis.     This presentation is likely due to dehydration and worsened by the comorbidities listed above. The patient is NOT critically ill at this time.    Sepsis Evaluation   NO EVIDENCE OF SEPSIS at this time.  Vital sign, physical exam, and lab findings are likely due to dehydration.    Plan   -Give 0.9% NS 1L of fluid  -Recheck lactate in 4hr    Disposition: The patient will remain on the current unit. We will continue to monitor this patient closely..    The BMT primary team was able to be reached and they are in agreement with the above plan.    Reassessment   Reassessment and plan follow-up will be performed by the primary team. The current agreed upon plan for reassessment/follow-up includes bedside exam and will be completed in 1 hour.    Time Spent on this Encounter   Total Critical Care time spent by me, excluding procedures, was 30 minutes.    HAI Greenwood  [facility] RRT AMCOM Job Code Contact #[phone]    Hospital Course   Admission Diagnosis: AML bmt  AML (acute myeloid leukemia) in remission (H)     Brief Summary of events leading to rapid response:   A rapid response was called for Florencia Gao due to lactic acidosis triggered by sepsis/SIRS BPA at 10:00  10/16/20.      The patients is not known to have an infection.    Significant Comorbidities:   Day 9 of JCARLOS allo sib PBSCT for AML    Medications   Scheduled     sodium chloride 0.9%  1,000 mL Intravenous Once     acyclovir (ZOVIRAX) IV  10 mg/kg (Adjusted) Intravenous Q8H     clindamycin   Topical BID     dextrose 5% water  10-20 mL Intracatheter Daily at 8 pm     dextrose 5% water  10-20 mL Intracatheter Daily at 8 pm     filgrastim (NEUPOGEN/GRANIX) intravenous  5 mcg/kg (Treatment Plan Recorded) Intravenous Daily at 8 pm     granisetron  1 mg Intravenous BID     heparin lock flush  5 mL Intravenous Q24H      levofloxacin  250 mg Oral Daily at 10 am     levothyroxine  88 mcg Oral Daily     loratadine  10 mg Oral Daily     loratadine  10 mg Oral Once     mycophenolate mofetil  1,000 mg Intravenous Q8H BMT     OLANZapine zydis  2.5 mg Oral At Bedtime     pantoprazole (PROTONIX) IV  40 mg Intravenous Daily with breakfast     prochlorperazine  5 mg Intravenous TID     ursodiol  300 mg Oral TID     venlafaxine  75 mg Oral Daily     voriconazole  200 mg Oral Q12H JASMYNE      PRN   acetaminophen, acetaminophen, acetaminophen, ceFEPIme (MAXIPIME) IV, diphenhydrAMINE, diphenhydrAMINE, heparin lock flush, lidocaine 4%, lidocaine (buffered or not buffered), loperamide, LORazepam **OR** LORazepam, magnesium sulfate, magnesium sulfate, naloxone, polyethylene glycol, potassium chloride, potassium chloride with lidocaine, potassium chloride, potassium chloride, potassium chloride, potassium phosphate (KPHOS) in D5W IV, potassium phosphate (KPHOS) in D5W IV, potassium phosphate (KPHOS) in D5W IV, potassium phosphate (KPHOS) in D5W IV, sodium chloride (PF)   Allergies   Allergies   Allergen Reactions     Blood Transfusion Related (Informational Only)      Patient has a history of a clinically significant antibody against RBC antigens.  A delay in compatible RBCs may occur.     Sulfa Drugs Rash     Acetaminophen-Codeine Dizziness     could not sit up after surgery until off all narcotics, even Tylenol #3     Chlorhexidine Rash        Physical Exam   Temp: 98.6  F (37  C) Temp  Min: 96.6  F (35.9  C)  Max: 98.6  F (37  C)  Resp: 16 Resp  Min: 16  Max: 16  SpO2: 95 % SpO2  Min: 95 %  Max: 99 %  Pulse: 87 Pulse  Min: 81  Max: 121    No data recorded  BP: 99/48 Systolic (24hrs), Av , Min:99 , Max:148   Diastolic (24hrs), Av, Min:46, Max:87     I/Os: I/O last 3 completed shifts:  In: 2631.4 [P.O.:1420; I.V.:1211.4]  Out: 3950 [Urine:3950]     Exam:   General: chronically ill appearing  Mental Status: AAOx4.  Cardiac: regular rate and  rhythm  Respiratory: breathing comfortably on room air, no adventitious sounds to bilateral auscultation    Significant Results and Procedures   Lactic Acid:   Recent Labs   Lab Test 10/16/20  0606 10/14/20  1219 10/12/20  1552   LACTS 2.5* 1.3 0.7     CBC:   Recent Labs   Lab Test 10/16/20  0610 10/15/20  0308 10/14/20  0325   WBC 0.1* 0.1* 0.1*   HGB 10.1* 8.9* 9.5*   HCT 30.6* 26.8* 28.2*   PLT 4* 12* 16*

## 2020-10-17 LAB
ANION GAP SERPL CALCULATED.3IONS-SCNC: 5 MMOL/L (ref 3–14)
BUN SERPL-MCNC: 4 MG/DL (ref 7–30)
CALCIUM SERPL-MCNC: 8.4 MG/DL (ref 8.5–10.1)
CHLORIDE SERPL-SCNC: 106 MMOL/L (ref 94–109)
CO2 SERPL-SCNC: 28 MMOL/L (ref 20–32)
CREAT SERPL-MCNC: 0.56 MG/DL (ref 0.52–1.04)
DIFFERENTIAL METHOD BLD: ABNORMAL
ERYTHROCYTE [DISTWIDTH] IN BLOOD BY AUTOMATED COUNT: 11.8 % (ref 10–15)
GFR SERPL CREATININE-BSD FRML MDRD: >90 ML/MIN/{1.73_M2}
GLUCOSE SERPL-MCNC: 82 MG/DL (ref 70–99)
HCT VFR BLD AUTO: 24.6 % (ref 35–47)
HGB BLD-MCNC: 8.2 G/DL (ref 11.7–15.7)
LACTATE BLD-SCNC: 1.5 MMOL/L (ref 0.7–2)
MAGNESIUM SERPL-MCNC: 1.6 MG/DL (ref 1.6–2.3)
MCH RBC QN AUTO: 29.8 PG (ref 26.5–33)
MCHC RBC AUTO-ENTMCNC: 33.3 G/DL (ref 31.5–36.5)
MCV RBC AUTO: 90 FL (ref 78–100)
PLATELET # BLD AUTO: 33 10E9/L (ref 150–450)
POTASSIUM SERPL-SCNC: 3.7 MMOL/L (ref 3.4–5.3)
RBC # BLD AUTO: 2.75 10E12/L (ref 3.8–5.2)
RESEARCH KIT COLLECTION: NORMAL
SODIUM SERPL-SCNC: 139 MMOL/L (ref 133–144)
WBC # BLD AUTO: 0 10E9/L (ref 4–11)

## 2020-10-17 PROCEDURE — 250N000013 HC RX MED GY IP 250 OP 250 PS 637: Performed by: PHYSICIAN ASSISTANT

## 2020-10-17 PROCEDURE — 99233 SBSQ HOSP IP/OBS HIGH 50: CPT | Performed by: INTERNAL MEDICINE

## 2020-10-17 PROCEDURE — 250N000011 HC RX IP 250 OP 636: Performed by: PHYSICIAN ASSISTANT

## 2020-10-17 PROCEDURE — 250N000013 HC RX MED GY IP 250 OP 250 PS 637: Performed by: INTERNAL MEDICINE

## 2020-10-17 PROCEDURE — 83605 ASSAY OF LACTIC ACID: CPT | Performed by: INTERNAL MEDICINE

## 2020-10-17 PROCEDURE — 206N000001 HC R&B BMT UMMC

## 2020-10-17 PROCEDURE — 80048 BASIC METABOLIC PNL TOTAL CA: CPT | Performed by: PHYSICIAN ASSISTANT

## 2020-10-17 PROCEDURE — 258N000003 HC RX IP 258 OP 636: Performed by: PHYSICIAN ASSISTANT

## 2020-10-17 PROCEDURE — 250N000011 HC RX IP 250 OP 636: Performed by: INTERNAL MEDICINE

## 2020-10-17 PROCEDURE — C9113 INJ PANTOPRAZOLE SODIUM, VIA: HCPCS | Performed by: PHYSICIAN ASSISTANT

## 2020-10-17 PROCEDURE — 83735 ASSAY OF MAGNESIUM: CPT | Performed by: PHYSICIAN ASSISTANT

## 2020-10-17 PROCEDURE — 85027 COMPLETE CBC AUTOMATED: CPT

## 2020-10-17 PROCEDURE — 250N000009 HC RX 250: Performed by: PHYSICIAN ASSISTANT

## 2020-10-17 PROCEDURE — 258N000003 HC RX IP 258 OP 636: Performed by: INTERNAL MEDICINE

## 2020-10-17 RX ORDER — BENZOCAINE/MENTHOL 6 MG-10 MG
LOZENGE MUCOUS MEMBRANE 2 TIMES DAILY
Status: DISCONTINUED | OUTPATIENT
Start: 2020-10-17 | End: 2020-10-28 | Stop reason: HOSPADM

## 2020-10-17 RX ADMIN — DEXTROSE MONOHYDRATE 20 ML: 50 INJECTION, SOLUTION INTRAVENOUS at 20:07

## 2020-10-17 RX ADMIN — URSODIOL 300 MG: 300 CAPSULE ORAL at 08:25

## 2020-10-17 RX ADMIN — VORICONAZOLE 200 MG: 200 TABLET, FILM COATED ORAL at 19:55

## 2020-10-17 RX ADMIN — CLINDAMYCIN PHOSPHATE: 10 GEL TOPICAL at 08:26

## 2020-10-17 RX ADMIN — ACYCLOVIR SODIUM 700 MG: 50 INJECTION, SOLUTION INTRAVENOUS at 11:22

## 2020-10-17 RX ADMIN — TACROLIMUS 80 MCG/HR: 5 INJECTION, SOLUTION INTRAVENOUS at 20:08

## 2020-10-17 RX ADMIN — CLINDAMYCIN PHOSPHATE: 10 GEL TOPICAL at 20:06

## 2020-10-17 RX ADMIN — LEVOTHYROXINE SODIUM 88 MCG: 0.09 TABLET ORAL at 08:25

## 2020-10-17 RX ADMIN — MYCOPHENOLATE MOFETIL 1000 MG: 500 INJECTION, POWDER, LYOPHILIZED, FOR SOLUTION INTRAVENOUS at 22:14

## 2020-10-17 RX ADMIN — PANTOPRAZOLE SODIUM 40 MG: 40 INJECTION, POWDER, FOR SOLUTION INTRAVENOUS at 08:25

## 2020-10-17 RX ADMIN — URSODIOL 300 MG: 300 CAPSULE ORAL at 19:55

## 2020-10-17 RX ADMIN — VORICONAZOLE 200 MG: 200 TABLET, FILM COATED ORAL at 08:24

## 2020-10-17 RX ADMIN — DEXTROSE MONOHYDRATE 10 ML: 50 INJECTION, SOLUTION INTRAVENOUS at 19:55

## 2020-10-17 RX ADMIN — GRANISETRON HYDROCHLORIDE 1 MG: 1 INJECTION INTRAVENOUS at 08:25

## 2020-10-17 RX ADMIN — PROCHLORPERAZINE EDISYLATE 5 MG: 5 INJECTION INTRAMUSCULAR; INTRAVENOUS at 11:23

## 2020-10-17 RX ADMIN — GRANISETRON HYDROCHLORIDE 1 MG: 1 INJECTION INTRAVENOUS at 19:59

## 2020-10-17 RX ADMIN — ACYCLOVIR SODIUM 700 MG: 50 INJECTION, SOLUTION INTRAVENOUS at 02:47

## 2020-10-17 RX ADMIN — MYCOPHENOLATE MOFETIL 1000 MG: 500 INJECTION, POWDER, LYOPHILIZED, FOR SOLUTION INTRAVENOUS at 05:15

## 2020-10-17 RX ADMIN — Medication 400 MCG: at 19:55

## 2020-10-17 RX ADMIN — Medication 2.5 MG: at 22:13

## 2020-10-17 RX ADMIN — LEVOFLOXACIN 250 MG: 250 TABLET, FILM COATED ORAL at 11:22

## 2020-10-17 RX ADMIN — LORATADINE 10 MG: 10 TABLET ORAL at 08:25

## 2020-10-17 RX ADMIN — URSODIOL 300 MG: 300 CAPSULE ORAL at 14:48

## 2020-10-17 RX ADMIN — POLYETHYLENE GLYCOL 3350 17 G: 17 POWDER, FOR SOLUTION ORAL at 19:56

## 2020-10-17 RX ADMIN — HYDROCORTISONE: 1 CREAM TOPICAL at 13:30

## 2020-10-17 RX ADMIN — MAGNESIUM SULFATE 2 G: 2 INJECTION INTRAVENOUS at 05:15

## 2020-10-17 RX ADMIN — ACYCLOVIR SODIUM 700 MG: 50 INJECTION, SOLUTION INTRAVENOUS at 17:36

## 2020-10-17 RX ADMIN — MYCOPHENOLATE MOFETIL 1000 MG: 500 INJECTION, POWDER, LYOPHILIZED, FOR SOLUTION INTRAVENOUS at 14:48

## 2020-10-17 RX ADMIN — POTASSIUM CHLORIDE 20 MEQ: 29.8 INJECTION, SOLUTION INTRAVENOUS at 06:48

## 2020-10-17 RX ADMIN — VENLAFAXINE 75 MG: 75 TABLET ORAL at 14:48

## 2020-10-17 ASSESSMENT — ACTIVITIES OF DAILY LIVING (ADL)
ADLS_ACUITY_SCORE: 10

## 2020-10-17 ASSESSMENT — MIFFLIN-ST. JEOR: SCORE: 1301.47

## 2020-10-17 NOTE — PROGRESS NOTES
Calorie Count  Intake recorded for: 10/16  Total Kcals: 1613 Total Protein: 52g  Kcals from Hospital Food: 1613   Protein: 52g  Kcals from Outside Food (average):0 Protein: 0g  # Meals Ordered from Kitchen: 3 meals ordered.  # Meals Recorded: First: 100% raisin bran cereal, whole milk, orange juice. Second: 100% tomato soup, crackers,peanut butter, fruit loops, 66% apple pie. Third: 100% chicken florentine pizza, inocencio food cake, mini wheat cereal.   # Supplements Recorded: 0

## 2020-10-17 NOTE — PLAN OF CARE
VSS. Afebrile. Pt denies pain this shift. Reports fatigue. Pt did shower in evening and linen changed. Denies nausea. Up independently. Replace magnesium and potassium this am, recheck tomorrow. Continue plan of care.     Problem: Adult Inpatient Plan of Care  Goal: Plan of Care Review  Outcome: No Change  Goal: Patient-Specific Goal (Individualization)  Outcome: No Change  Goal: Absence of Hospital-Acquired Illness or Injury  Outcome: No Change  Intervention: Identify and Manage Fall Risk  Recent Flowsheet Documentation  Taken 10/16/2020 2100 by Jaylyn Noe RN  Safety Promotion/Fall Prevention:    fall prevention program maintained    lighting adjusted    nonskid shoes/slippers when out of bed    safety round/check completed  Intervention: Prevent VTE (venous thromboembolism)  Recent Flowsheet Documentation  Taken 10/16/2020 2100 by Jaylyn Noe RN  VTE Prevention/Management:    ambulation promoted    bleeding risk assessed    fluids promoted    AROM (active range of motion) performed  Goal: Optimal Comfort and Wellbeing  Outcome: No Change  Goal: Readiness for Transition of Care  Outcome: No Change  Goal: Rounds/Family Conference  Outcome: No Change     Problem: Bladder Irritation (Stem Cell/Bone Marrow Transplant)  Goal: Symptom-Free Urinary Elimination  Outcome: No Change  Intervention: Monitor and Manage Bladder Irritation  Recent Flowsheet Documentation  Taken 10/16/2020 2100 by Jaylyn Noe, RN  Hyperhydration Management: fluids provided     Problem: Diarrhea (Stem Cell/Bone Marrow Transplant)  Goal: Diarrhea Symptom Control  Outcome: No Change  Intervention: Manage Diarrhea  Recent Flowsheet Documentation  Taken 10/16/2020 2100 by Jaylyn Noe, RN  Fluid/Electrolyte Management: fluids provided     Problem: Fatigue (Stem Cell/Bone Marrow Transplant)  Goal: Energy Level Supports Daily Activity  Outcome: No Change     Problem: Hematologic Alteration (Stem Cell/Bone Marrow Transplant)  Goal: Blood Counts Within  Acceptable Range  Outcome: No Change     Problem: Hypersensitivity Reaction (Stem Cell/Bone Marrow Transplant)  Goal: Absence of Hypersensitivity Reaction  Outcome: No Change     Problem: Infection Risk (Stem Cell/Bone Marrow Transplant)  Goal: Absence of Infection Signs/Symptoms  Outcome: No Change  Intervention: Prevent and Manage Infection  Recent Flowsheet Documentation  Taken 10/16/2020 2100 by Jaylyn Noe, RN  Isolation Precautions:    cytotoxic precautions maintained    protective environment maintained     Problem: Mucositis (Stem Cell/Bone Marrow Transplant)  Goal: Mucous Membrane Health and Integrity  Outcome: No Change     Problem: Nausea and Vomiting (Stem Cell/Bone Marrow Transplant)  Goal: Nausea and Vomiting Symptom Relief  Outcome: No Change  Intervention: Prevent and Manage Nausea and Vomiting  Recent Flowsheet Documentation  Taken 10/16/2020 2100 by Jaylyn Noe, RN  Nausea/Vomiting Interventions: antiemetic     Problem: Nutrition Intake Altered (Stem Cell/Bone Marrow Transplant)  Goal: Optimal Nutrition Intake  Outcome: No Change

## 2020-10-17 NOTE — PROGRESS NOTES
BMT Staff:  The patient was discussed on morning rounds with the nurses, mid level provider, and house staff and seen and examined by me. All labs and imaging were reviewed. I reviewed events over the last 24 hours including vitals and flow sheets. I agree with the above note and have been responsible for the care plan and interpretation of progress.     Subjective:  Reports feeling well today, good appetite, good control of nausea, no vomiting, no abd pain, no F/C/NS, no  complaints, hip/bony pains mild, resolved post nasal drip with no other respiratory symptoms, no HA/LH/Dizziness.      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  1. Heme/BMT: Day +10, transfusion as needed  2. ID: Afebrile  3. CV: no active issues  4. GI/ FEN: monitor regimen related toxicity, adjusting antiemetics, encourage fluid PO intake, I/O and daily weights, calorie counts and consider TPN, Mild Protein-Calorie Malnutrition   5. Renal: euvolemic, mild hyponatremia resolved  6. PPx: Levo, donato/vori, ACV  7.  Hypothyroidism on replacement, situational depression on Effexor, PT: encourage ambulation     Chito Bautista MD

## 2020-10-17 NOTE — PLAN OF CARE
"/79 (BP Location: Right arm)   Pulse 104   Temp 99.1  F (37.3  C) (Axillary)   Resp 16   Ht 1.68 m (5' 6.14\")   Wt 77.7 kg (171 lb 6.4 oz)   SpO2 97%   BMI 27.55 kg/m        S: No acute events 0700 -1900    B:  75 year old female, currently day +10 of JCARLOS Allo Sib PBSCT for AML (IDH2) in CR1    A: Afebrile, VSS, 97% on RA. Endorses rectal pain with bowel movements - patient received tucks pads and steroid cream. Patient is up independently voiding, adequately. Patient declined 4 pm dose of compazine as it makes her fatigued and patient slept most of the day. Patient received no PRN's. Follicular rash present on chest which patient reports is \"itchy\". Calorie counts in place. Patient declined shower on day shift, endorses that she would like to take a shower this evening.    R: No further nursing concerns, continue plan of care.  "

## 2020-10-18 LAB
ABO + RH BLD: ABNORMAL
ABO + RH BLD: ABNORMAL
ANION GAP SERPL CALCULATED.3IONS-SCNC: 5 MMOL/L (ref 3–14)
BACTERIA SPEC CULT: NORMAL
BLD GP AB SCN SERPL QL: ABNORMAL
BLOOD BANK CMNT PATIENT-IMP: ABNORMAL
BLOOD BANK CMNT PATIENT-IMP: ABNORMAL
BUN SERPL-MCNC: 5 MG/DL (ref 7–30)
CALCIUM SERPL-MCNC: 8.5 MG/DL (ref 8.5–10.1)
CHLORIDE SERPL-SCNC: 103 MMOL/L (ref 94–109)
CO2 SERPL-SCNC: 27 MMOL/L (ref 20–32)
CREAT SERPL-MCNC: 0.64 MG/DL (ref 0.52–1.04)
DIFFERENTIAL METHOD BLD: ABNORMAL
ERYTHROCYTE [DISTWIDTH] IN BLOOD BY AUTOMATED COUNT: 11.8 % (ref 10–15)
GFR SERPL CREATININE-BSD FRML MDRD: 88 ML/MIN/{1.73_M2}
GLUCOSE SERPL-MCNC: 88 MG/DL (ref 70–99)
HCT VFR BLD AUTO: 25.2 % (ref 35–47)
HGB BLD-MCNC: 8.3 G/DL (ref 11.7–15.7)
LACTATE BLD-SCNC: 0.9 MMOL/L (ref 0.7–2)
Lab: NORMAL
MAGNESIUM SERPL-MCNC: 1.5 MG/DL (ref 1.6–2.3)
MAGNESIUM SERPL-MCNC: 2.3 MG/DL (ref 1.6–2.3)
MCH RBC QN AUTO: 29.3 PG (ref 26.5–33)
MCHC RBC AUTO-ENTMCNC: 32.9 G/DL (ref 31.5–36.5)
MCV RBC AUTO: 89 FL (ref 78–100)
PLATELET # BLD AUTO: 17 10E9/L (ref 150–450)
POTASSIUM SERPL-SCNC: 4.1 MMOL/L (ref 3.4–5.3)
RBC # BLD AUTO: 2.83 10E12/L (ref 3.8–5.2)
SODIUM SERPL-SCNC: 136 MMOL/L (ref 133–144)
SPECIMEN EXP DATE BLD: ABNORMAL
SPECIMEN SOURCE: NORMAL
WBC # BLD AUTO: 0 10E9/L (ref 4–11)

## 2020-10-18 PROCEDURE — 250N000009 HC RX 250: Performed by: PHYSICIAN ASSISTANT

## 2020-10-18 PROCEDURE — 83735 ASSAY OF MAGNESIUM: CPT | Performed by: PHYSICIAN ASSISTANT

## 2020-10-18 PROCEDURE — 258N000003 HC RX IP 258 OP 636: Performed by: PHYSICIAN ASSISTANT

## 2020-10-18 PROCEDURE — 206N000001 HC R&B BMT UMMC

## 2020-10-18 PROCEDURE — 80048 BASIC METABOLIC PNL TOTAL CA: CPT | Performed by: PHYSICIAN ASSISTANT

## 2020-10-18 PROCEDURE — 86850 RBC ANTIBODY SCREEN: CPT | Performed by: PHYSICIAN ASSISTANT

## 2020-10-18 PROCEDURE — 250N000011 HC RX IP 250 OP 636: Performed by: PHYSICIAN ASSISTANT

## 2020-10-18 PROCEDURE — 85027 COMPLETE CBC AUTOMATED: CPT

## 2020-10-18 PROCEDURE — 83605 ASSAY OF LACTIC ACID: CPT | Performed by: INTERNAL MEDICINE

## 2020-10-18 PROCEDURE — 250N000011 HC RX IP 250 OP 636: Performed by: INTERNAL MEDICINE

## 2020-10-18 PROCEDURE — 250N000013 HC RX MED GY IP 250 OP 250 PS 637: Performed by: INTERNAL MEDICINE

## 2020-10-18 PROCEDURE — C9113 INJ PANTOPRAZOLE SODIUM, VIA: HCPCS | Performed by: PHYSICIAN ASSISTANT

## 2020-10-18 PROCEDURE — 258N000003 HC RX IP 258 OP 636: Performed by: INTERNAL MEDICINE

## 2020-10-18 PROCEDURE — 86901 BLOOD TYPING SEROLOGIC RH(D): CPT | Performed by: PHYSICIAN ASSISTANT

## 2020-10-18 PROCEDURE — 250N000013 HC RX MED GY IP 250 OP 250 PS 637: Performed by: PHYSICIAN ASSISTANT

## 2020-10-18 PROCEDURE — 83735 ASSAY OF MAGNESIUM: CPT | Performed by: INTERNAL MEDICINE

## 2020-10-18 PROCEDURE — 99233 SBSQ HOSP IP/OBS HIGH 50: CPT | Performed by: INTERNAL MEDICINE

## 2020-10-18 PROCEDURE — 86900 BLOOD TYPING SEROLOGIC ABO: CPT | Performed by: PHYSICIAN ASSISTANT

## 2020-10-18 RX ORDER — PROCHLORPERAZINE MALEATE 5 MG
5 TABLET ORAL EVERY 6 HOURS PRN
Status: DISCONTINUED | OUTPATIENT
Start: 2020-10-18 | End: 2020-10-28 | Stop reason: HOSPADM

## 2020-10-18 RX ADMIN — VORICONAZOLE 200 MG: 200 TABLET, FILM COATED ORAL at 08:22

## 2020-10-18 RX ADMIN — Medication 400 MCG: at 19:48

## 2020-10-18 RX ADMIN — ACYCLOVIR SODIUM 700 MG: 50 INJECTION, SOLUTION INTRAVENOUS at 09:42

## 2020-10-18 RX ADMIN — DEXTROSE MONOHYDRATE 20 ML: 50 INJECTION, SOLUTION INTRAVENOUS at 19:54

## 2020-10-18 RX ADMIN — URSODIOL 300 MG: 300 CAPSULE ORAL at 19:45

## 2020-10-18 RX ADMIN — ACYCLOVIR SODIUM 700 MG: 50 INJECTION, SOLUTION INTRAVENOUS at 17:55

## 2020-10-18 RX ADMIN — MYCOPHENOLATE MOFETIL 1000 MG: 500 INJECTION, POWDER, LYOPHILIZED, FOR SOLUTION INTRAVENOUS at 06:07

## 2020-10-18 RX ADMIN — LEVOFLOXACIN 250 MG: 250 TABLET, FILM COATED ORAL at 09:42

## 2020-10-18 RX ADMIN — URSODIOL 300 MG: 300 CAPSULE ORAL at 08:21

## 2020-10-18 RX ADMIN — MYCOPHENOLATE MOFETIL 1000 MG: 500 INJECTION, POWDER, LYOPHILIZED, FOR SOLUTION INTRAVENOUS at 22:45

## 2020-10-18 RX ADMIN — DEXTROSE MONOHYDRATE 10 ML: 50 INJECTION, SOLUTION INTRAVENOUS at 19:47

## 2020-10-18 RX ADMIN — VENLAFAXINE 75 MG: 75 TABLET ORAL at 13:39

## 2020-10-18 RX ADMIN — MAGNESIUM SULFATE IN WATER 4 G: 40 INJECTION, SOLUTION INTRAVENOUS at 06:08

## 2020-10-18 RX ADMIN — HYDROCORTISONE: 1 CREAM TOPICAL at 08:28

## 2020-10-18 RX ADMIN — PROCHLORPERAZINE EDISYLATE 5 MG: 5 INJECTION INTRAMUSCULAR; INTRAVENOUS at 15:01

## 2020-10-18 RX ADMIN — LEVOTHYROXINE SODIUM 88 MCG: 0.09 TABLET ORAL at 08:22

## 2020-10-18 RX ADMIN — CLINDAMYCIN PHOSPHATE: 10 GEL TOPICAL at 19:55

## 2020-10-18 RX ADMIN — URSODIOL 300 MG: 300 CAPSULE ORAL at 13:39

## 2020-10-18 RX ADMIN — ACYCLOVIR SODIUM 700 MG: 50 INJECTION, SOLUTION INTRAVENOUS at 02:49

## 2020-10-18 RX ADMIN — HYDROCORTISONE: 1 CREAM TOPICAL at 22:48

## 2020-10-18 RX ADMIN — LORATADINE 10 MG: 10 TABLET ORAL at 08:21

## 2020-10-18 RX ADMIN — VORICONAZOLE 200 MG: 200 TABLET, FILM COATED ORAL at 19:45

## 2020-10-18 RX ADMIN — TACROLIMUS 80 MCG/HR: 5 INJECTION, SOLUTION INTRAVENOUS at 19:54

## 2020-10-18 RX ADMIN — GRANISETRON HYDROCHLORIDE 1 MG: 1 INJECTION INTRAVENOUS at 08:23

## 2020-10-18 RX ADMIN — MYCOPHENOLATE MOFETIL 1000 MG: 500 INJECTION, POWDER, LYOPHILIZED, FOR SOLUTION INTRAVENOUS at 13:39

## 2020-10-18 RX ADMIN — CLINDAMYCIN PHOSPHATE: 10 GEL TOPICAL at 08:29

## 2020-10-18 RX ADMIN — POLYETHYLENE GLYCOL 3350 17 G: 17 POWDER, FOR SOLUTION ORAL at 19:45

## 2020-10-18 RX ADMIN — PANTOPRAZOLE SODIUM 40 MG: 40 INJECTION, POWDER, FOR SOLUTION INTRAVENOUS at 08:22

## 2020-10-18 RX ADMIN — Medication 2.5 MG: at 22:45

## 2020-10-18 RX ADMIN — GRANISETRON HYDROCHLORIDE 1 MG: 1 INJECTION INTRAVENOUS at 19:46

## 2020-10-18 ASSESSMENT — ACTIVITIES OF DAILY LIVING (ADL)
ADLS_ACUITY_SCORE: 10

## 2020-10-18 ASSESSMENT — MIFFLIN-ST. JEOR: SCORE: 1299.2

## 2020-10-18 NOTE — PROGRESS NOTES
Calorie Count  Intake recorded for: 10/17  Total Kcals: 491 Total Protein: 22g  Kcals from Hospital Food: 491  Protein: 22g  Kcals from Outside Food (average):0 Protein: 0g  # Meals Ordered from Kitchen: 2 meal ordered.  # Meals Recorded: 2 meals recorded. (First - 100% orange, 25% blueberry muffin)       (Second - 100% banana, crumb cod w/ tartar sauce, 75% green beans)  # Supplements Recorded: 0

## 2020-10-18 NOTE — PROGRESS NOTES
"BMT Daily Progress Note   10/17/20    Patient ID:  Florencia Gao is a 75 year old female, currently day +10of JCARLOS Allo Sib PBSCT for AML (IDH2) in CR1.      Diagnosis AMLM1 Acute myelogeneous leukemia, M1, myeloblastic  HCT Type Allogeneic    Prep Regimen Fludarabine  Cytoxan  TBI   Donor Source Sibling    GVHD Prophylaxis Post transplant cytoxan  Tac/MMF  Primary BMT Provider ACE      INTERVAL  HISTORY   Feels well this morning. Afebrile - biggest complain was hemorroidal irritation. No rectal bleeding. Eat and drinking okay. Motivated to be more active.      Review of Systems: 8 point ROS negative except as noted above.    PHYSICAL EXAM     Weight In/Out     Wt Readings from Last 3 Encounters:   10/17/20 77.7 kg (171 lb 6.4 oz)   09/17/20 77.8 kg (171 lb 8 oz)   09/10/20 77.2 kg (170 lb 3.1 oz)      I/O last 3 completed shifts:  In: 2665 [P.O.:780; I.V.:1885]  Out: 1650 [Urine:1650]       KPS:  100    /63 (BP Location: Right arm)   Pulse 89   Temp 98.3  F (36.8  C) (Axillary)   Resp 16   Ht 1.68 m (5' 6.14\")   Wt 77.7 kg (171 lb 6.4 oz)   SpO2 96%   BMI 27.55 kg/m     General: NAD   Eyes:  LINH, sclera anicteric.   Lungs: CTA bilaterally, normal WOB on RA   Cardiovascular: RRR, no M/R/G   Abdominal/Rectal: +BS, soft, NT, ND  Rectal exam: Large irritated/inflammed hemrroid at ~3oclock. Not exquistly tender no induration or concern for abscess. No skin breakdown noticed.   Lymphatics: no edema b/l LE   Skin: follicular rash on central chest. Small area of involvement on central back. No other rashes or petechiae. Non- tender.   Neuro: A&O x3  Line: right CVC.   10/5/20 Current aGVHD staging:  Skin 0, UGI 0, LGI 0, Liver 0 (keep in note through day +180, delete if auto)    LABS AND IMAGING - PAST 24 HOURS     Lab Results   Component Value Date    WBC 0.0 (LL) 10/18/2020    ANEU 0.4 (LL) 10/08/2020    HGB 8.3 (L) 10/18/2020    HCT 25.2 (L) 10/18/2020    PLT 17 (LL) 10/18/2020     10/18/2020    " POTASSIUM 4.1 10/18/2020    CHLORIDE 103 10/18/2020    CO2 27 10/18/2020    GLC 88 10/18/2020    BUN 5 (L) 10/18/2020    CR 0.64 10/18/2020    MAG 1.5 (L) 10/18/2020    INR 1.09 10/12/2020     I have assessed all abnormal lab values for their clinical significance and any values considered clinically significant have been addressed in the assessment and plan  OVERALL PLAN   Florencia Gao is a 75 year old female with AML in CR1, admitted for JCARLOS Allo sib pbsct per protocol 2019-10, randomized to Post transplant cytoxan/Tac/MMF.  Day +10.      1.  BMT: Cy/flu/TBI prep.   Allopurinol given through day 0.   Received 4.19 x10(6) CD34/kg. Donor B pos and recipient B neg.   GCSF started d+5 and cont to until ANC>1500 x3 consecutive days. Re-stage per protocol.     2.  HEME: Keep Hgb>8 and plts>10K.   - No transfusion today   - Of note has significant antibodies to RBCs- can cause delay in transfusion availability. Premeds tylenol and benadryl.                             3.  ID: Afebrile.   RVP 10/14 d/t post-nasal drip-pending. Started Claritin.   Cont Levo, Vori (started 10/14), and HD ACV (CMV+, HSV+, EBV+) prophy.   Pjp prophy to start day+28 - IV pentamdine vs dapsone as sulfa allergy.                                         4.  GI: nausea.  Nausea: headaches with zofran. Significant nausea/vomiting with cytoxan. Scheduled kytril and comopazine tid. Changed some meds to IV 10/12 d/t nausea with pills. Added zyprexa 2.5mg ODT at bedtime 10/12. s/p emend 10/13.\  - Hemorroids: tucks prn, barrier cream and start BID hydrocortisone cream topically.    - Ativan available PRN break-through symptoms.   - **Per protocol no dex/steroids**   Protonix for GI prophy.    Ursodiol for VOD prophy.     5.  GVHD Prophylaxis:   Day +3 and day+4 cytoxan  Day+5 start tac and MMF, tac level 10.0, decreased to 88mcg/hr d/t voriconazole starting. Repeat level 15.2, level decreased to 80 mcg/hr 10/15.       6.  FEN/Renal:  - Cr and lytes  stable.    - HypoNa secondary to cytoxan. Resolved.   - Replete lytes PRN per SS. Monitor weight, I+O, lytes per protocol with IVF flush.  - Anorexia and malnutrition in the context of acute illness: Calorie counts starting 10/8- eating minimally. Slightly better- monitor.      7. Hypothyroidism  Continue synthroid 88mcg      8. Psych: Situational Depression continue effexor 75mg daily.     9.  Skin:    - Follicular rash 10/15 on chest: started clindamycin gel. Benadryl prn for itching.   - Previous irritation/rash under and around CVC dressing. Pt with allergy to chlorhexidine. Removed biopatch and trial of different dressing. Improved.      10. Research: Pt consented to [id number] (Seamless randomized tiral to alleviate morbidity and mortality) for which she was randomized to the palliative care management arm. Palliative care consult placed 10/8 per research recs- see note dated 10/8.     Dispo: patient will remain admitted through neutropenia and subsequent engraftment.     HAI Jerome-C  [phone]    Please see separate attending note for this day from Chito Bautista MD

## 2020-10-18 NOTE — PLAN OF CARE
VSS, afebrile. Pt up independently. Steady on feet. Reports nausea as being controlled, held compazine due to this making her drowsy. Miralax given in evening. Replace magnesium this am. Recheck needed. Continue plan of care.     Problem: Adult Inpatient Plan of Care  Goal: Plan of Care Review  Outcome: No Change  Goal: Patient-Specific Goal (Individualization)  Outcome: No Change  Goal: Absence of Hospital-Acquired Illness or Injury  Outcome: No Change  Intervention: Identify and Manage Fall Risk  Recent Flowsheet Documentation  Taken 10/17/2020 2300 by Jaylyn Noe, CASSIDY  Safety Promotion/Fall Prevention:    fall prevention program maintained    lighting adjusted    nonskid shoes/slippers when out of bed    clutter free environment maintained  Intervention: Prevent VTE (venous thromboembolism)  Recent Flowsheet Documentation  Taken 10/17/2020 2300 by Jaylyn Noe RN  VTE Prevention/Management:    ambulation promoted    bleeding risk assessed    fluids promoted    AROM (active range of motion) performed  Goal: Optimal Comfort and Wellbeing  Outcome: No Change  Goal: Readiness for Transition of Care  Outcome: No Change  Goal: Rounds/Family Conference  Outcome: No Change     Problem: Bladder Irritation (Stem Cell/Bone Marrow Transplant)  Goal: Symptom-Free Urinary Elimination  Outcome: No Change  Intervention: Monitor and Manage Bladder Irritation  Recent Flowsheet Documentation  Taken 10/17/2020 2300 by Jaylyn Noe, RN  Hyperhydration Management: fluids provided     Problem: Diarrhea (Stem Cell/Bone Marrow Transplant)  Goal: Diarrhea Symptom Control  Outcome: No Change  Intervention: Manage Diarrhea  Recent Flowsheet Documentation  Taken 10/17/2020 2300 by Jaylyn Noe, RN  Fluid/Electrolyte Management: fluids provided     Problem: Fatigue (Stem Cell/Bone Marrow Transplant)  Goal: Energy Level Supports Daily Activity  Outcome: No Change     Problem: Hematologic Alteration (Stem Cell/Bone Marrow Transplant)  Goal: Blood  Counts Within Acceptable Range  Outcome: No Change     Problem: Hypersensitivity Reaction (Stem Cell/Bone Marrow Transplant)  Goal: Absence of Hypersensitivity Reaction  Outcome: No Change     Problem: Infection Risk (Stem Cell/Bone Marrow Transplant)  Goal: Absence of Infection Signs/Symptoms  Outcome: No Change  Intervention: Prevent and Manage Infection  Recent Flowsheet Documentation  Taken 10/17/2020 2300 by Jaylyn Noe RN  Isolation Precautions:    cytotoxic precautions maintained    protective environment maintained     Problem: Mucositis (Stem Cell/Bone Marrow Transplant)  Goal: Mucous Membrane Health and Integrity  Outcome: No Change  Intervention: Maintain Oral Mucous Membrane Integrity  Recent Flowsheet Documentation  Taken 10/17/2020 2300 by Jaylyn Noe RN  Oral Mucous Membrane Protection: nonirritating oral foods promoted  Oral Care:    teeth brushed    oral rinse provided     Problem: Nausea and Vomiting (Stem Cell/Bone Marrow Transplant)  Goal: Nausea and Vomiting Symptom Relief  Outcome: No Change  Intervention: Prevent and Manage Nausea and Vomiting  Recent Flowsheet Documentation  Taken 10/17/2020 2300 by Jaylyn Noe RN  Nausea/Vomiting Interventions:    antiemetic    slow deep breathing encouraged    sips of clear liquids given     Problem: Nutrition Intake Altered (Stem Cell/Bone Marrow Transplant)  Goal: Optimal Nutrition Intake  Outcome: No Change  Intervention: Minimize and Manage Barriers to Oral Intake  Recent Flowsheet Documentation  Taken 10/17/2020 2300 by Jaylyn Noe RN  Oral Nutrition Promotion: rest periods promoted

## 2020-10-18 NOTE — PROGRESS NOTES
"BMT Daily Progress Note   October 18, 2020    Patient ID:  Florencia Gao is a 75 year old female, currently day +11 of JCARLOS Allo Sib PBSCT for AML (IDH2) in CR1.      Diagnosis AMLM1 Acute myelogeneous leukemia, M1, myeloblastic  HCT Type Allogeneic    Prep Regimen Fludarabine  Cytoxan  TBI   Donor Source Sibling    GVHD Prophylaxis Post transplant cytoxan  Tac/MMF  Primary BMT Provider ACE      INTERVAL  HISTORY   Feels well this morning. Afebrile- no infectious symptoms. Hemorroidal irritation is much improved today. No bleeding. Notes she is eating and drinking - actually stopped scheduled compazine as it makes her too sleepy but no issues with nausea. Calorie counts however are concerning.       Review of Systems: 8 point ROS negative except as noted above.    PHYSICAL EXAM     Weight In/Out     Wt Readings from Last 3 Encounters:   10/18/20 77.5 kg (170 lb 14.4 oz)   09/17/20 77.8 kg (171 lb 8 oz)   09/10/20 77.2 kg (170 lb 3.1 oz)      I/O last 3 completed shifts:  In: 2665 [P.O.:780; I.V.:1885]  Out: 1650 [Urine:1650]       KPS:  100    /81 (BP Location: Right arm)   Pulse 109   Temp 98.1  F (36.7  C) (Axillary)   Resp 16   Ht 1.68 m (5' 6.14\")   Wt 77.5 kg (170 lb 14.4 oz)   SpO2 97%   BMI 27.47 kg/m     General: NAD   Eyes:  LINH, sclera anicteric.   Lungs: CTA bilaterally, normal WOB on RA   Cardiovascular: RRR, no M/R/G   Abdominal/Rectal: +BS, soft, NT, ND  Rectal exam: Large irritated/inflammed hemrroid at ~3oclock. Not exquistly tender no induration or concern for abscess. No skin breakdown noticed.   Lymphatics: no edema b/l LE   Skin: follicular rash on central chest. Small area of involvement on central back. No other rashes or petechiae. Non- tender.   Neuro: A&O x3  Line: right CVC.   10/5/20 Current aGVHD staging:  Skin 0, UGI 0, LGI 0, Liver 0 (keep in note through day +180, delete if auto)    LABS AND IMAGING - PAST 24 HOURS     Lab Results   Component Value Date    WBC 0.0 (LL) " 10/18/2020    ANEU 0.4 (LL) 10/08/2020    HGB 8.3 (L) 10/18/2020    HCT 25.2 (L) 10/18/2020    PLT 17 (LL) 10/18/2020     10/18/2020    POTASSIUM 4.1 10/18/2020    CHLORIDE 103 10/18/2020    CO2 27 10/18/2020    GLC 88 10/18/2020    BUN 5 (L) 10/18/2020    CR 0.64 10/18/2020    MAG 1.5 (L) 10/18/2020    INR 1.09 10/12/2020     I have assessed all abnormal lab values for their clinical significance and any values considered clinically significant have been addressed in the assessment and plan  OVERALL PLAN   Florencia Gao is a 75 year old female with AML in CR1, admitted for JCARLOS Allo sib pbsct per protocol 2019-10, randomized to Post transplant cytoxan/Tac/MMF.  Day +11.      1.  BMT: Cy/flu/TBI prep.   Allopurinol given through day 0.   Received 4.19 x10(6) CD34/kg. Donor B pos and recipient B neg.   GCSF started d+5 and cont to until ANC>1500 x3 consecutive days. Re-stage per protocol.     2.  HEME: Keep Hgb>8 and plts>10K.   - plts 9- give plts, no bleeding.   - Of note has significant antibodies to RBCs- can cause delay in transfusion availability. Premeds tylenol and benadryl.                             3.  ID: Afebrile.   RVP 10/14 neg. Started Claritin.   Cont Levo, Vori (started 10/14), and HD ACV (CMV+, HSV+, EBV+) prophy.   Pjp prophy to start day+28 - IV pentamdine vs dapsone as sulfa allergy.                                         4.  GI: nausea - improved.   Nausea: headaches with zofran. Significant nausea/vomiting with cytoxan. s/p emend 10/13.  - Continue Scheduled kytril. Per patient request- compazine back to prn  -Continue zyprexa 2.5mg ODT at bedtime 10/12.   - Hemorroids: tucks prn, barrier cream and start BID hydrocortisone cream topically.    - Ativan available PRN break-through symptoms.   - **Per protocol no dex/steroids**   Protonix for GI prophy.    Ursodiol for VOD prophy.     5.  GVHD Prophylaxis:   Day +3 and day+4 cytoxan  Day+5 start tac and MMF, tac level 10.0, decreased to  88mcg/hr d/t voriconazole starting. Repeat level 15.2, level decreased to 80 mcg/hr 10/15.       6.  FEN/Renal:  - Cr and lytes stable.    - Replete lytes PRN per SS. Monitor weight, I+O, lytes per protocol with IVF flush.  - Anorexia and malnutrition in the context of acute illness: Calorie counts starting 10/8- eating minimally. Yesterday Calorie counts again low ~500     7. Hypothyroidism  Continue synthroid 88mcg      8. Psych: Situational Depression continue effexor 75mg daily.     9.  Skin:    - Follicular rash 10/15 on chest: started clindamycin gel. Benadryl prn for itching.   - Previous irritation/rash under and around CVC dressing. Pt with allergy to chlorhexidine. Removed biopatch and trial of different dressing. Improved.      10. Research: Pt consented to [id number] (Seamless randomized tiral to alleviate morbidity and mortality) for which she was randomized to the palliative care management arm. Palliative care consult placed 10/8 per research recs- see note dated 10/8.     Dispo: patient will remain admitted through neutropenia and subsequent engraftment.     Samira Barone PA-C  [phone]    BMT Staff:  The patient was discussed on morning rounds with the nurses, mid level provider, and house staff and seen and examined by me. All labs and imaging were reviewed. I reviewed events over the last 24 hours including vitals and flow sheets. I agree with the above note and have been responsible for the care plan and interpretation of progress.     Subjective:  Reports feeling well today, good appetite, good control of nausea, no vomiting, no abd pain, no F/C/NS, no  complaints, hip/bony pains mild, resolved post nasal drip with no other respiratory symptoms, no HA/LH/Dizziness.      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  1. Heme/BMT: Day +11, transfusion as needed  2. ID: Afebrile  3. CV: no active issues  4. GI/ FEN: monitor regimen related toxicity, adjusting antiemetics, encourage fluid PO intake, I/O and daily weights, calorie counts and consider TPN, Mild Protein-Calorie Malnutrition   5. Renal: euvolemic, mild hyponatremia resolved  6. PPx: Levo, donato/vori, ACV  7.  Hypothyroidism on replacement, situational depression on Effexor, PT: encourage ambulation     Chito Bautista MD

## 2020-10-18 NOTE — PLAN OF CARE
"BP (!) 153/89 (BP Location: Right arm)   Pulse 100   Temp 97.6  F (36.4  C) (Axillary)   Resp 16   Ht 1.68 m (5' 6.14\")   Wt 77.5 kg (170 lb 14.4 oz)   SpO2 98%   BMI 27.47 kg/m        S: No acute events 0700 - 1900    B: 75 year old female, currently day +11 of JCARLOS Allo Sib PBSCT for AML (IDH2) in CR1.     A: Afebrile. VSS. Up independently. Voiding adequately, 1 formed stool. Patient denies pain, endorses nausea. Received PRN compazine x1. Rash improving, less red, fewer visible follicular papules. Patient showered in am and completed her stretches.     R: No further nursing concerns, continue plan of care.  "

## 2020-10-19 ENCOUNTER — APPOINTMENT (OUTPATIENT)
Dept: PHYSICAL THERAPY | Facility: CLINIC | Age: 75
DRG: 014 | End: 2020-10-19
Attending: INTERNAL MEDICINE
Payer: COMMERCIAL

## 2020-10-19 LAB
ALBUMIN SERPL-MCNC: 2.8 G/DL (ref 3.4–5)
ALP SERPL-CCNC: 68 U/L (ref 40–150)
ALT SERPL W P-5'-P-CCNC: 18 U/L (ref 0–50)
ANION GAP SERPL CALCULATED.3IONS-SCNC: 4 MMOL/L (ref 3–14)
AST SERPL W P-5'-P-CCNC: 16 U/L (ref 0–45)
BILIRUB DIRECT SERPL-MCNC: <0.1 MG/DL (ref 0–0.2)
BILIRUB SERPL-MCNC: 0.3 MG/DL (ref 0.2–1.3)
BLD PROD TYP BPU: NORMAL
BLD PROD TYP BPU: NORMAL
BLD UNIT ID BPU: 0
BLOOD PRODUCT CODE: NORMAL
BPU ID: NORMAL
BUN SERPL-MCNC: 6 MG/DL (ref 7–30)
CALCIUM SERPL-MCNC: 8.3 MG/DL (ref 8.5–10.1)
CHLORIDE SERPL-SCNC: 101 MMOL/L (ref 94–109)
CO2 SERPL-SCNC: 29 MMOL/L (ref 20–32)
CREAT SERPL-MCNC: 0.61 MG/DL (ref 0.52–1.04)
DIFFERENTIAL METHOD BLD: ABNORMAL
ERYTHROCYTE [DISTWIDTH] IN BLOOD BY AUTOMATED COUNT: 11.6 % (ref 10–15)
GFR SERPL CREATININE-BSD FRML MDRD: 90 ML/MIN/{1.73_M2}
GLUCOSE SERPL-MCNC: 87 MG/DL (ref 70–99)
HCT VFR BLD AUTO: 24 % (ref 35–47)
HGB BLD-MCNC: 8.2 G/DL (ref 11.7–15.7)
INR PPP: 1.02 (ref 0.86–1.14)
LACTATE BLD-SCNC: 1.1 MMOL/L (ref 0.7–2)
MAGNESIUM SERPL-MCNC: 1.6 MG/DL (ref 1.6–2.3)
MCH RBC QN AUTO: 29.9 PG (ref 26.5–33)
MCHC RBC AUTO-ENTMCNC: 34.2 G/DL (ref 31.5–36.5)
MCV RBC AUTO: 88 FL (ref 78–100)
NUM BPU REQUESTED: 1
PHOSPHATE SERPL-MCNC: 2.7 MG/DL (ref 2.5–4.5)
PLATELET # BLD AUTO: 6 10E9/L (ref 150–450)
POTASSIUM SERPL-SCNC: 4 MMOL/L (ref 3.4–5.3)
PROT SERPL-MCNC: 6.1 G/DL (ref 6.8–8.8)
RBC # BLD AUTO: 2.74 10E12/L (ref 3.8–5.2)
SODIUM SERPL-SCNC: 134 MMOL/L (ref 133–144)
TACROLIMUS BLD-MCNC: 12.4 UG/L (ref 5–15)
TME LAST DOSE: NORMAL H
TRANSFUSION STATUS PATIENT QL: NORMAL
TRANSFUSION STATUS PATIENT QL: NORMAL
WBC # BLD AUTO: 0 10E9/L (ref 4–11)

## 2020-10-19 PROCEDURE — 206N000001 HC R&B BMT UMMC

## 2020-10-19 PROCEDURE — 83735 ASSAY OF MAGNESIUM: CPT | Performed by: PHYSICIAN ASSISTANT

## 2020-10-19 PROCEDURE — 85027 COMPLETE CBC AUTOMATED: CPT

## 2020-10-19 PROCEDURE — 250N000013 HC RX MED GY IP 250 OP 250 PS 637: Performed by: INTERNAL MEDICINE

## 2020-10-19 PROCEDURE — 250N000013 HC RX MED GY IP 250 OP 250 PS 637: Performed by: PHYSICIAN ASSISTANT

## 2020-10-19 PROCEDURE — 250N000009 HC RX 250: Performed by: PHYSICIAN ASSISTANT

## 2020-10-19 PROCEDURE — 80197 ASSAY OF TACROLIMUS: CPT | Performed by: PHYSICIAN ASSISTANT

## 2020-10-19 PROCEDURE — C9113 INJ PANTOPRAZOLE SODIUM, VIA: HCPCS | Performed by: PHYSICIAN ASSISTANT

## 2020-10-19 PROCEDURE — 82248 BILIRUBIN DIRECT: CPT | Performed by: PHYSICIAN ASSISTANT

## 2020-10-19 PROCEDURE — 250N000011 HC RX IP 250 OP 636: Performed by: PHYSICIAN ASSISTANT

## 2020-10-19 PROCEDURE — 250N000011 HC RX IP 250 OP 636: Performed by: INTERNAL MEDICINE

## 2020-10-19 PROCEDURE — 84100 ASSAY OF PHOSPHORUS: CPT | Performed by: PHYSICIAN ASSISTANT

## 2020-10-19 PROCEDURE — 258N000003 HC RX IP 258 OP 636: Performed by: INTERNAL MEDICINE

## 2020-10-19 PROCEDURE — 99233 SBSQ HOSP IP/OBS HIGH 50: CPT | Performed by: INTERNAL MEDICINE

## 2020-10-19 PROCEDURE — 97110 THERAPEUTIC EXERCISES: CPT | Mod: GP

## 2020-10-19 PROCEDURE — 85610 PROTHROMBIN TIME: CPT | Performed by: PHYSICIAN ASSISTANT

## 2020-10-19 PROCEDURE — P9037 PLATE PHERES LEUKOREDU IRRAD: HCPCS | Performed by: PHYSICIAN ASSISTANT

## 2020-10-19 PROCEDURE — 258N000003 HC RX IP 258 OP 636: Performed by: PHYSICIAN ASSISTANT

## 2020-10-19 PROCEDURE — 80053 COMPREHEN METABOLIC PANEL: CPT | Performed by: PHYSICIAN ASSISTANT

## 2020-10-19 PROCEDURE — 83605 ASSAY OF LACTIC ACID: CPT | Performed by: INTERNAL MEDICINE

## 2020-10-19 RX ADMIN — MAGNESIUM SULFATE 2 G: 2 INJECTION INTRAVENOUS at 04:35

## 2020-10-19 RX ADMIN — PROCHLORPERAZINE EDISYLATE 5 MG: 5 INJECTION INTRAMUSCULAR; INTRAVENOUS at 21:48

## 2020-10-19 RX ADMIN — PROCHLORPERAZINE EDISYLATE 5 MG: 5 INJECTION INTRAMUSCULAR; INTRAVENOUS at 16:14

## 2020-10-19 RX ADMIN — POTASSIUM CHLORIDE 20 MEQ: 29.8 INJECTION, SOLUTION INTRAVENOUS at 05:32

## 2020-10-19 RX ADMIN — PANTOPRAZOLE SODIUM 40 MG: 40 INJECTION, POWDER, FOR SOLUTION INTRAVENOUS at 08:50

## 2020-10-19 RX ADMIN — URSODIOL 300 MG: 300 CAPSULE ORAL at 19:49

## 2020-10-19 RX ADMIN — DIPHENHYDRAMINE HYDROCHLORIDE 25 MG: 25 CAPSULE ORAL at 04:35

## 2020-10-19 RX ADMIN — MYCOPHENOLATE MOFETIL 1000 MG: 500 INJECTION, POWDER, LYOPHILIZED, FOR SOLUTION INTRAVENOUS at 05:37

## 2020-10-19 RX ADMIN — HYDROCORTISONE: 1 CREAM TOPICAL at 19:58

## 2020-10-19 RX ADMIN — GRANISETRON HYDROCHLORIDE 1 MG: 1 INJECTION INTRAVENOUS at 19:49

## 2020-10-19 RX ADMIN — ACYCLOVIR SODIUM 700 MG: 50 INJECTION, SOLUTION INTRAVENOUS at 02:45

## 2020-10-19 RX ADMIN — Medication 400 MCG: at 19:49

## 2020-10-19 RX ADMIN — ACYCLOVIR SODIUM 700 MG: 50 INJECTION, SOLUTION INTRAVENOUS at 17:53

## 2020-10-19 RX ADMIN — ACYCLOVIR SODIUM 700 MG: 50 INJECTION, SOLUTION INTRAVENOUS at 09:40

## 2020-10-19 RX ADMIN — PROCHLORPERAZINE EDISYLATE 5 MG: 5 INJECTION INTRAMUSCULAR; INTRAVENOUS at 08:45

## 2020-10-19 RX ADMIN — VENLAFAXINE 75 MG: 75 TABLET ORAL at 14:27

## 2020-10-19 RX ADMIN — MYCOPHENOLATE MOFETIL 1000 MG: 500 INJECTION, POWDER, LYOPHILIZED, FOR SOLUTION INTRAVENOUS at 21:49

## 2020-10-19 RX ADMIN — VORICONAZOLE 200 MG: 200 TABLET, FILM COATED ORAL at 19:49

## 2020-10-19 RX ADMIN — DEXTROSE MONOHYDRATE 20 ML: 50 INJECTION, SOLUTION INTRAVENOUS at 19:50

## 2020-10-19 RX ADMIN — LORATADINE 10 MG: 10 TABLET ORAL at 08:54

## 2020-10-19 RX ADMIN — ACETAMINOPHEN 650 MG: 325 TABLET, FILM COATED ORAL at 04:35

## 2020-10-19 RX ADMIN — GRANISETRON HYDROCHLORIDE 1 MG: 1 INJECTION INTRAVENOUS at 08:50

## 2020-10-19 RX ADMIN — DEXTROSE MONOHYDRATE 20 ML: 50 INJECTION, SOLUTION INTRAVENOUS at 19:49

## 2020-10-19 RX ADMIN — HYDROCORTISONE: 1 CREAM TOPICAL at 08:55

## 2020-10-19 RX ADMIN — LEVOTHYROXINE SODIUM 88 MCG: 0.09 TABLET ORAL at 08:54

## 2020-10-19 RX ADMIN — LEVOFLOXACIN 250 MG: 250 TABLET, FILM COATED ORAL at 09:40

## 2020-10-19 RX ADMIN — Medication 2.5 MG: at 21:48

## 2020-10-19 RX ADMIN — URSODIOL 300 MG: 300 CAPSULE ORAL at 08:54

## 2020-10-19 RX ADMIN — URSODIOL 300 MG: 300 CAPSULE ORAL at 14:27

## 2020-10-19 RX ADMIN — MYCOPHENOLATE MOFETIL 1000 MG: 500 INJECTION, POWDER, LYOPHILIZED, FOR SOLUTION INTRAVENOUS at 14:28

## 2020-10-19 RX ADMIN — VORICONAZOLE 200 MG: 200 TABLET, FILM COATED ORAL at 08:54

## 2020-10-19 RX ADMIN — TACROLIMUS 80 MCG/HR: 5 INJECTION, SOLUTION INTRAVENOUS at 21:00

## 2020-10-19 ASSESSMENT — ACTIVITIES OF DAILY LIVING (ADL)
ADLS_ACUITY_SCORE: 10

## 2020-10-19 ASSESSMENT — MIFFLIN-ST. JEOR: SCORE: 1301.01

## 2020-10-19 NOTE — PLAN OF CARE
Florencia requested compazine once for nausea.  This was effective and she has eaten both breakfast and lunch, specifics were recorded on calorie count sheets.  She is up and about in her room, showered and worked with PT today.  Tacrolimus drip infusing at 80 mcg per hour, level was therapeutic at 12.4 this morning.      Problem: Adult Inpatient Plan of Care  Goal: Plan of Care Review  Outcome: Improving

## 2020-10-19 NOTE — PROGRESS NOTES
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      Diagnosis AMLM1 Acute myelogeneous leukemia, M1, myeloblastic  HCT Type Allogeneic    Prep Regimen Fludarabine  Cytoxan  TBI   Donor Source Sibling    GVHD Prophylaxis Post transplant cytoxan  Tac/MMF  Primary BMT Provider ACE      INTERVAL  HISTORY   Up reading in her chair this morning. Feeling well. Hemorrhoidal irritation is still there but better than it was. Rash on chest has resolved. No diarrhea. Nausea flared a bit last night but responded to compazine. Eating okay. Blood tinged nasal secretions but no bleeding. No other acute medical complaints.        Review of Systems: 8 point ROS negative except as noted above.    PHYSICAL EXAM     Weight In/Out     Wt Readings from Last 3 Encounters:   10/19/20 77.7 kg (171 lb 4.8 oz)   09/17/20 77.8 kg (171 lb 8 oz)   09/10/20 77.2 kg (170 lb 3.1 oz)      I/O last 3 completed shifts:  In: 3111 [P.O.:1420; I.V.:1460]  Out: 2100 [Urine:2100]       KPS:  90    /71 (BP Location: Right arm)   Pulse 82   Temp 97.6  F (36.4  C) (Axillary)   Resp 16   Ht 1.68 m (5' 6.14\")   Wt 77.7 kg (171 lb 4.8 oz)   SpO2 96%   BMI 27.53 kg/m     General: NAD   Eyes:  LINH, sclera anicteric.   Lungs: CTA bilaterally, normal WOB on RA   Cardiovascular: RRR, no M/R/G   Abdominal/Rectal: +BS, soft, NT, ND  Rectal exam: Large irritated/inflammed hemrroid at ~3oclock. Not exquistly tender no induration or concern for abscess. No skin breakdown noticed. (not examined 10/19)  Lymphatics: no edema b/l LE   Skin: follicular rash on central chest nearly resolved. No other rashes or petechiae. Non- tender.   Neuro: A&O x3  Line: right CVC.   10/19/20 Current aGVHD staging:  Skin 0, UGI 0, LGI 0, Liver 0 (keep in note through day +180, delete if auto)    LABS AND IMAGING - PAST 24 HOURS     Lab Results   Component Value " Date    WBC 0.0 (LL) 10/19/2020    ANEU 0.4 (LL) 10/08/2020    HGB 8.2 (L) 10/19/2020    HCT 24.0 (L) 10/19/2020    PLT 6 (LL) 10/19/2020     10/19/2020    POTASSIUM 4.0 10/19/2020    CHLORIDE 101 10/19/2020    CO2 29 10/19/2020    GLC 87 10/19/2020    BUN 6 (L) 10/19/2020    CR 0.61 10/19/2020    MAG 1.6 10/19/2020    INR 1.02 10/19/2020     I have assessed all abnormal lab values for their clinical significance and any values considered clinically significant have been addressed in the assessment and plan  OVERALL PLAN   Florencia Gao is a 75 year old female with AML in CR1, admitted for JCARLOS Allo sib pbsct per protocol 2019-10, randomized to Post transplant cytoxan/Tac/MMF.  Day +12.      1.  BMT: Cy/flu/TBI prep.   Allopurinol given through day 0.   Received 4.19 x10(6) CD34/kg. Donor B pos and recipient B neg.   GCSF started d+5 and cont to until ANC>1500 x3 consecutive days. Re-stage per protocol.     2.  HEME: Keep Hgb>8 and plts>10K.   - plts 4- give plts, no bleeding.   - Of note has significant antibodies to RBCs- can cause delay in transfusion availability. Premeds tylenol and benadryl.                             3.  ID: Afebrile.   RVP 10/14 neg. Started Claritin.   Cont Levo, Vori (started 10/14), and HD ACV (CMV+, HSV+, EBV+) prophy.   Pjp prophy to start day+28 - IV pentamdine vs dapsone as sulfa allergy.                                         4.  GI: nausea - improved.   Nausea: headaches with zofran. Significant nausea/vomiting with cytoxan. s/p emend 10/13.  - Continue Scheduled kytril and zyprexa 2.5mg at bedtime.  Compazine and ativan prn  - Hemorroids: tucks prn, barrier cream and start BID hydrocortisone cream topically.    - Ativan available PRN break-through symptoms.   - **Per protocol no dex/steroids**   Protonix for GI prophy.    Ursodiol for VOD prophy.     5.  GVHD Prophylaxis:   Day +3 and day+4 cytoxan  On tac and MMF. Tac adjusted with starting voriconazole. Currently on 80  mcg/hr. Level 10/16 okay at 12.6. Tac level today.      6.  FEN/Renal:  - Cr and lytes stable.    - Replete lytes PRN per SS. Monitor weight, I+O, lytes per protocol with IVF flush.  - Anorexia and malnutrition in the context of acute illness: Calorie counts starting 10/8- eating minimally. Yesterday Calorie counts again low ~500.      7. Hypothyroidism  Continue synthroid 88mcg      8. Psych: Situational Depression continue effexor 75mg daily.     9.  Skin:    - Follicular rash 10/15 on chest: s/p clindamycin gel. Resolved.    - Previous irritation/rash under and around CVC dressing. Pt with allergy to chlorhexidine. Removed biopatch and trial of different dressing. Improved.      10. Research: Pt consented to [id number] (Seamless randomized tiral to alleviate morbidity and mortality) for which she was randomized to the palliative care management arm. Palliative care consult placed 10/8 per research recs- see note dated 10/8.     Dispo: patient will remain admitted through neutropenia and subsequent engraftment.     Denzel Magallon PA-C  [phone]     BMT Staff:  The patient was discussed on morning rounds with the nurses, mid level provider, and house staff and seen and examined by me. All labs and imaging were reviewed. I reviewed events over the last 24 hours including vitals and flow sheets. I agree with the above note and have been responsible for the care plan and interpretation of progress.     Subjective:  Reports feeling well today- no new concerns, good appetite, good control of nausea, no vomiting, no abd pain, no F/C/NS, no  complaints, hip/bony pains mild, resolved post nasal drip with no other respiratory symptoms, no HA/LH/Dizziness.      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  1. Heme/BMT: Day +12, transfusion as needed  2. ID: Afebrile  3. CV: no active issues  4. GI/ FEN: monitor regimen related toxicity, adjusting antiemetics, encourage fluid PO intake, I/O and daily weights, calorie counts and consider TPN, Mild Protein-Calorie Malnutrition   5. Renal: euvolemic, mild hyponatremia resolved  6. PPx: Levo, donato/vori, ACV  7.  Hypothyroidism on replacement, situational depression on Effexor, PT: encourage ambulation     Chito Bautista MD

## 2020-10-19 NOTE — PLAN OF CARE
[id number]: Pt afebrile, OVSS on RA. Pt denies pain, nausea, SOB . IV compazine given preventative before her supper. CVC dressing changed. Pt is up ind. No further complaints.    Problem: Adult Inpatient Plan of Care  Goal: Plan of Care Review  Outcome: No Change  Goal: Optimal Comfort and Wellbeing  Outcome: No Change     Problem: Hematologic Alteration (Stem Cell/Bone Marrow Transplant)  Goal: Blood Counts Within Acceptable Range  Outcome: No Change     Problem: Infection Risk (Stem Cell/Bone Marrow Transplant)  Goal: Absence of Infection Signs/Symptoms  Outcome: No Change     Problem: Nausea and Vomiting (Stem Cell/Bone Marrow Transplant)  Goal: Nausea and Vomiting Symptom Relief  Outcome: No Change  Intervention: Prevent and Manage Nausea and Vomiting  Recent Flowsheet Documentation  Taken 10/19/2020 1600 by Sana Petersen, RN  Nausea/Vomiting Interventions: antiemetic

## 2020-10-19 NOTE — PLAN OF CARE
VSS, tachy in evening. Lactic acid triggered-0.9 result. Denies nausea this shift, continues on scheduled medication. Up independently. Rash to chest  improving, pt denies itching this shift. Using home cpap at night. Replace potassium and magnesium this am. Platelets given. Continue plan of care.     Problem: Adult Inpatient Plan of Care  Goal: Plan of Care Review  Outcome: No Change  Goal: Patient-Specific Goal (Individualization)  Outcome: No Change  Goal: Absence of Hospital-Acquired Illness or Injury  Outcome: No Change  Intervention: Identify and Manage Fall Risk  Recent Flowsheet Documentation  Taken 10/18/2020 2100 by Jaylyn Noe RN  Safety Promotion/Fall Prevention:    clutter free environment maintained    fall prevention program maintained    lighting adjusted    nonskid shoes/slippers when out of bed  Intervention: Prevent VTE (venous thromboembolism)  Recent Flowsheet Documentation  Taken 10/18/2020 2100 by Jaylyn Noe RN  VTE Prevention/Management:    ambulation promoted    bleeding risk assessed    fluids promoted    AROM (active range of motion) performed  Goal: Optimal Comfort and Wellbeing  Outcome: No Change  Goal: Readiness for Transition of Care  Outcome: No Change  Goal: Rounds/Family Conference  Outcome: No Change     Problem: Bladder Irritation (Stem Cell/Bone Marrow Transplant)  Goal: Symptom-Free Urinary Elimination  Outcome: No Change  Intervention: Monitor and Manage Bladder Irritation  Recent Flowsheet Documentation  Taken 10/18/2020 2100 by Jaylyn Noe RN  Hyperhydration Management: fluids provided     Problem: Diarrhea (Stem Cell/Bone Marrow Transplant)  Goal: Diarrhea Symptom Control  Outcome: No Change  Intervention: Manage Diarrhea  Recent Flowsheet Documentation  Taken 10/18/2020 2100 by Jaylyn Noe, RN  Fluid/Electrolyte Management: fluids provided     Problem: Diarrhea (Stem Cell/Bone Marrow Transplant)  Goal: Diarrhea Symptom Control  Outcome: No Change  Intervention: Manage  Diarrhea  Recent Flowsheet Documentation  Taken 10/18/2020 2100 by Jaylyn Noe RN  Fluid/Electrolyte Management: fluids provided     Problem: Fatigue (Stem Cell/Bone Marrow Transplant)  Goal: Energy Level Supports Daily Activity  Outcome: No Change     Problem: Hematologic Alteration (Stem Cell/Bone Marrow Transplant)  Goal: Blood Counts Within Acceptable Range  Outcome: No Change     Problem: Hypersensitivity Reaction (Stem Cell/Bone Marrow Transplant)  Goal: Absence of Hypersensitivity Reaction  Outcome: No Change     Problem: Infection Risk (Stem Cell/Bone Marrow Transplant)  Goal: Absence of Infection Signs/Symptoms  Outcome: No Change  Intervention: Prevent and Manage Infection  Recent Flowsheet Documentation  Taken 10/18/2020 2100 by Jaylyn Noe RN  Isolation Precautions:    protective environment maintained    cytotoxic precautions maintained     Problem: Mucositis (Stem Cell/Bone Marrow Transplant)  Goal: Mucous Membrane Health and Integrity  Outcome: No Change  Intervention: Maintain Oral Mucous Membrane Integrity  Recent Flowsheet Documentation  Taken 10/18/2020 2100 by Jaylyn Noe RN  Oral Mucous Membrane Protection: nonirritating oral foods promoted     Problem: Mucositis (Stem Cell/Bone Marrow Transplant)  Goal: Mucous Membrane Health and Integrity  Outcome: No Change  Intervention: Maintain Oral Mucous Membrane Integrity  Recent Flowsheet Documentation  Taken 10/18/2020 2100 by Jaylyn Noe RN  Oral Mucous Membrane Protection: nonirritating oral foods promoted     Problem: Nausea and Vomiting (Stem Cell/Bone Marrow Transplant)  Goal: Nausea and Vomiting Symptom Relief  Outcome: No Change  Intervention: Prevent and Manage Nausea and Vomiting  Recent Flowsheet Documentation  Taken 10/18/2020 2100 by Jaylyn Noe RN  Nausea/Vomiting Interventions:    antiemetic    slow deep breathing encouraged     Problem: Nutrition Intake Altered (Stem Cell/Bone Marrow Transplant)  Goal: Optimal Nutrition Intake  Outcome:  No Change  Intervention: Minimize and Manage Barriers to Oral Intake  Recent Flowsheet Documentation  Taken 10/18/2020 2100 by Jaylyn Noe, RN  Oral Nutrition Promotion: rest periods promoted

## 2020-10-20 ENCOUNTER — APPOINTMENT (OUTPATIENT)
Dept: PHYSICAL THERAPY | Facility: CLINIC | Age: 75
DRG: 014 | End: 2020-10-20
Attending: INTERNAL MEDICINE
Payer: COMMERCIAL

## 2020-10-20 LAB
ANION GAP SERPL CALCULATED.3IONS-SCNC: 4 MMOL/L (ref 3–14)
BUN SERPL-MCNC: 6 MG/DL (ref 7–30)
CALCIUM SERPL-MCNC: 8.2 MG/DL (ref 8.5–10.1)
CHLORIDE SERPL-SCNC: 101 MMOL/L (ref 94–109)
CO2 SERPL-SCNC: 28 MMOL/L (ref 20–32)
CREAT SERPL-MCNC: 0.65 MG/DL (ref 0.52–1.04)
DIFFERENTIAL METHOD BLD: ABNORMAL
ERYTHROCYTE [DISTWIDTH] IN BLOOD BY AUTOMATED COUNT: 11.5 % (ref 10–15)
GFR SERPL CREATININE-BSD FRML MDRD: 88 ML/MIN/{1.73_M2}
GLUCOSE SERPL-MCNC: 104 MG/DL (ref 70–99)
HCT VFR BLD AUTO: 21.8 % (ref 35–47)
HGB BLD-MCNC: 7.5 G/DL (ref 11.7–15.7)
LACTATE BLD-SCNC: 1.3 MMOL/L (ref 0.7–2)
MAGNESIUM SERPL-MCNC: 1.3 MG/DL (ref 1.6–2.3)
MAGNESIUM SERPL-MCNC: 2.3 MG/DL (ref 1.6–2.3)
MCH RBC QN AUTO: 29.9 PG (ref 26.5–33)
MCHC RBC AUTO-ENTMCNC: 34.4 G/DL (ref 31.5–36.5)
MCV RBC AUTO: 87 FL (ref 78–100)
PLATELET # BLD AUTO: 21 10E9/L (ref 150–450)
POTASSIUM SERPL-SCNC: 3.8 MMOL/L (ref 3.4–5.3)
RBC # BLD AUTO: 2.51 10E12/L (ref 3.8–5.2)
SODIUM SERPL-SCNC: 134 MMOL/L (ref 133–144)
WBC # BLD AUTO: 0 10E9/L (ref 4–11)

## 2020-10-20 PROCEDURE — 250N000013 HC RX MED GY IP 250 OP 250 PS 637: Performed by: INTERNAL MEDICINE

## 2020-10-20 PROCEDURE — 80048 BASIC METABOLIC PNL TOTAL CA: CPT | Performed by: PHYSICIAN ASSISTANT

## 2020-10-20 PROCEDURE — 250N000011 HC RX IP 250 OP 636: Performed by: PHYSICIAN ASSISTANT

## 2020-10-20 PROCEDURE — 87081 CULTURE SCREEN ONLY: CPT | Performed by: PHYSICIAN ASSISTANT

## 2020-10-20 PROCEDURE — 250N000011 HC RX IP 250 OP 636: Performed by: INTERNAL MEDICINE

## 2020-10-20 PROCEDURE — 99233 SBSQ HOSP IP/OBS HIGH 50: CPT | Performed by: INTERNAL MEDICINE

## 2020-10-20 PROCEDURE — 206N000001 HC R&B BMT UMMC

## 2020-10-20 PROCEDURE — 97750 PHYSICAL PERFORMANCE TEST: CPT | Mod: GP

## 2020-10-20 PROCEDURE — 85027 COMPLETE CBC AUTOMATED: CPT

## 2020-10-20 PROCEDURE — 83605 ASSAY OF LACTIC ACID: CPT | Performed by: INTERNAL MEDICINE

## 2020-10-20 PROCEDURE — 258N000003 HC RX IP 258 OP 636: Performed by: INTERNAL MEDICINE

## 2020-10-20 PROCEDURE — 258N000003 HC RX IP 258 OP 636: Performed by: PHYSICIAN ASSISTANT

## 2020-10-20 PROCEDURE — 250N000009 HC RX 250: Performed by: PHYSICIAN ASSISTANT

## 2020-10-20 PROCEDURE — 250N000013 HC RX MED GY IP 250 OP 250 PS 637: Performed by: PHYSICIAN ASSISTANT

## 2020-10-20 PROCEDURE — C9113 INJ PANTOPRAZOLE SODIUM, VIA: HCPCS | Performed by: PHYSICIAN ASSISTANT

## 2020-10-20 PROCEDURE — 83735 ASSAY OF MAGNESIUM: CPT | Performed by: PHYSICIAN ASSISTANT

## 2020-10-20 PROCEDURE — 97110 THERAPEUTIC EXERCISES: CPT | Mod: GP

## 2020-10-20 RX ADMIN — HYDROCORTISONE: 1 CREAM TOPICAL at 19:48

## 2020-10-20 RX ADMIN — MYCOPHENOLATE MOFETIL 1000 MG: 500 INJECTION, POWDER, LYOPHILIZED, FOR SOLUTION INTRAVENOUS at 13:41

## 2020-10-20 RX ADMIN — PANTOPRAZOLE SODIUM 40 MG: 40 INJECTION, POWDER, FOR SOLUTION INTRAVENOUS at 08:13

## 2020-10-20 RX ADMIN — PROCHLORPERAZINE EDISYLATE 5 MG: 5 INJECTION INTRAMUSCULAR; INTRAVENOUS at 14:23

## 2020-10-20 RX ADMIN — PROCHLORPERAZINE EDISYLATE 5 MG: 5 INJECTION INTRAMUSCULAR; INTRAVENOUS at 21:56

## 2020-10-20 RX ADMIN — LEVOTHYROXINE SODIUM 88 MCG: 0.09 TABLET ORAL at 08:17

## 2020-10-20 RX ADMIN — MAGNESIUM SULFATE IN WATER 4 G: 40 INJECTION, SOLUTION INTRAVENOUS at 04:27

## 2020-10-20 RX ADMIN — LEVOFLOXACIN 250 MG: 250 TABLET, FILM COATED ORAL at 10:01

## 2020-10-20 RX ADMIN — Medication 2.5 MG: at 22:04

## 2020-10-20 RX ADMIN — POLYETHYLENE GLYCOL 3350 17 G: 17 POWDER, FOR SOLUTION ORAL at 22:05

## 2020-10-20 RX ADMIN — PROCHLORPERAZINE EDISYLATE 5 MG: 5 INJECTION INTRAMUSCULAR; INTRAVENOUS at 08:04

## 2020-10-20 RX ADMIN — GRANISETRON HYDROCHLORIDE 1 MG: 1 INJECTION INTRAVENOUS at 08:13

## 2020-10-20 RX ADMIN — DEXTROSE MONOHYDRATE 20 ML: 50 INJECTION, SOLUTION INTRAVENOUS at 19:36

## 2020-10-20 RX ADMIN — URSODIOL 300 MG: 300 CAPSULE ORAL at 19:35

## 2020-10-20 RX ADMIN — POTASSIUM CHLORIDE 20 MEQ: 29.8 INJECTION, SOLUTION INTRAVENOUS at 04:22

## 2020-10-20 RX ADMIN — GRANISETRON HYDROCHLORIDE 1 MG: 1 INJECTION INTRAVENOUS at 19:35

## 2020-10-20 RX ADMIN — TACROLIMUS 80 MCG/HR: 5 INJECTION, SOLUTION INTRAVENOUS at 19:36

## 2020-10-20 RX ADMIN — LORATADINE 10 MG: 10 TABLET ORAL at 08:17

## 2020-10-20 RX ADMIN — ACYCLOVIR SODIUM 700 MG: 50 INJECTION, SOLUTION INTRAVENOUS at 18:07

## 2020-10-20 RX ADMIN — HYDROCORTISONE: 1 CREAM TOPICAL at 08:17

## 2020-10-20 RX ADMIN — VORICONAZOLE 200 MG: 200 TABLET, FILM COATED ORAL at 08:17

## 2020-10-20 RX ADMIN — URSODIOL 300 MG: 300 CAPSULE ORAL at 13:41

## 2020-10-20 RX ADMIN — ACYCLOVIR SODIUM 700 MG: 50 INJECTION, SOLUTION INTRAVENOUS at 10:01

## 2020-10-20 RX ADMIN — VORICONAZOLE 200 MG: 200 TABLET, FILM COATED ORAL at 19:35

## 2020-10-20 RX ADMIN — MYCOPHENOLATE MOFETIL 1000 MG: 500 INJECTION, POWDER, LYOPHILIZED, FOR SOLUTION INTRAVENOUS at 21:57

## 2020-10-20 RX ADMIN — Medication 400 MCG: at 19:35

## 2020-10-20 RX ADMIN — URSODIOL 300 MG: 300 CAPSULE ORAL at 08:17

## 2020-10-20 RX ADMIN — ACETAMINOPHEN 650 MG: 325 TABLET, FILM COATED ORAL at 14:23

## 2020-10-20 RX ADMIN — VENLAFAXINE 75 MG: 75 TABLET ORAL at 13:41

## 2020-10-20 RX ADMIN — ACYCLOVIR SODIUM 700 MG: 50 INJECTION, SOLUTION INTRAVENOUS at 01:48

## 2020-10-20 RX ADMIN — MYCOPHENOLATE MOFETIL 1000 MG: 500 INJECTION, POWDER, LYOPHILIZED, FOR SOLUTION INTRAVENOUS at 06:17

## 2020-10-20 ASSESSMENT — ACTIVITIES OF DAILY LIVING (ADL)
ADLS_ACUITY_SCORE: 10

## 2020-10-20 ASSESSMENT — MIFFLIN-ST. JEOR: SCORE: 1294.66

## 2020-10-20 ASSESSMENT — PAIN DESCRIPTION - DESCRIPTORS
DESCRIPTORS: DULL;HEADACHE
DESCRIPTORS: DULL;HEADACHE

## 2020-10-20 NOTE — LETTER
9/9/2020     RE: Florencia Gao  [address] [place] [address]    Dear Colleague,    Thank you for referring your patient, Florencia Gao, to the [facility] BLOOD AND MARROW TRANSPLANT. Please see a copy of my visit note below.    Blood and Marrow Transplant   Psychosocial Assessment with   Clinical     Assessment completed on 9/9/20 via phone as part of the COVID 19 protocol. Assessment of living situation, support system, financial status, functional status, coping, stressors, need for resources and social work intervention provided as needed. Information for this assessment was provided by pt and pt's son's (Robles) long-term significant other (Gaye) report in addition to medical chart review and consultation with medical team.     Present at Assessment:   Patient: Florencia Gao  Son's (Robles) long-term (14yrs) significant other: Gaye Colvin  : ROSSI Pabon, LG  : ROSSI Sawant, [facility]    Diagnosis: Acute Myeloid Leukemia (AML)    Date of Diagnosis: 6/2020    Transplant type: Allogeneic     Donor: Related allogeneic donor stem cell transplant  Donor: Alfonso Law (brother)    Physician: Yong Moncada MD    Workup Nurse Coordinator: Snehal Reza RN  Primary Nurse Coordinator: Britany Finch RN    Social Workers: ROSSI Pabon, LIANNA    Permanent Address:   [address] [place] [address] [place] [address]    Living Situation: Lives alone in [place], [place] (36 minutes/ 30.2 miles from [facility]). MD stated that patient will be required to relocate post-transplant.     Local Address:   [facility] or [facility].    Pt is on the [facility] wait list (currently #15) & is looking into other local lodging options. She is also interested in the [facility] if this becomes available. Florencia & family are agreeable to relocation request by MD, though they have also expressed they'd be interested in discussing further with MD a plan to potentially return closer  "to home w/ family in [place], [place] father out from transplant pending Pt's condition/recovery.   Addendum: On 20, SW talked w/ Dr. Moncada re: above relocation. Dr. Moncada is open to Pt returning home, but not before day 60 and will continue to assess pending Pt's recovery. Pt will continue to have these conversations w/ Dr. Moncada. SW notified patient.     Contact Information:  Pt's Cell Phone: [phone]  Son's sig other/Gaye's Cell Phone: [phone]  Son Robles Gao's Cell Phone: [phone]    Presenting Information:  Florencia is a 75 year old female diagnosed with AML who presents for evaluation for Allogenic transplant at the [facility] ([facility] Pt was accompanied to today's visit by her son's (Robles) long-term (14 yrs) significant other Gaye Colvin.     Decision Making: Self    Health Care Directive: Pt reports she has the form and is having discussions with their family regarding their health care wishes. Pt said she wants to complete her Healthcare Directive in the next few days. Pt reports she would like to designate Gaye Colvin to be her Health Care Agent and plans to complete this prior to admission. SW explained that since she does not have a healthcare directive, legally her two living sons would make decisions on her behalf, if she did not have capacity to make her own medical decisions. Pt understood. Pt has previously expressed concern that her sons do not communicate with each other (stating \"they hardly speak\").    Relationship Status:     Special Needs: Local Lodging Needed.     Family/Support System: Pt endorsed a strong support system including family, close friends, and her sanjeev community who will be available to support pt throughout transplant process.     Spouse: NA  Children: 2 living sons Robles ([place], [place]) & Parmjit ([place], [place]); Pt has a son Jacoby ( last year following a motorcycle accident)  Grandchildren: " 3 grandchildren (ages 24, 13, 7)  Parents:   Siblings: 2 older sisters & 4 younger brothers; all live in [facility] except for 1 brother lives in [place], [place].   Friends: Pt endorsed a good friend support system.    Caregiver: LIANNA discussed with pt and pt's son's significant other the caregiver role and expectation at length. Pt is agreeable to having a full time caregiver for a minimum of 100 days until cleared by the BMT physician. Pt and pt's son's significant other(Gaye) confirmed understanding of the caregiver requirement. Pt's primary caregiver will be Son (Robles) and his long-term significant other (Gaye Colvin) with Pt's sister Martha and brother Silverio as a secondary caregivers. Caregiver education and resources provided. No caregiver concerns identified.     Caregiver Contact Information:  Son's sig other/Gaye's Cell Phone: [phone]  Son Robles Gao's Cell Phone: [phone]    Transportation Mode: Private vehicle. Pt is aware of driving restrictions post-BMT and the need for the caregiver is to drive until cleared to drive by the BMT physician. SW provided information on parking info and monthly parking pass options.     Insurance: Pt has [employer]age health insurance. Pt denied specific insurance concerns at this time. SW reiterated information about the [employer]  should specific insurance questions arise as pt moves through transplant process. Future Insurance questions referred to [employer] : Natalia Goncalves (P: [phone]).    Sources of Income: Pt is supported by her senior living income. Pt denied anticipation of financial hardship related to BMT at this time. SW provided information on neelam options and encouraged pt to contact this SW for support should financial situation change.     Employment: Retired (10 yrs) .     Mental Health:  Pt reported a hx of anxiety and panic attacks. Pt is currently taking  "medication (for the last 2 years) and pt feels the medication is working well. SW explained that it's not uncommon for patients going through transplant to experience symptoms of depression/anxiety. Pt endorsed history of panic attacks that have been experienced in times of high stress such as going through cancer treatment. She identified what that looks like for her and states she experiences chest pain. Pt reports coping mechanisms for panic attacks and anxiety includes her paul chi routine, deep breathing, and \"putting my head down.\" Pt believes she would be able to identify symptoms of anxiety throughout the transplant process.     PHQ-9:  Pt scored a 0/27 which indicates \"none\" on the depression severity scale. Pt endorses this is an accurate reflection of her emotional state.    GAD7:  Pt scored a 1/21 which indicates no sign of anxiety on the Generalized Anxiety Disorder Questionnaire. Pt endorses this is an accurate reflection of her emotional state. Pt discussed her hx of anxiety and feels that she has been able to effectively cope and manage her symptoms as they arise.     Chemical Use:   Tobacco: No  Alcohol: No current use; no concerns w/ abstinence from alcohol post-transplant  Marijuana: No  Other Drugs: No  Based on the information provided, there appear to be no specific risks or concerns identified at this time.     Trauma/Loss/Abuse History: Multiple losses associated with cancer diagnosis and treatment, including health, changes to physical appearance, etc.     Spirituality: Pt identified as Nondenominational and is a part of the [facility] [employer] in [place], [place]. She endorses that they are aware of her diagnosis and are supportive. LIANNA explained that there are Chaplains on the unit and pt can request to meet with a  at anytime. Florencia is interested in meeting regularly with unit  and having reiki services. Pt would also like a blessing ceremony with the unit  on the day of " Include Location In Plan?: No "her transplant.    Coping: Pt noted that she is currently feeling \"hopeful\". Pt shared that her main coping mechanisms are talking with family/friends, prayer/spiritual practices, crossword puzzles, playing games on her phone, and watching movies. SW and pt discussed additional positive coping mechanisms that pt can utilize while in the hospital. While hospitalized, pt plans to continue her paul chi routine, watch movies, and play games on her phone. Florencia also endorsed that she has found it \"helpful\" that her son's significant other (Gaye) is a nurse and her medical knowledge has been \"useful\" during this time.    Caregiver Coping: Caregiver did not note any concerns and endorsed that she has all the education needed at this time.     Education Provided: Transplant process expectations, Caregiver requirements, Caregiver self-care, Financial issues related to transplant, Financial resources/grants available, Common psychosocial stressors pre/post transplant, Support group(s) available, Hospital resources available, Web site information, Social Work role and Resources for grandchildren.    Interventions Provided: Psychosocial Support and Education     Assessment and Recommendations for Team:  Pt is a 75 year old female diagnosed with AML who is here undergoing preparation for a planned Allogenic transplant.     Pt is pleasant, calm and able to articulate concerns/coping mechanisms in an appropriate manner. During our meeting pt was alert, was interactive, and she presented appropriate memory and thought processes. Pt feels comfortable communicating with the medical team. Pt has a strong supportive network of family and friends who are involved. Pt has developed strong coping mechanisms. Pt and pt's caregivers will benefit from ongoing psychosocial support in regards to coping with the adjustment to the BMT process.     Pt has a strong support system and a confirmed caregiver plan. Pt verbalizes understanding of " the transplant process and wanting to proceed. SW provided contact information and encouraged pt to contact SW with questions, concerns, resources and for support.    Per this assessment, SW did not identify any barriers to this patient moving forward with transplant.    Important Information:   -Hx of anxiety and panic attacks; see mental section.    Follow up Planned:   Psychosocial support  Lodging referrals - Pt is on the [facility] Wait List (Currently #15)  Healthcare Directive - Follow-up inpatient  Spiritual Health referral - Pt would like a blessing ceremony day of transplant, regular  visits and Reiki while inpatient.    ROSSI Pabon, [facility]  Adult Blood & Marrow Transplant   Phone: [phone]  Pager: [phone]      Again, thank you for allowing me to participate in the care of your patient.        Sincerely,        ROSSI Cazares     Detail Level: Zone

## 2020-10-20 NOTE — PROGRESS NOTES
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      Diagnosis AMLM1 Acute myelogeneous leukemia, M1, myeloblastic  HCT Type Allogeneic    Prep Regimen Fludarabine  Cytoxan  TBI   Donor Source Sibling    GVHD Prophylaxis Post transplant cytoxan  Tac/MMF  Primary BMT Provider ACE      INTERVAL  HISTORY   Up doing exercises in her room this morning. Feeling tired but otherwise well. Stools were a bit looser last night but no abdominal pain or hemorrhoidal bleeding. Nausea controlled, ate 1100 calories yesterday. Feels tremulous this morning but no headaches. No other acute medical complaints.    Review of Systems: 8 point ROS negative except as noted above.    PHYSICAL EXAM     Weight In/Out     Wt Readings from Last 3 Encounters:   10/20/20 77.1 kg (169 lb 14.4 oz)   09/17/20 77.8 kg (171 lb 8 oz)   09/10/20 77.2 kg (170 lb 3.1 oz)      I/O last 3 completed shifts:  In: 2727.36 [P.O.:480; I.V.:2247.36]  Out: 2700 [Urine:2700]       KPS:  90    BP (!) 153/96 (BP Location: Right arm)   Pulse 100   Temp 98.1  F (36.7  C) (Axillary)   Resp 16   Ht 1.68 m (5' 6.14\")   Wt 77.1 kg (169 lb 14.4 oz)   SpO2 95%   BMI 27.30 kg/m     General: NAD   Eyes:  LINH, sclera anicteric.   Lungs: CTA bilaterally, normal WOB on RA   Cardiovascular: RRR, no M/R/G   Abdominal/Rectal: +BS, soft, NT, ND  Rectal exam: Large irritated/inflammed hemrroid at ~3oclock. Not exquistly tender no induration or concern for abscess. No skin breakdown noticed. (not examined 10/20)  Lymphatics: no edema b/l LE   Skin: No rashes or petechiae.  Neuro: A&O x3  Line: right CVC.   10/19/20 Current aGVHD staging:  Skin 0, UGI 0, LGI 0, Liver 0 (keep in note through day +180, delete if auto)    LABS AND IMAGING - PAST 24 HOURS     Lab Results   Component Value Date    WBC 0.0 (LL) 10/20/2020    ANEU 0.4 (LL) 10/08/2020    HGB 7.5 (L) " 10/20/2020    HCT 21.8 (L) 10/20/2020    PLT 21 (LL) 10/20/2020     10/20/2020    POTASSIUM 3.8 10/20/2020    CHLORIDE 101 10/20/2020    CO2 28 10/20/2020     (H) 10/20/2020    BUN 6 (L) 10/20/2020    CR 0.65 10/20/2020    MAG 1.3 (L) 10/20/2020    INR 1.02 10/19/2020     I have assessed all abnormal lab values for their clinical significance and any values considered clinically significant have been addressed in the assessment and plan  OVERALL PLAN   Florencia Gao is a 75 year old female with AML in CR1, admitted for JCARLOS Allo sib pbsct per protocol 2019-10, randomized to Post transplant cytoxan/Tac/MMF.  Day +13.      1.  BMT: Cy/flu/TBI prep.   Allopurinol given through day 0.   Received 4.19 x10(6) CD34/kg. Donor B pos and recipient B neg.   GCSF started d+5 and cont to until ANC>1500 x3 consecutive days. Re-stage per protocol.     2.  HEME: Keep Hgb>8 and plts>10K.   - pancytopenic secondary to chemotherapy and transplant.   - Of note has significant antibodies to RBCs- can cause delay in transfusion availability. Premeds tylenol and benadryl.                             3.  ID: Afebrile.   RVP 10/14 neg. Started Claritin.   Cont Levo, Vori (started 10/14), and HD ACV (CMV+, HSV+, EBV+) prophy.   Pjp prophy to start day+28 - IV pentamdine vs dapsone as sulfa allergy.                                         4.  GI: nausea - improved.   Nausea: headaches with zofran. Significant nausea/vomiting with cytoxan. s/p emend 10/13. Continue Scheduled kytril and zyprexa 2.5mg at bedtime.  Compazine and ativan prn  - Hemorroids: tucks prn, barrier cream and start BID hydrocortisone cream topically.    - Ativan available PRN break-through symptoms.   - **Per protocol no dex/steroids**   Protonix for GI prophy.    Ursodiol for VOD prophy.     5.  GVHD Prophylaxis:   Day +3 and day+4 cytoxan  On tac and MMF. Tac adjusted with starting voriconazole. Currently on 80 mcg/hr. Level 10/16 okay at 12.6. Tac level  10/19 good at 12.4.      6.  FEN/Renal:  - Cr and lytes stable.    - Replete lytes PRN per SS. Monitor weight, I+O, lytes per protocol with IVF flush.  - Anorexia and malnutrition in the context of acute illness: Calorie counts ongoing. Ate 1100 calories 10/20.      7. Hypothyroidism  Continue synthroid 88mcg      8. Psych: Situational Depression continue effexor 75mg daily.     9.  Skin:    - Follicular rash 10/15 on chest: s/p clindamycin gel. Resolved.    - Previous irritation/rash under and around CVC dressing. Pt with allergy to chlorhexidine. Removed biopatch and trial of different dressing. Improved.      10. Cards: intermittent hypertension- monitor for now. Tac level okay.     11. Research: Pt consented to MT2019-31 (Seamless randomized tiral to alleviate morbidity and mortality) for which she was randomized to the palliative care management arm. Palliative care consult placed 10/8 per research recs- see note dated 10/8.     Dispo: patient will remain admitted through neutropenia and subsequent engraftment.     Denzel Magallon PA-C  [phone]     BMT Staff:  The patient was discussed on morning rounds with the nurses, mid level provider, and house staff and seen and examined by me. All labs and imaging were reviewed. I reviewed events over the last 24 hours including vitals and flow sheets. I agree with the above note and have been responsible for the care plan and interpretation of progress.     Subjective:  Reports feeling well today- no new concerns, good appetite, good control of nausea, no vomiting, no abd pain, no F/C/NS, no  complaints, hip/bony pains mild, resolved post nasal drip with no other respiratory symptoms, no HA/LH/Dizziness.      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  1. Heme/BMT: Day +13, transfusion as needed  2. ID: Afebrile  3. CV: no active issues  4. GI/ FEN: monitor regimen related toxicity, adjusting antiemetics, encourage fluid PO intake, I/O and daily weights  5. Renal: euvolemic, mild hyponatremia resolved  6. PPx: Levo, donato/vori, ACV  7.  Hypothyroidism on replacement, situational depression on Effexor, PT: encourage ambulation     Chito Bautista MD

## 2020-10-20 NOTE — PLAN OF CARE
PT 5C: 30 sec Sit to Stand test and FACIT fatigue scale completed today:    30-Second Sit to Stand Test:  The test is conducted with a straight back chair, without armrests, with a 17-inch seat height.    Patient Score (score =0 if must use arms) 11 reps (previous score = 7)    The 30 Second Sit to Stand Test is considered a test of fall risk.  Data from [place] [employer]NA, cosponsored by [place] Dept of Health:  If must use arms = High Fall Risk regardless of reps  8 or less times = High Fall Risk   9 to 12 times = Moderate Risk  13 or more times = Low Risk    The 30 Second Sit to Stand Test is also considered a test of leg strength and endurance.   Normative Data from Gary et al,. 2001  Age                 Reps: Men        Reps: Women  60-64                14-19                       12-17                               65-69                12-18                       11-16                    70-74                12-17                       10-15              75-79                11-17                       10-15                    80-84                10-15                         9-14  85-89                 8-14                          8-13  90-94                 7-12                          4-11    Assessment (rationale for performing, application to patient s function & care plan): test completed to assess leg strength and fall risk. Pt is improved to a moderate risk for falling; leg strength is increased.      FACIT Fatigue score: 27  (Prior FACIT scores: 46)    A score of < 30 indicates below normal range   The FACIT-Fatigue scale assesses self-reported fatigue and its impact on daily activities and function during the past week.  Scores range 52-0, with lower scores indicating worse fatigue.    40 is the average score in general [place] population with a standard deviation of ~10.    Minimally Important Difference   3-4 points.    Source: Scoring & Interpretation Materials at  http://www.facit.org/FACITOrg/Questionnaires     Frequency for PT is increased to 5 times/week to improve functional strength and endurance.

## 2020-10-20 NOTE — PROGRESS NOTES
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 Up independently. Denies pain, nausea. Ate 100% of dinner. Daughter in law visiting at bedside.    R: No further nursing concerns, continue plan of care.  "

## 2020-10-20 NOTE — PROGRESS NOTES
Calorie Count  Intake recorded for: 10/19  Total Kcals: 1121 Total Protein: 45g  Kcals from Hospital Food: 879  Protein: 43g  Kcals from Outside Food (average):242 Protein: 2g  # Meals Ordered from Kitchen: 3 meals + food from outside the hospital   # Meals Recorded: 4 meals (First - 100% raisin bran w/ 4 oz whole milk, peaches)      (Second - 100% peanut butter & jelly sandwich, vegetable soup)      (Third - 75% side mac & cheese, 50% roast turkey w/ gravy)      (Fourth - 100% 10 oz coke & 1 serving baked potato chips - from outside the hospital)  # Supplements Recorded: 0

## 2020-10-20 NOTE — PLAN OF CARE
Florencia has eaten breakfast and lunch, calorie counts in progress.  Requested IV compazine twice before medications.  Requested tylenol for a dull headache, headaches are not common for her, will reassess if it goes away.  Tac drip infusing as ordered.     Problem: Adult Inpatient Plan of Care  Goal: Plan of Care Review  Outcome: No Change

## 2020-10-20 NOTE — PLAN OF CARE
"  Blood pressure 133/68, pulse 104, temperature 97.5  F (36.4  C), temperature source Axillary, resp. rate 16, height 1.68 m (5' 6.14\"), weight 77.7 kg (171 lb 4.8 oz), SpO2 96 %.    Pt is getting 5 mg of Compazine for nausea with good relief . A/O x 4. Up independently, voiding and recording urine on pad in room. All lines and ports caps changed last night. Tac gtt is at 80 mcg/hour. Pt triggered SIRS for sepsis screening protocol and Lactate for sepsis was 1.1. On asa count. Low potassium, 3.8 and low magnesium levels this morning, 1.3 Pt got 20 mEq of potassium chloride and 4 g of magnesium sulfate is going. Pt will need magnesium level rechecked at 09:00 AM. She did rectal swap for VRE. Continue to monitor pt and follow plan of care.      Problem: Adult Inpatient Plan of Care  Goal: Plan of Care Review  Outcome: No Change     Problem: Adult Inpatient Plan of Care  Goal: Patient-Specific Goal (Individualization)  Outcome: No Change     Problem: Adult Inpatient Plan of Care  Goal: Absence of Hospital-Acquired Illness or Injury  Outcome: No Change  Intervention: Identify and Manage Fall Risk  Recent Flowsheet Documentation  Taken 10/20/2020 0400 by Priya Koch RN  Safety Promotion/Fall Prevention:   nonskid shoes/slippers when out of bed   clutter free environment maintained   fall prevention program maintained  Taken 10/19/2020 2000 by Priya Koch RN  Safety Promotion/Fall Prevention:   nonskid shoes/slippers when out of bed   clutter free environment maintained   fall prevention program maintained  Intervention: Prevent VTE (venous thromboembolism)  Recent Flowsheet Documentation  Taken 10/20/2020 0400 by Priya Koch RN  VTE Prevention/Management: ambulation promoted  Taken 10/19/2020 2000 by Priya Koch RN  VTE Prevention/Management: ambulation promoted     Problem: Fatigue (Stem Cell/Bone Marrow Transplant)  Goal: Energy Level Supports Daily Activity  Outcome: No Change     Problem: " Infection Risk (Stem Cell/Bone Marrow Transplant)  Goal: Absence of Infection Signs/Symptoms  Outcome: No Change     Problem: Nutrition Intake Altered (Stem Cell/Bone Marrow Transplant)  Goal: Optimal Nutrition Intake  Intervention: Minimize and Manage Barriers to Oral Intake  Recent Flowsheet Documentation  Taken 10/20/2020 0400 by Priya Koch, RN  Oral Nutrition Promotion:   social interaction promoted   calorie-dense foods provided   calorie-dense liquids provided   medicated  Taken 10/19/2020 2000 by Priya Koch RN  Oral Nutrition Promotion:   social interaction promoted   calorie-dense foods provided   calorie-dense liquids provided   medicated

## 2020-10-21 ENCOUNTER — RESEARCH ENCOUNTER (OUTPATIENT)
Dept: TRANSPLANT | Facility: CLINIC | Age: 75
End: 2020-10-21

## 2020-10-21 LAB
ABO + RH BLD: ABNORMAL
ABO + RH BLD: ABNORMAL
ALBUMIN SERPL-MCNC: 3 G/DL (ref 3.4–5)
ALP SERPL-CCNC: 67 U/L (ref 40–150)
ALT SERPL W P-5'-P-CCNC: 17 U/L (ref 0–50)
ANION GAP SERPL CALCULATED.3IONS-SCNC: 5 MMOL/L (ref 3–14)
AST SERPL W P-5'-P-CCNC: 16 U/L (ref 0–45)
BILIRUB SERPL-MCNC: 0.2 MG/DL (ref 0.2–1.3)
BLD GP AB SCN SERPL QL: ABNORMAL
BLOOD BANK CMNT PATIENT-IMP: ABNORMAL
BLOOD BANK CMNT PATIENT-IMP: ABNORMAL
BUN SERPL-MCNC: 6 MG/DL (ref 7–30)
CALCIUM SERPL-MCNC: 8.3 MG/DL (ref 8.5–10.1)
CHLORIDE SERPL-SCNC: 100 MMOL/L (ref 94–109)
CMV DNA SPEC NAA+PROBE-ACNC: NORMAL [IU]/ML
CMV DNA SPEC NAA+PROBE-LOG#: NORMAL {LOG_IU}/ML
CO2 SERPL-SCNC: 28 MMOL/L (ref 20–32)
CREAT SERPL-MCNC: 0.64 MG/DL (ref 0.52–1.04)
DIFFERENTIAL METHOD BLD: ABNORMAL
ERYTHROCYTE [DISTWIDTH] IN BLOOD BY AUTOMATED COUNT: 11.6 % (ref 10–15)
GFR SERPL CREATININE-BSD FRML MDRD: 89 ML/MIN/{1.73_M2}
GLUCOSE SERPL-MCNC: 103 MG/DL (ref 70–99)
HCT VFR BLD AUTO: 23.1 % (ref 35–47)
HGB BLD-MCNC: 7.9 G/DL (ref 11.7–15.7)
LACTATE BLD-SCNC: 1.1 MMOL/L (ref 0.7–2)
MAGNESIUM SERPL-MCNC: 1.5 MG/DL (ref 1.6–2.3)
MAGNESIUM SERPL-MCNC: 2.8 MG/DL (ref 1.6–2.3)
MCH RBC QN AUTO: 29.4 PG (ref 26.5–33)
MCHC RBC AUTO-ENTMCNC: 34.2 G/DL (ref 31.5–36.5)
MCV RBC AUTO: 86 FL (ref 78–100)
PLATELET # BLD AUTO: 18 10E9/L (ref 150–450)
POTASSIUM SERPL-SCNC: 4.2 MMOL/L (ref 3.4–5.3)
PROT SERPL-MCNC: 6.6 G/DL (ref 6.8–8.8)
RBC # BLD AUTO: 2.69 10E12/L (ref 3.8–5.2)
SODIUM SERPL-SCNC: 133 MMOL/L (ref 133–144)
SPECIMEN EXP DATE BLD: ABNORMAL
SPECIMEN SOURCE: NORMAL
WBC # BLD AUTO: 0.1 10E9/L (ref 4–11)

## 2020-10-21 PROCEDURE — 250N000011 HC RX IP 250 OP 636: Performed by: PHYSICIAN ASSISTANT

## 2020-10-21 PROCEDURE — 83605 ASSAY OF LACTIC ACID: CPT | Performed by: INTERNAL MEDICINE

## 2020-10-21 PROCEDURE — 206N000001 HC R&B BMT UMMC

## 2020-10-21 PROCEDURE — 83735 ASSAY OF MAGNESIUM: CPT | Performed by: PHYSICIAN ASSISTANT

## 2020-10-21 PROCEDURE — 258N000003 HC RX IP 258 OP 636: Performed by: PHYSICIAN ASSISTANT

## 2020-10-21 PROCEDURE — 86900 BLOOD TYPING SEROLOGIC ABO: CPT | Performed by: PHYSICIAN ASSISTANT

## 2020-10-21 PROCEDURE — 83735 ASSAY OF MAGNESIUM: CPT | Performed by: INTERNAL MEDICINE

## 2020-10-21 PROCEDURE — 250N000013 HC RX MED GY IP 250 OP 250 PS 637: Performed by: PHYSICIAN ASSISTANT

## 2020-10-21 PROCEDURE — 85027 COMPLETE CBC AUTOMATED: CPT

## 2020-10-21 PROCEDURE — 99233 SBSQ HOSP IP/OBS HIGH 50: CPT | Performed by: INTERNAL MEDICINE

## 2020-10-21 PROCEDURE — 86901 BLOOD TYPING SEROLOGIC RH(D): CPT | Performed by: PHYSICIAN ASSISTANT

## 2020-10-21 PROCEDURE — 250N000011 HC RX IP 250 OP 636: Performed by: INTERNAL MEDICINE

## 2020-10-21 PROCEDURE — 86850 RBC ANTIBODY SCREEN: CPT | Performed by: PHYSICIAN ASSISTANT

## 2020-10-21 PROCEDURE — 250N000013 HC RX MED GY IP 250 OP 250 PS 637: Performed by: INTERNAL MEDICINE

## 2020-10-21 PROCEDURE — 250N000011 HC RX IP 250 OP 636: Performed by: RADIOLOGY

## 2020-10-21 PROCEDURE — 80053 COMPREHEN METABOLIC PANEL: CPT | Performed by: PHYSICIAN ASSISTANT

## 2020-10-21 PROCEDURE — 250N000009 HC RX 250: Performed by: PHYSICIAN ASSISTANT

## 2020-10-21 PROCEDURE — C9113 INJ PANTOPRAZOLE SODIUM, VIA: HCPCS | Performed by: PHYSICIAN ASSISTANT

## 2020-10-21 PROCEDURE — 258N000003 HC RX IP 258 OP 636: Performed by: INTERNAL MEDICINE

## 2020-10-21 RX ORDER — PANTOPRAZOLE SODIUM 40 MG/1
40 TABLET, DELAYED RELEASE ORAL
Status: DISCONTINUED | OUTPATIENT
Start: 2020-10-22 | End: 2020-10-28 | Stop reason: HOSPADM

## 2020-10-21 RX ORDER — GRANISETRON HYDROCHLORIDE 1 MG/1
1 TABLET, FILM COATED ORAL EVERY 12 HOURS SCHEDULED
Status: DISCONTINUED | OUTPATIENT
Start: 2020-10-21 | End: 2020-10-28 | Stop reason: HOSPADM

## 2020-10-21 RX ADMIN — URSODIOL 300 MG: 300 CAPSULE ORAL at 08:02

## 2020-10-21 RX ADMIN — Medication 2.5 MG: at 21:27

## 2020-10-21 RX ADMIN — HYDROCORTISONE: 1 CREAM TOPICAL at 20:13

## 2020-10-21 RX ADMIN — GRANISETRON HYDROCHLORIDE 1 MG: 1 INJECTION INTRAVENOUS at 08:02

## 2020-10-21 RX ADMIN — PANTOPRAZOLE SODIUM 40 MG: 40 INJECTION, POWDER, FOR SOLUTION INTRAVENOUS at 08:02

## 2020-10-21 RX ADMIN — DEXTROSE MONOHYDRATE 20 ML: 50 INJECTION, SOLUTION INTRAVENOUS at 20:11

## 2020-10-21 RX ADMIN — MAGNESIUM SULFATE IN WATER 4 G: 40 INJECTION, SOLUTION INTRAVENOUS at 03:53

## 2020-10-21 RX ADMIN — LEVOFLOXACIN 250 MG: 250 TABLET, FILM COATED ORAL at 10:43

## 2020-10-21 RX ADMIN — PROCHLORPERAZINE EDISYLATE 5 MG: 5 INJECTION INTRAMUSCULAR; INTRAVENOUS at 22:30

## 2020-10-21 RX ADMIN — VORICONAZOLE 200 MG: 200 TABLET, FILM COATED ORAL at 08:02

## 2020-10-21 RX ADMIN — ACYCLOVIR SODIUM 700 MG: 50 INJECTION, SOLUTION INTRAVENOUS at 10:43

## 2020-10-21 RX ADMIN — PROCHLORPERAZINE EDISYLATE 5 MG: 5 INJECTION INTRAMUSCULAR; INTRAVENOUS at 10:52

## 2020-10-21 RX ADMIN — MYCOPHENOLATE MOFETIL 1000 MG: 500 INJECTION, POWDER, LYOPHILIZED, FOR SOLUTION INTRAVENOUS at 14:11

## 2020-10-21 RX ADMIN — PROCHLORPERAZINE EDISYLATE 5 MG: 5 INJECTION INTRAMUSCULAR; INTRAVENOUS at 16:56

## 2020-10-21 RX ADMIN — Medication 5 ML: at 10:53

## 2020-10-21 RX ADMIN — LEVOTHYROXINE SODIUM 88 MCG: 0.09 TABLET ORAL at 08:05

## 2020-10-21 RX ADMIN — GRANISETRON HYDROCHLORIDE 1 MG: 1 TABLET ORAL at 20:10

## 2020-10-21 RX ADMIN — ACYCLOVIR SODIUM 700 MG: 50 INJECTION, SOLUTION INTRAVENOUS at 01:54

## 2020-10-21 RX ADMIN — LORATADINE 10 MG: 10 TABLET ORAL at 08:02

## 2020-10-21 RX ADMIN — TACROLIMUS 80 MCG/HR: 5 INJECTION, SOLUTION INTRAVENOUS at 20:10

## 2020-10-21 RX ADMIN — VENLAFAXINE 75 MG: 75 TABLET ORAL at 14:11

## 2020-10-21 RX ADMIN — MYCOPHENOLATE MOFETIL 1000 MG: 500 INJECTION, POWDER, LYOPHILIZED, FOR SOLUTION INTRAVENOUS at 05:56

## 2020-10-21 RX ADMIN — Medication 400 MCG: at 20:11

## 2020-10-21 RX ADMIN — URSODIOL 300 MG: 300 CAPSULE ORAL at 20:10

## 2020-10-21 RX ADMIN — ACYCLOVIR SODIUM 700 MG: 50 INJECTION, SOLUTION INTRAVENOUS at 18:09

## 2020-10-21 RX ADMIN — MYCOPHENOLATE MOFETIL 1000 MG: 500 INJECTION, POWDER, LYOPHILIZED, FOR SOLUTION INTRAVENOUS at 21:27

## 2020-10-21 RX ADMIN — VORICONAZOLE 200 MG: 200 TABLET, FILM COATED ORAL at 20:10

## 2020-10-21 RX ADMIN — POLYETHYLENE GLYCOL 3350 17 G: 17 POWDER, FOR SOLUTION ORAL at 20:19

## 2020-10-21 RX ADMIN — URSODIOL 300 MG: 300 CAPSULE ORAL at 14:11

## 2020-10-21 RX ADMIN — HYDROCORTISONE: 1 CREAM TOPICAL at 08:05

## 2020-10-21 ASSESSMENT — ACTIVITIES OF DAILY LIVING (ADL)
ADLS_ACUITY_SCORE: 10

## 2020-10-21 ASSESSMENT — MIFFLIN-ST. JEOR: SCORE: 1289.22

## 2020-10-21 NOTE — PLAN OF CARE
PT 5C: CANCEL- pt declines PT session, states she feels nauseated and fatigued. Rescheduled.    Home

## 2020-10-21 NOTE — PROGRESS NOTES
Calorie Count  Intake recorded for: 10/20  Total Kcals: 805 Total Protein: 34g  Kcals from Hospital Food: 575  Protein: 29g  Kcals from Outside Food (average):230 Protein: 5g  # Meals Ordered from Kitchen: 3 small meals + food from outside the hospital   # Meals Recorded: 3 meals (First - 100% raisin bran w/ whole milk)      (Second - 100% peaches, 25% cottage cheese)      (Third - 75% pears, 50% sweet & sour chicken stir booker)      (Fourth - 100% candy, 1 oz cashews - from outside the hospital)  # Supplements Recorded: 0

## 2020-10-21 NOTE — PROGRESS NOTES
CLINICAL NUTRITION SERVICES - REASSESSMENT NOTE     Nutrition Prescription    RECOMMENDATIONS FOR MDs/PROVIDERS TO ORDER:  Pt eating fair currently.     Malnutrition Status:    Unable to determine due to no nutrition focused physical assessment at this time.    Recommendations already ordered by Registered Dietitian (RD):  Reordered calorie counts     Future/Additional Recommendations:  1. Monitor PO intake.   2. TPN recs in RD note from 10/14 if needed.      EVALUATION OF THE PROGRESS TOWARD GOALS   Diet: High Kcal/High Protein, PRN supplements    Intake:   5-day calorie count average providing 1105 kcals (18 kcal/kg) and 41 g PRO (0.6 g/kg), which meets 70% of estimated energy needs & 55% of estimated protein needs.   10/20: 805 kcal and 34 g PRO  10/19: 1121 kcal and 45 g PRO  10/18: No asa cnts  10/17: 491 kcal and 22 g PRO  10/16: 1631 kcal and 52 g PRO  10/15: 1475 kcal and 51 g PRO    Florencia reports that her appetite has been improving. She was able to eat raisin bran, milk, peaches, and a banana this morning.      NEW FINDINGS   Weight: 76.5 kg on 10/21, weight stable over the past week.     Labs:   Mg 2.8 (H- up from 1.5)    Meds:   Cellcept  Protonix  Tacrolimus   Compazine PRN    GI: Pt reports that her nausea is improved w/ medications.     MALNUTRITION  % Intake: < 75% for > 7 days (non-severe)  % Weight Loss: None noted  Subcutaneous Fat Loss: Unable to assess  Muscle Loss: Unable to assess  Fluid Accumulation/Edema: Unable to assess  Malnutrition Diagnosis: Unable to determine due to no NFPA.   *Pt declined nutrition focused physical assessment at this time.     Previous Goals   Total avg nutritional intake to meet a minimum of 25 kcal/kg and 1.2 g PRO/kg daily (per dosing wt 63 kg).  Evaluation: Not met    Previous Nutrition Diagnosis  Inadequate oral intake related to decreased appetite 2/2 nausea as evidenced by pt report and calorie counts showing intake meeting <50% nutrition needs.    Evaluation: Improving    CURRENT NUTRITION DIAGNOSIS  Inadequate oral intake related to decreased appetite 2/2 nausea as evidenced by pt report and calorie counts showing intake meeting <75% nutrition needs.     INTERVENTIONS  Implementation  Nutrition education: Discussed current PO intake and meal ordering.   Collaboration with other providers    Goals  Patient to consume % of nutritionally adequate meal trays TID, or the equivalent with supplements/snacks.    Monitoring/Evaluation  Progress toward goals will be monitored and evaluated per protocol.    Dayanara Aquino RD, LD  5C/BMT RD pager [phone]

## 2020-10-21 NOTE — PROGRESS NOTES
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      Diagnosis AMLM1 Acute myelogeneous leukemia, M1, myeloblastic  HCT Type Allogeneic    Prep Regimen Fludarabine  Cytoxan  TBI   Donor Source Sibling    GVHD Prophylaxis Post transplant cytoxan  Tac/MMF  Primary BMT Provider ACE      INTERVAL  HISTORY   Feeling well this morning. She is very excited that her WBC is 0.1 today. Nausea is controlled, open to changing some meds to oral. Stools loosely formed. Hemorrhoidal pain okay. No bleeding.     Review of Systems: 8 point ROS negative except as noted above.    PHYSICAL EXAM     Weight In/Out     Wt Readings from Last 3 Encounters:   10/21/20 76.5 kg (168 lb 11.2 oz)   09/17/20 77.8 kg (171 lb 8 oz)   09/10/20 77.2 kg (170 lb 3.1 oz)      I/O last 3 completed shifts:  In: 2493 [P.O.:480; I.V.:2013]  Out: 2950 [Urine:2950]       KPS:  90    BP (!) 146/78 (BP Location: Right arm)   Pulse 91   Temp 97.4  F (36.3  C) (Axillary)   Resp 18   Ht 1.68 m (5' 6.14\")   Wt 76.5 kg (168 lb 11.2 oz)   SpO2 96%   BMI 27.11 kg/m     General: NAD   Eyes:  LINH, sclera anicteric.   Lungs: CTA bilaterally, normal WOB on RA   Cardiovascular: RRR, no M/R/G   Abdominal/Rectal: +BS, soft, NT, ND  Rectal exam: Large irritated/inflammed hemrroid at ~3oclock. Not exquistly tender no induration or concern for abscess. No skin breakdown noticed. (not examined 10/21 as improving per patient)  Lymphatics: no edema b/l LE   Skin: No rashes or petechiae.  Neuro: A&O x3  Line: right CVC.   10/19/20 Current aGVHD staging:  Skin 0, UGI 0, LGI 0, Liver 0 (keep in note through day +180, delete if auto)    LABS AND IMAGING - PAST 24 HOURS     Lab Results   Component Value Date    WBC 0.1 (LL) 10/21/2020    ANEU 0.4 (LL) 10/08/2020    HGB 7.9 (L) 10/21/2020    HCT 23.1 (L) 10/21/2020    PLT 18 (LL) 10/21/2020     10/21/2020    " POTASSIUM 4.2 10/21/2020    CHLORIDE 100 10/21/2020    CO2 28 10/21/2020     (H) 10/21/2020    BUN 6 (L) 10/21/2020    CR 0.64 10/21/2020    MAG 1.5 (L) 10/21/2020    INR 1.02 10/19/2020     I have assessed all abnormal lab values for their clinical significance and any values considered clinically significant have been addressed in the assessment and plan  OVERALL PLAN   Florencia Gao is a 75 year old female with AML in CR1, admitted for JCARLOS Allo sib pbsct per protocol 2019-10, randomized to Post transplant cytoxan/Tac/MMF.  Day +14.      1.  BMT: Cy/flu/TBI prep.   Allopurinol given through day 0.   Received 4.19 x10(6) CD34/kg. Donor B pos and recipient B neg.   GCSF started d+5 and cont to until ANC>1500 x3 consecutive days. Re-stage per protocol.     2.  HEME: Keep Hgb>8 and plts>10K.   - pancytopenic secondary to chemotherapy and transplant. WBC up to 0.1.   - Of note has significant antibodies to RBCs- can cause delay in transfusion availability. Premeds tylenol and benadryl.                             3.  ID: Afebrile.   RVP 10/14 neg. Started Claritin.   Cont Levo, Vori (started 10/14), and HD ACV prophy.   Pjp prophy to start day+28 - IV pentamdine vs dapsone as sulfa allergy.                                         4.  GI: nausea - improved.   Nausea: headaches with zofran. Significant nausea/vomiting with cytoxan. s/p emend 10/13. Continue Scheduled kytril and zyprexa 2.5mg at bedtime.  Compazine and ativan prn  - Hemorroids: tucks prn, barrier cream and start BID hydrocortisone cream topically.    - Ativan available PRN break-through symptoms.   - **Per protocol no dex/steroids**   Protonix for GI prophy.    Ursodiol for VOD prophy.     5.  GVHD Prophylaxis:   Day +3 and day+4 cytoxan  On tac and MMF. Tac adjusted with starting voriconazole. Currently on 80 mcg/hr. Level 10/16 okay at 12.6. Tac level 10/19 good at 12.4.      6.  FEN/Renal:  - Cr and lytes stable.    - Replete lytes PRN per  SS. Monitor weight, I+O, lytes per protocol with IVF flush.  - Anorexia and malnutrition in the context of acute illness: Calorie counts ongoing.      7. Hypothyroidism  Continue synthroid 88mcg      8. Psych: Situational Depression continue effexor 75mg daily.     9.  Skin:    - Follicular rash 10/15 on chest: s/p clindamycin gel. Resolved.    - Previous irritation/rash under and around CVC dressing. Pt with allergy to chlorhexidine. Removed biopatch and trial of different dressing. Improved.      10. Cards: intermittent hypertension- monitor for now. Tac level okay.     11. Research: Pt consented to MT2019-31 (Seamless randomized tiral to alleviate morbidity and mortality) for which she was randomized to the palliative care management arm. Palliative care consult placed 10/8 per research recs- see note dated 10/8.     Dispo: patient will remain admitted through neutropenia and subsequent engraftment.     Denzel Magallon PA-C  [phone]     BMT Staff:  The patient was discussed on morning rounds with the nurses, mid level provider, and house staff and seen and examined by me. All labs and imaging were reviewed. I reviewed events over the last 24 hours including vitals and flow sheets. I agree with the above note and have been responsible for the care plan and interpretation of progress.     Subjective:  Reports feeling well today- no new concerns, was excited to hear about initial WBC recovery, good appetite, good control of nausea, no vomiting, no abd pain, no F/C/NS, no  complaints, hip/bony pains mild, resolved post nasal drip with no other respiratory symptoms, no HA/LH/Dizziness.      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  1. Heme/BMT: Day +14, transfusion as needed  2. ID: Afebrile  3. CV: no active issues  4. GI/ FEN: monitor regimen related toxicity, adjusting antiemetics, encourage fluid PO intake, I/O and daily weights  5. Renal: euvolemic, mild hyponatremia resolved  6. PPx: Levo, donato/vori, ACV  7.  Hypothyroidism on replacement, situational depression on Effexor, PT: encourage ambulation     Chito Bautista MD

## 2020-10-21 NOTE — PROGRESS NOTES
[facility]  Palliative Care Sign-Off Note    Palliative Care was consulted for palliative care as part of clinical trial. Now patient has elected to withdraw from the trial. At this time the patient does not have any needs we are actively addressing, and we are signing off.    However we know their condition may change -- please re-consult us if we can be helpful at any time in the future.     Thank you for the opportunity to be involved in the care of this patient.          Margie Lockhart MD / Palliative Medicine / Pager [phone] / [facility] Inpatient Team Consult Pager [phone] (answered 8am-430pm M-F) - ok to text page via [facility] / After-Hours Answering Service [phone] / Palliative Clinic in the [facility] at the [facility] [place] - [phone] (scheduling); [phone] (triage).

## 2020-10-21 NOTE — PLAN OF CARE
Pt given 1 dose IV compazine for nausea this afternoon.  She reports feeling fatigued with a low appetite though she did eat small meals today.  VSS    Problem: Adult Inpatient Plan of Care  Goal: Plan of Care Review  Outcome: No Change  Flowsheets (Taken 10/21/2020 1833)  Plan of Care Reviewed With: patient     Problem: Nausea and Vomiting (Stem Cell/Bone Marrow Transplant)  Goal: Nausea and Vomiting Symptom Relief  Intervention: Prevent and Manage Nausea and Vomiting  Flowsheets (Taken 10/21/2020 1833)  Nausea/Vomiting Interventions: antiemetic

## 2020-10-21 NOTE — PROGRESS NOTES
[id number]: Withdrawal of Consent     Florencia Gao has chosen to withdraw from the study MT2019-19. Specifically, the patient would like to:  No longer be involved in or contacted for study interventions, including assessments and surveys.    MT2019-31: Withdrawal of Consent     Florencia aGo has chosen to withdraw from the study MT2019-31. Specifically, the patient would like to:    No longer be involved in or contacted for study interventions, including assessments and surveys, and patient visits.    Brook Ervin   DERMATOLOGY HISTORY  []  YES    [x]  NO Pacemaker, defibrillator  []  YES    [x]  NO Artificial heart valves  []  YES    [x]  NO Artificial joints in last 6 months  []  YES    [x]  NO Allergy to numbing medicine  [x]  YES    []  NO Tanning bed use.last used 5-6 years ago  PERSONAL HISTORY:  []  YES    [x]  NO  Melanoma located at   []  YES    [x]  NO  Basal Cell Carcinoma  []  YES    [x]  NO  Squamous Cell Carcinoma  []  YES    [x]  NO  Unknown type in    FAMILY HISTORY:  []  YES    [x]  NO  Melanoma in   []  YES    [x]  NO  Basal Cell Carcinoma  []  YES    [x]  NO  Squamous Cell Carcinoma  []  YES    [x]  NO  Unknown type in    HISTORY OF PRESENT ILLNESS:  Patient is a pleasant 50 year old male new patient here for evaluation of lesion along the right lower cutaneous eyelid, present for about 5 years but feels it has changed and became sensitive to the sun while fishing last weekend.    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  Per patient, this lesion has remained stable and unchanged.         ALLERGIES:   Allergen Reactions   • Bee Sting SWELLING       Current Outpatient Prescriptions   Medication Sig   • EPINEPHrine (EPIPEN 2-AMBER) 0.3 MG/0.3ML auto-injector Inject 0.3 mLs into the muscle as needed (for allergic reaction). May substitute similar medication covered by insurance   • rosuvastatin (CRESTOR) 5 MG tablet Take 1 tablet by mouth daily.   • fluticasone (FLONASE) 50 MCG/ACT nasal spray Spray 2 sprays in each nostril daily.   • hydrochlorothiazide (HYDRODIURIL) 25 MG tablet Take 1 tablet by mouth daily.   • lisinopril (ZESTRIL) 20 MG tablet Take 1 tablet by mouth daily.   • fluticasone (FLOVENT HFA) 110 MCG/ACT inhaler Inhale 2 puffs into the lungs 2 times daily.   • albuterol (PROAIR RESPICLICK) 108 (90 BASE) MCG/ACT inhaler Inhale 2 puffs into the lungs every 4 hours as needed for Wheezing.   • Co-Enzyme Q-10 100 MG Cap    •  mometasone (ELOCON) 0.1 % cream Apply 1 application topically daily.   • Omega-3 Fatty Acids (FISH OIL) 500 MG capsule Take 1,500 mg by mouth daily.   • Ascorbic Acid (VITAMIN C) 100 MG tablet Take 100 mg by mouth daily.     No current facility-administered medications for this visit.        Past Medical History:   Diagnosis Date   • Essential (primary) hypertension 2004       Past Surgical History:   Procedure Laterality Date   • BREAST LUMPECTOMY     • CYST REMOVAL  03/11/2009    ganglion in left hand.    • VASECTOMY         PHYSICAL EXAMINATION:  Vitals:    06/28/17 1458   BP: 120/80   Pulse: 66   Resp: 20     General:  No acute distress, pleasant, alert and oriented x3, well nourished fair complexioned  Right mid infraorbital rim with a 3x3mm bluish nodule with slight whitish involvement along the inferior portion.  No inflammation and well circumscribed.   Just superior to this are two brown, elevated stuck on papules each 2-3mm in size without inflammation.  Brownish macular pigmentation along the right lower eyelid. Along the right nasal wall there are 2 on keratotic flesh-colored papules about 2-3 mm in size each without any quality.      ASSESSMENT/PLAN:  1. 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  Reassurance provided.  This has remained stable in size from previous measurement in 2013. Certainly if it were to change we would see him back for biopsy.     2.Probable seborrheic keratosis vs pigmented actinic keratosis of the right lower cutaneous eyelid just superior of blue nevus.  Recommend cryotherapy as well to the area along the right nasal wall.    He would like to wait after the holiday and into September to have this done given his profession.    He will make appointment accordingly.      Daly PAGAN

## 2020-10-21 NOTE — PLAN OF CARE
"/62 (BP Location: Right arm)   Pulse 98   Temp 99  F (37.2  C) (Axillary)   Resp 18   Ht 1.68 m (5' 6.14\")   Wt 77.1 kg (169 lb 14.4 oz)   SpO2 95%   BMI 27.30 kg/m    AVSS. Gave compazine and miralax. Lactic was drawn, 1.3. Mg was 1.5, gave 4g. Denies N/V/D and pain. Pt is voiding and had no stools during shift. Pt is calling appropriately and is IND in room. Continue with plan of care.    Problem: Adult Inpatient Plan of Care  Goal: Plan of Care Review  Outcome: No Change  Flowsheets (Taken 10/21/2020 3886)  Plan of Care Reviewed With: patient  Progress: no change     Problem: Fatigue (Stem Cell/Bone Marrow Transplant)  Goal: Energy Level Supports Daily Activity  Outcome: No Change     Problem: Hematologic Alteration (Stem Cell/Bone Marrow Transplant)  Goal: Blood Counts Within Acceptable Range  Outcome: No Change     Problem: Infection Risk (Stem Cell/Bone Marrow Transplant)  Goal: Absence of Infection Signs/Symptoms  Outcome: No Change     Problem: Infection Risk (Stem Cell/Bone Marrow Transplant)  Goal: Absence of Infection Signs/Symptoms  Intervention: Prevent and Manage Infection  Recent Flowsheet Documentation  Taken 10/20/2020 2000 by Reshma Mckeon RN  Isolation Precautions: protective environment maintained     Problem: Nausea and Vomiting (Stem Cell/Bone Marrow Transplant)  Goal: Nausea and Vomiting Symptom Relief  Outcome: No Change     Problem: Nausea and Vomiting (Stem Cell/Bone Marrow Transplant)  Goal: Nausea and Vomiting Symptom Relief  Intervention: Prevent and Manage Nausea and Vomiting  Recent Flowsheet Documentation  Taken 10/20/2020 2000 by Reshma Mckeon RN  Nausea/Vomiting Interventions: antiemetic     Problem: Nutrition Intake Altered (Stem Cell/Bone Marrow Transplant)  Goal: Optimal Nutrition Intake  Outcome: No Change     Problem: Nutrition Intake Altered (Stem Cell/Bone Marrow Transplant)  Goal: Optimal Nutrition Intake  Intervention: Minimize and Manage Barriers to " Oral Intake  Recent Flowsheet Documentation  Taken 10/20/2020 2000 by Reshma Mckeon RN  Oral Nutrition Promotion:    calorie-dense foods provided    calorie-dense liquids provided     Problem: Adult Inpatient Plan of Care  Goal: Absence of Hospital-Acquired Illness or Injury  Intervention: Identify and Manage Fall Risk  Recent Flowsheet Documentation  Taken 10/20/2020 2000 by Reshma Mckeon RN  Safety Promotion/Fall Prevention:    assistive device/personal items within reach    clutter free environment maintained    lighting adjusted    nonskid shoes/slippers when out of bed     Problem: Adult Inpatient Plan of Care  Goal: Absence of Hospital-Acquired Illness or Injury  Intervention: Prevent VTE (venous thromboembolism)  Recent Flowsheet Documentation  Taken 10/20/2020 2000 by Reshma Mckeon RN  VTE Prevention/Management: ambulation promoted     Problem: Bladder Irritation (Stem Cell/Bone Marrow Transplant)  Goal: Symptom-Free Urinary Elimination  Intervention: Monitor and Manage Bladder Irritation  Recent Flowsheet Documentation  Taken 10/20/2020 2305 by Reshma Mckeon RN  Pain Management Interventions: rest  Taken 10/20/2020 2000 by Reshma Mckeon RN  Hyperhydration Management: fluids provided     Problem: Diarrhea (Stem Cell/Bone Marrow Transplant)  Goal: Diarrhea Symptom Control  Intervention: Manage Diarrhea  Recent Flowsheet Documentation  Taken 10/20/2020 2000 by Reshma Mckeon RN  Fluid/Electrolyte Management: fluids provided     Problem: Mucositis (Stem Cell/Bone Marrow Transplant)  Goal: Mucous Membrane Health and Integrity  Intervention: Maintain Oral Mucous Membrane Integrity  Recent Flowsheet Documentation  Taken 10/20/2020 2000 by Reshma Mckeon RN  Oral Mucous Membrane Protection:    lip/mouth moisturizer applied    nonirritating oral fluids promoted    nonirritating oral foods promoted  Oral Care: lip lubricant applied

## 2020-10-21 NOTE — PLAN OF CARE
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 Up independently, denies pain. Endorses nausea. Received compazine x1. No further PRN's administered. Patient showered. Calorie counts in place. Tac infusing at 4 ml/hr.     R: No further nursing concerns, continue plan of care.  "

## 2020-10-22 ENCOUNTER — APPOINTMENT (OUTPATIENT)
Dept: PHYSICAL THERAPY | Facility: CLINIC | Age: 75
DRG: 014 | End: 2020-10-22
Attending: INTERNAL MEDICINE
Payer: COMMERCIAL

## 2020-10-22 LAB
ALBUMIN SERPL-MCNC: 2.8 G/DL (ref 3.4–5)
ALP SERPL-CCNC: 64 U/L (ref 40–150)
ALT SERPL W P-5'-P-CCNC: 16 U/L (ref 0–50)
ANION GAP SERPL CALCULATED.3IONS-SCNC: 4 MMOL/L (ref 3–14)
AST SERPL W P-5'-P-CCNC: 14 U/L (ref 0–45)
BACTERIA SPEC CULT: NORMAL
BILIRUB SERPL-MCNC: 0.2 MG/DL (ref 0.2–1.3)
BLD PROD TYP BPU: NORMAL
BLD UNIT ID BPU: 0
BLOOD PRODUCT CODE: NORMAL
BPU ID: NORMAL
BUN SERPL-MCNC: 6 MG/DL (ref 7–30)
CALCIUM SERPL-MCNC: 8.4 MG/DL (ref 8.5–10.1)
CHLORIDE SERPL-SCNC: 102 MMOL/L (ref 94–109)
CO2 SERPL-SCNC: 28 MMOL/L (ref 20–32)
CREAT SERPL-MCNC: 0.66 MG/DL (ref 0.52–1.04)
DIFFERENTIAL METHOD BLD: ABNORMAL
ERYTHROCYTE [DISTWIDTH] IN BLOOD BY AUTOMATED COUNT: 11.6 % (ref 10–15)
GFR SERPL CREATININE-BSD FRML MDRD: 87 ML/MIN/{1.73_M2}
GLUCOSE SERPL-MCNC: 91 MG/DL (ref 70–99)
HCT VFR BLD AUTO: 21.2 % (ref 35–47)
HGB BLD-MCNC: 7.2 G/DL (ref 11.7–15.7)
Lab: NORMAL
MAGNESIUM SERPL-MCNC: 1.6 MG/DL (ref 1.6–2.3)
MCH RBC QN AUTO: 29.3 PG (ref 26.5–33)
MCHC RBC AUTO-ENTMCNC: 34 G/DL (ref 31.5–36.5)
MCV RBC AUTO: 86 FL (ref 78–100)
PLATELET # BLD AUTO: 9 10E9/L (ref 150–450)
POTASSIUM SERPL-SCNC: 4.1 MMOL/L (ref 3.4–5.3)
PROT SERPL-MCNC: 6.3 G/DL (ref 6.8–8.8)
RBC # BLD AUTO: 2.46 10E12/L (ref 3.8–5.2)
SODIUM SERPL-SCNC: 134 MMOL/L (ref 133–144)
SPECIMEN SOURCE: NORMAL
TRANSFUSION STATUS PATIENT QL: NORMAL
TRANSFUSION STATUS PATIENT QL: NORMAL
WBC # BLD AUTO: 0.1 10E9/L (ref 4–11)

## 2020-10-22 PROCEDURE — 258N000003 HC RX IP 258 OP 636: Performed by: INTERNAL MEDICINE

## 2020-10-22 PROCEDURE — 999N001121 HC STATISTIC RESEARCH KIT COLLECTION: Performed by: INTERNAL MEDICINE

## 2020-10-22 PROCEDURE — 97530 THERAPEUTIC ACTIVITIES: CPT | Mod: GP | Performed by: PHYSICAL THERAPIST

## 2020-10-22 PROCEDURE — 206N000001 HC R&B BMT UMMC

## 2020-10-22 PROCEDURE — 250N000013 HC RX MED GY IP 250 OP 250 PS 637: Performed by: PHYSICIAN ASSISTANT

## 2020-10-22 PROCEDURE — 250N000011 HC RX IP 250 OP 636: Performed by: INTERNAL MEDICINE

## 2020-10-22 PROCEDURE — 250N000009 HC RX 250: Performed by: PHYSICIAN ASSISTANT

## 2020-10-22 PROCEDURE — 258N000003 HC RX IP 258 OP 636: Performed by: PHYSICIAN ASSISTANT

## 2020-10-22 PROCEDURE — P9037 PLATE PHERES LEUKOREDU IRRAD: HCPCS | Performed by: PHYSICIAN ASSISTANT

## 2020-10-22 PROCEDURE — 250N000013 HC RX MED GY IP 250 OP 250 PS 637: Performed by: INTERNAL MEDICINE

## 2020-10-22 PROCEDURE — 85027 COMPLETE CBC AUTOMATED: CPT

## 2020-10-22 PROCEDURE — 85025 COMPLETE CBC W/AUTO DIFF WBC: CPT | Performed by: PHYSICIAN ASSISTANT

## 2020-10-22 PROCEDURE — 250N000011 HC RX IP 250 OP 636: Performed by: PHYSICIAN ASSISTANT

## 2020-10-22 PROCEDURE — 83735 ASSAY OF MAGNESIUM: CPT | Performed by: PHYSICIAN ASSISTANT

## 2020-10-22 PROCEDURE — 99233 SBSQ HOSP IP/OBS HIGH 50: CPT | Performed by: INTERNAL MEDICINE

## 2020-10-22 PROCEDURE — 97110 THERAPEUTIC EXERCISES: CPT | Mod: GP | Performed by: PHYSICAL THERAPIST

## 2020-10-22 PROCEDURE — 80053 COMPREHEN METABOLIC PANEL: CPT | Performed by: PHYSICIAN ASSISTANT

## 2020-10-22 RX ADMIN — MYCOPHENOLATE MOFETIL 1000 MG: 500 INJECTION, POWDER, LYOPHILIZED, FOR SOLUTION INTRAVENOUS at 14:23

## 2020-10-22 RX ADMIN — POLYETHYLENE GLYCOL 3350 17 G: 17 POWDER, FOR SOLUTION ORAL at 21:05

## 2020-10-22 RX ADMIN — ACYCLOVIR SODIUM 700 MG: 50 INJECTION, SOLUTION INTRAVENOUS at 11:10

## 2020-10-22 RX ADMIN — VORICONAZOLE 200 MG: 200 TABLET, FILM COATED ORAL at 20:58

## 2020-10-22 RX ADMIN — PANTOPRAZOLE SODIUM 40 MG: 40 TABLET, DELAYED RELEASE ORAL at 08:28

## 2020-10-22 RX ADMIN — LEVOTHYROXINE SODIUM 88 MCG: 0.09 TABLET ORAL at 08:27

## 2020-10-22 RX ADMIN — DIPHENHYDRAMINE HYDROCHLORIDE 25 MG: 25 CAPSULE ORAL at 05:23

## 2020-10-22 RX ADMIN — MAGNESIUM SULFATE 2 G: 2 INJECTION INTRAVENOUS at 04:55

## 2020-10-22 RX ADMIN — LORATADINE 10 MG: 10 TABLET ORAL at 08:27

## 2020-10-22 RX ADMIN — TACROLIMUS 80 MCG/HR: 5 INJECTION, SOLUTION INTRAVENOUS at 21:00

## 2020-10-22 RX ADMIN — VENLAFAXINE 75 MG: 75 TABLET ORAL at 14:22

## 2020-10-22 RX ADMIN — URSODIOL 300 MG: 300 CAPSULE ORAL at 20:58

## 2020-10-22 RX ADMIN — ACYCLOVIR SODIUM 700 MG: 50 INJECTION, SOLUTION INTRAVENOUS at 02:06

## 2020-10-22 RX ADMIN — VORICONAZOLE 200 MG: 200 TABLET, FILM COATED ORAL at 08:28

## 2020-10-22 RX ADMIN — GRANISETRON HYDROCHLORIDE 1 MG: 1 TABLET ORAL at 20:58

## 2020-10-22 RX ADMIN — Medication 400 MCG: at 21:00

## 2020-10-22 RX ADMIN — PROCHLORPERAZINE EDISYLATE 5 MG: 5 INJECTION INTRAMUSCULAR; INTRAVENOUS at 08:28

## 2020-10-22 RX ADMIN — PROCHLORPERAZINE EDISYLATE 5 MG: 5 INJECTION INTRAMUSCULAR; INTRAVENOUS at 14:32

## 2020-10-22 RX ADMIN — DEXTROSE MONOHYDRATE 20 ML: 50 INJECTION, SOLUTION INTRAVENOUS at 21:00

## 2020-10-22 RX ADMIN — LEVOFLOXACIN 250 MG: 250 TABLET, FILM COATED ORAL at 11:10

## 2020-10-22 RX ADMIN — URSODIOL 300 MG: 300 CAPSULE ORAL at 14:22

## 2020-10-22 RX ADMIN — MYCOPHENOLATE MOFETIL 1000 MG: 500 INJECTION, POWDER, LYOPHILIZED, FOR SOLUTION INTRAVENOUS at 22:14

## 2020-10-22 RX ADMIN — ACETAMINOPHEN 650 MG: 325 TABLET, FILM COATED ORAL at 05:22

## 2020-10-22 RX ADMIN — GRANISETRON HYDROCHLORIDE 1 MG: 1 TABLET ORAL at 08:27

## 2020-10-22 RX ADMIN — ACYCLOVIR SODIUM 700 MG: 50 INJECTION, SOLUTION INTRAVENOUS at 18:11

## 2020-10-22 RX ADMIN — Medication 2.5 MG: at 21:03

## 2020-10-22 RX ADMIN — PROCHLORPERAZINE EDISYLATE 5 MG: 5 INJECTION INTRAMUSCULAR; INTRAVENOUS at 21:05

## 2020-10-22 RX ADMIN — HYDROCORTISONE: 1 CREAM TOPICAL at 08:33

## 2020-10-22 RX ADMIN — MYCOPHENOLATE MOFETIL 1000 MG: 500 INJECTION, POWDER, LYOPHILIZED, FOR SOLUTION INTRAVENOUS at 05:46

## 2020-10-22 RX ADMIN — HYDROCORTISONE: 1 CREAM TOPICAL at 21:01

## 2020-10-22 RX ADMIN — URSODIOL 300 MG: 300 CAPSULE ORAL at 08:27

## 2020-10-22 ASSESSMENT — ACTIVITIES OF DAILY LIVING (ADL)
ADLS_ACUITY_SCORE: 10

## 2020-10-22 ASSESSMENT — MIFFLIN-ST. JEOR: SCORE: 1292.85

## 2020-10-22 NOTE — PROGRESS NOTES
BRIEF SOCIAL WORK NOTE:    SW attempted to reach pt by phone (per her preference) multiple times on 10/21 & 10/21 without success. SW will continue to be involved and offer support as able.     ORSSI Pabon, [facility]  Adult Blood & Marrow Transplant   Phone: [phone]  Pager: [phone]

## 2020-10-22 NOTE — PROGRESS NOTES
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      Diagnosis AMLM1 Acute myelogeneous leukemia, M1, myeloblastic  HCT Type Allogeneic    Prep Regimen Fludarabine  Cytoxan  TBI   Donor Source Sibling    GVHD Prophylaxis Post transplant cytoxan  Tac/MMF  Primary BMT Provider ACE      INTERVAL  HISTORY   Had some abdominal discomfort yesterday afternoon and had 3 stools that were not loose but not formed, took miralax last night. No further hemorrhoidal pain or bleeding. Nausea controlled. No other medical complaints.     Review of Systems: 8 point ROS negative except as noted above.    PHYSICAL EXAM     Weight In/Out     Wt Readings from Last 3 Encounters:   10/22/20 76.9 kg (169 lb 8 oz)   09/17/20 77.8 kg (171 lb 8 oz)   09/10/20 77.2 kg (170 lb 3.1 oz)      I/O last 3 completed shifts:  In: 2030 [P.O.:300; I.V.:1509]  Out: 2125 [Urine:1775; Stool:350]       KPS:  90    /77 (BP Location: Right arm)   Pulse 105   Temp 96.9  F (36.1  C) (Axillary)   Resp 16   Ht 1.68 m (5' 6.14\")   Wt 76.9 kg (169 lb 8 oz)   SpO2 97%   BMI 27.24 kg/m     General: NAD   Eyes:  LINH, sclera anicteric.   Lungs: CTA bilaterally, normal WOB on RA   Cardiovascular: RRR, no M/R/G   Abdominal/Rectal: +BS, soft, NT, ND  Rectal exam: Large irritated/inflammed hemrroid at ~3oclock and second hemorrhoid present at ~6o'clock. Not exquistly tender no induration or concern for abscess. No skin breakdown noticed.   Lymphatics: no edema b/l LE   Skin: No rashes or petechiae.  Neuro: A&O x3  Line: right CVC.   10/22/20 Current aGVHD staging:  Skin 0, UGI 0, LGI 0, Liver 0 (keep in note through day +180, delete if auto)    LABS AND IMAGING - PAST 24 HOURS     Lab Results   Component Value Date    WBC 0.1 (LL) 10/22/2020    ANEU 0.4 (LL) 10/08/2020    HGB 7.2 (L) 10/22/2020    HCT 21.2 (L) 10/22/2020    PLT 9 (LL) 10/22/2020     " 10/22/2020    POTASSIUM 4.1 10/22/2020    CHLORIDE 102 10/22/2020    CO2 28 10/22/2020    GLC 91 10/22/2020    BUN 6 (L) 10/22/2020    CR 0.66 10/22/2020    MAG 1.6 10/22/2020    INR 1.02 10/19/2020     I have assessed all abnormal lab values for their clinical significance and any values considered clinically significant have been addressed in the assessment and plan  OVERALL PLAN   Florencia Gao is a 75 year old female with AML in CR1, admitted for JCARLOS Allo sib pbsct per protocol 2019-10, randomized to Post transplant cytoxan/Tac/MMF.  Day +15.      1.  BMT: Cy/flu/TBI prep.   Allopurinol given through day 0.   Received 4.19 x10(6) CD34/kg. Donor B pos and recipient B neg.   GCSF started d+5 and cont to until ANC>1500 x3 consecutive days.   Re-stage per protocol. Scheduled 10/28 at 11am on 5C.      2.  HEME: Keep Hgb>8 and plts>10K.   - pancytopenic secondary to chemotherapy and transplant. WBC up to 0.1.   - Of note has significant antibodies to RBCs- can cause delay in transfusion availability. Premeds tylenol and benadryl.                             3.  ID: Afebrile.   RVP 10/14 neg. Started Claritin.   Cont Levo, Vori (started 10/14), and HD ACV prophy.   Pjp prophy to start day+28 - IV pentamdine vs dapsone as sulfa allergy.                                         4.  GI:   Nausea: headaches with zofran. Significant nausea/vomiting with cytoxan. s/p emend 10/13. Continue Scheduled kytril and zyprexa 2.5mg at bedtime.  Compazine and ativan prn  - Hemorroids: tucks prn, barrier cream and start BID hydrocortisone cream topically.    - Stools: pt on miralax daily PTA. Encouraged pt to hold off on miralax d/t loose stools   - **Per protocol no dex/steroids**   Protonix for GI prophy.    Ursodiol for VOD prophy.     5.  GVHD Prophylaxis:   Day +3 and day+4 cytoxan  On tac and MMF. Tac adjusted with starting voriconazole. Currently on 80 mcg/hr. Level 10/16 okay at 12.6. Tac level 10/19 good at 12.4.      6.   FEN/Renal:  - Cr and lytes stable.    - Replete lytes PRN per SS. Monitor weight, I+O, lytes per protocol with IVF flush.  - Anorexia and malnutrition in the context of acute illness: Calorie counts ongoing.      7. Hypothyroidism  Continue synthroid 88mcg      8. Psych: Situational Depression continue effexor 75mg daily.     9.  Skin:    - Follicular rash 10/15 on chest: s/p clindamycin gel. Resolved.    - Previous irritation/rash under and around CVC dressing. Pt with allergy to chlorhexidine. Removed biopatch and trial of different dressing. Improved.      10. Cards: intermittent hypertension- monitor for now. Tac level okay.     11. Research: Pt consented to MT2019-31 (Seamless randomized tiral to alleviate morbidity and mortality) for which she was randomized to the palliative care management arm. Palliative care consult placed 10/8 per research recs- see note dated 10/8.     Dispo: patient will remain admitted through neutropenia and subsequent engraftment.     Denzel Magallon PA-C  [phone]      Bone Marrow Transplant (5C) Attending Progress Note    I am assuming care for this patient after receiving sign-out from Dr. Chito Bautista yesterday. She updated me on all issues and plan for smooth transition of care.      The patient was discussed on morning rounds with the nurses, and mid-level provider, house staff and was seen and examined by me. All labs and imaging were reviewed. I reviewed events over the last 24 hours including vitals and flow sheets. I agree with the above note and have been responsible for the care plan and interpretation of progress. 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  Overall, she has been doing relatively well since I last met her 2 weeks ago.  She has had mild toxicities from transplant including hemorrhoids.  She is afebrile and her vital signs are stable.  She has had no active  infections in continues to eat without the need for TPN.  We reviewed her course over the past 2 weeks.    There is no evidence of mucositis. There is no adenopathy on exam and lungs are clear. There is no LE edema and no rash. Labs show a creatinine of 0.64, hemoglobin of 7.2, platelet count of 9000 for which she will be transfused and white count of only 0.1.  We continue to wait for engraftment.  She is on growth factor..     We discussed expectations for the next several days. All questions have been answered.  She understands that we hope to see some signs of engraftment in the next week to 10 days.  She is at risk for infection but has none currently.    Richy Quigley MD

## 2020-10-22 NOTE — PROGRESS NOTES
Calorie Count  Intake recorded for: 10/21  Total Kcals: 1396 Total Protein: 50g  Kcals from Hospital Food: 1396  Protein: 50g  Kcals from Outside Food (average):0 Protein: 0g  # Meals Ordered from Kitchen: 2 meals   # Meals Recorded: 2 meals (First - 100% raisin bran w/ whole milk peaches, banana)      (Second - 25% penne pasta w/ chicken elysia)  # Supplements Recorded: 100% 1.75% Boost Shakes

## 2020-10-22 NOTE — PROGRESS NOTES
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      Diagnosis AMLM1 Acute myelogeneous leukemia, M1, myeloblastic  HCT Type Allogeneic    Prep Regimen Fludarabine  Cytoxan  TBI   Donor Source Sibling    GVHD Prophylaxis Post transplant cytoxan  Tac/MMF  Primary BMT Provider ACE      INTERVAL  HISTORY   Had some abdominal discomfort yesterday afternoon and had 3 stools that were not loose but not formed, took miralax last night. No further hemorrhoidal pain or bleeding. Nausea controlled. No other medical complaints.     Review of Systems: 8 point ROS negative except as noted above.    PHYSICAL EXAM     Weight In/Out     Wt Readings from Last 3 Encounters:   10/22/20 76.9 kg (169 lb 8 oz)   09/17/20 77.8 kg (171 lb 8 oz)   09/10/20 77.2 kg (170 lb 3.1 oz)      I/O last 3 completed shifts:  In: 2760 [P.O.:780; I.V.:1759]  Out: 2775 [Urine:2425; Stool:350]       KPS:  90    /75 (BP Location: Right arm)   Pulse 90   Temp 97.8  F (36.6  C) (Axillary)   Resp 12   Ht 1.68 m (5' 6.14\")   Wt 76.9 kg (169 lb 8 oz)   SpO2 95%   BMI 27.24 kg/m     General: NAD   Eyes:  LINH, sclera anicteric.   Lungs: CTA bilaterally, normal WOB on RA   Cardiovascular: RRR, no M/R/G   Abdominal/Rectal: +BS, soft, NT, ND  Rectal exam: Large irritated/inflammed hemrroid at ~3oclock and second hemorrhoid present at ~6o'clock. Not exquistly tender no induration or concern for abscess. No skin breakdown noticed.   Lymphatics: no edema b/l LE   Skin: No rashes or petechiae.  Neuro: A&O x3  Line: right CVC.   10/22/20 Current aGVHD staging:  Skin 0, UGI 0, LGI 0, Liver 0 (keep in note through day +180, delete if auto)    LABS AND IMAGING - PAST 24 HOURS     Lab Results   Component Value Date    WBC 0.1 (LL) 10/22/2020    ANEU 0.4 (LL) 10/08/2020    HGB 7.2 (L) 10/22/2020    HCT 21.2 (L) 10/22/2020    PLT 9 (LL) 10/22/2020     " 10/22/2020    POTASSIUM 4.1 10/22/2020    CHLORIDE 102 10/22/2020    CO2 28 10/22/2020    GLC 91 10/22/2020    BUN 6 (L) 10/22/2020    CR 0.66 10/22/2020    MAG 1.6 10/22/2020    INR 1.02 10/19/2020     I have assessed all abnormal lab values for their clinical significance and any values considered clinically significant have been addressed in the assessment and plan  OVERALL PLAN   Florencia Gao is a 75 year old female with AML in CR1, admitted for JCARLOS Allo sib pbsct per protocol 2019-10, randomized to Post transplant cytoxan/Tac/MMF.  Day +15.      1.  BMT: Cy/flu/TBI prep.   Allopurinol given through day 0.   Received 4.19 x10(6) CD34/kg. Donor B pos and recipient B neg.   GCSF started d+5 and cont to until ANC>1500 x3 consecutive days.   Re-stage per protocol. Scheduled 10/28 at 11am on 5C.      2.  HEME: Keep Hgb>8 and plts>10K.   - pancytopenic secondary to chemotherapy and transplant. WBC up to 0.1.   - Of note has significant antibodies to RBCs- can cause delay in transfusion availability. Premeds tylenol and benadryl.                             3.  ID: Afebrile.   RVP 10/14 neg. Started Claritin.   Cont Levo, Vori (started 10/14), and HD ACV prophy.   Pjp prophy to start day+28 - IV pentamdine vs dapsone as sulfa allergy.                                         4.  GI:   Nausea: headaches with zofran. Significant nausea/vomiting with cytoxan. s/p emend 10/13. Continue Scheduled kytril and zyprexa 2.5mg at bedtime.  Compazine and ativan prn  - Hemorroids: tucks prn, barrier cream and start BID hydrocortisone cream topically.    - Stools: pt on miralax daily PTA. Encouraged pt to hold off on miralax d/t loose stools   - **Per protocol no dex/steroids**   Protonix for GI prophy.    Ursodiol for VOD prophy.     5.  GVHD Prophylaxis:   Day +3 and day+4 cytoxan  On tac and MMF. Tac adjusted with starting voriconazole. Currently on 80 mcg/hr. Level 10/16 okay at 12.6. Tac level 10/19 good at 12.4.      6.   FEN/Renal:  - Cr and lytes stable.    - Replete lytes PRN per SS. Monitor weight, I+O, lytes per protocol with IVF flush.  - Anorexia and malnutrition in the context of acute illness: Calorie counts ongoing.      7. Hypothyroidism  Continue synthroid 88mcg      8. Psych: Situational Depression continue effexor 75mg daily.     9.  Skin:    - Follicular rash 10/15 on chest: s/p clindamycin gel. Resolved.    - Previous irritation/rash under and around CVC dressing. Pt with allergy to chlorhexidine. Removed biopatch and trial of different dressing. Improved.      10. Cards: intermittent hypertension- monitor for now. Tac level okay.     11. Research: Pt consented to MT2019-31 (Seamless randomized tiral to alleviate morbidity and mortality) for which she was randomized to the palliative care management arm. Palliative care consult placed 10/8 per research recs- see note dated 10/8.     Dispo: patient will remain admitted through neutropenia and subsequent engraftment.     Denzel Magallon PA-C  [phone]     BMT Staff:  The patient was discussed on morning rounds with the nurses, mid level provider, and house staff and seen and examined by me. All labs and imaging were reviewed. I reviewed events over the last 24 hours including vitals and flow sheets. I agree with the above note and have been responsible for the care plan and interpretation of progress.     Subjective:  Reports feeling well today- no new concerns, was excited to hear about initial WBC recovery, good appetite, good control of nausea, no vomiting, no abd pain, no F/C/NS, no  complaints, hip/bony pains mild, resolved post nasal drip with no other respiratory symptoms, no HA/LH/Dizziness.      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  1. Heme/BMT: Day +14, transfusion as needed  2. ID: Afebrile  3. CV: no active issues  4. GI/ FEN: monitor regimen related toxicity, adjusting antiemetics, encourage fluid PO intake, I/O and daily weights  5. Renal: euvolemic, mild hyponatremia resolved  6. PPx: Levo, donato/vori, ACV  7.  Hypothyroidism on replacement, situational depression on Effexor, PT: encourage ambulation     Chito Bautista MD

## 2020-10-22 NOTE — PROGRESS NOTES
"SPIRITUAL HEALTH SERVICES  [facility] ([place] 5C  REFERRAL SOURCE: Follow-up     Pt stated \"I'm better today, yesterday was hard.\" Provided supportive conversation in which Florencia shared about what she is looking forward to when she goes home. She also shared about her family (2 sons, 2 dtr-in-laws, grndchild ,and shared about her third son who  of a motorcycle accident in 2019.     PLAN: Chaplains remain available per pt/family/staff request.     Rev. Chel Claudio MDiv, [facility]  Staff    Pager [phone]  * [facility] remains available  for emergent requests/referrals, either by having the switchboard page the on-call  or by entering an ASAP/STAT consult in Epic (this will also page the on-call ).*   "

## 2020-10-22 NOTE — PLAN OF CARE
Pt afebrile, OVSS on RA. Pt denies pain, SOB, diarrhea. IV compazine x2 to prevent nausea before meals. No Bm this shift, still needs research stool sample to be collected. Pt is up ind, pt reports feeling much better than yesterday. No further complaints.   Problem: Adult Inpatient Plan of Care  Goal: Plan of Care Review  Outcome: No Change  Goal: Optimal Comfort and Wellbeing  Outcome: No Change     Problem: Hematologic Alteration (Stem Cell/Bone Marrow Transplant)  Goal: Blood Counts Within Acceptable Range  Outcome: No Change     Problem: Diarrhea (Stem Cell/Bone Marrow Transplant)  Goal: Diarrhea Symptom Control  Outcome: Improving  Intervention: Manage Diarrhea  Recent Flowsheet Documentation  Taken 10/22/2020 0800 by Sana Petersen, RN  Fluid/Electrolyte Management: fluids provided     Problem: Nausea and Vomiting (Stem Cell/Bone Marrow Transplant)  Goal: Nausea and Vomiting Symptom Relief  Outcome: Improving  Intervention: Prevent and Manage Nausea and Vomiting  Recent Flowsheet Documentation  Taken 10/22/2020 0800 by Sana Petersen, RN  Nausea/Vomiting Interventions: antiemetic     Problem: Nutrition Intake Altered (Stem Cell/Bone Marrow Transplant)  Goal: Optimal Nutrition Intake  Outcome: Improving

## 2020-10-22 NOTE — PLAN OF CARE
"/65   Pulse 76   Temp 97.9  F (36.6  C) (Axillary)   Resp 16   Ht 1.68 m (5' 6.14\")   Wt 76.5 kg (168 lb 11.2 oz)   SpO2 95%   BMI 27.11 kg/m    AVSS. Gave compazine for nausea with improvement. Gave miralax. Platelets 9, pre-meds and 1 unit of platelets given. Magnesium 1.6, 2gs given. Lactic drawn, 1.1.  Pt denies N/V/D, and pain. No stools during shift. Calling appropriately. Continue with plan of care.    Problem: Adult Inpatient Plan of Care  Goal: Plan of Care Review  Outcome: No Change  Flowsheets (Taken 10/22/2020 0357)  Plan of Care Reviewed With: patient  Progress: no change     Problem: Bladder Irritation (Stem Cell/Bone Marrow Transplant)  Goal: Symptom-Free Urinary Elimination  Outcome: No Change     Problem: Bladder Irritation (Stem Cell/Bone Marrow Transplant)  Goal: Symptom-Free Urinary Elimination  Intervention: Monitor and Manage Bladder Irritation  Recent Flowsheet Documentation  Taken 10/21/2020 2100 by Reshma Mckeon RN  Hyperhydration Management: fluids provided     Problem: Diarrhea (Stem Cell/Bone Marrow Transplant)  Goal: Diarrhea Symptom Control  Outcome: No Change     Problem: Diarrhea (Stem Cell/Bone Marrow Transplant)  Goal: Diarrhea Symptom Control  Intervention: Manage Diarrhea  Recent Flowsheet Documentation  Taken 10/21/2020 2100 by Reshma Mckeon, RN  Fluid/Electrolyte Management: fluids provided     Problem: Fatigue (Stem Cell/Bone Marrow Transplant)  Goal: Energy Level Supports Daily Activity  Outcome: No Change     Problem: Hematologic Alteration (Stem Cell/Bone Marrow Transplant)  Goal: Blood Counts Within Acceptable Range  Outcome: No Change     Problem: Hypersensitivity Reaction (Stem Cell/Bone Marrow Transplant)  Goal: Absence of Hypersensitivity Reaction  Outcome: No Change     Problem: Infection Risk (Stem Cell/Bone Marrow Transplant)  Goal: Absence of Infection Signs/Symptoms  Outcome: No Change     Problem: Infection Risk (Stem Cell/Bone Marrow " Transplant)  Goal: Absence of Infection Signs/Symptoms  Intervention: Prevent and Manage Infection  Recent Flowsheet Documentation  Taken 10/21/2020 2100 by Reshma Mckeon RN  Isolation Precautions: protective environment maintained     Problem: Mucositis (Stem Cell/Bone Marrow Transplant)  Goal: Mucous Membrane Health and Integrity  Outcome: No Change     Problem: Mucositis (Stem Cell/Bone Marrow Transplant)  Goal: Mucous Membrane Health and Integrity  Intervention: Maintain Oral Mucous Membrane Integrity  Recent Flowsheet Documentation  Taken 10/21/2020 2100 by Reshma Mckeon RN  Oral Mucous Membrane Protection: lip/mouth moisturizer applied  Oral Care:    lip lubricant applied    oral rinse provided     Problem: Nausea and Vomiting (Stem Cell/Bone Marrow Transplant)  Goal: Nausea and Vomiting Symptom Relief  Outcome: No Change     Problem: Nausea and Vomiting (Stem Cell/Bone Marrow Transplant)  Goal: Nausea and Vomiting Symptom Relief  Intervention: Prevent and Manage Nausea and Vomiting  Recent Flowsheet Documentation  Taken 10/21/2020 2100 by Reshma Mckeon RN  Nausea/Vomiting Interventions: antiemetic     Problem: Nutrition Intake Altered (Stem Cell/Bone Marrow Transplant)  Goal: Optimal Nutrition Intake  Outcome: No Change     Problem: Nutrition Intake Altered (Stem Cell/Bone Marrow Transplant)  Goal: Optimal Nutrition Intake  Intervention: Minimize and Manage Barriers to Oral Intake  Recent Flowsheet Documentation  Taken 10/21/2020 2100 by Reshma Mckeon RN  Oral Nutrition Promotion:    calorie-dense foods provided    calorie-dense liquids provided     Problem: Adult Inpatient Plan of Care  Goal: Absence of Hospital-Acquired Illness or Injury  Intervention: Identify and Manage Fall Risk  Recent Flowsheet Documentation  Taken 10/21/2020 2100 by Reshma Mckeon RN  Safety Promotion/Fall Prevention:    fall prevention program maintained    lighting adjusted    nonskid shoes/slippers when out  of bed     Problem: Adult Inpatient Plan of Care  Goal: Absence of Hospital-Acquired Illness or Injury  Intervention: Prevent VTE (venous thromboembolism)  Recent Flowsheet Documentation  Taken 10/21/2020 2100 by Reshma Mckeon RN  VTE Prevention/Management:    ambulation promoted    bleeding precautions maintained    bleeding risk assessed    fluids promoted

## 2020-10-22 NOTE — PROGRESS NOTES
Patient had been rushing to the restroom to urinate, she realized that a tucks pad had fallen in the toilet and she had flushed the toilet. After she flushed the toilet she tried to retrieve the tucks pad and got a small scrap on her index finger of her right hand. We cleaned the scrap with antiseptic, applied bacitracin and a bandaid.

## 2020-10-23 ENCOUNTER — APPOINTMENT (OUTPATIENT)
Dept: PHYSICAL THERAPY | Facility: CLINIC | Age: 75
DRG: 014 | End: 2020-10-23
Attending: INTERNAL MEDICINE
Payer: COMMERCIAL

## 2020-10-23 LAB
ANION GAP SERPL CALCULATED.3IONS-SCNC: 6 MMOL/L (ref 3–14)
BUN SERPL-MCNC: 6 MG/DL (ref 7–30)
CALCIUM SERPL-MCNC: 8.4 MG/DL (ref 8.5–10.1)
CHLORIDE SERPL-SCNC: 100 MMOL/L (ref 94–109)
CO2 SERPL-SCNC: 27 MMOL/L (ref 20–32)
CREAT SERPL-MCNC: 0.69 MG/DL (ref 0.52–1.04)
DIFFERENTIAL METHOD BLD: ABNORMAL
ERYTHROCYTE [DISTWIDTH] IN BLOOD BY AUTOMATED COUNT: 11.7 % (ref 10–15)
GFR SERPL CREATININE-BSD FRML MDRD: 86 ML/MIN/{1.73_M2}
GLUCOSE SERPL-MCNC: 99 MG/DL (ref 70–99)
HCT VFR BLD AUTO: 22.3 % (ref 35–47)
HGB BLD-MCNC: 7.6 G/DL (ref 11.7–15.7)
LACTATE BLD-SCNC: 1.7 MMOL/L (ref 0.7–2)
MAGNESIUM SERPL-MCNC: 1.4 MG/DL (ref 1.6–2.3)
MAGNESIUM SERPL-MCNC: 2.3 MG/DL (ref 1.6–2.3)
MCH RBC QN AUTO: 29.3 PG (ref 26.5–33)
MCHC RBC AUTO-ENTMCNC: 34.1 G/DL (ref 31.5–36.5)
MCV RBC AUTO: 86 FL (ref 78–100)
PLATELET # BLD AUTO: 33 10E9/L (ref 150–450)
POTASSIUM SERPL-SCNC: 4.1 MMOL/L (ref 3.4–5.3)
RBC # BLD AUTO: 2.59 10E12/L (ref 3.8–5.2)
SODIUM SERPL-SCNC: 133 MMOL/L (ref 133–144)
WBC # BLD AUTO: 0.2 10E9/L (ref 4–11)

## 2020-10-23 PROCEDURE — 250N000013 HC RX MED GY IP 250 OP 250 PS 637: Performed by: INTERNAL MEDICINE

## 2020-10-23 PROCEDURE — 83735 ASSAY OF MAGNESIUM: CPT | Performed by: INTERNAL MEDICINE

## 2020-10-23 PROCEDURE — 99233 SBSQ HOSP IP/OBS HIGH 50: CPT | Performed by: INTERNAL MEDICINE

## 2020-10-23 PROCEDURE — 80048 BASIC METABOLIC PNL TOTAL CA: CPT | Performed by: PHYSICIAN ASSISTANT

## 2020-10-23 PROCEDURE — 250N000011 HC RX IP 250 OP 636: Performed by: PHYSICIAN ASSISTANT

## 2020-10-23 PROCEDURE — 258N000003 HC RX IP 258 OP 636: Performed by: PHYSICIAN ASSISTANT

## 2020-10-23 PROCEDURE — 258N000003 HC RX IP 258 OP 636: Performed by: INTERNAL MEDICINE

## 2020-10-23 PROCEDURE — 250N000011 HC RX IP 250 OP 636: Performed by: INTERNAL MEDICINE

## 2020-10-23 PROCEDURE — 206N000001 HC R&B BMT UMMC

## 2020-10-23 PROCEDURE — 999N001121 HC STATISTIC RESEARCH KIT COLLECTION: Performed by: INTERNAL MEDICINE

## 2020-10-23 PROCEDURE — 250N000013 HC RX MED GY IP 250 OP 250 PS 637: Performed by: PHYSICIAN ASSISTANT

## 2020-10-23 PROCEDURE — 85027 COMPLETE CBC AUTOMATED: CPT

## 2020-10-23 PROCEDURE — 97530 THERAPEUTIC ACTIVITIES: CPT | Mod: GP | Performed by: PHYSICAL THERAPIST

## 2020-10-23 PROCEDURE — 83605 ASSAY OF LACTIC ACID: CPT | Performed by: INTERNAL MEDICINE

## 2020-10-23 PROCEDURE — 83735 ASSAY OF MAGNESIUM: CPT | Performed by: PHYSICIAN ASSISTANT

## 2020-10-23 PROCEDURE — 97110 THERAPEUTIC EXERCISES: CPT | Mod: GP | Performed by: PHYSICAL THERAPIST

## 2020-10-23 PROCEDURE — 250N000009 HC RX 250: Performed by: PHYSICIAN ASSISTANT

## 2020-10-23 RX ADMIN — TACROLIMUS 80 MCG/HR: 5 INJECTION, SOLUTION INTRAVENOUS at 20:29

## 2020-10-23 RX ADMIN — VORICONAZOLE 200 MG: 200 TABLET, FILM COATED ORAL at 20:31

## 2020-10-23 RX ADMIN — MYCOPHENOLATE MOFETIL 1000 MG: 500 INJECTION, POWDER, LYOPHILIZED, FOR SOLUTION INTRAVENOUS at 15:06

## 2020-10-23 RX ADMIN — ACYCLOVIR SODIUM 700 MG: 50 INJECTION, SOLUTION INTRAVENOUS at 02:11

## 2020-10-23 RX ADMIN — LORATADINE 10 MG: 10 TABLET ORAL at 08:17

## 2020-10-23 RX ADMIN — GRANISETRON HYDROCHLORIDE 1 MG: 1 TABLET ORAL at 20:31

## 2020-10-23 RX ADMIN — MYCOPHENOLATE MOFETIL 1000 MG: 500 INJECTION, POWDER, LYOPHILIZED, FOR SOLUTION INTRAVENOUS at 06:07

## 2020-10-23 RX ADMIN — PROCHLORPERAZINE EDISYLATE 5 MG: 5 INJECTION INTRAMUSCULAR; INTRAVENOUS at 15:06

## 2020-10-23 RX ADMIN — GRANISETRON HYDROCHLORIDE 1 MG: 1 TABLET ORAL at 08:17

## 2020-10-23 RX ADMIN — LEVOTHYROXINE SODIUM 88 MCG: 0.09 TABLET ORAL at 08:17

## 2020-10-23 RX ADMIN — HYDROCORTISONE: 1 CREAM TOPICAL at 20:49

## 2020-10-23 RX ADMIN — HYDROCORTISONE: 1 CREAM TOPICAL at 08:18

## 2020-10-23 RX ADMIN — PROCHLORPERAZINE EDISYLATE 5 MG: 5 INJECTION INTRAMUSCULAR; INTRAVENOUS at 20:46

## 2020-10-23 RX ADMIN — ACYCLOVIR SODIUM 700 MG: 50 INJECTION, SOLUTION INTRAVENOUS at 18:31

## 2020-10-23 RX ADMIN — Medication 2.5 MG: at 22:56

## 2020-10-23 RX ADMIN — ACYCLOVIR SODIUM 700 MG: 50 INJECTION, SOLUTION INTRAVENOUS at 10:35

## 2020-10-23 RX ADMIN — URSODIOL 300 MG: 300 CAPSULE ORAL at 20:31

## 2020-10-23 RX ADMIN — PANTOPRAZOLE SODIUM 40 MG: 40 TABLET, DELAYED RELEASE ORAL at 08:17

## 2020-10-23 RX ADMIN — MYCOPHENOLATE MOFETIL 1000 MG: 500 INJECTION, POWDER, LYOPHILIZED, FOR SOLUTION INTRAVENOUS at 22:56

## 2020-10-23 RX ADMIN — MAGNESIUM SULFATE IN WATER 4 G: 40 INJECTION, SOLUTION INTRAVENOUS at 06:08

## 2020-10-23 RX ADMIN — LEVOFLOXACIN 250 MG: 250 TABLET, FILM COATED ORAL at 10:35

## 2020-10-23 RX ADMIN — URSODIOL 300 MG: 300 CAPSULE ORAL at 15:06

## 2020-10-23 RX ADMIN — Medication 400 MCG: at 20:29

## 2020-10-23 RX ADMIN — DEXTROSE MONOHYDRATE 20 ML: 50 INJECTION, SOLUTION INTRAVENOUS at 20:30

## 2020-10-23 RX ADMIN — URSODIOL 300 MG: 300 CAPSULE ORAL at 08:17

## 2020-10-23 RX ADMIN — VENLAFAXINE 75 MG: 75 TABLET ORAL at 15:06

## 2020-10-23 RX ADMIN — VORICONAZOLE 200 MG: 200 TABLET, FILM COATED ORAL at 08:17

## 2020-10-23 ASSESSMENT — ACTIVITIES OF DAILY LIVING (ADL)
ADLS_ACUITY_SCORE: 11
ADLS_ACUITY_SCORE: 10
ADLS_ACUITY_SCORE: 10
ADLS_ACUITY_SCORE: 11
ADLS_ACUITY_SCORE: 10
ADLS_ACUITY_SCORE: 11

## 2020-10-23 ASSESSMENT — MIFFLIN-ST. JEOR: SCORE: 1294

## 2020-10-23 NOTE — PLAN OF CARE
Afebrile, VSS on RA. Pt complains of mild headache overnight, declining intervention. PRN Compazine x1. Pt took Miralax last night, no BM, still needs research stool collected. Pt continues on asa counts. 4g Mg replaced for Mg of 1.4, rechecks ordered. Continue with plan of care.     Problem: Adult Inpatient Plan of Care  Goal: Plan of Care Review  Outcome: No Change     Problem: Adult Inpatient Plan of Care  Goal: Patient-Specific Goal (Individualization)  Outcome: No Change     Problem: Adult Inpatient Plan of Care  Goal: Absence of Hospital-Acquired Illness or Injury  Outcome: No Change

## 2020-10-23 NOTE — PLAN OF CARE
AVSS on room air. Pt denies pain. Given compazine x1 for nausea with good relief. Calorie count in place, pt eating small amounts at each meal.  and line changed this afternoon, will need purple cap and line changed tonight. Lactic acid 1.7. Mg recheck was 2.3. pt had three formed stools today, samples sent for research.      Problem: Adult Inpatient Plan of Care  Goal: Plan of Care Review  Outcome: Improving  Flowsheets (Taken 10/23/2020 1718)  Plan of Care Reviewed With: patient  Progress: improving  Goal: Absence of Hospital-Acquired Illness or Injury  Intervention: Identify and Manage Fall Risk  Recent Flowsheet Documentation  Taken 10/23/2020 0800 by Eloisa Jane RN  Safety Promotion/Fall Prevention:   activity supervised   clutter free environment maintained   fall prevention program maintained   lighting adjusted  Intervention: Prevent VTE (venous thromboembolism)  Recent Flowsheet Documentation  Taken 10/23/2020 0800 by Eloisa Jane RN  VTE Prevention/Management:   ambulation promoted   AROM (active range of motion) performed  Goal: Optimal Comfort and Wellbeing  Outcome: Improving     Problem: Diarrhea (Stem Cell/Bone Marrow Transplant)  Goal: Diarrhea Symptom Control  Outcome: Improving  Intervention: Manage Diarrhea  Recent Flowsheet Documentation  Taken 10/23/2020 0800 by Eloisa Jane RN  Fluid/Electrolyte Management: fluids provided     Problem: Fatigue (Stem Cell/Bone Marrow Transplant)  Goal: Energy Level Supports Daily Activity  Outcome: Improving     Problem: Nausea and Vomiting (Stem Cell/Bone Marrow Transplant)  Goal: Nausea and Vomiting Symptom Relief  Outcome: Improving     Problem: Nutrition Intake Altered (Stem Cell/Bone Marrow Transplant)  Goal: Optimal Nutrition Intake  Outcome: No Change  Intervention: Minimize and Manage Barriers to Oral Intake  Recent Flowsheet Documentation  Taken 10/23/2020 0800 by Eloisa Jane RN  Oral Nutrition Promotion:   calorie-dense foods  provided   medicated

## 2020-10-23 NOTE — PROGRESS NOTES
"Calorie Count    Intake recorded for: 10/22/2020  Total Kcals: 761 Total Protein: 10g    Kcals from Hospital Food: 463   Protein: 4g    Kcals from Outside Food (average):298 Protein: 6g    # Meals Ordered from Kitchen: 2 meals ordered     # Meals Recorded: 2 recorded (first 100% 1 piece apple pie w/ whipped topping)  (second - 100% 1oz cashews, 2 pieces candy, Noosa yoghurt)    # Supplements Recorded: no intake recorded       Calorie and protein information for \"candy\" intake based on values recorded on calorie count sheet and on meal tray ticket. Type of candy not indicated, but calorie information for candy recorded as \"2 candy x 50 [k]asa = 100 [kcal], prot[ein] = 0.\"          "

## 2020-10-23 NOTE — PROGRESS NOTES
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      Diagnosis AMLM1 Acute myelogeneous leukemia, M1, myeloblastic  HCT Type Allogeneic    Prep Regimen Fludarabine  Cytoxan  TBI   Donor Source Sibling    GVHD Prophylaxis Post transplant cytoxan  Tac/MMF  Primary BMT Provider ACE      INTERVAL  HISTORY   She is doing well.  Recovering counts.  No new medical complaints.  Nausea controlled.     Review of Systems: 8 point ROS negative except as noted above.    PHYSICAL EXAM     Weight In/Out     Wt Readings from Last 3 Encounters:   10/23/20 77 kg (169 lb 12.1 oz)   09/17/20 77.8 kg (171 lb 8 oz)   09/10/20 77.2 kg (170 lb 3.1 oz)      I/O last 3 completed shifts:  In: 618 [I.V.:618]  Out: 2200 [Urine:2200]       KPS:  90    BP (!) 149/71 (BP Location: Right arm)   Pulse 97   Temp 98  F (36.7  C) (Axillary)   Resp 16   Ht 1.68 m (5' 6.14\")   Wt 77 kg (169 lb 12.1 oz)   SpO2 97%   BMI 27.28 kg/m     General: NAD   Eyes:  LINH, sclera anicteric.   Lungs: CTA bilaterally, normal WOB on RA   Cardiovascular: RRR, no M/R/G   Abdominal/Rectal: +BS, soft, NT, ND  Rectal exam: Large irritated/inflammed hemrroid at ~3oclock and second hemorrhoid present at ~6o'clock. Not exquistly tender no induration or concern for abscess. No skin breakdown noticed. --not examined today (10/23)  Lymphatics: no edema b/l LE   Skin: No rashes or petechiae.  Neuro: A&O x3  Line: right CVC.   10/23/20 Current aGVHD staging:  Skin 0, UGI 0, LGI 0, Liver 0 (keep in note through day +180, delete if auto)    LABS AND IMAGING - PAST 24 HOURS     Lab Results   Component Value Date    WBC 0.2 (LL) 10/23/2020    ANEU 0.4 (LL) 10/08/2020    HGB 7.6 (L) 10/23/2020    HCT 22.3 (L) 10/23/2020    PLT 33 (LL) 10/23/2020     10/23/2020    POTASSIUM 4.1 10/23/2020    CHLORIDE 100 10/23/2020    CO2 27 10/23/2020    GLC 99 10/23/2020    BUN 6 (L) " 10/23/2020    CR 0.69 10/23/2020    MAG 1.4 (L) 10/23/2020    INR 1.02 10/19/2020     I have assessed all abnormal lab values for their clinical significance and any values considered clinically significant have been addressed in the assessment and plan  OVERALL PLAN   Florencia Gao is a 75 year old female with AML in CR1, admitted for JCARLOS Allo sib pbsct per protocol 2019-10, randomized to Post transplant cytoxan/Tac/MMF.  Day +16.      1.  BMT: Cy/flu/TBI prep.   Allopurinol given through day 0.   Received 4.19 x10(6) CD34/kg. Donor B pos and recipient B neg.   GCSF started d+5 and cont to until ANC>1500 x3 consecutive days.   Re-stage per protocol. Scheduled 10/28 at 11am on 5C.      2.  HEME: Keep Hgb>7g/dL and plts>10K.   - pancytopenic secondary to chemotherapy and transplant.   - Of note has significant antibodies to RBCs- can cause delay in transfusion availability. Premeds tylenol and benadryl.                             3.  ID: Afebrile.   RVP 10/14 neg. Started Claritin.   Cont Levo, Vori (started 10/14), and HD ACV prophy.   Pjp prophy to start day+28 - IV pentamdine vs dapsone as sulfa allergy.                                         4.  GI:   Nausea: headaches with zofran. Significant nausea/vomiting with cytoxan. s/p emend 10/13. Continue Scheduled kytril and zyprexa 2.5mg at bedtime.  Compazine and ativan prn  - Hemorroids: tucks prn, barrier cream and start BID hydrocortisone cream topically.    - Stools: pt on miralax daily PTA. Encouraged pt to hold off on miralax d/t loose stools   - **Per protocol no dex/steroids**   Protonix for GI prophy.    Ursodiol for VOD prophy.     5.  GVHD Prophylaxis:   Day +3 and day+4 cytoxan  On tac and MMF. Tac adjusted with starting voriconazole. Currently on 80 mcg/hr. Level 10/16 okay at 12.6. Tac level 10/19 good at 12.4.      6.  FEN/Renal:  - Cr and lytes stable.    - Replete lytes PRN per SS. Monitor weight, I+O, lytes per protocol with IVF flush.  -  Anorexia and malnutrition in the context of acute illness: Calorie counts ongoing.      7. Hypothyroidism  Continue synthroid 88mcg      8. Psych: Situational Depression continue effexor 75mg daily.     9.  Skin:    - Follicular rash 10/15 on chest: s/p clindamycin gel. Resolved.    - Previous irritation/rash under and around CVC dressing. Pt with allergy to chlorhexidine. Removed biopatch and trial of different dressing. Improved.      10. Cards: intermittent hypertension- monitor for now. Tac level okay.     11. Research: Pt consented to [id number] (Seamless randomized tiral to alleviate morbidity and mortality) for which she was randomized to the palliative care management arm. Palliative care consult placed 10/8 per research recs- see note dated 10/8.     Dispo: patient will remain admitted through neutropenia and subsequent engraftment.      iNna Wyatt pa-c  [phone]        Bone Marrow Transplant (5C) Attending Progress Note    The patient was discussed on morning rounds with the nurses, and mid-level provider, house staff and was seen and examined by me. All labs and imaging were reviewed. I reviewed events over the last 24 hours including vitals and flow sheets. I agree with the above note and have been responsible for the care plan and interpretation of progress. 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  Overall, she has been doing relatively well .  She has had mild toxicities from transplant including hemorrhoids.  She is afebrile and her vital signs are stable.  She has had no active infections in continues to eat without the need for TPN.     There is no evidence of mucositis. There is no adenopathy on exam and lungs are clear. There is no LE edema and no rash. Labs show a creatinine of 0.7, hemoglobin of 7.6, platelet count [id number] after transfusion and white count increased to 200 today (up from  100).  We continue to wait for engraftment.  She is on growth factor.    We discussed expectations for the next several days. All questions have been answered.  She understands that we hope to see some signs of engraftment in the next week.  The increased WBC is a subtle sign. She is at risk for infection but has none currently.    Richy Quigley MD

## 2020-10-24 LAB
ANION GAP SERPL CALCULATED.3IONS-SCNC: 8 MMOL/L (ref 3–14)
BUN SERPL-MCNC: 5 MG/DL (ref 7–30)
CALCIUM SERPL-MCNC: 8.3 MG/DL (ref 8.5–10.1)
CHLORIDE SERPL-SCNC: 102 MMOL/L (ref 94–109)
CO2 SERPL-SCNC: 25 MMOL/L (ref 20–32)
CREAT SERPL-MCNC: 0.64 MG/DL (ref 0.52–1.04)
DIFFERENTIAL METHOD BLD: ABNORMAL
ERYTHROCYTE [DISTWIDTH] IN BLOOD BY AUTOMATED COUNT: 11.6 % (ref 10–15)
GFR SERPL CREATININE-BSD FRML MDRD: 89 ML/MIN/{1.73_M2}
GLUCOSE SERPL-MCNC: 89 MG/DL (ref 70–99)
HCT VFR BLD AUTO: 20.9 % (ref 35–47)
HGB BLD-MCNC: 7.2 G/DL (ref 11.7–15.7)
MAGNESIUM SERPL-MCNC: 1.7 MG/DL (ref 1.6–2.3)
MCH RBC QN AUTO: 29.5 PG (ref 26.5–33)
MCHC RBC AUTO-ENTMCNC: 34.4 G/DL (ref 31.5–36.5)
MCV RBC AUTO: 86 FL (ref 78–100)
PLATELET # BLD AUTO: 18 10E9/L (ref 150–450)
POTASSIUM SERPL-SCNC: 4.2 MMOL/L (ref 3.4–5.3)
RBC # BLD AUTO: 2.44 10E12/L (ref 3.8–5.2)
SODIUM SERPL-SCNC: 135 MMOL/L (ref 133–144)
TACROLIMUS BLD-MCNC: 10.1 UG/L (ref 5–15)
TME LAST DOSE: NORMAL H
WBC # BLD AUTO: 0.3 10E9/L (ref 4–11)

## 2020-10-24 PROCEDURE — 258N000003 HC RX IP 258 OP 636: Performed by: PHYSICIAN ASSISTANT

## 2020-10-24 PROCEDURE — 250N000009 HC RX 250: Performed by: PHYSICIAN ASSISTANT

## 2020-10-24 PROCEDURE — 206N000001 HC R&B BMT UMMC

## 2020-10-24 PROCEDURE — 250N000013 HC RX MED GY IP 250 OP 250 PS 637: Performed by: PHYSICIAN ASSISTANT

## 2020-10-24 PROCEDURE — 85027 COMPLETE CBC AUTOMATED: CPT

## 2020-10-24 PROCEDURE — 86902 BLOOD TYPE ANTIGEN DONOR EA: CPT | Performed by: PHYSICIAN ASSISTANT

## 2020-10-24 PROCEDURE — 86850 RBC ANTIBODY SCREEN: CPT | Performed by: PHYSICIAN ASSISTANT

## 2020-10-24 PROCEDURE — 86900 BLOOD TYPING SEROLOGIC ABO: CPT | Performed by: PHYSICIAN ASSISTANT

## 2020-10-24 PROCEDURE — 258N000003 HC RX IP 258 OP 636: Performed by: INTERNAL MEDICINE

## 2020-10-24 PROCEDURE — 250N000011 HC RX IP 250 OP 636: Performed by: INTERNAL MEDICINE

## 2020-10-24 PROCEDURE — 250N000011 HC RX IP 250 OP 636: Performed by: PHYSICIAN ASSISTANT

## 2020-10-24 PROCEDURE — 80197 ASSAY OF TACROLIMUS: CPT | Performed by: PHYSICIAN ASSISTANT

## 2020-10-24 PROCEDURE — 80048 BASIC METABOLIC PNL TOTAL CA: CPT | Performed by: PHYSICIAN ASSISTANT

## 2020-10-24 PROCEDURE — 86901 BLOOD TYPING SEROLOGIC RH(D): CPT | Performed by: PHYSICIAN ASSISTANT

## 2020-10-24 PROCEDURE — 86922 COMPATIBILITY TEST ANTIGLOB: CPT | Performed by: PHYSICIAN ASSISTANT

## 2020-10-24 PROCEDURE — 250N000013 HC RX MED GY IP 250 OP 250 PS 637: Performed by: INTERNAL MEDICINE

## 2020-10-24 PROCEDURE — 83735 ASSAY OF MAGNESIUM: CPT | Performed by: PHYSICIAN ASSISTANT

## 2020-10-24 PROCEDURE — 99233 SBSQ HOSP IP/OBS HIGH 50: CPT | Performed by: INTERNAL MEDICINE

## 2020-10-24 RX ADMIN — ACYCLOVIR SODIUM 700 MG: 50 INJECTION, SOLUTION INTRAVENOUS at 03:55

## 2020-10-24 RX ADMIN — VORICONAZOLE 200 MG: 200 TABLET, FILM COATED ORAL at 09:01

## 2020-10-24 RX ADMIN — GRANISETRON HYDROCHLORIDE 1 MG: 1 TABLET ORAL at 20:00

## 2020-10-24 RX ADMIN — PROCHLORPERAZINE MALEATE 5 MG: 5 TABLET ORAL at 21:26

## 2020-10-24 RX ADMIN — ACYCLOVIR SODIUM 700 MG: 50 INJECTION, SOLUTION INTRAVENOUS at 12:04

## 2020-10-24 RX ADMIN — MYCOPHENOLATE MOFETIL 1000 MG: 500 INJECTION, POWDER, LYOPHILIZED, FOR SOLUTION INTRAVENOUS at 06:40

## 2020-10-24 RX ADMIN — MYCOPHENOLATE MOFETIL 1000 MG: 500 INJECTION, POWDER, LYOPHILIZED, FOR SOLUTION INTRAVENOUS at 14:38

## 2020-10-24 RX ADMIN — PANTOPRAZOLE SODIUM 40 MG: 40 TABLET, DELAYED RELEASE ORAL at 09:01

## 2020-10-24 RX ADMIN — VENLAFAXINE 75 MG: 75 TABLET ORAL at 14:38

## 2020-10-24 RX ADMIN — POLYETHYLENE GLYCOL 3350 17 G: 17 POWDER, FOR SOLUTION ORAL at 20:01

## 2020-10-24 RX ADMIN — VORICONAZOLE 200 MG: 200 TABLET, FILM COATED ORAL at 20:00

## 2020-10-24 RX ADMIN — LEVOTHYROXINE SODIUM 88 MCG: 0.09 TABLET ORAL at 09:01

## 2020-10-24 RX ADMIN — DEXTROSE MONOHYDRATE 20 ML: 50 INJECTION, SOLUTION INTRAVENOUS at 22:08

## 2020-10-24 RX ADMIN — URSODIOL 300 MG: 300 CAPSULE ORAL at 09:01

## 2020-10-24 RX ADMIN — LORATADINE 10 MG: 10 TABLET ORAL at 09:01

## 2020-10-24 RX ADMIN — GRANISETRON HYDROCHLORIDE 1 MG: 1 TABLET ORAL at 09:02

## 2020-10-24 RX ADMIN — LEVOFLOXACIN 250 MG: 250 TABLET, FILM COATED ORAL at 12:04

## 2020-10-24 RX ADMIN — DEXTROSE MONOHYDRATE 10 ML: 50 INJECTION, SOLUTION INTRAVENOUS at 21:26

## 2020-10-24 RX ADMIN — HYDROCORTISONE: 1 CREAM TOPICAL at 09:02

## 2020-10-24 RX ADMIN — ACYCLOVIR SODIUM 700 MG: 50 INJECTION, SOLUTION INTRAVENOUS at 19:58

## 2020-10-24 RX ADMIN — TACROLIMUS 80 MCG/HR: 5 INJECTION, SOLUTION INTRAVENOUS at 19:54

## 2020-10-24 RX ADMIN — Medication 2.5 MG: at 21:26

## 2020-10-24 RX ADMIN — URSODIOL 300 MG: 300 CAPSULE ORAL at 19:59

## 2020-10-24 RX ADMIN — MYCOPHENOLATE MOFETIL 1000 MG: 500 INJECTION, POWDER, LYOPHILIZED, FOR SOLUTION INTRAVENOUS at 21:27

## 2020-10-24 RX ADMIN — PROCHLORPERAZINE MALEATE 5 MG: 5 TABLET ORAL at 14:42

## 2020-10-24 RX ADMIN — MAGNESIUM SULFATE 2 G: 2 INJECTION INTRAVENOUS at 06:40

## 2020-10-24 RX ADMIN — URSODIOL 300 MG: 300 CAPSULE ORAL at 14:38

## 2020-10-24 RX ADMIN — HYDROCORTISONE: 1 CREAM TOPICAL at 20:00

## 2020-10-24 RX ADMIN — Medication 400 MCG: at 21:27

## 2020-10-24 ASSESSMENT — ACTIVITIES OF DAILY LIVING (ADL)
ADLS_ACUITY_SCORE: 11

## 2020-10-24 ASSESSMENT — MIFFLIN-ST. JEOR: SCORE: 1288.31

## 2020-10-24 NOTE — PROGRESS NOTES
Calorie Count  Intake recorded for: 10/23  Total Kcals: 921 Total Protein: 33g  Kcals from Hospital Food: 851  Protein: 32g  Kcals from Outside Food (average):70 Protein: 1g  # Meals Ordered from Kitchen: 3 meals ordered  # Meals Recorded: 2 meals, first: (100% raisin bran, 8 oz 1% milk, peaches)      Second: (100% side beans & rice w/ salsa, pears, 1 [employer] chocolate square (from outside hospital))  # Supplements Recorded: 100% 1 Boost Plus

## 2020-10-24 NOTE — PLAN OF CARE
"./65 (BP Location: Right arm)   Pulse 89   Temp 98  F (36.7  C) (Axillary)   Resp 16   Ht 1.68 m (5' 6.14\")   Wt 77 kg (169 lb 12.1 oz)   SpO2 96%   BMI 27.28 kg/m      Pt alert and oriented. Avss on RA. Denies pain or nausea. Took prn Compazine x1 preventative. On Tac gtt 80 mcg/hr. 2g Magnesium replaced per scale. Cont to monitor and follow poc.    Problem: Adult Inpatient Plan of Care  Goal: Plan of Care Review  Outcome: No Change     Problem: Adult Inpatient Plan of Care  Goal: Patient-Specific Goal (Individualization)  Outcome: No Change     Problem: Diarrhea (Stem Cell/Bone Marrow Transplant)  Goal: Diarrhea Symptom Control  Outcome: No Change     Problem: Fatigue (Stem Cell/Bone Marrow Transplant)  Goal: Energy Level Supports Daily Activity  Outcome: No Change     Problem: Hematologic Alteration (Stem Cell/Bone Marrow Transplant)  Goal: Blood Counts Within Acceptable Range  Outcome: No Change     Problem: Nausea and Vomiting (Stem Cell/Bone Marrow Transplant)  Goal: Nausea and Vomiting Symptom Relief  Outcome: No Change     Problem: Nutrition Intake Altered (Stem Cell/Bone Marrow Transplant)  Goal: Optimal Nutrition Intake  Intervention: Minimize and Manage Barriers to Oral Intake  Recent Flowsheet Documentation  Taken 10/23/2020 2030 by Darin Sears, RN  Oral Nutrition Promotion:   calorie-dense foods provided   calorie-dense liquids provided     "

## 2020-10-24 NOTE — PROGRESS NOTES
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      Diagnosis AMLM1 Acute myelogeneous leukemia, M1, myeloblastic  HCT Type Allogeneic    Prep Regimen Fludarabine  Cytoxan  TBI   Donor Source Sibling    GVHD Prophylaxis Post transplant cytoxan  Tac/MMF  Primary BMT Provider ACE      INTERVAL  HISTORY   She is doing well.  Recovering counts. BM's fluctuant. Nausea controlled.      Review of Systems: 8 point ROS negative except as noted above.    PHYSICAL EXAM     Weight In/Out     Wt Readings from Last 3 Encounters:   10/24/20 76.4 kg (168 lb 8 oz)   09/17/20 77.8 kg (171 lb 8 oz)   09/10/20 77.2 kg (170 lb 3.1 oz)      I/O last 3 completed shifts:  In: 3632 [P.O.:1440; I.V.:2192]  Out: 4700 [Urine:4700]       KPS:  90    BP (!) 149/77 (BP Location: Right arm)   Pulse 105   Temp 97.5  F (36.4  C) (Axillary)   Resp 16   Ht 1.68 m (5' 6.14\")   Wt 76.4 kg (168 lb 8 oz)   SpO2 96%   BMI 27.08 kg/m     General: NAD   Eyes:  LINH, sclera anicteric.   Lungs: CTA bilaterally, normal WOB on RA   Cardiovascular: RRR, no M/R/G   Abdominal/Rectal: +BS, soft, NT, ND  Lymphatics: no edema b/l LE   Skin: No rashes or petechiae.  Neuro: A&O x3  Line: right CVC.   10/24/20 Current aGVHD staging:  Skin 0, UGI 0, LGI 0, Liver 0 (keep in note through day +180, delete if auto)    LABS AND IMAGING - PAST 24 HOURS     Lab Results   Component Value Date    WBC 0.3 (LL) 10/24/2020    ANEU 0.4 (LL) 10/08/2020    HGB 7.2 (L) 10/24/2020    HCT 20.9 (L) 10/24/2020    PLT 18 (LL) 10/24/2020     10/24/2020    POTASSIUM 4.2 10/24/2020    CHLORIDE 102 10/24/2020    CO2 25 10/24/2020    GLC 89 10/24/2020    BUN 5 (L) 10/24/2020    CR 0.64 10/24/2020    MAG 1.7 10/24/2020    INR 1.02 10/19/2020     I have assessed all abnormal lab values for their clinical significance and any values considered clinically significant have been addressed in " the assessment and plan  OVERALL PLAN   Florencia Gao is a 75 year old female with AML in CR1, admitted for JCARLOS Allo sib pbsct per protocol 2019-10, randomized to Post transplant cytoxan/Tac/MMF.  Day +17.      1.  BMT: Cy/flu/TBI prep.   Received 4.19 x10(6) CD34/kg. Donor B pos and recipient B neg.   GCSF started d+5 and cont to until ANC>1500 x3 consecutive days.   Re-stage per protocol. Scheduled 10/28 at 11am on 5C.    **Per protocol no dex/steroids**      2.  HEME: Keep Hgb>7g/dL and plts>10K.   - pancytopenic secondary to chemotherapy and transplant.   - Of note has significant antibodies to RBCs- can cause delay in transfusion availability. Premeds tylenol and benadryl.                             3.  ID: Afebrile.   RVP 10/14 neg. Started Claritin.   Cont Levo, Vori, and HD ACV prophy.   Pjp prophy to start day+28 - IV pentamdine vs dapsone as sulfa allergy.                                         4.  GI:   - Nausea: headaches with zofran. Significant nausea/vomiting with cytoxan. s/p emend 10/13. Continue Scheduled kytril and zyprexa 2.5mg at bedtime.  Compazine and ativan prn  - Hemorroids: tucks prn, barrier cream and start BID hydrocortisone cream topically.    - Stools: pt on miralax daily PTA Now holding off d/t loose stools   - Protonix for GI prophy.    - Ursodiol for VOD prophy.     5.  GVHD Prophylaxis:   Day +3 and day+4 cytoxan  On tac and MMF.   Tac level Monday.     6.  FEN/Renal:  - Cr and lytes stable.    - Replete lytes PRN per SS. Monitor weight, I+O, lytes per protocol with IVF flush.  - Anorexia and malnutrition in the context of acute illness: Calorie counts ongoing.      7. Hypothyroidism  Continue synthroid 88mcg      8. Psych: Situational Depression continue effexor 75mg daily.     9.  Skin:    - Follicular rash 10/15 on chest: s/p clindamycin gel. Resolved.    - Previous irritation/rash under and around CVC dressing. Pt with allergy to chlorhexidine. Removed biopatch and trial of  different dressing. Improved.      10. Cards: intermittent hypertension- monitor for now. Tac level okay.     11. Research: Pt consented to MT2019-31 (Seamless randomized tiral to alleviate morbidity and mortality) for which she was randomized to the palliative care management arm. Palliative care consult placed 10/8 per research recs- see note dated 10/8.     Dispo: patient will remain admitted through neutropenia and subsequent engraftment.      Naomy Cisneros PA-C  #[id number]      Bone Marrow Transplant (5C) Attending Progress Note    The patient was discussed on morning rounds with the nurses, and mid-level provider, house staff and was seen and examined by me. All labs and imaging were reviewed. I reviewed events over the last 24 hours including vitals and flow sheets. I agree with the above note and have been responsible for the care plan and interpretation of progress. 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  Overall, she has been doing relatively well .  She has had mild toxicities from transplant including hemorrhoids.  She is afebrile and her vital signs are stable.  She has had no active infections in continues to eat without the need for TPN.  She denies any shortness of breath or cough.  The rest of her review of systems is unremarkable.  She is in good spirits today.    Physical exam: There is no evidence of mucositis. There is no adenopathy on exam and lungs are clear. There is no LE edema and no rash. Labs show a creatinine of 0.64, hemoglobin of 7.4, platelet count of 18,000 and a white count that increased to 300 from 200 yesterday.  She is making gradual daily improvement for the past several days..    It is my overall impression that she is starting to slowly engraft. All questions have been answered. She is at risk for infection but has none currently.  I am hoping to see continued progress over the  next week.    Richy Quigley MD

## 2020-10-24 NOTE — PLAN OF CARE
"AVSS on room air. Pt denies pain or nausea. Given compazine x1 per pt request \"to prevent nausea\". Pt eating fair, calorie count sheet filled out per pt. Had a starbucks almond croissant for breakfast with a coffee and cereal and peaches for lunch. Pt had two soft stools today and is pleased  that her stools are \"finally normal\". Independent in room. Pt is excited to be getting closer to discharge in next week or so.    Problem: Adult Inpatient Plan of Care  Goal: Plan of Care Review  Outcome: Improving  Flowsheets (Taken 10/24/2020 1758)  Plan of Care Reviewed With:    patient    caregiver  Progress: improving  Goal: Absence of Hospital-Acquired Illness or Injury  Outcome: Improving  Intervention: Identify and Manage Fall Risk  Recent Flowsheet Documentation  Taken 10/24/2020 0800 by Eloisa Jane RN  Safety Promotion/Fall Prevention:    clutter free environment maintained    fall prevention program maintained  Goal: Optimal Comfort and Wellbeing  Outcome: Improving  Goal: Readiness for Transition of Care  Outcome: Improving     Problem: Diarrhea (Stem Cell/Bone Marrow Transplant)  Goal: Diarrhea Symptom Control  Outcome: Improving  Intervention: Manage Diarrhea  Recent Flowsheet Documentation  Taken 10/24/2020 0800 by Eloisa Jane RN  Fluid/Electrolyte Management: fluids provided     Problem: Nausea and Vomiting (Stem Cell/Bone Marrow Transplant)  Goal: Nausea and Vomiting Symptom Relief  Outcome: Improving     Problem: Nutrition Intake Altered (Stem Cell/Bone Marrow Transplant)  Goal: Optimal Nutrition Intake  Outcome: Improving  Intervention: Minimize and Manage Barriers to Oral Intake  Recent Flowsheet Documentation  Taken 10/24/2020 0800 by Eloisa Jane, RN  Oral Nutrition Promotion:    calorie-dense foods provided    medicated     "

## 2020-10-25 LAB
ALBUMIN UR-MCNC: 30 MG/DL
ANION GAP SERPL CALCULATED.3IONS-SCNC: 5 MMOL/L (ref 3–14)
APPEARANCE UR: CLEAR
BASOPHILS # BLD AUTO: 0 10E9/L (ref 0–0.2)
BASOPHILS NFR BLD AUTO: 0.6 %
BILIRUB UR QL STRIP: NEGATIVE
BUN SERPL-MCNC: 7 MG/DL (ref 7–30)
CALCIUM SERPL-MCNC: 9 MG/DL (ref 8.5–10.1)
CHLORIDE SERPL-SCNC: 104 MMOL/L (ref 94–109)
CO2 SERPL-SCNC: 27 MMOL/L (ref 20–32)
COLOR UR AUTO: ABNORMAL
CREAT SERPL-MCNC: 0.67 MG/DL (ref 0.52–1.04)
DIFFERENTIAL METHOD BLD: ABNORMAL
EOSINOPHIL # BLD AUTO: 0 10E9/L (ref 0–0.7)
EOSINOPHIL NFR BLD AUTO: 0 %
ERYTHROCYTE [DISTWIDTH] IN BLOOD BY AUTOMATED COUNT: 11.7 % (ref 10–15)
GFR SERPL CREATININE-BSD FRML MDRD: 87 ML/MIN/{1.73_M2}
GLUCOSE SERPL-MCNC: 91 MG/DL (ref 70–99)
GLUCOSE UR STRIP-MCNC: NEGATIVE MG/DL
HCT VFR BLD AUTO: 22.2 % (ref 35–47)
HGB BLD-MCNC: 7.4 G/DL (ref 11.7–15.7)
HGB UR QL STRIP: ABNORMAL
KETONES UR STRIP-MCNC: NEGATIVE MG/DL
LEUKOCYTE ESTERASE UR QL STRIP: NEGATIVE
LYMPHOCYTES # BLD AUTO: 0.1 10E9/L (ref 0.8–5.3)
LYMPHOCYTES NFR BLD AUTO: 9.5 %
MAGNESIUM SERPL-MCNC: 1.5 MG/DL (ref 1.6–2.3)
MAGNESIUM SERPL-MCNC: 2.2 MG/DL (ref 1.6–2.3)
MCH RBC QN AUTO: 28.8 PG (ref 26.5–33)
MCHC RBC AUTO-ENTMCNC: 33.3 G/DL (ref 31.5–36.5)
MCV RBC AUTO: 86 FL (ref 78–100)
MONOCYTES # BLD AUTO: 0.1 10E9/L (ref 0–1.3)
MONOCYTES NFR BLD AUTO: 14.6 %
MUCOUS THREADS #/AREA URNS LPF: PRESENT /LPF
NEUTROPHILS # BLD AUTO: 0.6 10E9/L (ref 1.6–8.3)
NEUTROPHILS NFR BLD AUTO: 75.3 %
NITRATE UR QL: NEGATIVE
PH UR STRIP: 6.5 PH (ref 5–7)
PLATELET # BLD AUTO: 11 10E9/L (ref 150–450)
POTASSIUM SERPL-SCNC: 4.1 MMOL/L (ref 3.4–5.3)
RBC # BLD AUTO: 2.57 10E12/L (ref 3.8–5.2)
RBC #/AREA URNS AUTO: 8 /HPF (ref 0–2)
RBC MORPH BLD: NORMAL
SODIUM SERPL-SCNC: 136 MMOL/L (ref 133–144)
SOURCE: ABNORMAL
SP GR UR STRIP: 1.01 (ref 1–1.03)
SQUAMOUS #/AREA URNS AUTO: 1 /HPF (ref 0–1)
UROBILINOGEN UR STRIP-MCNC: NORMAL MG/DL (ref 0–2)
WBC # BLD AUTO: 0.8 10E9/L (ref 4–11)
WBC #/AREA URNS AUTO: 1 /HPF (ref 0–5)

## 2020-10-25 PROCEDURE — 206N000001 HC R&B BMT UMMC

## 2020-10-25 PROCEDURE — 250N000011 HC RX IP 250 OP 636: Performed by: PHYSICIAN ASSISTANT

## 2020-10-25 PROCEDURE — 99233 SBSQ HOSP IP/OBS HIGH 50: CPT | Performed by: INTERNAL MEDICINE

## 2020-10-25 PROCEDURE — 87086 URINE CULTURE/COLONY COUNT: CPT | Performed by: INTERNAL MEDICINE

## 2020-10-25 PROCEDURE — 250N000011 HC RX IP 250 OP 636: Performed by: INTERNAL MEDICINE

## 2020-10-25 PROCEDURE — 250N000013 HC RX MED GY IP 250 OP 250 PS 637: Performed by: PHYSICIAN ASSISTANT

## 2020-10-25 PROCEDURE — 258N000003 HC RX IP 258 OP 636: Performed by: INTERNAL MEDICINE

## 2020-10-25 PROCEDURE — 250N000009 HC RX 250: Performed by: PHYSICIAN ASSISTANT

## 2020-10-25 PROCEDURE — 250N000012 HC RX MED GY IP 250 OP 636 PS 637: Performed by: PHYSICIAN ASSISTANT

## 2020-10-25 PROCEDURE — 250N000013 HC RX MED GY IP 250 OP 250 PS 637: Performed by: INTERNAL MEDICINE

## 2020-10-25 PROCEDURE — 83735 ASSAY OF MAGNESIUM: CPT | Performed by: PHYSICIAN ASSISTANT

## 2020-10-25 PROCEDURE — 258N000003 HC RX IP 258 OP 636: Performed by: PHYSICIAN ASSISTANT

## 2020-10-25 PROCEDURE — 87799 DETECT AGENT NOS DNA QUANT: CPT | Performed by: INTERNAL MEDICINE

## 2020-10-25 PROCEDURE — 80048 BASIC METABOLIC PNL TOTAL CA: CPT | Performed by: PHYSICIAN ASSISTANT

## 2020-10-25 PROCEDURE — 81001 URINALYSIS AUTO W/SCOPE: CPT | Performed by: PHYSICIAN ASSISTANT

## 2020-10-25 PROCEDURE — 85025 COMPLETE CBC W/AUTO DIFF WBC: CPT | Performed by: PHYSICIAN ASSISTANT

## 2020-10-25 RX ORDER — MYCOPHENOLATE MOFETIL 500 MG/1
1000 TABLET ORAL
Status: DISCONTINUED | OUTPATIENT
Start: 2020-10-25 | End: 2020-10-28 | Stop reason: HOSPADM

## 2020-10-25 RX ADMIN — Medication 400 MCG: at 21:20

## 2020-10-25 RX ADMIN — URSODIOL 300 MG: 300 CAPSULE ORAL at 14:32

## 2020-10-25 RX ADMIN — PANTOPRAZOLE SODIUM 40 MG: 40 TABLET, DELAYED RELEASE ORAL at 08:22

## 2020-10-25 RX ADMIN — HYDROCORTISONE: 1 CREAM TOPICAL at 08:23

## 2020-10-25 RX ADMIN — VORICONAZOLE 200 MG: 200 TABLET, FILM COATED ORAL at 20:12

## 2020-10-25 RX ADMIN — POLYETHYLENE GLYCOL 3350 17 G: 17 POWDER, FOR SOLUTION ORAL at 20:13

## 2020-10-25 RX ADMIN — MYCOPHENOLATE MOFETIL 1000 MG: 500 TABLET ORAL at 14:32

## 2020-10-25 RX ADMIN — TACROLIMUS 80 MCG/HR: 5 INJECTION, SOLUTION INTRAVENOUS at 20:13

## 2020-10-25 RX ADMIN — ACYCLOVIR SODIUM 700 MG: 50 INJECTION, SOLUTION INTRAVENOUS at 20:13

## 2020-10-25 RX ADMIN — URSODIOL 300 MG: 300 CAPSULE ORAL at 09:32

## 2020-10-25 RX ADMIN — URSODIOL 300 MG: 300 CAPSULE ORAL at 20:12

## 2020-10-25 RX ADMIN — LEVOFLOXACIN 250 MG: 250 TABLET, FILM COATED ORAL at 09:35

## 2020-10-25 RX ADMIN — MYCOPHENOLATE MOFETIL 1000 MG: 500 INJECTION, POWDER, LYOPHILIZED, FOR SOLUTION INTRAVENOUS at 06:36

## 2020-10-25 RX ADMIN — ACYCLOVIR SODIUM 700 MG: 50 INJECTION, SOLUTION INTRAVENOUS at 03:39

## 2020-10-25 RX ADMIN — ACYCLOVIR SODIUM 700 MG: 50 INJECTION, SOLUTION INTRAVENOUS at 11:50

## 2020-10-25 RX ADMIN — GRANISETRON HYDROCHLORIDE 1 MG: 1 TABLET ORAL at 08:22

## 2020-10-25 RX ADMIN — LORATADINE 10 MG: 10 TABLET ORAL at 09:32

## 2020-10-25 RX ADMIN — HYDROCORTISONE: 1 CREAM TOPICAL at 20:14

## 2020-10-25 RX ADMIN — PROCHLORPERAZINE MALEATE 5 MG: 5 TABLET ORAL at 15:12

## 2020-10-25 RX ADMIN — VENLAFAXINE 75 MG: 75 TABLET ORAL at 14:32

## 2020-10-25 RX ADMIN — MAGNESIUM SULFATE IN WATER 4 G: 40 INJECTION, SOLUTION INTRAVENOUS at 10:31

## 2020-10-25 RX ADMIN — ACETAMINOPHEN 650 MG: 325 TABLET, FILM COATED ORAL at 15:12

## 2020-10-25 RX ADMIN — VORICONAZOLE 200 MG: 200 TABLET, FILM COATED ORAL at 09:32

## 2020-10-25 RX ADMIN — DEXTROSE MONOHYDRATE 20 ML: 50 INJECTION, SOLUTION INTRAVENOUS at 22:39

## 2020-10-25 RX ADMIN — GRANISETRON HYDROCHLORIDE 1 MG: 1 TABLET ORAL at 20:12

## 2020-10-25 RX ADMIN — MYCOPHENOLATE MOFETIL 1000 MG: 500 TABLET ORAL at 21:19

## 2020-10-25 RX ADMIN — DEXTROSE MONOHYDRATE 20 ML: 50 INJECTION, SOLUTION INTRAVENOUS at 21:20

## 2020-10-25 RX ADMIN — PROCHLORPERAZINE MALEATE 5 MG: 5 TABLET ORAL at 21:19

## 2020-10-25 RX ADMIN — LEVOTHYROXINE SODIUM 88 MCG: 0.09 TABLET ORAL at 08:22

## 2020-10-25 RX ADMIN — Medication 2.5 MG: at 21:19

## 2020-10-25 ASSESSMENT — ACTIVITIES OF DAILY LIVING (ADL)
ADLS_ACUITY_SCORE: 11

## 2020-10-25 ASSESSMENT — MIFFLIN-ST. JEOR: SCORE: 1278.79

## 2020-10-25 ASSESSMENT — PAIN DESCRIPTION - DESCRIPTORS: DESCRIPTORS: HEADACHE

## 2020-10-25 NOTE — PLAN OF CARE
AVSS on room air. Pt denies nausea but asked for compazine x1 to prevent nausea this afternoon. Given tylenol x1 for headache. Pt complains of urethral discomfort and flank pain, UA/UC and BK urine sent. Pt had one small streak of blood when she blew her nose with no further bleeding, ordered ocean spray for her. 4g Mg replaced, recheck 2.2, recheck ordered for tomorrow. MMF switched to oral today. Pt tolerating oral meds well and eating 100% of 3 small meals. Pt is in good spirits excited about her counts coming up and discharge later this week.  Plan for discharge as soon as Wednesday. Will need acyclovir and tac switched to oral prior to discharge.    Problem: Adult Inpatient Plan of Care  Goal: Plan of Care Review  Outcome: Improving  Flowsheets (Taken 10/25/2020 1807)  Plan of Care Reviewed With: patient  Progress: improving  Goal: Absence of Hospital-Acquired Illness or Injury  Outcome: Improving  Intervention: Identify and Manage Fall Risk  Recent Flowsheet Documentation  Taken 10/25/2020 0800 by Eloisa Jane RN  Safety Promotion/Fall Prevention:   assistive device/personal items within reach   clutter free environment maintained   fall prevention program maintained  Intervention: Prevent VTE (venous thromboembolism)  Recent Flowsheet Documentation  Taken 10/25/2020 0800 by Eloisa Jane RN  VTE Prevention/Management:   ambulation promoted   AROM (active range of motion) performed  Goal: Optimal Comfort and Wellbeing  Outcome: Improving     Problem: Bladder Irritation (Stem Cell/Bone Marrow Transplant)  Goal: Symptom-Free Urinary Elimination  10/25/2020 1807 by Eloisa Jane RN  Outcome: Declining  10/25/2020 1449 by Eloisa Jane RN  Outcome: Declining  Intervention: Monitor and Manage Bladder Irritation  Recent Flowsheet Documentation  Taken 10/25/2020 1513 by Eloisa Jane RN  Pain Management Interventions: medication (see MAR)  Taken 10/25/2020 0800 by Eloisa Jane RN  Hyperhydration Management:  fluids provided     Problem: Fatigue (Stem Cell/Bone Marrow Transplant)  Goal: Energy Level Supports Daily Activity  Outcome: Improving     Problem: Nausea and Vomiting (Stem Cell/Bone Marrow Transplant)  Goal: Nausea and Vomiting Symptom Relief  Outcome: Improving     Problem: Nutrition Intake Altered (Stem Cell/Bone Marrow Transplant)  Goal: Optimal Nutrition Intake  Outcome: Improving  Intervention: Minimize and Manage Barriers to Oral Intake  Recent Flowsheet Documentation  Taken 10/25/2020 0800 by Eloisa Jane, RN  Oral Nutrition Promotion:   calorie-dense foods provided   medicated

## 2020-10-25 NOTE — PROGRESS NOTES
Calorie Count    Intake recorded for: 10/24/2020  Total Kcals: 1533 Total Protein: 49g    Kcals from Hospital Food: 588   Protein: 32g    Kcals from Outside Food (average):945 Protein: 17g    # Meals Ordered from Kitchen: 2 meals ordered     # Meals Recorded: 3 recorded - 2 from hospital, 1 from outside food (first - 100% chicken florentine flatbread pizza)  (second - 100% peaches, Raisin Bran w/ 4oz whole milk)  (third - 100% 1 almond croissant, fun size M&M's, fun size Snickers bar, 10oz cranberry juice)    # Supplements Recorded: no intake recorded

## 2020-10-25 NOTE — PROGRESS NOTES
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      Diagnosis AMLM1 Acute myelogeneous leukemia, M1, myeloblastic  HCT Type Allogeneic    Prep Regimen Fludarabine  Cytoxan  TBI   Donor Source Sibling    GVHD Prophylaxis Post transplant cytoxan  Tac/MMF  Primary BMT Provider ACE      INTERVAL  HISTORY   She is doing well. Excited about counts recovering. Okay with changing another med to pill.      Review of Systems: 8 point ROS negative except as noted above.    PHYSICAL EXAM     Weight In/Out     Wt Readings from Last 3 Encounters:   10/25/20 75.5 kg (166 lb 6.4 oz)   09/17/20 77.8 kg (171 lb 8 oz)   09/10/20 77.2 kg (170 lb 3.1 oz)      I/O last 3 completed shifts:  In: 3937 [P.O.:1940; I.V.:1997]  Out: 3650 [Urine:3650]       KPS:  90    /70 (BP Location: Right arm)   Pulse 97   Temp 98.2  F (36.8  C) (Axillary)   Resp 16   Ht 1.68 m (5' 6.14\")   Wt 75.5 kg (166 lb 6.4 oz)   SpO2 97%   BMI 26.74 kg/m     General: NAD   Eyes:  LINH, sclera anicteric.   Lungs: CTA bilaterally  Cardiovascular: RRR, no M/R/G   Lymphatics: no edema b/l LE   Skin: No rashes or petechiae.  Neuro: A&O x3  Line: right CVC.   10/25/20 Current aGVHD staging:  Skin 0, UGI 0, LGI 0, Liver 0 (keep in note through day +180, delete if auto)    LABS AND IMAGING - PAST 24 HOURS     Lab Results   Component Value Date    WBC 0.8 (LL) 10/25/2020    ANEU 0.6 (L) 10/25/2020    HGB 7.4 (L) 10/25/2020    HCT 22.2 (L) 10/25/2020    PLT 11 (LL) 10/25/2020     10/25/2020    POTASSIUM 4.1 10/25/2020    CHLORIDE 104 10/25/2020    CO2 27 10/25/2020    GLC 91 10/25/2020    BUN 7 10/25/2020    CR 0.67 10/25/2020    MAG 1.7 10/24/2020    INR 1.02 10/19/2020     I have assessed all abnormal lab values for their clinical significance and any values considered clinically significant have been addressed in the assessment and plan  OVERALL PLAN "   Florencia Gao is a 75 year old female with AML in CR1, admitted for JCARLOS Allo sib pbsct per protocol 2019-10, randomized to Post transplant cytoxan/Tac/MMF.  Day +18.      1.  BMT: Cy/flu/TBI prep.   Received 4.19 x10(6) CD34/kg. Donor B pos and recipient B neg.   GCSF started d+5 and cont to until ANC>1500 x3 consecutive days. Counts recovering ANC 0.6 today.  Re-stage per protocol. Scheduled 10/28 at 11am on 5C.    **Per protocol no dex/steroids**      2.  HEME: Keep Hgb>7g/dL and plts>10K.   - pancytopenic secondary to chemotherapy and transplant.   - Of note has significant antibodies to RBCs- can cause delay in transfusion availability. Premeds tylenol and benadryl.                             3.  ID: Afebrile.   RVP 10/14 neg. Started Claritin.   Cont Levo, Vori, and HD ACV prophy.   Pjp prophy to start day+28 - IV pentamdine vs dapsone as sulfa allergy.                                         4.  GI:   - Nausea: headaches with zofran. Significant nausea/vomiting with cytoxan. s/p emend 10/13. Continue Scheduled kytril and zyprexa 2.5mg at bedtime. Compazine and ativan prn.  - Hemorroids: tucks prn, barrier cream and start BID hydrocortisone cream topically.    - Stools: pt on miralax daily PTA Now holding off d/t loose stools   - Protonix for GI prophy.    - Ursodiol for VOD prophy.     5.  GVHD Prophylaxis:   Day +3 and day+4 cytoxan  On tac and MMF. Change MMF to PO today.  Tac level Monday.     6.  FEN/Renal:  - Cr and lytes stable.    - Replete lytes PRN per SS. Monitor weight, I+O, lytes per protocol with IVF flush.  - Anorexia and malnutrition in the context of acute illness: Calorie counts ongoing.      7. Hypothyroidism  Continue synthroid 88mcg      8. Psych: Situational Depression continue effexor 75mg daily.     9.  Skin:    - Follicular rash 10/15 on chest: s/p clindamycin gel. Resolved.    - Previous irritation/rash under and around CVC dressing. Pt with allergy to chlorhexidine. Removed  biopatch and trial of different dressing. Improved.      10. Cards: intermittent hypertension- monitor for now. Tac level okay.     11. Research: Pt consented to MT2019-31 (Seamless randomized tiral to alleviate morbidity and mortality) for which she was randomized to the palliative care management arm. Palliative care consult placed 10/8 per research recs- see note dated 10/8.     Dispo: patient will remain admitted through neutropenia and subsequent engraftment.      Naomy Cisneros PA-C  #[id number]      Bone Marrow Transplant (5C) Attending Progress Note    The patient was discussed on morning rounds with the nurses, and mid-level provider, house staff and was seen and examined by me. All labs and imaging were reviewed. I reviewed events over the last 24 hours including vitals and flow sheets. I agree with the above note and have been responsible for the care plan and interpretation of progress. 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  Overall, she has been doing relatively well .  She has had mild toxicities from transplant including hemorrhoids.  She is afebrile and her vital signs are stable.  She has had no active infections in continues to eat without the need for TPN.  She denies any shortness of breath or cough.  She does complain of some burning with urination and is convinced that she has a urinary tract infection.  She has had these in the past.  The rest of her review of systems is unremarkable.  She is in good spirits today.    Physical exam: There is no evidence of mucositis. There is no adenopathy on exam and lungs are clear. There is no LE edema and no rash. Labs show a creatinine of 0.67, hemoglobin of 7.4, platelet count of 10,000 and a white count that increased to 800 from 300 yesterday.  She has a measurable neutrophil count of 600 for the first time post transplant.  This is fantastic news.    It  is my overall impression that she is starting to engraft.  We have sent urine studies given her new symptoms.  Should note that she is on some antibiotics making a bacterial infection less likely.  We have sent urine BK studies.  All questions have been answered. She is at risk for infection but has none currently.  With her neutrophils clearly engrafting, I think that discharge midweek is possible.    Richy Quigley MD

## 2020-10-25 NOTE — PLAN OF CARE
"BP (!) 149/77 (BP Location: Right arm)   Pulse 94   Temp 98.1  F (36.7  C) (Axillary)   Resp 16   Ht 1.68 m (5' 6.14\")   Wt 76.4 kg (168 lb 8 oz)   SpO2 97%   BMI 27.08 kg/m      Afebrile, AOVSS. No complaints of N/V/D. She took compazine preemptively. She is AOx4 and independent. No replacements needed. Tac drip infusing per orders.      Problem: Adult Inpatient Plan of Care  Goal: Plan of Care Review  Outcome: No Change     Problem: Adult Inpatient Plan of Care  Goal: Optimal Comfort and Wellbeing  Outcome: No Change     "

## 2020-10-26 ENCOUNTER — APPOINTMENT (OUTPATIENT)
Dept: PHYSICAL THERAPY | Facility: CLINIC | Age: 75
DRG: 014 | End: 2020-10-26
Attending: INTERNAL MEDICINE
Payer: COMMERCIAL

## 2020-10-26 LAB
ABO + RH BLD: ABNORMAL
ABO + RH BLD: ABNORMAL
ALBUMIN SERPL-MCNC: 2.7 G/DL (ref 3.4–5)
ALP SERPL-CCNC: 67 U/L (ref 40–150)
ALT SERPL W P-5'-P-CCNC: 15 U/L (ref 0–50)
ANION GAP SERPL CALCULATED.3IONS-SCNC: 5 MMOL/L (ref 3–14)
AST SERPL W P-5'-P-CCNC: 17 U/L (ref 0–45)
BACTERIA SPEC CULT: NO GROWTH
BASOPHILS # BLD AUTO: 0 10E9/L (ref 0–0.2)
BASOPHILS NFR BLD AUTO: 0 %
BILIRUB DIRECT SERPL-MCNC: <0.1 MG/DL (ref 0–0.2)
BILIRUB SERPL-MCNC: 0.3 MG/DL (ref 0.2–1.3)
BKV DNA # SPEC NAA+PROBE: NORMAL COPIES/ML
BKV DNA SPEC NAA+PROBE-LOG#: NORMAL LOG COPIES/ML
BLD GP AB SCN SERPL QL: ABNORMAL
BLD PROD TYP BPU: ABNORMAL
BLD PROD TYP BPU: NORMAL
BLD UNIT ID BPU: 0
BLD UNIT ID BPU: 0
BLOOD BANK CMNT PATIENT-IMP: ABNORMAL
BLOOD BANK CMNT PATIENT-IMP: ABNORMAL
BLOOD PRODUCT CODE: NORMAL
BLOOD PRODUCT CODE: NORMAL
BPU ID: NORMAL
BPU ID: NORMAL
BUN SERPL-MCNC: 6 MG/DL (ref 7–30)
CALCIUM SERPL-MCNC: 8.5 MG/DL (ref 8.5–10.1)
CHLORIDE SERPL-SCNC: 101 MMOL/L (ref 94–109)
CO2 SERPL-SCNC: 28 MMOL/L (ref 20–32)
CREAT SERPL-MCNC: 0.7 MG/DL (ref 0.52–1.04)
DIFFERENTIAL METHOD BLD: ABNORMAL
EOSINOPHIL # BLD AUTO: 0 10E9/L (ref 0–0.7)
EOSINOPHIL NFR BLD AUTO: 0 %
ERYTHROCYTE [DISTWIDTH] IN BLOOD BY AUTOMATED COUNT: 11.8 % (ref 10–15)
GFR SERPL CREATININE-BSD FRML MDRD: 86 ML/MIN/{1.73_M2}
GLUCOSE SERPL-MCNC: 98 MG/DL (ref 70–99)
HCT VFR BLD AUTO: 20.8 % (ref 35–47)
HGB BLD-MCNC: 6.9 G/DL (ref 11.7–15.7)
INR PPP: 1.01 (ref 0.86–1.14)
LYMPHOCYTES # BLD AUTO: 0.1 10E9/L (ref 0.8–5.3)
LYMPHOCYTES NFR BLD AUTO: 5.5 %
Lab: NORMAL
MAGNESIUM SERPL-MCNC: 1.6 MG/DL (ref 1.6–2.3)
MCH RBC QN AUTO: 28.5 PG (ref 26.5–33)
MCHC RBC AUTO-ENTMCNC: 33.2 G/DL (ref 31.5–36.5)
MCV RBC AUTO: 86 FL (ref 78–100)
MONOCYTES # BLD AUTO: 0.2 10E9/L (ref 0–1.3)
MONOCYTES NFR BLD AUTO: 19.8 %
NEUTROPHILS # BLD AUTO: 0.7 10E9/L (ref 1.6–8.3)
NEUTROPHILS NFR BLD AUTO: 74.7 %
NUM BPU REQUESTED: 1
NUM BPU REQUESTED: 1
PHOSPHATE SERPL-MCNC: 3.2 MG/DL (ref 2.5–4.5)
PLATELET # BLD AUTO: 8 10E9/L (ref 150–450)
PLATELET # BLD EST: ABNORMAL 10*3/UL
POTASSIUM SERPL-SCNC: 4.2 MMOL/L (ref 3.4–5.3)
PROT SERPL-MCNC: 6.2 G/DL (ref 6.8–8.8)
RBC # BLD AUTO: 2.42 10E12/L (ref 3.8–5.2)
RBC MORPH BLD: NORMAL
RESEARCH KIT COLLECTION: NORMAL
SODIUM SERPL-SCNC: 134 MMOL/L (ref 133–144)
SPECIMEN EXP DATE BLD: ABNORMAL
SPECIMEN SOURCE: NORMAL
SPECIMEN SOURCE: NORMAL
TACROLIMUS BLD-MCNC: 11.2 UG/L (ref 5–15)
TME LAST DOSE: NORMAL H
TRANSFUSION STATUS PATIENT QL: NORMAL
WBC # BLD AUTO: 1 10E9/L (ref 4–11)

## 2020-10-26 PROCEDURE — 85025 COMPLETE CBC W/AUTO DIFF WBC: CPT | Performed by: PHYSICIAN ASSISTANT

## 2020-10-26 PROCEDURE — 250N000012 HC RX MED GY IP 250 OP 636 PS 637: Performed by: PHYSICIAN ASSISTANT

## 2020-10-26 PROCEDURE — P9037 PLATE PHERES LEUKOREDU IRRAD: HCPCS | Performed by: PHYSICIAN ASSISTANT

## 2020-10-26 PROCEDURE — 83735 ASSAY OF MAGNESIUM: CPT | Performed by: PHYSICIAN ASSISTANT

## 2020-10-26 PROCEDURE — 85610 PROTHROMBIN TIME: CPT | Performed by: PHYSICIAN ASSISTANT

## 2020-10-26 PROCEDURE — 250N000013 HC RX MED GY IP 250 OP 250 PS 637: Performed by: PHYSICIAN ASSISTANT

## 2020-10-26 PROCEDURE — 258N000003 HC RX IP 258 OP 636: Performed by: INTERNAL MEDICINE

## 2020-10-26 PROCEDURE — P9040 RBC LEUKOREDUCED IRRADIATED: HCPCS | Performed by: PHYSICIAN ASSISTANT

## 2020-10-26 PROCEDURE — 258N000003 HC RX IP 258 OP 636: Performed by: PHYSICIAN ASSISTANT

## 2020-10-26 PROCEDURE — 97110 THERAPEUTIC EXERCISES: CPT | Mod: GP

## 2020-10-26 PROCEDURE — 250N000013 HC RX MED GY IP 250 OP 250 PS 637: Performed by: INTERNAL MEDICINE

## 2020-10-26 PROCEDURE — 250N000011 HC RX IP 250 OP 636: Performed by: INTERNAL MEDICINE

## 2020-10-26 PROCEDURE — 84100 ASSAY OF PHOSPHORUS: CPT | Performed by: PHYSICIAN ASSISTANT

## 2020-10-26 PROCEDURE — 206N000001 HC R&B BMT UMMC

## 2020-10-26 PROCEDURE — 82248 BILIRUBIN DIRECT: CPT | Performed by: PHYSICIAN ASSISTANT

## 2020-10-26 PROCEDURE — 250N000011 HC RX IP 250 OP 636: Performed by: PHYSICIAN ASSISTANT

## 2020-10-26 PROCEDURE — 99233 SBSQ HOSP IP/OBS HIGH 50: CPT | Performed by: INTERNAL MEDICINE

## 2020-10-26 PROCEDURE — 80197 ASSAY OF TACROLIMUS: CPT | Performed by: PHYSICIAN ASSISTANT

## 2020-10-26 PROCEDURE — 80053 COMPREHEN METABOLIC PANEL: CPT | Performed by: PHYSICIAN ASSISTANT

## 2020-10-26 PROCEDURE — 97750 PHYSICAL PERFORMANCE TEST: CPT | Mod: GP

## 2020-10-26 RX ORDER — ACYCLOVIR 800 MG/1
800 TABLET ORAL
Status: DISCONTINUED | OUTPATIENT
Start: 2020-10-26 | End: 2020-10-28 | Stop reason: HOSPADM

## 2020-10-26 RX ADMIN — MYCOPHENOLATE MOFETIL 1000 MG: 500 TABLET ORAL at 13:36

## 2020-10-26 RX ADMIN — ACYCLOVIR 800 MG: 800 TABLET ORAL at 13:36

## 2020-10-26 RX ADMIN — LEVOTHYROXINE SODIUM 88 MCG: 0.09 TABLET ORAL at 08:24

## 2020-10-26 RX ADMIN — POLYETHYLENE GLYCOL 3350 17 G: 17 POWDER, FOR SOLUTION ORAL at 20:46

## 2020-10-26 RX ADMIN — LORATADINE 10 MG: 10 TABLET ORAL at 08:24

## 2020-10-26 RX ADMIN — DEXTROSE MONOHYDRATE 10 ML: 50 INJECTION, SOLUTION INTRAVENOUS at 20:38

## 2020-10-26 RX ADMIN — MYCOPHENOLATE MOFETIL 1000 MG: 500 TABLET ORAL at 05:44

## 2020-10-26 RX ADMIN — MYCOPHENOLATE MOFETIL 1000 MG: 500 TABLET ORAL at 22:14

## 2020-10-26 RX ADMIN — TACROLIMUS 80 MCG/HR: 5 INJECTION, SOLUTION INTRAVENOUS at 20:38

## 2020-10-26 RX ADMIN — Medication 2.5 MG: at 22:15

## 2020-10-26 RX ADMIN — URSODIOL 300 MG: 300 CAPSULE ORAL at 20:38

## 2020-10-26 RX ADMIN — URSODIOL 300 MG: 300 CAPSULE ORAL at 08:24

## 2020-10-26 RX ADMIN — MAGNESIUM SULFATE 2 G: 2 INJECTION INTRAVENOUS at 04:45

## 2020-10-26 RX ADMIN — ACETAMINOPHEN 650 MG: 325 TABLET, FILM COATED ORAL at 04:12

## 2020-10-26 RX ADMIN — DIPHENHYDRAMINE HYDROCHLORIDE 25 MG: 25 CAPSULE ORAL at 04:12

## 2020-10-26 RX ADMIN — ACYCLOVIR 800 MG: 800 TABLET ORAL at 17:37

## 2020-10-26 RX ADMIN — HYDROCORTISONE: 1 CREAM TOPICAL at 20:39

## 2020-10-26 RX ADMIN — HYDROCORTISONE: 1 CREAM TOPICAL at 08:25

## 2020-10-26 RX ADMIN — VORICONAZOLE 200 MG: 200 TABLET, FILM COATED ORAL at 08:24

## 2020-10-26 RX ADMIN — PROCHLORPERAZINE MALEATE 5 MG: 5 TABLET ORAL at 23:48

## 2020-10-26 RX ADMIN — GRANISETRON HYDROCHLORIDE 1 MG: 1 TABLET ORAL at 08:24

## 2020-10-26 RX ADMIN — Medication 400 MCG: at 20:38

## 2020-10-26 RX ADMIN — GRANISETRON HYDROCHLORIDE 1 MG: 1 TABLET ORAL at 20:38

## 2020-10-26 RX ADMIN — ACYCLOVIR 800 MG: 800 TABLET ORAL at 22:14

## 2020-10-26 RX ADMIN — PANTOPRAZOLE SODIUM 40 MG: 40 TABLET, DELAYED RELEASE ORAL at 08:25

## 2020-10-26 RX ADMIN — VENLAFAXINE 75 MG: 75 TABLET ORAL at 13:36

## 2020-10-26 RX ADMIN — ACYCLOVIR SODIUM 700 MG: 50 INJECTION, SOLUTION INTRAVENOUS at 03:13

## 2020-10-26 RX ADMIN — VORICONAZOLE 200 MG: 200 TABLET, FILM COATED ORAL at 20:38

## 2020-10-26 RX ADMIN — LEVOFLOXACIN 250 MG: 250 TABLET, FILM COATED ORAL at 09:11

## 2020-10-26 RX ADMIN — URSODIOL 300 MG: 300 CAPSULE ORAL at 13:36

## 2020-10-26 ASSESSMENT — ACTIVITIES OF DAILY LIVING (ADL)
ADLS_ACUITY_SCORE: 11

## 2020-10-26 ASSESSMENT — MIFFLIN-ST. JEOR: SCORE: 1287.4

## 2020-10-26 NOTE — PLAN OF CARE
Discharge Planner OT   Patient plan for discharge: [facility]   Current status: per discussion with PT, no acute OT needs identified. Plans for discharge this week and PT currently meeting pt needs. Will sign off and complete orders.   Barriers to return to prior living situation: defer to PT  Recommendations for discharge: defer to PT  Rationale for recommendations:  no acute OT needs.        Entered by: Dayanara Lynn 10/26/2020 3:14 PM

## 2020-10-26 NOTE — PLAN OF CARE
PT 5C: Completed Day + 19 testing with results as follows:    30-Second Sit to Stand Test:  The test is conducted with a straight back chair, without armrests, with a 17-inch seat height.    Patient Score (score =0 if must use arms) 8 reps (previous score = 11); pt fatigued today due to low hemoglobin.    The 30 Second Sit to Stand Test is considered a test of fall risk.  Data from [place] ANA, cosponsored by [place] Dept of Health:  If must use arms = High Fall Risk regardless of reps  8 or less times = High Fall Risk   9 to 12 times = Moderate Risk  13 or more times = Low Risk    The 30 Second Sit to Stand Test is also considered a test of leg strength and endurance.   Normative Data from Gary et al,. 2001  Age                 Reps: Men        Reps: Women  60-64                14-19                       12-17                               65-69                12-18                       11-16                    70-74                12-17                       10-15              75-79                11-17                       10-15                    80-84                10-15                         9-14  85-89                 8-14                          8-13  90-94                 7-12                          4-11    Assessment (rationale for performing, application to patient s function & care plan): Test completed to assess LE functional strength and endurance; pt is now a high risk for falls.    FACIT Fatigue score: 31  (Prior FACIT scores: 27)    A score of < 30 indicates below normal range   The FACIT-Fatigue scale assesses self-reported fatigue and its impact on daily activities and function during the past week.  Scores range 52-0, with lower scores indicating worse fatigue.    40 is the average score in general [place] population with a standard deviation of ~10.    Minimally Important Difference   3-4 points.    Source: Scoring & Interpretation Materials at http://www.facit.org/FACITOrg/Questionnaires

## 2020-10-26 NOTE — PLAN OF CARE
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 Up independently. Denies pain, nausea, vomiting. Patient tac level drawn result 11.2. Patient transitioned to oral acyclovir and oral mycophenolate. Patient anticipating transition to oral tac tomorrow. Anticipated discharge Wednesday, 10/28. Patient worked with PT today. IV tac infusing at 4 ml/hr.    R: No further nursing concerns, continue plan of care  "

## 2020-10-26 NOTE — PLAN OF CARE
Care Coordination  Discharge Planning     Line Company:  [employer] [phone] for line care supplies   Referral made for line care:  Yes   IV medications needed at discharge:  None   Pharmacy concerns:  None. (Of note, vori requires prior auth)     PT/OT recommended:  No  Referral made for PT/OT:  NA    D/c location:  [facility]  Has placement need been communicated to Social Work?  Yes    [place] Teaching time arranged with family:  Tues 10/27 at 11am, via phone call with pt  Notify nurse to schedule line care class/ DM teaching, prior to d/c:  Patient and caregiver previously received teaching, and decline further need     Caregiver:  Gaye (son's SO) [phone]      Roxane Moura, RN, BSN  RN Care Coordinator - Inpatient BMT, Unit 5C  Ph [phone]  Pg [phone]

## 2020-10-26 NOTE — PROGRESS NOTES
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      Diagnosis AMLM1 Acute myelogeneous leukemia, M1, myeloblastic  HCT Type Allogeneic    Prep Regimen Fludarabine  Cytoxan  TBI   Donor Source Sibling    GVHD Prophylaxis Post transplant cytoxan  Tac/MMF  Primary BMT Provider ACE      INTERVAL  HISTORY   She is doing well. Counts are improving. Urethral pain is better today. No hematuria, dysuria, or loose stools. Nausea well controlled. Eating well.      Review of Systems: 8 point ROS negative except as noted above.    PHYSICAL EXAM     Weight In/Out     Wt Readings from Last 3 Encounters:   10/26/20 76.3 kg (168 lb 4.8 oz)   09/17/20 77.8 kg (171 lb 8 oz)   09/10/20 77.2 kg (170 lb 3.1 oz)      I/O last 3 completed shifts:  In: 3968 [P.O.:1980; I.V.:1765]  Out: 3500 [Urine:3500]       KPS:  90    /74 (BP Location: Right arm)   Pulse 90   Temp 97.7  F (36.5  C) (Axillary)   Resp 16   Ht 1.68 m (5' 6.14\")   Wt 76.3 kg (168 lb 4.8 oz)   SpO2 97%   BMI 27.05 kg/m     General: NAD   Eyes:  LINH, sclera anicteric.   Lungs: CTA bilaterally  Cardiovascular: RRR, no M/R/G   Lymphatics: no edema b/l LE   Skin: No rashes or petechiae.  Neuro: A&O x3  Line: right CVC.   10/25/20 Current aGVHD staging:  Skin 0, UGI 0, LGI 0, Liver 0 (keep in note through day +180, delete if auto)    LABS AND IMAGING - PAST 24 HOURS     Lab Results   Component Value Date    WBC 1.0 (L) 10/26/2020    ANEU 0.7 (L) 10/26/2020    HGB 6.9 (LL) 10/26/2020    HCT 20.8 (L) 10/26/2020    PLT 8 (LL) 10/26/2020     10/26/2020    POTASSIUM 4.2 10/26/2020    CHLORIDE 101 10/26/2020    CO2 28 10/26/2020    GLC 98 10/26/2020    BUN 6 (L) 10/26/2020    CR 0.70 10/26/2020    MAG 1.6 10/26/2020    INR 1.01 10/26/2020     I have assessed all abnormal lab values for their clinical significance and any values considered clinically significant have " been addressed in the assessment and plan  OVERALL PLAN   Florencia Gao is a 75 year old female with AML in CR1, admitted for JCARLOS Allo sib pbsct per protocol 2019-10, randomized to Post transplant cytoxan/Tac/MMF.  Day +19.      1.  BMT: Cy/flu/TBI prep.   Received 4.19 x10(6) CD34/kg. Donor B pos and recipient B neg.   GCSF started d+5 and cont to until ANC>1500 x3 consecutive days. Counts recovering ANC 0.7 today.  Re-stage per protocol. Scheduled 10/28 at 11am on 5C.    **Per protocol no dex/steroids**      2.  HEME: Keep Hgb>7g/dL and plts>10K.   - pancytopenic secondary to chemotherapy and transplant.   - Of note has significant antibodies to RBCs- can cause delay in transfusion availability. Premeds tylenol and benadryl.                             3.  ID: Afebrile.   RVP 10/14 neg. Started Claritin.   Cont Levo, Vori, and HD ACV prophy.   Pjp prophy to start day+28 - IV pentamdine vs dapsone as sulfa allergy.                                         4.  GI:   - Nausea: headaches with zofran. Significant nausea/vomiting with cytoxan. s/p emend 10/13. Continue Scheduled kytril and zyprexa 2.5mg at bedtime. Compazine and ativan prn.  - Hemorroids: tucks prn, barrier cream and start BID hydrocortisone cream topically.    - Stools: pt on miralax daily PTA Now holding off d/t loose stools   - Protonix for GI prophy.    - Ursodiol for VOD prophy.     5.  GVHD Prophylaxis:   Day +3 and day+4 cytoxan  On tac and MMF. Change MMF to PO today.  Tac level Monday.     6.  FEN/Renal:  - Cr and lytes stable.    - Replete lytes PRN per SS. Monitor weight, I+O, lytes per protocol with IVF flush.  - Anorexia and malnutrition in the context of acute illness: Calorie counts ongoing.   - Urethral irritation 10/25- UA with RBCs, urine BK neg.       7. Hypothyroidism  Continue synthroid 88mcg      8. Psych: Situational Depression continue effexor 75mg daily.     9.  Skin:    - Follicular rash 10/15 on chest: s/p clindamycin gel.  Resolved.    - Previous irritation/rash under and around CVC dressing. Pt with allergy to chlorhexidine. Removed biopatch and trial of different dressing. Improved.      10. Cards: intermittent hypertension- monitor for now. Tac level okay.     11. Research: Pt consented to [id number] (Seamless randomized tiral to alleviate morbidity and mortality) for which she was randomized to the palliative care management arm. Seen by Palliative 10/8. Pt has subsequently withdrawn from this study.     Dispo: patient will remain admitted through neutropenia and subsequent engraftment. Plan for tentative discharge Wednesday following bone marrow biopsy.      Denzel Magallon PA-C  [phone]      Bone Marrow Transplant (5C) Attending Progress Note    The patient was discussed on morning rounds with the nurses, and mid-level provider, house staff and was seen and examined by me. All labs and imaging were reviewed. I reviewed events over the last 24 hours including vitals and flow sheets. I agree with the above note and have been responsible for the care plan and interpretation of progress. 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  Overall, she has been doing relatively well .  She has had mild toxicities from transplant including hemorrhoids.  Her vital signs are stable.  She has had no active infections in continues to eat without the need for TPN.  She denies any shortness of breath or cough.  She has less urinary symptoms today and is very excited about the possibility of going home soon.  She has been up and around in the room and is beginning to pack.  She remains afebrile.    Physical exam: There is no evidence of mucositis. There is no adenopathy on exam and lungs are clear. There is no LE edema and no rash. Labs show a creatinine of 0.7, hemoglobin of 6.9 and she will get blood transfused, platelet count of 8,000 (and she will  receive platelets) and a white count that increased to 1000 from 700 yesterday.  Her neutrophil count is increasing nicely.    It is my overall impression that she is starting to engraft.  Urine viral studies are still pending but the UA did show some microscopic red blood cells.  All questions have been answered.  With her neutrophils clearly engrafting, we talked about discharge Wednesday after her bone marrow biopsy.    Richy Quigley MD

## 2020-10-26 NOTE — PLAN OF CARE
"/64   Pulse 84   Temp 96  F (35.6  C) (Axillary)   Resp 16   Ht 1.68 m (5' 6.14\")   Wt 75.5 kg (166 lb 6.4 oz)   SpO2 95%   BMI 26.74 kg/m      VSS. No complaints of pain of N/V/D. She took Compazine preemptively for nausea. She is AOx4 and independent. She is getting 1 unit of platelets and  1 unit of blood this AM with pre-meds. She is also getting magnesium. Tac drip infusing per mar order.      Problem: Adult Inpatient Plan of Care  Goal: Plan of Care Review  Outcome: No Change     Problem: Adult Inpatient Plan of Care  Goal: Optimal Comfort and Wellbeing  Outcome: No Change     "

## 2020-10-27 ENCOUNTER — APPOINTMENT (OUTPATIENT)
Dept: PHYSICAL THERAPY | Facility: CLINIC | Age: 75
DRG: 014 | End: 2020-10-27
Attending: INTERNAL MEDICINE
Payer: COMMERCIAL

## 2020-10-27 LAB
ANION GAP SERPL CALCULATED.3IONS-SCNC: 4 MMOL/L (ref 3–14)
BASOPHILS # BLD AUTO: 0 10E9/L (ref 0–0.2)
BASOPHILS NFR BLD AUTO: 0 %
BUN SERPL-MCNC: 6 MG/DL (ref 7–30)
CALCIUM SERPL-MCNC: 9.1 MG/DL (ref 8.5–10.1)
CHLORIDE SERPL-SCNC: 101 MMOL/L (ref 94–109)
CO2 SERPL-SCNC: 27 MMOL/L (ref 20–32)
CREAT SERPL-MCNC: 0.69 MG/DL (ref 0.52–1.04)
DIFFERENTIAL METHOD BLD: ABNORMAL
EOSINOPHIL # BLD AUTO: 0 10E9/L (ref 0–0.7)
EOSINOPHIL NFR BLD AUTO: 0 %
ERYTHROCYTE [DISTWIDTH] IN BLOOD BY AUTOMATED COUNT: 12.6 % (ref 10–15)
GFR SERPL CREATININE-BSD FRML MDRD: 86 ML/MIN/{1.73_M2}
GLUCOSE SERPL-MCNC: 89 MG/DL (ref 70–99)
HCT VFR BLD AUTO: 25.3 % (ref 35–47)
HGB BLD-MCNC: 8.7 G/DL (ref 11.7–15.7)
LYMPHOCYTES # BLD AUTO: 0.1 10E9/L (ref 0.8–5.3)
LYMPHOCYTES NFR BLD AUTO: 5.3 %
MAGNESIUM SERPL-MCNC: 1.6 MG/DL (ref 1.6–2.3)
MCH RBC QN AUTO: 29.2 PG (ref 26.5–33)
MCHC RBC AUTO-ENTMCNC: 34.4 G/DL (ref 31.5–36.5)
MCV RBC AUTO: 85 FL (ref 78–100)
MONOCYTES # BLD AUTO: 0.3 10E9/L (ref 0–1.3)
MONOCYTES NFR BLD AUTO: 19.3 %
NEUTROPHILS # BLD AUTO: 1.4 10E9/L (ref 1.6–8.3)
NEUTROPHILS NFR BLD AUTO: 75.4 %
PLATELET # BLD AUTO: 38 10E9/L (ref 150–450)
PLATELET # BLD EST: ABNORMAL 10*3/UL
POTASSIUM SERPL-SCNC: 4.4 MMOL/L (ref 3.4–5.3)
RBC # BLD AUTO: 2.98 10E12/L (ref 3.8–5.2)
RBC MORPH BLD: NORMAL
SODIUM SERPL-SCNC: 132 MMOL/L (ref 133–144)
WBC # BLD AUTO: 1.8 10E9/L (ref 4–11)

## 2020-10-27 PROCEDURE — 250N000013 HC RX MED GY IP 250 OP 250 PS 637: Performed by: INTERNAL MEDICINE

## 2020-10-27 PROCEDURE — 85025 COMPLETE CBC W/AUTO DIFF WBC: CPT | Performed by: PHYSICIAN ASSISTANT

## 2020-10-27 PROCEDURE — 86901 BLOOD TYPING SEROLOGIC RH(D): CPT | Performed by: PHYSICIAN ASSISTANT

## 2020-10-27 PROCEDURE — 250N000012 HC RX MED GY IP 250 OP 636 PS 637: Performed by: PHYSICIAN ASSISTANT

## 2020-10-27 PROCEDURE — 86922 COMPATIBILITY TEST ANTIGLOB: CPT | Performed by: PHYSICIAN ASSISTANT

## 2020-10-27 PROCEDURE — 80048 BASIC METABOLIC PNL TOTAL CA: CPT | Performed by: PHYSICIAN ASSISTANT

## 2020-10-27 PROCEDURE — 86900 BLOOD TYPING SEROLOGIC ABO: CPT | Performed by: PHYSICIAN ASSISTANT

## 2020-10-27 PROCEDURE — 97110 THERAPEUTIC EXERCISES: CPT | Mod: GP

## 2020-10-27 PROCEDURE — 86850 RBC ANTIBODY SCREEN: CPT | Performed by: PHYSICIAN ASSISTANT

## 2020-10-27 PROCEDURE — 87081 CULTURE SCREEN ONLY: CPT | Performed by: PHYSICIAN ASSISTANT

## 2020-10-27 PROCEDURE — 250N000011 HC RX IP 250 OP 636: Performed by: PHYSICIAN ASSISTANT

## 2020-10-27 PROCEDURE — 250N000013 HC RX MED GY IP 250 OP 250 PS 637: Performed by: PHYSICIAN ASSISTANT

## 2020-10-27 PROCEDURE — 250N000011 HC RX IP 250 OP 636: Performed by: RADIOLOGY

## 2020-10-27 PROCEDURE — 206N000001 HC R&B BMT UMMC

## 2020-10-27 PROCEDURE — 83735 ASSAY OF MAGNESIUM: CPT | Performed by: PHYSICIAN ASSISTANT

## 2020-10-27 PROCEDURE — 250N000013 HC RX MED GY IP 250 OP 250 PS 637: Performed by: STUDENT IN AN ORGANIZED HEALTH CARE EDUCATION/TRAINING PROGRAM

## 2020-10-27 PROCEDURE — 86902 BLOOD TYPE ANTIGEN DONOR EA: CPT | Performed by: PHYSICIAN ASSISTANT

## 2020-10-27 PROCEDURE — 99233 SBSQ HOSP IP/OBS HIGH 50: CPT | Performed by: INTERNAL MEDICINE

## 2020-10-27 RX ORDER — TACROLIMUS 1 MG/1
CAPSULE ORAL
Qty: 120 CAPSULE | Refills: 0 | Status: SHIPPED | OUTPATIENT
Start: 2020-10-27 | End: 2020-11-13

## 2020-10-27 RX ORDER — OLANZAPINE 5 MG/1
TABLET, ORALLY DISINTEGRATING ORAL
Qty: 30 TABLET | Refills: 0 | Status: SHIPPED | OUTPATIENT
Start: 2020-10-27 | End: 2020-11-13

## 2020-10-27 RX ORDER — LORATADINE 10 MG/1
10 TABLET ORAL DAILY
Qty: 30 TABLET | Refills: 0 | Status: SHIPPED | OUTPATIENT
Start: 2020-10-28 | End: 2020-11-13

## 2020-10-27 RX ORDER — PROCHLORPERAZINE MALEATE 5 MG
5 TABLET ORAL EVERY 6 HOURS PRN
Qty: 45 TABLET | Refills: 0 | Status: SHIPPED | OUTPATIENT
Start: 2020-10-27 | End: 2020-11-13

## 2020-10-27 RX ORDER — POLYETHYLENE GLYCOL 3350 17 G/17G
1 POWDER, FOR SOLUTION ORAL DAILY PRN
COMMUNITY
Start: 2020-10-27 | End: 2020-11-13

## 2020-10-27 RX ORDER — BENZOCAINE/MENTHOL 6 MG-10 MG
LOZENGE MUCOUS MEMBRANE 2 TIMES DAILY
Qty: 30 G | Refills: 0 | Status: SHIPPED | OUTPATIENT
Start: 2020-10-27 | End: 2020-12-31

## 2020-10-27 RX ORDER — MYCOPHENOLATE MOFETIL 500 MG/1
1000 TABLET ORAL EVERY 8 HOURS
Qty: 84 TABLET | Refills: 0 | Status: SHIPPED | OUTPATIENT
Start: 2020-10-27 | End: 2020-11-11

## 2020-10-27 RX ORDER — GRANISETRON HYDROCHLORIDE 1 MG/1
1 TABLET, FILM COATED ORAL EVERY 12 HOURS
Qty: 60 TABLET | Refills: 0 | Status: SHIPPED | OUTPATIENT
Start: 2020-10-27 | End: 2020-11-13

## 2020-10-27 RX ORDER — PANTOPRAZOLE SODIUM 40 MG/1
40 TABLET, DELAYED RELEASE ORAL
Qty: 30 TABLET | Refills: 0 | Status: SHIPPED | OUTPATIENT
Start: 2020-10-28 | End: 2020-11-13

## 2020-10-27 RX ORDER — URSODIOL 300 MG/1
300 CAPSULE ORAL 3 TIMES DAILY
Qty: 120 CAPSULE | Refills: 0 | Status: SHIPPED | OUTPATIENT
Start: 2020-10-27 | End: 2020-12-09

## 2020-10-27 RX ORDER — VORICONAZOLE 200 MG/1
200 TABLET, FILM COATED ORAL EVERY 12 HOURS
Qty: 60 TABLET | Refills: 0 | Status: SHIPPED | OUTPATIENT
Start: 2020-10-27 | End: 2020-11-13

## 2020-10-27 RX ORDER — TACROLIMUS 0.5 MG/1
CAPSULE ORAL
Qty: 60 CAPSULE | Refills: 0 | Status: SHIPPED | OUTPATIENT
Start: 2020-10-27 | End: 2020-11-13

## 2020-10-27 RX ORDER — ACYCLOVIR 800 MG/1
800 TABLET ORAL
Qty: 150 TABLET | Refills: 0 | Status: SHIPPED | OUTPATIENT
Start: 2020-10-27 | End: 2020-11-13

## 2020-10-27 RX ADMIN — MYCOPHENOLATE MOFETIL 1000 MG: 500 TABLET ORAL at 20:43

## 2020-10-27 RX ADMIN — Medication 2.5 MG: at 20:43

## 2020-10-27 RX ADMIN — ACYCLOVIR 800 MG: 800 TABLET ORAL at 14:31

## 2020-10-27 RX ADMIN — HYDROCORTISONE: 1 CREAM TOPICAL at 20:44

## 2020-10-27 RX ADMIN — ACYCLOVIR 800 MG: 800 TABLET ORAL at 20:43

## 2020-10-27 RX ADMIN — POLYETHYLENE GLYCOL 3350 17 G: 17 POWDER, FOR SOLUTION ORAL at 20:42

## 2020-10-27 RX ADMIN — LORATADINE 10 MG: 10 TABLET ORAL at 08:46

## 2020-10-27 RX ADMIN — URSODIOL 300 MG: 300 CAPSULE ORAL at 14:30

## 2020-10-27 RX ADMIN — ACYCLOVIR 800 MG: 800 TABLET ORAL at 08:48

## 2020-10-27 RX ADMIN — GRANISETRON HYDROCHLORIDE 1 MG: 1 TABLET ORAL at 20:42

## 2020-10-27 RX ADMIN — DEXTROSE MONOHYDRATE: 50 INJECTION, SOLUTION INTRAVENOUS at 20:41

## 2020-10-27 RX ADMIN — MYCOPHENOLATE MOFETIL 1000 MG: 500 TABLET ORAL at 06:35

## 2020-10-27 RX ADMIN — MYCOPHENOLATE MOFETIL 1000 MG: 500 TABLET ORAL at 14:30

## 2020-10-27 RX ADMIN — URSODIOL 300 MG: 300 CAPSULE ORAL at 08:45

## 2020-10-27 RX ADMIN — Medication 5 ML: at 10:35

## 2020-10-27 RX ADMIN — PANTOPRAZOLE SODIUM 40 MG: 40 TABLET, DELAYED RELEASE ORAL at 08:47

## 2020-10-27 RX ADMIN — VORICONAZOLE 200 MG: 200 TABLET, FILM COATED ORAL at 20:43

## 2020-10-27 RX ADMIN — GRANISETRON HYDROCHLORIDE 1 MG: 1 TABLET ORAL at 08:46

## 2020-10-27 RX ADMIN — LEVOTHYROXINE SODIUM 88 MCG: 0.09 TABLET ORAL at 08:47

## 2020-10-27 RX ADMIN — VORICONAZOLE 200 MG: 200 TABLET, FILM COATED ORAL at 08:47

## 2020-10-27 RX ADMIN — URSODIOL 300 MG: 300 CAPSULE ORAL at 20:43

## 2020-10-27 RX ADMIN — ACYCLOVIR 800 MG: 800 TABLET ORAL at 17:51

## 2020-10-27 RX ADMIN — TACROLIMUS 2.5 MG: 1 CAPSULE ORAL at 20:42

## 2020-10-27 RX ADMIN — MAGNESIUM SULFATE 2 G: 2 INJECTION INTRAVENOUS at 06:36

## 2020-10-27 RX ADMIN — Medication 400 MCG: at 20:42

## 2020-10-27 RX ADMIN — ACYCLOVIR 800 MG: 800 TABLET ORAL at 10:57

## 2020-10-27 RX ADMIN — TACROLIMUS 2.5 MG: 1 CAPSULE ORAL at 10:58

## 2020-10-27 RX ADMIN — VENLAFAXINE 75 MG: 75 TABLET ORAL at 14:31

## 2020-10-27 RX ADMIN — DIPHENHYDRAMINE HYDROCHLORIDE 25 MG: 25 CAPSULE ORAL at 03:58

## 2020-10-27 RX ADMIN — HYDROCORTISONE: 1 CREAM TOPICAL at 08:50

## 2020-10-27 RX ADMIN — Medication 5 ML: at 03:54

## 2020-10-27 RX ADMIN — DEXTROSE MONOHYDRATE: 50 INJECTION, SOLUTION INTRAVENOUS at 20:44

## 2020-10-27 ASSESSMENT — ACTIVITIES OF DAILY LIVING (ADL)
ADLS_ACUITY_SCORE: 11

## 2020-10-27 ASSESSMENT — MIFFLIN-ST. JEOR: SCORE: 1279.69

## 2020-10-27 NOTE — PLAN OF CARE
"/72 (BP Location: Right arm)   Pulse 93   Temp 97.9  F (36.6  C) (Axillary)   Resp 20   Ht 1.68 m (5' 6.14\")   Wt 76.3 kg (168 lb 4.8 oz)   SpO2 95%   BMI 27.05 kg/m      AVSS. Denies pain/nausea. Up independently. Voiding adequately. Stool sample needed, pt aware and thinks she will have BM today. R CVC running tac at 4 ml/hr. 2g mag replaced. Study labs sent. Plan is to have bmbx done on Wednesday and then discharge. Will continue with POC.   "

## 2020-10-27 NOTE — PLAN OF CARE
Care Coordination  Discharge Planning     Line Company:  [facility] [phone] for line care supplies    **Patient requests Aquaguard shower covers for IV - ISAEBL to please note this in line care orders (Summary of Care).    Referral made for line care:  Yes   IV medications needed at discharge:  None   Pharmacy concerns:  None. (Of note, vori requires prior auth)     PT/OT recommended:  No  Referral made for PT/OT:  NA    D/[facility] location:  [facility]  Has placement need been communicated to Social Work?  Yes    NC Teaching time arranged with family:  Completed 10/27, via phone call with pt  Notify nurse to schedule line care class/ DM teaching, prior to d/c:  Patient and caregiver previously received teaching, and decline further need     Caregiver:  Gaye (son's SO) [phone]    Orblespadma Gao (son) [phone]      Roxane Moura, RN, BSN  RN Care Coordinator - Inpatient BMT, Unit 5C  Ph [phone]   [phone]

## 2020-10-27 NOTE — SUMMARY OF CARE
Discharge Med List     Florencia Gao   Home Medication Instructions MISTY:[id number]    Printed on:10/28/20 6694   Medication Information                      acyclovir (ZOVIRAX) 800 MG tablet  Take 1 tablet (800 mg) by mouth 5 times daily             granisetron (KYTRIL) 1 MG tablet  Take 1 tablet (1 mg) by mouth every 12 hours to prevent nausea             hydrocortisone (CORTAID) 1 % external cream  Apply topically 2 times daily to hemorrhoids until resolved.             levothyroxine (SYNTHROID/LEVOTHROID) 88 MCG tablet  Take 1 tablet (88 mcg) by mouth daily             loratadine (CLARITIN) 10 MG tablet  Take 1 tablet (10 mg) by mouth daily             mycophenolate (GENERIC EQUIVALENT) 500 MG tablet  Take 2 tablets (1,000 mg) by mouth every 8 hours             OLANZapine zydis (ZYPREXA) 5 MG ODT  Dissolve half tablet under the tongue daily at bedtime. If too difficult to break tablet in half, okay to try taking full tablet under the tongue at bedtime.             pantoprazole (PROTONIX) 40 MG EC tablet  Take 1 tablet (40 mg) by mouth every morning (before breakfast)             polyethylene glycol (MIRALAX) 17 g packet  Take 17 g by mouth daily as needed for constipation             prochlorperazine (COMPAZINE) 5 MG tablet  Take 1 tablet (5 mg) by mouth every 6 hours as needed for nausea or vomiting             tacrolimus (GENERIC EQUIVALENT) 0.5 MG capsule  Combine one 0.5mg capsules with two 1mg capsules for total dose of 2.5mg capsules by mouth twice daily.             tacrolimus (GENERIC EQUIVALENT) 1 MG capsule  Combine two 1mg capsules with one 0.5mg capsules for total dose of 2.5mg capsules by mouth twice daily.             ursodiol (ACTIGALL) 300 MG capsule  Take 1 capsule (300 mg) by mouth 3 times daily             venlafaxine (EFFEXOR) 75 MG tablet  Take 1 tablet (75 mg) by mouth daily             voriconazole (VFEND) 200 MG tablet  Take 1 tablet (200 mg) by mouth every 12 hours

## 2020-10-27 NOTE — PROGRESS NOTES
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     Interventions: Per Med Team, pt is anticipated to be medically stable for discharge tomorrow. SW met with pt via phone to assess coping, provide psychosocial support and provide discharge packet with post-transplant resources for patient and caregiver. Pt shared that she is \"ecstatic\" about discharging the hospital and she has her own \"personal RN\" via her caregiver Gaye. Pt discussed staying in the [facility] [address] and how she is hopeful that she will be able to return home sooner than 100 days due to proximity to clinic and she plans to follow up with Dr. Moncada on this in clinic. SW provided empathetic listening, validation of concerns, and encouragement. SW encouraged pt to contact SW for support, questions and/or resources.     Education Provided: Discharge Resource Packet discussed & offered to which Pt politely declined at this time, Caregiver Self-Care Education, and Expected Emotional Responses to Transition Home.    Assessment: Pt presented as calm, engaged, and \"excited.\" Pt appears to be coping appropriately. Pt continues to be supported by her family.    Plan: Pt to discharge to [facility] with Gaye (son's long-term sig other - RN) as primary caregiver. SW will continue to provide psychosocial support and assistance with resources as needed. SW will continue to collaborate with multidisciplinary team regarding pt's plan of care.     ROSSI Pabon, [facility]  Adult Blood & Marrow Transplant   Phone: [phone]  Pager: [phone]    "

## 2020-10-27 NOTE — PLAN OF CARE
AVSS. Denies pain/nausea. Research stool and urine sample sent. VRE sent. Pt had 4 formed BMs today. Pt has hemorrhoids and is using cortaid cream to help. She did have some bleeding with her BMs. Nurse looked and no active bleeding from site. She switched to oral tac as well. Patient teaching completed. Pt reporting having a busy day and was hoping for a good nights sleep tonight. Plan is for discharge tomorrow after bone marrow biopsy.     Problem: Bladder Irritation (Stem Cell/Bone Marrow Transplant)  Goal: Symptom-Free Urinary Elimination  Outcome: No Change     Problem: Diarrhea (Stem Cell/Bone Marrow Transplant)  Goal: Diarrhea Symptom Control  Outcome: No Change  Intervention: Manage Diarrhea  Recent Flowsheet Documentation  Taken 10/27/2020 0900 by Arabella Yoon RN  Fluid/Electrolyte Management: fluids provided     Problem: Fatigue (Stem Cell/Bone Marrow Transplant)  Goal: Energy Level Supports Daily Activity  Outcome: No Change     Problem: Infection Risk (Stem Cell/Bone Marrow Transplant)  Goal: Absence of Infection Signs/Symptoms  Outcome: No Change  Intervention: Prevent and Manage Infection  Recent Flowsheet Documentation  Taken 10/27/2020 0900 by Arabella Yoon RN  Isolation Precautions: protective environment maintained     Problem: Nausea and Vomiting (Stem Cell/Bone Marrow Transplant)  Goal: Nausea and Vomiting Symptom Relief  Outcome: No Change     Problem: Nutrition Intake Altered (Stem Cell/Bone Marrow Transplant)  Goal: Optimal Nutrition Intake  Outcome: No Change     Problem: Adult Inpatient Plan of Care  Goal: Plan of Care Review  Outcome: Improving  Goal: Optimal Comfort and Wellbeing  Outcome: Improving     Problem: Hematologic Alteration (Stem Cell/Bone Marrow Transplant)  Goal: Blood Counts Within Acceptable Range  Outcome: Improving  Intervention: Monitor and Manage Hematologic Symptoms  Recent Flowsheet Documentation  Taken 10/27/2020 0900 by Arabella Yoon RN  Bleeding  Precautions:   blood pressure closely monitored   gentle oral care promoted   monitored for signs of bleeding

## 2020-10-27 NOTE — PROGRESS NOTES
BMT Teaching Flowsheet    Florencia Gao is a 75 year old female  The primary encounter diagnosis was Acute myeloid leukemia in remission (H). A diagnosis of AML (acute myeloid leukemia) in remission (H) was also pertinent to this visit.    Teaching Topic: Allogeneic stem cell transplant discharge teaching     **PHONE TEACH**    Person(s) involved in teaching: Patient  Motivation Level  Asks Questions: Yes  Eager to Learn: Yes  Cooperative: Yes  Receptive (willing/able to accept information): Yes  Any cultural factors/Zoroastrianism beliefs that may influence understanding or compliance? No    Patient demonstrates understanding of the following:  - Reason for the appointment, diagnosis and treatment plan: Yes  - Knowledge of proper use of medications and conditions for which they are ordered (with special attention to potential side effects or drug interactions): No, explain: Bedside RN to complete medication teaching with patient and caregiver prior to discharge.   - Which situations necessitate calling provider and whom to contact: Yes    Teaching concerns addressed: None identified    Proper use and care of (medical equipment, care aids, etc.) Yes  Pain management techniques: Yes  Patient instructed on hand hygiene: Yes  How and/when to access community resources: Yes    Infection Control:  Patient demonstrates understanding of the following:  Surgical procedure site care taught NA  Signs and symptoms of infection taught Yes  Wound care taught NA  Central venous catheter care taught No, explain: Patient and caregiver previously received teaching, and decline further need     Instructional Materials Used/Given:   Discharge teaching completed with patient and family. Written guidelines provided including clinic follow up, signs and symptoms to report, infection prevention, and contact list. Reviewed potential side effects s/p transplant and what to expect during recovery period. Reviewed symptoms and treatment of  GVHD. Discussed clinic routines. Discussed activities to avoid to prevent infections and mask guidelines. [facility] for line care supplies and Aquaguard shower covers. Pt and family verbalize understanding of material via teach back and all questions have been answered.    Time spent with patient: 45 minutes.    Specific Concerns: NA

## 2020-10-27 NOTE — SUMMARY OF CARE
[facility] Summary of Care   & Survivorship Care Plan    Treatment Team:  Patient Care Team:  Yong Moncada MD as PCP - General (Internal Medicine)  Natalia Pereira MSW as   Britany Finch RN as BMT Nurse Coordinator (Transplant)  Yong Moncada MD as BMT Physician (Transplant)  Discharge Diagnosis: S/P BMT    Transplant Essential Data:  Diagnosis AMLM1 Acute myelogeneous leukemia, M1, myeloblastic  HCT Type Allogeneic    Prep Regimen Fludarabine  Cytoxan  TBI   Donor Source Related PBSC    GVHD Prophylaxis Post transplant cytoxan  MMF/Tac  Clinical Trials none       Hospital Discharge on 10/27/20  [facility]   Activity at Discharge As tolerated   Nutrition Regular diet as tolerated     Blood Transfusion Parameters Transfuse if Hemoglobin < or equal 7 g/dL  Red Blood Cell Order: 1 units, irradiated and leukoreduced   Transfuse if Platelet count < or equal 10,000 uL  Platelet order: 1 adult dose, irradiated and leukoreduced  Transfusion Pre-meds:  None     IV Medications Outpatient Pharmacy G-CSF to be given in clinic: (dose) 480 mcg subcutaneous daily until ANC >1500 x3 days      Home Infusion  IV Medications through home infusion: None   Line Care Care: [place] - Flush each line with 5mL of 10 unit/mL heparin every 24 hours  Supplies Through: [facility]  Fax: [phone]  Ph: [phone]     Patient requests Aquaguard shower covers for IV   Physical Therapy & Support Services None     Laboratory Tests at Next Clinic Visit Hemogram (CBC) differential, platelet count  Magnesium  Comprehensive Metabolic Panel     Restrictions Immediately Post-Transplant   Always wear your mask in public.    No driving until cleared by your physician    Continue dietary precautions as discussed at your pre-BMT teaching session   When to Call  (Refer to Discharge Teaching Materials)   Fever > 100.5 F    Bleeding that does not stop after a few  minutes    Severe nausea, vomiting, or diarrhea     New fall or injury    Change in designated caregiver    Medication question    Any other urgent concern   Follow Up Visits Clinic Appointment Date/Time:   10/29/20: 9:45am labs, 10:30am provider visit and possible infusion, 12:00pm pharmacy visit (please bring med box)     Survivorship Visits:     You will have a 60-minute provider visit dedicated to cancer Survivorship one week before your scheduled anniversary visit on day + 100, 1 year, and 2 years.     After 2 years, or if you received an allogeneic transplant and you develop a condition called chronic GVHD, you can continue to receive comprehensive Survivorship care in our Transplant Late-Effects Clinic (TLC) annually by calling [phone] to schedule an appointment    Your labs will be drawn after your survivorship appointments to allow your provider to order additional tests as needed.     BMT Contact Information  For issues including fevers 100.5 or more:  Please call during the week: Monday through Friday between hours of 8:00 am and 4:30 pm- Call BMT office- [phone]  After hours/Weekends: Please call [facility]  and ask for BMT Physician on call and the  will have the BMT Physician call you back: [phone]    Regarding COVID-19 Pandemic  Advice for Patients:  a.       Avoid contact with individuals:   i.     Who are sick or have recently been sick  ii.      Have traveled to high risk areas (per CDC guidelines) or have been on a cruise in the last 14 days  iii.      Who were or could have been exposed to COVID-19   b.       If experiencing symptoms such as: Fever, cough or shortness of breath contact BMT at [phone] Mon-Fri 8am-4:30pm or After Hours at [phone] (ask to speak to a BMT Fellow) for guidance on need for clinical assessment  c.      Avoid all non- essential travel at this time; if traveling is necessary use mask (N-95)   d.    Wear  a mask when in public areas  d.       Avoid crowded places, if possible  f.        Follow CDC advice https://www.cdc.gov/coronavirus/2019-ncov/index.html and travel guidelines https://www.cdc.gov/coronavirus/2019-ncov/travelers/index.html      Oncology Treatment History: Oncology Treatment History:   Florencia Gao was well until April or May 2020 when she developed progressive fatigue and some easy bruising.  By mid June, pancytopenia and the diagnosis of AML was recognized. Initially Hb 8.1  WBC 1.4, Plts 29,000.    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  Flt 3 and NPM1 and CEBPalpha mutations were negative.     She was treated with idarubicin plus cytarabine (7+3) and achieved complete remission.  A day 14 marrow was empty and recovery of her counts at roughly 6 weeks --July 31 bone marrow was cellular with no morphologic signs of leukemia and the trisomy 8 was no longer detectable.     Treatment/Chemotherapy Number of Cycles Date Range Outcomes & Complications   7+3 1 6/18- Day 14 bmbx no blast but no count recovery. Dya+28 bmbx CR         Bone Marrow Workup Results:  Blood Counts     Recent Labs   Lab Test 09/30/20  1033   WBC 5.9   ANEU 3.5   ALYM 1.7   ANA ROSA 0.5   AEOS 0.1   HGB 12.8   HCT 39.0         Bone Marrow 9/16/20: Bone marrow, posterior iliac crest, left decalcified trephine biopsy:    - No morphologic or immunophenotypic evidence of recurrent/persistent   acute myeloid leukemia (see comment)     - Normocellular marrow for age (20-30%) with trilineage hematopoiesis     - No increase in blasts and no overt dysplasia     fferential     COMMENT:   Flow was negative as well.    Blood Type     Recent Labs   Lab Test 09/30/20  1033   ABO B neg      Chemistries     Recent Labs   Lab Test 09/30/20  1033      POTASSIUM 3.9   CHLORIDE 104   CO2 29   BUN 11   CR 0.75      Liver Tests     Recent Labs   Lab Test 09/30/20  1033    BILITOTAL 0.3   ALKPHOS 79   AST 27   ALT 38      Chest X-Ray: 9/8/20:                                                                  IMPRESSION: No acute cardiopulmonary disease.   Chest CT 9/30/20: IMPRESSION:   1. No acute airspace disease.  2. Sequela of prior granulomatous disease   PFTs FVC%      Recent Labs   Lab Test 09/11/20  0835   20003 123         FEV1%      Recent Labs   Lab Test 09/11/20  0835   20016 133         DLCO%      Recent Labs   Lab Test 09/11/20  0835   [id number] 102      ECHO or MUGA: ECHO: 9/8/20  Left Ventricle  Global and regional left ventricular function is normal with an EF of 60-65%.  Left ventricular size is normal. Relative wall thickness is increased  consistent with concentric remodeling. Left ventricular diastolic function is  normal. Global peak LV longitudinal strain is averaged at -21.6%. This is  within reported normal limits (normal <-18%).   EKG 9/8/20: NSR   Serologies: CMV +, EBV +, HSV+ Hep b/C neg, HIV neg, HTLV1 and II neg, MPX neg, [place] neg, Trypanosoma cruzi neg.            Denzel Magallon

## 2020-10-27 NOTE — PROGRESS NOTES
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      Diagnosis AMLM1 Acute myelogeneous leukemia, M1, myeloblastic  HCT Type Allogeneic    Prep Regimen Fludarabine  Cytoxan  TBI   Donor Source Sibling    GVHD Prophylaxis Post transplant cytoxan  Tac/MMF  Primary BMT Provider ACE      INTERVAL  HISTORY   She is doing well. Excited about discharging tomorrow. No new medical complaints. Is slightly constipated but hoping she will have a bowel movement today.     Review of Systems: 8 point ROS negative except as noted above.    PHYSICAL EXAM     Weight In/Out     Wt Readings from Last 3 Encounters:   10/26/20 76.3 kg (168 lb 4.8 oz)   09/17/20 77.8 kg (171 lb 8 oz)   09/10/20 77.2 kg (170 lb 3.1 oz)      I/O last 3 completed shifts:  In: 2337 [P.O.:1540; I.V.:497]  Out: 3450 [Urine:3450]       KPS:  90    /73 (BP Location: Right arm)   Pulse 86   Temp 97.6  F (36.4  C) (Axillary)   Resp 20   Ht 1.68 m (5' 6.14\")   Wt 76.3 kg (168 lb 4.8 oz)   SpO2 96%   BMI 27.05 kg/m     General: NAD   Eyes:  LINH, sclera anicteric.   Lungs: CTA bilaterally  Cardiovascular: RRR, no M/R/G   Lymphatics: no edema b/l LE   Skin: No rashes or petechiae.  Neuro: A&O x3  Line: right CVC.   10/25/20 Current aGVHD staging:  Skin 0, UGI 0, LGI 0, Liver 0 (keep in note through day +180, delete if auto)    LABS AND IMAGING - PAST 24 HOURS     Lab Results   Component Value Date    WBC 1.8 (L) 10/27/2020    ANEU 1.4 (L) 10/27/2020    HGB 8.7 (L) 10/27/2020    HCT 25.3 (L) 10/27/2020    PLT 38 (LL) 10/27/2020     (L) 10/27/2020    POTASSIUM 4.4 10/27/2020    CHLORIDE 101 10/27/2020    CO2 27 10/27/2020    GLC 89 10/27/2020    BUN 6 (L) 10/27/2020    CR 0.69 10/27/2020    MAG 1.6 10/27/2020    INR 1.01 10/26/2020     I have assessed all abnormal lab values for their clinical significance and any values considered clinically significant " have been addressed in the assessment and plan  OVERALL PLAN   Florencia Gao is a 75 year old female with AML in CR1, admitted for JCARLOS Allo sib pbsct per protocol 2019-10, randomized to Post transplant cytoxan/Tac/MMF.  Day +20.      1.  BMT: Cy/flu/TBI prep.   Received 4.19 x10(6) CD34/kg. Donor B pos and recipient B neg.   GCSF started d+5 and cont to until ANC>1500 x3 consecutive days. Counts recovering ANC 1.4 today.  Re-stage per protocol. Scheduled 10/28 at 11am on 5C.    **Per protocol no dex/steroids**      2.  HEME: Keep Hgb>7g/dL and plts>10K.   - pancytopenic secondary to chemotherapy and transplant.   - Of note has significant antibodies to RBCs- can cause delay in transfusion availability. Premeds tylenol and benadryl.                             3.  ID: Afebrile.   Rhinorrhea: RVP 10/14 neg. Started Claritin.   Cont Levo, Vori, and HD ACV prophy. Stop levo today as ANC 1.4.   Pjp prophy to start day+28 - IV pentamdine vs dapsone as sulfa allergy.                                         4.  GI:   - Nausea: headaches with zofran. Significant nausea/vomiting with cytoxan. s/p emend 10/13. Continue Scheduled kytril and zyprexa 2.5mg at bedtime. Compazine and ativan prn.  - Hemorroids: tucks prn, barrier cream and start BID hydrocortisone cream topically.    - Stools: pt on miralax daily PTA Now holding off d/t loose stools   - Protonix for GI prophy.    - Ursodiol for VOD prophy.     5.  GVHD Prophylaxis:   Day +3 and day+4 cytoxan  On tac and MMF.  Tac level 10/26 was 11.2- transition to Tacrolimus 2.5mg PO BID. Level on 10/29.     6.  FEN/Renal:  - Cr and lytes stable.    - Replete lytes PRN per SS. Monitor weight, I+O, lytes per protocol with IVF flush.  - Anorexia and malnutrition in the context of acute illness: Calorie counts adequate.   - Urethral irritation 10/25- UA with RBCs, urine BK neg.       7. Hypothyroidism  Continue synthroid 88mcg      8. Psych: Situational Depression continue effexor  75mg daily.     9.  Skin:    - Follicular rash 10/15 on chest: s/p clindamycin gel. Resolved.    - Previous irritation/rash under and around CVC dressing. Pt with allergy to chlorhexidine. Removed biopatch and trial of different dressing. Improved.      10. Cards: intermittent hypertension- monitor for now. Tac level okay.     11. Research: Pt consented to [id number] (Seamless randomized tiral to alleviate morbidity and mortality) for which she was randomized to the palliative care management arm. Seen by Palliative 10/8. Pt has subsequently withdrawn from this study.     Dispo: patient will remain admitted through neutropenia and subsequent engraftment. Plan for tentative discharge tomorrow following bone marrow biopsy.      Denzel Magallon PA-C  [phone]      Bone Marrow Transplant (5C) Attending Progress Note    The patient was discussed on morning rounds with the nurses, and mid-level provider, house staff and was seen and examined by me. All labs and imaging were reviewed. I reviewed events over the last 24 hours including vitals and flow sheets. I agree with the above note and have been responsible for the care plan and interpretation of progress. 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  Overall, she has been doing relatively well .  She has had mild toxicities from transplant including hemorrhoids.  Her vital signs are stable.  She has had no active infections in continues to eat without the need for TPN.  She denies any shortness of breath or cough.  Her urinary urinary symptoms are gone today and she is very excited about the possibility of going home soon.  She has been up and around in the room.  She remains afebrile.    Physical exam: There is no evidence of mucositis. There is no adenopathy on exam and lungs are clear. There is no LE edema and no rash. Labs show a creatinine of 0.69, hemoglobin of 8.7 and she  will get blood transfused, [place] [address] and a white count that increased to 1800 from 1400 yesterday.  Her neutrophil count is increasing nicely.    It is my overall impression that she is starting to engraft.  Urine BK is negative. All questions have been answered.  With her neutrophils clearly engrafting, we talked about discharge Wednesday after her bone marrow biopsy.    Richy Quigley MD

## 2020-10-28 ENCOUNTER — HOME INFUSION (PRE-WILLOW HOME INFUSION) (OUTPATIENT)
Dept: PHARMACY | Facility: CLINIC | Age: 75
End: 2020-10-28

## 2020-10-28 VITALS
RESPIRATION RATE: 18 BRPM | WEIGHT: 165.8 LBS | TEMPERATURE: 97.2 F | OXYGEN SATURATION: 95 % | HEIGHT: 66 IN | BODY MASS INDEX: 26.65 KG/M2 | DIASTOLIC BLOOD PRESSURE: 77 MMHG | HEART RATE: 101 BPM | SYSTOLIC BLOOD PRESSURE: 133 MMHG

## 2020-10-28 LAB
ABO + RH BLD: ABNORMAL
ABO + RH BLD: ABNORMAL
ANION GAP SERPL CALCULATED.3IONS-SCNC: 6 MMOL/L (ref 3–14)
BASOPHILS # BLD AUTO: 0 10E9/L (ref 0–0.2)
BASOPHILS NFR BLD AUTO: 0 %
BLD GP AB SCN SERPL QL: ABNORMAL
BLD PROD TYP BPU: ABNORMAL
BLOOD BANK CMNT PATIENT-IMP: ABNORMAL
BLOOD BANK CMNT PATIENT-IMP: ABNORMAL
BUN SERPL-MCNC: 8 MG/DL (ref 7–30)
CALCIUM SERPL-MCNC: 8.9 MG/DL (ref 8.5–10.1)
CHLORIDE SERPL-SCNC: 97 MMOL/L (ref 94–109)
CO2 SERPL-SCNC: 26 MMOL/L (ref 20–32)
CREAT SERPL-MCNC: 0.71 MG/DL (ref 0.52–1.04)
DIFFERENTIAL METHOD BLD: ABNORMAL
EOSINOPHIL # BLD AUTO: 0 10E9/L (ref 0–0.7)
EOSINOPHIL NFR BLD AUTO: 0 %
ERYTHROCYTE [DISTWIDTH] IN BLOOD BY AUTOMATED COUNT: 12.5 % (ref 10–15)
GFR SERPL CREATININE-BSD FRML MDRD: 84 ML/MIN/{1.73_M2}
GLUCOSE SERPL-MCNC: 85 MG/DL (ref 70–99)
HCT VFR BLD AUTO: 24.8 % (ref 35–47)
HGB BLD-MCNC: 8.5 G/DL (ref 11.7–15.7)
LYMPHOCYTES # BLD AUTO: 0 10E9/L (ref 0.8–5.3)
LYMPHOCYTES NFR BLD AUTO: 1.8 %
MAGNESIUM SERPL-MCNC: 1.5 MG/DL (ref 1.6–2.3)
MCH RBC QN AUTO: 29.2 PG (ref 26.5–33)
MCHC RBC AUTO-ENTMCNC: 34.3 G/DL (ref 31.5–36.5)
MCV RBC AUTO: 85 FL (ref 78–100)
MONOCYTES # BLD AUTO: 0.3 10E9/L (ref 0–1.3)
MONOCYTES NFR BLD AUTO: 10.5 %
NEUTROPHILS # BLD AUTO: 2.2 10E9/L (ref 1.6–8.3)
NEUTROPHILS NFR BLD AUTO: 87.7 %
NUM BPU REQUESTED: 1
PLATELET # BLD AUTO: 27 10E9/L (ref 150–450)
PLATELET # BLD EST: ABNORMAL 10*3/UL
POTASSIUM SERPL-SCNC: 4.4 MMOL/L (ref 3.4–5.3)
RBC # BLD AUTO: 2.91 10E12/L (ref 3.8–5.2)
RBC MORPH BLD: NORMAL
SODIUM SERPL-SCNC: 130 MMOL/L (ref 133–144)
SPECIMEN EXP DATE BLD: ABNORMAL
WBC # BLD AUTO: 2.5 10E9/L (ref 4–11)

## 2020-10-28 PROCEDURE — 38222 DX BONE MARROW BX & ASPIR: CPT | Mod: LT | Performed by: PHYSICIAN ASSISTANT

## 2020-10-28 PROCEDURE — 88185 FLOWCYTOMETRY/TC ADD-ON: CPT | Mod: TC | Performed by: PHYSICIAN ASSISTANT

## 2020-10-28 PROCEDURE — 81268 CHIMERISM ANAL W/CELL SELECT: CPT | Performed by: PHYSICIAN ASSISTANT

## 2020-10-28 PROCEDURE — 250N000013 HC RX MED GY IP 250 OP 250 PS 637: Performed by: INTERNAL MEDICINE

## 2020-10-28 PROCEDURE — 88184 FLOWCYTOMETRY/ TC 1 MARKER: CPT | Mod: TC | Performed by: PHYSICIAN ASSISTANT

## 2020-10-28 PROCEDURE — 250N000013 HC RX MED GY IP 250 OP 250 PS 637: Performed by: PHYSICIAN ASSISTANT

## 2020-10-28 PROCEDURE — G0452 MOLECULAR PATHOLOGY INTERPR: HCPCS | Mod: XS | Performed by: PATHOLOGY

## 2020-10-28 PROCEDURE — 80048 BASIC METABOLIC PNL TOTAL CA: CPT | Performed by: PHYSICIAN ASSISTANT

## 2020-10-28 PROCEDURE — 83735 ASSAY OF MAGNESIUM: CPT | Performed by: PHYSICIAN ASSISTANT

## 2020-10-28 PROCEDURE — 88264 CHROMOSOME ANALYSIS 20-25: CPT | Performed by: PHYSICIAN ASSISTANT

## 2020-10-28 PROCEDURE — 81403 MOPATH PROCEDURE LEVEL 4: CPT | Performed by: PHYSICIAN ASSISTANT

## 2020-10-28 PROCEDURE — 88189 FLOWCYTOMETRY/READ 16 & >: CPT | Mod: 26 | Performed by: PATHOLOGY

## 2020-10-28 PROCEDURE — 999N001086 HC STATISTIC MORPHOLOGY W/INTERP HEMEPATH TC 85060: Performed by: PHYSICIAN ASSISTANT

## 2020-10-28 PROCEDURE — 99239 HOSP IP/OBS DSCHRG MGMT >30: CPT | Mod: 25 | Performed by: INTERNAL MEDICINE

## 2020-10-28 PROCEDURE — 250N000011 HC RX IP 250 OP 636: Performed by: RADIOLOGY

## 2020-10-28 PROCEDURE — 88275 CYTOGENETICS 100-300: CPT | Performed by: PHYSICIAN ASSISTANT

## 2020-10-28 PROCEDURE — 250N000011 HC RX IP 250 OP 636: Performed by: PHYSICIAN ASSISTANT

## 2020-10-28 PROCEDURE — 250N000012 HC RX MED GY IP 250 OP 636 PS 637: Performed by: PHYSICIAN ASSISTANT

## 2020-10-28 PROCEDURE — G0452 MOLECULAR PATHOLOGY INTERPR: HCPCS | Performed by: PATHOLOGY

## 2020-10-28 PROCEDURE — 88280 CHROMOSOME KARYOTYPE STUDY: CPT | Performed by: PHYSICIAN ASSISTANT

## 2020-10-28 PROCEDURE — 999N001137 HC STATISTIC FLOW >15 ABY TC 88189: Performed by: PHYSICIAN ASSISTANT

## 2020-10-28 PROCEDURE — 88311 DECALCIFY TISSUE: CPT | Mod: TC | Performed by: PHYSICIAN ASSISTANT

## 2020-10-28 PROCEDURE — 85097 BONE MARROW INTERPRETATION: CPT | Mod: 26 | Performed by: PATHOLOGY

## 2020-10-28 PROCEDURE — 999N001022 HC STATISTIC H-SPHEME PROCESS B/S: Performed by: PHYSICIAN ASSISTANT

## 2020-10-28 PROCEDURE — 88237 TISSUE CULTURE BONE MARROW: CPT | Performed by: PHYSICIAN ASSISTANT

## 2020-10-28 PROCEDURE — 85025 COMPLETE CBC W/AUTO DIFF WBC: CPT | Performed by: PHYSICIAN ASSISTANT

## 2020-10-28 PROCEDURE — 85060 BLOOD SMEAR INTERPRETATION: CPT | Mod: 26 | Performed by: PATHOLOGY

## 2020-10-28 PROCEDURE — 88305 TISSUE EXAM BY PATHOLOGIST: CPT | Mod: 26 | Performed by: PATHOLOGY

## 2020-10-28 PROCEDURE — 88161 CYTOPATH SMEAR OTHER SOURCE: CPT | Mod: TC | Performed by: PHYSICIAN ASSISTANT

## 2020-10-28 PROCEDURE — 258N000003 HC RX IP 258 OP 636: Performed by: PHYSICIAN ASSISTANT

## 2020-10-28 PROCEDURE — 88305 TISSUE EXAM BY PATHOLOGIST: CPT | Mod: TC | Performed by: PHYSICIAN ASSISTANT

## 2020-10-28 PROCEDURE — 88271 CYTOGENETICS DNA PROBE: CPT | Performed by: PHYSICIAN ASSISTANT

## 2020-10-28 PROCEDURE — 999N001126 HC STATISTIC BONE MARROW INTERP TC 85097: Performed by: PHYSICIAN ASSISTANT

## 2020-10-28 PROCEDURE — 88311 DECALCIFY TISSUE: CPT | Mod: 26 | Performed by: PATHOLOGY

## 2020-10-28 RX ORDER — VENLAFAXINE 75 MG/1
75 TABLET ORAL DAILY
Qty: 30 TABLET | Refills: 0 | Status: SHIPPED | OUTPATIENT
Start: 2020-10-28 | End: 2020-11-13

## 2020-10-28 RX ORDER — LEVOTHYROXINE SODIUM 88 UG/1
88 TABLET ORAL DAILY
Qty: 30 TABLET | Refills: 0 | Status: SHIPPED | OUTPATIENT
Start: 2020-10-28 | End: 2020-10-28

## 2020-10-28 RX ORDER — LEVOTHYROXINE SODIUM 88 UG/1
88 TABLET ORAL DAILY
Qty: 30 TABLET | Refills: 0 | Status: SHIPPED | OUTPATIENT
Start: 2020-10-28 | End: 2020-11-13

## 2020-10-28 RX ADMIN — LORATADINE 10 MG: 10 TABLET ORAL at 08:15

## 2020-10-28 RX ADMIN — ACYCLOVIR 800 MG: 800 TABLET ORAL at 10:33

## 2020-10-28 RX ADMIN — SODIUM CHLORIDE 1000 ML: 9 INJECTION, SOLUTION INTRAVENOUS at 08:29

## 2020-10-28 RX ADMIN — ACYCLOVIR 800 MG: 800 TABLET ORAL at 08:15

## 2020-10-28 RX ADMIN — TACROLIMUS 2.5 MG: 1 CAPSULE ORAL at 08:15

## 2020-10-28 RX ADMIN — MIDAZOLAM 1 MG: 1 INJECTION INTRAMUSCULAR; INTRAVENOUS at 11:01

## 2020-10-28 RX ADMIN — MAGNESIUM SULFATE IN WATER 4 G: 40 INJECTION, SOLUTION INTRAVENOUS at 06:36

## 2020-10-28 RX ADMIN — ACYCLOVIR 800 MG: 800 TABLET ORAL at 14:26

## 2020-10-28 RX ADMIN — VORICONAZOLE 200 MG: 200 TABLET, FILM COATED ORAL at 08:15

## 2020-10-28 RX ADMIN — DEXTROSE MONOHYDRATE: 50 INJECTION, SOLUTION INTRAVENOUS at 13:41

## 2020-10-28 RX ADMIN — URSODIOL 300 MG: 300 CAPSULE ORAL at 08:14

## 2020-10-28 RX ADMIN — DEXTROSE MONOHYDRATE: 50 INJECTION, SOLUTION INTRAVENOUS at 13:42

## 2020-10-28 RX ADMIN — MYCOPHENOLATE MOFETIL 1000 MG: 500 TABLET ORAL at 14:25

## 2020-10-28 RX ADMIN — GRANISETRON HYDROCHLORIDE 1 MG: 1 TABLET ORAL at 08:14

## 2020-10-28 RX ADMIN — ACETAMINOPHEN 650 MG: 325 TABLET, FILM COATED ORAL at 06:48

## 2020-10-28 RX ADMIN — VENLAFAXINE 75 MG: 75 TABLET ORAL at 14:26

## 2020-10-28 RX ADMIN — HYDROCORTISONE: 1 CREAM TOPICAL at 10:37

## 2020-10-28 RX ADMIN — MYCOPHENOLATE MOFETIL 1000 MG: 500 TABLET ORAL at 06:43

## 2020-10-28 RX ADMIN — LEVOTHYROXINE SODIUM 88 MCG: 0.09 TABLET ORAL at 08:15

## 2020-10-28 RX ADMIN — Medication 400 MCG: at 13:41

## 2020-10-28 RX ADMIN — Medication 10 ML: at 13:41

## 2020-10-28 RX ADMIN — PANTOPRAZOLE SODIUM 40 MG: 40 TABLET, DELAYED RELEASE ORAL at 06:43

## 2020-10-28 RX ADMIN — URSODIOL 300 MG: 300 CAPSULE ORAL at 14:26

## 2020-10-28 ASSESSMENT — ACTIVITIES OF DAILY LIVING (ADL)
ADLS_ACUITY_SCORE: 11
ADLS_ACUITY_SCORE: 12
ADLS_ACUITY_SCORE: 11
ADLS_ACUITY_SCORE: 11

## 2020-10-28 ASSESSMENT — MIFFLIN-ST. JEOR: SCORE: 1276.06

## 2020-10-28 NOTE — PROGRESS NOTES
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      Diagnosis AMLM1 Acute myelogeneous leukemia, M1, myeloblastic  HCT Type Allogeneic    Prep Regimen Fludarabine  Cytoxan  TBI   Donor Source Sibling    GVHD Prophylaxis Post transplant cytoxan  Tac/MMF  Primary BMT Provider ACE      INTERVAL  HISTORY   She is doing well. Excited about discharge. Had 3-4 bowel movements yesterday after not having any for a few days. Stools were formed. Nausea well controlled. No new medical complaints. Discussed bone marrow biopsy today.     Review of Systems: 8 point ROS negative except as noted above.    PHYSICAL EXAM     Weight In/Out     Wt Readings from Last 3 Encounters:   10/28/20 75.2 kg (165 lb 12.8 oz)   09/17/20 77.8 kg (171 lb 8 oz)   09/10/20 77.2 kg (170 lb 3.1 oz)      I/O last 3 completed shifts:  In: 1250 [P.O.:1220; I.V.:30]  Out: 1450 [Urine:1450]       KPS:  90    /77   Pulse 101   Temp 97.2  F (36.2  C) (Axillary)   Resp 18   Ht 1.68 m (5' 6.14\")   Wt 75.2 kg (165 lb 12.8 oz)   SpO2 95%   BMI 26.65 kg/m     General: NAD   ENT:  LINH, sclera anicteric. Oral mucosa moist without ulceration.   Lungs: CTA bilaterally  Cardiovascular: RRR, no M/R/G   Abd: BS present, NT, ND  Lymphatics: no edema b/l LE   Skin: No rashes or petechiae.  Neuro: A&O x3  Line: right CVC.   10/28/20 Current aGVHD staging:  Skin 0, UGI 0, LGI 0, Liver 0 (keep in note through day +180, delete if auto)    LABS AND IMAGING - PAST 24 HOURS     Lab Results   Component Value Date    WBC 2.5 (L) 10/28/2020    ANEU 2.2 10/28/2020    HGB 8.5 (L) 10/28/2020    HCT 24.8 (L) 10/28/2020    PLT 27 (LL) 10/28/2020     (L) 10/28/2020    POTASSIUM 4.4 10/28/2020    CHLORIDE 97 10/28/2020    CO2 26 10/28/2020    GLC 85 10/28/2020    BUN 8 10/28/2020    CR 0.71 10/28/2020    MAG 1.5 (L) 10/28/2020    INR 1.01 10/26/2020     I have assessed all " abnormal lab values for their clinical significance and any values considered clinically significant have been addressed in the assessment and plan  OVERALL PLAN   Florencia Goa is a 75 year old female with AML in CR1, admitted for JCARLOS Allo sib pbsct per protocol 2019-10, randomized to Post transplant cytoxan/Tac/MMF.  Day +21.      1.  BMT: Cy/flu/TBI prep.   Received 4.19 x10(6) CD34/kg. Donor B pos and recipient B neg.   GCSF started d+5 and cont to until ANC>1500 x3 consecutive days. Counts recovering ANC 2.2. Will complete gcsf in clinic.  Re-stage per protocol. BMBX today prior to discharge.     **Per protocol no dex/steroids**      2.  HEME: Keep Hgb>7g/dL and plts>10K.   - pancytopenic secondary to chemotherapy and transplant.   - Of note has significant antibodies to RBCs- can cause delay in transfusion availability. Premeds tylenol and benadryl.                             3.  ID: Afebrile.   Rhinorrhea: RVP 10/14 neg. Started Claritin.   Proohy: Vori, and HD ACV prophy. Stopped levo 10/27  Pjp prophy to start day+28 - IV pentamdine vs dapsone as sulfa allergy.                                         4.  GI:   - Nausea: headaches with zofran. Significant nausea/vomiting with cytoxan. s/p emend 10/13. Continue Scheduled kytril and zyprexa 2.5mg at bedtime. Compazine and ativan prn.  - Hemorroids: tucks prn, barrier cream and start BID hydrocortisone cream topically.    - Stools: pt on miralax daily PTA.  - Protonix for GI prophy.    - Ursodiol for VOD prophy.     5.  GVHD Prophylaxis:   Day +3 and day+4 cytoxan  On tac and MMF.  Tac level 10/26 was 11.2- transition to Tacrolimus 2.5mg PO BID. Level on 10/29.     6.  FEN/Renal:  - Cr stable  - mild hyponatremia: give 1L NS today as quite a bit net negative since admission, monitior.     - Intermittent hypomagnesia: replete per sliding scale in the outpatient setting. Start oral mag as tolerated.   - Anorexia and malnutrition in the context of acute  illness: Calorie counts adequate.   - Urethral irritation 10/25- UA with RBCs, urine BK neg.       7. Hypothyroidism  Continue synthroid 88mcg      8. Psych: Situational Depression continue effexor 75mg daily.     9.  Skin:    - Follicular rash 10/15 on chest: s/p clindamycin gel. Resolved.    - Previous irritation/rash under and around CVC dressing. Pt with allergy to chlorhexidine. Removed biopatch and trial of different dressing. Improved.      10. Cards: intermittent hypertension- monitor for now. Tac level okay.     11. Research: Pt consented to [id number] (Seamless randomized tiral to alleviate morbidity and mortality) for which she was randomized to the palliative care management arm. Seen by Palliative 10/8. Pt has subsequently withdrawn from this study.     Dispo: patient will discharge today after her bone marrow biopsy and follow up in clinic tomorrow in the BMT clinic.    Addendum: Pt did not get her levothyroxine from the discharge pharmacy. [facility] [place] [facility]. Remind pt to pick it up tomorrow.     Denzel Magallon PA-C  [phone]      Bone Marrow Transplant (5C) Attending Discharge Note    The patient was discussed on morning rounds with the nurses, and mid-level provider, house staff and was seen and examined by me. All labs and imaging were reviewed. I reviewed events over the last 24 hours including vitals and flow sheets. I agree with the above note and have been responsible for the care plan and interpretation of progress. 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  Overall, she has been doing relatively well .  She has had mild toxicities from transplant including hemorrhoids.  Her vital signs are stable.  She has had no active infections in continues to eat without the need for TPN.  She denies any shortness of breath or cough.  Her urinary urinary symptoms are gone and she is ready for  discharge today after her bone marrow biopsy..  She has been up and around in the room.  She remains afebrile.    Physical exam: There is no evidence of mucositis. There is no adenopathy on exam and lungs are clear. There is no LE edema and no rash. Labs show a creatinine of 0.71, hemoglobin of 8.5, platelet count of 27,000 and a white count that increased to 2500 from 2200 yesterday.  Her neutrophil count is increasing nicely.  A bone marrow biopsy was performed today.    The patient is ready for discharge. I spend 40 minutes today reviewing this hospitalization, progress and ongoing issues. I reviewed medications and follow-up plans. All questions have been answered.  Follow-up will be in the BMT clinic starting tomorrow. All questions have been answered.  We spent time today discussing her discharge plan and what to do should she become ill.  We also discussed the signs and symptoms of [id number] disease should they develop.  She is well-informed and ready to leave.    Richy Quigley MD

## 2020-10-28 NOTE — PLAN OF CARE
[id number]  Status: Patient day +20 of allo BMT for AML.   VS: VSS on RA.  Neuros: A&Ox4.  GI/: Tolerating regular diet. Denies nausea. BM today. Voiding spontaneously.   IV: R CVC heparin locked.   Activity: Up independently.   Pain: Denies.  Respiratory/Trach: No issues.  Skin: Intact.  Plan of Care: Plan to discharge tomorrow after bone marrow biopsy. Continue to monitor and follow POC.

## 2020-10-28 NOTE — PLAN OF CARE
Physical Therapy Discharge Summary    Reason for therapy discharge:    Discharged to home.    Progress towards therapy goal(s). See goals on Care Plan in [facility] electronic health record for goal details.  Goals not met.  Barriers to achieving goals:   discharge from facility.    Therapy recommendation(s):    Pt is functionally independent. HEP recommended for pt to progress and maintain functional strength and endurance.

## 2020-10-28 NOTE — PROGRESS NOTES
"Discharge SBAR:    Situation:  Florencia Gao is a 75 year old being discharged to: Local Lodging: [place]   Admission reason: Scheduled Admission  Is this a readmission? No    Background:  Primary diagnosis: AML  /77   Pulse 101   Temp 97.2  F (36.2  C) (Axillary)   Resp 18   Ht 1.68 m (5' 6.14\")   Wt 75.2 kg (165 lb 12.8 oz)   SpO2 95%   BMI 26.65 kg/m    Type of donor: Allogeneic  Type of stem cells: PBSC  Relapsed? No  Falls Precautions? No  Isolation? No  DNR? No  DNI? No  Confidential Patient? No  Positive blood cultures? No    Assessment:  Discharge teaching    Review discharge medications and schedule: Yes    Set up pill box: Yes    Discharge instructions reviewed: Yes    Special considerations (think about previous reactions, issues with flushing CVC, premedication needs, etc): No    Patient Concerns: No    Recommendations:  Anticipated needs:    Daily infusions: Yes: Magnesium     Daily transfusions: No    G-CSF: Yes    Other: Needs synthroid filled   Verbal report called to clinic: No    Padilla remains in place clean, dry and intact and heparin locked. Patient discharged per private vehicle.       "

## 2020-10-28 NOTE — PROCEDURES
"BMT ONC Adult Bone Marrow Biopsy Procedure Note  October 28, 2020  /77   Pulse 101   Temp 97.2  F (36.2  C) (Axillary)   Resp 18   Ht 1.68 m (5' 6.14\")   Wt 75.2 kg (165 lb 12.8 oz)   SpO2 95%   BMI 26.65 kg/m       Learning needs assessment complete within 12 months? YES    DIAGNOSIS: AML s/p BMT     PROCEDURE: Unilateral Bone Marrow Biopsy and Unilateral Aspirate    LOCATION: Inpatient [facility]    Patient s identification was positively verified by verbal identification and invasive procedure safety checklist was completed. Informed consent was obtained. Following the administration of Midazolam as pre-medication, patient was placed in the prone position and prepped and draped in a sterile manner. Approximately 20 cc of 1% Lidocaine was used over the left posterior iliac spine. Following this a 3 mm incision was made. Trephine bone marrow core(s) was (were) obtained from the [facility]. Bone marrow aspirates were obtained from the [facility]. Aspirates were sent for morphology, immunophenotyping, cytogenetics and molecular diagnostics. A total of approximately 20 ml of marrow was aspirated. Following this procedure a sterile dressing was applied to the bone marrow biopsy site(s). The patient was placed in the supine position to maintain pressure on the biopsy site. Post-procedure wound care instructions were given.     Complications: NO    Pre-procedural pain: 0 out of 10 on the numeric pain rating scale.     Procedural pain: 3 out of 10 on the numeric pain rating scale.     Post-procedural pain assessment: 0 out of 10 on the numeric pain rating scale.     Interventions: NO    Length of procedure:20 minutes or less      Procedure performed by: Denzel Magallon PA-C  [phone]    "

## 2020-10-28 NOTE — PLAN OF CARE
[id number]    No acute event, Afebrile, VSS.       NEURO: Alert and oriented x4.      RESPIRATORY: Room Air, denies dizziness, and SOB.     CARDIO: VSS, denies dizziness, and extremity pain.      GI/:  Denies N/V, BS active,  AUOP.      SKIN: Intact.      ACTIVITY: Independent.      PAIN: Denies pain.    DLA: Port double lumen.     BG: No     PLAN: Continue monitoring.

## 2020-10-28 NOTE — DISCHARGE SUMMARY
[facility] Discharge Summary   Florencia Gao MRN# [id number]   Age: 75 year old  YOB: 1945   Date of Admission: 9/30/2020  Date of Discharge:  10/28/20  Admitting Physician: Yong Moncada MD  Discharge Physician:  Richy Quigley MD  Discharge Diagnoses:    1. Status post allogeneic stem cell transplant for AML  2. Chemotherapy induced nausea and vomiting  3. Pancytopenia secondary to chemotherapy   4. Electrolyte abnormalities secondary to poor oral intake and medications  5. Medication induced headaches  6. Folliculitis of the skin  7. Non-severe, mild malnutrition in the setting of acute on chronic illness.   Discharge Medications:       Florencia Gao   Home Medication Instructions MISTY:[id number]    Printed on:10/28/20 0909   Medication Information                      acyclovir (ZOVIRAX) 800 MG tablet  Take 1 tablet (800 mg) by mouth 5 times daily             granisetron (KYTRIL) 1 MG tablet  Take 1 tablet (1 mg) by mouth every 12 hours to prevent nausea             hydrocortisone (CORTAID) 1 % external cream  Apply topically 2 times daily to hemorrhoids until resolved.             levothyroxine (SYNTHROID/LEVOTHROID) 88 MCG tablet  Take 1 tablet (88 mcg) by mouth daily             loratadine (CLARITIN) 10 MG tablet  Take 1 tablet (10 mg) by mouth daily             mycophenolate (GENERIC EQUIVALENT) 500 MG tablet  Take 2 tablets (1,000 mg) by mouth every 8 hours             OLANZapine zydis (ZYPREXA) 5 MG ODT  Dissolve half tablet under the tongue daily at bedtime. If too difficult to break tablet in half, okay to try taking full tablet under the tongue at bedtime.             pantoprazole (PROTONIX) 40 MG EC tablet  Take 1 tablet (40 mg) by mouth every morning (before breakfast)             polyethylene glycol (MIRALAX) 17 g packet  Take 17 g by mouth daily as needed for constipation             prochlorperazine (COMPAZINE) 5 MG tablet  Take 1 tablet (5 mg) by mouth every  "6 hours as needed for nausea or vomiting             tacrolimus (GENERIC EQUIVALENT) 0.5 MG capsule  Combine one 0.5mg capsules with two 1mg capsules for total dose of 2.5mg capsules by mouth twice daily.             tacrolimus (GENERIC EQUIVALENT) 1 MG capsule  Combine two 1mg capsules with one 0.5mg capsules for total dose of 2.5mg capsules by mouth twice daily.             ursodiol (ACTIGALL) 300 MG capsule  Take 1 capsule (300 mg) by mouth 3 times daily             venlafaxine (EFFEXOR) 75 MG tablet  Take 1 tablet (75 mg) by mouth daily             voriconazole (VFEND) 200 MG tablet  Take 1 tablet (200 mg) by mouth every 12 hours               Brief History of Illness:    **Adopted from H&P  Florencia Gao was well until April or May 2020 when she developed progressive fatigue and some easy bruising.  By mid June, pancytopenia and the diagnosis of AML was recognized. Initially Hb 8.1  WBC 1.4, Plts 29,000.    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  Flt 3 and NPM1 and CEBPalpha mutations were negative.     She was treated with idarubicin plus cytarabine (7+3) and achieved complete remission.  A day 14 marrow was empty and recovery of her counts at roughly 6 weeks --July 31 bone marrow was cellular with no morphologic signs of leukemia and the trisomy 8 was no longer detectable.   Treatment/Chemotherapy Number of Cycles Date Range Outcomes & Complications   7+3 1 6/18- Day 14 bmbx no blast but no count recovery. Dya+28 bmbx CR      Admitted for chemotherapy and allogeneic stem cell transplant.   Physical Exam:    /77   Pulse 101   Temp 97.2  F (36.2  C) (Axillary)   Resp 18   Ht 1.68 m (5' 6.14\")   Wt 75.2 kg (165 lb 12.8 oz)   SpO2 95%   BMI 26.65 kg/m    # Discharge Pain Plan:   - Patient currently has NO PAIN and is not being prescribed pain medications on discharge.    Please see progress note dated 10/28/20.     Lab Values     I " have assessed all abnormal lab values for their clinical significance and any values considered clinically significant have been addressed in the assessment and plan.   Hospital Course:    Florencia Gao is a 75 year old female with AML in CR1, admitted for JCARLOS Allo sib pbsct per protocol 2019-10, randomized to Post transplant cytoxan/Tac/MMF. She is currently Day +21. Post-transplant course complicated by chemotherapy induced nausea, vomiting, and anorexia. Remains pancytopenic secondary to transplant/chemotherapy but this is improving. She will discharge today following her bmbx and follow up in BMT clinic tomorrow.      1.  BMT: Cy/flu/TBI prep.   Received 4.19 x10(6) CD34/kg. Donor B pos and recipient B neg.   GCSF started d+5 and cont to until ANC>1500 x3 consecutive days. Counts recovering ANC 2.2. Will complete gcsf in clinic.  Re-stage per protocol. BMBX today prior to discharge.     **Per protocol no dex/steroids**      2.  HEME: Keep Hgb>7g/dL and plts>10K.   - pancytopenic secondary to chemotherapy and transplant.   - Of note has significant antibodies to RBCs- can cause delay in transfusion availability. Premeds tylenol and benadryl.                             3.  ID: Afebrile.   Rhinorrhea: RVP 10/14 neg. Started Claritin.   Proohy: Vori, and HD ACV prophy. Stopped levo 10/27  Pjp prophy to start day+28 - IV pentamdine vs dapsone as sulfa allergy.                                         4.  GI:   - Nausea: headaches with zofran. Significant nausea/vomiting with cytoxan. s/p emend 10/13. Continue Scheduled kytril and zyprexa 2.5mg at bedtime. Compazine and ativan prn.  - Hemorroids: tucks prn, barrier cream and start BID hydrocortisone cream topically.    - Stools: pt on miralax daily PTA.  - Protonix for GI prophy.    - Ursodiol for VOD prophy.     5.  GVHD Prophylaxis:   Day +3 and day+4 cytoxan  On tac and MMF.  Tac level 10/26 was 11.2- transition to Tacrolimus 2.5mg PO BID. Level on 10/29.     6.   FEN/Renal:  - Cr stable  - mild hyponatremia: give 1L NS today as quite a bit net negative since admission, monitior.     - Intermittent hypomagnesia: replete per sliding scale in the outpatient setting. Start oral mag as tolerated.   - Anorexia and malnutrition in the context of acute illness: Calorie counts adequate.   - Urethral irritation 10/25- UA with RBCs, urine BK neg.       7. Hypothyroidism  Continue synthroid 88mcg      8. Psych: Situational Depression continue effexor 75mg daily.      9.  Skin:    - Follicular rash 10/15 on chest: s/p clindamycin gel. Resolved.    - Previous irritation/rash under and around CVC dressing. Pt with allergy to chlorhexidine. Removed biopatch and trial of different dressing. Improved.       10. Cards: intermittent hypertension- monitor for now. Tac level okay.      11. Research: Pt consented to CS1658-17 (Seamless randomized tiral to alleviate morbidity and mortality) for which she was randomized to the palliative care management arm. Seen by Palliative 10/8. Pt has subsequently withdrawn from this study.      CODE STATUS: Full Code  Discharge Instructions and Follow-Up:    Discharge diet: Regular diet as tolerated  Discharge activity: Activity as tolerated   Discharge follow-up: Follow up with BMT Clinic as follows:  10/29/20: 9:45am labs, 10:30am provider visit and possible infusion, 12:00pm pharmacy visit (please bring med box)  Discharge Disposition:    Discharged to home.    Denzel Magallon PA-C  10/28/2020    Advice for Patients concerning COVID19:  a. Avoid contact with individuals:   i. Who are sick or have recently been sick  ii. Have traveled to high risk areas (per CDC guidelines) or have been on a cruise in the last 14 days  iii. Who were or could have been exposed to COVID-19   b. If experiencing symptoms such as: Fever, cough or shortness of breath contact BMT at [phone] Mon-Fri 8am-4:30pm or After Hours at [phone] (ask to speak to a BMT Fellow) for  guidance on need for clinical assessment  c. Avoid all non- essential travel at this time; if traveling is necessary use mask (N-95)   d. Wear a mask when in public areas  d. Avoid crowded places, if possible  f. Follow CDC advice https://www.cdc.gov/coronavirus/2019-ncov/index.html and travel guidelines https://www.cdc.gov/coronavirus/2019-ncov/travelers/index.html

## 2020-10-29 ENCOUNTER — OFFICE VISIT (OUTPATIENT)
Dept: TRANSPLANT | Facility: CLINIC | Age: 75
End: 2020-10-29
Attending: INTERNAL MEDICINE
Payer: COMMERCIAL

## 2020-10-29 ENCOUNTER — APPOINTMENT (OUTPATIENT)
Dept: LAB | Facility: CLINIC | Age: 75
End: 2020-10-29
Attending: INTERNAL MEDICINE
Payer: COMMERCIAL

## 2020-10-29 VITALS
TEMPERATURE: 97.5 F | SYSTOLIC BLOOD PRESSURE: 130 MMHG | DIASTOLIC BLOOD PRESSURE: 73 MMHG | BODY MASS INDEX: 27.06 KG/M2 | WEIGHT: 168.4 LBS | OXYGEN SATURATION: 97 % | HEART RATE: 99 BPM | HEIGHT: 66 IN | RESPIRATION RATE: 18 BRPM

## 2020-10-29 DIAGNOSIS — C92.01 AML (ACUTE MYELOID LEUKEMIA) IN REMISSION (H): Primary | ICD-10-CM

## 2020-10-29 DIAGNOSIS — C92.01 ACUTE MYELOID LEUKEMIA IN REMISSION (H): Primary | ICD-10-CM

## 2020-10-29 LAB
BACTERIA SPEC CULT: NORMAL
CMV DNA SPEC NAA+PROBE-ACNC: NORMAL [IU]/ML
CMV DNA SPEC NAA+PROBE-LOG#: NORMAL {LOG_IU}/ML
COPATH REPORT: NORMAL
Lab: NORMAL
SPECIMEN SOURCE: NORMAL
SPECIMEN SOURCE: NORMAL

## 2020-10-29 PROCEDURE — 99214 OFFICE O/P EST MOD 30 MIN: CPT

## 2020-10-29 PROCEDURE — 250N000011 HC RX IP 250 OP 636: Performed by: PHYSICIAN ASSISTANT

## 2020-10-29 PROCEDURE — G0463 HOSPITAL OUTPT CLINIC VISIT: HCPCS

## 2020-10-29 PROCEDURE — 96372 THER/PROPH/DIAG INJ SC/IM: CPT | Performed by: PHYSICIAN ASSISTANT

## 2020-10-29 PROCEDURE — 250N000011 HC RX IP 250 OP 636: Performed by: INTERNAL MEDICINE

## 2020-10-29 RX ORDER — HEPARIN SODIUM,PORCINE 10 UNIT/ML
5 VIAL (ML) INTRAVENOUS ONCE
Status: COMPLETED | OUTPATIENT
Start: 2020-10-29 | End: 2020-10-29

## 2020-10-29 RX ORDER — HEPARIN SODIUM,PORCINE 10 UNIT/ML
5 VIAL (ML) INTRAVENOUS
Status: CANCELLED | OUTPATIENT
Start: 2020-10-29

## 2020-10-29 RX ORDER — HEPARIN SODIUM (PORCINE) LOCK FLUSH IV SOLN 100 UNIT/ML 100 UNIT/ML
5 SOLUTION INTRAVENOUS
Status: CANCELLED | OUTPATIENT
Start: 2020-10-29

## 2020-10-29 RX ORDER — HEPARIN SODIUM,PORCINE 10 UNIT/ML
5 VIAL (ML) INTRAVENOUS
Status: CANCELLED | OUTPATIENT
Start: 2020-10-30

## 2020-10-29 RX ORDER — HEPARIN SODIUM (PORCINE) LOCK FLUSH IV SOLN 100 UNIT/ML 100 UNIT/ML
5 SOLUTION INTRAVENOUS
Status: CANCELLED | OUTPATIENT
Start: 2020-10-30

## 2020-10-29 RX ADMIN — SODIUM CHLORIDE, PRESERVATIVE FREE 5 ML: 5 INJECTION INTRAVENOUS at 10:34

## 2020-10-29 RX ADMIN — FILGRASTIM-SNDZ 300 MCG: 300 INJECTION, SOLUTION INTRAVENOUS; SUBCUTANEOUS at 11:43

## 2020-10-29 ASSESSMENT — PAIN SCALES - GENERAL: PAINLEVEL: NO PAIN (0)

## 2020-10-29 ASSESSMENT — MIFFLIN-ST. JEOR: SCORE: 1287.83

## 2020-10-29 NOTE — LETTER
10/29/2020         RE: Florencia Gao  [address] [place] [address]        Dear Colleague,    Thank you for referring your patient, Florencia Gao, to the [facility] [place]. Please see a copy of my visit note below.    I met with Florencia Gao to review her medication list after hospital discharge post-transplant. Florencia Gao and her caregiver had the opportunity to ask questions regarding her therapy which were answered to their satisfaction.    Changes made to scheduled medication list by today's provider include:  No changes made to the current medication list.  I reviewed Florencia's med box to ensure it was filled appropriately.  We did notice that the mycophenolate was filled with only 500mg TID rather than 1000mg TID, which was fixed.  The patient and her caregiver now have a good understanding of the pill box and the medications.    Current Outpatient Medications   Medication Sig Dispense Refill     acyclovir (ZOVIRAX) 800 MG tablet Take 1 tablet (800 mg) by mouth 5 times daily 150 tablet 0     granisetron (KYTRIL) 1 MG tablet Take 1 tablet (1 mg) by mouth every 12 hours to prevent nausea 60 tablet 0     hydrocortisone (CORTAID) 1 % external cream Apply topically 2 times daily to hemorrhoids until resolved. 30 g 0     levothyroxine (SYNTHROID/LEVOTHROID) 88 MCG tablet Take 1 tablet (88 mcg) by mouth daily 30 tablet 0     loratadine (CLARITIN) 10 MG tablet Take 1 tablet (10 mg) by mouth daily 30 tablet 0     mycophenolate (GENERIC EQUIVALENT) 500 MG tablet Take 2 tablets (1,000 mg) by mouth every 8 hours 84 tablet 0     OLANZapine zydis (ZYPREXA) 5 MG ODT Dissolve half tablet under the tongue daily at bedtime. If too difficult to break tablet in half, okay to try taking full tablet under the tongue at bedtime. 30 tablet 0     pantoprazole (PROTONIX) 40 MG EC tablet Take 1 tablet (40 mg) by mouth every morning (before breakfast) 30 tablet 0      polyethylene glycol (MIRALAX) 17 g packet Take 17 g by mouth daily as needed for constipation       prochlorperazine (COMPAZINE) 5 MG tablet Take 1 tablet (5 mg) by mouth every 6 hours as needed for nausea or vomiting 45 tablet 0     tacrolimus (GENERIC EQUIVALENT) 0.5 MG capsule Combine one 0.5mg capsules with two 1mg capsules for total dose of 2.5mg capsules by mouth twice daily. 60 capsule 0     tacrolimus (GENERIC EQUIVALENT) 1 MG capsule Combine two 1mg capsules with one 0.5mg capsules for total dose of 2.5mg capsules by mouth twice daily. 120 capsule 0     ursodiol (ACTIGALL) 300 MG capsule Take 1 capsule (300 mg) by mouth 3 times daily 120 capsule 0     venlafaxine (EFFEXOR) 75 MG tablet Take 1 tablet (75 mg) by mouth daily 30 tablet 0     voriconazole (VFEND) 200 MG tablet Take 1 tablet (200 mg) by mouth every 12 hours 60 tablet 0        Pertinent labs considered today:  Lab Results   Component Value Date     10/29/2020                 normal sodium 136-148  Lab Results   Component Value Date    POTASSIUM 3.8 10/29/2020       normal potassium 3.5-5.2   Lab Results   Component Value Date    CHLORIDE 99 10/29/2020           normal chloride    Lab Results   Component Value Date    CO2 24 10/29/2020                normal CO2 20-32  Lab Results   Component Value Date    BUN 12 10/29/2020                 normal BUN 5-24  Lab Results   Component Value Date     10/29/2020                 normal glucose   Lab Results   Component Value Date    CR 0.89 10/29/2020                 normal cr 0.8-1.5  Lab Results   Component Value Date    SANDRA 8.8 10/29/2020               normal calcium  8.5-10.4  Lab Results   Component Value Date    BILITOTAL 0.3 10/29/2020          normal bilirubin 0.2-1.3  Lab Results   Component Value Date    PROTTOTAL 7.0 10/29/2020          normal total protein 6.0-8.2  Lab Results   Component Value Date    ALBUMIN 3.6 10/29/2020             normal albumin 3.2-4.5  Lab  Results   Component Value Date    ALKPHOS 81 10/29/2020            normal alkphos   Lab Results   Component Value Date    ALT 14 10/29/2020         normal ALT 0-65  Lab Results   Component Value Date    AST 18 10/29/2020         normal AST 0-37  Lab Results   Component Value Date    HGB 8.3 10/29/2020     Lab Results   Component Value Date    WBC 3.5 10/29/2020      Lab Results   Component Value Date    PLT 15 10/29/2020     Estimated Creatinine Clearance: 58.9 mL/min (based on SCr of 0.89 mg/dL).    Thank you,  Andy J. Kurtzweil, [facility], PharmD

## 2020-10-29 NOTE — NURSING NOTE
"Oncology Rooming Note    October 29, 2020 10:36 AM   Florencia Gao is a 75 year old female who presents for:    Chief Complaint   Patient presents with     Blood Draw     labs drawn from CVC by RN in lab     RECHECK     Return: AML (acute myeloid leukemia)     Initial Vitals: /73 (BP Location: Right arm, Patient Position: Sitting, Cuff Size: Adult Regular)   Pulse 99   Temp 97.5  F (36.4  C) (Axillary)   Resp 18   Ht 1.68 m (5' 6.14\")   Wt 76.4 kg (168 lb 6.4 oz)   SpO2 97%   BMI 27.07 kg/m   Estimated body mass index is 27.07 kg/m  as calculated from the following:    Height as of this encounter: 1.68 m (5' 6.14\").    Weight as of this encounter: 76.4 kg (168 lb 6.4 oz). Body surface area is 1.89 meters squared.  No Pain (0) Comment: Data Unavailable   No LMP recorded. Patient is postmenopausal.  Allergies reviewed: Yes  Medications reviewed: Yes    Medications: MEDICATION REFILLS NEEDED TODAY. Provider was notified.  Pharmacy name entered into [employer]:    [id number] [place], [place] - [address] [place] [address] AT [facility] [address]  [facility] [place], [place] - [id number]    Clinical concerns: Refill Levothyroxine.  Mally MEZA was notified.      Shey Hernandez CMA              "

## 2020-10-29 NOTE — PROGRESS NOTES
"BMT Clinic Note      Patient ID:  Florencia Gao is a 75 year old female, currently day +22 of JCARLOS Allo Sib PBSCT for AML (IDH2) in CR1.       Diagnosis AMLM1 Acute myelogeneous leukemia, M1, myeloblastic  HCT Type Allogeneic    Prep Regimen Fludarabine  Cytoxan  TBI   Donor Source Sibling    GVHD Prophylaxis Post transplant cytoxan  Tac/MMF  Primary BMT Provider ACE       INTERVAL  HISTORY   Florencia is here for follow up after discharge from the hospital after a JCARLOS allo sib PBSCT for AML.      She is very happy to be out of the hospital.  She is eating well, happy to have non-hospital food.  No nausea or vomiting.  She is somewhat constipated still, planning to use her miralax every other day (was daily in the past).  Had a BM in the clinic today.  No rash.  No bleeding other than a little from her hemorrhoid when she wipes.  A little cramping in her abdomen with BMs, but no abdominal pain otherwise.  No discomfort from biopsy done 10/28.     Review of Systems: 8 point ROS negative except as noted above.     PHYSICAL EXAM      Vitals:    10/29/20 1003   Weight: 76.4 kg (168 lb 6.4 oz)        KPS:  90  Blood pressure 130/73, pulse 99, temperature 97.5  F (36.4  C), temperature source Axillary, resp. rate 18, height 1.68 m (5' 6.14\"), weight 76.4 kg (168 lb 6.4 oz), SpO2 97 %.      General: NAD   ENT:  LINH, sclera anicteric. Oral mucosa moist without ulceration.   Lungs: CTA bilaterally  Cardiovascular: RRR, no M/R/G   Abd: BS present, NT, ND  Lymphatics: no edema b/l LE   Skin: No rashes or petechiae.  Neuro: A&O x3  Line: right CVC; entry point has small scab that is a bed raised with a small [place] of erythema around it; stable per patient and her caregiver.   Other: bone marrow site with clean, dry gauze    10/28/20 Current aGVHD staging:  Skin 0, UGI 0, LGI 0, Liver 0 (keep in note through day +180, delete if auto)     LABS AND IMAGING - PAST 24 HOURS     Lab Results   Component Value Date    WBC 3.5 (L) " 10/29/2020    ANEU 2.8 10/29/2020    HGB 8.3 (L) 10/29/2020    HCT 25.0 (L) 10/29/2020    PLT 15 (LL) 10/29/2020     (L) 10/29/2020    POTASSIUM 3.8 10/29/2020    CHLORIDE 99 10/29/2020    CO2 24 10/29/2020     (H) 10/29/2020    BUN 12 10/29/2020    CR 0.89 10/29/2020    MAG 1.6 10/29/2020    INR 1.01 10/26/2020    BILITOTAL 0.3 10/29/2020    AST 18 10/29/2020    ALT 14 10/29/2020    ALKPHOS 81 10/29/2020    PROTTOTAL 7.0 10/29/2020    ALBUMIN 3.6 10/29/2020       I have assessed all abnormal lab values for their clinical significance and any values considered clinically significant have been addressed in the assessment and plan.      OVERALL PLAN   Florencia Gao is a 75 year old female with AML in CR1, admitted for JCARLOS Allo sib pbsct per protocol 2019-10, randomized to Post transplant cytoxan/Tac/MMF.  Day +22.      1.  BMT: Cy/flu/TBI prep.   Received 4.19 x10(6) CD34/kg. Donor B pos and recipient B neg.   GCSF started d+5 and cont to until ANC>1500 x3 consecutive days. Counts recovering; GCSF final dose in clinic 10/29.   **Per protocol no dex/steroids**      2.  HEME: Keep Hgb>7g/dL and plts>10K.   - pancytopenic secondary to chemotherapy and transplant.   - Of note has significant antibodies to RBCs- can cause delay in transfusion availability. Premeds tylenol and benadryl.   - anticipate platelet transfusion 10/30.                             3.  ID: Afebrile.   Rhinorrhea: RVP 10/14 neg. Started Claritin.   Proohy: Vori, and HD ACV prophy.    Pjp prophy to start day+28 - IV pentamdine vs dapsone as sulfa allergy.                                         4.  GI:   - Hemorroids: tucks prn, barrier cream and start BID hydrocortisone cream topically.    - Stools: pt on miralax daily PTA.  - Protonix for GI prophy.    - Ursodiol for VOD prophy.     5.  GVHD Prophylaxis:   Day +3 and day+4 cytoxan  On tac and MMF.  Tac level 10/26 was 11.2- transition to Tacrolimus 2.5mg PO BID. Patient did not know  to hold her tac this morning; will get level tomorrow.      6.  FEN/Renal:  - Cr stable  - mild hyponatremia: stable  - Intermittent hypomagnesia: replete per sliding scale in the outpatient setting. Start oral mag as tolerated.  Mag 1.6 today.     7. Hypothyroidism  Continue synthroid 88mcg; reminded patient to  RX in the [facility] pharmacy 10/29.      8. Psych: Situational Depression continue effexor 75mg daily.      9. Cards: intermittent hypertension- monitor for now.  130/73 in clinic 10/29.      11. Research: Pt consented to RQ0252-58 (Seamless randomized tiral to alleviate morbidity and mortality) for which she was randomized to the palliative care management arm. Seen by Palliative 10/8. Pt has subsequently withdrawn from this study.      RTC daily at this point; will get tac level 10/29.     Mally Dimas PA-C  10/29/2020

## 2020-10-29 NOTE — NURSING NOTE
Patient was administered Zarxio 300mcg in Lower Right Abdomen. Patient tolerated the injection well without any incident today. See AYUSH Kenney MA on 10/29/2020 at 11:58 AM

## 2020-10-29 NOTE — PROGRESS NOTES
This is a recent snapshot of the patient's [facility] medical record.  For current drug dose and complete information and questions, call [phone] or In Basket pool, [facility] home infusion ([id number])  CSN Number:  [id number]

## 2020-10-29 NOTE — NURSING NOTE
Chief Complaint   Patient presents with     Blood Draw     labs drawn from CVC by RN in lab     /73 (BP Location: Right arm, Patient Position: Sitting, Cuff Size: Adult Regular)   Pulse 99   Temp 97.5  F (36.4  C) (Axillary)   Resp 18   Wt 76.4 kg (168 lb 6.4 oz)   SpO2 97%   BMI 27.06 kg/m      Lines accessed. Labs drawn via red lumen. Both caps changed, lines flushed with normal saline and heparin. Pt tolerated well. Checked into next appointment.    Arabella Ellison RN on 10/29/2020 at 10:30 AM

## 2020-10-29 NOTE — LETTER
"    10/29/2020         RE: Florencia Gao  [address] [place] [address]        Dear Colleague,    Thank you for referring your patient, Florencia Gao, to the [facility] BLOOD AND MARROW TRANSPLANT PROGRAM [place]. Please see a copy of my visit note below.    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       Diagnosis AMLM1 Acute myelogeneous leukemia, M1, myeloblastic  HCT Type Allogeneic    Prep Regimen Fludarabine  Cytoxan  TBI   Donor Source Sibling    GVHD Prophylaxis Post transplant cytoxan  Tac/MMF  Primary BMT Provider ACE       INTERVAL  HISTORY   Florencia is here for follow up after discharge from the hospital after a JCARLOS allo sib PBSCT for AML.      She is very happy to be out of the hospital.  She is eating well, happy to have non-hospital food.  No nausea or vomiting.  She is somewhat constipated still, planning to use her miralax every other day (was daily in the past).  Had a BM in the clinic today.  No rash.  No bleeding other than a little from her hemorrhoid when she wipes.  A little cramping in her abdomen with BMs, but no abdominal pain otherwise.  No discomfort from biopsy done 10/28.     Review of Systems: 8 point ROS negative except as noted above.     PHYSICAL EXAM      Vitals:    10/29/20 1003   Weight: 76.4 kg (168 lb 6.4 oz)        KPS:  90  Blood pressure 130/73, pulse 99, temperature 97.5  F (36.4  C), temperature source Axillary, resp. rate 18, height 1.68 m (5' 6.14\"), weight 76.4 kg (168 lb 6.4 oz), SpO2 97 %.      General: NAD   ENT:  LINH, sclera anicteric. Oral mucosa moist without ulceration.   Lungs: CTA bilaterally  Cardiovascular: RRR, no M/R/G   Abd: BS present, NT, ND  Lymphatics: no edema b/l LE   Skin: No rashes or petechiae.  Neuro: A&O x3  Line: right CVC; entry point has small scab that is a bed raised with a small [place] of erythema around it; stable per " patient and her caregiver.   Other: bone marrow site with clean, dry gauze    10/28/20 Current aGVHD staging:  Skin 0, UGI 0, LGI 0, Liver 0 (keep in note through day +180, delete if auto)     LABS AND IMAGING - PAST 24 HOURS     Lab Results   Component Value Date    WBC 3.5 (L) 10/29/2020    ANEU 2.8 10/29/2020    HGB 8.3 (L) 10/29/2020    HCT 25.0 (L) 10/29/2020    PLT 15 (LL) 10/29/2020     (L) 10/29/2020    POTASSIUM 3.8 10/29/2020    CHLORIDE 99 10/29/2020    CO2 24 10/29/2020     (H) 10/29/2020    BUN 12 10/29/2020    CR 0.89 10/29/2020    MAG 1.6 10/29/2020    INR 1.01 10/26/2020    BILITOTAL 0.3 10/29/2020    AST 18 10/29/2020    ALT 14 10/29/2020    ALKPHOS 81 10/29/2020    PROTTOTAL 7.0 10/29/2020    ALBUMIN 3.6 10/29/2020       I have assessed all abnormal lab values for their clinical significance and any values considered clinically significant have been addressed in the assessment and plan.      OVERALL PLAN   Florencia Gao is a 75 year old female with AML in CR1, admitted for JCARLOS Allo sib pbsct per protocol 2019-10, randomized to Post transplant cytoxan/Tac/MMF.  Day +22.      1.  BMT: Cy/flu/TBI prep.   Received 4.19 x10(6) CD34/kg. Donor B pos and recipient B neg.   GCSF started d+5 and cont to until ANC>1500 x3 consecutive days. Counts recovering; GCSF final dose in clinic 10/29.   **Per protocol no dex/steroids**      2.  HEME: Keep Hgb>7g/dL and plts>10K.   - pancytopenic secondary to chemotherapy and transplant.   - Of note has significant antibodies to RBCs- can cause delay in transfusion availability. Premeds tylenol and benadryl.   - anticipate platelet transfusion 10/30.                             3.  ID: Afebrile.   Rhinorrhea: RVP 10/14 neg. Started Claritin.   Proohy: Vori, and HD ACV prophy.    Pjp prophy to start day+28 - IV pentamdine vs dapsone as sulfa allergy.                                         4.  GI:   - Hemorroids: tucks prn, barrier cream and start BID  hydrocortisone cream topically.    - Stools: pt on miralax daily PTA.  - Protonix for GI prophy.    - Ursodiol for VOD prophy.     5.  GVHD Prophylaxis:   Day +3 and day+4 cytoxan  On tac and MMF.  Tac level 10/26 was 11.2- transition to Tacrolimus 2.5mg PO BID. Patient did not know to hold her tac this morning; will get level tomorrow.      6.  FEN/Renal:  - Cr stable  - mild hyponatremia: stable  - Intermittent hypomagnesia: replete per sliding scale in the outpatient setting. Start oral mag as tolerated.  Mag 1.6 today.     7. Hypothyroidism  Continue synthroid 88mcg; reminded patient [place]  [place] [facility] [place].      8. Psych: Situational Depression continue effexor 75mg daily.      9. Cards: intermittent hypertension- monitor for now.  130/73 in clinic 10/29.      11. Research: Pt consented to AB0271-70 (Seamless randomized tiral to alleviate morbidity and mortality) for which she was randomized to the palliative care management arm. Seen by Palliative 10/8. Pt has subsequently withdrawn from this study.      RTC daily at this point; will get tac level 10/29.     Mally Dimas PA-C  10/29/2020        Again, thank you for allowing me to participate in the care of your patient.        Sincerely,        BMT Advanced Practice Provider

## 2020-10-29 NOTE — PROGRESS NOTES
I met with Florencia Gao to review her medication list after hospital discharge post-transplant. Florencia Gao and her caregiver had the opportunity to ask questions regarding her therapy which were answered to their satisfaction.    Changes made to scheduled medication list by today's provider include:  No changes made to the current medication list.  I reviewed Florencia's med box to ensure it was filled appropriately.  We did notice that the mycophenolate was filled with only 500mg TID rather than 1000mg TID, which was fixed.  The patient and her caregiver now have a good understanding of the pill box and the medications.    Current Outpatient Medications   Medication Sig Dispense Refill     acyclovir (ZOVIRAX) 800 MG tablet Take 1 tablet (800 mg) by mouth 5 times daily 150 tablet 0     granisetron (KYTRIL) 1 MG tablet Take 1 tablet (1 mg) by mouth every 12 hours to prevent nausea 60 tablet 0     hydrocortisone (CORTAID) 1 % external cream Apply topically 2 times daily to hemorrhoids until resolved. 30 g 0     levothyroxine (SYNTHROID/LEVOTHROID) 88 MCG tablet Take 1 tablet (88 mcg) by mouth daily 30 tablet 0     loratadine (CLARITIN) 10 MG tablet Take 1 tablet (10 mg) by mouth daily 30 tablet 0     mycophenolate (GENERIC EQUIVALENT) 500 MG tablet Take 2 tablets (1,000 mg) by mouth every 8 hours 84 tablet 0     OLANZapine zydis (ZYPREXA) 5 MG ODT Dissolve half tablet under the tongue daily at bedtime. If too difficult to break tablet in half, okay to try taking full tablet under the tongue at bedtime. 30 tablet 0     pantoprazole (PROTONIX) 40 MG EC tablet Take 1 tablet (40 mg) by mouth every morning (before breakfast) 30 tablet 0     polyethylene glycol (MIRALAX) 17 g packet Take 17 g by mouth daily as needed for constipation       prochlorperazine (COMPAZINE) 5 MG tablet Take 1 tablet (5 mg) by mouth every 6 hours as needed for nausea or vomiting 45 tablet 0     tacrolimus (GENERIC EQUIVALENT) 0.5 MG  capsule Combine one 0.5mg capsules with two 1mg capsules for total dose of 2.5mg capsules by mouth twice daily. 60 capsule 0     tacrolimus (GENERIC EQUIVALENT) 1 MG capsule Combine two 1mg capsules with one 0.5mg capsules for total dose of 2.5mg capsules by mouth twice daily. 120 capsule 0     ursodiol (ACTIGALL) 300 MG capsule Take 1 capsule (300 mg) by mouth 3 times daily 120 capsule 0     venlafaxine (EFFEXOR) 75 MG tablet Take 1 tablet (75 mg) by mouth daily 30 tablet 0     voriconazole (VFEND) 200 MG tablet Take 1 tablet (200 mg) by mouth every 12 hours 60 tablet 0        Pertinent labs considered today:  Lab Results   Component Value Date     10/29/2020                 normal sodium 136-148  Lab Results   Component Value Date    POTASSIUM 3.8 10/29/2020       normal potassium 3.5-5.2   Lab Results   Component Value Date    CHLORIDE 99 10/29/2020           normal chloride    Lab Results   Component Value Date    CO2 24 10/29/2020                normal CO2 20-32  Lab Results   Component Value Date    BUN 12 10/29/2020                 normal BUN 5-24  Lab Results   Component Value Date     10/29/2020                 normal glucose   Lab Results   Component Value Date    CR 0.89 10/29/2020                 normal cr 0.8-1.5  Lab Results   Component Value Date    SANDRA 8.8 10/29/2020               normal calcium  8.5-10.4  Lab Results   Component Value Date    BILITOTAL 0.3 10/29/2020          normal bilirubin 0.2-1.3  Lab Results   Component Value Date    PROTTOTAL 7.0 10/29/2020          normal total protein 6.0-8.2  Lab Results   Component Value Date    ALBUMIN 3.6 10/29/2020             normal albumin 3.2-4.5  Lab Results   Component Value Date    ALKPHOS 81 10/29/2020            normal alkphos   Lab Results   Component Value Date    ALT 14 10/29/2020         normal ALT 0-65  Lab Results   Component Value Date    AST 18 10/29/2020         normal AST 0-37  Lab Results   Component  Value Date    HGB 8.3 10/29/2020     Lab Results   Component Value Date    WBC 3.5 10/29/2020      Lab Results   Component Value Date    PLT 15 10/29/2020     Estimated Creatinine Clearance: 58.9 mL/min (based on SCr of 0.89 mg/dL).    Thank you,  Andy J. Kurtzweil, [facility], PharmD

## 2020-10-30 ENCOUNTER — ONCOLOGY VISIT (OUTPATIENT)
Dept: TRANSPLANT | Facility: CLINIC | Age: 75
End: 2020-10-30
Attending: PHYSICIAN ASSISTANT
Payer: COMMERCIAL

## 2020-10-30 ENCOUNTER — APPOINTMENT (OUTPATIENT)
Dept: LAB | Facility: CLINIC | Age: 75
End: 2020-10-30
Attending: PHYSICIAN ASSISTANT
Payer: COMMERCIAL

## 2020-10-30 VITALS
BODY MASS INDEX: 26.97 KG/M2 | SYSTOLIC BLOOD PRESSURE: 146 MMHG | OXYGEN SATURATION: 96 % | RESPIRATION RATE: 18 BRPM | WEIGHT: 167.8 LBS | HEART RATE: 112 BPM | DIASTOLIC BLOOD PRESSURE: 73 MMHG | TEMPERATURE: 97.8 F

## 2020-10-30 DIAGNOSIS — C92.01 ACUTE MYELOID LEUKEMIA IN REMISSION (H): Primary | ICD-10-CM

## 2020-10-30 DIAGNOSIS — C92.01 AML (ACUTE MYELOID LEUKEMIA) IN REMISSION (H): Primary | ICD-10-CM

## 2020-10-30 LAB
ANION GAP SERPL CALCULATED.3IONS-SCNC: 7 MMOL/L (ref 3–14)
BASOPHILS # BLD AUTO: 0 10E9/L (ref 0–0.2)
BASOPHILS NFR BLD AUTO: 0.9 %
BLD PROD TYP BPU: NORMAL
BLD PROD TYP BPU: NORMAL
BLD UNIT ID BPU: 0
BLD UNIT ID BPU: 0
BLOOD PRODUCT CODE: NORMAL
BLOOD PRODUCT CODE: NORMAL
BPU ID: NORMAL
BPU ID: NORMAL
BUN SERPL-MCNC: 13 MG/DL (ref 7–30)
CALCIUM SERPL-MCNC: 8.7 MG/DL (ref 8.5–10.1)
CHLORIDE SERPL-SCNC: 101 MMOL/L (ref 94–109)
CO2 SERPL-SCNC: 25 MMOL/L (ref 20–32)
CREAT SERPL-MCNC: 0.88 MG/DL (ref 0.52–1.04)
DIFFERENTIAL METHOD BLD: ABNORMAL
EOSINOPHIL # BLD AUTO: 0 10E9/L (ref 0–0.7)
EOSINOPHIL NFR BLD AUTO: 0 %
ERYTHROCYTE [DISTWIDTH] IN BLOOD BY AUTOMATED COUNT: 12.4 % (ref 10–15)
GFR SERPL CREATININE-BSD FRML MDRD: 65 ML/MIN/{1.73_M2}
GLUCOSE SERPL-MCNC: 119 MG/DL (ref 70–99)
HCT VFR BLD AUTO: 24.1 % (ref 35–47)
HGB BLD-MCNC: 8.1 G/DL (ref 11.7–15.7)
LYMPHOCYTES # BLD AUTO: 0.2 10E9/L (ref 0.8–5.3)
LYMPHOCYTES NFR BLD AUTO: 3.5 %
MAGNESIUM SERPL-MCNC: 1.2 MG/DL (ref 1.6–2.3)
MCH RBC QN AUTO: 29.1 PG (ref 26.5–33)
MCHC RBC AUTO-ENTMCNC: 33.6 G/DL (ref 31.5–36.5)
MCV RBC AUTO: 87 FL (ref 78–100)
MONOCYTES # BLD AUTO: 0.7 10E9/L (ref 0–1.3)
MONOCYTES NFR BLD AUTO: 17.4 %
MYELOCYTES # BLD: 0 10E9/L
MYELOCYTES NFR BLD MANUAL: 0.9 %
NEUTROPHILS # BLD AUTO: 3.3 10E9/L (ref 1.6–8.3)
NEUTROPHILS NFR BLD AUTO: 77.3 %
PLATELET # BLD AUTO: 17 10E9/L (ref 150–450)
PLATELET # BLD EST: ABNORMAL 10*3/UL
POTASSIUM SERPL-SCNC: 4.2 MMOL/L (ref 3.4–5.3)
RBC # BLD AUTO: 2.78 10E12/L (ref 3.8–5.2)
RBC MORPH BLD: NORMAL
SODIUM SERPL-SCNC: 133 MMOL/L (ref 133–144)
TACROLIMUS BLD-MCNC: 8.5 UG/L (ref 5–15)
TME LAST DOSE: NORMAL H
TRANSFUSION STATUS PATIENT QL: NORMAL
WBC # BLD AUTO: 4.3 10E9/L (ref 4–11)

## 2020-10-30 PROCEDURE — 96365 THER/PROPH/DIAG IV INF INIT: CPT

## 2020-10-30 PROCEDURE — 85025 COMPLETE CBC W/AUTO DIFF WBC: CPT | Performed by: PHYSICIAN ASSISTANT

## 2020-10-30 PROCEDURE — 99207 PR NO CHARGE LOS: CPT

## 2020-10-30 PROCEDURE — 86922 COMPATIBILITY TEST ANTIGLOB: CPT | Performed by: PHYSICIAN ASSISTANT

## 2020-10-30 PROCEDURE — 80197 ASSAY OF TACROLIMUS: CPT | Performed by: PHYSICIAN ASSISTANT

## 2020-10-30 PROCEDURE — 86901 BLOOD TYPING SEROLOGIC RH(D): CPT | Performed by: PHYSICIAN ASSISTANT

## 2020-10-30 PROCEDURE — 83735 ASSAY OF MAGNESIUM: CPT | Performed by: PHYSICIAN ASSISTANT

## 2020-10-30 PROCEDURE — 86902 BLOOD TYPE ANTIGEN DONOR EA: CPT | Performed by: PHYSICIAN ASSISTANT

## 2020-10-30 PROCEDURE — 250N000011 HC RX IP 250 OP 636: Performed by: PHYSICIAN ASSISTANT

## 2020-10-30 PROCEDURE — 86900 BLOOD TYPING SEROLOGIC ABO: CPT | Performed by: PHYSICIAN ASSISTANT

## 2020-10-30 PROCEDURE — 96366 THER/PROPH/DIAG IV INF ADDON: CPT

## 2020-10-30 PROCEDURE — 80048 BASIC METABOLIC PNL TOTAL CA: CPT | Performed by: PHYSICIAN ASSISTANT

## 2020-10-30 PROCEDURE — 99214 OFFICE O/P EST MOD 30 MIN: CPT

## 2020-10-30 PROCEDURE — 86850 RBC ANTIBODY SCREEN: CPT | Performed by: PHYSICIAN ASSISTANT

## 2020-10-30 PROCEDURE — G0463 HOSPITAL OUTPT CLINIC VISIT: HCPCS | Mod: 25

## 2020-10-30 RX ORDER — HEPARIN SODIUM,PORCINE 10 UNIT/ML
5 VIAL (ML) INTRAVENOUS
Status: DISCONTINUED | OUTPATIENT
Start: 2020-10-30 | End: 2020-10-30 | Stop reason: HOSPADM

## 2020-10-30 RX ORDER — MAGNESIUM SULFATE HEPTAHYDRATE 40 MG/ML
4 INJECTION, SOLUTION INTRAVENOUS ONCE
Status: COMPLETED | OUTPATIENT
Start: 2020-10-30 | End: 2020-10-30

## 2020-10-30 RX ADMIN — MAGNESIUM SULFATE IN WATER 4 G: 40 INJECTION, SOLUTION INTRAVENOUS at 14:46

## 2020-10-30 RX ADMIN — Medication 5 ML: at 13:57

## 2020-10-30 RX ADMIN — Medication 5 ML: at 13:56

## 2020-10-30 ASSESSMENT — PAIN SCALES - GENERAL: PAINLEVEL: NO PAIN (0)

## 2020-10-30 NOTE — PROGRESS NOTES
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       Diagnosis AMLM1 Acute myelogeneous leukemia, M1, myeloblastic  HCT Type Allogeneic    Prep Regimen Fludarabine  Cytoxan  TBI   Donor Source Sibling    GVHD Prophylaxis Post transplant cytoxan  Tac/MMF  Primary BMT Provider ACE       INTERVAL  HISTORY   Florencia is here for follow up after discharge from the hospital after a JCARLOS allo sib PBSCT for AML.      She felt great alst coupel of day since hospital discharge. However she is more fatigued today. She is eating and drinking well. She has had looser stools- thinks due to avocado - still only 2 BMs. She will monitor. She is eating well. No infectious symptoms. No rash. Denies bone pain with gcsf.     Review of Systems: 8 point ROS negative except as noted above.     PHYSICAL EXAM      Vitals:    10/30/20 1342   Weight: 76.1 kg (167 lb 12.8 oz)        KPS:  90  Blood pressure (!) 146/73, pulse 112, temperature 97.8  F (36.6  C), resp. rate 18, weight 76.1 kg (167 lb 12.8 oz), SpO2 96 %.      General: NAD   ENT:  LINH, sclera anicteric. Oral mucosa moist without ulceration.   Lungs: CTA bilaterally  Cardiovascular: RRR, no M/R/G   Abd: BS present, NT, ND  Lymphatics: no edema b/l LE   Skin: No rashes or petechiae.  Neuro: A&O x3  Line: right CVC; entry point has small scab that is a bed raised with a small [place] of erythema around it; stable per patient and her caregiver.   Other: bone marrow site with clean, dry gauze    10/28/20 Current aGVHD staging:  Skin 0, UGI 0, LGI 0, Liver 0 (keep in note through day +180, delete if auto)     LABS AND IMAGING - PAST 24 HOURS     Lab Results   Component Value Date    WBC 3.5 (L) 10/29/2020    ANEU 2.8 10/29/2020    HGB 8.3 (L) 10/29/2020    HCT 25.0 (L) 10/29/2020    PLT 15 (LL) 10/29/2020     (L) 10/29/2020    POTASSIUM 3.8 10/29/2020    CHLORIDE 99 10/29/2020    CO2 24  10/29/2020     (H) 10/29/2020    BUN 12 10/29/2020    CR 0.89 10/29/2020    MAG 1.6 10/29/2020    INR 1.01 10/26/2020    BILITOTAL 0.3 10/29/2020    AST 18 10/29/2020    ALT 14 10/29/2020    ALKPHOS 81 10/29/2020    PROTTOTAL 7.0 10/29/2020    ALBUMIN 3.6 10/29/2020       I have assessed all abnormal lab values for their clinical significance and any values considered clinically significant have been addressed in the assessment and plan.      OVERALL PLAN   Florencia Gao is a 75 year old female with AML in CR1, admitted for JCARLOS Allo sib pbsct per protocol 2019-10, randomized to Post transplant cytoxan/Tac/MMF.  Day +23.      1.  BMT: Cy/flu/TBI prep.   Received 4.19 x10(6) CD34/kg. Donor B pos and recipient B neg.   GCSF started d+5 and cont to until ANC>1500 x3 consecutive days. Counts recovering; GCSF final dose in clinic 10/29. WBC 3.3-  No gcsf today but antipicate she may need again 11/1/20    **Per protocol no dex/steroids**      2.  HEME: Keep Hgb>7g/dL and plts>10K.   - pancytopenic secondary to chemotherapy and transplant.   - Of note has significant antibodies to RBCs- can cause delay in transfusion availability. Premeds tylenol and benadryl.   - plts holding steady without transfusion - 17k today. Possible plts Sunday.                             3.  ID: Afebrile.   Rhinorrhea: RVP 10/14 neg. Started Claritin.   Proohy: Vori, and HD ACV prophy.    Pjp prophy to start day+28 - IV pentamdine vs dapsone as sulfa allergy.                                         4.  GI:   - Hemorroids: tucks prn, barrier cream and start BID hydrocortisone cream topically.    - Stools: pt on miralax daily PTA.  - Protonix for GI prophy.    - Ursodiol for VOD prophy.     5.  GVHD Prophylaxis:   Day +3 and day+4 cytoxan  On tac and MMF.  Tac level 10/26 was 11.2- transition to Tacrolimus 2.5mg PO BID. 10/30 Tac level pending.      6.  FEN/Renal:  - Cr stable  - mild hyponatremia: stable  - Intermittent hypomagnesia:  replete per sliding scale in the outpatient setting. Start oral mag as tolerated.  Mag 1.2 today- given 4gm IV mag today.     7. Hypothyroidism  Continue synthroid 88mcg; reminded patient to  [place] [facility] [place].      8. Psych: Situational Depression continue effexor 75mg daily.      9. Cards: intermittent hypertension- monitor for now.  130/73 in clinic 10/29.      10. Research: Pt consented to MT2019-31 (Seamless randomized tiral to alleviate morbidity and mortality) for which she was randomized to the palliative care management arm. Seen by Palliative 10/8. Pt has subsequently withdrawn from this study.      Will let Florencia have tomorrow off. RTC Sunday for labs, provider visit, possible gcsf and plts.   - Give 4g IV mag today.   - also schedule M/T/W next week. Hopeful she will able to decrease to MWF visits     Samira Barone PA-C  [phone]

## 2020-10-30 NOTE — PROGRESS NOTES
Infusion Nursing Note:  Florencia Gao presents today for IV Magnesium. See MAR.     Patient seen by provider today: Yes: HAI Jerome   present during visit today: Not Applicable.    Note: Pt with a Magnesium level of 1.2 today. Magnesium 4 grams IV infused per orders over 2 hours.     Intravenous Access:  Padilla.    Treatment Conditions:  Lab Results   Component Value Date     10/30/2020                   Lab Results   Component Value Date    POTASSIUM 4.2 10/30/2020           Lab Results   Component Value Date    MAG 1.2 10/30/2020            Lab Results   Component Value Date    CR 0.88 10/30/2020                   Lab Results   Component Value Date    SANDRA 8.7 10/30/2020                Lab Results   Component Value Date    BILITOTAL 0.3 10/29/2020           Lab Results   Component Value Date    ALBUMIN 3.6 10/29/2020                    Lab Results   Component Value Date    ALT 14 10/29/2020           Lab Results   Component Value Date    AST 18 10/29/2020           Post Infusion Assessment:  Patient tolerated infusion without incident.       Discharge Plan:   Patient discharged in stable condition accompanied by: daughter.  Departure Mode: Ambulatory with SBA.    aJclyn Heart RN

## 2020-10-30 NOTE — LETTER
10/30/2020         RE: Florencia Gao  [address] [place] [address]        Dear Colleague,    Thank you for referring your patient, Florencia Gao, to the [facility] BLOOD AND MARROW TRANSPLANT PROGRAM [place]. Please see a copy of my visit note below.    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       Diagnosis AMLM1 Acute myelogeneous leukemia, M1, myeloblastic  HCT Type Allogeneic    Prep Regimen Fludarabine  Cytoxan  TBI   Donor Source Sibling    GVHD Prophylaxis Post transplant cytoxan  Tac/MMF  Primary BMT Provider ACE       INTERVAL  HISTORY   Florencia is here for follow up after discharge from the hospital after a JCARLOS allo sib PBSCT for AML.      She is very happy to be out of the hospital.  She is eating well, happy to have non-hospital food.  No nausea or vomiting.  She is somewhat constipated still, planning to use her miralax every other day (was daily in the past).  Had a BM in the clinic today.  No rash.  No bleeding other than a little from her hemorrhoid when she wipes.  A little cramping in her abdomen with BMs, but no abdominal pain otherwise.  No discomfort from biopsy done 10/28.     Review of Systems: 8 point ROS negative except as noted above.     PHYSICAL EXAM      There were no vitals filed for this visit.     KPS:  90  There were no vitals taken for this visit.      General: NAD   ENT:  LINH, sclera anicteric. Oral mucosa moist without ulceration.   Lungs: CTA bilaterally  Cardiovascular: RRR, no M/R/G   Abd: BS present, NT, ND  Lymphatics: no edema b/l LE   Skin: No rashes or petechiae.  Neuro: A&O x3  Line: right CVC; entry point has small scab that is a bed raised with a small [place] of erythema around it; stable per patient and her caregiver.   Other: bone marrow site with clean, dry gauze    10/28/20 Current aGVHD staging:  Skin 0, UGI 0, LGI 0, Liver 0 (keep in note  through day +180, delete if auto)     LABS AND IMAGING - PAST 24 HOURS     Lab Results   Component Value Date    WBC 4.3 10/30/2020    ANEU 3.3 10/30/2020    HGB 8.1 (L) 10/30/2020    HCT 24.1 (L) 10/30/2020    PLT 17 (LL) 10/30/2020     10/30/2020    POTASSIUM 4.2 10/30/2020    CHLORIDE 101 10/30/2020    CO2 25 10/30/2020     (H) 10/30/2020    BUN 13 10/30/2020    CR 0.88 10/30/2020    MAG 1.2 (L) 10/30/2020    INR 1.01 10/26/2020    BILITOTAL 0.3 10/29/2020    AST 18 10/29/2020    ALT 14 10/29/2020    ALKPHOS 81 10/29/2020    PROTTOTAL 7.0 10/29/2020    ALBUMIN 3.6 10/29/2020       I have assessed all abnormal lab values for their clinical significance and any values considered clinically significant have been addressed in the assessment and plan.      OVERALL PLAN   Florencia Gao is a 75 year old female with AML in CR1, admitted for JCARLOS Allo sib pbsct per protocol 2019-10, randomized to Post transplant cytoxan/Tac/MMF.  Day +23.      1.  BMT: Cy/flu/TBI prep.   Received 4.19 x10(6) CD34/kg. Donor B pos and recipient B neg.   GCSF started d+5 and cont to until ANC>1500 x3 consecutive days. Counts recovering; GCSF final dose in clinic 10/29.   **Per protocol no dex/steroids**      2.  HEME: Keep Hgb>7g/dL and plts>10K.   - pancytopenic secondary to chemotherapy and transplant.   - Of note has significant antibodies to RBCs- can cause delay in transfusion availability. Premeds tylenol and benadryl.   - anticipate platelet transfusion 10/30.                             3.  ID: Afebrile.   Rhinorrhea: RVP 10/14 neg. Started Claritin.   Proohy: Vori, and HD ACV prophy.    Pjp prophy to start day+28 - IV pentamdine vs dapsone as sulfa allergy.                                         4.  GI:   - Hemorroids: tucks prn, barrier cream and start BID hydrocortisone cream topically.    - Stools: pt on miralax daily PTA.  - Protonix for GI prophy.    - Ursodiol for VOD prophy.     5.  GVHD Prophylaxis:   Day +3  and day+4 cytoxan  On tac and MMF.  Tac level 10/26 was 11.2- transition to Tacrolimus 2.5mg PO BID. Patient did not know to hold her tac this morning; will get level tomorrow.      6.  FEN/Renal:  - Cr stable  - mild hyponatremia: stable  - Intermittent hypomagnesia: replete per sliding scale in the outpatient setting. Start oral mag as tolerated.  Mag 1.6 today.     7. Hypothyroidism  Continue synthroid 88mcg; reminded patient [place]  [place] [facility] [place].      8. Psych: Situational Depression continue effexor 75mg daily.      9. Cards: intermittent hypertension- monitor for now.  130/73 in clinic 10/29.      11. Research: Pt consented to MT2019-31 (Seamless randomized tiral to alleviate morbidity and mortality) for which she was randomized to the palliative care management arm. Seen by Palliative 10/8. Pt has subsequently withdrawn from this study.      RTC daily at this point; will get tac level 10/29.     Mally Dimas PA-C  10/29/2020      Infusion Nursing Note:  Florencia Gao presents today for IV Magnesium. See MAR.     Patient seen by provider today: Yes: HAI Jerome   present during visit today: Not Applicable.    Note: Pt with a Magnesium level of 1.2 today. Magnesium 4 grams IV infused per orders over 2 hours.     Intravenous Access:  Padilla.    Treatment Conditions:  Lab Results   Component Value Date     10/30/2020                   Lab Results   Component Value Date    POTASSIUM 4.2 10/30/2020           Lab Results   Component Value Date    MAG 1.2 10/30/2020            Lab Results   Component Value Date    CR 0.88 10/30/2020                   Lab Results   Component Value Date    SANDRA 8.7 10/30/2020                Lab Results   Component Value Date    BILITOTAL 0.3 10/29/2020           Lab Results   Component Value Date    ALBUMIN 3.6 10/29/2020                    Lab Results   Component Value Date    ALT 14 10/29/2020           Lab Results   Component Value  Date    AST 18 10/29/2020           Post Infusion Assessment:  Patient tolerated infusion without incident.       Discharge Plan:   Patient discharged in stable condition accompanied by: daughter.  Departure Mode: Ambulatory with SBA.    Jaclyn Heart RN                            Again, thank you for allowing me to participate in the care of your patient.        Sincerely,        [facility]

## 2020-10-30 NOTE — LETTER
10/30/2020         RE: Florencia Gao  [address] [place] [address]        Dear Colleague,    Thank you for referring your patient, Florencia Gao, to the [facility] AND [facility]ANSPL[facility] [place]. Please see a copy of my visit note below.    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       Diagnosis AMLM1 Acute myelogeneous leukemia, M1, myeloblastic  HCT Type Allogeneic    Prep Regimen Fludarabine  Cytoxan  TBI   Donor Source Sibling    GVHD Prophylaxis Post transplant cytoxan  Tac/MMF  Primary BMT Provider ACE       INTERVAL  HISTORY   Florencia is here for follow up after discharge from the hospital after a JCARLOS allo sib PBSCT for AML.      She felt great alst coupel of day since hospital discharge. However she is more fatigued today. She is eating and drinking well. She has had looser stools- thinks due to avocado - still only 2 BMs. She will monitor. She is eating well. No infectious symptoms. No rash. Denies bone pain with gcsf.     Review of Systems: 8 point ROS negative except as noted above.     PHYSICAL EXAM      Vitals:    10/30/20 1342   Weight: 76.1 kg (167 lb 12.8 oz)        KPS:  90  Blood pressure (!) 146/73, pulse 112, temperature 97.8  F (36.6  C), resp. rate 18, weight 76.1 kg (167 lb 12.8 oz), SpO2 96 %.      General: NAD   ENT:  LINH, sclera anicteric. Oral mucosa moist without ulceration.   Lungs: CTA bilaterally  Cardiovascular: RRR, no M/R/G   Abd: BS present, NT, ND  Lymphatics: no edema b/l LE   Skin: No rashes or petechiae.  Neuro: A&O x3  Line: right CVC; entry point has small scab that is a bed raised with a small [place] of erythema around it; stable per patient and her caregiver.   Other: bone marrow site with clean, dry gauze    10/28/20 Current aGVHD staging:  Skin 0, UGI 0, LGI 0, Liver 0 (keep in note through day +180, delete if auto)     LABS AND  IMAGING - PAST 24 HOURS     Lab Results   Component Value Date    WBC 3.5 (L) 10/29/2020    ANEU 2.8 10/29/2020    HGB 8.3 (L) 10/29/2020    HCT 25.0 (L) 10/29/2020    PLT 15 (LL) 10/29/2020     (L) 10/29/2020    POTASSIUM 3.8 10/29/2020    CHLORIDE 99 10/29/2020    CO2 24 10/29/2020     (H) 10/29/2020    BUN 12 10/29/2020    CR 0.89 10/29/2020    MAG 1.6 10/29/2020    INR 1.01 10/26/2020    BILITOTAL 0.3 10/29/2020    AST 18 10/29/2020    ALT 14 10/29/2020    ALKPHOS 81 10/29/2020    PROTTOTAL 7.0 10/29/2020    ALBUMIN 3.6 10/29/2020       I have assessed all abnormal lab values for their clinical significance and any values considered clinically significant have been addressed in the assessment and plan.      OVERALL PLAN   Florencia Gao is a 75 year old female with AML in CR1, admitted for JCARLOS Allo sib pbsct per protocol 2019-10, randomized to Post transplant cytoxan/Tac/MMF.  Day +23.      1.  BMT: Cy/flu/TBI prep.   Received 4.19 x10(6) CD34/kg. Donor B pos and recipient B neg.   GCSF started d+5 and cont to until ANC>1500 x3 consecutive days. Counts recovering; GCSF final dose in clinic 10/29. WBC 3.3-  No gcsf today but antipicate she may need again 11/1/20    **Per protocol no dex/steroids**      2.  HEME: Keep Hgb>7g/dL and plts>10K.   - pancytopenic secondary to chemotherapy and transplant.   - Of note has significant antibodies to RBCs- can cause delay in transfusion availability. Premeds tylenol and benadryl.   - plts holding steady without transfusion - 17k today. Possible plts Sunday.                             3.  ID: Afebrile.   Rhinorrhea: RVP 10/14 neg. Started Claritin.   Proohy: Vori, and HD ACV prophy.    Pjp prophy to start day+28 - IV pentamdine vs dapsone as sulfa allergy.                                         4.  GI:   - Hemorroids: tucks prn, barrier cream and start BID hydrocortisone cream topically.    - Stools: pt on miralax daily PTA.  - Protonix for GI prophy.    -  Ursodiol for VOD prophy.     5.  GVHD Prophylaxis:   Day +3 and day+4 cytoxan  On tac and MMF.  Tac level 10/26 was 11.2- transition to Tacrolimus 2.5mg PO BID. 10/30 Tac level pending.      6.  FEN/Renal:  - Cr stable  - mild hyponatremia: stable  - Intermittent hypomagnesia: replete per sliding scale in the outpatient setting. Start oral mag as tolerated.  Mag 1.2 today- given 4gm IV mag today.     7. Hypothyroidism  Continue synthroid 88mcg; reminded patient to  RX in the [facility] pharmacy 10/29.      8. Psych: Situational Depression continue effexor 75mg daily.      9. Cards: intermittent hypertension- monitor for now.  130/73 in clinic 10/29.      10. Research: Pt consented to HG6702-72 (Seamless randomized tiral to alleviate morbidity and mortality) for which she was randomized to the palliative care management arm. Seen by Palliative 10/8. Pt has subsequently withdrawn from this study.      Will let Florencia have tomorrow off. RTC Sunday for labs, provider visit, possible gcsf and plts.   - Give 4g IV mag today.   - also schedule M/T/W next week. Hopeful she will able to decrease to MWF visits     Samira Barone PA-C  [phone]        Again, thank you for allowing me to participate in the care of your patient.        Sincerely,        BMT Advanced Practice Provider

## 2020-10-30 NOTE — PROGRESS NOTES
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       Diagnosis AMLM1 Acute myelogeneous leukemia, M1, myeloblastic  HCT Type Allogeneic    Prep Regimen Fludarabine  Cytoxan  TBI   Donor Source Sibling    GVHD Prophylaxis Post transplant cytoxan  Tac/MMF  Primary BMT Provider ACE       INTERVAL  HISTORY   Florencia is here for follow up after discharge from the hospital after a JCARLOS allo sib PBSCT for AML.      She is very happy to be out of the hospital.  She is eating well, happy to have non-hospital food.  No nausea or vomiting.  She is somewhat constipated still, planning to use her miralax every other day (was daily in the past).  Had a BM in the clinic today.  No rash.  No bleeding other than a little from her hemorrhoid when she wipes.  A little cramping in her abdomen with BMs, but no abdominal pain otherwise.  No discomfort from biopsy done 10/28.     Review of Systems: 8 point ROS negative except as noted above.     PHYSICAL EXAM      There were no vitals filed for this visit.     KPS:  90  There were no vitals taken for this visit.      General: NAD   ENT:  LINH, sclera anicteric. Oral mucosa moist without ulceration.   Lungs: CTA bilaterally  Cardiovascular: RRR, no M/R/G   Abd: BS present, NT, ND  Lymphatics: no edema b/l LE   Skin: No rashes or petechiae.  Neuro: A&O x3  Line: right CVC; entry point has small scab that is a bed raised with a small [place] of erythema around it; stable per patient and her caregiver.   Other: bone marrow site with clean, dry gauze    10/28/20 Current aGVHD staging:  Skin 0, UGI 0, LGI 0, Liver 0 (keep in note through day +180, delete if auto)     LABS AND IMAGING - PAST 24 HOURS     Lab Results   Component Value Date    WBC 4.3 10/30/2020    ANEU 3.3 10/30/2020    HGB 8.1 (L) 10/30/2020    HCT 24.1 (L) 10/30/2020    PLT 17 (LL) 10/30/2020     10/30/2020    POTASSIUM 4.2  10/30/2020    CHLORIDE 101 10/30/2020    CO2 25 10/30/2020     (H) 10/30/2020    BUN 13 10/30/2020    CR 0.88 10/30/2020    MAG 1.2 (L) 10/30/2020    INR 1.01 10/26/2020    BILITOTAL 0.3 10/29/2020    AST 18 10/29/2020    ALT 14 10/29/2020    ALKPHOS 81 10/29/2020    PROTTOTAL 7.0 10/29/2020    ALBUMIN 3.6 10/29/2020       I have assessed all abnormal lab values for their clinical significance and any values considered clinically significant have been addressed in the assessment and plan.      OVERALL PLAN   Florencia Gao is a 75 year old female with AML in CR1, admitted for JCARLOS Allo sib pbsct per protocol 2019-10, randomized to Post transplant cytoxan/Tac/MMF.  Day +23.      1.  BMT: Cy/flu/TBI prep.   Received 4.19 x10(6) CD34/kg. Donor B pos and recipient B neg.   GCSF started d+5 and cont to until ANC>1500 x3 consecutive days. Counts recovering; GCSF final dose in clinic 10/29.   **Per protocol no dex/steroids**      2.  HEME: Keep Hgb>7g/dL and plts>10K.   - pancytopenic secondary to chemotherapy and transplant.   - Of note has significant antibodies to RBCs- can cause delay in transfusion availability. Premeds tylenol and benadryl.   - anticipate platelet transfusion 10/30.                             3.  ID: Afebrile.   Rhinorrhea: RVP 10/14 neg. Started Claritin.   Proohy: Vori, and HD ACV prophy.    Pjp prophy to start day+28 - IV pentamdine vs dapsone as sulfa allergy.                                         4.  GI:   - Hemorroids: tucks prn, barrier cream and start BID hydrocortisone cream topically.    - Stools: pt on miralax daily PTA.  - Protonix for GI prophy.    - Ursodiol for VOD prophy.     5.  GVHD Prophylaxis:   Day +3 and day+4 cytoxan  On tac and MMF.  Tac level 10/26 was 11.2- transition to Tacrolimus 2.5mg PO BID. Patient did not know to hold her tac this morning; will get level tomorrow.      6.  FEN/Renal:  - Cr stable  - mild hyponatremia: stable  - Intermittent hypomagnesia:  replete per sliding scale in the outpatient setting. Start oral mag as tolerated.  Mag 1.6 today.     7. Hypothyroidism  Continue synthroid 88mcg; reminded patient to  RX in the [facility] pharmacy 10/29.      8. Psych: Situational Depression continue effexor 75mg daily.      9. Cards: intermittent hypertension- monitor for now.  130/73 in clinic 10/29.      11. Research: Pt consented to PG3648-74 (Seamless randomized tiral to alleviate morbidity and mortality) for which she was randomized to the palliative care management arm. Seen by Palliative 10/8. Pt has subsequently withdrawn from this study.      RTC daily at this point; will get tac level 10/29.     Mally Dimas PA-C  10/29/2020

## 2020-10-30 NOTE — NURSING NOTE
Chief Complaint   Patient presents with     Lab Only     cvc, heparin locked, vitals checked       Tiffany Navarro RN on 10/30/2020 at 1:53 PM

## 2020-10-30 NOTE — NURSING NOTE
Chief Complaint   Patient presents with     RECHECK     Pt here for IV Magnesium. Pt is s/p BMT for AML.      Jaclyn Heart RN

## 2020-11-01 ENCOUNTER — INFUSION THERAPY VISIT (OUTPATIENT)
Dept: TRANSPLANT | Facility: CLINIC | Age: 75
End: 2020-11-01
Payer: COMMERCIAL

## 2020-11-01 ENCOUNTER — APPOINTMENT (OUTPATIENT)
Dept: LAB | Facility: CLINIC | Age: 75
End: 2020-11-01
Payer: COMMERCIAL

## 2020-11-01 ENCOUNTER — ONCOLOGY VISIT (OUTPATIENT)
Dept: TRANSPLANT | Facility: CLINIC | Age: 75
End: 2020-11-01
Payer: COMMERCIAL

## 2020-11-01 VITALS
WEIGHT: 168.4 LBS | OXYGEN SATURATION: 97 % | RESPIRATION RATE: 18 BRPM | TEMPERATURE: 97.8 F | BODY MASS INDEX: 27.07 KG/M2 | SYSTOLIC BLOOD PRESSURE: 129 MMHG | DIASTOLIC BLOOD PRESSURE: 79 MMHG | HEART RATE: 114 BPM

## 2020-11-01 DIAGNOSIS — C92.01 ACUTE MYELOID LEUKEMIA IN REMISSION (H): Primary | ICD-10-CM

## 2020-11-01 DIAGNOSIS — E83.42 HYPOMAGNESEMIA: ICD-10-CM

## 2020-11-01 DIAGNOSIS — C92.01 AML (ACUTE MYELOID LEUKEMIA) IN REMISSION (H): Primary | ICD-10-CM

## 2020-11-01 DIAGNOSIS — C92.01 AML (ACUTE MYELOID LEUKEMIA) IN REMISSION (H): ICD-10-CM

## 2020-11-01 LAB
ANION GAP SERPL CALCULATED.3IONS-SCNC: 8 MMOL/L (ref 3–14)
BASOPHILS # BLD AUTO: 0 10E9/L (ref 0–0.2)
BASOPHILS NFR BLD AUTO: 0 %
BLD PROD TYP BPU: NORMAL
BLD PROD TYP BPU: NORMAL
BLD UNIT ID BPU: 0
BLD UNIT ID BPU: 0
BLOOD PRODUCT CODE: NORMAL
BLOOD PRODUCT CODE: NORMAL
BPU ID: NORMAL
BPU ID: NORMAL
BUN SERPL-MCNC: 14 MG/DL (ref 7–30)
CALCIUM SERPL-MCNC: 9.4 MG/DL (ref 8.5–10.1)
CHLORIDE SERPL-SCNC: 102 MMOL/L (ref 94–109)
CO2 SERPL-SCNC: 24 MMOL/L (ref 20–32)
CREAT SERPL-MCNC: 0.82 MG/DL (ref 0.52–1.04)
DIFFERENTIAL METHOD BLD: ABNORMAL
EOSINOPHIL # BLD AUTO: 0 10E9/L (ref 0–0.7)
EOSINOPHIL NFR BLD AUTO: 0 %
ERYTHROCYTE [DISTWIDTH] IN BLOOD BY AUTOMATED COUNT: 12.4 % (ref 10–15)
GFR SERPL CREATININE-BSD FRML MDRD: 71 ML/MIN/{1.73_M2}
GLUCOSE SERPL-MCNC: 124 MG/DL (ref 70–99)
HCT VFR BLD AUTO: 26.1 % (ref 35–47)
HGB BLD-MCNC: 8.6 G/DL (ref 11.7–15.7)
LYMPHOCYTES # BLD AUTO: 0.2 10E9/L (ref 0.8–5.3)
LYMPHOCYTES NFR BLD AUTO: 10 %
MAGNESIUM SERPL-MCNC: 1.4 MG/DL (ref 1.6–2.3)
MCH RBC QN AUTO: 29 PG (ref 26.5–33)
MCHC RBC AUTO-ENTMCNC: 33 G/DL (ref 31.5–36.5)
MCV RBC AUTO: 88 FL (ref 78–100)
MONOCYTES # BLD AUTO: 0.7 10E9/L (ref 0–1.3)
MONOCYTES NFR BLD AUTO: 31 %
NEUTROPHILS # BLD AUTO: 1.4 10E9/L (ref 1.6–8.3)
NEUTROPHILS NFR BLD AUTO: 59 %
PLATELET # BLD AUTO: 33 10E9/L (ref 150–450)
POIKILOCYTOSIS BLD QL SMEAR: SLIGHT
POTASSIUM SERPL-SCNC: 3.9 MMOL/L (ref 3.4–5.3)
RBC # BLD AUTO: 2.97 10E12/L (ref 3.8–5.2)
SODIUM SERPL-SCNC: 134 MMOL/L (ref 133–144)
TRANSFUSION STATUS PATIENT QL: NORMAL
WBC # BLD AUTO: 2.3 10E9/L (ref 4–11)

## 2020-11-01 PROCEDURE — 250N000011 HC RX IP 250 OP 636: Performed by: PHYSICIAN ASSISTANT

## 2020-11-01 PROCEDURE — 86850 RBC ANTIBODY SCREEN: CPT | Performed by: PHYSICIAN ASSISTANT

## 2020-11-01 PROCEDURE — 80048 BASIC METABOLIC PNL TOTAL CA: CPT | Performed by: PHYSICIAN ASSISTANT

## 2020-11-01 PROCEDURE — 83735 ASSAY OF MAGNESIUM: CPT | Performed by: PHYSICIAN ASSISTANT

## 2020-11-01 PROCEDURE — 86902 BLOOD TYPE ANTIGEN DONOR EA: CPT | Performed by: PHYSICIAN ASSISTANT

## 2020-11-01 PROCEDURE — 86900 BLOOD TYPING SEROLOGIC ABO: CPT | Performed by: PHYSICIAN ASSISTANT

## 2020-11-01 PROCEDURE — 85025 COMPLETE CBC W/AUTO DIFF WBC: CPT | Performed by: PHYSICIAN ASSISTANT

## 2020-11-01 PROCEDURE — 250N000013 HC RX MED GY IP 250 OP 250 PS 637

## 2020-11-01 PROCEDURE — G0463 HOSPITAL OUTPT CLINIC VISIT: HCPCS

## 2020-11-01 PROCEDURE — 99214 OFFICE O/P EST MOD 30 MIN: CPT

## 2020-11-01 PROCEDURE — 99207 PR NO CHARGE LOS: CPT

## 2020-11-01 PROCEDURE — 86901 BLOOD TYPING SEROLOGIC RH(D): CPT | Performed by: PHYSICIAN ASSISTANT

## 2020-11-01 PROCEDURE — 86922 COMPATIBILITY TEST ANTIGLOB: CPT | Performed by: PHYSICIAN ASSISTANT

## 2020-11-01 RX ORDER — HEPARIN SODIUM (PORCINE) LOCK FLUSH IV SOLN 100 UNIT/ML 100 UNIT/ML
5 SOLUTION INTRAVENOUS
Status: CANCELLED | OUTPATIENT
Start: 2020-11-01

## 2020-11-01 RX ORDER — HEPARIN SODIUM,PORCINE 10 UNIT/ML
5 VIAL (ML) INTRAVENOUS
Status: CANCELLED | OUTPATIENT
Start: 2020-11-01

## 2020-11-01 RX ORDER — MAGNESIUM OXIDE 400 MG/1
400 TABLET ORAL 2 TIMES DAILY
Qty: 60 TABLET | Refills: 3 | Status: SHIPPED | OUTPATIENT
Start: 2020-11-01 | End: 2020-11-03

## 2020-11-01 RX ORDER — MAGNESIUM OXIDE 400 MG/1
400 TABLET ORAL ONCE
Status: COMPLETED | OUTPATIENT
Start: 2020-11-01 | End: 2020-11-01

## 2020-11-01 RX ORDER — MAGNESIUM SULFATE HEPTAHYDRATE 40 MG/ML
2 INJECTION, SOLUTION INTRAVENOUS ONCE
Status: DISCONTINUED | OUTPATIENT
Start: 2020-11-01 | End: 2020-11-01 | Stop reason: HOSPADM

## 2020-11-01 RX ORDER — HEPARIN SODIUM,PORCINE 10 UNIT/ML
5 VIAL (ML) INTRAVENOUS
Status: DISCONTINUED | OUTPATIENT
Start: 2020-11-01 | End: 2020-11-01 | Stop reason: HOSPADM

## 2020-11-01 RX ADMIN — MAGNESIUM OXIDE TAB 400 MG (241.3 MG ELEMENTAL MG) 400 MG: 400 (241.3 MG) TAB at 08:56

## 2020-11-01 RX ADMIN — Medication 5 ML: at 08:29

## 2020-11-01 RX ADMIN — Medication 5 ML: at 08:30

## 2020-11-01 ASSESSMENT — PAIN SCALES - GENERAL: PAINLEVEL: NO PAIN (0)

## 2020-11-01 NOTE — LETTER
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       Diagnosis AMLM1 Acute myelogeneous leukemia, M1, myeloblastic  HCT Type Allogeneic    Prep Regimen Fludarabine  Cytoxan  TBI   Donor Source Sibling    GVHD Prophylaxis Post transplant cytoxan  Tac/MMF  Primary BMT Provider ACE       INTERVAL  HISTORY   Florencia is here for follow up after discharge from the hospital after a JCARLOS allo sib PBSCT for AML.      She is feeling very well, no new issues,  happy to be out of the hospital.  She is eating well, two sof BM daily, taking avocado to prevent constipation. No rashes, no N/V.  No bleeding, no abdominal pain.  No discomfort from biopsy done 10/28.     Review of Systems: 8 point ROS negative except as noted above.     PHYSICAL EXAM      Vitals:    11/01/20 0746   Weight: 76.4 kg (168 lb 6.4 oz)        KPS:  90  Blood pressure 129/79, pulse 114, temperature 97.8  F (36.6  C), resp. rate 18, weight 76.4 kg (168 lb 6.4 oz), SpO2 97 %.      General: NAD   ENT:  LINH, sclera anicteric. Oral mucosa moist without ulceration.   Lungs: CTA bilaterally  Cardiovascular: RRR, no M/R/G   Abd: BS present, NT, ND  Lymphatics: no edema b/l LE   Skin: No rashes or petechiae.  Neuro: A&O x3  Line: right CVC; entry point has small scab that is a bed raised with a small [place] of erythema around it; stable per patient and her caregiver.   Other: bone marrow site with clean, dry gauze    10/28/20 Current aGVHD staging:  Skin 0, UGI 0, LGI 0, Liver 0 (keep in note through day +180, delete if auto)     LABS AND IMAGING - PAST 24 HOURS     Lab Results   Component Value Date    WBC 2.3 (L) 11/01/2020    ANEU 3.3 10/30/2020    HGB 8.6 (L) 11/01/2020    HCT 26.1 (L) 11/01/2020    PLT 33 (LL) 11/01/2020     11/01/2020    POTASSIUM 3.9 11/01/2020    CHLORIDE 102  11/01/2020    CO2 24 11/01/2020     (H) 11/01/2020    BUN 14 11/01/2020    CR 0.82 11/01/2020    MAG 1.4 (L) 11/01/2020    INR 1.01 10/26/2020    BILITOTAL 0.3 10/29/2020    AST 18 10/29/2020    ALT 14 10/29/2020    ALKPHOS 81 10/29/2020    PROTTOTAL 7.0 10/29/2020    ALBUMIN 3.6 10/29/2020       I have assessed all abnormal lab values for their clinical significance and any values considered clinically significant have been addressed in the assessment and plan.      OVERALL PLAN   Florencia Gao is a 75 year old female with AML in CR1, admitted for JCARLOS Allo sib pbsct per protocol 2019-10, randomized to Post transplant cytoxan/Tac/MMF.  Day +25.      1.  BMT: Cy/flu/TBI prep.   Received 4.19 x10(6) CD34/kg. Donor B pos and recipient B neg.   GCSF started d+5 and cont to until ANC>1500 x3 consecutive days. Counts recovering; GCSF final dose in clinic 10/29. No GCSF today.   **Per protocol no dex/steroids**      2.  HEME: Keep Hgb>7g/dL and plts>10K.   - pancytopenic secondary to chemotherapy and transplant.   - Of note has significant antibodies to RBCs- can cause delay in transfusion availability. Premeds tylenol and benadryl.   - no plt transfusion today                            3.  ID: Afebrile.   Rhinorrhea: RVP 10/14 neg. Started Claritin.   Proohy: Vori, and HD ACV prophy.    Pjp prophy to start day+28 - IV pentamdine vs dapsone as sulfa allergy.                                         4.  GI: eating great. Add Mg to prevent constipation  - Hemorroids: tucks prn, barrier cream and start BID hydrocortisone cream topically.    - Stools: pt on miralax daily PTA.  - Protonix for GI prophy.    - Ursodiol for VOD prophy.     5.  GVHD Prophylaxis:   Day +3 and day+4 cytoxan  On tac and MMF.  Tac level 10/26 was 11.2- transition to Tacrolimus 2.5mg PO BID. Level  8.5, cont the same dose.    6.  FEN/Renal:  - Cr stable  - mild hyponatremia: stable  - Intermittent hypomagnesia:   Start oral mag 400mg po BID on  11/1.  Mag 1.4 today.     7. Hypothyroidism  Continue synthroid 88mcg; reminded patient to  RX in the [facility] pharmacy 10/29.      8. Psych: Situational Depression continue effexor 75mg daily.      9. Cards: intermittent hypertension- monitor for now.  130/73 in clinic 10/29.      11. Research: Pt consented to XB7107-49 (Seamless randomized tiral to alleviate morbidity and mortality) for which she was randomized to the palliative care management arm. Seen by Palliative 10/8. Pt has subsequently withdrawn from this study.      RTC daily at this point; will get tac level 10/29.     Mally Dimas PA-C  10/29/2020        BMT DOM

## 2020-11-01 NOTE — NURSING NOTE
"Oncology Rooming Note    November 1, 2020 8:27 AM   Florencia Gao is a 75 year old female who presents for:    Chief Complaint   Patient presents with     RECHECK     post bmt for AML here for labs and md visit     Initial Vitals: /79   Pulse 114   Temp 97.8  F (36.6  C)   Resp 18   Wt 76.4 kg (168 lb 6.4 oz)   SpO2 97%   BMI 27.07 kg/m   Estimated body mass index is 27.07 kg/m  as calculated from the following:    Height as of 10/29/20: 1.68 m (5' 6.14\").    Weight as of this encounter: 76.4 kg (168 lb 6.4 oz). Body surface area is 1.89 meters squared.  No Pain (0) Comment: Data Unavailable   No LMP recorded. Patient is postmenopausal.  Allergies reviewed: Yes  Medications reviewed: Yes    Medications: Medication refills not needed today.  Pharmacy name entered into [employer]:    [id number] [address] AT [facility] [address]  [facility] [place], [place] - [address] [facility] [address]    Clinical concerns: none       Tiffany Navarro RN              "

## 2020-11-01 NOTE — PROGRESS NOTES
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       Diagnosis AMLM1 Acute myelogeneous leukemia, M1, myeloblastic  HCT Type Allogeneic    Prep Regimen Fludarabine  Cytoxan  TBI   Donor Source Sibling    GVHD Prophylaxis Post transplant cytoxan  Tac/MMF  Primary BMT Provider ACE       INTERVAL  HISTORY   Florencia is here for follow up after discharge from the hospital after a JCARLOS allo sib PBSCT for AML.      She is feeling very well, no new issues,  happy to be out of the hospital.  She is eating well, two sof BM daily, taking avocado to prevent constipation. No rashes, no N/V.  No bleeding, no abdominal pain.  No discomfort from biopsy done 10/28.     Review of Systems: 8 point ROS negative except as noted above.     PHYSICAL EXAM      Vitals:    11/01/20 0746   Weight: 76.4 kg (168 lb 6.4 oz)        KPS:  90  Blood pressure 129/79, pulse 114, temperature 97.8  F (36.6  C), resp. rate 18, weight 76.4 kg (168 lb 6.4 oz), SpO2 97 %.      General: NAD   ENT:  LINH, sclera anicteric. Oral mucosa moist without ulceration.   Lungs: CTA bilaterally  Cardiovascular: RRR, no M/R/G   Abd: BS present, NT, ND  Lymphatics: no edema b/l LE   Skin: No rashes or petechiae.  Neuro: A&O x3  Line: right CVC; entry point has small scab that is a bed raised with a small [place] of erythema around it; stable per patient and her caregiver.   Other: bone marrow site with clean, dry gauze    10/28/20 Current aGVHD staging:  Skin 0, UGI 0, LGI 0, Liver 0 (keep in note through day +180, delete if auto)     LABS AND IMAGING - PAST 24 HOURS     Lab Results   Component Value Date    WBC 2.3 (L) 11/01/2020    ANEU 3.3 10/30/2020    HGB 8.6 (L) 11/01/2020    HCT 26.1 (L) 11/01/2020    PLT 33 (LL) 11/01/2020     11/01/2020    POTASSIUM 3.9 11/01/2020    CHLORIDE 102 11/01/2020    CO2 24 11/01/2020     (H) 11/01/2020    BUN 14 11/01/2020    CR  0.82 11/01/2020    MAG 1.4 (L) 11/01/2020    INR 1.01 10/26/2020    BILITOTAL 0.3 10/29/2020    AST 18 10/29/2020    ALT 14 10/29/2020    ALKPHOS 81 10/29/2020    PROTTOTAL 7.0 10/29/2020    ALBUMIN 3.6 10/29/2020       I have assessed all abnormal lab values for their clinical significance and any values considered clinically significant have been addressed in the assessment and plan.      OVERALL PLAN   Florencia Gao is a 75 year old female with AML in CR1, admitted for JCARLOS Allo sib pbsct per protocol 2019-10, randomized to Post transplant cytoxan/Tac/MMF.  Day +25.      1.  BMT: Cy/flu/TBI prep.   Received 4.19 x10(6) CD34/kg. Donor B pos and recipient B neg.   GCSF started d+5 and cont to until ANC>1500 x3 consecutive days. Counts recovering; GCSF final dose in clinic 10/29. No GCSF today.   **Per protocol no dex/steroids**      2.  HEME: Keep Hgb>7g/dL and plts>10K.   - pancytopenic secondary to chemotherapy and transplant.   - Of note has significant antibodies to RBCs- can cause delay in transfusion availability. Premeds tylenol and benadryl.   - no plt transfusion today                            3.  ID: Afebrile.   Rhinorrhea: RVP 10/14 neg. Started Claritin.   Proohy: Vori, and HD ACV prophy.    Pjp prophy to start day+28 - IV pentamdine vs dapsone as sulfa allergy.                                         4.  GI: eating great. Add Mg to prevent constipation  - Hemorroids: tucks prn, barrier cream and start BID hydrocortisone cream topically.    - Stools: pt on miralax daily PTA.  - Protonix for GI prophy.    - Ursodiol for VOD prophy.     5.  GVHD Prophylaxis:   Day +3 and day+4 cytoxan  On tac and MMF.  Tac level 10/26 was 11.2- transition to Tacrolimus 2.5mg PO BID. Level  8.5, cont the same dose.    6.  FEN/Renal:  - Cr stable  - mild hyponatremia: stable  - Intermittent hypomagnesia:   Start oral mag 400mg po BID on 11/1.  Mag 1.4 today.     7. Hypothyroidism  Continue synthroid 88mcg; reminded  patient to  RX in the [facility] pharmacy 10/29.      8. Psych: Situational Depression continue effexor 75mg daily.      9. Cards: intermittent hypertension- monitor for now.  130/73 in clinic 10/29.      11. Research: Pt consented to FT5149-06 (Seamless randomized tiral to alleviate morbidity and mortality) for which she was randomized to the palliative care management arm. Seen by Palliative 10/8. Pt has subsequently withdrawn from this study.      RTC daily at this point; will get tac level 10/29.     Mally Dimas PA-C  10/29/2020

## 2020-11-01 NOTE — LETTER
11/1/2020         RE: Florencia Gao  [address] [place] [address]        Dear Colleague,    Thank you for referring your patient, Florencia Gao, to the [facility] BLOOD AND MARROW TRANSPLANT PROGRAM [place]. Please see a copy of my visit note below.    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       Diagnosis AMLM1 Acute myelogeneous leukemia, M1, myeloblastic  HCT Type Allogeneic    Prep Regimen Fludarabine  Cytoxan  TBI   Donor Source Sibling    GVHD Prophylaxis Post transplant cytoxan  Tac/MMF  Primary BMT Provider ACE       INTERVAL  HISTORY   Florencia is here for follow up after discharge from the hospital after a JCARLOS allo sib PBSCT for AML.      She is feeling very well, no new issues,  happy to be out of the hospital.  She is eating well, two sof BM daily, taking avocado to prevent constipation. No rashes, no N/V.  No bleeding, no abdominal pain.  No discomfort from biopsy done 10/28.     Review of Systems: 8 point ROS negative except as noted above.     PHYSICAL EXAM      Vitals:    11/01/20 0746   Weight: 76.4 kg (168 lb 6.4 oz)        KPS:  90  Blood pressure 129/79, pulse 114, temperature 97.8  F (36.6  C), resp. rate 18, weight 76.4 kg (168 lb 6.4 oz), SpO2 97 %.      General: NAD   ENT:  LINH, sclera anicteric. Oral mucosa moist without ulceration.   Lungs: CTA bilaterally  Cardiovascular: RRR, no M/R/G   Abd: BS present, NT, ND  Lymphatics: no edema b/l LE   Skin: No rashes or petechiae.  Neuro: A&O x3  Line: right CVC; entry point has small scab that is a bed raised with a small [place] of erythema around it; stable per patient and her caregiver.   Other: bone marrow site with clean, dry gauze    10/28/20 Current aGVHD staging:  Skin 0, UGI 0, LGI 0, Liver 0 (keep in note through day +180, delete if auto)     LABS AND IMAGING - PAST 24 HOURS     Lab Results   Component Value Date     WBC 2.3 (L) 11/01/2020    ANEU 3.3 10/30/2020    HGB 8.6 (L) 11/01/2020    HCT 26.1 (L) 11/01/2020    PLT 33 (LL) 11/01/2020     11/01/2020    POTASSIUM 3.9 11/01/2020    CHLORIDE 102 11/01/2020    CO2 24 11/01/2020     (H) 11/01/2020    BUN 14 11/01/2020    CR 0.82 11/01/2020    MAG 1.4 (L) 11/01/2020    INR 1.01 10/26/2020    BILITOTAL 0.3 10/29/2020    AST 18 10/29/2020    ALT 14 10/29/2020    ALKPHOS 81 10/29/2020    PROTTOTAL 7.0 10/29/2020    ALBUMIN 3.6 10/29/2020       I have assessed all abnormal lab values for their clinical significance and any values considered clinically significant have been addressed in the assessment and plan.      OVERALL PLAN   Florencia Gao is a 75 year old female with AML in CR1, admitted for JCARLOS Allo sib pbsct per protocol 2019-10, randomized to Post transplant cytoxan/Tac/MMF.  Day +25.      1.  BMT: Cy/flu/TBI prep.   Received 4.19 x10(6) CD34/kg. Donor B pos and recipient B neg.   GCSF started d+5 and cont to until ANC>1500 x3 consecutive days. Counts recovering; GCSF final dose in clinic 10/29. No GCSF today.   **Per protocol no dex/steroids**      2.  HEME: Keep Hgb>7g/dL and plts>10K.   - pancytopenic secondary to chemotherapy and transplant.   - Of note has significant antibodies to RBCs- can cause delay in transfusion availability. Premeds tylenol and benadryl.   - no plt transfusion today                            3.  ID: Afebrile.   Rhinorrhea: RVP 10/14 neg. Started Claritin.   Proohy: Vori, and HD ACV prophy.    Pjp prophy to start day+28 - IV pentamdine vs dapsone as sulfa allergy.                                         4.  GI: eating great. Add Mg to prevent constipation  - Hemorroids: tucks prn, barrier cream and start BID hydrocortisone cream topically.    - Stools: pt on miralax daily PTA.  - Protonix for GI prophy.    - Ursodiol for VOD prophy.     5.  GVHD Prophylaxis:   Day +3 and day+4 cytoxan  On tac and MMF.  Tac level 10/26 was 11.2-  transition to Tacrolimus 2.5mg PO BID. Level  8.5, cont the same dose.    6.  FEN/Renal:  - Cr stable  - mild hyponatremia: stable  - Intermittent hypomagnesia:   Start oral mag 400mg po BID on 11/1.  Mag 1.4 today.     7. Hypothyroidism  Continue synthroid 88mcg; [place] patient [place] [facility] [place]      8. Psych: Situational Depression continue effexor 75mg daily.      9. Cards: intermittent hypertension- monitor for now.  130/73 in clinic 10/29.      11. Research: Pt consented to [id number] (Seamless randomized tiral to alleviate morbidity and mortality) for which she was randomized to the palliative care management arm. Seen by Palliative 10/8. Pt has subsequently withdrawn from this study.      RTC daily at this point; will get tac level 10/29.     Mally Dimas PA-C  10/29/2020        Again, thank you for allowing me to participate in the care of your patient.        Sincerely,        BMT DOM

## 2020-11-01 NOTE — LETTER
11/1/2020         RE: Florencia Gao  [address] [place] [address]        Dear Colleague,    Thank you for referring your patient, Florencia Gao, to the [facility] BLOOD AND MARROW TRANSPLANT PROGRAM [place]. Please see a copy of my visit note below.    Patient did not need transfusion today.      Again, thank you for allowing me to participate in the care of your patient.        Sincerely,        [facility]

## 2020-11-02 LAB
ABO + RH BLD: ABNORMAL
ABO + RH BLD: ABNORMAL
BLD GP AB SCN SERPL QL: ABNORMAL
BLD PROD TYP BPU: ABNORMAL
BLD PROD TYP BPU: NORMAL
BLOOD BANK CMNT PATIENT-IMP: ABNORMAL
BLOOD BANK CMNT PATIENT-IMP: ABNORMAL
NUM BPU REQUESTED: 1
NUM BPU REQUESTED: 1
SPECIMEN EXP DATE BLD: ABNORMAL

## 2020-11-03 ENCOUNTER — ONCOLOGY VISIT (OUTPATIENT)
Dept: TRANSPLANT | Facility: CLINIC | Age: 75
End: 2020-11-03
Attending: PHYSICIAN ASSISTANT
Payer: COMMERCIAL

## 2020-11-03 ENCOUNTER — APPOINTMENT (OUTPATIENT)
Dept: LAB | Facility: CLINIC | Age: 75
End: 2020-11-03
Attending: PHYSICIAN ASSISTANT
Payer: COMMERCIAL

## 2020-11-03 VITALS
TEMPERATURE: 98 F | SYSTOLIC BLOOD PRESSURE: 117 MMHG | WEIGHT: 168.6 LBS | HEART RATE: 98 BPM | OXYGEN SATURATION: 98 % | RESPIRATION RATE: 16 BRPM | BODY MASS INDEX: 27.1 KG/M2 | HEIGHT: 66 IN | DIASTOLIC BLOOD PRESSURE: 68 MMHG

## 2020-11-03 DIAGNOSIS — C92.01 AML (ACUTE MYELOID LEUKEMIA) IN REMISSION (H): Primary | ICD-10-CM

## 2020-11-03 LAB
ABO + RH BLD: ABNORMAL
ABO + RH BLD: ABNORMAL
ALBUMIN SERPL-MCNC: 3.5 G/DL (ref 3.4–5)
ALP SERPL-CCNC: 82 U/L (ref 40–150)
ALT SERPL W P-5'-P-CCNC: 22 U/L (ref 0–50)
ANION GAP SERPL CALCULATED.3IONS-SCNC: 6 MMOL/L (ref 3–14)
AST SERPL W P-5'-P-CCNC: 22 U/L (ref 0–45)
BASOPHILS # BLD AUTO: 0 10E9/L (ref 0–0.2)
BASOPHILS NFR BLD AUTO: 2.6 %
BILIRUB SERPL-MCNC: 0.2 MG/DL (ref 0.2–1.3)
BLD GP AB SCN SERPL QL: ABNORMAL
BLD PROD TYP BPU: ABNORMAL
BLD PROD TYP BPU: NORMAL
BLD PROD TYP BPU: NORMAL
BLD UNIT ID BPU: 0
BLOOD BANK CMNT PATIENT-IMP: ABNORMAL
BLOOD BANK CMNT PATIENT-IMP: ABNORMAL
BLOOD PRODUCT CODE: NORMAL
BPU ID: NORMAL
BUN SERPL-MCNC: 13 MG/DL (ref 7–30)
CALCIUM SERPL-MCNC: 8.4 MG/DL (ref 8.5–10.1)
CHLORIDE SERPL-SCNC: 105 MMOL/L (ref 94–109)
CO2 SERPL-SCNC: 25 MMOL/L (ref 20–32)
CREAT SERPL-MCNC: 0.88 MG/DL (ref 0.52–1.04)
DIFFERENTIAL METHOD BLD: ABNORMAL
EOSINOPHIL # BLD AUTO: 0 10E9/L (ref 0–0.7)
EOSINOPHIL NFR BLD AUTO: 0 %
ERYTHROCYTE [DISTWIDTH] IN BLOOD BY AUTOMATED COUNT: 12.5 % (ref 10–15)
GFR SERPL CREATININE-BSD FRML MDRD: 65 ML/MIN/{1.73_M2}
GLUCOSE SERPL-MCNC: 134 MG/DL (ref 70–99)
HCT VFR BLD AUTO: 23.6 % (ref 35–47)
HGB BLD-MCNC: 7.9 G/DL (ref 11.7–15.7)
LYMPHOCYTES # BLD AUTO: 0.2 10E9/L (ref 0.8–5.3)
LYMPHOCYTES NFR BLD AUTO: 13 %
MAGNESIUM SERPL-MCNC: 1.2 MG/DL (ref 1.6–2.3)
MCH RBC QN AUTO: 29.2 PG (ref 26.5–33)
MCHC RBC AUTO-ENTMCNC: 33.5 G/DL (ref 31.5–36.5)
MCV RBC AUTO: 87 FL (ref 78–100)
MONOCYTES # BLD AUTO: 0.3 10E9/L (ref 0–1.3)
MONOCYTES NFR BLD AUTO: 28.7 %
NEUTROPHILS # BLD AUTO: 0.7 10E9/L (ref 1.6–8.3)
NEUTROPHILS NFR BLD AUTO: 55.7 %
NRBC # BLD AUTO: 0 10*3/UL
NRBC BLD AUTO-RTO: 1 /100
NUM BPU REQUESTED: 1
NUM BPU REQUESTED: 1
PLATELET # BLD AUTO: 34 10E9/L (ref 150–450)
PLATELET # BLD EST: ABNORMAL 10*3/UL
POTASSIUM SERPL-SCNC: 3.5 MMOL/L (ref 3.4–5.3)
PROT SERPL-MCNC: 6.8 G/DL (ref 6.8–8.8)
RBC # BLD AUTO: 2.71 10E12/L (ref 3.8–5.2)
RBC MORPH BLD: NORMAL
SODIUM SERPL-SCNC: 136 MMOL/L (ref 133–144)
SPECIMEN EXP DATE BLD: ABNORMAL
TRANSFUSION STATUS PATIENT QL: NORMAL
TRANSFUSION STATUS PATIENT QL: NORMAL
WBC # BLD AUTO: 1.2 10E9/L (ref 4–11)

## 2020-11-03 PROCEDURE — 250N000011 HC RX IP 250 OP 636: Performed by: PHYSICIAN ASSISTANT

## 2020-11-03 PROCEDURE — 96365 THER/PROPH/DIAG IV INF INIT: CPT

## 2020-11-03 PROCEDURE — 80053 COMPREHEN METABOLIC PANEL: CPT | Performed by: PHYSICIAN ASSISTANT

## 2020-11-03 PROCEDURE — 83735 ASSAY OF MAGNESIUM: CPT | Performed by: PHYSICIAN ASSISTANT

## 2020-11-03 PROCEDURE — 96372 THER/PROPH/DIAG INJ SC/IM: CPT | Performed by: PHYSICIAN ASSISTANT

## 2020-11-03 PROCEDURE — 258N000003 HC RX IP 258 OP 636: Performed by: PHYSICIAN ASSISTANT

## 2020-11-03 PROCEDURE — 96366 THER/PROPH/DIAG IV INF ADDON: CPT

## 2020-11-03 PROCEDURE — 99207 PR NO CHARGE LOS: CPT

## 2020-11-03 PROCEDURE — 99214 OFFICE O/P EST MOD 30 MIN: CPT

## 2020-11-03 PROCEDURE — 85025 COMPLETE CBC W/AUTO DIFF WBC: CPT | Performed by: PHYSICIAN ASSISTANT

## 2020-11-03 PROCEDURE — G0463 HOSPITAL OUTPT CLINIC VISIT: HCPCS

## 2020-11-03 RX ORDER — HEPARIN SODIUM (PORCINE) LOCK FLUSH IV SOLN 100 UNIT/ML 100 UNIT/ML
5 SOLUTION INTRAVENOUS
Status: CANCELLED | OUTPATIENT
Start: 2020-11-03

## 2020-11-03 RX ORDER — HEPARIN SODIUM,PORCINE 10 UNIT/ML
5 VIAL (ML) INTRAVENOUS
Status: DISCONTINUED | OUTPATIENT
Start: 2020-11-03 | End: 2020-11-03 | Stop reason: HOSPADM

## 2020-11-03 RX ORDER — MAGNESIUM SULFATE HEPTAHYDRATE 40 MG/ML
2 INJECTION, SOLUTION INTRAVENOUS ONCE
Status: COMPLETED | OUTPATIENT
Start: 2020-11-03 | End: 2020-11-03

## 2020-11-03 RX ORDER — HEPARIN SODIUM,PORCINE 10 UNIT/ML
5 VIAL (ML) INTRAVENOUS
Status: CANCELLED | OUTPATIENT
Start: 2020-11-03

## 2020-11-03 RX ADMIN — MAGNESIUM SULFATE 2 G: 2 INJECTION INTRAVENOUS at 08:31

## 2020-11-03 RX ADMIN — SODIUM CHLORIDE 1000 ML: 9 INJECTION, SOLUTION INTRAVENOUS at 08:30

## 2020-11-03 RX ADMIN — Medication 5 ML: at 07:27

## 2020-11-03 RX ADMIN — FILGRASTIM-SNDZ 300 MCG: 300 INJECTION, SOLUTION INTRAVENOUS; SUBCUTANEOUS at 10:30

## 2020-11-03 RX ADMIN — Medication 5 ML: at 07:26

## 2020-11-03 RX ADMIN — MAGNESIUM SULFATE IN WATER 2 G: 40 INJECTION, SOLUTION INTRAVENOUS at 09:30

## 2020-11-03 ASSESSMENT — PAIN SCALES - GENERAL: PAINLEVEL: NO PAIN (0)

## 2020-11-03 ASSESSMENT — MIFFLIN-ST. JEOR: SCORE: 1288.73

## 2020-11-03 NOTE — NURSING NOTE
"Oncology Rooming Note    November 3, 2020 7:42 AM   Florencia Gao is a 75 year old female who presents for:    Chief Complaint   Patient presents with     Blood Draw     VS done, labs collected via CVC by lab RN, caps changed and heparin locked x 2.  Hx AML s/p transplant.     RECHECK     Return: AML (acute myeloblastic leukemia)     Initial Vitals: /68 (BP Location: Right arm, Patient Position: Sitting, Cuff Size: Adult Regular)   Pulse 98   Temp 98  F (36.7  C) (Tympanic)   Resp 16   Ht 1.68 m (5' 6.14\")   Wt 76.5 kg (168 lb 9.6 oz)   SpO2 98%   BMI 27.10 kg/m   Estimated body mass index is 27.1 kg/m  as calculated from the following:    Height as of this encounter: 1.68 m (5' 6.14\").    Weight as of this encounter: 76.5 kg (168 lb 9.6 oz). Body surface area is 1.89 meters squared.  No Pain (0) Comment: Data Unavailable   No LMP recorded. Patient is postmenopausal.  Allergies reviewed: Yes  Medications reviewed: Yes    Medications: Medication refills not needed today.  Pharmacy name entered into GoEuro:    [facility][id number] [address] AT [facility] [address]  [facility] [place], [place] - [phone]    Clinical concerns: N/A        Shey Hernandez CMA              "

## 2020-11-03 NOTE — PROGRESS NOTES
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       Diagnosis AMLM1 Acute myelogeneous leukemia, M1, myeloblastic  HCT Type Allogeneic    Prep Regimen Fludarabine  Cytoxan  TBI   Donor Source Sibling    GVHD Prophylaxis Post transplant cytoxan  Tac/MMF  Primary BMT Provider ACE       INTERVAL  HISTORY   Florencia is here for follow up after discharge from the hospital after a JCARLOS allo sib PBSCT for AML.      She is having an increase in loose stools since starting oral magnesium.  She confirms she is no longer taking miralax or any other bowel regimen.  The loose stools wipe her out and her hemorrhoids are really hurting.  She has a little blood from them with wiping.  She is using soft cloth/spray and hydrocortisone cream and barrier cream.      No n/v, no rash.  Eating well, working on fluid intake. No chest pain, abdominal pain, fevers, cough/cold, mouth sores.  No lumps/bumps.  No swelling.  No other bleeding besides from hemorrhoid.     Review of Systems: 8 point ROS negative except as noted above.     PHYSICAL EXAM      Vitals:    11/03/20 0724   Weight: 76.5 kg (168 lb 9.6 oz)        KPS:  90  Blood pressure 117/68, pulse 98, temperature 98  F (36.7  C), temperature source Tympanic, resp. rate 16, height 1.68 m (5' 6.14\"), weight 76.5 kg (168 lb 9.6 oz), SpO2 98 %.      General: NAD   ENT:  LINH, sclera anicteric. Oral mucosa moist without ulceration.   Lungs: CTA bilaterally  Cardiovascular: RRR, no M/R/G   Abd: BS present, NT, ND  Lymphatics: no edema b/l LE   Skin: No rashes or petechiae.  Neuro: A&O x3  Line: right CVC; entry point has small scab that is a bed raised with a small [place] of erythema around it; stable per patient and her caregiver.   Other: bone marrow site with clean, dry gauze    10/28/20 Current aGVHD staging:  Skin 0, UGI 0, LGI 0, Liver 0 (keep in note through day +180, delete if auto)     LABS " AND IMAGING - PAST 24 HOURS     Lab Results   Component Value Date    WBC 2.3 (L) 11/01/2020    ANEU 1.4 (L) 11/01/2020    HGB 8.6 (L) 11/01/2020    HCT 26.1 (L) 11/01/2020    PLT 33 (LL) 11/01/2020     11/01/2020    POTASSIUM 3.9 11/01/2020    CHLORIDE 102 11/01/2020    CO2 24 11/01/2020     (H) 11/01/2020    BUN 14 11/01/2020    CR 0.82 11/01/2020    MAG 1.4 (L) 11/01/2020    INR 1.01 10/26/2020    BILITOTAL 0.3 10/29/2020    AST 18 10/29/2020    ALT 14 10/29/2020    ALKPHOS 81 10/29/2020    PROTTOTAL 7.0 10/29/2020    ALBUMIN 3.6 10/29/2020       I have assessed all abnormal lab values for their clinical significance and any values considered clinically significant have been addressed in the assessment and plan.      OVERALL PLAN   Florencia Gao is a 75 year old female with AML in CR1, admitted for JCARLOS Allo sib pbsct per protocol 2019-10, randomized to Post transplant cytoxan/Tac/MMF.  Day +27.      1.  BMT: Cy/flu/TBI prep.   Received 4.19 x10(6) CD34/kg. Donor B pos and recipient B neg.   GCSF started d+5 and cont to until ANC>1500 x3 consecutive days. Counts recovering; GCSF final dose in clinic 10/29. PRN GCSF dose given 11/3 for ANC down to 0.7.   **Per protocol no dex/steroids**      2.  HEME: Keep Hgb>7g/dL and plts>10K.   - pancytopenic secondary to chemotherapy and transplant.   - Of note has significant antibodies to RBCs- can cause delay in transfusion availability. Premeds tylenol and benadryl.                               3.  ID: Afebrile.   Rhinorrhea: RVP 10/14 neg. Started Claritin.   Proohy: Vori, and HD ACV prophy.    Pjp prophy to start day+28 - IV pentamdine vs dapsone as sulfa allergy.                                         4.  GI: eating great. Add Mg to prevent constipation  - Hemorroids: tucks prn, barrier cream and start BID hydrocortisone cream topically.    - Loose stools: stop oral mag  - Protonix for GI prophy.    - Ursodiol for VOD prophy.     5.  GVHD Prophylaxis:    Day +3 and day+4 cytoxan  On tac and MMF.  Tac level 10/26 was 11.2- transition to Tacrolimus 2.5mg PO BID. Level  8.5, cont the same dose.  Check level 11/4.     6.  FEN/Renal:  - Cr stable  - mild hyponatremia: stable  - Intermittent hypomagnesia: did not tolerate oral magnesium due to loose stools.  Stop 11/3.  IV mag 4 grams in infusion with 1L NS today 11/3.     7. Hypothyroidism  Continue synthroid 88mcg; reminded patient to  RX in [place] [facility] [place] 10[place]29.      8. Psych: Situational Depression continue effexor 75mg daily.      9. Cards: /68 on 11/3     11. Research: Pt consented to UE3882-75 (Seamless randomized tiral to alleviate morbidity and mortality) for which she was randomized to the palliative care management arm. Seen by Palliative 10/8. Pt has subsequently withdrawn from this study.      RTC daily at this point; will get tac level 11/4.  IV mag  IVF  Stop oral mag    Mally Dimas PA-C  11/3/2020

## 2020-11-03 NOTE — NURSING NOTE
Chief Complaint   Patient presents with     Blood Draw     VS done, labs collected via CVC by lab RN, caps changed and heparin locked x 2.  Hx AML s/p transplant.

## 2020-11-03 NOTE — LETTER
11/3/2020         RE: Florencia Gao  [address] [place] [address]        Dear Colleague,    Thank you for referring your patient, Florencia Gao, to the [facility] BLOOD AND MARROW TRANSPLANT PROGRAM [place]. Please see a copy of my visit note below.    Infusion Nursing Note:  Florencia Gao presents today for IVF and IV Mag.    Patient seen by provider today: Yes: Mally   present during visit today: Not Applicable.    Note: Patient received 1L NS and 4gms of Magnesium IV.  Patient also received Zarxio subcutaneous x 1 in the right abdomen.    Intravenous Access:  Padilla.    Treatment Conditions:  Lab Results   Component Value Date    HGB 7.9 11/03/2020     Lab Results   Component Value Date    WBC 1.2 11/03/2020      Lab Results   Component Value Date    ANEU 0.7 11/03/2020     Lab Results   Component Value Date    PLT 34 11/03/2020      Lab Results   Component Value Date     11/03/2020                   Lab Results   Component Value Date    POTASSIUM 3.5 11/03/2020           Lab Results   Component Value Date    MAG 1.2 11/03/2020            Lab Results   Component Value Date    CR 0.88 11/03/2020                   Lab Results   Component Value Date    SANDRA 8.4 11/03/2020                Lab Results   Component Value Date    BILITOTAL 0.2 11/03/2020           Lab Results   Component Value Date    ALBUMIN 3.5 11/03/2020                    Lab Results   Component Value Date    ALT 22 11/03/2020           Lab Results   Component Value Date    AST 22 11/03/2020       Results reviewed, labs MET treatment parameters, ok to proceed with treatment.      Post Infusion Assessment:  Patient tolerated infusion without incident.  Patient tolerated injection without incident.  Site patent and intact, free from redness, edema or discomfort.       Discharge Plan:   Discharge instructions reviewed with: Patient.  Patient and/or family verbalized understanding of discharge instructions  and all questions answered.  Patient discharged in stable condition accompanied by: daughter-in-law.  Departure Mode: Ambulatory.    Alexa Garcia RN                            Again, thank you for allowing me to participate in the care of your patient.        Sincerely,        [facility]

## 2020-11-03 NOTE — PROGRESS NOTES
Infusion Nursing Note:  Florencia Gao presents today for IVF and IV Mag.    Patient seen by provider today: Yes: Mally   present during visit today: Not Applicable.    Note: Patient received 1L NS and 4gms of Magnesium IV.  Patient also received Zarxio subcutaneous x 1 in the right abdomen.    Intravenous Access:  Padilla.    Treatment Conditions:  Lab Results   Component Value Date    HGB 7.9 11/03/2020     Lab Results   Component Value Date    WBC 1.2 11/03/2020      Lab Results   Component Value Date    ANEU 0.7 11/03/2020     Lab Results   Component Value Date    PLT 34 11/03/2020      Lab Results   Component Value Date     11/03/2020                   Lab Results   Component Value Date    POTASSIUM 3.5 11/03/2020           Lab Results   Component Value Date    MAG 1.2 11/03/2020            Lab Results   Component Value Date    CR 0.88 11/03/2020                   Lab Results   Component Value Date    SANDRA 8.4 11/03/2020                Lab Results   Component Value Date    BILITOTAL 0.2 11/03/2020           Lab Results   Component Value Date    ALBUMIN 3.5 11/03/2020                    Lab Results   Component Value Date    ALT 22 11/03/2020           Lab Results   Component Value Date    AST 22 11/03/2020       Results reviewed, labs MET treatment parameters, ok to proceed with treatment.      Post Infusion Assessment:  Patient tolerated infusion without incident.  Patient tolerated injection without incident.  Site patent and intact, free from redness, edema or discomfort.       Discharge Plan:   Discharge instructions reviewed with: Patient.  Patient and/or family verbalized understanding of discharge instructions and all questions answered.  Patient discharged in stable condition accompanied by: daughter-in-law.  Departure Mode: Ambulatory.    Alexa Garcia RN

## 2020-11-03 NOTE — LETTER
"    11/3/2020         RE: Florencia Gao  [address] [place] [address]        Dear Colleague,    Thank you for referring your patient, Florencia Gao, to the [facility] BLOOD AND MARROW TRANSPLANT PROGRAM [place]. Please see a copy of my visit note below.    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       Diagnosis AMLM1 Acute myelogeneous leukemia, M1, myeloblastic  HCT Type Allogeneic    Prep Regimen Fludarabine  Cytoxan  TBI   Donor Source Sibling    GVHD Prophylaxis Post transplant cytoxan  Tac/MMF  Primary BMT Provider ACE       INTERVAL  HISTORY   Florencia is here for follow up after discharge from the hospital after a JCARLOS allo sib PBSCT for AML.      She is having an increase in loose stools since starting oral magnesium.  She confirms she is no longer taking miralax or any other bowel regimen.  The loose stools wipe her out and her hemorrhoids are really hurting.  She has a little blood from them with wiping.  She is using soft cloth/spray and hydrocortisone cream and barrier cream.      No n/v, no rash.  Eating well, working on fluid intake. No chest pain, abdominal pain, fevers, cough/cold, mouth sores.  No lumps/bumps.  No swelling.  No other bleeding besides from hemorrhoid.     Review of Systems: 8 point ROS negative except as noted above.     PHYSICAL EXAM      Vitals:    11/03/20 0724   Weight: 76.5 kg (168 lb 9.6 oz)        KPS:  90  Blood pressure 117/68, pulse 98, temperature 98  F (36.7  C), temperature source Tympanic, resp. rate 16, height 1.68 m (5' 6.14\"), weight 76.5 kg (168 lb 9.6 oz), SpO2 98 %.      General: NAD   ENT:  LINH, sclera anicteric. Oral mucosa moist without ulceration.   Lungs: CTA bilaterally  Cardiovascular: RRR, no M/R/G   Abd: BS present, NT, ND  Lymphatics: no edema b/l LE   Skin: No rashes or petechiae.  Neuro: A&O x3  Line: right CVC; entry point has " small scab that is a bed raised with a small [place] of erythema around it; stable per patient and her caregiver.   Other: bone marrow site with clean, dry gauze    10/28/20 Current aGVHD staging:  Skin 0, UGI 0, LGI 0, Liver 0 (keep in note through day +180, delete if auto)     LABS AND IMAGING - PAST 24 HOURS     Lab Results   Component Value Date    WBC 2.3 (L) 11/01/2020    ANEU 1.4 (L) 11/01/2020    HGB 8.6 (L) 11/01/2020    HCT 26.1 (L) 11/01/2020    PLT 33 (LL) 11/01/2020     11/01/2020    POTASSIUM 3.9 11/01/2020    CHLORIDE 102 11/01/2020    CO2 24 11/01/2020     (H) 11/01/2020    BUN 14 11/01/2020    CR 0.82 11/01/2020    MAG 1.4 (L) 11/01/2020    INR 1.01 10/26/2020    BILITOTAL 0.3 10/29/2020    AST 18 10/29/2020    ALT 14 10/29/2020    ALKPHOS 81 10/29/2020    PROTTOTAL 7.0 10/29/2020    ALBUMIN 3.6 10/29/2020       I have assessed all abnormal lab values for their clinical significance and any values considered clinically significant have been addressed in the assessment and plan.      OVERALL PLAN   Florencia Gao is a 75 year old female with AML in CR1, admitted for JCARLOS Allo sib pbsct per protocol 2019-10, randomized to Post transplant cytoxan/Tac/MMF.  Day +27.      1.  BMT: Cy/flu/TBI prep.   Received 4.19 x10(6) CD34/kg. Donor B pos and recipient B neg.   GCSF started d+5 and cont to until ANC>1500 x3 consecutive days. Counts recovering; GCSF final dose in clinic 10/29. PRN GCSF dose given 11/3 for ANC down to 0.7.   **Per protocol no dex/steroids**      2.  HEME: Keep Hgb>7g/dL and plts>10K.   - pancytopenic secondary to chemotherapy and transplant.   - Of note has significant antibodies to RBCs- can cause delay in transfusion availability. Premeds tylenol and benadryl.                               3.  ID: Afebrile.   Rhinorrhea: RVP 10/14 neg. Started Claritin.   Proohy: Vori, and HD ACV prophy.    Pjp prophy to start day+28 - IV pentamdine vs dapsone as sulfa  allergy.                                         4.  GI: eating great. Add Mg to prevent constipation  - Hemorroids: tucks prn, barrier cream and start BID hydrocortisone cream topically.    - Loose stools: stop oral mag  - Protonix for GI prophy.    - Ursodiol for VOD prophy.     5.  GVHD Prophylaxis:   Day +3 and day+4 cytoxan  On tac and MMF.  Tac level 10/26 was 11.2- transition to Tacrolimus 2.5mg PO BID. Level  8.5, cont the same dose.  Check level 11/4.     6.  FEN/Renal:  - Cr stable  - mild hyponatremia: stable  - Intermittent hypomagnesia: did not tolerate oral magnesium due to loose stools.  Stop 11/3.  IV mag 4 grams in infusion with 1L NS today 11/3.     7. Hypothyroidism  Continue synthroid 88mcg; reminded patient to  RX in the [facility] [place] [place] pharmacy 10/29.      8. Psych: Situational Depression continue effexor 75mg daily.      9. Cards: /68 on 11/3     11. Research: Pt consented to OP0132-39 (Seamless randomized tiral to alleviate morbidity and mortality) for which she was randomized to the palliative care management arm. Seen by Palliative 10/8. Pt has subsequently withdrawn from this study.      RTC daily at this point; will get tac level 11/4.  IV mag  IVF  Stop oral mag    Mally Dimas PA-C  11/3/2020          Again, thank you for allowing me to participate in the care of your patient.        Sincerely,        BMT Advanced Practice Provider

## 2020-11-04 ENCOUNTER — ONCOLOGY VISIT (OUTPATIENT)
Dept: TRANSPLANT | Facility: CLINIC | Age: 75
End: 2020-11-04
Attending: PHYSICIAN ASSISTANT
Payer: COMMERCIAL

## 2020-11-04 ENCOUNTER — APPOINTMENT (OUTPATIENT)
Dept: LAB | Facility: CLINIC | Age: 75
End: 2020-11-04
Attending: PHYSICIAN ASSISTANT
Payer: COMMERCIAL

## 2020-11-04 VITALS
SYSTOLIC BLOOD PRESSURE: 130 MMHG | RESPIRATION RATE: 16 BRPM | WEIGHT: 170 LBS | BODY MASS INDEX: 27.32 KG/M2 | HEART RATE: 96 BPM | DIASTOLIC BLOOD PRESSURE: 67 MMHG | TEMPERATURE: 98 F | OXYGEN SATURATION: 98 %

## 2020-11-04 DIAGNOSIS — C92.01 AML (ACUTE MYELOID LEUKEMIA) IN REMISSION (H): Primary | ICD-10-CM

## 2020-11-04 LAB
ABO + RH BLD: ABNORMAL
ABO + RH BLD: ABNORMAL
ANION GAP SERPL CALCULATED.3IONS-SCNC: 6 MMOL/L (ref 3–14)
BASOPHILS # BLD AUTO: 0 10E9/L (ref 0–0.2)
BASOPHILS NFR BLD AUTO: 0 %
BLD GP AB SCN SERPL QL: ABNORMAL
BLD PROD TYP BPU: NORMAL
BLD PROD TYP BPU: NORMAL
BLD UNIT ID BPU: 0
BLD UNIT ID BPU: 0
BLOOD BANK CMNT PATIENT-IMP: ABNORMAL
BLOOD BANK CMNT PATIENT-IMP: ABNORMAL
BLOOD PRODUCT CODE: NORMAL
BLOOD PRODUCT CODE: NORMAL
BPU ID: NORMAL
BPU ID: NORMAL
BUN SERPL-MCNC: 11 MG/DL (ref 7–30)
CALCIUM SERPL-MCNC: 8.4 MG/DL (ref 8.5–10.1)
CHLORIDE SERPL-SCNC: 107 MMOL/L (ref 94–109)
CO2 SERPL-SCNC: 26 MMOL/L (ref 20–32)
CREAT SERPL-MCNC: 0.86 MG/DL (ref 0.52–1.04)
DIFFERENTIAL METHOD BLD: ABNORMAL
EOSINOPHIL # BLD AUTO: 0 10E9/L (ref 0–0.7)
EOSINOPHIL NFR BLD AUTO: 0 %
ERYTHROCYTE [DISTWIDTH] IN BLOOD BY AUTOMATED COUNT: 12.5 % (ref 10–15)
GFR SERPL CREATININE-BSD FRML MDRD: 67 ML/MIN/{1.73_M2}
GLUCOSE SERPL-MCNC: 114 MG/DL (ref 70–99)
HCT VFR BLD AUTO: 22.4 % (ref 35–47)
HGB BLD-MCNC: 7.4 G/DL (ref 11.7–15.7)
LYMPHOCYTES # BLD AUTO: 0.3 10E9/L (ref 0.8–5.3)
LYMPHOCYTES NFR BLD AUTO: 3.5 %
MAGNESIUM SERPL-MCNC: 1.5 MG/DL (ref 1.6–2.3)
MCH RBC QN AUTO: 29.2 PG (ref 26.5–33)
MCHC RBC AUTO-ENTMCNC: 33 G/DL (ref 31.5–36.5)
MCV RBC AUTO: 89 FL (ref 78–100)
MONOCYTES # BLD AUTO: 1.1 10E9/L (ref 0–1.3)
MONOCYTES NFR BLD AUTO: 13 %
NEUTROPHILS # BLD AUTO: 7 10E9/L (ref 1.6–8.3)
NEUTROPHILS NFR BLD AUTO: 83.5 %
PLATELET # BLD AUTO: 33 10E9/L (ref 150–450)
PLATELET # BLD EST: ABNORMAL 10*3/UL
POTASSIUM SERPL-SCNC: 3.8 MMOL/L (ref 3.4–5.3)
RBC # BLD AUTO: 2.53 10E12/L (ref 3.8–5.2)
RBC MORPH BLD: NORMAL
SODIUM SERPL-SCNC: 138 MMOL/L (ref 133–144)
SPECIMEN EXP DATE BLD: ABNORMAL
TACROLIMUS BLD-MCNC: 8.8 UG/L (ref 5–15)
TME LAST DOSE: NORMAL H
TRANSFUSION STATUS PATIENT QL: NORMAL
WBC # BLD AUTO: 8.4 10E9/L (ref 4–11)

## 2020-11-04 PROCEDURE — 80197 ASSAY OF TACROLIMUS: CPT | Performed by: PHYSICIAN ASSISTANT

## 2020-11-04 PROCEDURE — 86901 BLOOD TYPING SEROLOGIC RH(D): CPT | Performed by: PHYSICIAN ASSISTANT

## 2020-11-04 PROCEDURE — 80048 BASIC METABOLIC PNL TOTAL CA: CPT | Performed by: PHYSICIAN ASSISTANT

## 2020-11-04 PROCEDURE — 86850 RBC ANTIBODY SCREEN: CPT | Performed by: PHYSICIAN ASSISTANT

## 2020-11-04 PROCEDURE — 99214 OFFICE O/P EST MOD 30 MIN: CPT

## 2020-11-04 PROCEDURE — 86900 BLOOD TYPING SEROLOGIC ABO: CPT | Performed by: PHYSICIAN ASSISTANT

## 2020-11-04 PROCEDURE — 99207 PR NO CHARGE LOS: CPT

## 2020-11-04 PROCEDURE — 85025 COMPLETE CBC W/AUTO DIFF WBC: CPT | Performed by: PHYSICIAN ASSISTANT

## 2020-11-04 PROCEDURE — 250N000011 HC RX IP 250 OP 636: Performed by: PHYSICIAN ASSISTANT

## 2020-11-04 PROCEDURE — 96365 THER/PROPH/DIAG IV INF INIT: CPT

## 2020-11-04 PROCEDURE — G0463 HOSPITAL OUTPT CLINIC VISIT: HCPCS | Mod: 25

## 2020-11-04 PROCEDURE — 83735 ASSAY OF MAGNESIUM: CPT | Performed by: PHYSICIAN ASSISTANT

## 2020-11-04 RX ORDER — MAGNESIUM SULFATE HEPTAHYDRATE 40 MG/ML
2 INJECTION, SOLUTION INTRAVENOUS ONCE
Status: COMPLETED | OUTPATIENT
Start: 2020-11-04 | End: 2020-11-04

## 2020-11-04 RX ORDER — HEPARIN SODIUM,PORCINE 10 UNIT/ML
5 VIAL (ML) INTRAVENOUS
Status: DISCONTINUED | OUTPATIENT
Start: 2020-11-04 | End: 2020-11-04 | Stop reason: HOSPADM

## 2020-11-04 RX ADMIN — MAGNESIUM SULFATE 2 G: 2 INJECTION INTRAVENOUS at 08:17

## 2020-11-04 RX ADMIN — Medication 5 ML: at 07:33

## 2020-11-04 RX ADMIN — Medication 5 ML: at 07:34

## 2020-11-04 ASSESSMENT — PAIN SCALES - GENERAL: PAINLEVEL: NO PAIN (0)

## 2020-11-04 NOTE — PROGRESS NOTES
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       Diagnosis AMLM1 Acute myelogeneous leukemia, M1, myeloblastic  HCT Type Allogeneic    Prep Regimen Fludarabine  Cytoxan  TBI   Donor Source Sibling    GVHD Prophylaxis Post transplant cytoxan  Tac/MMF  Primary BMT Provider ACE       INTERVAL  HISTORY   Florencia is feeling pretty good.  She is eating OK with no N/V.  Diarrhea has improved since stopping Mg Oxide.  She just started Mg Glycinate in the hopes that she will tolerate this better.  Afebrile with no cough or SOB.  No pain, bleeding or rash.  Bothered by hemorrhoids, she is using soft cloth/spray and hydrocortisone cream and barrier cream.      Review of Systems: 8 point ROS negative except as noted above.     PHYSICAL EXAM      Vitals:    11/04/20 0732   Weight: 77.1 kg (170 lb)        KPS:  90  Blood pressure 130/67, pulse 96, temperature 98  F (36.7  C), temperature source Oral, resp. rate 16, weight 77.1 kg (170 lb), SpO2 98 %.    General: NAD   ENT:  LINH, sclera anicteric. Oral mucosa moist without ulceration.   Lungs: CTA bilaterally  Cardiovascular: RRR, no M/R/G   Abd: BS present, NT, ND  Lymphatics: no edema  Skin: No rashes or petechiae.  Neuro: A&O x3  Line: right CVC NT, no drainage.      10/28/20 Current aGVHD staging:  Skin 0, UGI 0, LGI 0, Liver 0 (keep in note through day +180, delete if auto)     LABS AND IMAGING - PAST 24 HOURS     Lab Results   Component Value Date    WBC 8.4 11/04/2020    ANEU 7.0 11/04/2020    HGB 7.4 (L) 11/04/2020    HCT 22.4 (L) 11/04/2020    PLT 33 (LL) 11/04/2020     11/04/2020    POTASSIUM 3.8 11/04/2020    CHLORIDE 107 11/04/2020    CO2 26 11/04/2020     (H) 11/04/2020    BUN 11 11/04/2020    CR 0.86 11/04/2020    MAG 1.5 (L) 11/04/2020    INR 1.01 10/26/2020    BILITOTAL 0.2 11/03/2020    AST 22 11/03/2020    ALT 22 11/03/2020    ALKPHOS 82 11/03/2020     PROTTOTAL 6.8 11/03/2020    ALBUMIN 3.5 11/03/2020       I have assessed all abnormal lab values for their clinical significance and any values considered clinically significant have been addressed in the assessment and plan.      OVERALL PLAN   Florencia Gao is a 75 year old female with AML in CR1, day + 28 s/p JCARLOS Allo sib pbsct per protocol 2019-10, randomized to Post transplant cytoxan/Tac/MMF.       1.  BMT: Cy/flu/TBI prep.   Received 4.19 x10(6) CD34/kg. Donor B pos and recipient B neg.   PRN GCSF dose given 11/3, WBC upt to 8.4 today   **Per protocol no dex/steroids**      2.  HEME: Keep Hgb>7g/dL and plts>10K. No transfusions today  - Of note has significant antibodies to RBCs- can cause delay in transfusion availability. Premeds tylenol and benadryl.                               3.  ID: Afebrile.   - Prophy: Vori, and HD ACV prophy.    - Pjp prophy to start day+28 - IV pentamdine vs dapsone as sulfa allergy.                                         4.  GI: eating great.   - Hemorroids: tucks prn, barrier cream and start BID hydrocortisone cream topically.    - Protonix for GI prophy.    - Ursodiol for VOD prophy.     5.  GVHD Prophylaxis:   Day +3 and day+4 cytoxan  On tac 2.5mg BID and MMF.  - Tac level pending today.      6.  FEN/Renal:  - Cr stable  - hypomagnesia: did not tolerate Mg Oxide due to diarrhea.  Started Mg Glycinate & will supp IV today as well      7. Hypothyroidism  Continue synthroid 88mcg; reminded patient to  RX in the [facility] pharmacy 10/29.      8. Psych: Situational Depression continue effexor 75mg daily.      9. Research: Pt consented to MT2019-31 (Seamless randomized tiral to alleviate morbidity and mortality) for which she was randomized to the palliative care management arm. Seen by Palliative 10/8. Pt has subsequently withdrawn from this study.     RTC tomorrow with Dr. Antwan Winters

## 2020-11-04 NOTE — NURSING NOTE
Chief Complaint   Patient presents with     Blood Draw     Labs drawn via CVC by RN in lab. VS taken.      Joselyn Zavala RN

## 2020-11-04 NOTE — LETTER
11/4/2020         RE: Florencia Gao  [address] [place] [address]        Dear Colleague,    Thank you for referring your patient, Florencia Gao, to the [facility] BLOOD AND MARROW TRANSPLANT PROGRAM [place]. Please see a copy of my visit note below.    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       Diagnosis AMLM1 Acute myelogeneous leukemia, M1, myeloblastic  HCT Type Allogeneic    Prep Regimen Fludarabine  Cytoxan  TBI   Donor Source Sibling    GVHD Prophylaxis Post transplant cytoxan  Tac/MMF  Primary BMT Provider ACE       INTERVAL  HISTORY   Florencia is feeling pretty good.  She is eating OK with no N/V.  Diarrhea has improved since stopping Mg Oxide.  She just started Mg Glycinate in the hopes that she will tolerate this better.  Afebrile with no cough or SOB.  No pain, bleeding or rash.  Bothered by hemorrhoids, she is using soft cloth/spray and hydrocortisone cream and barrier cream.      Review of Systems: 8 point ROS negative except as noted above.     PHYSICAL EXAM      Vitals:    11/04/20 0732   Weight: 77.1 kg (170 lb)        KPS:  90  Blood pressure 130/67, pulse 96, temperature 98  F (36.7  C), temperature source Oral, resp. rate 16, weight 77.1 kg (170 lb), SpO2 98 %.    General: NAD   ENT:  LINH, sclera anicteric. Oral mucosa moist without ulceration.   Lungs: CTA bilaterally  Cardiovascular: RRR, no M/R/G   Abd: BS present, NT, ND  Lymphatics: no edema  Skin: No rashes or petechiae.  Neuro: A&O x3  Line: right CVC NT, no drainage.      10/28/20 Current aGVHD staging:  Skin 0, UGI 0, LGI 0, Liver 0 (keep in note through day +180, delete if auto)     LABS AND IMAGING - PAST 24 HOURS     Lab Results   Component Value Date    WBC 8.4 11/04/2020    ANEU 7.0 11/04/2020    HGB 7.4 (L) 11/04/2020    HCT 22.4 (L) 11/04/2020    PLT 33 (LL) 11/04/2020     11/04/2020    POTASSIUM  3.8 11/04/2020    CHLORIDE 107 11/04/2020    CO2 26 11/04/2020     (H) 11/04/2020    BUN 11 11/04/2020    CR 0.86 11/04/2020    MAG 1.5 (L) 11/04/2020    INR 1.01 10/26/2020    BILITOTAL 0.2 11/03/2020    AST 22 11/03/2020    ALT 22 11/03/2020    ALKPHOS 82 11/03/2020    PROTTOTAL 6.8 11/03/2020    ALBUMIN 3.5 11/03/2020       I have assessed all abnormal lab values for their clinical significance and any values considered clinically significant have been addressed in the assessment and plan.      OVERALL PLAN   Florencia Gao is a 75 year old female with AML in CR1, day + 28 s/p JCARLOS Allo sib pbsct per protocol 2019-10, randomized to Post transplant cytoxan/Tac/MMF.       1.  BMT: Cy/flu/TBI prep.   Received 4.19 x10(6) CD34/kg. Donor B pos and recipient B neg.   PRN GCSF dose given 11/3, WBC upt to 8.4 today   **Per protocol no dex/steroids**      2.  HEME: Keep Hgb>7g/dL and plts>10K. No transfusions today  - Of note has significant antibodies to RBCs- can cause delay in transfusion availability. Premeds tylenol and benadryl.                               3.  ID: Afebrile.   - Prophy: Vori, and HD ACV prophy.    - Pjp prophy to start day+28 - IV pentamdine vs dapsone as sulfa allergy.                                         4.  GI: eating great.   - Hemorroids: tucks prn, barrier cream and start BID hydrocortisone cream topically.    - Protonix for GI prophy.    - Ursodiol for VOD prophy.     5.  GVHD Prophylaxis:   Day +3 and day+4 cytoxan  On tac 2.5mg BID and MMF.  - Tac level pending today.      6.  FEN/Renal:  - Cr stable  - hypomagnesia: did not tolerate Mg Oxide due to diarrhea.  Started Mg Glycinate & will supp IV today as well      7. Hypothyroidism  Continue synthroid 88mcg; reminded patient to  RX in the [facility] pharmacy 10/29.      8. Psych: Situational Depression continue effexor 75mg daily.      9. Research: Pt consented to MT2019-31 (Seamless randomized tiral to alleviate morbidity and  mortality) for which she was randomized to the palliative care management arm. Seen by Palliative 10/8. Pt has subsequently withdrawn from this study.     RTC tomorrow with Dr. Antwan Winters      Again, thank you for allowing me to participate in the care of your patient.        Sincerely,        BMT Advanced Practice Provider

## 2020-11-04 NOTE — PROGRESS NOTES
Infusion Nursing Note:  Florencia Gao presents today for magnesium.    Patient seen by provider today: Yes: Mague Winters   present during visit today: Not Applicable.    Note: Patient arrives for magnesium replacement, administered 2gm over 1 hour and patient tolerated well. No further replacement needs today.    Intravenous Access:  Padilla.    Treatment Conditions:  Lab Results   Component Value Date    HGB 7.4 11/04/2020     Lab Results   Component Value Date    WBC 8.4 11/04/2020      Lab Results   Component Value Date    ANEU 7.0 11/04/2020     Lab Results   Component Value Date    PLT 33 11/04/2020      Results reviewed, labs MET treatment parameters, ok to proceed with treatment.      Post Infusion Assessment:  Patient tolerated infusion without incident.  No evidence of extravasations.  Access discontinued per protocol.       Discharge Plan:   Patient discharged in stable condition accompanied by: daughter.  Departure Mode: Ambulatory.    Annie Orellana RN

## 2020-11-04 NOTE — LETTER
11/4/2020         RE: Florencia Gao  [address] [place] [address]        Dear Colleague,    Thank you for referring your patient, Florencia Gao, to the [facility] BLOOD AND MARROW TRANSPLANT PROGRAM [place]. Please see a copy of my visit note below.    Infusion Nursing Note:  Florencia Gao presents today for magnesium.    Patient seen by provider today: Yes: Mague Winters   present during visit today: Not Applicable.    Note: Patient arrives for magnesium replacement, administered 2gm over 1 hour and patient tolerated well. No further replacement needs today.    Intravenous Access:  Padilla.    Treatment Conditions:  Lab Results   Component Value Date    HGB 7.4 11/04/2020     Lab Results   Component Value Date    WBC 8.4 11/04/2020      Lab Results   Component Value Date    ANEU 7.0 11/04/2020     Lab Results   Component Value Date    PLT 33 11/04/2020      Results reviewed, labs MET treatment parameters, ok to proceed with treatment.      Post Infusion Assessment:  Patient tolerated infusion without incident.  No evidence of extravasations.  Access discontinued per protocol.       Discharge Plan:   Patient discharged in stable condition accompanied by: daughter.  Departure Mode: Ambulatory.    Annie Orellana RN                            Again, thank you for allowing me to participate in the care of your patient.        Sincerely,        [facility]

## 2020-11-05 ENCOUNTER — APPOINTMENT (OUTPATIENT)
Dept: LAB | Facility: CLINIC | Age: 75
End: 2020-11-05
Attending: INTERNAL MEDICINE
Payer: COMMERCIAL

## 2020-11-05 ENCOUNTER — OFFICE VISIT (OUTPATIENT)
Dept: TRANSPLANT | Facility: CLINIC | Age: 75
End: 2020-11-05
Attending: INTERNAL MEDICINE
Payer: COMMERCIAL

## 2020-11-05 VITALS
TEMPERATURE: 98.2 F | HEART RATE: 101 BPM | DIASTOLIC BLOOD PRESSURE: 77 MMHG | WEIGHT: 170.8 LBS | RESPIRATION RATE: 18 BRPM | SYSTOLIC BLOOD PRESSURE: 135 MMHG | BODY MASS INDEX: 27.45 KG/M2 | OXYGEN SATURATION: 98 %

## 2020-11-05 DIAGNOSIS — C92.01 ACUTE MYELOID LEUKEMIA IN REMISSION (H): ICD-10-CM

## 2020-11-05 DIAGNOSIS — C92.01 AML (ACUTE MYELOID LEUKEMIA) IN REMISSION (H): ICD-10-CM

## 2020-11-05 DIAGNOSIS — Z94.81 STATUS POST BONE MARROW TRANSPLANT (H): ICD-10-CM

## 2020-11-05 LAB
ANION GAP SERPL CALCULATED.3IONS-SCNC: 5 MMOL/L (ref 3–14)
BASOPHILS # BLD AUTO: 0 10E9/L (ref 0–0.2)
BASOPHILS NFR BLD AUTO: 0 %
BUN SERPL-MCNC: 11 MG/DL (ref 7–30)
CALCIUM SERPL-MCNC: 8.4 MG/DL (ref 8.5–10.1)
CHLORIDE SERPL-SCNC: 107 MMOL/L (ref 94–109)
CMV DNA SPEC NAA+PROBE-ACNC: NORMAL [IU]/ML
CMV DNA SPEC NAA+PROBE-LOG#: NORMAL {LOG_IU}/ML
CO2 SERPL-SCNC: 27 MMOL/L (ref 20–32)
CREAT SERPL-MCNC: 0.68 MG/DL (ref 0.52–1.04)
DIFFERENTIAL METHOD BLD: ABNORMAL
EOSINOPHIL # BLD AUTO: 0 10E9/L (ref 0–0.7)
EOSINOPHIL NFR BLD AUTO: 0 %
ERYTHROCYTE [DISTWIDTH] IN BLOOD BY AUTOMATED COUNT: 12.7 % (ref 10–15)
GFR SERPL CREATININE-BSD FRML MDRD: 87 ML/MIN/{1.73_M2}
GLUCOSE SERPL-MCNC: 103 MG/DL (ref 70–99)
HCT VFR BLD AUTO: 22.4 % (ref 35–47)
HGB BLD-MCNC: 7.4 G/DL (ref 11.7–15.7)
LYMPHOCYTES # BLD AUTO: 0.2 10E9/L (ref 0.8–5.3)
LYMPHOCYTES NFR BLD AUTO: 4.4 %
MAGNESIUM SERPL-MCNC: 1.6 MG/DL (ref 1.6–2.3)
MCH RBC QN AUTO: 29.1 PG (ref 26.5–33)
MCHC RBC AUTO-ENTMCNC: 33 G/DL (ref 31.5–36.5)
MCV RBC AUTO: 88 FL (ref 78–100)
MONOCYTES # BLD AUTO: 0.6 10E9/L (ref 0–1.3)
MONOCYTES NFR BLD AUTO: 10.6 %
NEUTROPHILS # BLD AUTO: 4.6 10E9/L (ref 1.6–8.3)
NEUTROPHILS NFR BLD AUTO: 85 %
PLATELET # BLD AUTO: 49 10E9/L (ref 150–450)
PLATELET # BLD EST: ABNORMAL 10*3/UL
POIKILOCYTOSIS BLD QL SMEAR: SLIGHT
POTASSIUM SERPL-SCNC: 3.8 MMOL/L (ref 3.4–5.3)
RBC # BLD AUTO: 2.54 10E12/L (ref 3.8–5.2)
SODIUM SERPL-SCNC: 139 MMOL/L (ref 133–144)
SPECIMEN SOURCE: NORMAL
WBC # BLD AUTO: 5.4 10E9/L (ref 4–11)

## 2020-11-05 PROCEDURE — 86922 COMPATIBILITY TEST ANTIGLOB: CPT | Performed by: INTERNAL MEDICINE

## 2020-11-05 PROCEDURE — 85025 COMPLETE CBC W/AUTO DIFF WBC: CPT | Performed by: NURSE PRACTITIONER

## 2020-11-05 PROCEDURE — 83735 ASSAY OF MAGNESIUM: CPT | Performed by: NURSE PRACTITIONER

## 2020-11-05 PROCEDURE — 99214 OFFICE O/P EST MOD 30 MIN: CPT | Mod: GC | Performed by: INTERNAL MEDICINE

## 2020-11-05 PROCEDURE — 86900 BLOOD TYPING SEROLOGIC ABO: CPT | Performed by: INTERNAL MEDICINE

## 2020-11-05 PROCEDURE — 36592 COLLECT BLOOD FROM PICC: CPT

## 2020-11-05 PROCEDURE — 86850 RBC ANTIBODY SCREEN: CPT | Performed by: INTERNAL MEDICINE

## 2020-11-05 PROCEDURE — 80048 BASIC METABOLIC PNL TOTAL CA: CPT | Performed by: NURSE PRACTITIONER

## 2020-11-05 PROCEDURE — 86902 BLOOD TYPE ANTIGEN DONOR EA: CPT | Performed by: INTERNAL MEDICINE

## 2020-11-05 PROCEDURE — G0463 HOSPITAL OUTPT CLINIC VISIT: HCPCS | Mod: 25

## 2020-11-05 PROCEDURE — G0463 HOSPITAL OUTPT CLINIC VISIT: HCPCS

## 2020-11-05 PROCEDURE — 86901 BLOOD TYPING SEROLOGIC RH(D): CPT | Performed by: INTERNAL MEDICINE

## 2020-11-05 PROCEDURE — 250N000011 HC RX IP 250 OP 636: Performed by: INTERNAL MEDICINE

## 2020-11-05 RX ORDER — HEPARIN SODIUM,PORCINE 10 UNIT/ML
5 VIAL (ML) INTRAVENOUS
Status: DISCONTINUED | OUTPATIENT
Start: 2020-11-05 | End: 2020-11-06 | Stop reason: HOSPADM

## 2020-11-05 RX ADMIN — Medication 5 ML: at 15:26

## 2020-11-05 RX ADMIN — Medication 5 ML: at 15:25

## 2020-11-05 ASSESSMENT — PAIN SCALES - GENERAL: PAINLEVEL: NO PAIN (0)

## 2020-11-05 NOTE — NURSING NOTE
Chief Complaint   Patient presents with     Oncology Clinic Visit     Return; AML     Blood Draw     VS done, labs collected via CVC by lab RN, heparin locked x 2. Hx AML.

## 2020-11-05 NOTE — LETTER
"    11/5/2020         RE: Florencia Gao  [address] [place] [address]      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         Diagnosis AMLM1 Acute myelogeneous leukemia, M1, myeloblastic  HCT Type Allogeneic    Prep Regimen Fludarabine  Cytoxan  TBI   Donor Source Sibling    GVHD Prophylaxis Post transplant cytoxan  Tac/MMF  Primary BMT Provider ACE          Interval history:     Ms Florencia Gao presents with her friend/caregiver/advocate who lives a mile from her in [place] and is currently with her at the [place] ([facility]).    She reports that she has been feeling pretty good with ongoing progressive improvements and no questions nor concerns today.    She has been walking round trip to/from the [place] without issue. She does \"hang onto\" her friend while walking just in case. She has been eating and drinking well.      Comprehensive ROS otherwise negative.    On ROS in addition to the above  Endorses:  +Low energy but progressively improving  +Bowel movements have been regular for the first time in quite some time (was previously chronically on miralax daily but has not needed it)  +Hemorrhoids - with infrequent hematochezia    Denies  fevers, chills, night sweats, headaches, dysphagia/odynophagia, change in weight, change in appetite, cough, shortness of breath, chest pain, nausea/vomiting, abd pain, constipation/diarrhea, dysuria, hematuria, swelling, rashes, lymphadenopathy, neuropathy    Past Medical History:   Reviewed in EPIC    Past Medical History:   Diagnosis Date     AML (acute myeloblastic leukemia) (H)      Anxiety      Hypothyroidism 2000     Osteoporosis         Medications:   Reviewed in EPIC    Magnesium glycinate 2 pills, twice per day (400mg BID); noted to have improved " side effect profile and perhaps slightly lower elemental magnesium weight for weight vs mg oxide due to the higher molecular weight of glycine    Current Outpatient Medications   Medication Sig     acyclovir (ZOVIRAX) 800 MG tablet Take 1 tablet (800 mg) by mouth 5 times daily     granisetron (KYTRIL) 1 MG tablet Take 1 tablet (1 mg) by mouth every 12 hours to prevent nausea (Patient taking differently: Take 1 mg by mouth every 12 hours 1 tablet in the morning to prevent nausea)     hydrocortisone (CORTAID) 1 % external cream Apply topically 2 times daily to hemorrhoids until resolved.     levothyroxine (SYNTHROID/LEVOTHROID) 88 MCG tablet Take 1 tablet (88 mcg) by mouth daily     loratadine (CLARITIN) 10 MG tablet Take 1 tablet (10 mg) by mouth daily     mycophenolate (GENERIC EQUIVALENT) 500 MG tablet Take 2 tablets (1,000 mg) by mouth every 8 hours     OLANZapine zydis (ZYPREXA) 5 MG ODT Dissolve half tablet under the tongue daily at bedtime. If too difficult to break tablet in half, okay to try taking full tablet under the tongue at bedtime.     pantoprazole (PROTONIX) 40 MG EC tablet Take 1 tablet (40 mg) by mouth every morning (before breakfast)     polyethylene glycol (MIRALAX) 17 g packet Take 17 g by mouth daily as needed for constipation     prochlorperazine (COMPAZINE) 5 MG tablet Take 1 tablet (5 mg) by mouth every 6 hours as needed for nausea or vomiting     tacrolimus (GENERIC EQUIVALENT) 0.5 MG capsule Combine one 0.5mg capsules with two 1mg capsules for total dose of 2.5mg capsules by mouth twice daily.     tacrolimus (GENERIC EQUIVALENT) 1 MG capsule Combine two 1mg capsules with one 0.5mg capsules for total dose of 2.5mg capsules by mouth twice daily.     ursodiol (ACTIGALL) 300 MG capsule Take 1 capsule (300 mg) by mouth 3 times daily     venlafaxine (EFFEXOR) 75 MG tablet Take 1 tablet (75 mg) by mouth daily     voriconazole (VFEND) 200 MG tablet Take 1 tablet (200 mg) by mouth every 12 hours      Current Facility-Administered Medications   Medication     heparin lock flush 10 UNIT/ML injection 5 mL      Physical Exam (Resident / Clinician):   Blood pressure 135/77, pulse 101, temperature 98.2  F (36.8  C), resp. rate 18, weight 77.5 kg (170 lb 12.8 oz), SpO2 98 %.    ECOG PS: 1-2  Constitutional: WDWN female in NAD, pleasant and appropriate  HEENT:  NC/AT, no icterus, OP clear, MMM  Skin: Mild pallor, No jaundice nor ecchymoses  Lungs: CTAB, no w/r/r, nonlabored breathing  Cardiovascular: RRR, S1, S2, no m/r/g  GI/Abdomen: +BS, soft, nontender, nondistended, no organomegaly nor masses  Back: no tenderness over spinous processes, iliac crests, scapulae  MSK/Extremities: Warm, well perfused. No edema  LN: no cervical, supraclavicular, nor axillary lymphadenopathy  Neurologic: alert, answering questions appropriately, moving all extremities spontaneously  Psych: appropriate affect  Data:   Reviewed in EPIC and notable for:    Results for AME ROSENTHAL (MRN [id number]) as of 11/6/2020 01:44   Ref. Range 11/5/2020 15:32   Sodium Latest Ref Range: 133 - 144 mmol/L 139   Potassium Latest Ref Range: 3.4 - 5.3 mmol/L 3.8   Chloride Latest Ref Range: 94 - 109 mmol/L 107   Carbon Dioxide Latest Ref Range: 20 - 32 mmol/L 27   Urea Nitrogen Latest Ref Range: 7 - 30 mg/dL 11   Creatinine Latest Ref Range: 0.52 - 1.04 mg/dL 0.68   GFR Estimate Latest Ref Range: >60 mL/min/1.73_m2 87   GFR Estimate If Black Latest Ref Range: >60 mL/min/1.73_m2 >90   Calcium Latest Ref Range: 8.5 - 10.1 mg/dL 8.4 (L)   Anion Gap Latest Ref Range: 3 - 14 mmol/L 5   Magnesium Latest Ref Range: 1.6 - 2.3 mg/dL 1.6   Glucose Latest Ref Range: 70 - 99 mg/dL 103 (H)   WBC Latest Ref Range: 4.0 - 11.0 10e9/L 5.4   Hemoglobin Latest Ref Range: 11.7 - 15.7 g/dL 7.4 (L)   Hematocrit Latest Ref Range: 35.0 - 47.0 % 22.4 (L)   Platelet Count Latest Ref Range: 150 - 450 10e9/L 49 (LL)   RBC Count Latest Ref Range: 3.8 - 5.2 10e12/L 2.54 (L)   MCV  Latest Ref Range: 78 - 100 fl 88   MCH Latest Ref Range: 26.5 - 33.0 pg 29.1   MCHC Latest Ref Range: 31.5 - 36.5 g/dL 33.0   RDW Latest Ref Range: 10.0 - 15.0 % 12.7   Diff Method Unknown Manual Differential   % Neutrophils Latest Units: % 85.0   % Lymphocytes Latest Units: % 4.4   % Monocytes Latest Units: % 10.6   % Eosinophils Latest Units: % 0.0   % Basophils Latest Units: % 0.0   Absolute Neutrophil Latest Ref Range: 1.6 - 8.3 10e9/L 4.6   Absolute Lymphocytes Latest Ref Range: 0.8 - 5.3 10e9/L 0.2 (L)   Absolute Monocytes Latest Ref Range: 0.0 - 1.3 10e9/L 0.6   Absolute Eosinophils Latest Ref Range: 0.0 - 0.7 10e9/L 0.0   Absolute Basophils Latest Ref Range: 0.0 - 0.2 10e9/L 0.0       Assessment and Plan:     Florencia Gao is a 75 year old female with AML in CR1, day + 29 s/p JCARLOS Allo sib pbsct per protocol 2019-10, randomized to Post transplant cytoxan/Tac/MMF.       1.  BMT: Cy/flu/TBI prep.   Received 4.19 x10(6) CD34/kg. Donor B pos and recipient B neg.   PRN GCSF dose given 11/3   **Per protocol no dex/steroids**      2.  HEME: Keep Hgb>7g/dL and plts>10K. No transfusions today  - Has not had transfusion since hospitalization however hgb has been slowly drifting down; will have labs checked on Sunday with possible transfusion  - Of note has significant antibodies to RBCs- can cause delay in transfusion availability. Premeds tylenol and benadryl. She was given a medical card from transfusion medicine and advised to get a medical bracelet to notify other medical providers that she has had a transplant and requires irradiated blood products if she needs a transfusion.                             3.  ID: Afebrile.   - Prophy: Vori, and HD ACV prophy.    - Pjp prophy to start day+28, will plan for IV pentamdine due to sulfa allergy and patient preference for convenience of less frequent dosing. Per uptodate 4mg/kg every 4 weeks (with a possible range of every 2-4 weeks)                              4.   GI: eating great.   - Hemorroids: tucks prn, barrier cream and start BID hydrocortisone cream topically.    - Protonix for GI prophy.    - Ursodiol for VOD prophy.     5.  GVHD Prophylaxis:   Day +3 and day+4 cytoxan  On tac 2.5mg BID and MMF (latter through Day +35)  - Tac level pending today.       6.  FEN/Renal:  - Cr stable  - hypomagnesia: did not tolerate Mg Oxide due to diarrhea.  Started Mg Glycinate & continuing IV PRN   Continue to increase oral Mag as tolerated to reduce need for IV supplementation     7. Hypothyroidism  Continue synthroid 88mcg     8. Psych: Situational Depression continue effexor 75mg daily.      9. Research: Pt consented to MT2019-31 (Seamless randomized tiral to alleviate morbidity and mortality) for which she was randomized to the palliative care management arm. Seen by Palliative 10/8. Pt has subsequently withdrawn from this study.     Follow up:  -RTC Sunday 11/8 for labs, 2g Mg IV infusion on arrival, possible RBC transfusion and DOM (early appointment preferred as she has a TV show to watch     Labs and treatment plan reviewed with patient. All questions answered.    In this 30 minutes visit, > 50% spent in counseling and coordinating care.    Patient discussed with Dr. Moncada.    Juice Christian MD ([place]), PhD   Hem/Onc Fellow    Patient has been seen and evaluated by me. I have reviewed today's vital signs, medications, labs and imaging results. I have discussed the plan with the team, edited and agree with the findings and plan in this note.      Recovering well with no active infections and no GVHD to date.   Maintain tac levels and supplement Mag with IV and try for more po to limit need for frequent visits to clinic.  No other acute new sx.  Not quite free of need for transfusions as Hgb drifting down but no additional new issues today.    Yong Moncada MD

## 2020-11-05 NOTE — NURSING NOTE
"Oncology Rooming Note    November 5, 2020 3:39 PM   Florencia Gao is a 75 year old female who presents for:    Chief Complaint   Patient presents with     Oncology Clinic Visit     Return; AML     Initial Vitals: /77 (BP Location: Right arm, Patient Position: Sitting, Cuff Size: Adult Large)   Pulse 101   Temp 98.2  F (36.8  C)   Resp 18   Wt 77.5 kg (170 lb 12.8 oz)   SpO2 98%   BMI 27.45 kg/m   Estimated body mass index is 27.45 kg/m  as calculated from the following:    Height as of 11/3/20: 1.68 m (5' 6.14\").    Weight as of this encounter: 77.5 kg (170 lb 12.8 oz). Body surface area is 1.9 meters squared.  No Pain (0) Comment: Data Unavailable   No LMP recorded. Patient is postmenopausal.  Allergies reviewed: Yes  Medications reviewed: Yes    Medications: Medication refills not needed today.  Pharmacy name entered into TeamPages:    [id number] [place], [place] - [address] [place] [address] [address]  [place], [place] - [address] [facility] [phone]    Clinical concerns: Patient states there are no new concerns to discuss with provider.  Dr Moncada was not notified.      Jessica Weber CMA              "

## 2020-11-05 NOTE — LETTER
"    11/5/2020         RE: Florencia Gao  [address] [place] [address]        Dear Colleague,    Thank you for referring your patient, Florencia Gao, to the [facility] BLOOD AND MARROW TRANSPLANT PROGRAM [place]. Please see a copy of my visit note below.    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         Diagnosis AMLM1 Acute myelogeneous leukemia, M1, myeloblastic  HCT Type Allogeneic    Prep Regimen Fludarabine  Cytoxan  TBI   Donor Source Sibling    GVHD Prophylaxis Post transplant cytoxan  Tac/MMF  Primary BMT Provider ACE          Interval history:     Ms Florencia Gao presents with her friend/caregiver/advocate who lives a mile from her in [place] and is currently with her at the [facility] ([facility]).    She reports that she has been feeling pretty good with ongoing progressive improvements and no questions nor concerns today.    She has been walking round trip to/from the [facility] without issue. She does \"hang onto\" her friend while walking just in case. She has been eating and drinking well.      Comprehensive ROS otherwise negative.    On ROS in addition to the above  Endorses:  +Low energy but progressively improving  +Bowel movements have been regular for the first time in quite some time (was previously chronically on miralax daily but has not needed it)  +Hemorrhoids - with infrequent hematochezia    Denies  fevers, chills, night sweats, headaches, dysphagia/odynophagia, change in weight, change in appetite, cough, shortness of breath, chest pain, nausea/vomiting, abd pain, constipation/diarrhea, dysuria, hematuria, swelling, rashes, lymphadenopathy, neuropathy    Past Medical History:   Reviewed in EPIC    Past Medical History:   Diagnosis Date     AML (acute " myeloblastic leukemia) (H)      Anxiety      Hypothyroidism 2000     Osteoporosis         Medications:   Reviewed in EPIC    Magnesium glycinate 2 pills, twice per day (400mg BID); noted to have improved side effect profile and perhaps slightly lower elemental magnesium weight for weight vs mg oxide due to the higher molecular weight of glycine    Current Outpatient Medications   Medication Sig     acyclovir (ZOVIRAX) 800 MG tablet Take 1 tablet (800 mg) by mouth 5 times daily     granisetron (KYTRIL) 1 MG tablet Take 1 tablet (1 mg) by mouth every 12 hours to prevent nausea (Patient taking differently: Take 1 mg by mouth every 12 hours 1 tablet in the morning to prevent nausea)     hydrocortisone (CORTAID) 1 % external cream Apply topically 2 times daily to hemorrhoids until resolved.     levothyroxine (SYNTHROID/LEVOTHROID) 88 MCG tablet Take 1 tablet (88 mcg) by mouth daily     loratadine (CLARITIN) 10 MG tablet Take 1 tablet (10 mg) by mouth daily     mycophenolate (GENERIC EQUIVALENT) 500 MG tablet Take 2 tablets (1,000 mg) by mouth every 8 hours     OLANZapine zydis (ZYPREXA) 5 MG ODT Dissolve half tablet under the tongue daily at bedtime. If too difficult to break tablet in half, okay to try taking full tablet under the tongue at bedtime.     pantoprazole (PROTONIX) 40 MG EC tablet Take 1 tablet (40 mg) by mouth every morning (before breakfast)     polyethylene glycol (MIRALAX) 17 g packet Take 17 g by mouth daily as needed for constipation     prochlorperazine (COMPAZINE) 5 MG tablet Take 1 tablet (5 mg) by mouth every 6 hours as needed for nausea or vomiting     tacrolimus (GENERIC EQUIVALENT) 0.5 MG capsule Combine one 0.5mg capsules with two 1mg capsules for total dose of 2.5mg capsules by mouth twice daily.     tacrolimus (GENERIC EQUIVALENT) 1 MG capsule Combine two 1mg capsules with one 0.5mg capsules for total dose of 2.5mg capsules by mouth twice daily.     ursodiol (ACTIGALL) 300 MG capsule Take 1  capsule (300 mg) by mouth 3 times daily     venlafaxine (EFFEXOR) 75 MG tablet Take 1 tablet (75 mg) by mouth daily     voriconazole (VFEND) 200 MG tablet Take 1 tablet (200 mg) by mouth every 12 hours     Current Facility-Administered Medications   Medication     heparin lock flush 10 UNIT/ML injection 5 mL      Physical Exam (Resident / Clinician):   Blood pressure 135/77, pulse 101, temperature 98.2  F (36.8  C), resp. rate 18, weight 77.5 kg (170 lb 12.8 oz), SpO2 98 %.    ECOG PS: 1-2  Constitutional: WDWN female in NAD, pleasant and appropriate  HEENT:  NC/AT, no icterus, OP clear, MMM  Skin: Mild pallor, No jaundice nor ecchymoses  Lungs: CTAB, no w/r/r, nonlabored breathing  Cardiovascular: RRR, S1, S2, no m/r/g  GI/Abdomen: +BS, soft, nontender, nondistended, no organomegaly nor masses  Back: no tenderness over spinous processes, iliac crests, scapulae  MSK/Extremities: Warm, well perfused. No edema  LN: no cervical, supraclavicular, nor axillary lymphadenopathy  Neurologic: alert, answering questions appropriately, moving all extremities spontaneously  Psych: appropriate affect  Data:   Reviewed in EPIC and notable for:    Results for AME ROSENTHAL (MRN [id number]) as of 11/6/2020 01:44   Ref. Range 11/5/2020 15:32   Sodium Latest Ref Range: 133 - 144 mmol/L 139   Potassium Latest Ref Range: 3.4 - 5.3 mmol/L 3.8   Chloride Latest Ref Range: 94 - 109 mmol/L 107   Carbon Dioxide Latest Ref Range: 20 - 32 mmol/L 27   Urea Nitrogen Latest Ref Range: 7 - 30 mg/dL 11   Creatinine Latest Ref Range: 0.52 - 1.04 mg/dL 0.68   GFR Estimate Latest Ref Range: >60 mL/min/1.73_m2 87   GFR Estimate If Black Latest Ref Range: >60 mL/min/1.73_m2 >90   Calcium Latest Ref Range: 8.5 - 10.1 mg/dL 8.4 (L)   Anion Gap Latest Ref Range: 3 - 14 mmol/L 5   Magnesium Latest Ref Range: 1.6 - 2.3 mg/dL 1.6   Glucose Latest Ref Range: 70 - 99 mg/dL 103 (H)   WBC Latest Ref Range: 4.0 - 11.0 10e9/L 5.4   Hemoglobin Latest Ref Range:  11.7 - 15.7 g/dL 7.4 (L)   Hematocrit Latest Ref Range: 35.0 - 47.0 % 22.4 (L)   Platelet Count Latest Ref Range: 150 - 450 10e9/L 49 (LL)   RBC Count Latest Ref Range: 3.8 - 5.2 10e12/L 2.54 (L)   MCV Latest Ref Range: 78 - 100 fl 88   MCH Latest Ref Range: 26.5 - 33.0 pg 29.1   MCHC Latest Ref Range: 31.5 - 36.5 g/dL 33.0   RDW Latest Ref Range: 10.0 - 15.0 % 12.7   Diff Method Unknown Manual Differential   % Neutrophils Latest Units: % 85.0   % Lymphocytes Latest Units: % 4.4   % Monocytes Latest Units: % 10.6   % Eosinophils Latest Units: % 0.0   % Basophils Latest Units: % 0.0   Absolute Neutrophil Latest Ref Range: 1.6 - 8.3 10e9/L 4.6   Absolute Lymphocytes Latest Ref Range: 0.8 - 5.3 10e9/L 0.2 (L)   Absolute Monocytes Latest Ref Range: 0.0 - 1.3 10e9/L 0.6   Absolute Eosinophils Latest Ref Range: 0.0 - 0.7 10e9/L 0.0   Absolute Basophils Latest Ref Range: 0.0 - 0.2 10e9/L 0.0       Assessment and Plan:     Florencia Gao is a 75 year old female with AML in CR1, day + 29 s/p JCARLOS Allo sib pbsct per protocol 2019-10, randomized to Post transplant cytoxan/Tac/MMF.       1.  BMT: Cy/flu/TBI prep.   Received 4.19 x10(6) CD34/kg. Donor B pos and recipient B neg.   PRN GCSF dose given 11/3   **Per protocol no dex/steroids**      2.  HEME: Keep Hgb>7g/dL and plts>10K. No transfusions today  - Has not had transfusion since hospitalization however hgb has been slowly drifting down; will have labs checked on Sunday with possible transfusion  - Of note has significant antibodies to RBCs- can cause delay in transfusion availability. Premeds tylenol and benadryl. She was given a medical card from transfusion medicine and advised to get a medical bracelet to notify other medical providers that she has had a transplant and requires irradiated blood products if she needs a transfusion.                             3.  ID: Afebrile.   - Prophy: Vori, and HD ACV prophy.    - Pjp prophy to start day+28, will plan for IV  pentamdine due to sulfa allergy and patient preference for convenience of less frequent dosing. Per uptodate 4mg/kg every 4 weeks (with a possible range of every 2-4 weeks)                              4.  GI: eating great.   - Hemorroids: tucks prn, barrier cream and start BID hydrocortisone cream topically.    - Protonix for GI prophy.    - Ursodiol for VOD prophy.     5.  GVHD Prophylaxis:   Day +3 and day+4 cytoxan  On tac 2.5mg BID and MMF (latter through Day +35)  - Tac level pending today.       6.  FEN/Renal:  - Cr stable  - hypomagnesia: did not tolerate Mg Oxide due to diarrhea.  Started Mg Glycinate & continuing IV PRN   Continue to increase oral Mag as tolerated to reduce need for IV supplementation     7. Hypothyroidism  Continue synthroid 88mcg     8. Psych: Situational Depression continue effexor 75mg daily.      9. Research: Pt consented to [id number] (Seamless randomized tiral to alleviate morbidity and mortality) for which she was randomized to the palliative care management arm. Seen by Palliative 10/8. Pt has subsequently withdrawn from this study.     Follow up:  -RTC Sunday 11/8 for labs, 2g Mg IV infusion on arrival, possible RBC transfusion and DOM (early appointment preferred as she has a TV show to watch     Labs and treatment plan reviewed with patient. All questions answered.    In this 30 minutes visit, > 50% spent in counseling and coordinating care.    Patient discussed with Dr. Moncada.    Juice Christian MD ([place]), PhD   Hem/Onc Fellow    Patient has been seen and evaluated by me. I have reviewed today's vital signs, medications, labs and imaging results. I have discussed the plan with the team, edited and agree with the findings and plan in this note.      Recovering well with no active infections and no GVHD to date.   Maintain tac levels and supplement Mag with IV and try for more po to limit need for frequent visits to clinic.  No other acute new sx.  Not quite free of need for  transfusions as Hgb drifting down but no additional new issues today.    Yong Moncada MD

## 2020-11-05 NOTE — PROGRESS NOTES
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         Diagnosis AMLM1 Acute myelogeneous leukemia, M1, myeloblastic  HCT Type Allogeneic    Prep Regimen Fludarabine  Cytoxan  TBI   Donor Source Sibling    GVHD Prophylaxis Post transplant cytoxan  Tac/MMF  Primary BMT Provider ACE          Interval history:     Ms Florencia Gao presents with her friend/caregiver/advocate who lives a mile from her in [place] and is currently with her at the [facility] ([facility]).    She reports that she has been feeling pretty good with ongoing progressive improvements and no questions nor concerns today.    She has been walking round trip to/from the [facility] without issue. She does \"hang onto\" her friend while walking just in case. She has been eating and drinking well.      Comprehensive ROS otherwise negative.    On ROS in addition to the above  Endorses:  +Low energy but progressively improving  +Bowel movements have been regular for the first time in quite some time (was previously chronically on miralax daily but has not needed it)  +Hemorrhoids - with infrequent hematochezia    Denies  fevers, chills, night sweats, headaches, dysphagia/odynophagia, change in weight, change in appetite, cough, shortness of breath, chest pain, nausea/vomiting, abd pain, constipation/diarrhea, dysuria, hematuria, swelling, rashes, lymphadenopathy, neuropathy    Past Medical History:   Reviewed in EPIC    Past Medical History:   Diagnosis Date     AML (acute myeloblastic leukemia) (H)      Anxiety      Hypothyroidism 2000     Osteoporosis         Medications:   Reviewed in EPIC    Magnesium glycinate 2 pills, twice per day (400mg BID); noted to have improved side effect profile and perhaps slightly lower elemental magnesium weight for weight vs " mg oxide due to the higher molecular weight of glycine    Current Outpatient Medications   Medication Sig     acyclovir (ZOVIRAX) 800 MG tablet Take 1 tablet (800 mg) by mouth 5 times daily     granisetron (KYTRIL) 1 MG tablet Take 1 tablet (1 mg) by mouth every 12 hours to prevent nausea (Patient taking differently: Take 1 mg by mouth every 12 hours 1 tablet in the morning to prevent nausea)     hydrocortisone (CORTAID) 1 % external cream Apply topically 2 times daily to hemorrhoids until resolved.     levothyroxine (SYNTHROID/LEVOTHROID) 88 MCG tablet Take 1 tablet (88 mcg) by mouth daily     loratadine (CLARITIN) 10 MG tablet Take 1 tablet (10 mg) by mouth daily     mycophenolate (GENERIC EQUIVALENT) 500 MG tablet Take 2 tablets (1,000 mg) by mouth every 8 hours     OLANZapine zydis (ZYPREXA) 5 MG ODT Dissolve half tablet under the tongue daily at bedtime. If too difficult to break tablet in half, okay to try taking full tablet under the tongue at bedtime.     pantoprazole (PROTONIX) 40 MG EC tablet Take 1 tablet (40 mg) by mouth every morning (before breakfast)     polyethylene glycol (MIRALAX) 17 g packet Take 17 g by mouth daily as needed for constipation     prochlorperazine (COMPAZINE) 5 MG tablet Take 1 tablet (5 mg) by mouth every 6 hours as needed for nausea or vomiting     tacrolimus (GENERIC EQUIVALENT) 0.5 MG capsule Combine one 0.5mg capsules with two 1mg capsules for total dose of 2.5mg capsules by mouth twice daily.     tacrolimus (GENERIC EQUIVALENT) 1 MG capsule Combine two 1mg capsules with one 0.5mg capsules for total dose of 2.5mg capsules by mouth twice daily.     ursodiol (ACTIGALL) 300 MG capsule Take 1 capsule (300 mg) by mouth 3 times daily     venlafaxine (EFFEXOR) 75 MG tablet Take 1 tablet (75 mg) by mouth daily     voriconazole (VFEND) 200 MG tablet Take 1 tablet (200 mg) by mouth every 12 hours     Current Facility-Administered Medications   Medication     heparin lock flush 10  UNIT/ML injection 5 mL      Physical Exam (Resident / Clinician):   Blood pressure 135/77, pulse 101, temperature 98.2  F (36.8  C), resp. rate 18, weight 77.5 kg (170 lb 12.8 oz), SpO2 98 %.    ECOG PS: 1-2  Constitutional: WDWN female in NAD, pleasant and appropriate  HEENT:  NC/AT, no icterus, OP clear, MMM  Skin: Mild pallor, No jaundice nor ecchymoses  Lungs: CTAB, no w/r/r, nonlabored breathing  Cardiovascular: RRR, S1, S2, no m/r/g  GI/Abdomen: +BS, soft, nontender, nondistended, no organomegaly nor masses  Back: no tenderness over spinous processes, iliac crests, scapulae  MSK/Extremities: Warm, well perfused. No edema  LN: no cervical, supraclavicular, nor axillary lymphadenopathy  Neurologic: alert, answering questions appropriately, moving all extremities spontaneously  Psych: appropriate affect  Data:   Reviewed in EPIC and notable for:    Results for AME ROSENTHAL (MRN [id number]) as of 11/6/2020 01:44   Ref. Range 11/5/2020 15:32   Sodium Latest Ref Range: 133 - 144 mmol/L 139   Potassium Latest Ref Range: 3.4 - 5.3 mmol/L 3.8   Chloride Latest Ref Range: 94 - 109 mmol/L 107   Carbon Dioxide Latest Ref Range: 20 - 32 mmol/L 27   Urea Nitrogen Latest Ref Range: 7 - 30 mg/dL 11   Creatinine Latest Ref Range: 0.52 - 1.04 mg/dL 0.68   GFR Estimate Latest Ref Range: >60 mL/min/1.73_m2 87   GFR Estimate If Black Latest Ref Range: >60 mL/min/1.73_m2 >90   Calcium Latest Ref Range: 8.5 - 10.1 mg/dL 8.4 (L)   Anion Gap Latest Ref Range: 3 - 14 mmol/L 5   Magnesium Latest Ref Range: 1.6 - 2.3 mg/dL 1.6   Glucose Latest Ref Range: 70 - 99 mg/dL 103 (H)   WBC Latest Ref Range: 4.0 - 11.0 10e9/L 5.4   Hemoglobin Latest Ref Range: 11.7 - 15.7 g/dL 7.4 (L)   Hematocrit Latest Ref Range: 35.0 - 47.0 % 22.4 (L)   Platelet Count Latest Ref Range: 150 - 450 10e9/L 49 (LL)   RBC Count Latest Ref Range: 3.8 - 5.2 10e12/L 2.54 (L)   MCV Latest Ref Range: 78 - 100 fl 88   MCH Latest Ref Range: 26.5 - 33.0 pg 29.1   MCHC  Latest Ref Range: 31.5 - 36.5 g/dL 33.0   RDW Latest Ref Range: 10.0 - 15.0 % 12.7   Diff Method Unknown Manual Differential   % Neutrophils Latest Units: % 85.0   % Lymphocytes Latest Units: % 4.4   % Monocytes Latest Units: % 10.6   % Eosinophils Latest Units: % 0.0   % Basophils Latest Units: % 0.0   Absolute Neutrophil Latest Ref Range: 1.6 - 8.3 10e9/L 4.6   Absolute Lymphocytes Latest Ref Range: 0.8 - 5.3 10e9/L 0.2 (L)   Absolute Monocytes Latest Ref Range: 0.0 - 1.3 10e9/L 0.6   Absolute Eosinophils Latest Ref Range: 0.0 - 0.7 10e9/L 0.0   Absolute Basophils Latest Ref Range: 0.0 - 0.2 10e9/L 0.0       Assessment and Plan:     Florencia Gao is a 75 year old female with AML in CR1, day + 29 s/p JCARLOS Allo sib pbsct per protocol 2019-10, randomized to Post transplant cytoxan/Tac/MMF.       1.  BMT: Cy/flu/TBI prep.   Received 4.19 x10(6) CD34/kg. Donor B pos and recipient B neg.   PRN GCSF dose given 11/3   **Per protocol no dex/steroids**      2.  HEME: Keep Hgb>7g/dL and plts>10K. No transfusions today  - Has not had transfusion since hospitalization however hgb has been slowly drifting down; will have labs checked on Sunday with possible transfusion  - Of note has significant antibodies to RBCs- can cause delay in transfusion availability. Premeds tylenol and benadryl. She was given a medical card from transfusion medicine and advised to get a medical bracelet to notify other medical providers that she has had a transplant and requires irradiated blood products if she needs a transfusion.                             3.  ID: Afebrile.   - Prophy: Vori, and HD ACV prophy.    - Pjp prophy to start day+28, will plan for IV pentamdine due to sulfa allergy and patient preference for convenience of less frequent dosing. Per uptodate 4mg/kg every 4 weeks (with a possible range of every 2-4 weeks)                              4.  GI: eating great.   - Hemorroids: tucks prn, barrier cream and start BID  hydrocortisone cream topically.    - Protonix for GI prophy.    - Ursodiol for VOD prophy.     5.  GVHD Prophylaxis:   Day +3 and day+4 cytoxan  On tac 2.5mg BID and MMF (latter through Day +35)  - Tac level pending today.       6.  FEN/Renal:  - Cr stable  - hypomagnesia: did not tolerate Mg Oxide due to diarrhea.  Started Mg Glycinate & continuing IV PRN   Continue to increase oral Mag as tolerated to reduce need for IV supplementation     7. Hypothyroidism  Continue synthroid 88mcg     8. Psych: Situational Depression continue effexor 75mg daily.      9. Research: Pt consented to MT2019-31 (Seamless randomized tiral to alleviate morbidity and mortality) for which she was randomized to the palliative care management arm. Seen by Palliative 10/8. Pt has subsequently withdrawn from this study.     Follow up:  -RTC Sunday 11/8 for labs, 2g Mg IV infusion on arrival, possible RBC transfusion and DOM (early appointment preferred as she has a TV show to watch     Labs and treatment plan reviewed with patient. All questions answered.    In this 30 minutes visit, > 50% spent in counseling and coordinating care.    Patient discussed with Dr. Moncada.    Juice Christian MD ([place]), PhD   Hem/Onc Fellow    Patient has been seen and evaluated by me. I have reviewed today's vital signs, medications, labs and imaging results. I have discussed the plan with the team, edited and agree with the findings and plan in this note.      Recovering well with no active infections and no GVHD to date.   Maintain tac levels and supplement Mag with IV and try for more po to limit need for frequent visits to clinic.  No other acute new sx.  Not quite free of need for transfusions as Hgb drifting down but no additional new issues today.    Yong Moncada MD

## 2020-11-06 LAB — COPATH REPORT: NORMAL

## 2020-11-08 ENCOUNTER — ONCOLOGY VISIT (OUTPATIENT)
Dept: TRANSPLANT | Facility: CLINIC | Age: 75
End: 2020-11-08
Attending: INTERNAL MEDICINE
Payer: COMMERCIAL

## 2020-11-08 ENCOUNTER — APPOINTMENT (OUTPATIENT)
Dept: LAB | Facility: CLINIC | Age: 75
End: 2020-11-08
Attending: INTERNAL MEDICINE
Payer: COMMERCIAL

## 2020-11-08 VITALS
HEART RATE: 111 BPM | DIASTOLIC BLOOD PRESSURE: 66 MMHG | RESPIRATION RATE: 16 BRPM | TEMPERATURE: 96.5 F | OXYGEN SATURATION: 98 % | WEIGHT: 172.2 LBS | BODY MASS INDEX: 27.68 KG/M2 | SYSTOLIC BLOOD PRESSURE: 113 MMHG

## 2020-11-08 DIAGNOSIS — C92.01 AML (ACUTE MYELOID LEUKEMIA) IN REMISSION (H): Primary | ICD-10-CM

## 2020-11-08 LAB
ABO + RH BLD: ABNORMAL
ALBUMIN SERPL-MCNC: 3.7 G/DL (ref 3.4–5)
ALP SERPL-CCNC: 93 U/L (ref 40–150)
ALT SERPL W P-5'-P-CCNC: 23 U/L (ref 0–50)
ANION GAP SERPL CALCULATED.3IONS-SCNC: 6 MMOL/L (ref 3–14)
AST SERPL W P-5'-P-CCNC: 21 U/L (ref 0–45)
BASOPHILS # BLD AUTO: 0.1 10E9/L (ref 0–0.2)
BASOPHILS NFR BLD AUTO: 3.5 %
BILIRUB SERPL-MCNC: 0.2 MG/DL (ref 0.2–1.3)
BLD GP AB SCN SERPL QL: ABNORMAL
BLD GP AB SCN SERPL QL: ABNORMAL
BLD PROD TYP BPU: ABNORMAL
BLD PROD TYP BPU: NORMAL
BLD UNIT ID BPU: 0
BLOOD BANK CMNT PATIENT-IMP: ABNORMAL
BLOOD PRODUCT CODE: NORMAL
BPU ID: NORMAL
BUN SERPL-MCNC: 14 MG/DL (ref 7–30)
CALCIUM SERPL-MCNC: 9 MG/DL (ref 8.5–10.1)
CHLORIDE SERPL-SCNC: 102 MMOL/L (ref 94–109)
CO2 SERPL-SCNC: 27 MMOL/L (ref 20–32)
CREAT SERPL-MCNC: 0.83 MG/DL (ref 0.52–1.04)
DIFFERENTIAL METHOD BLD: ABNORMAL
EOSINOPHIL # BLD AUTO: 0 10E9/L (ref 0–0.7)
EOSINOPHIL NFR BLD AUTO: 0 %
ERYTHROCYTE [DISTWIDTH] IN BLOOD BY AUTOMATED COUNT: 12.5 % (ref 10–15)
GFR SERPL CREATININE-BSD FRML MDRD: 70 ML/MIN/{1.73_M2}
GLUCOSE SERPL-MCNC: 119 MG/DL (ref 70–99)
HCT VFR BLD AUTO: 24.8 % (ref 35–47)
HGB BLD-MCNC: 8.1 G/DL (ref 11.7–15.7)
LYMPHOCYTES # BLD AUTO: 0.3 10E9/L (ref 0.8–5.3)
LYMPHOCYTES NFR BLD AUTO: 20 %
MAGNESIUM SERPL-MCNC: 1.3 MG/DL (ref 1.6–2.3)
MCH RBC QN AUTO: 28.9 PG (ref 26.5–33)
MCHC RBC AUTO-ENTMCNC: 32.7 G/DL (ref 31.5–36.5)
MCV RBC AUTO: 89 FL (ref 78–100)
METAMYELOCYTES # BLD: 0 10E9/L
METAMYELOCYTES NFR BLD MANUAL: 1.7 %
MONOCYTES # BLD AUTO: 0.4 10E9/L (ref 0–1.3)
MONOCYTES NFR BLD AUTO: 22.6 %
MYELOCYTES # BLD: 0 10E9/L
MYELOCYTES NFR BLD MANUAL: 1.8 %
NEUTROPHILS # BLD AUTO: 0.9 10E9/L (ref 1.6–8.3)
NEUTROPHILS NFR BLD AUTO: 50.4 %
NUM BPU REQUESTED: 1
PLATELET # BLD AUTO: 76 10E9/L (ref 150–450)
PLATELET # BLD EST: ABNORMAL 10*3/UL
POIKILOCYTOSIS BLD QL SMEAR: SLIGHT
POTASSIUM SERPL-SCNC: 4 MMOL/L (ref 3.4–5.3)
PROT SERPL-MCNC: 7.2 G/DL (ref 6.8–8.8)
RBC # BLD AUTO: 2.8 10E12/L (ref 3.8–5.2)
SODIUM SERPL-SCNC: 135 MMOL/L (ref 133–144)
SPECIMEN EXP DATE BLD: ABNORMAL
SPECIMEN EXP DATE BLD: ABNORMAL
TRANSFUSION STATUS PATIENT QL: NORMAL
TRANSFUSION STATUS PATIENT QL: NORMAL
WBC # BLD AUTO: 1.7 10E9/L (ref 4–11)

## 2020-11-08 PROCEDURE — 99207 PR NO CHARGE LOS: CPT

## 2020-11-08 PROCEDURE — 250N000011 HC RX IP 250 OP 636: Performed by: PHYSICIAN ASSISTANT

## 2020-11-08 PROCEDURE — G0463 HOSPITAL OUTPT CLINIC VISIT: HCPCS | Mod: 25

## 2020-11-08 PROCEDURE — 96365 THER/PROPH/DIAG IV INF INIT: CPT

## 2020-11-08 PROCEDURE — 80053 COMPREHEN METABOLIC PANEL: CPT | Performed by: INTERNAL MEDICINE

## 2020-11-08 PROCEDURE — 86901 BLOOD TYPING SEROLOGIC RH(D): CPT | Performed by: INTERNAL MEDICINE

## 2020-11-08 PROCEDURE — G0463 HOSPITAL OUTPT CLINIC VISIT: HCPCS

## 2020-11-08 PROCEDURE — 85025 COMPLETE CBC W/AUTO DIFF WBC: CPT | Performed by: INTERNAL MEDICINE

## 2020-11-08 PROCEDURE — 250N000011 HC RX IP 250 OP 636: Performed by: INTERNAL MEDICINE

## 2020-11-08 PROCEDURE — 99214 OFFICE O/P EST MOD 30 MIN: CPT

## 2020-11-08 PROCEDURE — 86850 RBC ANTIBODY SCREEN: CPT | Performed by: INTERNAL MEDICINE

## 2020-11-08 PROCEDURE — 96372 THER/PROPH/DIAG INJ SC/IM: CPT | Performed by: PHYSICIAN ASSISTANT

## 2020-11-08 PROCEDURE — 83735 ASSAY OF MAGNESIUM: CPT | Performed by: INTERNAL MEDICINE

## 2020-11-08 PROCEDURE — 86900 BLOOD TYPING SEROLOGIC ABO: CPT | Performed by: INTERNAL MEDICINE

## 2020-11-08 RX ORDER — DEXTROSE MONOHYDRATE 50 MG/ML
10-20 INJECTION, SOLUTION INTRAVENOUS ONCE
Status: DISCONTINUED | OUTPATIENT
Start: 2020-11-08 | End: 2020-11-08 | Stop reason: HOSPADM

## 2020-11-08 RX ORDER — HEPARIN SODIUM,PORCINE 10 UNIT/ML
5 VIAL (ML) INTRAVENOUS
Status: CANCELLED | OUTPATIENT
Start: 2020-11-08

## 2020-11-08 RX ORDER — HEPARIN SODIUM,PORCINE 10 UNIT/ML
5 VIAL (ML) INTRAVENOUS
Status: DISCONTINUED | OUTPATIENT
Start: 2020-11-08 | End: 2020-11-08 | Stop reason: HOSPADM

## 2020-11-08 RX ORDER — MAGNESIUM SULFATE HEPTAHYDRATE 40 MG/ML
2 INJECTION, SOLUTION INTRAVENOUS ONCE
Status: COMPLETED | OUTPATIENT
Start: 2020-11-08 | End: 2020-11-08

## 2020-11-08 RX ORDER — HEPARIN SODIUM (PORCINE) LOCK FLUSH IV SOLN 100 UNIT/ML 100 UNIT/ML
5 SOLUTION INTRAVENOUS
Status: CANCELLED | OUTPATIENT
Start: 2020-11-08

## 2020-11-08 RX ADMIN — Medication 5 ML: at 07:29

## 2020-11-08 RX ADMIN — Medication 5 ML: at 09:38

## 2020-11-08 RX ADMIN — MAGNESIUM SULFATE HEPTAHYDRATE 2 G: 40 INJECTION, SOLUTION INTRAVENOUS at 08:19

## 2020-11-08 RX ADMIN — FILGRASTIM-SNDZ 300 MCG: 300 INJECTION, SOLUTION INTRAVENOUS; SUBCUTANEOUS at 09:42

## 2020-11-08 ASSESSMENT — PAIN SCALES - GENERAL: PAINLEVEL: NO PAIN (0)

## 2020-11-08 NOTE — LETTER
11/8/2020         RE: Florencia Gao  [address] [place] [address]        Dear Colleague,    Thank you for referring your patient, Florencia Gao, to the [facility] BLOOD AND MARROW TRANSPLANT PROGRAM [place]. Please see a copy of my visit note below.    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           Diagnosis AMLM1 Acute myelogeneous leukemia, M1, myeloblastic  HCT Type Allogeneic    Prep Regimen Fludarabine  Cytoxan  TBI   Donor Source Sibling    GVHD Prophylaxis Post transplant cytoxan  Tac/MMF  Primary BMT Provider ACE          Interval history:     Ms Florencia Gao presents with her friend/caregiver/advocate who lives a mile from her in [place] and is currently with her at the [place] ([facility]).    Feeling well, eating better, more appetite, takes a walk every day. No diarrhea, tolerates Mg Glyc well. Still on MMF.  No rash, no diarrhea, no N/V, no edema.  Comprehensive ROS otherwise negative.    On ROS in addition to the above  Endorses:  +Low energy but progressively improving  +Bowel movements have been regular for the first time in quite some time (was previously chronically on miralax daily but has not needed it)  +Hemorrhoids - with infrequent hematochezia    Denies  fevers, chills, night sweats, headaches, dysphagia/odynophagia, change in weight, change in appetite, cough, shortness of breath, chest pain, nausea/vomiting, abd pain, constipation/diarrhea, dysuria, hematuria, swelling, rashes, lymphadenopathy, neuropathy    Past Medical History:   Reviewed in EPIC    Past Medical History:   Diagnosis Date     AML (acute myeloblastic leukemia) (H)      Anxiety      Hypothyroidism 2000     Osteoporosis         Medications:   Reviewed in EPIC    Magnesium glycinate 2 pills,  twice per day (400mg BID); noted to have improved side effect profile and perhaps slightly lower elemental magnesium weight for weight vs mg oxide due to the higher molecular weight of glycine    Current Outpatient Medications   Medication Sig     acyclovir (ZOVIRAX) 800 MG tablet Take 1 tablet (800 mg) by mouth 5 times daily     granisetron (KYTRIL) 1 MG tablet Take 1 tablet (1 mg) by mouth every 12 hours to prevent nausea (Patient taking differently: Take 1 mg by mouth every 12 hours 1 tablet in the morning to prevent nausea)     hydrocortisone (CORTAID) 1 % external cream Apply topically 2 times daily to hemorrhoids until resolved.     levothyroxine (SYNTHROID/LEVOTHROID) 88 MCG tablet Take 1 tablet (88 mcg) by mouth daily     loratadine (CLARITIN) 10 MG tablet Take 1 tablet (10 mg) by mouth daily     mycophenolate (GENERIC EQUIVALENT) 500 MG tablet Take 2 tablets (1,000 mg) by mouth every 8 hours     OLANZapine zydis (ZYPREXA) 5 MG ODT Dissolve half tablet under the tongue daily at bedtime. If too difficult to break tablet in half, okay to try taking full tablet under the tongue at bedtime.     pantoprazole (PROTONIX) 40 MG EC tablet Take 1 tablet (40 mg) by mouth every morning (before breakfast)     polyethylene glycol (MIRALAX) 17 g packet Take 17 g by mouth daily as needed for constipation     prochlorperazine (COMPAZINE) 5 MG tablet Take 1 tablet (5 mg) by mouth every 6 hours as needed for nausea or vomiting     tacrolimus (GENERIC EQUIVALENT) 0.5 MG capsule Combine one 0.5mg capsules with two 1mg capsules for total dose of 2.5mg capsules by mouth twice daily.     tacrolimus (GENERIC EQUIVALENT) 1 MG capsule Combine two 1mg capsules with one 0.5mg capsules for total dose of 2.5mg capsules by mouth twice daily.     ursodiol (ACTIGALL) 300 MG capsule Take 1 capsule (300 mg) by mouth 3 times daily     venlafaxine (EFFEXOR) 75 MG tablet Take 1 tablet (75 mg) by mouth daily     voriconazole (VFEND) 200 MG tablet  Take 1 tablet (200 mg) by mouth every 12 hours     Current Facility-Administered Medications   Medication     heparin lock flush 10 UNIT/ML injection 5 mL     heparin lock flush 10 UNIT/ML injection 5 mL     Facility-Administered Medications Ordered in Other Visits   Medication     filgrastim-sndz (ZARXIO) injection syringe 300 mcg      Physical Exam (Resident / Clinician):   Blood pressure 113/66, pulse 111, temperature 96.5  F (35.8  C), temperature source Tympanic, resp. rate 16, weight 78.1 kg (172 lb 3.2 oz), SpO2 98 %.    ECOG PS: 1  Constitutional: WDWN female in NAD, pleasant and appropriate  HEENT:  NC/AT, no icterus, OP clear, MMM  Skin: Mild pallor, No jaundice nor ecchymoses  Lungs: CTAB, no w/r/r, nonlabored breathing  Cardiovascular: RRR, S1, S2, no m/r/g  GI/Abdomen: +BS, soft, nontender, nondistended, no organomegaly nor masses  Back: no tenderness over spinous processes, iliac crests, scapulae  MSK/Extremities: Warm, well perfused. No edema  LN: no cervical, supraclavicular, nor axillary lymphadenopathy  Neurologic: alert, answering questions appropriately, moving all extremities spontaneously  Psych: appropriate affect  Skin without rashes, mouth hydrated, no oral lesions.   Data:   Reviewed in EPIC and notable for:  Lab Results   Component Value Date    WBC 1.7 (L) 11/08/2020    ANEU 0.9 (L) 11/08/2020    HGB 8.1 (L) 11/08/2020    HCT 24.8 (L) 11/08/2020    PLT 76 (L) 11/08/2020     11/08/2020    POTASSIUM 4.0 11/08/2020    CHLORIDE 102 11/08/2020    CO2 27 11/08/2020     (H) 11/08/2020    BUN 14 11/08/2020    CR 0.83 11/08/2020    MAG 1.3 (L) 11/08/2020    INR 1.01 10/26/2020    AST 21 11/08/2020    ALT 23 11/08/2020           Assessment and Plan:     Florencia Gao is a 75 year old female with AML in CR1, day + 32 s/p JCARLOS Allo sib pbsct per protocol 2019-10, randomized to Post transplant cytoxan/Tac/MMF.       1.  BMT: Cy/flu/TBI prep.   Received 4.19 x10(6) CD34/kg. Donor B pos  and recipient B neg.   PRN GCSF dose given 11/3  Give Neupogen today (11/8)   **Per protocol no dex/steroids**      2.  HEME: Keep Hgb>7g/dL and plts>10K. No transfusions today  Anemia.  plt are getting better.  - Of note has significant antibodies to RBCs- can cause delay in transfusion availability. Premeds tylenol and benadryl. She was given a medical card from transfusion medicine and advised to get a medical bracelet to notify other medical providers that she has had a transplant and requires irradiated blood products if she needs a transfusion.                             3.  ID: Afebrile.   - Prophy: Vori, and HD ACV prophy.    - Pjp prophy to start day+28, plan for IV pentamdine for Wed 11/10.4mg/kg every 4 weeks  sulfa allergy                                 4.  GI: eating great.   - Hemorroids: tucks prn, barrier cream and start BID hydrocortisone cream topically.    - Protonix for GI prophy.    - Ursodiol for VOD prophy.     5.  GVHD Prophylaxis: stop MMF today (contributing to cytopenia, engrafted)  Day +3 and day+4 cytoxan  On tac 2.5mg BID.   - Tac level 8.8 mg/dl       6.  FEN/Renal:  - Cr stable  - hypomagnesia: did not tolerate Mg Oxide due to diarrhea.  Started Mg Glycinate 240mg - increase to 2 pills TID & continuing IV PRN. 2gm today          7. Hypothyroidism  Continue synthroid 88mcg     8. Psych: Situational Depression continue effexor 75mg daily.      9. Research: Pt consented to LS9092-85 (Seamless randomized trial to alleviate morbidity and mortality) for which she was randomized to the palliative care management arm. Seen by Palliative 10/8. Pt has subsequently withdrawn from this study.     Follow up:  -RTC Sunday 11/8 for labs, 2g Mg IV infusion on arrival, possible RBC transfusion and DOM (early appointment preferred as she has a TV show to watch     Labs and treatment plan reviewed with patient. All questions answered.  F/u next Wed 11/11    Lisa Khan MD

## 2020-11-08 NOTE — LETTER
11/8/2020         RE: Florencia Gao  [address] [place] [address]        Dear Colleague,    Thank you for referring your patient, Florencia Gao, to the [facility] BLOOD AND MARROW TRANSPLANT PROGRAM [place]. Please see a copy of my visit note below.    Infusion Nursing Note:  Florencia Gao presents today for IV Magnesium and Zarxio subcutaneous Injection. See MAR.     Patient seen by provider today: Yes: Dr. Khan   present during visit today: Not Applicable.    Note: Pt with a Magnesium level of 1.3 today. Magnesium 2 grams IV administered over 1 hour per electrolyte replacement protocol.    Pt's ANC 0.9 today. Zarxio 300 mcg subcutaneous injection administered in Right Lower Abd per standing order.      Intravenous Access:  Padilla.    Treatment Conditions:  Lab Results   Component Value Date    HGB 8.1 11/08/2020     Lab Results   Component Value Date    WBC 1.7 11/08/2020      Lab Results   Component Value Date    ANEU 0.9 11/08/2020     Lab Results   Component Value Date    PLT 76 11/08/2020      Lab Results   Component Value Date     11/08/2020                   Lab Results   Component Value Date    POTASSIUM 4.0 11/08/2020           Lab Results   Component Value Date    MAG 1.3 11/08/2020            Lab Results   Component Value Date    CR 0.83 11/08/2020                   Lab Results   Component Value Date    SANDRA 9.0 11/08/2020                Lab Results   Component Value Date    BILITOTAL 0.2 11/08/2020           Lab Results   Component Value Date    ALBUMIN 3.7 11/08/2020                    Lab Results   Component Value Date    ALT 23 11/08/2020           Lab Results   Component Value Date    AST 21 11/08/2020           Post Infusion Assessment:  Patient tolerated infusion without incident.  Patient tolerated injection without incident.       Discharge Plan:   Copy of AVS reviewed with patient and/or family.  Patient will return Wednesday, 11/11 for next  appointment and IV Pentamidine.  Patient discharged in stable condition accompanied by: sister.  Departure Mode: Ambulatory.    Jaclyn Heart RN                            Again, thank you for allowing me to participate in the care of your patient.        Sincerely,        [facility]

## 2020-11-08 NOTE — PROGRESS NOTES
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           Diagnosis AMLM1 Acute myelogeneous leukemia, M1, myeloblastic  HCT Type Allogeneic    Prep Regimen Fludarabine  Cytoxan  TBI   Donor Source Sibling    GVHD Prophylaxis Post transplant cytoxan  Tac/MMF  Primary BMT Provider ACE          Interval history:     Ms Florencia Gao presents with her friend/caregiver/advocate who lives a mile from her in [place] and is currently with her at the [place] ([facility]).    Feeling well, eating better, more appetite, takes a walk every day. No diarrhea, tolerates Mg Glyc well. Still on MMF.  No rash, no diarrhea, no N/V, no edema.  Comprehensive ROS otherwise negative.    On ROS in addition to the above  Endorses:  +Low energy but progressively improving  +Bowel movements have been regular for the first time in quite some time (was previously chronically on miralax daily but has not needed it)  +Hemorrhoids - with infrequent hematochezia    Denies  fevers, chills, night sweats, headaches, dysphagia/odynophagia, change in weight, change in appetite, cough, shortness of breath, chest pain, nausea/vomiting, abd pain, constipation/diarrhea, dysuria, hematuria, swelling, rashes, lymphadenopathy, neuropathy    Past Medical History:   Reviewed in EPIC    Past Medical History:   Diagnosis Date     AML (acute myeloblastic leukemia) (H)      Anxiety      Hypothyroidism 2000     Osteoporosis         Medications:   Reviewed in EPIC    Magnesium glycinate 2 pills, twice per day (400mg BID); noted to have improved side effect profile and perhaps slightly lower elemental magnesium weight for weight vs mg oxide due to the higher molecular weight of glycine    Current Outpatient Medications   Medication Sig     acyclovir (ZOVIRAX) 800 MG tablet  Take 1 tablet (800 mg) by mouth 5 times daily     granisetron (KYTRIL) 1 MG tablet Take 1 tablet (1 mg) by mouth every 12 hours to prevent nausea (Patient taking differently: Take 1 mg by mouth every 12 hours 1 tablet in the morning to prevent nausea)     hydrocortisone (CORTAID) 1 % external cream Apply topically 2 times daily to hemorrhoids until resolved.     levothyroxine (SYNTHROID/LEVOTHROID) 88 MCG tablet Take 1 tablet (88 mcg) by mouth daily     loratadine (CLARITIN) 10 MG tablet Take 1 tablet (10 mg) by mouth daily     mycophenolate (GENERIC EQUIVALENT) 500 MG tablet Take 2 tablets (1,000 mg) by mouth every 8 hours     OLANZapine zydis (ZYPREXA) 5 MG ODT Dissolve half tablet under the tongue daily at bedtime. If too difficult to break tablet in half, okay to try taking full tablet under the tongue at bedtime.     pantoprazole (PROTONIX) 40 MG EC tablet Take 1 tablet (40 mg) by mouth every morning (before breakfast)     polyethylene glycol (MIRALAX) 17 g packet Take 17 g by mouth daily as needed for constipation     prochlorperazine (COMPAZINE) 5 MG tablet Take 1 tablet (5 mg) by mouth every 6 hours as needed for nausea or vomiting     tacrolimus (GENERIC EQUIVALENT) 0.5 MG capsule Combine one 0.5mg capsules with two 1mg capsules for total dose of 2.5mg capsules by mouth twice daily.     tacrolimus (GENERIC EQUIVALENT) 1 MG capsule Combine two 1mg capsules with one 0.5mg capsules for total dose of 2.5mg capsules by mouth twice daily.     ursodiol (ACTIGALL) 300 MG capsule Take 1 capsule (300 mg) by mouth 3 times daily     venlafaxine (EFFEXOR) 75 MG tablet Take 1 tablet (75 mg) by mouth daily     voriconazole (VFEND) 200 MG tablet Take 1 tablet (200 mg) by mouth every 12 hours     Current Facility-Administered Medications   Medication     heparin lock flush 10 UNIT/ML injection 5 mL     heparin lock flush 10 UNIT/ML injection 5 mL     Facility-Administered Medications Ordered in Other Visits   Medication      filgrastim-sndz (ZARXIO) injection syringe 300 mcg      Physical Exam (Resident / Clinician):   Blood pressure 113/66, pulse 111, temperature 96.5  F (35.8  C), temperature source Tympanic, resp. rate 16, weight 78.1 kg (172 lb 3.2 oz), SpO2 98 %.    ECOG PS: 1  Constitutional: WDWN female in NAD, pleasant and appropriate  HEENT:  NC/AT, no icterus, OP clear, MMM  Skin: Mild pallor, No jaundice nor ecchymoses  Lungs: CTAB, no w/r/r, nonlabored breathing  Cardiovascular: RRR, S1, S2, no m/r/g  GI/Abdomen: +BS, soft, nontender, nondistended, no organomegaly nor masses  Back: no tenderness over spinous processes, iliac crests, scapulae  MSK/Extremities: Warm, well perfused. No edema  LN: no cervical, supraclavicular, nor axillary lymphadenopathy  Neurologic: alert, answering questions appropriately, moving all extremities spontaneously  Psych: appropriate affect  Skin without rashes, mouth hydrated, no oral lesions.   Data:   Reviewed in EPIC and notable for:  Lab Results   Component Value Date    WBC 1.7 (L) 11/08/2020    ANEU 0.9 (L) 11/08/2020    HGB 8.1 (L) 11/08/2020    HCT 24.8 (L) 11/08/2020    PLT 76 (L) 11/08/2020     11/08/2020    POTASSIUM 4.0 11/08/2020    CHLORIDE 102 11/08/2020    CO2 27 11/08/2020     (H) 11/08/2020    BUN 14 11/08/2020    CR 0.83 11/08/2020    MAG 1.3 (L) 11/08/2020    INR 1.01 10/26/2020    AST 21 11/08/2020    ALT 23 11/08/2020           Assessment and Plan:     Florencia Gao is a 75 year old female with AML in CR1, day + 32 s/p JCARLOS Allo sib pbsct per protocol 2019-10, randomized to Post transplant cytoxan/Tac/MMF.       1.  BMT: Cy/flu/TBI prep.   Received 4.19 x10(6) CD34/kg. Donor B pos and recipient B neg.   PRN GCSF dose given 11/3  Give Neupogen today (11/8)   **Per protocol no dex/steroids**      2.  HEME: Keep Hgb>7g/dL and plts>10K. No transfusions today  Anemia.  plt are getting better.  - Of note has significant antibodies to RBCs- can cause delay in  transfusion availability. Premeds tylenol and benadryl. She was given a medical card from transfusion medicine and advised to get a medical bracelet to notify other medical providers that she has had a transplant and requires irradiated blood products if she needs a transfusion.                             3.  ID: Afebrile.   - Prophy: Vori, and HD ACV prophy.    - Pjp prophy to start day+28, plan for IV pentamdine for Wed 11/10.4mg/kg every 4 weeks  sulfa allergy                                 4.  GI: eating great.   - Hemorroids: tucks prn, barrier cream and start BID hydrocortisone cream topically.    - Protonix for GI prophy.    - Ursodiol for VOD prophy.     5.  GVHD Prophylaxis: stop MMF today (contributing to cytopenia, engrafted)  Day +3 and day+4 cytoxan  On tac 2.5mg BID.   - Tac level 8.8 mg/dl       6.  FEN/Renal:  - Cr stable  - hypomagnesia: did not tolerate Mg Oxide due to diarrhea.  Started Mg Glycinate 240mg - increase to 2 pills TID & continuing IV PRN. 2gm today          7. Hypothyroidism  Continue synthroid 88mcg     8. Psych: Situational Depression continue effexor 75mg daily.      9. Research: Pt consented to QG1299-70 (Seamless randomized trial to alleviate morbidity and mortality) for which she was randomized to the palliative care management arm. Seen by Palliative 10/8. Pt has subsequently withdrawn from this study.     Follow up:  -RTC Sunday 11/8 for labs, 2g Mg IV infusion on arrival, possible RBC transfusion and DOM (early appointment preferred as she has a TV show to watch     Labs and treatment plan reviewed with patient. All questions answered.  F/u next Wed 11/11    Lisa Khan MD

## 2020-11-08 NOTE — PROGRESS NOTES
Infusion Nursing Note:  Florencia Gao presents today for IV Magnesium and Zarxio subcutaneous Injection. See MAR.     Patient seen by provider today: Yes: Dr. Khan   present during visit today: Not Applicable.    Note: Pt with a Magnesium level of 1.3 today. Magnesium 2 grams IV administered over 1 hour per electrolyte replacement protocol.    Pt's ANC 0.9 today. Zarxio 300 mcg subcutaneous injection administered in Right Lower Abd per standing order.      Intravenous Access:  Padilla.    Treatment Conditions:  Lab Results   Component Value Date    HGB 8.1 11/08/2020     Lab Results   Component Value Date    WBC 1.7 11/08/2020      Lab Results   Component Value Date    ANEU 0.9 11/08/2020     Lab Results   Component Value Date    PLT 76 11/08/2020      Lab Results   Component Value Date     11/08/2020                   Lab Results   Component Value Date    POTASSIUM 4.0 11/08/2020           Lab Results   Component Value Date    MAG 1.3 11/08/2020            Lab Results   Component Value Date    CR 0.83 11/08/2020                   Lab Results   Component Value Date    SANDRA 9.0 11/08/2020                Lab Results   Component Value Date    BILITOTAL 0.2 11/08/2020           Lab Results   Component Value Date    ALBUMIN 3.7 11/08/2020                    Lab Results   Component Value Date    ALT 23 11/08/2020           Lab Results   Component Value Date    AST 21 11/08/2020           Post Infusion Assessment:  Patient tolerated infusion without incident.  Patient tolerated injection without incident.       Discharge Plan:   Copy of AVS reviewed with patient and/or family.  Patient will return Wednesday, 11/11 for next appointment and IV Pentamidine.  Patient discharged in stable condition accompanied by: sister.  Departure Mode: Ambulatory.    Jaclyn Heart RN

## 2020-11-08 NOTE — NURSING NOTE
Chief Complaint   Patient presents with     RECHECK     Pt here for IV Magnesium and Zarxio SQ Injection. Pt is s/p BMT for AML.      Jaclyn Heart RN

## 2020-11-08 NOTE — NURSING NOTE
Chief Complaint   Patient presents with     Blood Draw     Labs drawn via cvc by RN in lab. VS taken.      CVC accessed, labs drawn. Line flushed and Heparin locked. Vital signs taken. Checked into next appointment.   Yaritza Parsons RN

## 2020-11-09 LAB
BLD PROD TYP BPU: NORMAL
BLD UNIT ID BPU: 0
BLOOD PRODUCT CODE: NORMAL
BPU ID: NORMAL
TRANSFUSION STATUS PATIENT QL: NORMAL
TRANSFUSION STATUS PATIENT QL: NORMAL

## 2020-11-10 DIAGNOSIS — E83.42 HYPOMAGNESEMIA: ICD-10-CM

## 2020-11-10 DIAGNOSIS — Z94.81 BONE MARROW TRANSPLANT STATUS (H): ICD-10-CM

## 2020-11-10 DIAGNOSIS — C92.01 AML (ACUTE MYELOID LEUKEMIA) IN REMISSION (H): Primary | ICD-10-CM

## 2020-11-10 RX ORDER — HEPARIN SODIUM,PORCINE 10 UNIT/ML
2 VIAL (ML) INTRAVENOUS
Status: CANCELLED | OUTPATIENT
Start: 2020-11-11

## 2020-11-11 ENCOUNTER — APPOINTMENT (OUTPATIENT)
Dept: LAB | Facility: CLINIC | Age: 75
End: 2020-11-11
Attending: PHYSICIAN ASSISTANT
Payer: COMMERCIAL

## 2020-11-11 ENCOUNTER — OFFICE VISIT (OUTPATIENT)
Dept: TRANSPLANT | Facility: CLINIC | Age: 75
End: 2020-11-11
Attending: INTERNAL MEDICINE
Payer: COMMERCIAL

## 2020-11-11 VITALS — DIASTOLIC BLOOD PRESSURE: 80 MMHG | SYSTOLIC BLOOD PRESSURE: 125 MMHG | HEART RATE: 98 BPM

## 2020-11-11 VITALS
DIASTOLIC BLOOD PRESSURE: 69 MMHG | TEMPERATURE: 97.8 F | RESPIRATION RATE: 16 BRPM | HEART RATE: 110 BPM | SYSTOLIC BLOOD PRESSURE: 128 MMHG | OXYGEN SATURATION: 98 % | WEIGHT: 172 LBS | BODY MASS INDEX: 27.64 KG/M2

## 2020-11-11 DIAGNOSIS — E83.42 HYPOMAGNESEMIA: ICD-10-CM

## 2020-11-11 DIAGNOSIS — C92.01 AML (ACUTE MYELOID LEUKEMIA) IN REMISSION (H): ICD-10-CM

## 2020-11-11 DIAGNOSIS — C92.01 AML (ACUTE MYELOID LEUKEMIA) IN REMISSION (H): Primary | ICD-10-CM

## 2020-11-11 DIAGNOSIS — Z94.81 BONE MARROW TRANSPLANT STATUS (H): ICD-10-CM

## 2020-11-11 DIAGNOSIS — Z94.81 STATUS POST BONE MARROW TRANSPLANT (H): Primary | ICD-10-CM

## 2020-11-11 LAB
ALBUMIN SERPL-MCNC: 3.5 G/DL (ref 3.4–5)
ALP SERPL-CCNC: 100 U/L (ref 40–150)
ALT SERPL W P-5'-P-CCNC: 25 U/L (ref 0–50)
ANION GAP SERPL CALCULATED.3IONS-SCNC: 5 MMOL/L (ref 3–14)
AST SERPL W P-5'-P-CCNC: 24 U/L (ref 0–45)
BASOPHILS # BLD AUTO: 0.1 10E9/L (ref 0–0.2)
BASOPHILS NFR BLD AUTO: 1.7 %
BILIRUB SERPL-MCNC: 0.2 MG/DL (ref 0.2–1.3)
BUN SERPL-MCNC: 14 MG/DL (ref 7–30)
CALCIUM SERPL-MCNC: 8.7 MG/DL (ref 8.5–10.1)
CHLORIDE SERPL-SCNC: 103 MMOL/L (ref 94–109)
CO2 SERPL-SCNC: 26 MMOL/L (ref 20–32)
CREAT SERPL-MCNC: 0.82 MG/DL (ref 0.52–1.04)
DACRYOCYTES BLD QL SMEAR: SLIGHT
DIFFERENTIAL METHOD BLD: ABNORMAL
EOSINOPHIL # BLD AUTO: 0.1 10E9/L (ref 0–0.7)
EOSINOPHIL NFR BLD AUTO: 1.8 %
ERYTHROCYTE [DISTWIDTH] IN BLOOD BY AUTOMATED COUNT: 14.8 % (ref 10–15)
GFR SERPL CREATININE-BSD FRML MDRD: 71 ML/MIN/{1.73_M2}
GLUCOSE SERPL-MCNC: 122 MG/DL (ref 70–99)
HCT VFR BLD AUTO: 23.4 % (ref 35–47)
HGB BLD-MCNC: 7.5 G/DL (ref 11.7–15.7)
LYMPHOCYTES # BLD AUTO: 0.2 10E9/L (ref 0.8–5.3)
LYMPHOCYTES NFR BLD AUTO: 3.5 %
MAGNESIUM SERPL-MCNC: 1.6 MG/DL (ref 1.6–2.3)
MCH RBC QN AUTO: 29.2 PG (ref 26.5–33)
MCHC RBC AUTO-ENTMCNC: 32.1 G/DL (ref 31.5–36.5)
MCV RBC AUTO: 91 FL (ref 78–100)
METAMYELOCYTES # BLD: 0.1 10E9/L
METAMYELOCYTES NFR BLD MANUAL: 1.7 %
MONOCYTES # BLD AUTO: 0.2 10E9/L (ref 0–1.3)
MONOCYTES NFR BLD AUTO: 4.4 %
MYELOCYTES # BLD: 0.1 10E9/L
MYELOCYTES NFR BLD MANUAL: 1.7 %
NEUTROPHILS # BLD AUTO: 4.4 10E9/L (ref 1.6–8.3)
NEUTROPHILS NFR BLD AUTO: 85.2 %
NRBC # BLD AUTO: 0.1 10*3/UL
NRBC BLD AUTO-RTO: 2 /100
OVALOCYTES BLD QL SMEAR: SLIGHT
PLATELET # BLD AUTO: 76 10E9/L (ref 150–450)
PLATELET # BLD EST: ABNORMAL 10*3/UL
POIKILOCYTOSIS BLD QL SMEAR: SLIGHT
POTASSIUM SERPL-SCNC: 4.2 MMOL/L (ref 3.4–5.3)
PROT SERPL-MCNC: 7 G/DL (ref 6.8–8.8)
RBC # BLD AUTO: 2.57 10E12/L (ref 3.8–5.2)
SODIUM SERPL-SCNC: 134 MMOL/L (ref 133–144)
WBC # BLD AUTO: 5.2 10E9/L (ref 4–11)

## 2020-11-11 PROCEDURE — 81268 CHIMERISM ANAL W/CELL SELECT: CPT | Performed by: PHYSICIAN ASSISTANT

## 2020-11-11 PROCEDURE — G0463 HOSPITAL OUTPT CLINIC VISIT: HCPCS | Mod: 25

## 2020-11-11 PROCEDURE — 258N000003 HC RX IP 258 OP 636: Performed by: INTERNAL MEDICINE

## 2020-11-11 PROCEDURE — 250N000011 HC RX IP 250 OP 636: Performed by: PHYSICIAN ASSISTANT

## 2020-11-11 PROCEDURE — 86850 RBC ANTIBODY SCREEN: CPT | Performed by: PHYSICIAN ASSISTANT

## 2020-11-11 PROCEDURE — 81268 CHIMERISM ANAL W/CELL SELECT: CPT

## 2020-11-11 PROCEDURE — 250N000009 HC RX 250: Performed by: INTERNAL MEDICINE

## 2020-11-11 PROCEDURE — 99214 OFFICE O/P EST MOD 30 MIN: CPT

## 2020-11-11 PROCEDURE — 86900 BLOOD TYPING SEROLOGIC ABO: CPT | Performed by: PHYSICIAN ASSISTANT

## 2020-11-11 PROCEDURE — 85025 COMPLETE CBC W/AUTO DIFF WBC: CPT | Performed by: INTERNAL MEDICINE

## 2020-11-11 PROCEDURE — 86902 BLOOD TYPE ANTIGEN DONOR EA: CPT | Performed by: PHYSICIAN ASSISTANT

## 2020-11-11 PROCEDURE — 86922 COMPATIBILITY TEST ANTIGLOB: CPT | Performed by: PHYSICIAN ASSISTANT

## 2020-11-11 PROCEDURE — 96365 THER/PROPH/DIAG IV INF INIT: CPT

## 2020-11-11 PROCEDURE — 86901 BLOOD TYPING SEROLOGIC RH(D): CPT | Performed by: PHYSICIAN ASSISTANT

## 2020-11-11 PROCEDURE — 83735 ASSAY OF MAGNESIUM: CPT | Performed by: INTERNAL MEDICINE

## 2020-11-11 PROCEDURE — 80053 COMPREHEN METABOLIC PANEL: CPT | Performed by: INTERNAL MEDICINE

## 2020-11-11 PROCEDURE — G0452 MOLECULAR PATHOLOGY INTERPR: HCPCS | Mod: 26 | Performed by: PATHOLOGY

## 2020-11-11 RX ORDER — HEPARIN SODIUM,PORCINE 10 UNIT/ML
2 VIAL (ML) INTRAVENOUS
Status: CANCELLED | OUTPATIENT
Start: 2020-12-09

## 2020-11-11 RX ORDER — HEPARIN SODIUM,PORCINE 10 UNIT/ML
5 VIAL (ML) INTRAVENOUS
Status: DISCONTINUED | OUTPATIENT
Start: 2020-11-11 | End: 2020-11-11 | Stop reason: HOSPADM

## 2020-11-11 RX ADMIN — Medication 5 ML: at 10:29

## 2020-11-11 RX ADMIN — PENTAMIDINE ISETHIONATE 300 MG: 300 INJECTION, POWDER, LYOPHILIZED, FOR SOLUTION INTRAMUSCULAR; INTRAVENOUS at 11:08

## 2020-11-11 ASSESSMENT — PAIN SCALES - GENERAL: PAINLEVEL: NO PAIN (0)

## 2020-11-11 NOTE — LETTER
11/11/2020         RE: Florencia Gao  [address] [place] [address]        Dear Colleague,    Thank you for referring your patient, Florencia Gao, to the [facility] BLOOD AND MARROW TRANSPLANT PROGRAM [place]. Please see a copy of my visit note below.    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         Diagnosis AMLM1 Acute myelogeneous leukemia, M1, myeloblastic  HCT Type Allogeneic    Prep Regimen Fludarabine  Cytoxan  TBI   Donor Source Sibling    GVHD Prophylaxis Post transplant cytoxan  Tac/MMF  Primary BMT Provider ACE      CC: here for IV pentamidine and possible IV magnesium    Interval history:     Florencia feels really good, today.  She has a good energy level and a good appetite.  She is eating well.  No nausea, vomiting, diarrhea.  She has added half a magnesium oxide pill to her magnesium glycerate and so far, she has not suffered any ill effects.  No rash.  No bleeding other than occasional hemorrhoidal bleed that is minor.  No headache, sore throat, eye pain, chest pain.  Her only abdominal pain is cramping prior to a bowel movement. No fever, cold, cough.      ROS: negative except as above.    Physical Exam (Resident / Clinician):   Blood pressure 128/69, pulse 110, temperature 97.8  F (36.6  C), temperature source Oral, resp. rate 16, weight 78 kg (172 lb), SpO2 98 %.    ECOG PS: 1  Constitutional: WDWN female in NAD, pleasant and appropriate  HEENT:  NC/AT, no icterus, OP clear, MMM  Skin: Mild pallor, No jaundice nor ecchymoses  Lungs: CTAB, no w/r/r, nonlabored breathing  Cardiovascular: RRR, S1, S2, no m/r/g  GI/Abdomen: +BS, soft, nontender, nondistended, no organomegaly nor masses  MSK/Extremities: Warm, well perfused. No edema  Neurologic: alert, answering questions appropriately, moving all extremities spontaneously  Psych:  appropriate affect  Skin without rashes   Data:   Reviewed in EPIC and notable for:  Lab Results   Component Value Date    WBC 5.2 11/11/2020    ANEU 4.4 11/11/2020    HGB 7.5 (L) 11/11/2020    HCT 23.4 (L) 11/11/2020    PLT 76 (L) 11/11/2020     11/11/2020    POTASSIUM 4.2 11/11/2020    CHLORIDE 103 11/11/2020    CO2 26 11/11/2020     (H) 11/11/2020    BUN 14 11/11/2020    CR 0.82 11/11/2020    MAG 1.6 11/11/2020    INR 1.01 10/26/2020    AST 24 11/11/2020    ALT 25 11/11/2020           Assessment and Plan:     Florencia Gao is a 75 year old female with AML in CR1, day + 35 s/p JCARLOS Allo sib pbsct per protocol 2019-10, randomized to Post transplant cytoxan/Tac/MMF.       1.  BMT: Cy/flu/TBI prep.   Received 4.19 x10(6) CD34/kg. Donor B pos and recipient B neg.  Engrafted; intermittent growth factor needs.      2.  HEME: Keep Hgb>7g/dL and plts>10K. Premeds tylenol and benadryl.  No transfusions today, but will plan for possible red cells on 11/13.     - anemia and thrombocytopenia secondary to chemotherapy    - Of note has significant antibodies to RBCs- can cause delay in transfusion availability.                             3.  ID: Afebrile.   - Prophy: Vori, and HD ACV prophy.    - IV pentamdine as PJP prophylaxis, first dose 11/11/2020.                               4.  GI:    - Hemorroids: tucks prn, barrier cream and start BID hydrocortisone cream topically.    - Protonix for GI prophy.    - Ursodiol for VOD prophy.     5.  GVHD   MMF discontinued day +32.  Day +3 and day+4 cytoxan  On tac 2.5mg BID.   - Tac level 8.8 mg/dl; get next tacrolimus level on 11/13.      6.  FEN/Renal:  - Cr stable  - hypomagnesia: improving on mag glycinate 480 tid plus 1/2 tab of magnesium oxide per day.  Mag level 1.6 today, no IV supplementation needed.         7. Hypothyroidism  Continue synthroid 88mcg     8. Psych: Situational Depression continue effexor 75mg daily.      9. Research: Pt consented to [id number]  (Seamless randomized trial to alleviate morbidity and mortality) for which she was randomized to the palliative care management arm. Seen by Palliative 10/8. Pt has subsequently withdrawn from this study.     Follow up:  -RTC 11/13 for possible red cells, possible magnesium and follow up visit prior to the weekend.  She lives 25 minutes away in [place] and would like to go home for the weekend.     Mally Dimas PA-C  11/11/2020

## 2020-11-11 NOTE — LETTER
11/11/2020         RE: Florencia Gao  [address] [place] [address]        Dear Colleague,    Thank you for referring your patient, Florencia Gao, to the [facility] BLOOD AND MARROW TRANSPLANT PROGRAM [place]. Please see a copy of my visit note below.    Infusion Nursing Note:  Florencia Gao presents today for Pentamidine.    Patient seen by provider today: Yes: HAI Givens   present during visit today: Not Applicable.    Note: Pt given Pentamidine over one hour.  VS stable throughout.    Intravenous Access:  Padilla.    Treatment Conditions:  Not Applicable.      Post Infusion Assessment:  Patient tolerated infusion without incident.  Blood return noted pre and post infusion.  Site patent and intact, free from redness, edema or discomfort.  No evidence of extravasations.       Discharge Plan:   Discharge instructions reviewed with: Patient.  Patient and/or family verbalized understanding of discharge instructions and all questions answered.  Copy of AVS reviewed with patient and/or family.  Patient will return 11/13 for next appointment.  Patient discharged in stable condition accompanied by: self.  Departure Mode: Ambulatory.    Rosina Montiel, CASSIDY                            Again, thank you for allowing me to participate in the care of your patient.        Sincerely,        [facility] [place]

## 2020-11-11 NOTE — PROGRESS NOTES
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         Diagnosis AMLM1 Acute myelogeneous leukemia, M1, myeloblastic  HCT Type Allogeneic    Prep Regimen Fludarabine  Cytoxan  TBI   Donor Source Sibling    GVHD Prophylaxis Post transplant cytoxan  Tac/MMF  Primary BMT Provider ACE      CC: here for IV pentamidine and possible IV magnesium    Interval history:     Florencia feels really good, today.  She has a good energy level and a good appetite.  She is eating well.  No nausea, vomiting, diarrhea.  She has added half a magnesium oxide pill to her magnesium glycerate and so far, she has not suffered any ill effects.  No rash.  No bleeding other than occasional hemorrhoidal bleed that is minor.  No headache, sore throat, eye pain, chest pain.  Her only abdominal pain is cramping prior to a bowel movement. No fever, cold, cough.      ROS: negative except as above.    Physical Exam (Resident / Clinician):   Blood pressure 128/69, pulse 110, temperature 97.8  F (36.6  C), temperature source Oral, resp. rate 16, weight 78 kg (172 lb), SpO2 98 %.    ECOG PS: 1  Constitutional: WDWN female in NAD, pleasant and appropriate  HEENT:  NC/AT, no icterus, OP clear, MMM  Skin: Mild pallor, No jaundice nor ecchymoses  Lungs: CTAB, no w/r/r, nonlabored breathing  Cardiovascular: RRR, S1, S2, no m/r/g  GI/Abdomen: +BS, soft, nontender, nondistended, no organomegaly nor masses  MSK/Extremities: Warm, well perfused. No edema  Neurologic: alert, answering questions appropriately, moving all extremities spontaneously  Psych: appropriate affect  Skin without rashes   Data:   Reviewed in EPIC and notable for:  Lab Results   Component Value Date    WBC 5.2 11/11/2020    ANEU 4.4 11/11/2020    HGB 7.5 (L) 11/11/2020    HCT 23.4 (L) 11/11/2020    PLT 76 (L) 11/11/2020     11/11/2020    POTASSIUM 4.2  11/11/2020    CHLORIDE 103 11/11/2020    CO2 26 11/11/2020     (H) 11/11/2020    BUN 14 11/11/2020    CR 0.82 11/11/2020    MAG 1.6 11/11/2020    INR 1.01 10/26/2020    AST 24 11/11/2020    ALT 25 11/11/2020           Assessment and Plan:     Florencia Gao is a 75 year old female with AML in CR1, day + 35 s/p JCARLOS Allo sib pbsct per protocol 2019-10, randomized to Post transplant cytoxan/Tac/MMF.       1.  BMT: Cy/flu/TBI prep.   Received 4.19 x10(6) CD34/kg. Donor B pos and recipient B neg.  Engrafted; intermittent growth factor needs.      2.  HEME: Keep Hgb>7g/dL and plts>10K. Premeds tylenol and benadryl.  No transfusions today, but will plan for possible red cells on 11/13.     - anemia and thrombocytopenia secondary to chemotherapy    - Of note has significant antibodies to RBCs- can cause delay in transfusion availability.                             3.  ID: Afebrile.   - Prophy: Vori, and HD ACV prophy.    - IV pentamdine as PJP prophylaxis, first dose 11/11/2020.                               4.  GI:    - Hemorroids: tucks prn, barrier cream and start BID hydrocortisone cream topically.    - Protonix for GI prophy.    - Ursodiol for VOD prophy.     5.  GVHD   MMF discontinued day +32.  Day +3 and day+4 cytoxan  On tac 2.5mg BID.   - Tac level 8.8 mg/dl; get next tacrolimus level on 11/13.      6.  FEN/Renal:  - Cr stable  - hypomagnesia: improving on mag glycinate 480 tid plus 1/2 tab of magnesium oxide per day.  Mag level 1.6 today, no IV supplementation needed.         7. Hypothyroidism  Continue synthroid 88mcg     8. Psych: Situational Depression continue effexor 75mg daily.      9. Research: Pt consented to HY5009-19 (Seamless randomized trial to alleviate morbidity and mortality) for which she was randomized to the palliative care management arm. Seen by Palliative 10/8. Pt has subsequently withdrawn from this study.     Follow up:  -RTC 11/13 for possible red cells, possible magnesium and  follow up visit prior to the weekend.  She lives 25 minutes away in [place] and would like to go home for the weekend.     Mally Dimas PA-C  11/11/2020

## 2020-11-11 NOTE — PROGRESS NOTES
Infusion Nursing Note:  Florencia Gao presents today for Pentamidine.    Patient seen by provider today: Yes: HAI Givens   present during visit today: Not Applicable.    Note: Pt given Pentamidine over one hour.  VS stable throughout.    Intravenous Access:  Padilla.    Treatment Conditions:  Not Applicable.      Post Infusion Assessment:  Patient tolerated infusion without incident.  Blood return noted pre and post infusion.  Site patent and intact, free from redness, edema or discomfort.  No evidence of extravasations.       Discharge Plan:   Discharge instructions reviewed with: Patient.  Patient and/or family verbalized understanding of discharge instructions and all questions answered.  Copy of AVS reviewed with patient and/or family.  Patient will return 11/13 for next appointment.  Patient discharged in stable condition accompanied by: self.  Departure Mode: Ambulatory.    Rosina Montiel RN

## 2020-11-12 LAB
ABO + RH BLD: ABNORMAL
ABO + RH BLD: ABNORMAL
BLD GP AB SCN SERPL QL: ABNORMAL
BLD PROD TYP BPU: ABNORMAL
BLOOD BANK CMNT PATIENT-IMP: ABNORMAL
BLOOD BANK CMNT PATIENT-IMP: ABNORMAL
COPATH REPORT: NORMAL
NUM BPU REQUESTED: 1
SPECIMEN EXP DATE BLD: ABNORMAL

## 2020-11-13 ENCOUNTER — ONCOLOGY VISIT (OUTPATIENT)
Dept: TRANSPLANT | Facility: CLINIC | Age: 75
End: 2020-11-13
Attending: INTERNAL MEDICINE
Payer: COMMERCIAL

## 2020-11-13 ENCOUNTER — APPOINTMENT (OUTPATIENT)
Dept: LAB | Facility: CLINIC | Age: 75
End: 2020-11-13
Attending: INTERNAL MEDICINE
Payer: COMMERCIAL

## 2020-11-13 VITALS
SYSTOLIC BLOOD PRESSURE: 112 MMHG | RESPIRATION RATE: 18 BRPM | TEMPERATURE: 98.1 F | HEART RATE: 89 BPM | OXYGEN SATURATION: 97 % | DIASTOLIC BLOOD PRESSURE: 67 MMHG

## 2020-11-13 DIAGNOSIS — Z94.81 STATUS POST BONE MARROW TRANSPLANT (H): ICD-10-CM

## 2020-11-13 DIAGNOSIS — C92.01 ACUTE MYELOID LEUKEMIA IN REMISSION (H): ICD-10-CM

## 2020-11-13 DIAGNOSIS — C92.01 AML (ACUTE MYELOID LEUKEMIA) IN REMISSION (H): ICD-10-CM

## 2020-11-13 PROCEDURE — G0463 HOSPITAL OUTPT CLINIC VISIT: HCPCS

## 2020-11-13 PROCEDURE — 250N000011 HC RX IP 250 OP 636: Performed by: INTERNAL MEDICINE

## 2020-11-13 PROCEDURE — 36591 DRAW BLOOD OFF VENOUS DEVICE: CPT

## 2020-11-13 PROCEDURE — 99214 OFFICE O/P EST MOD 30 MIN: CPT

## 2020-11-13 PROCEDURE — 36592 COLLECT BLOOD FROM PICC: CPT

## 2020-11-13 RX ORDER — HEPARIN SODIUM,PORCINE 10 UNIT/ML
5 VIAL (ML) INTRAVENOUS
Status: DISCONTINUED | OUTPATIENT
Start: 2020-11-13 | End: 2020-11-13 | Stop reason: HOSPADM

## 2020-11-13 RX ORDER — TACROLIMUS 1 MG/1
CAPSULE ORAL
Qty: 120 CAPSULE | Refills: 0 | Status: SHIPPED | OUTPATIENT
Start: 2020-11-13 | End: 2020-12-03

## 2020-11-13 RX ORDER — LORATADINE 10 MG/1
10 TABLET ORAL DAILY
Qty: 30 TABLET | Refills: 0 | Status: SHIPPED | OUTPATIENT
Start: 2020-11-13 | End: 2020-12-31

## 2020-11-13 RX ORDER — VORICONAZOLE 200 MG/1
200 TABLET, FILM COATED ORAL EVERY 12 HOURS
Qty: 60 TABLET | Refills: 0 | Status: SHIPPED | OUTPATIENT
Start: 2020-11-13 | End: 2020-12-18

## 2020-11-13 RX ORDER — VENLAFAXINE 75 MG/1
75 TABLET ORAL DAILY
Qty: 30 TABLET | Refills: 0 | Status: SHIPPED | OUTPATIENT
Start: 2020-11-13 | End: 2020-12-18

## 2020-11-13 RX ORDER — OLANZAPINE 5 MG/1
TABLET, ORALLY DISINTEGRATING ORAL
Qty: 30 TABLET | Refills: 0 | Status: SHIPPED | OUTPATIENT
Start: 2020-11-13 | End: 2020-12-18

## 2020-11-13 RX ORDER — LEVOTHYROXINE SODIUM 88 UG/1
88 TABLET ORAL DAILY
Qty: 30 TABLET | Refills: 0 | Status: SHIPPED | OUTPATIENT
Start: 2020-11-13 | End: 2020-11-24

## 2020-11-13 RX ORDER — TACROLIMUS 0.5 MG/1
CAPSULE ORAL
Qty: 60 CAPSULE | Refills: 0 | Status: SHIPPED | OUTPATIENT
Start: 2020-11-13 | End: 2020-12-03

## 2020-11-13 RX ORDER — PANTOPRAZOLE SODIUM 20 MG/1
20 TABLET, DELAYED RELEASE ORAL
Qty: 30 TABLET | Refills: 1 | Status: SHIPPED | OUTPATIENT
Start: 2020-11-13 | End: 2020-12-31

## 2020-11-13 RX ORDER — ACYCLOVIR 800 MG/1
800 TABLET ORAL
Qty: 150 TABLET | Refills: 0 | Status: SHIPPED | OUTPATIENT
Start: 2020-11-13 | End: 2020-12-18

## 2020-11-13 RX ADMIN — Medication 5 ML: at 09:02

## 2020-11-13 ASSESSMENT — PAIN SCALES - GENERAL: PAINLEVEL: NO PAIN (0)

## 2020-11-13 NOTE — NURSING NOTE
"Oncology Rooming Note    November 13, 2020 9:07 AM   Florencia Gao is a 75 year old female who presents for:    Chief Complaint   Patient presents with     Port Draw     RECHECK     Patient here for provider visit r/t AML post transplant.      Initial Vitals: /67   Pulse 89   Temp 98.1  F (36.7  C)   Resp 18   SpO2 97%  Estimated body mass index is 27.64 kg/m  as calculated from the following:    Height as of 11/3/20: 1.68 m (5' 6.14\").    Weight as of 11/11/20: 78 kg (172 lb). There is no height or weight on file to calculate BSA.  No Pain (0) Comment: Data Unavailable   No LMP recorded. Patient is postmenopausal.  Allergies reviewed: Yes  Medications reviewed: Yes    Medications: MEDICATION REFILLS NEEDED TODAY. Provider was notified.  Pharmacy name entered into [facility]:    [id number] [address] AT [facility] [address]  [facility] [place], [place] - [phone]    Clinical concerns: Labs drawn in clinic room by RN from chiquis. Both lines flushed with heparin.      Valeria Bennett RN              "

## 2020-11-13 NOTE — PROGRESS NOTES
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         Diagnosis AMLM1 Acute myelogeneous leukemia, M1, myeloblastic  HCT Type Allogeneic    Prep Regimen Fludarabine  Cytoxan  TBI   Donor Source Sibling    GVHD Prophylaxis Post transplant cytoxan  Tac/MMF  Primary BMT Provider ACE      CC: here for IV pentamidine and possible IV magnesium    Interval history:     Florencia continues to feel well without n/v/d. Had one stool yesterday with some belly cramping beforehand. Tried increasing oral mag to one tablet at night (up from half tab). Still with occasionally trace blood on toilet tissue from hemorrhoids, no tiffanie bleeding. No belly pain since. No fevers, chills, cough or cold symptoms. No rashes, bruises or bleeding.     ROS: negative except as above.    Physical Exam (Resident / Clinician):   Blood pressure 112/67, pulse 89, temperature 98.1  F (36.7  C), resp. rate 18, SpO2 97 %.    ECOG PS: 1  Constitutional: WDWN female in NAD, pleasant and appropriate  HEENT:  NC/AT, no icterus, OP clear, MMM  Skin: Mild pallor, No jaundice nor ecchymoses  Lungs: CTAB, no w/r/r, nonlabored breathing  Cardiovascular: RRR, S1, S2, no m/r/g  GI/Abdomen: +BS, soft, nontender, nondistended, no palpable organomegaly or masses  MSK/Extremities: Warm, well perfused. No edema  Neurologic: alert, answering questions appropriately, moving all extremities spontaneouslySkin without rashes   Psych: appropriate affect  Access: right upper chest CVC, c/d/i, dressing last changed 11/11  Data:   Reviewed in EPIC and notable for:  Lab Results   Component Value Date    WBC 4.0 11/13/2020    ANEU 2.7 11/13/2020    HGB 7.5 (L) 11/13/2020    HCT 23.2 (L) 11/13/2020    PLT 93 (L) 11/13/2020     (L) 11/13/2020    POTASSIUM 4.4 11/13/2020    CHLORIDE 102 11/13/2020    CO2 25 11/13/2020     (H) 11/13/2020     BUN 16 11/13/2020    CR 0.91 11/13/2020    MAG 1.6 11/13/2020    INR 1.01 10/26/2020    AST 24 11/11/2020    ALT 25 11/11/2020           Assessment and Plan:     Florencia Gao is a 75 year old female with AML in CR1, day +37 s/p JCARLOS Allo sib pbsct per protocol 2019-10, randomized to Post transplant cytoxan/Tac/MMF.       1.  BMT: Cy/flu/TBI prep.   Received 4.19 x10(6) CD34/kg. Donor B pos and recipient B neg.  Engrafted; intermittent growth factor needs.      2.  HEME: Keep Hgb>7g/dL and plts>10K. Premeds tylenol and benadryl.   Plt improving, hgb stable  - anemia and thrombocytopenia secondary to chemotherapy    - Of note has significant antibodies to RBCs- can cause delay in transfusion availability.                             3.  ID: Afebrile.   - Prophy: Vori, and HD ACV prophy.    - IV pentamdine as PJP prophylaxis, first dose 11/11/2020.   CMV neg 11/4, pending today 11/13                              4.  GI:    - Hemorroids: tucks prn, barrier cream and start BID hydrocortisone cream topically.    - Protonix for GI prophy.    - Ursodiol for VOD prophy.     5.  GVHD   MMF discontinued day +32.  Day +3 and day+4 cytoxan  On tac 2.5mg BID.   - Tac level 8.8 mg/dl 11/4; patient states she held for level today      6.  FEN/Renal:  - Cr stable  - hypomagnesia: improving on mag glycinate 480 tid. Pt had belly cramps with full tab, so leave at 1/2 tab mag ox at bedtime. Mag level 1.6 today, no IV supplementation needed.      7. Hypothyroidism  Continue synthroid 88mcg     8. Psych: Situational Depression continue effexor 75mg daily.      9. Research: Pt consented to BR5516-69 (Seamless randomized trial to alleviate morbidity and mortality) for which she was randomized to the palliative care management arm. Seen by Palliative 10/8. Pt has subsequently withdrawn from this study.     Plan: tac pending  Cont mag glycinate TID and mag ox 200mg (half tablet) at night  RTC: Monday for provider visit and  bay Ryan [facility]  [phone]

## 2020-11-13 NOTE — LETTER
11/13/2020         RE: Florencia Gao  [address] [place] [address]        Dear Colleague,    Thank you for referring your patient, Florencia Gao, to the [facility] BLOOD AND MARROW TRANSPLANT PROGRAM [place]. Please see a copy of my visit note below.    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         Diagnosis AMLM1 Acute myelogeneous leukemia, M1, myeloblastic  HCT Type Allogeneic    Prep Regimen Fludarabine  Cytoxan  TBI   Donor Source Sibling    GVHD Prophylaxis Post transplant cytoxan  Tac/MMF  Primary BMT Provider ACE      CC: here for IV pentamidine and possible IV magnesium    Interval history:     Florencia continues to feel well without n/v/d. Had one stool yesterday with some belly cramping beforehand. Tried increasing oral mag to one tablet at night (up from half tab). Still with occasionally trace blood on toilet tissue from hemorrhoids, no tiffanie bleeding. No belly pain since. No fevers, chills, cough or cold symptoms. No rashes, bruises or bleeding.     ROS: negative except as above.    Physical Exam (Resident / Clinician):   Blood pressure 112/67, pulse 89, temperature 98.1  F (36.7  C), resp. rate 18, SpO2 97 %.    ECOG PS: 1  Constitutional: WDWN female in NAD, pleasant and appropriate  HEENT:  NC/AT, no icterus, OP clear, MMM  Skin: Mild pallor, No jaundice nor ecchymoses  Lungs: CTAB, no w/r/r, nonlabored breathing  Cardiovascular: RRR, S1, S2, no m/r/g  GI/Abdomen: +BS, soft, nontender, nondistended, no palpable organomegaly or masses  MSK/Extremities: Warm, well perfused. No edema  Neurologic: alert, answering questions appropriately, moving all extremities spontaneouslySkin without rashes   Psych: appropriate affect  Access: right upper chest CVC, c/d/i, dressing last changed 11/11  Data:   Reviewed in EPIC and notable for:  Lab  Results   Component Value Date    WBC 4.0 11/13/2020    ANEU 2.7 11/13/2020    HGB 7.5 (L) 11/13/2020    HCT 23.2 (L) 11/13/2020    PLT 93 (L) 11/13/2020     (L) 11/13/2020    POTASSIUM 4.4 11/13/2020    CHLORIDE 102 11/13/2020    CO2 25 11/13/2020     (H) 11/13/2020    BUN 16 11/13/2020    CR 0.91 11/13/2020    MAG 1.6 11/13/2020    INR 1.01 10/26/2020    AST 24 11/11/2020    ALT 25 11/11/2020           Assessment and Plan:     Florencia Gao is a 75 year old female with AML in CR1, day +37 s/p JCARLOS Allo sib pbsct per protocol 2019-10, randomized to Post transplant cytoxan/Tac/MMF.       1.  BMT: Cy/flu/TBI prep.   Received 4.19 x10(6) CD34/kg. Donor B pos and recipient B neg.  Engrafted; intermittent growth factor needs.      2.  HEME: Keep Hgb>7g/dL and plts>10K. Premeds tylenol and benadryl.   Plt improving, hgb stable  - anemia and thrombocytopenia secondary to chemotherapy    - Of note has significant antibodies to RBCs- can cause delay in transfusion availability.                             3.  ID: Afebrile.   - Prophy: Vori, and HD ACV prophy.    - IV pentamdine as PJP prophylaxis, first dose 11/11/2020.   CMV neg 11/4, pending today 11/13                              4.  GI:    - Hemorroids: tucks prn, barrier cream and start BID hydrocortisone cream topically.    - Protonix for GI prophy.    - Ursodiol for VOD prophy.     5.  GVHD   MMF discontinued day +32.  Day +3 and day+4 cytoxan  On tac 2.5mg BID.   - Tac level 8.8 mg/dl 11/4; patient states she held for level today      6.  FEN/Renal:  - Cr stable  - hypomagnesia: improving on mag glycinate 480 tid. Pt had belly cramps with full tab, so leave at 1/2 tab mag ox at bedtime. Mag level 1.6 today, no IV supplementation needed.      7. Hypothyroidism  Continue synthroid 88mcg     8. Psych: Situational Depression continue effexor 75mg daily.      9. Research: Pt consented to MT2019-31 (Seamless randomized trial to alleviate morbidity and  mortality) for which she was randomized to the palliative care management arm. Seen by Palliative 10/8. Pt has subsequently withdrawn from this study.     Plan: tac pending  Cont mag glycinate TID and mag ox 200mg (half tablet) at night  RTC: Monday for provider visit and labs    Gloria Ryan PAC  [phone]        Again, thank you for allowing me to participate in the care of your patient.        Sincerely,        BMT Advanced Practice Provider

## 2020-11-16 ENCOUNTER — ONCOLOGY VISIT (OUTPATIENT)
Dept: TRANSPLANT | Facility: CLINIC | Age: 75
End: 2020-11-16
Attending: INTERNAL MEDICINE
Payer: COMMERCIAL

## 2020-11-16 ENCOUNTER — APPOINTMENT (OUTPATIENT)
Dept: LAB | Facility: CLINIC | Age: 75
End: 2020-11-16
Attending: INTERNAL MEDICINE
Payer: COMMERCIAL

## 2020-11-16 VITALS
HEART RATE: 110 BPM | TEMPERATURE: 98.3 F | RESPIRATION RATE: 18 BRPM | OXYGEN SATURATION: 95 % | WEIGHT: 173.7 LBS | BODY MASS INDEX: 27.92 KG/M2 | DIASTOLIC BLOOD PRESSURE: 68 MMHG | SYSTOLIC BLOOD PRESSURE: 111 MMHG

## 2020-11-16 DIAGNOSIS — C92.01 ACUTE MYELOID LEUKEMIA IN REMISSION (H): ICD-10-CM

## 2020-11-16 DIAGNOSIS — C92.01 AML (ACUTE MYELOID LEUKEMIA) IN REMISSION (H): Primary | ICD-10-CM

## 2020-11-16 LAB
ALBUMIN SERPL-MCNC: 3.5 G/DL (ref 3.4–5)
ALP SERPL-CCNC: 103 U/L (ref 40–150)
ALT SERPL W P-5'-P-CCNC: 30 U/L (ref 0–50)
ANION GAP SERPL CALCULATED.3IONS-SCNC: 7 MMOL/L (ref 3–14)
ANISOCYTOSIS BLD QL SMEAR: ABNORMAL
AST SERPL W P-5'-P-CCNC: 31 U/L (ref 0–45)
BASOPHILS # BLD AUTO: 0.1 10E9/L (ref 0–0.2)
BASOPHILS NFR BLD AUTO: 1.7 %
BILIRUB SERPL-MCNC: 0.3 MG/DL (ref 0.2–1.3)
BUN SERPL-MCNC: 16 MG/DL (ref 7–30)
CALCIUM SERPL-MCNC: 8.7 MG/DL (ref 8.5–10.1)
CHLORIDE SERPL-SCNC: 101 MMOL/L (ref 94–109)
CO2 SERPL-SCNC: 24 MMOL/L (ref 20–32)
CREAT SERPL-MCNC: 0.92 MG/DL (ref 0.52–1.04)
DIFFERENTIAL METHOD BLD: ABNORMAL
EOSINOPHIL # BLD AUTO: 0.1 10E9/L (ref 0–0.7)
EOSINOPHIL NFR BLD AUTO: 4.3 %
ERYTHROCYTE [DISTWIDTH] IN BLOOD BY AUTOMATED COUNT: 18.3 % (ref 10–15)
GFR SERPL CREATININE-BSD FRML MDRD: 61 ML/MIN/{1.73_M2}
GLUCOSE SERPL-MCNC: 108 MG/DL (ref 70–99)
HCT VFR BLD AUTO: 24.1 % (ref 35–47)
HGB BLD-MCNC: 7.7 G/DL (ref 11.7–15.7)
LYMPHOCYTES # BLD AUTO: 0.1 10E9/L (ref 0.8–5.3)
LYMPHOCYTES NFR BLD AUTO: 4.3 %
MACROCYTES BLD QL SMEAR: PRESENT
MAGNESIUM SERPL-MCNC: 1.8 MG/DL (ref 1.6–2.3)
MCH RBC QN AUTO: 29.8 PG (ref 26.5–33)
MCHC RBC AUTO-ENTMCNC: 32 G/DL (ref 31.5–36.5)
MCV RBC AUTO: 93 FL (ref 78–100)
METAMYELOCYTES # BLD: 0.2 10E9/L
METAMYELOCYTES NFR BLD MANUAL: 5.2 %
MONOCYTES # BLD AUTO: 0.9 10E9/L (ref 0–1.3)
MONOCYTES NFR BLD AUTO: 27 %
MYELOCYTES # BLD: 0.1 10E9/L
MYELOCYTES NFR BLD MANUAL: 2.6 %
NEUTROPHILS # BLD AUTO: 1.9 10E9/L (ref 1.6–8.3)
NEUTROPHILS NFR BLD AUTO: 54.9 %
NRBC # BLD AUTO: 0.1 10*3/UL
NRBC BLD AUTO-RTO: 2 /100
PLATELET # BLD AUTO: 122 10E9/L (ref 150–450)
PLATELET # BLD EST: ABNORMAL 10*3/UL
POTASSIUM SERPL-SCNC: 4.3 MMOL/L (ref 3.4–5.3)
PROT SERPL-MCNC: 7 G/DL (ref 6.8–8.8)
RBC # BLD AUTO: 2.58 10E12/L (ref 3.8–5.2)
SODIUM SERPL-SCNC: 132 MMOL/L (ref 133–144)
WBC # BLD AUTO: 3.4 10E9/L (ref 4–11)

## 2020-11-16 PROCEDURE — 86900 BLOOD TYPING SEROLOGIC ABO: CPT | Performed by: INTERNAL MEDICINE

## 2020-11-16 PROCEDURE — 36592 COLLECT BLOOD FROM PICC: CPT

## 2020-11-16 PROCEDURE — 86901 BLOOD TYPING SEROLOGIC RH(D): CPT | Performed by: INTERNAL MEDICINE

## 2020-11-16 PROCEDURE — 83735 ASSAY OF MAGNESIUM: CPT | Performed by: STUDENT IN AN ORGANIZED HEALTH CARE EDUCATION/TRAINING PROGRAM

## 2020-11-16 PROCEDURE — 80053 COMPREHEN METABOLIC PANEL: CPT | Performed by: STUDENT IN AN ORGANIZED HEALTH CARE EDUCATION/TRAINING PROGRAM

## 2020-11-16 PROCEDURE — G0463 HOSPITAL OUTPT CLINIC VISIT: HCPCS

## 2020-11-16 PROCEDURE — 250N000011 HC RX IP 250 OP 636: Performed by: INTERNAL MEDICINE

## 2020-11-16 PROCEDURE — 99214 OFFICE O/P EST MOD 30 MIN: CPT

## 2020-11-16 PROCEDURE — 86922 COMPATIBILITY TEST ANTIGLOB: CPT | Performed by: INTERNAL MEDICINE

## 2020-11-16 PROCEDURE — 86850 RBC ANTIBODY SCREEN: CPT | Performed by: INTERNAL MEDICINE

## 2020-11-16 PROCEDURE — 85025 COMPLETE CBC W/AUTO DIFF WBC: CPT | Performed by: STUDENT IN AN ORGANIZED HEALTH CARE EDUCATION/TRAINING PROGRAM

## 2020-11-16 PROCEDURE — 86902 BLOOD TYPE ANTIGEN DONOR EA: CPT | Performed by: INTERNAL MEDICINE

## 2020-11-16 RX ORDER — HEPARIN SODIUM,PORCINE 10 UNIT/ML
5 VIAL (ML) INTRAVENOUS
Status: DISCONTINUED | OUTPATIENT
Start: 2020-11-16 | End: 2020-11-16 | Stop reason: HOSPADM

## 2020-11-16 RX ADMIN — Medication 5 ML: at 07:41

## 2020-11-16 RX ADMIN — Medication 5 ML: at 07:42

## 2020-11-16 ASSESSMENT — PAIN SCALES - GENERAL: PAINLEVEL: NO PAIN (0)

## 2020-11-16 NOTE — LETTER
11/16/2020         RE: Florencia Gao  [address] [place] [address]        Dear Colleague,    Thank you for referring your patient, Florencia Gao, to the [facility] BLOOD AND MARROW TRANSPLANT PROGRAM [place]. Please see a copy of my visit note below.    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         Diagnosis AMLM1 Acute myelogeneous leukemia, M1, myeloblastic  HCT Type Allogeneic    Prep Regimen Fludarabine  Cytoxan  TBI   Donor Source Sibling    GVHD Prophylaxis Post transplant cytoxan  Tac/MMF  Primary BMT Provider ACE        Interval history:     Florencia returns for follow up and feels very well. Abdominal cramping better with only a half tablet of oral mag oxide and three times daily magnesium glycinate. Stools are soft and formed. No pain and hemorrhoids are improving. Occasional blood on bathroom tissue when wiping but not every time. No nausea or vomiting, appetite stable.  No fevers, chills, cough or cold symptoms. No rashes, bruises or bleeding.     ROS: negative except as above.    Physical Exam (Resident / Clinician):   Blood pressure 111/68, pulse 110, temperature 98.3  F (36.8  C), resp. rate 18, weight 78.8 kg (173 lb 11.2 oz), SpO2 95 %.    Wt Readings from Last 5 Encounters:   11/16/20 78.8 kg (173 lb 11.2 oz)   11/11/20 78 kg (172 lb)   11/08/20 78.1 kg (172 lb 3.2 oz)   11/05/20 77.5 kg (170 lb 12.8 oz)   11/04/20 77.1 kg (170 lb)       ECOG PS: 1  Constitutional: NAD, pleasant and appropriate  HEENT:  NC/AT, no icterus  Lungs: CTAB, no w/r/r, nonlabored breathing  Cardiovascular: RRR, S1, S2, no m/r/g  GI/Abdomen: +BS, soft, nontender, nondistended  Skin: no rashes or petechiae.   MSK/Extremities: Warm, well perfused. No edema  Neurologic: alert, answering questions appropriately  Psych: appropriate affect  Access: right upper chest CVC,  c/d/i, dressing last changed 11/11  Data:   Reviewed in EPIC and notable for:  Lab Results   Component Value Date    WBC 3.4 (L) 11/16/2020    ANEU 2.7 11/13/2020    HGB 7.7 (L) 11/16/2020    HCT 24.1 (L) 11/16/2020     (L) 11/16/2020     (L) 11/16/2020    POTASSIUM 4.3 11/16/2020    CHLORIDE 101 11/16/2020    CO2 24 11/16/2020     (H) 11/16/2020    BUN 16 11/16/2020    CR 0.92 11/16/2020    MAG 1.8 11/16/2020    INR 1.01 10/26/2020    AST 31 11/16/2020    ALT 30 11/16/2020       Assessment and Plan:     Florencia Gao is a 75 year old female with AML in CR1, day +40 s/p JCARLOS Allo sib pbsct per protocol 2019-10, randomized to Post transplant cytoxan/Tac/MMF.       1.  BMT: Cy/flu/TBI prep.   Received 4.19 x10(6) CD34/kg. Donor B pos and recipient B neg.  Engrafted; intermittent growth factor needs.      2.  HEME: Keep Hgb>7g/dL and plts>10K. Premeds tylenol and benadryl.   Plt improving, hgb stable  - anemia and thrombocytopenia secondary to chemotherapy  - Of note has significant antibodies to RBCs- can cause delay in transfusion availability.                             3.  ID: Afebrile.   - Prophy: Vori, and HD ACV prophy.    - IV pentamdine as PJP prophylaxis, first dose 11/11/2020.   CMV neg 11/13                              4.  GI:    - Hemorroids: tucks prn, barrier cream and start BID hydrocortisone cream topically.    - Protonix for GI prophy.    - Ursodiol for VOD prophy.     5.  GVHD   MMF discontinued day +32.  Day +3 and day+4 cytoxan  On tac 2.5mg BID.   - Tac level 7.6 mg/dl on 2.5mg BID 11/13; no changes. Repeat level 11/18       6.  FEN/Renal:  - Cr stable  - hypomagnesia: Pt had belly cramps with full tab, so leave at 1/2 tab mag ox at bedtime and mag glycinate 480mg tid. Mag level 1.8 today, no IV supplementation needed.      7. Hypothyroidism  Continue synthroid 88mcg     8. Psych: Situational Depression continue effexor 75mg daily.      9. Research: Pt consented to  BE6408-59 (Seamless randomized trial to alleviate morbidity and mortality) for which she was randomized to the palliative care management arm. Seen by Palliative 10/8. Pt has subsequently withdrawn from this study.     RTC: 11/18 for labs, provider visit and infusion for possible IV mag and dressing change. If clinically stable pt can likely come weekly.    Denzel Magallon PA-C  [phone]        Again, thank you for allowing me to participate in the care of your patient.        Sincerely,        BMT Advanced Practice Provider

## 2020-11-16 NOTE — NURSING NOTE
Chief Complaint   Patient presents with     Lab Only     cvc, heparin locked, vitals checked     Tiffany Navarro RN on 11/16/2020 at 7:44 AM

## 2020-11-16 NOTE — NURSING NOTE
"Oncology Rooming Note    November 16, 2020 7:58 AM   Florencia Gao is a 75 year old female who presents for:    Chief Complaint   Patient presents with     Lab Only     cvc, heparin locked, vitals checked     Oncology Clinic Visit     AML     Initial Vitals: /68   Pulse 110   Temp 98.3  F (36.8  C)   Resp 18   Wt 78.8 kg (173 lb 11.2 oz)   SpO2 95%   BMI 27.92 kg/m   Estimated body mass index is 27.92 kg/m  as calculated from the following:    Height as of 11/3/20: 1.68 m (5' 6.14\").    Weight as of this encounter: 78.8 kg (173 lb 11.2 oz). Body surface area is 1.92 meters squared.  No Pain (0) Comment: Data Unavailable   No LMP recorded. Patient is postmenopausal.  Allergies reviewed: Yes  Medications reviewed: Yes    Medications: Medication refills not needed today.  Pharmacy name entered into [email or web address]:    [facility] #[id number] [place], [place] - [address] [place] [address] AT [facility] [address]  [facility] [place], [place] -  [facility] [phone]    Clinical concerns: no new concerns. Denzel was notified.      Natalia Starr CMA            "

## 2020-11-16 NOTE — PROGRESS NOTES
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         Diagnosis AMLM1 Acute myelogeneous leukemia, M1, myeloblastic  HCT Type Allogeneic    Prep Regimen Fludarabine  Cytoxan  TBI   Donor Source Sibling    GVHD Prophylaxis Post transplant cytoxan  Tac/MMF  Primary BMT Provider ACE        Interval history:     Florencia returns for follow up and feels very well. Abdominal cramping better with only a half tablet of oral mag oxide and three times daily magnesium glycinate. Stools are soft and formed. No pain and hemorrhoids are improving. Occasional blood on bathroom tissue when wiping but not every time. No nausea or vomiting, appetite stable.  No fevers, chills, cough or cold symptoms. No rashes, bruises or bleeding.     ROS: negative except as above.    Physical Exam (Resident / Clinician):   Blood pressure 111/68, pulse 110, temperature 98.3  F (36.8  C), resp. rate 18, weight 78.8 kg (173 lb 11.2 oz), SpO2 95 %.    Wt Readings from Last 5 Encounters:   11/16/20 78.8 kg (173 lb 11.2 oz)   11/11/20 78 kg (172 lb)   11/08/20 78.1 kg (172 lb 3.2 oz)   11/05/20 77.5 kg (170 lb 12.8 oz)   11/04/20 77.1 kg (170 lb)       ECOG PS: 1  Constitutional: NAD, pleasant and appropriate  HEENT:  NC/AT, no icterus  Lungs: CTAB, no w/r/r, nonlabored breathing  Cardiovascular: RRR, S1, S2, no m/r/g  GI/Abdomen: +BS, soft, nontender, nondistended  Skin: no rashes or petechiae.   MSK/Extremities: Warm, well perfused. No edema  Neurologic: alert, answering questions appropriately  Psych: appropriate affect  Access: right upper chest CVC, c/d/i, dressing last changed 11/11  Data:   Reviewed in EPIC and notable for:  Lab Results   Component Value Date    WBC 3.4 (L) 11/16/2020    ANEU 2.7 11/13/2020    HGB 7.7 (L) 11/16/2020    HCT 24.1 (L) 11/16/2020     (L) 11/16/2020     (L) 11/16/2020    POTASSIUM 4.3  11/16/2020    CHLORIDE 101 11/16/2020    CO2 24 11/16/2020     (H) 11/16/2020    BUN 16 11/16/2020    CR 0.92 11/16/2020    MAG 1.8 11/16/2020    INR 1.01 10/26/2020    AST 31 11/16/2020    ALT 30 11/16/2020       Assessment and Plan:     Florencia Gao is a 75 year old female with AML in CR1, day +40 s/p JCARLOS Allo sib pbsct per protocol 2019-10, randomized to Post transplant cytoxan/Tac/MMF.       1.  BMT: Cy/flu/TBI prep.   Received 4.19 x10(6) CD34/kg. Donor B pos and recipient B neg.  Engrafted; intermittent growth factor needs.      2.  HEME: Keep Hgb>7g/dL and plts>10K. Premeds tylenol and benadryl.   Plt improving, hgb stable  - anemia and thrombocytopenia secondary to chemotherapy  - Of note has significant antibodies to RBCs- can cause delay in transfusion availability.                             3.  ID: Afebrile.   - Prophy: Vori, and HD ACV prophy.    - IV pentamdine as PJP prophylaxis, first dose 11/11/2020.   CMV neg 11/13                              4.  GI:    - Hemorroids: tucks prn, barrier cream and start BID hydrocortisone cream topically.    - Protonix for GI prophy.    - Ursodiol for VOD prophy.     5.  GVHD   MMF discontinued day +32.  Day +3 and day+4 cytoxan  On tac 2.5mg BID.   - Tac level 7.6 mg/dl on 2.5mg BID 11/13; no changes. Repeat level 11/18       6.  FEN/Renal:  - Cr stable  - hypomagnesia: Pt had belly cramps with full tab, so leave at 1/2 tab mag ox at bedtime and mag glycinate 480mg tid. Mag level 1.8 today, no IV supplementation needed.      7. Hypothyroidism  Continue synthroid 88mcg     8. Psych: Situational Depression continue effexor 75mg daily.      9. Research: Pt consented to CI8502-43 (Seamless randomized trial to alleviate morbidity and mortality) for which she was randomized to the palliative care management arm. Seen by Palliative 10/8. Pt has subsequently withdrawn from this study.     RTC: 11/18 for labs, provider visit and infusion for possible IV mag and  dressing change. If clinically stable pt can likely come weekly.    Denzel Magallon PA-C  [phone]

## 2020-11-17 LAB
ABO + RH BLD: ABNORMAL
ABO + RH BLD: ABNORMAL
BLD GP AB SCN SERPL QL: ABNORMAL
BLD PROD TYP BPU: ABNORMAL
BLOOD BANK CMNT PATIENT-IMP: ABNORMAL
BLOOD BANK CMNT PATIENT-IMP: ABNORMAL
CELL TRANSPLANT TYPE: ABNORMAL
NUM BPU REQUESTED: 1
SPECIMEN EXP DATE BLD: ABNORMAL

## 2020-11-18 ENCOUNTER — OFFICE VISIT (OUTPATIENT)
Dept: TRANSPLANT | Facility: CLINIC | Age: 75
End: 2020-11-18
Attending: PHYSICIAN ASSISTANT
Payer: COMMERCIAL

## 2020-11-18 ENCOUNTER — APPOINTMENT (OUTPATIENT)
Dept: LAB | Facility: CLINIC | Age: 75
End: 2020-11-18
Attending: PHYSICIAN ASSISTANT
Payer: COMMERCIAL

## 2020-11-18 VITALS
RESPIRATION RATE: 18 BRPM | OXYGEN SATURATION: 94 % | HEART RATE: 110 BPM | TEMPERATURE: 98.2 F | SYSTOLIC BLOOD PRESSURE: 116 MMHG | WEIGHT: 171.5 LBS | BODY MASS INDEX: 27.56 KG/M2 | DIASTOLIC BLOOD PRESSURE: 67 MMHG

## 2020-11-18 DIAGNOSIS — C92.01 AML (ACUTE MYELOID LEUKEMIA) IN REMISSION (H): Primary | ICD-10-CM

## 2020-11-18 LAB
ANION GAP SERPL CALCULATED.3IONS-SCNC: 4 MMOL/L (ref 3–14)
ANION GAP SERPL CALCULATED.3IONS-SCNC: 8 MMOL/L (ref 3–14)
ANISOCYTOSIS BLD QL SMEAR: ABNORMAL
BASOPHILS # BLD AUTO: 0 10E9/L (ref 0–0.2)
BASOPHILS NFR BLD AUTO: 0 %
BUN SERPL-MCNC: 16 MG/DL (ref 7–30)
BUN SERPL-MCNC: 17 MG/DL (ref 7–30)
CALCIUM SERPL-MCNC: 8.2 MG/DL (ref 8.5–10.1)
CALCIUM SERPL-MCNC: 8.8 MG/DL (ref 8.5–10.1)
CHLORIDE SERPL-SCNC: 100 MMOL/L (ref 94–109)
CHLORIDE SERPL-SCNC: 94 MMOL/L (ref 94–109)
CO2 SERPL-SCNC: 23 MMOL/L (ref 20–32)
CO2 SERPL-SCNC: 23 MMOL/L (ref 20–32)
CREAT SERPL-MCNC: 0.96 MG/DL (ref 0.52–1.04)
CREAT SERPL-MCNC: 1.03 MG/DL (ref 0.52–1.04)
DACRYOCYTES BLD QL SMEAR: SLIGHT
DIFFERENTIAL METHOD BLD: ABNORMAL
EOSINOPHIL # BLD AUTO: 0 10E9/L (ref 0–0.7)
EOSINOPHIL NFR BLD AUTO: 0 %
ERYTHROCYTE [DISTWIDTH] IN BLOOD BY AUTOMATED COUNT: 19.5 % (ref 10–15)
GFR SERPL CREATININE-BSD FRML MDRD: 54 ML/MIN/{1.73_M2}
GFR SERPL CREATININE-BSD FRML MDRD: 59 ML/MIN/{1.73_M2}
GLUCOSE SERPL-MCNC: 116 MG/DL (ref 70–99)
GLUCOSE SERPL-MCNC: 130 MG/DL (ref 70–99)
HCT VFR BLD AUTO: 25.2 % (ref 35–47)
HGB BLD-MCNC: 8.3 G/DL (ref 11.7–15.7)
LYMPHOCYTES # BLD AUTO: 0.2 10E9/L (ref 0.8–5.3)
LYMPHOCYTES NFR BLD AUTO: 5.3 %
MAGNESIUM SERPL-MCNC: 1.7 MG/DL (ref 1.6–2.3)
MCH RBC QN AUTO: 30.3 PG (ref 26.5–33)
MCHC RBC AUTO-ENTMCNC: 32.9 G/DL (ref 31.5–36.5)
MCV RBC AUTO: 92 FL (ref 78–100)
METAMYELOCYTES # BLD: 0 10E9/L
METAMYELOCYTES NFR BLD MANUAL: 0.9 %
MONOCYTES # BLD AUTO: 0.6 10E9/L (ref 0–1.3)
MONOCYTES NFR BLD AUTO: 14.9 %
NEUTROPHILS # BLD AUTO: 3 10E9/L (ref 1.6–8.3)
NEUTROPHILS NFR BLD AUTO: 78.9 %
OSMOLALITY SERPL: 274 MMOL/KG (ref 280–301)
OSMOLALITY UR: 317 MMOL/KG (ref 100–1200)
OVALOCYTES BLD QL SMEAR: SLIGHT
PLATELET # BLD AUTO: 153 10E9/L (ref 150–450)
PLATELET # BLD EST: ABNORMAL 10*3/UL
POIKILOCYTOSIS BLD QL SMEAR: SLIGHT
POLYCHROMASIA BLD QL SMEAR: SLIGHT
POTASSIUM SERPL-SCNC: 4.4 MMOL/L (ref 3.4–5.3)
POTASSIUM SERPL-SCNC: 4.7 MMOL/L (ref 3.4–5.3)
RBC # BLD AUTO: 2.74 10E12/L (ref 3.8–5.2)
SODIUM SERPL-SCNC: 125 MMOL/L (ref 133–144)
SODIUM SERPL-SCNC: 127 MMOL/L (ref 133–144)
WBC # BLD AUTO: 3.8 10E9/L (ref 4–11)

## 2020-11-18 PROCEDURE — 80048 BASIC METABOLIC PNL TOTAL CA: CPT | Performed by: NURSE PRACTITIONER

## 2020-11-18 PROCEDURE — 250N000011 HC RX IP 250 OP 636: Performed by: PHYSICIAN ASSISTANT

## 2020-11-18 PROCEDURE — G0463 HOSPITAL OUTPT CLINIC VISIT: HCPCS | Mod: 25

## 2020-11-18 PROCEDURE — 83930 ASSAY OF BLOOD OSMOLALITY: CPT | Performed by: NURSE PRACTITIONER

## 2020-11-18 PROCEDURE — 258N000003 HC RX IP 258 OP 636: Performed by: PHYSICIAN ASSISTANT

## 2020-11-18 PROCEDURE — 85025 COMPLETE CBC W/AUTO DIFF WBC: CPT | Performed by: PHYSICIAN ASSISTANT

## 2020-11-18 PROCEDURE — 99214 OFFICE O/P EST MOD 30 MIN: CPT

## 2020-11-18 PROCEDURE — 96360 HYDRATION IV INFUSION INIT: CPT

## 2020-11-18 PROCEDURE — 83935 ASSAY OF URINE OSMOLALITY: CPT | Performed by: NURSE PRACTITIONER

## 2020-11-18 PROCEDURE — 80048 BASIC METABOLIC PNL TOTAL CA: CPT | Performed by: PHYSICIAN ASSISTANT

## 2020-11-18 PROCEDURE — 83735 ASSAY OF MAGNESIUM: CPT | Performed by: PHYSICIAN ASSISTANT

## 2020-11-18 RX ORDER — HEPARIN SODIUM,PORCINE 10 UNIT/ML
5 VIAL (ML) INTRAVENOUS
Status: DISCONTINUED | OUTPATIENT
Start: 2020-11-18 | End: 2020-11-18 | Stop reason: HOSPADM

## 2020-11-18 RX ADMIN — Medication 5 ML: at 10:28

## 2020-11-18 RX ADMIN — SODIUM CHLORIDE 1000 ML: 9 INJECTION, SOLUTION INTRAVENOUS at 11:28

## 2020-11-18 RX ADMIN — Medication 5 ML: at 10:26

## 2020-11-18 ASSESSMENT — PAIN SCALES - GENERAL: PAINLEVEL: NO PAIN (0)

## 2020-11-18 NOTE — LETTER
11/18/2020         RE: Florencia Gao  [address] [place] [address]        Dear Colleague,    Thank you for referring your patient, Florencia Gao, to the [facility] BLOOD AND MARROW TRANSPLANT PROGRAM [place]. Please see a copy of my visit note below.    Infusion Nursing Note:  Florencia Gao presents today for IVF.    Patient seen by provider today: Yes: Caroline Thakur NP   present during visit today: Not Applicable.    Note: Pt received 1L NS.  Recheck Na+ was 127--okay to discharge per Caroline.  Urine for sodium/osmolality sent also.    Intravenous Access:  Padilla.    Treatment Conditions:  Not Applicable.      Post Infusion Assessment:  Patient tolerated infusion without incident.  Blood return noted pre and post infusion.  Site patent and intact, free from redness, edema or discomfort.  No evidence of extravasations.       Discharge Plan:   Discharge instructions reviewed with: Patient.  Patient and/or family verbalized understanding of discharge instructions and all questions answered.  Copy of AVS reviewed with patient and/or family.  Patient will return 11/19 for next appointment.  Patient discharged in stable condition accompanied by: self.  Departure Mode: Ambulatory.    Rosina Montiel, CASSIDY                            Again, thank you for allowing me to participate in the care of your patient.        Sincerely,        [facility]

## 2020-11-18 NOTE — NURSING NOTE
Chief Complaint   Patient presents with     Lab Only     cvc, heparin locked, caps changed, vitals checked     Tiffany Navarro RN on 11/18/2020 at 10:30 AM

## 2020-11-18 NOTE — NURSING NOTE
"Oncology Rooming Note    November 18, 2020 10:48 AM   Florencia Gao is a 75 year old female who presents for:    Chief Complaint   Patient presents with     Lab Only     cvc, heparin locked, caps changed, vitals checked     Oncology Clinic Visit     AML     Initial Vitals: /67   Pulse 110   Temp 98.2  F (36.8  C)   Resp 18   Wt 77.8 kg (171 lb 8 oz)   SpO2 94%   BMI 27.56 kg/m   Estimated body mass index is 27.56 kg/m  as calculated from the following:    Height as of 11/3/20: 1.68 m (5' 6.14\").    Weight as of this encounter: 77.8 kg (171 lb 8 oz). Body surface area is 1.91 meters squared.  No Pain (0) Comment: Data Unavailable   No LMP recorded. Patient is postmenopausal.  Allergies reviewed: Yes  Medications reviewed: Yes    Medications: Medication refills not needed today.  Pharmacy name entered into [employer]:    [facility] #[id number] [place], [place] - [address] [place] [address] AT [facility] [address]  [facility] [place], [place] - [address] [facility] [phone]    Clinical concerns: Inquiring about flu vaccine today.  Caroline was notified.      Natalia Starr CMA            "

## 2020-11-18 NOTE — PROGRESS NOTES
Infusion Nursing Note:  Florencia Gao presents today for IVF.    Patient seen by provider today: Yes: Caroline Thakur NP   present during visit today: Not Applicable.    Note: Pt received 1L NS.  Recheck Na+ was 127--okay to discharge per Caroline.  Urine for sodium/osmolality sent also.    Intravenous Access:  Padilla.    Treatment Conditions:  Not Applicable.      Post Infusion Assessment:  Patient tolerated infusion without incident.  Blood return noted pre and post infusion.  Site patent and intact, free from redness, edema or discomfort.  No evidence of extravasations.       Discharge Plan:   Discharge instructions reviewed with: Patient.  Patient and/or family verbalized understanding of discharge instructions and all questions answered.  Copy of AVS reviewed with patient and/or family.  Patient will return 11/19 for next appointment.  Patient discharged in stable condition accompanied by: self.  Departure Mode: Ambulatory.    Rosina Montile RN

## 2020-11-18 NOTE — PROGRESS NOTES
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         Diagnosis AMLM1 Acute myelogeneous leukemia, M1, myeloblastic  HCT Type Allogeneic    Prep Regimen Fludarabine  Cytoxan  TBI   Donor Source Sibling    GVHD Prophylaxis Post transplant cytoxan  Tac/MMF  Primary BMT Provider ACE        Interval history:     Florencia returns for follow up and feels very well. Caregiver on speaker phone today. Florencia has more energy and is eating and drinking better. She has been drinking about a liter of fluids mixed with either pedialyte or propel and water with pills. She's been trying to eat salty foods. No lightheadedness or dizziness. No n/v. Some cramping prior to BM. No rashes. No fevers. No cough.    ROS: negative except as above.    Physical Exam (Resident / Clinician):   Blood pressure 116/67, pulse 110, temperature 98.2  F (36.8  C), resp. rate 18, weight 77.8 kg (171 lb 8 oz), SpO2 94 %.    Wt Readings from Last 5 Encounters:   11/18/20 77.8 kg (171 lb 8 oz)   11/16/20 78.8 kg (173 lb 11.2 oz)   11/11/20 78 kg (172 lb)   11/08/20 78.1 kg (172 lb 3.2 oz)   11/05/20 77.5 kg (170 lb 12.8 oz)       ECOG PS: 1  Constitutional: NAD, pleasant and appropriate  HEENT:  NC/AT, no icterus  Lungs: CTAB, no w/r/r, nonlabored breathing  Cardiovascular: RRR, S1, S2, no m/r/g  GI/Abdomen: +BS, soft, nontender, nondistended  Skin: no rashes or petechiae.   MSK/Extremities: Warm, well perfused. No edema  Neurologic: alert, answering questions appropriately  Psych: appropriate affect  Access: right upper chest CVC, c/d/i, dressing last changed 11/18 today  Data:   Reviewed in EPIC and notable for:  Lab Results   Component Value Date    WBC 3.8 (L) 11/18/2020    ANEU 3.0 11/18/2020    HGB 8.3 (L) 11/18/2020    HCT 25.2 (L) 11/18/2020     11/18/2020     (L) 11/18/2020    POTASSIUM 4.4 11/18/2020    CHLORIDE  94 11/18/2020    CO2 23 11/18/2020     (H) 11/18/2020    BUN 17 11/18/2020    CR 1.03 11/18/2020    MAG 1.7 11/18/2020    INR 1.01 10/26/2020    AST 31 11/16/2020    ALT 30 11/16/2020       Assessment and Plan:     Florencia Gao is a 75 year old female with AML in CR1, day +42 s/p JCARLOS Allo sib pbsct per protocol 2019-10, randomized to Post transplant cytoxan/Tac/MMF.       1.  BMT: Cy/flu/TBI prep.   Received 4.19 x10(6) CD34/kg. Donor B pos and recipient B neg.  Engrafted; intermittent growth factor needs.      2.  HEME: Keep Hgb>7g/dL and plts>10K. Premeds tylenol and benadryl.   Plt improving, hgb stable  - anemia and thrombocytopenia secondary to chemotherapy  - Of note has significant antibodies to RBCs- can cause delay in transfusion availability.                             3.  ID: Afebrile.   - Prophy: Vori, and HD ACV prophy.    - IV pentamdine as PJP prophylaxis, first dose 11/11/2020.   CMV neg 11/13                              4.  GI:    - Hemorroids: tucks prn, barrier cream and start BID hydrocortisone cream topically.    - Protonix for GI prophy.    - Ursodiol for VOD prophy.     5.  GVHD   MMF discontinued day +32.  Day +3 and day+4 cytoxan  On tac 2.5mg BID.   - Tac level 7.6 mg/dl on 2.5mg BID 11/13; no changes. Repeat level 11/18-not held; reorder for friday       6.  FEN/Renal:  - Hyponatremia 125(132). Ordered urine lytes/osms and fluid restrict to 1L electrolyte drinks. Give NS today and recheck Na afterwards.  - Cr stable  - hypomagnesia: Pt had belly cramps with full tab, so leave at 1/2 tab mag ox at bedtime and mag glycinate 480mg tid. Mag level 1.7 today .      7. Hypothyroidism  Continue synthroid 88mcg     8. Psych: Situational Depression continue effexor 75mg daily.      9. Research: Pt consented to MT2019-31 (Seamless randomized trial to alleviate morbidity and mortality) for which she was randomized to the palliative care management arm. Seen by Palliative 10/8. Pt has  subsequently withdrawn from this study.     RTC: 11/19 for sodium recheck, tac level/provider visit.     Instructed to go to ED if mental status changes occur.    Caroline Thakur NP

## 2020-11-18 NOTE — LETTER
11/18/2020         RE: Florencia Gao  [address] [place] [address]        Dear Colleague,    Thank you for referring your patient, Florencia Gao, to the [facility] BLOOD AND MARROW TRANSPLANT PROGRAM [place]. Please see a copy of my visit note below.    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         Diagnosis AMLM1 Acute myelogeneous leukemia, M1, myeloblastic  HCT Type Allogeneic    Prep Regimen Fludarabine  Cytoxan  TBI   Donor Source Sibling    GVHD Prophylaxis Post transplant cytoxan  Tac/MMF  Primary BMT Provider ACE        Interval history:     Florencia returns for follow up and feels very well. Caregiver on speaker phone today. Florencia has more energy and is eating and drinking better. She has been drinking about a liter of fluids mixed with either pedialyte or propel and water with pills. She's been trying to eat salty foods. No lightheadedness or dizziness. No n/v. Some cramping prior to BM. No rashes. No fevers. No cough.    ROS: negative except as above.    Physical Exam (Resident / Clinician):   Blood pressure 116/67, pulse 110, temperature 98.2  F (36.8  C), resp. rate 18, weight 77.8 kg (171 lb 8 oz), SpO2 94 %.    Wt Readings from Last 5 Encounters:   11/18/20 77.8 kg (171 lb 8 oz)   11/16/20 78.8 kg (173 lb 11.2 oz)   11/11/20 78 kg (172 lb)   11/08/20 78.1 kg (172 lb 3.2 oz)   11/05/20 77.5 kg (170 lb 12.8 oz)       ECOG PS: 1  Constitutional: NAD, pleasant and appropriate  HEENT:  NC/AT, no icterus  Lungs: CTAB, no w/r/r, nonlabored breathing  Cardiovascular: RRR, S1, S2, no m/r/g  GI/Abdomen: +BS, soft, nontender, nondistended  Skin: no rashes or petechiae.   MSK/Extremities: Warm, well perfused. No edema  Neurologic: alert, answering questions appropriately  Psych: appropriate affect  Access: right upper chest CVC, c/d/i, dressing last  changed 11/18 today  Data:   Reviewed in EPIC and notable for:  Lab Results   Component Value Date    WBC 3.8 (L) 11/18/2020    ANEU 3.0 11/18/2020    HGB 8.3 (L) 11/18/2020    HCT 25.2 (L) 11/18/2020     11/18/2020     (L) 11/18/2020    POTASSIUM 4.4 11/18/2020    CHLORIDE 94 11/18/2020    CO2 23 11/18/2020     (H) 11/18/2020    BUN 17 11/18/2020    CR 1.03 11/18/2020    MAG 1.7 11/18/2020    INR 1.01 10/26/2020    AST 31 11/16/2020    ALT 30 11/16/2020       Assessment and Plan:     Florencia Gao is a 75 year old female with AML in CR1, day +42 s/p JCARLOS Allo sib pbsct per protocol 2019-10, randomized to Post transplant cytoxan/Tac/MMF.       1.  BMT: Cy/flu/TBI prep.   Received 4.19 x10(6) CD34/kg. Donor B pos and recipient B neg.  Engrafted; intermittent growth factor needs.      2.  HEME: Keep Hgb>7g/dL and plts>10K. Premeds tylenol and benadryl.   Plt improving, hgb stable  - anemia and thrombocytopenia secondary to chemotherapy  - Of note has significant antibodies to RBCs- can cause delay in transfusion availability.                             3.  ID: Afebrile.   - Prophy: Vori, and HD ACV prophy.    - IV pentamdine as PJP prophylaxis, first dose 11/11/2020.   CMV neg 11/13                              4.  GI:    - Hemorroids: tucks prn, barrier cream and start BID hydrocortisone cream topically.    - Protonix for GI prophy.    - Ursodiol for VOD prophy.     5.  GVHD   MMF discontinued day +32.  Day +3 and day+4 cytoxan  On tac 2.5mg BID.   - Tac level 7.6 mg/dl on 2.5mg BID 11/13; no changes. Repeat level 11/18-not held; reorder for friday       6.  FEN/Renal:  - Hyponatremia 125(132). Ordered urine lytes/osms and fluid restrict to 1L electrolyte drinks. Give NS today and recheck Na afterwards.  - Cr stable  - hypomagnesia: Pt had belly cramps with full tab, so leave at 1/2 tab mag ox at bedtime and mag glycinate 480mg tid. Mag level 1.7 today .      7. Hypothyroidism  Continue  synthroid 88mcg     8. Psych: Situational Depression continue effexor 75mg daily.      9. Research: Pt consented to MT2019-31 (Seamless randomized trial to alleviate morbidity and mortality) for which she was randomized to the palliative care management arm. Seen by Palliative 10/8. Pt has subsequently withdrawn from this study.     RTC: 11/19 for sodium recheck, tac level/provider visit.     Instructed to go to ED if mental status changes occur.    Caroline Thakur NP        Again, thank you for allowing me to participate in the care of your patient.        Sincerely,        BMT Advanced Practice Provider

## 2020-11-19 ENCOUNTER — APPOINTMENT (OUTPATIENT)
Dept: LAB | Facility: CLINIC | Age: 75
End: 2020-11-19
Attending: INTERNAL MEDICINE
Payer: COMMERCIAL

## 2020-11-19 ENCOUNTER — ONCOLOGY VISIT (OUTPATIENT)
Dept: TRANSPLANT | Facility: CLINIC | Age: 75
End: 2020-11-19
Attending: INTERNAL MEDICINE
Payer: COMMERCIAL

## 2020-11-19 VITALS
WEIGHT: 171 LBS | BODY MASS INDEX: 27.48 KG/M2 | SYSTOLIC BLOOD PRESSURE: 119 MMHG | HEART RATE: 102 BPM | OXYGEN SATURATION: 96 % | RESPIRATION RATE: 19 BRPM | TEMPERATURE: 98.7 F | DIASTOLIC BLOOD PRESSURE: 64 MMHG

## 2020-11-19 DIAGNOSIS — C92.01 AML (ACUTE MYELOID LEUKEMIA) IN REMISSION (H): Primary | ICD-10-CM

## 2020-11-19 LAB
CMV DNA SPEC NAA+PROBE-ACNC: NORMAL [IU]/ML
CMV DNA SPEC NAA+PROBE-LOG#: NORMAL {LOG_IU}/ML
SPECIMEN SOURCE: NORMAL

## 2020-11-19 PROCEDURE — 250N000011 HC RX IP 250 OP 636: Performed by: INTERNAL MEDICINE

## 2020-11-19 PROCEDURE — G0463 HOSPITAL OUTPT CLINIC VISIT: HCPCS

## 2020-11-19 PROCEDURE — 99214 OFFICE O/P EST MOD 30 MIN: CPT

## 2020-11-19 PROCEDURE — 36591 DRAW BLOOD OFF VENOUS DEVICE: CPT

## 2020-11-19 RX ORDER — HEPARIN SODIUM,PORCINE 10 UNIT/ML
5 VIAL (ML) INTRAVENOUS
Status: DISCONTINUED | OUTPATIENT
Start: 2020-11-19 | End: 2020-11-19 | Stop reason: HOSPADM

## 2020-11-19 RX ADMIN — Medication 5 ML: at 08:42

## 2020-11-19 ASSESSMENT — PAIN SCALES - GENERAL: PAINLEVEL: NO PAIN (0)

## 2020-11-19 NOTE — NURSING NOTE
"Oncology Rooming Note    November 19, 2020 9:05 AM   Florencia Gao is a 75 year old female who presents for:    Chief Complaint   Patient presents with     Port Draw     Labs drawn via PORT by RN in lab. VS taken.      RECHECK     here for provider visit HX: AML     Initial Vitals: /64 (BP Location: Right arm, Patient Position: Sitting, Cuff Size: Adult Regular)   Pulse 102   Temp 98.7  F (37.1  C) (Oral)   Resp 19   Wt 77.6 kg (171 lb)   SpO2 96%   BMI 27.48 kg/m   Estimated body mass index is 27.48 kg/m  as calculated from the following:    Height as of 11/3/20: 1.68 m (5' 6.14\").    Weight as of this encounter: 77.6 kg (171 lb). Body surface area is 1.9 meters squared.  No Pain (0) Comment: Data Unavailable   No LMP recorded. Patient is postmenopausal.  Allergies reviewed: Yes  Medications reviewed: Yes    Medications: Medication refills not needed today.  Pharmacy name entered into [facility]:    [facility] #[id number] [place], [place] - [address] [place] [address] AT [facility] [address]  [facility] [place], [place] - [address] [facility] [phone]    Clinical concerns: PT denies current complaints, nausea, or pain.  Pt reports, \"I feel a bit better than yesterday.\"         Ladi De La Torre RN              "

## 2020-11-19 NOTE — NURSING NOTE
Chief Complaint   Patient presents with     Port Draw     Labs drawn via PORT by RN in lab. VS taken.      Joselyn Zavala RN

## 2020-11-19 NOTE — LETTER
11/19/2020         RE: Florencia Gao  [address] [place] [address]        Dear Colleague,    Thank you for referring your patient, Florencia Gao, to the [facility] BLOOD AND MARROW TRANSPLANT PROGRAM [place]. Please see a copy of my visit note below.    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         Diagnosis AMLM1 Acute myelogeneous leukemia, M1, myeloblastic  HCT Type Allogeneic    Prep Regimen Fludarabine  Cytoxan  TBI   Donor Source Sibling    GVHD Prophylaxis Post transplant cytoxan  Tac/MMF  Primary BMT Provider ACE        Interval history:     Good energy; still takes a nap in the afternoon.  Gets tired easily, but recovers after resting.  Eating/drinking fine.  Not drinking water, drinking gatorade as instructed.  Bowel cramping with BMs, but no other abdominal pain.  No chest pain.  No fever, cold, cough.  No bleeding.  No rash.  No swelling.  No confusion.      ROS: 8 ponit negative except as above.    Physical Exam (Resident / Clinician):   Blood pressure 119/64, pulse 102, temperature 98.7  F (37.1  C), temperature source Oral, resp. rate 19, weight 77.6 kg (171 lb), SpO2 96 %.    Wt Readings from Last 5 Encounters:   11/19/20 77.6 kg (171 lb)   11/18/20 77.8 kg (171 lb 8 oz)   11/16/20 78.8 kg (173 lb 11.2 oz)   11/11/20 78 kg (172 lb)   11/08/20 78.1 kg (172 lb 3.2 oz)       ECOG PS: 1  Constitutional: NAD, pleasant and appropriate  HEENT:  NC/AT, no icterus; very edematous beneath her eyes bilaterally  Lungs: CTAB, no w/r/r, nonlabored breathing  Cardiovascular: RRR, S1, S2, no m/r/g  GI/Abdomen: +BS, soft, nontender, nondistended  Skin: no rashes or petechiae.   MSK/Extremities  No edema  Neurologic: alert, answering questions appropriately  Psych: appropriate affect  Access: right upper chest CVC, c/d/i   Data:   Reviewed in EPIC and notable  for:  Lab Results   Component Value Date    WBC 3.1 (L) 11/19/2020    ANEU 1.9 11/19/2020    HGB 7.7 (L) 11/19/2020    HCT 24.1 (L) 11/19/2020     11/19/2020     (L) 11/19/2020    POTASSIUM 4.6 11/19/2020    CHLORIDE 103 11/19/2020    CO2 20 11/19/2020     (H) 11/19/2020    BUN 14 11/19/2020    CR 0.93 11/19/2020    MAG 1.6 11/19/2020    INR 1.01 10/26/2020    AST 31 11/16/2020    ALT 30 11/16/2020       Assessment and Plan:     Florencia Gao is a 75 year old female with AML in CR1, day +43 s/p JCARLOS Allo sib pbsct per protocol 2019-10, randomized to Post transplant cytoxan/Tac/MMF.       1.  BMT: Cy/flu/TBI prep.   Received 4.19 x10(6) CD34/kg. Donor B pos and recipient B neg.  Engrafted; intermittent growth factor needs. None today.      2.  HEME: Keep Hgb>7g/dL and plts>10K. Premeds tylenol and benadryl.   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                             3.  ID: Afebrile.   - Prophy: Vori, and HD ACV prophy.    - IV pentamdine as PJP prophylaxis, first dose 11/11/2020.   - CMV neg 11/13                              4.  GI:    - Hemorroids: tucks prn, barrier cream and start BID hydrocortisone cream topically.    - Protonix for GI prophy.    - Ursodiol for VOD prophy.     5.  GVHD   MMF discontinued day +32.  Day +3 and day+4 cytoxan  On tac 2.5mg BID.   - Tac level 7.6 mg/dl on 2.5mg BID 11/13; level pending 11/19, confirms she did not take dose before blood draw.      6.  FEN/Renal:  - Hyponatremia 125 on 11/18, improved after NS to 127.  Now following free water restriction, drinking gatorade.  Improved to 132 today; continue free water restriction to < 500mL per day and continue gatorade for the rest of her fluid intake needs.    - Cr stable  - hypomagnesia: Pt had belly cramps with full tab, so leave at 1/2 tab mag ox at bedtime and mag  glycinate 480mg tid. Mag level 1.7 today .      7. Hypothyroidism  Continue synthroid 88mcg     8. Psych: Situational Depression continue effexor 75mg daily.      9. Research: Pt consented to GW9200-30 (Seamless randomized trial to alleviate morbidity and mortality) for which she was randomized to the palliative care management arm. Seen by Palliative 10/8. Pt has subsequently withdrawn from this study.     RTC: 11/24, sooner if needed.    Mally Dimas PA-C  11/19/2020          Again, thank you for allowing me to participate in the care of your patient.        Sincerely,        BMT Advanced Practice Provider

## 2020-11-19 NOTE — PROGRESS NOTES
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         Diagnosis AMLM1 Acute myelogeneous leukemia, M1, myeloblastic  HCT Type Allogeneic    Prep Regimen Fludarabine  Cytoxan  TBI   Donor Source Sibling    GVHD Prophylaxis Post transplant cytoxan  Tac/MMF  Primary BMT Provider ACE        Interval history:     Good energy; still takes a nap in the afternoon.  Gets tired easily, but recovers after resting.  Eating/drinking fine.  Not drinking water, drinking gatorade as instructed.  Bowel cramping with BMs, but no other abdominal pain.  No chest pain.  No fever, cold, cough.  No bleeding.  No rash.  No swelling.  No confusion.      ROS: 8 ponit negative except as above.    Physical Exam (Resident / Clinician):   Blood pressure 119/64, pulse 102, temperature 98.7  F (37.1  C), temperature source Oral, resp. rate 19, weight 77.6 kg (171 lb), SpO2 96 %.    Wt Readings from Last 5 Encounters:   11/19/20 77.6 kg (171 lb)   11/18/20 77.8 kg (171 lb 8 oz)   11/16/20 78.8 kg (173 lb 11.2 oz)   11/11/20 78 kg (172 lb)   11/08/20 78.1 kg (172 lb 3.2 oz)       ECOG PS: 1  Constitutional: NAD, pleasant and appropriate  HEENT:  NC/AT, no icterus; very edematous beneath her eyes bilaterally  Lungs: CTAB, no w/r/r, nonlabored breathing  Cardiovascular: RRR, S1, S2, no m/r/g  GI/Abdomen: +BS, soft, nontender, nondistended  Skin: no rashes or petechiae.   MSK/Extremities  No edema  Neurologic: alert, answering questions appropriately  Psych: appropriate affect  Access: right upper chest CVC, c/d/i   Data:   Reviewed in EPIC and notable for:  Lab Results   Component Value Date    WBC 3.1 (L) 11/19/2020    ANEU 1.9 11/19/2020    HGB 7.7 (L) 11/19/2020    HCT 24.1 (L) 11/19/2020     11/19/2020     (L) 11/19/2020    POTASSIUM 4.6 11/19/2020    CHLORIDE 103 11/19/2020    CO2 20 11/19/2020     (H)  11/19/2020    BUN 14 11/19/2020    CR 0.93 11/19/2020    MAG 1.6 11/19/2020    INR 1.01 10/26/2020    AST 31 11/16/2020    ALT 30 11/16/2020       Assessment and Plan:     Florencia Gao is a 75 year old female with AML in CR1, day +43 s/p JCARLOS Allo sib pbsct per protocol 2019-10, randomized to Post transplant cytoxan/Tac/MMF.       1.  BMT: Cy/flu/TBI prep.   Received 4.19 x10(6) CD34/kg. Donor B pos and recipient B neg.  Engrafted; intermittent growth factor needs. None today.      2.  HEME: Keep Hgb>7g/dL and plts>10K. Premeds tylenol and benadryl.   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                             3.  ID: Afebrile.   - Prophy: Vori, and HD ACV prophy.    - IV pentamdine as PJP prophylaxis, first dose 11/11/2020.   - CMV neg 11/13                              4.  GI:    - Hemorroids: tucks prn, barrier cream and start BID hydrocortisone cream topically.    - Protonix for GI prophy.    - Ursodiol for VOD prophy.     5.  GVHD   MMF discontinued day +32.  Day +3 and day+4 cytoxan  On tac 2.5mg BID.   - Tac level 7.6 mg/dl on 2.5mg BID 11/13; level pending 11/19, confirms she did not take dose before blood draw.      6.  FEN/Renal:  - Hyponatremia 125 on 11/18, improved after NS to 127.  Now following free water restriction, drinking gatorade.  Improved to 132 today; continue free water restriction to < 500mL per day and continue gatorade for the rest of her fluid intake needs.    - Cr stable  - hypomagnesia: Pt had belly cramps with full tab, so leave at 1/2 tab mag ox at bedtime and mag glycinate 480mg tid. Mag level 1.7 today .      7. Hypothyroidism  Continue synthroid 88mcg     8. Psych: Situational Depression continue effexor 75mg daily.      9. Research: Pt consented to UZ2341-98 (Seamless randomized trial to alleviate morbidity and mortality) for which she was  randomized to the palliative care management arm. Seen by Palliative 10/8. Pt has subsequently withdrawn from this study.     RTC: 11/24, sooner if needed.    Mally Dimas PA-C  11/19/2020

## 2020-11-24 ENCOUNTER — MEDICAL CORRESPONDENCE (OUTPATIENT)
Dept: HEALTH INFORMATION MANAGEMENT | Facility: CLINIC | Age: 75
End: 2020-11-24

## 2020-11-24 ENCOUNTER — APPOINTMENT (OUTPATIENT)
Dept: LAB | Facility: CLINIC | Age: 75
End: 2020-11-24
Attending: PHYSICIAN ASSISTANT
Payer: COMMERCIAL

## 2020-11-24 ENCOUNTER — OFFICE VISIT (OUTPATIENT)
Dept: TRANSPLANT | Facility: CLINIC | Age: 75
End: 2020-11-24
Attending: PHYSICIAN ASSISTANT
Payer: COMMERCIAL

## 2020-11-24 VITALS
TEMPERATURE: 98.5 F | SYSTOLIC BLOOD PRESSURE: 135 MMHG | WEIGHT: 173.3 LBS | BODY MASS INDEX: 27.85 KG/M2 | DIASTOLIC BLOOD PRESSURE: 76 MMHG | RESPIRATION RATE: 14 BRPM | OXYGEN SATURATION: 96 % | HEART RATE: 97 BPM

## 2020-11-24 DIAGNOSIS — C92.01 ACUTE MYELOID LEUKEMIA IN REMISSION (H): ICD-10-CM

## 2020-11-24 DIAGNOSIS — Z94.81 STATUS POST BONE MARROW TRANSPLANT (H): ICD-10-CM

## 2020-11-24 DIAGNOSIS — C92.01 AML (ACUTE MYELOID LEUKEMIA) IN REMISSION (H): ICD-10-CM

## 2020-11-24 LAB
ALBUMIN SERPL-MCNC: 3.5 G/DL (ref 3.4–5)
ALP SERPL-CCNC: 118 U/L (ref 40–150)
ALT SERPL W P-5'-P-CCNC: 31 U/L (ref 0–50)
ANION GAP SERPL CALCULATED.3IONS-SCNC: 7 MMOL/L (ref 3–14)
AST SERPL W P-5'-P-CCNC: 27 U/L (ref 0–45)
BASOPHILS # BLD AUTO: 0.1 10E9/L (ref 0–0.2)
BASOPHILS NFR BLD AUTO: 1.2 %
BILIRUB SERPL-MCNC: 0.2 MG/DL (ref 0.2–1.3)
BUN SERPL-MCNC: 24 MG/DL (ref 7–30)
CALCIUM SERPL-MCNC: 8.9 MG/DL (ref 8.5–10.1)
CHLORIDE SERPL-SCNC: 101 MMOL/L (ref 94–109)
CO2 SERPL-SCNC: 24 MMOL/L (ref 20–32)
CREAT SERPL-MCNC: 1.08 MG/DL (ref 0.52–1.04)
DIFFERENTIAL METHOD BLD: ABNORMAL
EOSINOPHIL # BLD AUTO: 0.1 10E9/L (ref 0–0.7)
EOSINOPHIL NFR BLD AUTO: 2.7 %
ERYTHROCYTE [DISTWIDTH] IN BLOOD BY AUTOMATED COUNT: 21.7 % (ref 10–15)
GFR SERPL CREATININE-BSD FRML MDRD: 51 ML/MIN/{1.73_M2}
GLUCOSE SERPL-MCNC: 129 MG/DL (ref 70–99)
HCT VFR BLD AUTO: 25.5 % (ref 35–47)
HGB BLD-MCNC: 8.1 G/DL (ref 11.7–15.7)
IMM GRANULOCYTES # BLD: 0.2 10E9/L (ref 0–0.4)
IMM GRANULOCYTES NFR BLD: 3.6 %
LYMPHOCYTES # BLD AUTO: 1.2 10E9/L (ref 0.8–5.3)
LYMPHOCYTES NFR BLD AUTO: 28.1 %
MAGNESIUM SERPL-MCNC: 1.8 MG/DL (ref 1.6–2.3)
MCH RBC QN AUTO: 30.3 PG (ref 26.5–33)
MCHC RBC AUTO-ENTMCNC: 31.8 G/DL (ref 31.5–36.5)
MCV RBC AUTO: 96 FL (ref 78–100)
MONOCYTES # BLD AUTO: 0.9 10E9/L (ref 0–1.3)
MONOCYTES NFR BLD AUTO: 22.8 %
NEUTROPHILS # BLD AUTO: 1.7 10E9/L (ref 1.6–8.3)
NEUTROPHILS NFR BLD AUTO: 41.6 %
NRBC # BLD AUTO: 0 10*3/UL
NRBC BLD AUTO-RTO: 0 /100
PLATELET # BLD AUTO: 197 10E9/L (ref 150–450)
PLATELET # BLD EST: ABNORMAL 10*3/UL
POTASSIUM SERPL-SCNC: 4.4 MMOL/L (ref 3.4–5.3)
PROT SERPL-MCNC: 7 G/DL (ref 6.8–8.8)
RBC # BLD AUTO: 2.67 10E12/L (ref 3.8–5.2)
SODIUM SERPL-SCNC: 132 MMOL/L (ref 133–144)
TACROLIMUS BLD-MCNC: 10.8 UG/L (ref 5–15)
TME LAST DOSE: NORMAL H
WBC # BLD AUTO: 4.1 10E9/L (ref 4–11)

## 2020-11-24 PROCEDURE — 80053 COMPREHEN METABOLIC PANEL: CPT | Performed by: PHYSICIAN ASSISTANT

## 2020-11-24 PROCEDURE — G0463 HOSPITAL OUTPT CLINIC VISIT: HCPCS

## 2020-11-24 PROCEDURE — 83735 ASSAY OF MAGNESIUM: CPT | Performed by: PHYSICIAN ASSISTANT

## 2020-11-24 PROCEDURE — 250N000011 HC RX IP 250 OP 636: Performed by: PHYSICIAN ASSISTANT

## 2020-11-24 PROCEDURE — 85025 COMPLETE CBC W/AUTO DIFF WBC: CPT | Performed by: PHYSICIAN ASSISTANT

## 2020-11-24 PROCEDURE — 36592 COLLECT BLOOD FROM PICC: CPT

## 2020-11-24 PROCEDURE — 80197 ASSAY OF TACROLIMUS: CPT | Performed by: PHYSICIAN ASSISTANT

## 2020-11-24 PROCEDURE — 99214 OFFICE O/P EST MOD 30 MIN: CPT

## 2020-11-24 RX ORDER — HEPARIN SODIUM,PORCINE 10 UNIT/ML
5 VIAL (ML) INTRAVENOUS ONCE
Status: COMPLETED | OUTPATIENT
Start: 2020-11-24 | End: 2020-11-24

## 2020-11-24 RX ORDER — LEVOTHYROXINE SODIUM 88 UG/1
88 TABLET ORAL DAILY
Qty: 30 TABLET | Refills: 0 | Status: SHIPPED | OUTPATIENT
Start: 2020-11-24 | End: 2020-12-18

## 2020-11-24 RX ADMIN — Medication 5 ML: at 10:49

## 2020-11-24 ASSESSMENT — PAIN SCALES - GENERAL: PAINLEVEL: NO PAIN (0)

## 2020-11-24 NOTE — LETTER
11/24/2020         RE: Florencia Gao  [address] [place] [address]        Dear Colleague,    Thank you for referring your patient, Florencia Gao, to the [facility] BLOOD AND MARROW TRANSPLANT PROGRAM [place]. Please see a copy of my visit note below.    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         Diagnosis AMLM1 Acute myelogeneous leukemia, M1, myeloblastic  HCT Type Allogeneic    Prep Regimen Fludarabine  Cytoxan  TBI   Donor Source Sibling    GVHD Prophylaxis Post transplant cytoxan  Tac/MMF  Primary BMT Provider ACE        Interval history:     Florencia returns, her daughter in law and caregiver calls in on the phone to hear our visit. Florencia is feeling well, less fatigued. Great appetite, no n/v/d. She has no issues pushing fluids and tries to have majority of liquid be gatorade. No rashes, bruises or bleeding. Would like to leave [place] as home in [place] is close.    ROS: 8 ponit negative except as above.    Physical Exam (Resident / Clinician):   Blood pressure 135/76, pulse 97, temperature 98.5  F (36.9  C), temperature source Oral, resp. rate 14, weight 78.6 kg (173 lb 4.8 oz), SpO2 96 %.    Wt Readings from Last 5 Encounters:   11/24/20 78.6 kg (173 lb 4.8 oz)   11/19/20 77.6 kg (171 lb)   11/18/20 77.8 kg (171 lb 8 oz)   11/16/20 78.8 kg (173 lb 11.2 oz)   11/11/20 78 kg (172 lb)       ECOG PS: 1  Constitutional: NAD, pleasant and appropriate  HEENT:  NC/AT, no icterus; very edematous beneath her eyes bilaterally  Lungs: CTAB, no w/r/r, nonlabored breathing  Cardiovascular: RRR, S1, S2, no m/r/g  GI/Abdomen: +BS, soft, nontender, nondistended  Skin: no rashes or petechiae.   MSK/Extremities  No edema  Neurologic: alert, answering questions appropriately  Psych: appropriate affect  Access: right upper chest CVC, c/d/i   Data:   Reviewed in EPIC  and notable for:  Lab Results   Component Value Date    WBC 4.1 11/24/2020    ANEU 1.7 11/24/2020    HGB 8.1 (L) 11/24/2020    HCT 25.5 (L) 11/24/2020     11/24/2020     (L) 11/24/2020    POTASSIUM 4.4 11/24/2020    CHLORIDE 101 11/24/2020    CO2 24 11/24/2020     (H) 11/24/2020    BUN 24 11/24/2020    CR 1.08 (H) 11/24/2020    MAG 1.8 11/24/2020    INR 1.01 10/26/2020    AST 27 11/24/2020    ALT 31 11/24/2020       Assessment and Plan:     Florencia Gao is a 75 year old female with AML in CR1, day +48 s/p JCARLOS Allo sib pbsct per protocol 2019-10, randomized to Post transplant cytoxan/Tac/MMF.       1.  BMT: Cy/flu/TBI prep.   Received 4.19 x10(6) CD34/kg. Donor B pos and recipient B neg.  Engrafted; intermittent growth factor needs. None today.      2.  HEME: Keep Hgb>7g/dL and plts>10K. Premeds tylenol and benadryl.   Plt now wnl, still anemic however improving  - anemia and leukopenia secondary to chemotherapy  - Of note has significant antibodies to RBCs- can cause delay in transfusion availability.                             3.  ID: Afebrile.   - Prophy: Vori, and HD ACV prophy.    - IV pentamdine as PJP prophylaxis, first dose 11/11/2020.   - CMV neg 11/18                              4.  GI:    - Hemorroids: tucks prn, barrier cream and start BID hydrocortisone cream topically.    - Protonix for GI prophy.    - Ursodiol for VOD prophy.     5.  GVHD   MMF discontinued day +32.  Day +3 and day+4 cytoxan  On tac 2.5mg BID, level 8.8 11/19. Held for level today, pending 11/24     6.  FEN/Renal:  - Hyponatremia 125 on 11/18, improved after NS to 127.  Now following free water restriction, drinking gatorade. Stable at 132.   - KENN as baseline 0.6, today 1.0. encouraged to keep pushing fluids, per pharm no ACV adjustment required today   - hypomagnesia: 1/2 tab mag ox at bedtime and mag glycinate 480mg tid. Mag level 1.8     7. Hypothyroidism  Continue synthroid 88mcg     8. Psych: Situational  Depression continue effexor 75mg daily.      9. Research: Pt consented to LT6822-41 (Seamless randomized trial to alleviate morbidity and mortality) for which she was randomized to the palliative care management arm. Seen by Palliative 10/8. Pt has subsequently withdrawn from this study.       Plan: push 70-80 oz 1/2 free water fluid/day  Tac pending    RTC: 1 week, sooner if needed  Ok if patient goes home to [place] between now and next visit    Gloria Ryan PAC  [phone]

## 2020-11-24 NOTE — NURSING NOTE
"Oncology Rooming Note    November 24, 2020 11:12 AM   Florencia Gao is a 75 year old female who presents for:    Chief Complaint   Patient presents with     Blood Draw     Labs drawn from CVC by RN in lab. Vitals taken. Checked into next appointment.      RECHECK     Pt is here for a rtn for AML S/P BMT     Initial Vitals: Blood Pressure 135/76 (BP Location: Right arm, Patient Position: Sitting, Cuff Size: Adult Regular)   Pulse 97   Temperature 98.5  F (36.9  C) (Oral)   Respiration 14   Weight 78.6 kg (173 lb 4.8 oz)   Oxygen Saturation 96%   Body Mass Index 27.85 kg/m   Estimated body mass index is 27.85 kg/m  as calculated from the following:    Height as of 11/3/20: 1.68 m (5' 6.14\").    Weight as of this encounter: 78.6 kg (173 lb 4.8 oz). Body surface area is 1.92 meters squared.  No Pain (0) Comment: Data Unavailable   No LMP recorded. Patient is postmenopausal.  Allergies reviewed: Yes  Medications reviewed: Yes    Medications: Medication refills not needed today.  Pharmacy name entered into Lang [facility]:    [id number] [place], [place] - [address] [place] [address] AT [facility] [address]  [facility] [place], [place] - [address] [facility] [phone]    Clinical concerns: none       Zenobia De Leon MA            "

## 2020-11-24 NOTE — NURSING NOTE
Chief Complaint   Patient presents with     Blood Draw     Labs drawn from CVC by RN in lab. Vitals taken. Checked into next appointment.      CVC accessed by RN in lab, labs drawn.  Both lines flushed with heparin. Caps changed.   Vital signs taken.  Pt checked in to next appointment.     Marilee Menjivar RN

## 2020-11-24 NOTE — PROGRESS NOTES
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         Diagnosis AMLM1 Acute myelogeneous leukemia, M1, myeloblastic  HCT Type Allogeneic    Prep Regimen Fludarabine  Cytoxan  TBI   Donor Source Sibling    GVHD Prophylaxis Post transplant cytoxan  Tac/MMF  Primary BMT Provider ACE        Interval history:     Florencia returns, her daughter in law and caregiver calls in on the phone to hear our visit. Florencia is feeling well, less fatigued. Great appetite, no n/v/d. She has no issues pushing fluids and tries to have majority of liquid be gatorade. No rashes, bruises or bleeding. Would like to leave [place] as home in [place] is close.    ROS: 8 ponit negative except as above.    Physical Exam (Resident / Clinician):   Blood pressure 135/76, pulse 97, temperature 98.5  F (36.9  C), temperature source Oral, resp. rate 14, weight 78.6 kg (173 lb 4.8 oz), SpO2 96 %.    Wt Readings from Last 5 Encounters:   11/24/20 78.6 kg (173 lb 4.8 oz)   11/19/20 77.6 kg (171 lb)   11/18/20 77.8 kg (171 lb 8 oz)   11/16/20 78.8 kg (173 lb 11.2 oz)   11/11/20 78 kg (172 lb)       ECOG PS: 1  Constitutional: NAD, pleasant and appropriate  HEENT:  NC/AT, no icterus; very edematous beneath her eyes bilaterally  Lungs: CTAB, no w/r/r, nonlabored breathing  Cardiovascular: RRR, S1, S2, no m/r/g  GI/Abdomen: +BS, soft, nontender, nondistended  Skin: no rashes or petechiae.   MSK/Extremities  No edema  Neurologic: alert, answering questions appropriately  Psych: appropriate affect  Access: right upper chest CVC, c/d/i   Data:   Reviewed in EPIC and notable for:  Lab Results   Component Value Date    WBC 4.1 11/24/2020    ANEU 1.7 11/24/2020    HGB 8.1 (L) 11/24/2020    HCT 25.5 (L) 11/24/2020     11/24/2020     (L) 11/24/2020    POTASSIUM 4.4 11/24/2020    CHLORIDE 101 11/24/2020    CO2 24 11/24/2020    GLC  129 (H) 11/24/2020    BUN 24 11/24/2020    CR 1.08 (H) 11/24/2020    MAG 1.8 11/24/2020    INR 1.01 10/26/2020    AST 27 11/24/2020    ALT 31 11/24/2020       Assessment and Plan:     Florencia Gao is a 75 year old female with AML in CR1, day +48 s/p JCARLOS Allo sib pbsct per protocol 2019-10, randomized to Post transplant cytoxan/Tac/MMF.       1.  BMT: Cy/flu/TBI prep.   Received 4.19 x10(6) CD34/kg. Donor B pos and recipient B neg.  Engrafted; intermittent growth factor needs. None today.      2.  HEME: Keep Hgb>7g/dL and plts>10K. Premeds tylenol and benadryl.   Plt now wnl, still anemic however improving  - anemia and leukopenia secondary to chemotherapy  - Of note has significant antibodies to RBCs- can cause delay in transfusion availability.                             3.  ID: Afebrile.   - Prophy: Vori, and HD ACV prophy.    - IV pentamdine as PJP prophylaxis, first dose 11/11/2020.   - CMV neg 11/18                              4.  GI:    - Hemorroids: tucks prn, barrier cream and start BID hydrocortisone cream topically.    - Protonix for GI prophy.    - Ursodiol for VOD prophy.     5.  GVHD   MMF discontinued day +32.  Day +3 and day+4 cytoxan  On tac 2.5mg BID, level 8.8 11/19. Held for level today, pending 11/24     6.  FEN/Renal:  - Hyponatremia 125 on 11/18, improved after NS to 127.  Now following free water restriction, drinking gatorade. Stable at 132.   - KENN as baseline 0.6, today 1.0. encouraged to keep pushing fluids, per pharm no ACV adjustment required today   - hypomagnesia: 1/2 tab mag ox at bedtime and mag glycinate 480mg tid. Mag level 1.8     7. Hypothyroidism  Continue synthroid 88mcg     8. Psych: Situational Depression continue effexor 75mg daily.      9. Research: Pt consented to MT2019-31 (Seamless randomized trial to alleviate morbidity and mortality) for which she was randomized to the palliative care management arm. Seen by Palliative 10/8. Pt has subsequently withdrawn from  this study.       Plan: push 70-80 oz 1/2 free water fluid/day  Tac pending    RTC: 1 week, sooner if needed  Ok if patient goes home to [place] between now and next visit    Gloria Ryan PAC  [phone]

## 2020-11-27 LAB — COPATH REPORT: NORMAL

## 2020-12-01 NOTE — PROGRESS NOTES
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  Eating with no N/V/d.  Afebrile with no cough or SOB.  No pain, bleeding or rash     ROS: 8 ponit negative except as above.    Physical Exam (Resident / Clinician):   Blood pressure 129/70, pulse 96, temperature 98.4  F (36.9  C), temperature source Tympanic, resp. rate 16, weight 78.2 kg (172 lb 4.8 oz), SpO2 99 %.    Wt Readings from Last 5 Encounters:   12/02/20 78.2 kg (172 lb 4.8 oz)   11/24/20 78.6 kg (173 lb 4.8 oz)   11/19/20 77.6 kg (171 lb)   11/18/20 77.8 kg (171 lb 8 oz)   11/16/20 78.8 kg (173 lb 11.2 oz)     ECOG PS: 1  Constitutional: NAD, pleasant and appropriate  HEENT:  NC/AT, no icterus; bilat periorbital edema  Lungs: CTAB  Cardiovascular: RRR, no m/r/g  GI/Abdomen: +BS, soft, nontender, nondistended  Skin: no rashes or petechiae.   MSK/Extremities  No edema  Neurologic: alert, answering questions appropriately  Psych: appropriate affect  Access: right upper chest CVC, c/d/i   Data:   Reviewed in EPIC and notable for:  Lab Results   Component Value Date    WBC 5.9 12/02/2020    ANEU 3.6 12/02/2020    HGB 9.4 (L) 12/02/2020    HCT 27.9 (L) 12/02/2020     12/02/2020     (L) 11/24/2020    POTASSIUM 4.4 11/24/2020    CHLORIDE 101 11/24/2020    CO2 24 11/24/2020     (H) 11/24/2020    BUN 24 11/24/2020    CR 1.08 (H) 11/24/2020    MAG 1.8 11/24/2020    INR 1.01 10/26/2020    AST 27 11/24/2020    ALT 31 11/24/2020       Assessment and Plan:     Florencia Gao is a 75 year old female with AML in CR1, day +56 s/p JCARLOS Allo sib pbsct per protocol 2019-10, randomized to Post  transplant cytoxan/Tac/MMF.       1.  BMT: Cy/flu/TBI prep.   - Received 4.19 x10(6) CD34/kg. Donor B pos and recipient B neg.  Engrafted; intermittent growth factor needs. None today.      2.  HEME: Keep Hgb>7g/dL and plts>10K. Not needing transfusions  - Of note has significant antibodies to RBCs- can cause delay in transfusion availability.                             3.  ID: Afebrile.   - Prophy: Vori, and HD ACV prophy.    - IV pentamdine as PJP prophylaxis, first dose 11/11/2020.   - CMV neg 11/24  - Flu shot today, 12/2                              4.  GI:    - Protonix for GI prophy.    - Ursodiol for VOD prophy through day 60 then DC.     5.  GVHD   MMF discontinued day +32.  Day +3 and day+4 cytoxan  On tac 2.5mg BID, level 10.8 11/24. Repeat level pending today     6.  FEN/Renal:  - Hyponatremia that waxes & wanes. 128 today.  Pt is drinking about 2L of fluid daily, about 2/3 pedialyte.  Instructed to try to limit hydration to about 1L daily, primarily electrolyte rich drinks.  ]  - KENN as baseline.  Stable  - hypomagnesia: 1/2 tab mag ox at bedtime and mag glycinate 480mg tid. Mag level 1.8     7. Hypothyroidism:  Continue synthroid 88mcg     8. Psych: Situational Depression continue effexor 75mg daily.   - Zyprexa for sleep     9. Research: Pt consented to MT2019-31 (Seamless randomized trial to alleviate morbidity and mortality) for which she was randomized to the palliative care management arm. Seen by Palliative 10/8. Pt has subsequently withdrawn from this study.         RTC: 1 week, sooner if needed.  Pentamadine next visit  Mague Winters

## 2020-12-02 ENCOUNTER — OFFICE VISIT (OUTPATIENT)
Dept: TRANSPLANT | Facility: CLINIC | Age: 75
End: 2020-12-02
Attending: NURSE PRACTITIONER
Payer: COMMERCIAL

## 2020-12-02 ENCOUNTER — APPOINTMENT (OUTPATIENT)
Dept: LAB | Facility: CLINIC | Age: 75
End: 2020-12-02
Attending: NURSE PRACTITIONER
Payer: COMMERCIAL

## 2020-12-02 VITALS
HEART RATE: 96 BPM | TEMPERATURE: 98.4 F | SYSTOLIC BLOOD PRESSURE: 129 MMHG | BODY MASS INDEX: 27.69 KG/M2 | WEIGHT: 172.3 LBS | RESPIRATION RATE: 16 BRPM | OXYGEN SATURATION: 99 % | DIASTOLIC BLOOD PRESSURE: 70 MMHG

## 2020-12-02 DIAGNOSIS — C92.01 AML (ACUTE MYELOID LEUKEMIA) IN REMISSION (H): ICD-10-CM

## 2020-12-02 LAB
ALBUMIN SERPL-MCNC: 3.9 G/DL (ref 3.4–5)
ALP SERPL-CCNC: 124 U/L (ref 40–150)
ALT SERPL W P-5'-P-CCNC: 32 U/L (ref 0–50)
ANION GAP SERPL CALCULATED.3IONS-SCNC: 7 MMOL/L (ref 3–14)
AST SERPL W P-5'-P-CCNC: 29 U/L (ref 0–45)
BASOPHILS # BLD AUTO: 0 10E9/L (ref 0–0.2)
BASOPHILS NFR BLD AUTO: 0.5 %
BILIRUB SERPL-MCNC: 0.2 MG/DL (ref 0.2–1.3)
BUN SERPL-MCNC: 22 MG/DL (ref 7–30)
CALCIUM SERPL-MCNC: 9.2 MG/DL (ref 8.5–10.1)
CHLORIDE SERPL-SCNC: 96 MMOL/L (ref 94–109)
CO2 SERPL-SCNC: 25 MMOL/L (ref 20–32)
CREAT SERPL-MCNC: 1.15 MG/DL (ref 0.52–1.04)
DIFFERENTIAL METHOD BLD: ABNORMAL
EOSINOPHIL # BLD AUTO: 0.1 10E9/L (ref 0–0.7)
EOSINOPHIL NFR BLD AUTO: 1 %
ERYTHROCYTE [DISTWIDTH] IN BLOOD BY AUTOMATED COUNT: 21.4 % (ref 10–15)
GFR SERPL CREATININE-BSD FRML MDRD: 47 ML/MIN/{1.73_M2}
GLUCOSE SERPL-MCNC: 112 MG/DL (ref 70–99)
HCT VFR BLD AUTO: 27.9 % (ref 35–47)
HGB BLD-MCNC: 9.4 G/DL (ref 11.7–15.7)
IMM GRANULOCYTES # BLD: 0.2 10E9/L (ref 0–0.4)
IMM GRANULOCYTES NFR BLD: 2.5 %
LYMPHOCYTES # BLD AUTO: 1.1 10E9/L (ref 0.8–5.3)
LYMPHOCYTES NFR BLD AUTO: 18.6 %
MAGNESIUM SERPL-MCNC: 1.8 MG/DL (ref 1.6–2.3)
MCH RBC QN AUTO: 32 PG (ref 26.5–33)
MCHC RBC AUTO-ENTMCNC: 33.7 G/DL (ref 31.5–36.5)
MCV RBC AUTO: 95 FL (ref 78–100)
MONOCYTES # BLD AUTO: 1 10E9/L (ref 0–1.3)
MONOCYTES NFR BLD AUTO: 17.3 %
NEUTROPHILS # BLD AUTO: 3.6 10E9/L (ref 1.6–8.3)
NEUTROPHILS NFR BLD AUTO: 60.1 %
NRBC # BLD AUTO: 0 10*3/UL
NRBC BLD AUTO-RTO: 0 /100
PLATELET # BLD AUTO: 217 10E9/L (ref 150–450)
POTASSIUM SERPL-SCNC: 4.8 MMOL/L (ref 3.4–5.3)
PROT SERPL-MCNC: 7.5 G/DL (ref 6.8–8.8)
RBC # BLD AUTO: 2.94 10E12/L (ref 3.8–5.2)
SODIUM SERPL-SCNC: 128 MMOL/L (ref 133–144)
TACROLIMUS BLD-MCNC: 12.8 UG/L (ref 5–15)
TME LAST DOSE: NORMAL H
WBC # BLD AUTO: 5.9 10E9/L (ref 4–11)

## 2020-12-02 PROCEDURE — 85025 COMPLETE CBC W/AUTO DIFF WBC: CPT | Performed by: STUDENT IN AN ORGANIZED HEALTH CARE EDUCATION/TRAINING PROGRAM

## 2020-12-02 PROCEDURE — G0008 ADMIN INFLUENZA VIRUS VAC: HCPCS | Performed by: NURSE PRACTITIONER

## 2020-12-02 PROCEDURE — 250N000011 HC RX IP 250 OP 636: Performed by: NURSE PRACTITIONER

## 2020-12-02 PROCEDURE — 99214 OFFICE O/P EST MOD 30 MIN: CPT

## 2020-12-02 PROCEDURE — G0463 HOSPITAL OUTPT CLINIC VISIT: HCPCS

## 2020-12-02 PROCEDURE — 90662 IIV NO PRSV INCREASED AG IM: CPT | Performed by: NURSE PRACTITIONER

## 2020-12-02 PROCEDURE — 83735 ASSAY OF MAGNESIUM: CPT | Performed by: STUDENT IN AN ORGANIZED HEALTH CARE EDUCATION/TRAINING PROGRAM

## 2020-12-02 PROCEDURE — G0463 HOSPITAL OUTPT CLINIC VISIT: HCPCS | Mod: 25

## 2020-12-02 PROCEDURE — 80053 COMPREHEN METABOLIC PANEL: CPT | Performed by: STUDENT IN AN ORGANIZED HEALTH CARE EDUCATION/TRAINING PROGRAM

## 2020-12-02 PROCEDURE — 80197 ASSAY OF TACROLIMUS: CPT | Performed by: STUDENT IN AN ORGANIZED HEALTH CARE EDUCATION/TRAINING PROGRAM

## 2020-12-02 RX ORDER — HEPARIN SODIUM,PORCINE 10 UNIT/ML
5 VIAL (ML) INTRAVENOUS
Status: DISCONTINUED | OUTPATIENT
Start: 2020-12-02 | End: 2020-12-02 | Stop reason: HOSPADM

## 2020-12-02 RX ADMIN — Medication 5 ML: at 10:52

## 2020-12-02 RX ADMIN — Medication 5 ML: at 10:51

## 2020-12-02 RX ADMIN — INFLUENZA A VIRUS A/MICHIGAN/45/2015 X-275 (H1N1) ANTIGEN (FORMALDEHYDE INACTIVATED), INFLUENZA A VIRUS A/SINGAPORE/INFIMH-16-0019/2016 IVR-186 (H3N2) ANTIGEN (FORMALDEHYDE INACTIVATED), INFLUENZA B VIRUS B/PHUKET/3073/2013 ANTIGEN (FORMALDEHYDE INACTIVATED), AND INFLUENZA B VIRUS B/MARYLAND/15/2016 BX-69A ANTIGEN (FORMALDEHYDE INACTIVATED) 0.7 ML: 60; 60; 60; 60 INJECTION, SUSPENSION INTRAMUSCULAR at 11:51

## 2020-12-02 ASSESSMENT — PAIN SCALES - GENERAL: PAINLEVEL: NO PAIN (0)

## 2020-12-02 NOTE — NURSING NOTE
Chief Complaint   Patient presents with     Oncology Clinic Visit     AML     Blood Draw     labs drawn from cvc by rn.  vs taken     Labs drawn from CVC by rn.  Good blood return noted in both lumens.  Both lumens flushed with NS and heparin.  Pt tolerated well.  VS taken.  Pt checked in for next appt.  Francoise Abdi RN

## 2020-12-02 NOTE — NURSING NOTE
"Oncology Rooming Note    December 2, 2020 11:04 AM   Florencia Gao is a 75 year old female who presents for:    Chief Complaint   Patient presents with     Oncology Clinic Visit     AML     Initial Vitals: /70 (BP Location: Right arm, Patient Position: Sitting, Cuff Size: Adult Regular)   Pulse 96   Temp 98.4  F (36.9  C) (Tympanic)   Resp 16   Wt 78.2 kg (172 lb 4.8 oz)   SpO2 99%   BMI 27.69 kg/m   Estimated body mass index is 27.69 kg/m  as calculated from the following:    Height as of 11/3/20: 1.68 m (5' 6.14\").    Weight as of this encounter: 78.2 kg (172 lb 4.8 oz). Body surface area is 1.91 meters squared.  No Pain (0) Comment: Data Unavailable   No LMP recorded. Patient is postmenopausal.  Allergies reviewed: Yes  Medications reviewed: Yes    Medications: Medication refills not needed today. If Ursodiol is continued she will need a refill.    Pharmacy name entered into [facility]:    [facility] #[id number] [place], [place] - [address] [place] [address] AT [facility] [address]  [facility] [place], [place] - [address] [facility] [phone]    Clinical concerns: If Ursodiol is continued will need refill.        Natalia Starr CMA            "

## 2020-12-02 NOTE — LETTER
12/2/2020         RE: Florencia Gao  [address] [place] [address]        Dear Colleague,    Thank you for referring your patient, Florencia Gao, to the [facility] BLOOD AND MARROW TRANSPLANT PROGRAM [place]. Please see a copy of my visit note below.    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  Eating with no N/V/d.  Afebrile with no cough or SOB.  No pain, bleeding or rash     ROS: 8 ponit negative except as above.    Physical Exam (Resident / Clinician):   Blood pressure 129/70, pulse 96, temperature 98.4  F (36.9  C), temperature source Tympanic, resp. rate 16, weight 78.2 kg (172 lb 4.8 oz), SpO2 99 %.    Wt Readings from Last 5 Encounters:   12/02/20 78.2 kg (172 lb 4.8 oz)   11/24/20 78.6 kg (173 lb 4.8 oz)   11/19/20 77.6 kg (171 lb)   11/18/20 77.8 kg (171 lb 8 oz)   11/16/20 78.8 kg (173 lb 11.2 oz)     ECOG PS: 1  Constitutional: NAD, pleasant and appropriate  HEENT:  NC/AT, no icterus; bilat periorbital edema  Lungs: CTAB  Cardiovascular: RRR, no m/r/g  GI/Abdomen: +BS, soft, nontender, nondistended  Skin: no rashes or petechiae.   MSK/Extremities  No edema  Neurologic: alert, answering questions appropriately  Psych: appropriate affect  Access: right upper chest CVC, c/d/i   Data:   Reviewed in EPIC and notable for:  Lab Results   Component Value Date    WBC 5.9 12/02/2020    ANEU 3.6 12/02/2020    HGB 9.4 (L) 12/02/2020    HCT 27.9 (L) 12/02/2020     12/02/2020     (L) 11/24/2020    POTASSIUM 4.4 11/24/2020    CHLORIDE 101 11/24/2020    CO2 24 11/24/2020     (H) 11/24/2020    BUN 24  11/24/2020    CR 1.08 (H) 11/24/2020    MAG 1.8 11/24/2020    INR 1.01 10/26/2020    AST 27 11/24/2020    ALT 31 11/24/2020       Assessment and Plan:     Florencia Gao is a 75 year old female with AML in CR1, day +56 s/p JCARLOS Allo sib pbsct per protocol 2019-10, randomized to Post transplant cytoxan/Tac/MMF.       1.  BMT: Cy/flu/TBI prep.   - Received 4.19 x10(6) CD34/kg. Donor B pos and recipient B neg.  Engrafted; intermittent growth factor needs. None today.      2.  HEME: Keep Hgb>7g/dL and plts>10K. Not needing transfusions  - Of note has significant antibodies to RBCs- can cause delay in transfusion availability.                             3.  ID: Afebrile.   - Prophy: Vori, and HD ACV prophy.    - IV pentamdine as PJP prophylaxis, first dose 11/11/2020.   - CMV neg 11/24  - Flu shot today, 12/2                              4.  GI:    - Protonix for GI prophy.    - Ursodiol for VOD prophy through day 60 then DC.     5.  GVHD   MMF discontinued day +32.  Day +3 and day+4 cytoxan  On tac 2.5mg BID, level 10.8 11/24. Repeat level pending today     6.  FEN/Renal:  - Hyponatremia that waxes & wanes. 128 today.  Pt is drinking about 2L of fluid daily, about 2/3 pedialyte.  Instructed to try to limit hydration to about 1L daily, primarily electrolyte rich drinks.  ]  - KENN as baseline.  Stable  - hypomagnesia: 1/2 tab mag ox at bedtime and mag glycinate 480mg tid. Mag level 1.8     7. Hypothyroidism:  Continue synthroid 88mcg     8. Psych: Situational Depression continue effexor 75mg daily.   - Zyprexa for sleep     9. Research: Pt consented to TW6701-01 (Seamless randomized trial to alleviate morbidity and mortality) for which she was randomized to the palliative care management arm. Seen by Palliative 10/8. Pt has subsequently withdrawn from this study.         RTC: 1 week, sooner if needed.  Pentamadine next visit  Mague Winters      Again, thank you for allowing me to participate in the care of your  patient.        Sincerely,        BMT Advanced Practice Provider

## 2020-12-02 NOTE — NURSING NOTE
Pt had dressing changed using Sterile technique. Site looks clean and dry. New dressing was applied.       Zenobia De Leon MA

## 2020-12-02 NOTE — NURSING NOTE
Pt received her High Dose Flu Vaccine to day with no concerns.   She tolerated these well.    See MAYANK De Leon MA

## 2020-12-03 ENCOUNTER — TELEPHONE (OUTPATIENT)
Dept: TRANSPLANT | Facility: CLINIC | Age: 75
End: 2020-12-03

## 2020-12-03 DIAGNOSIS — C92.01 ACUTE MYELOID LEUKEMIA IN REMISSION (H): ICD-10-CM

## 2020-12-03 DIAGNOSIS — Z94.81 STATUS POST BONE MARROW TRANSPLANT (H): ICD-10-CM

## 2020-12-03 RX ORDER — TACROLIMUS 0.5 MG/1
CAPSULE ORAL
Qty: 60 CAPSULE | Refills: 0 | COMMUNITY
Start: 2020-12-03 | End: 2020-12-23

## 2020-12-03 RX ORDER — TACROLIMUS 1 MG/1
2 CAPSULE ORAL 2 TIMES DAILY
Qty: 120 CAPSULE | Refills: 0 | COMMUNITY
Start: 2020-12-03 | End: 2020-12-18

## 2020-12-03 NOTE — TELEPHONE ENCOUNTER
I spoke with Florencia APOLINAR Gao regarding her Tacrolimus level from clinic visit 12/2/20, which resulted as 12.8 on 2.5mg twice daily. Per Mague Winters, Florencia is to decrease tacrolimus to 2mg twice daily. Plan to recheck level 12/9, pt was instructed to hold dose prior to labs. Florencia repeated these directions to me and voiced her understanding.     Suha Seymour, AbimaelD

## 2020-12-09 ENCOUNTER — OFFICE VISIT (OUTPATIENT)
Dept: TRANSPLANT | Facility: CLINIC | Age: 75
End: 2020-12-09
Attending: INTERNAL MEDICINE
Payer: COMMERCIAL

## 2020-12-09 ENCOUNTER — APPOINTMENT (OUTPATIENT)
Dept: LAB | Facility: CLINIC | Age: 75
End: 2020-12-09
Attending: INTERNAL MEDICINE
Payer: COMMERCIAL

## 2020-12-09 VITALS
OXYGEN SATURATION: 98 % | BODY MASS INDEX: 28.25 KG/M2 | SYSTOLIC BLOOD PRESSURE: 135 MMHG | TEMPERATURE: 97.7 F | RESPIRATION RATE: 16 BRPM | DIASTOLIC BLOOD PRESSURE: 80 MMHG | WEIGHT: 175.8 LBS | HEART RATE: 85 BPM

## 2020-12-09 DIAGNOSIS — Z94.81 STATUS POST BONE MARROW TRANSPLANT (H): ICD-10-CM

## 2020-12-09 DIAGNOSIS — C92.01 ACUTE MYELOID LEUKEMIA IN REMISSION (H): Primary | ICD-10-CM

## 2020-12-09 DIAGNOSIS — C92.01 AML (ACUTE MYELOID LEUKEMIA) IN REMISSION (H): Primary | ICD-10-CM

## 2020-12-09 LAB
ALBUMIN SERPL-MCNC: 3.6 G/DL (ref 3.4–5)
ALP SERPL-CCNC: 118 U/L (ref 40–150)
ALT SERPL W P-5'-P-CCNC: 30 U/L (ref 0–50)
ANION GAP SERPL CALCULATED.3IONS-SCNC: 5 MMOL/L (ref 3–14)
AST SERPL W P-5'-P-CCNC: 31 U/L (ref 0–45)
BASOPHILS # BLD AUTO: 0.1 10E9/L (ref 0–0.2)
BASOPHILS NFR BLD AUTO: 1.1 %
BILIRUB SERPL-MCNC: 0.2 MG/DL (ref 0.2–1.3)
BUN SERPL-MCNC: 25 MG/DL (ref 7–30)
CALCIUM SERPL-MCNC: 9 MG/DL (ref 8.5–10.1)
CHLORIDE SERPL-SCNC: 105 MMOL/L (ref 94–109)
CO2 SERPL-SCNC: 27 MMOL/L (ref 20–32)
CREAT SERPL-MCNC: 1.06 MG/DL (ref 0.52–1.04)
DIFFERENTIAL METHOD BLD: ABNORMAL
EOSINOPHIL # BLD AUTO: 0.1 10E9/L (ref 0–0.7)
EOSINOPHIL NFR BLD AUTO: 2.7 %
ERYTHROCYTE [DISTWIDTH] IN BLOOD BY AUTOMATED COUNT: 21.2 % (ref 10–15)
GFR SERPL CREATININE-BSD FRML MDRD: 52 ML/MIN/{1.73_M2}
GLUCOSE SERPL-MCNC: 98 MG/DL (ref 70–99)
HCT VFR BLD AUTO: 28.3 % (ref 35–47)
HGB BLD-MCNC: 8.9 G/DL (ref 11.7–15.7)
IMM GRANULOCYTES # BLD: 0.1 10E9/L (ref 0–0.4)
IMM GRANULOCYTES NFR BLD: 1.3 %
LYMPHOCYTES # BLD AUTO: 1.6 10E9/L (ref 0.8–5.3)
LYMPHOCYTES NFR BLD AUTO: 33.8 %
MAGNESIUM SERPL-MCNC: 1.8 MG/DL (ref 1.6–2.3)
MCH RBC QN AUTO: 31.1 PG (ref 26.5–33)
MCHC RBC AUTO-ENTMCNC: 31.4 G/DL (ref 31.5–36.5)
MCV RBC AUTO: 99 FL (ref 78–100)
MONOCYTES # BLD AUTO: 0.8 10E9/L (ref 0–1.3)
MONOCYTES NFR BLD AUTO: 17.6 %
NEUTROPHILS # BLD AUTO: 2.1 10E9/L (ref 1.6–8.3)
NEUTROPHILS NFR BLD AUTO: 43.5 %
NRBC # BLD AUTO: 0 10*3/UL
NRBC BLD AUTO-RTO: 0 /100
PLATELET # BLD AUTO: 191 10E9/L (ref 150–450)
POTASSIUM SERPL-SCNC: 5.2 MMOL/L (ref 3.4–5.3)
PROT SERPL-MCNC: 7.2 G/DL (ref 6.8–8.8)
RBC # BLD AUTO: 2.86 10E12/L (ref 3.8–5.2)
SODIUM SERPL-SCNC: 136 MMOL/L (ref 133–144)
TACROLIMUS BLD-MCNC: 10.1 UG/L (ref 5–15)
TME LAST DOSE: NORMAL H
WBC # BLD AUTO: 4.8 10E9/L (ref 4–11)

## 2020-12-09 PROCEDURE — 83735 ASSAY OF MAGNESIUM: CPT | Performed by: NURSE PRACTITIONER

## 2020-12-09 PROCEDURE — 250N000011 HC RX IP 250 OP 636: Performed by: INTERNAL MEDICINE

## 2020-12-09 PROCEDURE — G0463 HOSPITAL OUTPT CLINIC VISIT: HCPCS | Mod: 25

## 2020-12-09 PROCEDURE — 80053 COMPREHEN METABOLIC PANEL: CPT | Performed by: NURSE PRACTITIONER

## 2020-12-09 PROCEDURE — 80197 ASSAY OF TACROLIMUS: CPT | Performed by: NURSE PRACTITIONER

## 2020-12-09 PROCEDURE — 250N000009 HC RX 250: Performed by: INTERNAL MEDICINE

## 2020-12-09 PROCEDURE — 85025 COMPLETE CBC W/AUTO DIFF WBC: CPT | Performed by: NURSE PRACTITIONER

## 2020-12-09 PROCEDURE — 96365 THER/PROPH/DIAG IV INF INIT: CPT

## 2020-12-09 PROCEDURE — 258N000003 HC RX IP 258 OP 636: Performed by: INTERNAL MEDICINE

## 2020-12-09 PROCEDURE — 99214 OFFICE O/P EST MOD 30 MIN: CPT

## 2020-12-09 RX ORDER — HEPARIN SODIUM,PORCINE 10 UNIT/ML
5 VIAL (ML) INTRAVENOUS
Status: DISCONTINUED | OUTPATIENT
Start: 2020-12-09 | End: 2020-12-09 | Stop reason: HOSPADM

## 2020-12-09 RX ORDER — HEPARIN SODIUM,PORCINE 10 UNIT/ML
2 VIAL (ML) INTRAVENOUS
Status: CANCELLED | OUTPATIENT
Start: 2021-01-06

## 2020-12-09 RX ADMIN — PENTAMIDINE ISETHIONATE 300 MG: 300 INJECTION, POWDER, LYOPHILIZED, FOR SOLUTION INTRAMUSCULAR; INTRAVENOUS at 11:11

## 2020-12-09 RX ADMIN — Medication 5 ML: at 10:26

## 2020-12-09 ASSESSMENT — PAIN SCALES - GENERAL: PAINLEVEL: NO PAIN (0)

## 2020-12-09 NOTE — PROGRESS NOTES
Infusion Nursing Note:  Florencia Gao presents today for Pentamidine.  Patient seen by provider today: Yes: Dr. Vera.   present during visit today: Not Applicable.    Note: Pt received IV Pentamidine over one hour without incident.  VS stable throughout.    Intravenous Access:  Padilla.    Treatment Conditions:  Not Applicable.      Post Infusion Assessment:  Patient tolerated infusion without incident.  Blood return noted pre and post infusion.  Site patent and intact, free from redness, edema or discomfort.  No evidence of extravasations.       Discharge Plan:   Discharge instructions reviewed with: Patient.  Patient and/or family verbalized understanding of discharge instructions and all questions answered.  AVS to patient via PPT ReasearchT.  Patient will return 12/16 for next appointment.   Patient discharged in stable condition accompanied by: self.  Departure Mode: Ambulatory.    Rosina Montiel RN

## 2020-12-09 NOTE — PROGRESS NOTES
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  Eating with no N/V/D.  Afebrile with no cough or SOB.  No pain, bleeding or rash. Improved her sodium through drinking Bloody Chel mix.     ROS: 8 ponit negative except as above.    Physical Exam (Resident / Clinician):   There were no vitals taken for this visit.    Wt Readings from Last 5 Encounters:   12/09/20 79.7 kg (175 lb 12.8 oz)   12/02/20 78.2 kg (172 lb 4.8 oz)   11/24/20 78.6 kg (173 lb 4.8 oz)   11/19/20 77.6 kg (171 lb)   11/18/20 77.8 kg (171 lb 8 oz)     Date of GVH Score: 12/09/20  S 0, LGI 0, UGI 0, Liver 0          KPS: 90      Constitutional: NAD, pleasant and appropriate  HEENT:  NC/AT, no icterus; bilat periorbital edema  Lungs: CTAB  Cardiovascular: RRR, no m/r/g  GI/Abdomen: +BS, soft, nontender, nondistended  Skin: no rashes or petechiae.   MSK/Extremities  No edema  Neurologic: alert, answering questions appropriately  Psych: appropriate affect  Access: right upper chest CVC, c/d/i   Data:   Reviewed in EPIC and notable for:  Lab Results   Component Value Date    WBC 4.8 12/09/2020    ANEU 2.1 12/09/2020    HGB 8.9 (L) 12/09/2020    HCT 28.3 (L) 12/09/2020     12/09/2020     12/09/2020    POTASSIUM 5.2 12/09/2020    CHLORIDE 105 12/09/2020    CO2 27 12/09/2020    GLC 98 12/09/2020    BUN 25 12/09/2020    CR 1.06 (H) 12/09/2020    MAG 1.8 12/09/2020    INR 1.01 10/26/2020    AST 31 12/09/2020    ALT 30 12/09/2020       Assessment and Plan:     Florencia Gao is a 75 year old female with AML in CR1, day +63 s/p JCARLOS Allo sib pbsct per protocol 2019-10, randomized  to Post transplant cytoxan/Tac/MMF.       1.  BMT: Cy/flu/TBI prep.   - Received 4.19 x10(6) CD34/kg. Donor B pos and recipient B neg.  Engrafted; intermittent growth factor needs. None today. Restaging day 100.     2.  HEME: Keep Hgb>7g/dL and plts>10K. Not needing transfusions  - Of note has significant antibodies to RBCs- can cause delay in transfusion availability.                             3.  ID: Afebrile.   - Prophy: Vori, and HD ACV prophy.    - IV pentamdine as PJP prophylaxis, dose today 12/9  - CMV PCRs weekly, all negative to date  - Flu shot 12/2                              4.  GI:    - Protonix for GI prophy.    - Ursodiol can now stop     5.  GVHD   MMF discontinued day +32.  Day +3 and day+4 cytoxan  On tac 2 mg BID, level pending from today     6.  FEN/Renal:  - Hyponatremia that waxes & wanes. Pt is drinking about 2L of fluid daily, about 2/3 pedialyte.  Instructed to try to limit hydration to about 1L daily, primarily electrolyte rich drinks. Bloody Chel mix is working well.  - KENN as baseline.  Stable  - hypomagnesia: 1/2 tab mag ox at bedtime and mag glycinate 480mg tid. Mag level 1.8.     7. Hypothyroidism:  Continue synthroid 88mcg     8. Psych: Situational Depression continue effexor 75mg daily.   - Zyprexa for sleep     9. Research: Pt consented to QO3585-78 (Seamless randomized trial to alleviate morbidity and mortality) for which she was randomized to the palliative care management arm. Seen by Palliative 10/8. Pt has subsequently withdrawn from this study.     RTC next week per schedule, sooner PRN.      Amy Vera MD, pager [phone]

## 2020-12-09 NOTE — LETTER
12/9/2020         RE: Florencia Gao  [address] [place] [address]        Dear Colleague,    Thank you for referring your patient, Florencia Gao, to the [facility] BLOOD AND MARROW TRANSPLANT PROGRAM [place]. Please see a copy of my visit note below.    Infusion Nursing Note:  Florencia Gao presents today for Pentamidine.  Patient seen by provider today: Yes: Dr. Vera.   present during visit today: Not Applicable.    Note: Pt received IV Pentamidine over one hour without incident.  VS stable throughout.    Intravenous Access:  Padilla.    Treatment Conditions:  Not Applicable.      Post Infusion Assessment:  Patient tolerated infusion without incident.  Blood return noted pre and post infusion.  Site patent and intact, free from redness, edema or discomfort.  No evidence of extravasations.       Discharge Plan:   Discharge instructions reviewed with: Patient.  Patient and/or family verbalized understanding of discharge instructions and all questions answered.  AVS to patient via [employer].  Patient will return 12/16 for next appointment.   Patient discharged in stable condition accompanied by: self.  Departure Mode: Ambulatory.    Rosina Montiel, CASSIDY                            Again, thank you for allowing me to participate in the care of your patient.        Sincerely,        [facility] [place]     Other

## 2020-12-09 NOTE — LETTER
12/9/2020         RE: Florencia Gao  [address] [place] [address]        Dear Colleague,    Thank you for referring your patient, Florencia Gao, to the [facility] BLOOD AND MARROW TRANSPLANT PROGRAM [place]. Please see a copy of my visit note below.    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  Eating with no N/V/D.  Afebrile with no cough or SOB.  No pain, bleeding or rash. Improved her sodium through drinking Bloody Chel mix.     ROS: 8 ponit negative except as above.    Physical Exam (Resident / Clinician):   There were no vitals taken for this visit.    Wt Readings from Last 5 Encounters:   12/09/20 79.7 kg (175 lb 12.8 oz)   12/02/20 78.2 kg (172 lb 4.8 oz)   11/24/20 78.6 kg (173 lb 4.8 oz)   11/19/20 77.6 kg (171 lb)   11/18/20 77.8 kg (171 lb 8 oz)     Date of GVH Score: 12/09/20  S 0, LGI 0, UGI 0, Liver 0          KPS: 90      Constitutional: NAD, pleasant and appropriate  HEENT:  NC/AT, no icterus; bilat periorbital edema  Lungs: CTAB  Cardiovascular: RRR, no m/r/g  GI/Abdomen: +BS, soft, nontender, nondistended  Skin: no rashes or petechiae.   MSK/Extremities  No edema  Neurologic: alert, answering questions appropriately  Psych: appropriate affect  Access: right upper chest CVC, c/d/i   Data:   Reviewed in EPIC and notable for:  Lab Results   Component Value Date    WBC 4.8 12/09/2020    ANEU 2.1 12/09/2020    HGB 8.9 (L) 12/09/2020    HCT 28.3 (L) 12/09/2020     12/09/2020     12/09/2020    POTASSIUM 5.2 12/09/2020    CHLORIDE 105 12/09/2020    CO2 27 12/09/2020    GLC 98 12/09/2020    BUN  25 12/09/2020    CR 1.06 (H) 12/09/2020    MAG 1.8 12/09/2020    INR 1.01 10/26/2020    AST 31 12/09/2020    ALT 30 12/09/2020       Assessment and Plan:     Florencia Gao is a 75 year old female with AML in CR1, day +63 s/p JCARLOS Allo sib pbsct per protocol 2019-10, randomized to Post transplant cytoxan/Tac/MMF.       1.  BMT: Cy/flu/TBI prep.   - Received 4.19 x10(6) CD34/kg. Donor B pos and recipient B neg.  Engrafted; intermittent growth factor needs. None today. Restaging day 100.     2.  HEME: Keep Hgb>7g/dL and plts>10K. Not needing transfusions  - Of note has significant antibodies to RBCs- can cause delay in transfusion availability.                             3.  ID: Afebrile.   - Prophy: Vori, and HD ACV prophy.    - IV pentamdine as PJP prophylaxis, dose today 12/9  - CMV PCRs weekly, all negative to date  - Flu shot 12/2                              4.  GI:    - Protonix for GI prophy.    - Ursodiol can now stop     5.  GVHD   MMF discontinued day +32.  Day +3 and day+4 cytoxan  On tac 2 mg BID, level pending from today     6.  FEN/Renal:  - Hyponatremia that waxes & wanes. Pt is drinking about 2L of fluid daily, about 2/3 pedialyte.  Instructed to try to limit hydration to about 1L daily, primarily electrolyte rich drinks. Bloody Chel mix is working well.  - KENN as baseline.  Stable  - hypomagnesia: 1/2 tab mag ox at bedtime and mag glycinate 480mg tid. Mag level 1.8.     7. Hypothyroidism:  Continue synthroid 88mcg     8. Psych: Situational Depression continue effexor 75mg daily.   - Zyprexa for sleep     9. Research: Pt consented to MT2019-31 (Seamless randomized trial to alleviate morbidity and mortality) for which she was randomized to the palliative care management arm. Seen by Palliative 10/8. Pt has subsequently withdrawn from this study.     RTC next week per schedule, sooner PRN.      Amy Vera MD, pager [phone]        Again, thank you for allowing me to participate in the care of your  patient.        Sincerely,        BMT DOM

## 2020-12-09 NOTE — NURSING NOTE
"Chief Complaint   Patient presents with     RECHECK     Provider visit, AML     Oncology Rooming Note    December 9, 2020 11:02 AM   Florencia Gao is a 75 year old female who presents for:    Chief Complaint   Patient presents with     RECHECK     Provider visit, AML     Initial Vitals: There were no vitals taken for this visit. Estimated body mass index is 28.25 kg/m  as calculated from the following:    Height as of 11/3/20: 1.68 m (5' 6.14\").    Weight as of an earlier encounter on 12/9/20: 79.7 kg (175 lb 12.8 oz). There is no height or weight on file to calculate BSA.  Data Unavailable Comment: Data Unavailable   No LMP recorded. Patient is postmenopausal.  Allergies reviewed: Yes  Medications reviewed: Yes    Medications: Medication refills not needed today.  Pharmacy name entered into [employer]:    [facility] #[id number] [address]  [place], [place] - [address] [facility] [phone]    Clinical concerns: None      Rosina Montiel, RN              "

## 2020-12-09 NOTE — NURSING NOTE
Chief Complaint   Patient presents with     Blood Draw     labs drawn from cvc by rn.  vs taken     Labs drawn from CVC by rn.  Good blood return noted in both lumens.  Both lumens flushed with NS and heparin.  Pt tolerated well.  VS taken.  Pt checked in for next appt.    Francoise Abdi RN

## 2020-12-15 NOTE — PROGRESS NOTES
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  Eating with no N/V/D.  Afebrile with no cough or SOB.  No pain, bleeding or rash.     ROS: 8 ponit negative except as above.    Physical Exam (Resident / Clinician):   Blood pressure 139/79, pulse 99, temperature 97  F (36.1  C), temperature source Tympanic, resp. rate 16, weight 80 kg (176 lb 4.8 oz), SpO2 97 %.    Wt Readings from Last 5 Encounters:   12/16/20 80 kg (176 lb 4.8 oz)   12/09/20 79.7 kg (175 lb 12.8 oz)   12/02/20 78.2 kg (172 lb 4.8 oz)   11/24/20 78.6 kg (173 lb 4.8 oz)   11/19/20 77.6 kg (171 lb)     Date of GVH Score: 12/09/20  S 0, LGI 0, UGI 0, Liver 0          KPS: 90      Constitutional: NAD, pleasant and appropriate  HEENT:  NC/AT, no icterus; bilat periorbital edema  Lungs: CTAB  Cardiovascular: RRR, no m/r/g  GI/Abdomen: +BS, soft, nontender, nondistended  Skin: no rashes or petechiae.   MSK/Extremities  No edema  Neurologic: alert, answering questions appropriately  Psych: appropriate affect  Access: right upper chest CVC, c/d/i   Data:   Reviewed in EPIC and notable for:  Lab Results   Component Value Date    WBC 4.3 12/16/2020    ANEU 1.8 12/16/2020    HGB 9.2 (L) 12/16/2020    HCT 28.4 (L) 12/16/2020     12/16/2020     12/16/2020    POTASSIUM 4.5 12/16/2020    CHLORIDE 103 12/16/2020    CO2 26 12/16/2020    GLC 97 12/16/2020    BUN 24 12/16/2020    CR 1.09 (H) 12/16/2020    MAG 1.8 12/09/2020    INR 1.01 10/26/2020    AST 28 12/16/2020    ALT 30 12/16/2020       Assessment and Plan:     Florencia aGo is a 75 year old female with AML in CR1, day +70 s/p  JCARLOS Allo sib pbsct per protocol 2019-10, randomized to Post transplant cytoxan/Tac/MMF.       1.  BMT: Cy/flu/TBI prep.   - Received 4.19 x10(6) CD34/kg. Donor B pos and recipient B neg.  Engrafted; intermittent growth factor needs. None today. - Restaging day 100.     2.  HEME: Keep Hgb>7g/dL and plts>10K. Not needing transfusions  - Of note has significant antibodies to RBCs- can cause delay in transfusion availability.                             3.  ID: Afebrile.   - Prophy: Vori, and HD ACV prophy.    - IV pentamdine as PJP prophylaxis 12/9  - CMV PCRs weekly, all negative to date  - Flu shot 12/2                              4.  GI:    - Stop Protonix     5.  GVHD   MMF discontinued day +32.  Day +3 and day+4 cytoxan  On tac 2 mg BID, level 10.1 on 12/9, repeat level pending today     6.  FEN/Renal: Lytes & creat stable.    - KENN as baseline.  Stable     7. Hypothyroidism:  Continue synthroid 88mcg     8. Psych: Situational Depression continue effexor 75mg daily.   - Zyprexa for sleep     9. Research: Pt consented to SU8851-06 (Seamless randomized trial to alleviate morbidity and mortality) for which she was randomized to the palliative care management arm. Seen by Palliative 10/8. Pt has subsequently withdrawn from this study.     RTC next week per schedule, sooner PRN.      Mague Winters

## 2020-12-16 ENCOUNTER — APPOINTMENT (OUTPATIENT)
Dept: LAB | Facility: CLINIC | Age: 75
End: 2020-12-16
Attending: PHYSICIAN ASSISTANT
Payer: COMMERCIAL

## 2020-12-16 ENCOUNTER — OFFICE VISIT (OUTPATIENT)
Dept: TRANSPLANT | Facility: CLINIC | Age: 75
End: 2020-12-16
Attending: PHYSICIAN ASSISTANT
Payer: COMMERCIAL

## 2020-12-16 VITALS
RESPIRATION RATE: 16 BRPM | TEMPERATURE: 97 F | OXYGEN SATURATION: 97 % | DIASTOLIC BLOOD PRESSURE: 79 MMHG | SYSTOLIC BLOOD PRESSURE: 139 MMHG | WEIGHT: 176.3 LBS | HEART RATE: 99 BPM | BODY MASS INDEX: 28.34 KG/M2

## 2020-12-16 DIAGNOSIS — C92.01 ACUTE MYELOID LEUKEMIA IN REMISSION (H): ICD-10-CM

## 2020-12-16 LAB
ALBUMIN SERPL-MCNC: 3.8 G/DL (ref 3.4–5)
ALP SERPL-CCNC: 127 U/L (ref 40–150)
ALT SERPL W P-5'-P-CCNC: 30 U/L (ref 0–50)
ANION GAP SERPL CALCULATED.3IONS-SCNC: 7 MMOL/L (ref 3–14)
AST SERPL W P-5'-P-CCNC: 28 U/L (ref 0–45)
BASOPHILS # BLD AUTO: 0 10E9/L (ref 0–0.2)
BASOPHILS NFR BLD AUTO: 0.5 %
BILIRUB SERPL-MCNC: 0.2 MG/DL (ref 0.2–1.3)
BUN SERPL-MCNC: 24 MG/DL (ref 7–30)
CALCIUM SERPL-MCNC: 8.7 MG/DL (ref 8.5–10.1)
CHLORIDE SERPL-SCNC: 103 MMOL/L (ref 94–109)
CO2 SERPL-SCNC: 26 MMOL/L (ref 20–32)
CREAT SERPL-MCNC: 1.09 MG/DL (ref 0.52–1.04)
DIFFERENTIAL METHOD BLD: ABNORMAL
EOSINOPHIL # BLD AUTO: 0.1 10E9/L (ref 0–0.7)
EOSINOPHIL NFR BLD AUTO: 2.6 %
ERYTHROCYTE [DISTWIDTH] IN BLOOD BY AUTOMATED COUNT: 20.5 % (ref 10–15)
GFR SERPL CREATININE-BSD FRML MDRD: 50 ML/MIN/{1.73_M2}
GLUCOSE SERPL-MCNC: 97 MG/DL (ref 70–99)
HCT VFR BLD AUTO: 28.4 % (ref 35–47)
HGB BLD-MCNC: 9.2 G/DL (ref 11.7–15.7)
IMM GRANULOCYTES # BLD: 0 10E9/L (ref 0–0.4)
IMM GRANULOCYTES NFR BLD: 0.5 %
LYMPHOCYTES # BLD AUTO: 1.6 10E9/L (ref 0.8–5.3)
LYMPHOCYTES NFR BLD AUTO: 38 %
MCH RBC QN AUTO: 32.4 PG (ref 26.5–33)
MCHC RBC AUTO-ENTMCNC: 32.4 G/DL (ref 31.5–36.5)
MCV RBC AUTO: 100 FL (ref 78–100)
MONOCYTES # BLD AUTO: 0.7 10E9/L (ref 0–1.3)
MONOCYTES NFR BLD AUTO: 15.7 %
NEUTROPHILS # BLD AUTO: 1.8 10E9/L (ref 1.6–8.3)
NEUTROPHILS NFR BLD AUTO: 42.7 %
NRBC # BLD AUTO: 0 10*3/UL
NRBC BLD AUTO-RTO: 0 /100
PLATELET # BLD AUTO: 167 10E9/L (ref 150–450)
POTASSIUM SERPL-SCNC: 4.5 MMOL/L (ref 3.4–5.3)
PROT SERPL-MCNC: 7.2 G/DL (ref 6.8–8.8)
RBC # BLD AUTO: 2.84 10E12/L (ref 3.8–5.2)
SODIUM SERPL-SCNC: 135 MMOL/L (ref 133–144)
TACROLIMUS BLD-MCNC: 9.6 UG/L (ref 5–15)
TME LAST DOSE: NORMAL H
WBC # BLD AUTO: 4.3 10E9/L (ref 4–11)

## 2020-12-16 PROCEDURE — G0463 HOSPITAL OUTPT CLINIC VISIT: HCPCS

## 2020-12-16 PROCEDURE — 80053 COMPREHEN METABOLIC PANEL: CPT | Performed by: INTERNAL MEDICINE

## 2020-12-16 PROCEDURE — 85025 COMPLETE CBC W/AUTO DIFF WBC: CPT | Performed by: INTERNAL MEDICINE

## 2020-12-16 PROCEDURE — 36592 COLLECT BLOOD FROM PICC: CPT

## 2020-12-16 PROCEDURE — 99214 OFFICE O/P EST MOD 30 MIN: CPT

## 2020-12-16 PROCEDURE — 250N000011 HC RX IP 250 OP 636: Performed by: PHYSICIAN ASSISTANT

## 2020-12-16 PROCEDURE — 80197 ASSAY OF TACROLIMUS: CPT | Performed by: INTERNAL MEDICINE

## 2020-12-16 RX ORDER — HEPARIN SODIUM,PORCINE 10 UNIT/ML
5 VIAL (ML) INTRAVENOUS
Status: DISCONTINUED | OUTPATIENT
Start: 2020-12-16 | End: 2020-12-16 | Stop reason: HOSPADM

## 2020-12-16 RX ADMIN — Medication 5 ML: at 08:23

## 2020-12-16 ASSESSMENT — PAIN SCALES - GENERAL: PAINLEVEL: NO PAIN (0)

## 2020-12-16 NOTE — NURSING NOTE
"Oncology Rooming Note    December 16, 2020 8:40 AM   Florencia Gao is a 75 year old female who presents for:    Chief Complaint   Patient presents with     Blood Draw     labs drawn from cvc by rn.  vs taken     RECHECK     AML      Initial Vitals: /79 (BP Location: Right arm, Patient Position: Sitting, Cuff Size: Adult Regular)   Pulse 99   Temp 97  F (36.1  C) (Tympanic)   Resp 16   Wt 80 kg (176 lb 4.8 oz)   SpO2 97%   BMI 28.34 kg/m   Estimated body mass index is 28.34 kg/m  as calculated from the following:    Height as of 11/3/20: 1.68 m (5' 6.14\").    Weight as of this encounter: 80 kg (176 lb 4.8 oz). Body surface area is 1.93 meters squared.  No Pain (0) Comment: Data Unavailable   No LMP recorded. Patient is postmenopausal.  Allergies reviewed: Yes  Medications reviewed: Yes    Medications: Medication refills not needed today.  Pharmacy name entered into [facility]:    [facility] #[id number] [place], [place] - [facility] [facility] AT [facility] [address]  [facility] [place], [place] - [address] [facility] [phone]    Clinical concerns: NONE       Meme Klein CMA              "

## 2020-12-16 NOTE — LETTER
12/16/2020         RE: Florencia Gao  [address] [place] [address]        Dear Colleague,    Thank you for referring your patient, Florencia Gao, to the [facility] BLOOD AND MARROW TRANSPLANT PROGRAM [place]. Please see a copy of my visit note below.    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  Eating with no N/V/D.  Afebrile with no cough or SOB.  No pain, bleeding or rash.     ROS: 8 ponit negative except as above.    Physical Exam (Resident / Clinician):   Blood pressure 139/79, pulse 99, temperature 97  F (36.1  C), temperature source Tympanic, resp. rate 16, weight 80 kg (176 lb 4.8 oz), SpO2 97 %.    Wt Readings from Last 5 Encounters:   12/16/20 80 kg (176 lb 4.8 oz)   12/09/20 79.7 kg (175 lb 12.8 oz)   12/02/20 78.2 kg (172 lb 4.8 oz)   11/24/20 78.6 kg (173 lb 4.8 oz)   11/19/20 77.6 kg (171 lb)     Date of GVH Score: 12/09/20  S 0, LGI 0, UGI 0, Liver 0          KPS: 90      Constitutional: NAD, pleasant and appropriate  HEENT:  NC/AT, no icterus; bilat periorbital edema  Lungs: CTAB  Cardiovascular: RRR, no m/r/g  GI/Abdomen: +BS, soft, nontender, nondistended  Skin: no rashes or petechiae.   MSK/Extremities  No edema  Neurologic: alert, answering questions appropriately  Psych: appropriate affect  Access: right upper chest CVC, c/d/i   Data:   Reviewed in EPIC and notable for:  Lab Results   Component Value Date    WBC 4.3 12/16/2020    ANEU 1.8 12/16/2020    HGB 9.2 (L) 12/16/2020    HCT 28.4 (L) 12/16/2020     12/16/2020     12/16/2020    POTASSIUM 4.5 12/16/2020    CHLORIDE 103  12/16/2020    CO2 26 12/16/2020    GLC 97 12/16/2020    BUN 24 12/16/2020    CR 1.09 (H) 12/16/2020    MAG 1.8 12/09/2020    INR 1.01 10/26/2020    AST 28 12/16/2020    ALT 30 12/16/2020       Assessment and Plan:     Florencia Gao is a 75 year old female with AML in CR1, day +70 s/p JCARLOS Allo sib pbsct per protocol 2019-10, randomized to Post transplant cytoxan/Tac/MMF.       1.  BMT: Cy/flu/TBI prep.   - Received 4.19 x10(6) CD34/kg. Donor B pos and recipient B neg.  Engrafted; intermittent growth factor needs. None today. - Restaging day 100.     2.  HEME: Keep Hgb>7g/dL and plts>10K. Not needing transfusions  - Of note has significant antibodies to RBCs- can cause delay in transfusion availability.                             3.  ID: Afebrile.   - Prophy: Vori, and HD ACV prophy.    - IV pentamdine as PJP prophylaxis 12/9  - CMV PCRs weekly, all negative to date  - Flu shot 12/2                              4.  GI:    - Stop Protonix     5.  GVHD   MMF discontinued day +32.  Day +3 and day+4 cytoxan  On tac 2 mg BID, level 10.1 on 12/9, repeat level pending today     6.  FEN/Renal: Lytes & creat stable.    - KENN as baseline.  Stable     7. Hypothyroidism:  Continue synthroid 88mcg     8. Psych: Situational Depression continue effexor 75mg daily.   - Zyprexa for sleep     9. Research: Pt consented to [id number] (Seamless randomized trial to alleviate morbidity and mortality) for which she was randomized to the palliative care management arm. Seen by Palliative 10/8. Pt has subsequently withdrawn from this study.     RTC next week per schedule, sooner PRN.      Mague Winters        Again, thank you for allowing me to participate in the care of your patient.        Sincerely,        BMT Advanced Practice Provider

## 2020-12-22 ENCOUNTER — OFFICE VISIT (OUTPATIENT)
Dept: TRANSPLANT | Facility: CLINIC | Age: 75
End: 2020-12-22
Attending: PHYSICIAN ASSISTANT
Payer: COMMERCIAL

## 2020-12-22 ENCOUNTER — APPOINTMENT (OUTPATIENT)
Dept: LAB | Facility: CLINIC | Age: 75
End: 2020-12-22
Attending: PHYSICIAN ASSISTANT
Payer: COMMERCIAL

## 2020-12-22 VITALS
RESPIRATION RATE: 16 BRPM | TEMPERATURE: 98 F | HEART RATE: 88 BPM | DIASTOLIC BLOOD PRESSURE: 79 MMHG | SYSTOLIC BLOOD PRESSURE: 127 MMHG | OXYGEN SATURATION: 99 % | WEIGHT: 177.2 LBS | BODY MASS INDEX: 28.48 KG/M2

## 2020-12-22 DIAGNOSIS — C92.01 ACUTE MYELOID LEUKEMIA IN REMISSION (H): ICD-10-CM

## 2020-12-22 LAB
ALBUMIN SERPL-MCNC: 3.8 G/DL (ref 3.4–5)
ALP SERPL-CCNC: 117 U/L (ref 40–150)
ALT SERPL W P-5'-P-CCNC: 31 U/L (ref 0–50)
ANION GAP SERPL CALCULATED.3IONS-SCNC: 6 MMOL/L (ref 3–14)
AST SERPL W P-5'-P-CCNC: 30 U/L (ref 0–45)
BASOPHILS # BLD AUTO: 0 10E9/L (ref 0–0.2)
BASOPHILS NFR BLD AUTO: 0.5 %
BILIRUB SERPL-MCNC: 0.2 MG/DL (ref 0.2–1.3)
BUN SERPL-MCNC: 22 MG/DL (ref 7–30)
CALCIUM SERPL-MCNC: 8.9 MG/DL (ref 8.5–10.1)
CHLORIDE SERPL-SCNC: 100 MMOL/L (ref 94–109)
CO2 SERPL-SCNC: 27 MMOL/L (ref 20–32)
CREAT SERPL-MCNC: 1.17 MG/DL (ref 0.52–1.04)
DIFFERENTIAL METHOD BLD: ABNORMAL
EOSINOPHIL # BLD AUTO: 0.1 10E9/L (ref 0–0.7)
EOSINOPHIL NFR BLD AUTO: 1.3 %
ERYTHROCYTE [DISTWIDTH] IN BLOOD BY AUTOMATED COUNT: 18.7 % (ref 10–15)
GFR SERPL CREATININE-BSD FRML MDRD: 46 ML/MIN/{1.73_M2}
GLUCOSE SERPL-MCNC: 106 MG/DL (ref 70–99)
HCT VFR BLD AUTO: 29 % (ref 35–47)
HGB BLD-MCNC: 9.5 G/DL (ref 11.7–15.7)
IMM GRANULOCYTES # BLD: 0 10E9/L (ref 0–0.4)
IMM GRANULOCYTES NFR BLD: 0.7 %
LYMPHOCYTES # BLD AUTO: 1.9 10E9/L (ref 0.8–5.3)
LYMPHOCYTES NFR BLD AUTO: 31.9 %
MAGNESIUM SERPL-MCNC: 2.1 MG/DL (ref 1.6–2.3)
MCH RBC QN AUTO: 32.8 PG (ref 26.5–33)
MCHC RBC AUTO-ENTMCNC: 32.8 G/DL (ref 31.5–36.5)
MCV RBC AUTO: 100 FL (ref 78–100)
MONOCYTES # BLD AUTO: 0.8 10E9/L (ref 0–1.3)
MONOCYTES NFR BLD AUTO: 13.4 %
NEUTROPHILS # BLD AUTO: 3.2 10E9/L (ref 1.6–8.3)
NEUTROPHILS NFR BLD AUTO: 52.2 %
NRBC # BLD AUTO: 0 10*3/UL
NRBC BLD AUTO-RTO: 0 /100
PLATELET # BLD AUTO: 184 10E9/L (ref 150–450)
POTASSIUM SERPL-SCNC: 4.7 MMOL/L (ref 3.4–5.3)
PROT SERPL-MCNC: 7.2 G/DL (ref 6.8–8.8)
RBC # BLD AUTO: 2.9 10E12/L (ref 3.8–5.2)
SODIUM SERPL-SCNC: 133 MMOL/L (ref 133–144)
TACROLIMUS BLD-MCNC: 11 UG/L (ref 5–15)
TME LAST DOSE: NORMAL H
WBC # BLD AUTO: 6.1 10E9/L (ref 4–11)

## 2020-12-22 PROCEDURE — 36592 COLLECT BLOOD FROM PICC: CPT

## 2020-12-22 PROCEDURE — G0463 HOSPITAL OUTPT CLINIC VISIT: HCPCS

## 2020-12-22 PROCEDURE — 80197 ASSAY OF TACROLIMUS: CPT | Performed by: NURSE PRACTITIONER

## 2020-12-22 PROCEDURE — 80053 COMPREHEN METABOLIC PANEL: CPT | Performed by: NURSE PRACTITIONER

## 2020-12-22 PROCEDURE — 99214 OFFICE O/P EST MOD 30 MIN: CPT

## 2020-12-22 PROCEDURE — 83735 ASSAY OF MAGNESIUM: CPT | Performed by: NURSE PRACTITIONER

## 2020-12-22 PROCEDURE — 85025 COMPLETE CBC W/AUTO DIFF WBC: CPT | Performed by: NURSE PRACTITIONER

## 2020-12-22 RX ORDER — HEPARIN SODIUM,PORCINE 10 UNIT/ML
5 VIAL (ML) INTRAVENOUS ONCE
Status: DISCONTINUED | OUTPATIENT
Start: 2020-12-22 | End: 2020-12-22 | Stop reason: HOSPADM

## 2020-12-22 ASSESSMENT — PAIN SCALES - GENERAL: PAINLEVEL: NO PAIN (0)

## 2020-12-22 NOTE — NURSING NOTE
Chief Complaint   Patient presents with     Blood Draw     Labs drawn via CVC by RN. VS taken     Labs drawn from CVC by rn. Caps changed. Good blood return noted in both lumens.  Both lumens flushed with NS and heparin.  Pt tolerated well.  VS taken.  Pt checked in for next appt.    Jorge Baker RN

## 2020-12-22 NOTE — LETTER
12/22/2020         RE: Florencia Gao  [address] [place] [address]        Dear Colleague,    Thank you for referring your patient, Florencia Gao, to the [facility] BLOOD AND MARROW TRANSPLANT PROGRAM [place]. Please see a copy of my visit note below.    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 Caregiver on phone during visit. Eating well without n/v/d/c. No bleeding, rash, fevers, URI sx. Drinking 1.5L pedialyte daily. Some L knee and R hip discomfort, chronic, ok to start glucosamine/chondroitin. Improved periorbital edema.    ROS: 8 ponit negative except as above.      Physical Exam:     Date of GVH Score: 12/22/20  S 0, LGI 0, UGI 0, Liver 0          KPS: 90    Constitutional: NAD, pleasant and appropriate  HEENT:  NC/AT, no icterus; bilat periorbital edema - improved per pt  Lungs: CTAB  Cardiovascular: RRR, no m/r/g  GI/Abdomen: +BS, soft, nontender, nondistended  Skin: no rashes or petechiae.   MSK/Extremities  No edema  Neurologic: non-focal  Psych: appropriate affect  Access: right upper chest CVC, c/d/i   Labs:     Lab Results   Component Value Date    WBC 6.1 12/22/2020    ANEU 3.2 12/22/2020    HGB 9.5 (L) 12/22/2020    HCT 29.0 (L) 12/22/2020     12/22/2020     12/22/2020    POTASSIUM 4.7 12/22/2020    CHLORIDE 100 12/22/2020    CO2 27 12/22/2020     (H) 12/22/2020    BUN 22 12/22/2020    CR 1.17 (H) 12/22/2020    MAG 2.1 12/22/2020    INR 1.01 10/26/2020    BILITOTAL 0.2 12/22/2020    AST 30 12/22/2020    ALT 31 12/22/2020    ALKPHOS 117 12/22/2020    PROTTOTAL 7.2 12/22/2020    ALBUMIN 3.8  12/22/2020         Assessment and Plan:   Florencia Gao is a 75 year old female with AML in CR1, day +76 s/p JCARLOS Allo sib pbsct per protocol 2019-10, randomized to Post transplant cytoxan/Tac/MMF.       1.  BMT: Cy/flu/TBI prep.   - Received 4.19 x10(6) CD34/kg. Donor B pos and recipient B neg.  Engrafted; intermittent growth factor needs. None today. - Restaging day 100.     2.  HEME: Keep Hgb>7g/dL and plts>10K. 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                             3.  ID: Afebrile.   - Prophy: Vfend and HD ACV    - IV pentamdine as PJP prophylaxis 12/9; next scheduled 1/6/21 - can discuss if we continue this vs inhaled Pentam vs alternate oral option for PJP prophy after D100/line removal  - CMV PCRs weekly, all negative to date, last on 12/16  - Flu shot 12/2/20                              4.  GI:  No complaints.    5.  GVHD: none to date  S/p MMF; Day +3 and day+4 cytoxan  On tac 2 mg BID, level pending     6.  FEN/Renal: Lytes & creat stable.    - KENN as baseline.  Stable  - tac-induced hypoMg: cont mgOx 1/d (aids mild constipation), Mg 2.1.     7. Hypothyroidism:  Continue levothyroxine.     8. Psych: Situational Depression continue effexor 75mg daily.   - Zyprexa for sleep    RTC 12/30 week as scheduled; sooner prn      HAI Biswas-C  [phone]

## 2020-12-22 NOTE — NURSING NOTE
"Oncology Rooming Note    December 22, 2020 2:57 PM   Florencia Gao is a 75 year old female who presents for:    Chief Complaint   Patient presents with     Blood Draw     Labs drawn via CVC by RN. VS taken     Oncology Clinic Visit     AML     Initial Vitals: /79   Pulse 88   Temp 98  F (36.7  C) (Tympanic)   Resp 16   Wt 80.4 kg (177 lb 3.2 oz)   SpO2 99%   BMI 28.48 kg/m   Estimated body mass index is 28.48 kg/m  as calculated from the following:    Height as of 11/3/20: 1.68 m (5' 6.14\").    Weight as of this encounter: 80.4 kg (177 lb 3.2 oz). Body surface area is 1.94 meters squared.  No Pain (0) Comment: Data Unavailable   No LMP recorded. Patient is postmenopausal.  Allergies reviewed: Yes  Medications reviewed: Yes    Medications: Medication refills not needed today.  Pharmacy name entered into [employer]:    [facility] [address] [place] [address] - [address] [place] [address] AT [facility] [address]  [facility] [place], [place] - [address] [facility] [phone]    Clinical concerns: wants to know if she can take Osteobiflex or glucosamine/chondriotin and would like to increase fluids.        Natalia Starr CMA            "

## 2020-12-22 NOTE — PROGRESS NOTES
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 Caregiver on phone during visit. Eating well without n/v/d/c. No bleeding, rash, fevers, URI sx. Drinking 1.5L pedialyte daily. Some L knee and R hip discomfort, chronic, ok to start glucosamine/chondroitin. Improved periorbital edema.    ROS: 8 ponit negative except as above.      Physical Exam:     Date of GVH Score: 12/22/20  S 0, LGI 0, UGI 0, Liver 0          KPS: 90    Constitutional: NAD, pleasant and appropriate  HEENT:  NC/AT, no icterus; bilat periorbital edema - improved per pt  Lungs: CTAB  Cardiovascular: RRR, no m/r/g  GI/Abdomen: +BS, soft, nontender, nondistended  Skin: no rashes or petechiae.   MSK/Extremities  No edema  Neurologic: non-focal  Psych: appropriate affect  Access: right upper chest CVC, c/d/i   Labs:     Lab Results   Component Value Date    WBC 6.1 12/22/2020    ANEU 3.2 12/22/2020    HGB 9.5 (L) 12/22/2020    HCT 29.0 (L) 12/22/2020     12/22/2020     12/22/2020    POTASSIUM 4.7 12/22/2020    CHLORIDE 100 12/22/2020    CO2 27 12/22/2020     (H) 12/22/2020    BUN 22 12/22/2020    CR 1.17 (H) 12/22/2020    MAG 2.1 12/22/2020    INR 1.01 10/26/2020    BILITOTAL 0.2 12/22/2020    AST 30 12/22/2020    ALT 31 12/22/2020    ALKPHOS 117 12/22/2020    PROTTOTAL 7.2 12/22/2020    ALBUMIN 3.8 12/22/2020         Assessment and Plan:   Florencia Gao is a 75 year old female with AML in CR1, day +76 s/p JCARLOS Allo sib pbsct per protocol 2019-10, randomized to Post transplant cytoxan/Tac/MMF.       1.  BMT: Cy/flu/TBI prep.   - Received 4.19 x10(6) CD34/kg. Donor B pos and  recipient B neg.  Engrafted; intermittent growth factor needs. None today. - Restaging day 100.     2.  HEME: Keep Hgb>7g/dL and plts>10K. 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                             3.  ID: Afebrile.   - Prophy: Vfend and HD ACV    - IV pentamdine as PJP prophylaxis 12/9; next scheduled 1/6/21 - can discuss if we continue this vs inhaled Pentam vs alternate oral option for PJP prophy after D100/line removal  - CMV PCRs weekly, all negative to date, last on 12/16  - Flu shot 12/2/20                              4.  GI:  No complaints.    5.  GVHD: none to date  S/p MMF; Day +3 and day+4 cytoxan  On tac 2 mg BID, level pending     6.  FEN/Renal: Lytes & creat stable.    - KENN as baseline.  Stable  - tac-induced hypoMg: cont mgOx 1/d (aids mild constipation), Mg 2.1.     7. Hypothyroidism:  Continue levothyroxine.     8. Psych: Situational Depression continue effexor 75mg daily.   - Zyprexa for sleep    RTC 12/30 week as scheduled; sooner prn      HAI Biswas-C  [phone]

## 2020-12-23 ENCOUNTER — TELEPHONE (OUTPATIENT)
Dept: TRANSPLANT | Facility: CLINIC | Age: 75
End: 2020-12-23

## 2020-12-23 DIAGNOSIS — C92.01 ACUTE MYELOID LEUKEMIA IN REMISSION (H): ICD-10-CM

## 2020-12-23 DIAGNOSIS — Z94.81 STATUS POST BONE MARROW TRANSPLANT (H): ICD-10-CM

## 2020-12-23 RX ORDER — TACROLIMUS 0.5 MG/1
CAPSULE ORAL
Qty: 60 CAPSULE | Refills: 0 | COMMUNITY
Start: 2020-12-23 | End: 2020-12-28

## 2020-12-23 RX ORDER — TACROLIMUS 1 MG/1
1 CAPSULE ORAL
Qty: 120 CAPSULE | Refills: 3 | COMMUNITY
Start: 2020-12-23 | End: 2021-02-25

## 2020-12-23 NOTE — TELEPHONE ENCOUNTER
I spoke with Florenciayuri Gao regarding her Tacrolimus level from clinic visit 12/22/20, which resulted as 11 (but a 19 hour level) on 2 mg twice daily. Per Belkis Ramirez, Florencia is to decrease Tacrolimus to 1.5 mg twice daily. Plan to recheck level 12/30/20, pt was instructed to hold dose prior to labs. Florencia repeated these directions to me and voiced her understanding.     Suha Seymour, PharmD

## 2020-12-25 ENCOUNTER — TELEPHONE (OUTPATIENT)
Dept: ONCOLOGY | Facility: CLINIC | Age: 75
End: 2020-12-25

## 2020-12-25 NOTE — TELEPHONE ENCOUNTER
HEMATOLOGY ONCOLOGY ON CALL FELLOW NOTE    I received a call from the patient regarding a new rash.     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 She noticed a new rash starting this morning. It is located in the right arm shelter above the elbow crease and half way below it. She described it as erythematous to a degree that it looked well-demarcated measuring at approximately 7 x 4 inches in size. It was itchy. No recent trauma, insect/bug bites, change in soaps/detergents, or rash anywhere else. No edema. No systemic symptoms such as fevers, chills, or periorbital/facial edema. Patient does have a port on the right chest wall but the port site was reportedly clean, dry and intact. No other changes in clinical status. No recent medication changes. I was unable to convert the call into video to cast an actual look at the rash.     Most recent CBC and CMP were unremarkable vs. unchanged from 12/22/20.     At this point, while the etiology is not clear (nonspecific dermatitis?), there are at least no concerning signs/symptoms to suggest that the rash is life threatening or needs an urgent evaluation. Patient has no history of GVHD. Rash is disparate from the right port site with no suspicion of port site cellulitis or DVT.     Rash has not changed in size since the morning but has become less itchy. I advised patient to continue to monitor the rash and potentially trial emollients. I encouraged her to call back with worsening of the rash or changes in clinical status. Otherwise she reassuringly has a scheduled clinic appointment on 12/28/20 and can be followed up on at that time. Patient was comfortable with this plan.      Dejan Calvert  Hematology/Oncology Fellow  [facility]  [phone]  Time of call: 1725  End of call: 1740

## 2020-12-28 ENCOUNTER — OFFICE VISIT (OUTPATIENT)
Dept: TRANSPLANT | Facility: CLINIC | Age: 75
End: 2020-12-28
Attending: NURSE PRACTITIONER
Payer: COMMERCIAL

## 2020-12-28 ENCOUNTER — APPOINTMENT (OUTPATIENT)
Dept: LAB | Facility: CLINIC | Age: 75
End: 2020-12-28
Attending: NURSE PRACTITIONER
Payer: COMMERCIAL

## 2020-12-28 VITALS — SYSTOLIC BLOOD PRESSURE: 132 MMHG | DIASTOLIC BLOOD PRESSURE: 82 MMHG | TEMPERATURE: 98 F | HEART RATE: 84 BPM

## 2020-12-28 DIAGNOSIS — C92.01 ACUTE MYELOID LEUKEMIA IN REMISSION (H): ICD-10-CM

## 2020-12-28 LAB
ANION GAP SERPL CALCULATED.3IONS-SCNC: 7 MMOL/L (ref 3–14)
BASOPHILS # BLD AUTO: 0 10E9/L (ref 0–0.2)
BASOPHILS NFR BLD AUTO: 0.2 %
BUN SERPL-MCNC: 18 MG/DL (ref 7–30)
CALCIUM SERPL-MCNC: 8.9 MG/DL (ref 8.5–10.1)
CHLORIDE SERPL-SCNC: 95 MMOL/L (ref 94–109)
CO2 SERPL-SCNC: 25 MMOL/L (ref 20–32)
CREAT SERPL-MCNC: 1.06 MG/DL (ref 0.52–1.04)
DIFFERENTIAL METHOD BLD: ABNORMAL
EOSINOPHIL # BLD AUTO: 0.1 10E9/L (ref 0–0.7)
EOSINOPHIL NFR BLD AUTO: 0.9 %
ERYTHROCYTE [DISTWIDTH] IN BLOOD BY AUTOMATED COUNT: 17.3 % (ref 10–15)
GFR SERPL CREATININE-BSD FRML MDRD: 52 ML/MIN/{1.73_M2}
GLUCOSE SERPL-MCNC: 100 MG/DL (ref 70–99)
HCT VFR BLD AUTO: 28.2 % (ref 35–47)
HGB BLD-MCNC: 9.5 G/DL (ref 11.7–15.7)
IMM GRANULOCYTES # BLD: 0 10E9/L (ref 0–0.4)
IMM GRANULOCYTES NFR BLD: 0.6 %
LYMPHOCYTES # BLD AUTO: 1.9 10E9/L (ref 0.8–5.3)
LYMPHOCYTES NFR BLD AUTO: 35.3 %
MAGNESIUM SERPL-MCNC: 2.4 MG/DL (ref 1.6–2.3)
MCH RBC QN AUTO: 32.6 PG (ref 26.5–33)
MCHC RBC AUTO-ENTMCNC: 33.7 G/DL (ref 31.5–36.5)
MCV RBC AUTO: 97 FL (ref 78–100)
MONOCYTES # BLD AUTO: 0.8 10E9/L (ref 0–1.3)
MONOCYTES NFR BLD AUTO: 15.2 %
NEUTROPHILS # BLD AUTO: 2.5 10E9/L (ref 1.6–8.3)
NEUTROPHILS NFR BLD AUTO: 47.8 %
NRBC # BLD AUTO: 0 10*3/UL
NRBC BLD AUTO-RTO: 0 /100
PLATELET # BLD AUTO: 174 10E9/L (ref 150–450)
POTASSIUM SERPL-SCNC: 5.1 MMOL/L (ref 3.4–5.3)
RBC # BLD AUTO: 2.91 10E12/L (ref 3.8–5.2)
SODIUM SERPL-SCNC: 127 MMOL/L (ref 133–144)
TACROLIMUS BLD-MCNC: 10.3 UG/L (ref 5–15)
TME LAST DOSE: NORMAL H
WBC # BLD AUTO: 5.3 10E9/L (ref 4–11)

## 2020-12-28 PROCEDURE — 80048 BASIC METABOLIC PNL TOTAL CA: CPT | Performed by: PHYSICIAN ASSISTANT

## 2020-12-28 PROCEDURE — 250N000011 HC RX IP 250 OP 636: Performed by: NURSE PRACTITIONER

## 2020-12-28 PROCEDURE — 80197 ASSAY OF TACROLIMUS: CPT | Performed by: PHYSICIAN ASSISTANT

## 2020-12-28 PROCEDURE — G0463 HOSPITAL OUTPT CLINIC VISIT: HCPCS

## 2020-12-28 PROCEDURE — 99214 OFFICE O/P EST MOD 30 MIN: CPT

## 2020-12-28 PROCEDURE — 83735 ASSAY OF MAGNESIUM: CPT | Performed by: PHYSICIAN ASSISTANT

## 2020-12-28 PROCEDURE — 85025 COMPLETE CBC W/AUTO DIFF WBC: CPT | Performed by: PHYSICIAN ASSISTANT

## 2020-12-28 PROCEDURE — 36592 COLLECT BLOOD FROM PICC: CPT

## 2020-12-28 RX ORDER — HEPARIN SODIUM,PORCINE 10 UNIT/ML
5 VIAL (ML) INTRAVENOUS ONCE
Status: COMPLETED | OUTPATIENT
Start: 2020-12-28 | End: 2020-12-28

## 2020-12-28 RX ADMIN — Medication 5 ML: at 13:32

## 2020-12-28 NOTE — NURSING NOTE
"Oncology Rooming Note    December 28, 2020 1:39 PM   Florencia Gao is a 75 year old female who presents for:    Chief Complaint   Patient presents with     Blood Draw     Labs drawn via CVC by RN. VS taken     RECHECK     Pt is here for a rtn for S/P BMT for AML      Initial Vitals: Blood Pressure 132/82   Pulse 84   Temperature 98  F (36.7  C) (Tympanic)  Estimated body mass index is 28.48 kg/m  as calculated from the following:    Height as of 11/3/20: 1.68 m (5' 6.14\").    Weight as of 12/22/20: 80.4 kg (177 lb 3.2 oz). There is no height or weight on file to calculate BSA.  Data Unavailable Comment: Data Unavailable   No LMP recorded. Patient is postmenopausal.  Allergies reviewed: Yes  Medications reviewed: Yes    Medications: Medication refills not needed today.  Pharmacy name entered into [employer]:    [facility] #[id number] AT [facility] [address]  [facility] [place], [place] - [address] [facility] [phone]    Clinical concerns: none       Zenobia De Leon MA            "

## 2020-12-28 NOTE — PROGRESS NOTES
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 Caregiver not on phone today as pt left phone in car. Pt called fellow over the weekend regarding a rash on right arm that subsequently went away. She took claritin for 2 days but otherwise no changes. Some dry skin on left arm today. No n/v/d. No other complaints today.    ROS: 8 ponit negative except as above.      Physical Exam:     Date of GVH Score: 12/28/20  S 0, LGI 0, UGI 0, Liver 0          KPS: 90  Blood pressure 132/82, pulse 84, temperature 98  F (36.7  C), temperature source Tympanic.    Constitutional: NAD, pleasant and appropriate  HEENT:  NC/AT, no icterus; bilat periorbital edema - improved per pt  Lungs: CTAB  Cardiovascular: RRR, no m/r/g  GI/Abdomen: +BS, soft, nontender, nondistended  Skin: no rashes or petechiae. Small patch of dry skin on left forearm   MSK/Extremities  No edema  Neurologic: non-focal  Psych: appropriate affect  Access: right upper chest CVC, c/d/i   Labs:     Lab Results   Component Value Date    WBC 5.3 12/28/2020    ANEU 2.5 12/28/2020    HGB 9.5 (L) 12/28/2020    HCT 28.2 (L) 12/28/2020     12/28/2020     (L) 12/28/2020    POTASSIUM 5.1 12/28/2020    CHLORIDE 95 12/28/2020    CO2 25 12/28/2020     (H) 12/28/2020    BUN 18 12/28/2020    CR 1.06 (H) 12/28/2020    MAG 2.4 (H) 12/28/2020    INR 1.01 10/26/2020    BILITOTAL 0.2 12/22/2020    AST 30 12/22/2020    ALT 31 12/22/2020    ALKPHOS 117 12/22/2020    PROTTOTAL 7.2 12/22/2020    ALBUMIN 3.8 12/22/2020         Assessment and Plan:   Florencia Gao is a 75 year old female with AML in CR1, day +82  s/p JCARLOS Allo sib pbsct per protocol 2019-10, randomized to Post transplant cytoxan/Tac/MMF.       1.  BMT: Cy/flu/TBI prep.   - Received 4.19 x10(6) CD34/kg. Donor B pos and recipient B neg.  Engrafted; intermittent growth factor needs. None today. - Restaging day 100.     2.  HEME: Keep Hgb>7g/dL and plts>10K. 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                             3.  ID: Afebrile.   - Prophy: Vfend and HD ACV    - IV pentamdine as PJP prophylaxis 12/9; next scheduled 1/6/21 - can discuss if we continue this vs inhaled Pentam vs alternate oral option for PJP prophy after D100/line removal  - CMV PCRs weekly, all negative to date, last on 12/16  - Flu shot 12/2/20                              4.  GI:  No complaints.    5.  GVHD: none to date  S/p MMF; Day +3 and day+4 cytoxan  On tac 2 mg BID, level pending     6.  FEN/Renal: Na 127 today. Currently drinking 3L pedialyte and 1L water. Instructed to stop drinking 1L.  - KENN as baseline.  Stable  - tac-induced hypoMg: cont mgOx 1/d (aids mild constipation)     7. Hypothyroidism:  Continue levothyroxine.     8. Psych: Situational Depression continue effexor 75mg daily.   - Zyprexa for sleep    RTC thurs for provider/Na follow up    Caroline Thakur NP

## 2020-12-28 NOTE — NURSING NOTE
Chief Complaint   Patient presents with     Blood Draw     Labs drawn via CVC by RN. VS taken     Labs drawn from CVC by rn.  Good blood return noted in both lumens.  Both lumens flushed with NS and heparin.  Pt tolerated well.  VS taken.  Pt checked in for next appt.    Jorge Baker RN

## 2020-12-28 NOTE — LETTER
12/28/2020         RE: Florencia Gao  [address] [place] [address]        Dear Colleague,    Thank you for referring your patient, Florencia Gao, to the [facility] BLOOD AND MARROW TRANSPLANT PROGRAM [place]. Please see a copy of my visit note below.    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 Caregiver not on phone today as pt left phone in car. Pt called fellow over the weekend regarding a rash on right arm that subsequently went away. She took claritin for 2 days but otherwise no changes. Some dry skin on left arm today. No n/v/d. No other complaints today.    ROS: 8 ponit negative except as above.      Physical Exam:     Date of GVH Score: 12/28/20  S 0, LGI 0, UGI 0, Liver 0          KPS: 90  Blood pressure 132/82, pulse 84, temperature 98  F (36.7  C), temperature source Tympanic.    Constitutional: NAD, pleasant and appropriate  HEENT:  NC/AT, no icterus; bilat periorbital edema - improved per pt  Lungs: CTAB  Cardiovascular: RRR, no m/r/g  GI/Abdomen: +BS, soft, nontender, nondistended  Skin: no rashes or petechiae. Small patch of dry skin on left forearm   MSK/Extremities  No edema  Neurologic: non-focal  Psych: appropriate affect  Access: right upper chest CVC, c/d/i   Labs:     Lab Results   Component Value Date    WBC 5.3 12/28/2020    ANEU 2.5 12/28/2020    HGB 9.5 (L) 12/28/2020    HCT 28.2 (L) 12/28/2020     12/28/2020     (L) 12/28/2020    POTASSIUM 5.1 12/28/2020    CHLORIDE 95 12/28/2020    CO2 25 12/28/2020     (H) 12/28/2020    BUN 18 12/28/2020    CR 1.06 (H) 12/28/2020    MAG 2.4  (H) 12/28/2020    INR 1.01 10/26/2020    BILITOTAL 0.2 12/22/2020    AST 30 12/22/2020    ALT 31 12/22/2020    ALKPHOS 117 12/22/2020    PROTTOTAL 7.2 12/22/2020    ALBUMIN 3.8 12/22/2020         Assessment and Plan:   Florencia Gao is a 75 year old female with AML in CR1, day +82 s/p JCARLOS Allo sib pbsct per protocol 2019-10, randomized to Post transplant cytoxan/Tac/MMF.       1.  BMT: Cy/flu/TBI prep.   - Received 4.19 x10(6) CD34/kg. Donor B pos and recipient B neg.  Engrafted; intermittent growth factor needs. None today. - Restaging day 100.     2.  HEME: Keep Hgb>7g/dL and plts>10K. 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                             3.  ID: Afebrile.   - Prophy: Vfend and HD ACV    - IV pentamdine as PJP prophylaxis 12/9; next scheduled 1/6/21 - can discuss if we continue this vs inhaled Pentam vs alternate oral option for PJP prophy after D100/line removal  - CMV PCRs weekly, all negative to date, last on 12/16  - Flu shot 12/2/20                              4.  GI:  No complaints.    5.  GVHD: none to date  S/p MMF; Day +3 and day+4 cytoxan  On tac 2 mg BID, level pending     6.  FEN/Renal: Na 127 today. Currently drinking 3L pedialyte and 1L water. Instructed to stop drinking 1L.  - KENN as baseline.  Stable  - tac-induced hypoMg: cont mgOx 1/d (aids mild constipation)     7. Hypothyroidism:  Continue levothyroxine.     8. Psych: Situational Depression continue effexor 75mg daily.   - Zyprexa for sleep    RTC thurs for provider/Na follow up    Caroline Thakur NP

## 2020-12-30 NOTE — PROGRESS NOTES
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 Confirms drinking about 1.5L of pedialyte a day per her fluid restriction. Otherwise feels overall well. No acute new medical complaints. Continues to have fluctuant fluid accumulation in the skin under her eyes. Seems to be worst in the morning and gets better throughout the day. Non tender, no vision changes, and no swelling in her neck or rest of her face. No n/v/d, rashes, bleeding or other new medical complaints.     ROS: 8 ponit negative except as above.      Physical Exam:     Date of GVH Score: 12/28/20  S 0, LGI 0, UGI 0, Liver 0          KPS: 90  Blood pressure 131/73, pulse 90, temperature 97.1  F (36.2  C), temperature source Tympanic, resp. rate 16, weight 79.6 kg (175 lb 8 oz), SpO2 99 %.    Constitutional: NAD, pleasant and appropriate  HEENT:  NC/AT, no icterus; bilat periorbital edema - improved per pt  Lungs: CTAB  Cardiovascular: RRR, no m/r/g  GI/Abdomen: +BS, soft, nontender, nondistended  Skin: no rashes or petechiae. Small patch of dry skin on left forearm   MSK/Extremities  No edema  Neurologic: non-focal  Psych: appropriate affect  Access: right upper chest CVC, c/d/i   Labs:     Lab Results   Component Value Date    WBC 5.1 12/31/2020    ANEU 2.3 12/31/2020    HGB 9.7 (L) 12/31/2020    HCT 30.3 (L) 12/31/2020     12/31/2020     (L) 12/28/2020    POTASSIUM 5.1 12/28/2020    CHLORIDE 95 12/28/2020    CO2 25 12/28/2020     (H) 12/28/2020    BUN 18 12/28/2020    CR 1.06 (H) 12/28/2020    MAG 2.4 (H) 12/28/2020    INR 1.01 10/26/2020    BILITOTAL 0.2 12/22/2020    AST  30 12/22/2020    ALT 31 12/22/2020    ALKPHOS 117 12/22/2020    PROTTOTAL 7.2 12/22/2020    ALBUMIN 3.8 12/22/2020         Assessment and Plan:   Florencia Gao is a 75 year old female with AML in CR1, day +85 s/p JCARLOS Allo sib pbsct per protocol 2019-10, randomized to Post transplant cytoxan/Tac/MMF.       1.  BMT: Cy/flu/TBI prep.   - Received 4.19 x10(6) CD34/kg. Donor B pos and recipient B neg.  Engrafted; intermittent growth factor needs. None today.   - Restaging day 100.     2.  HEME: Keep Hgb>7g/dL and plts>10K. 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                             3.  ID: Afebrile.   - Prophy: Vfend and HD ACV    - IV pentamdine as PJP prophylaxis 12/9; next scheduled 1/6/21 - can discuss if we continue this vs inhaled Pentam vs alternate oral option for PJP prophy after D100/line removal  - CMV PCRs weekly, all negative to date, last on 12/16  - Flu shot 12/2/20                              4.  GI:  No complaints.    5.  GVHD: none to date  S/p MMF; Day +3 and day+4 cytoxan  On tac 1.5 mg BID, level 12/28 good at 10.3.      6.  FEN/Renal:   Hyponatremia: seems consistent with tac induced hypernatremia as potassium has been on the high side as well. Pt asymptomatic. On fluid restriction of 1.5L pedialyte with adequate control. Sodium 136 today.   - KENN as baseline.  Stable  - tac-induced hypoMg: cont mgOx 1/d (aids mild constipation)     7. Hypothyroidism:  Continue levothyroxine.     8. Psych: Situational Depression continue effexor 75mg daily.   - Zyprexa for sleep    RTC: 1/6 for labs, provider visit and pentamidine infusion.     Denzel Magallon PA-C  [phone]

## 2020-12-31 ENCOUNTER — ONCOLOGY VISIT (OUTPATIENT)
Dept: TRANSPLANT | Facility: CLINIC | Age: 75
End: 2020-12-31
Attending: INTERNAL MEDICINE
Payer: COMMERCIAL

## 2020-12-31 ENCOUNTER — APPOINTMENT (OUTPATIENT)
Dept: LAB | Facility: CLINIC | Age: 75
End: 2020-12-31
Attending: INTERNAL MEDICINE
Payer: COMMERCIAL

## 2020-12-31 VITALS
TEMPERATURE: 97.1 F | OXYGEN SATURATION: 99 % | SYSTOLIC BLOOD PRESSURE: 131 MMHG | HEART RATE: 90 BPM | RESPIRATION RATE: 16 BRPM | DIASTOLIC BLOOD PRESSURE: 73 MMHG | BODY MASS INDEX: 28.21 KG/M2 | WEIGHT: 175.5 LBS

## 2020-12-31 DIAGNOSIS — C92.01 ACUTE MYELOID LEUKEMIA IN REMISSION (H): ICD-10-CM

## 2020-12-31 LAB
ANION GAP SERPL CALCULATED.3IONS-SCNC: 7 MMOL/L (ref 3–14)
BASOPHILS # BLD AUTO: 0 10E9/L (ref 0–0.2)
BASOPHILS NFR BLD AUTO: 0.6 %
BUN SERPL-MCNC: 19 MG/DL (ref 7–30)
CALCIUM SERPL-MCNC: 9.1 MG/DL (ref 8.5–10.1)
CHLORIDE SERPL-SCNC: 102 MMOL/L (ref 94–109)
CO2 SERPL-SCNC: 27 MMOL/L (ref 20–32)
CREAT SERPL-MCNC: 0.98 MG/DL (ref 0.52–1.04)
DIFFERENTIAL METHOD BLD: ABNORMAL
EOSINOPHIL # BLD AUTO: 0.1 10E9/L (ref 0–0.7)
EOSINOPHIL NFR BLD AUTO: 2 %
ERYTHROCYTE [DISTWIDTH] IN BLOOD BY AUTOMATED COUNT: 17.6 % (ref 10–15)
GFR SERPL CREATININE-BSD FRML MDRD: 57 ML/MIN/{1.73_M2}
GLUCOSE SERPL-MCNC: 103 MG/DL (ref 70–99)
HCT VFR BLD AUTO: 30.3 % (ref 35–47)
HGB BLD-MCNC: 9.7 G/DL (ref 11.7–15.7)
IMM GRANULOCYTES # BLD: 0 10E9/L (ref 0–0.4)
IMM GRANULOCYTES NFR BLD: 0.8 %
LYMPHOCYTES # BLD AUTO: 2 10E9/L (ref 0.8–5.3)
LYMPHOCYTES NFR BLD AUTO: 38.7 %
MAGNESIUM SERPL-MCNC: 1.8 MG/DL (ref 1.6–2.3)
MCH RBC QN AUTO: 32.2 PG (ref 26.5–33)
MCHC RBC AUTO-ENTMCNC: 32 G/DL (ref 31.5–36.5)
MCV RBC AUTO: 101 FL (ref 78–100)
MONOCYTES # BLD AUTO: 0.7 10E9/L (ref 0–1.3)
MONOCYTES NFR BLD AUTO: 13.3 %
NEUTROPHILS # BLD AUTO: 2.3 10E9/L (ref 1.6–8.3)
NEUTROPHILS NFR BLD AUTO: 44.6 %
NRBC # BLD AUTO: 0 10*3/UL
NRBC BLD AUTO-RTO: 0 /100
PLATELET # BLD AUTO: 168 10E9/L (ref 150–450)
POTASSIUM SERPL-SCNC: 4.5 MMOL/L (ref 3.4–5.3)
RBC # BLD AUTO: 3.01 10E12/L (ref 3.8–5.2)
SODIUM SERPL-SCNC: 136 MMOL/L (ref 133–144)
WBC # BLD AUTO: 5.1 10E9/L (ref 4–11)

## 2020-12-31 PROCEDURE — G0463 HOSPITAL OUTPT CLINIC VISIT: HCPCS

## 2020-12-31 PROCEDURE — 80048 BASIC METABOLIC PNL TOTAL CA: CPT | Performed by: NURSE PRACTITIONER

## 2020-12-31 PROCEDURE — 250N000011 HC RX IP 250 OP 636: Performed by: INTERNAL MEDICINE

## 2020-12-31 PROCEDURE — 85025 COMPLETE CBC W/AUTO DIFF WBC: CPT | Performed by: NURSE PRACTITIONER

## 2020-12-31 PROCEDURE — 83735 ASSAY OF MAGNESIUM: CPT | Performed by: NURSE PRACTITIONER

## 2020-12-31 PROCEDURE — 99214 OFFICE O/P EST MOD 30 MIN: CPT

## 2020-12-31 PROCEDURE — 36592 COLLECT BLOOD FROM PICC: CPT

## 2020-12-31 RX ORDER — HEPARIN SODIUM,PORCINE 10 UNIT/ML
5 VIAL (ML) INTRAVENOUS
Status: DISCONTINUED | OUTPATIENT
Start: 2020-12-31 | End: 2020-12-31 | Stop reason: HOSPADM

## 2020-12-31 RX ADMIN — Medication 5 ML: at 09:45

## 2020-12-31 ASSESSMENT — PAIN SCALES - GENERAL: PAINLEVEL: NO PAIN (0)

## 2020-12-31 NOTE — NURSING NOTE
"Oncology Rooming Note    December 31, 2020 10:00 AM   Florencia Gao is a 75 year old female who presents for:    Chief Complaint   Patient presents with     Oncology Clinic Visit     AML     Blood Draw     labs drawn from cvc by rn.  vs taken     Initial Vitals: /73 (BP Location: Right arm, Patient Position: Sitting, Cuff Size: Adult Regular)   Pulse 90   Temp 97.1  F (36.2  C) (Tympanic)   Resp 16   Wt 79.6 kg (175 lb 8 oz)   SpO2 99%   BMI 28.21 kg/m   Estimated body mass index is 28.21 kg/m  as calculated from the following:    Height as of 11/3/20: 1.68 m (5' 6.14\").    Weight as of this encounter: 79.6 kg (175 lb 8 oz). Body surface area is 1.93 meters squared.  No Pain (0) Comment: Data Unavailable   No LMP recorded. Patient is postmenopausal.  Allergies reviewed: Yes  Medications reviewed: Yes    Medications: Medication refills not needed today.  Pharmacy name entered into [facility]:    [facility] #[id number] [place], [place] - [facility] [facility] AT [facility] [address]  [facility] [place], [place] - [address] [facility] [phone]    Clinical concerns: wants dressing change since she won't be back until wednesday       Natalia Starr CMA            "

## 2020-12-31 NOTE — LETTER
12/31/2020         RE: Florencia Gao  [address] [place] [address]        Dear Colleague,    Thank you for referring your patient, Florencia Gao, to the [facility] BLOOD AND MARROW TRANSPLANT PROGRAM [place]. Please see a copy of my visit note below.    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       Interval history:     Here for follow up of sodium. Confirms drinking about 1.5L of pedialyte a day per her fluid restriction. Otherwise feels overall well. No acute new medical complaints. Continues to have fluctuant fluid accumulation in the skin under her eyes. Seems to be worst in the morning and gets better throughout the day. Non tender, no vision changes, and no swelling in her neck or rest of her face. No n/v/d, rashes, bleeding or other new medical complaints.     ROS: 8 ponit negative except as above.      Physical Exam:     Date of GVH Score: 12/28/20  S 0, LGI 0, UGI 0, Liver 0          KPS: 90  Blood pressure 131/73, pulse 90, temperature 97.1  F (36.2  C), temperature source Tympanic, resp. rate 16, weight 79.6 kg (175 lb 8 oz), SpO2 99 %.    Constitutional: NAD, pleasant and appropriate  HEENT:  NC/AT, no icterus; bilat periorbital edema - improved per pt  Lungs: CTAB  Cardiovascular: RRR, no m/r/g  GI/Abdomen: +BS, soft, nontender, nondistended  Skin: no rashes or petechiae. Small patch of dry skin on left forearm   MSK/Extremities  No edema  Neurologic: non-focal  Psych: appropriate affect  Access: right upper chest CVC, c/d/i   Labs:     Lab Results   Component Value Date    WBC 5.1 12/31/2020    ANEU 2.3 12/31/2020    HGB 9.7 (L) 12/31/2020    HCT 30.3 (L)  12/31/2020     12/31/2020     (L) 12/28/2020    POTASSIUM 5.1 12/28/2020    CHLORIDE 95 12/28/2020    CO2 25 12/28/2020     (H) 12/28/2020    BUN 18 12/28/2020    CR 1.06 (H) 12/28/2020    MAG 2.4 (H) 12/28/2020    INR 1.01 10/26/2020    BILITOTAL 0.2 12/22/2020    AST 30 12/22/2020    ALT 31 12/22/2020    ALKPHOS 117 12/22/2020    PROTTOTAL 7.2 12/22/2020    ALBUMIN 3.8 12/22/2020         Assessment and Plan:   Florencia Gao is a 75 year old female with AML in CR1, day +85 s/p JCARLOS Allo sib pbsct per protocol 2019-10, randomized to Post transplant cytoxan/Tac/MMF.       1.  BMT: Cy/flu/TBI prep.   - Received 4.19 x10(6) CD34/kg. Donor B pos and recipient B neg.  Engrafted; intermittent growth factor needs. None today.   - Restaging day 100.     2.  HEME: Keep Hgb>7g/dL and plts>10K. 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                             3.  ID: Afebrile.   - Prophy: Vfend and HD ACV    - IV pentamdine as PJP prophylaxis 12/9; next scheduled 1/6/21 - can discuss if we continue this vs inhaled Pentam vs alternate oral option for PJP prophy after D100/line removal  - CMV PCRs weekly, all negative to date, last on 12/16  - Flu shot 12/2/20                              4.  GI:  No complaints.    5.  GVHD: none to date  S/p MMF; Day +3 and day+4 cytoxan  On tac 1.5 mg BID, level 12/28 good at 10.3.      6.  FEN/Renal:   Hyponatremia: seems consistent with tac induced hypernatremia as potassium has been on the high side as well. Pt asymptomatic. On fluid restriction of 1.5L pedialyte with adequate control. Sodium 136 today.   - KENN as baseline.  Stable  - tac-induced hypoMg: cont mgOx 1/d (aids mild constipation)     7. Hypothyroidism:  Continue levothyroxine.     8. Psych: Situational Depression continue effexor 75mg daily.   - Zyprexa for sleep    RTC: 1/6 for labs, provider visit and  pentamidine infusion.     Denzel Magallon PA-C  [phone]                  Again, thank you for allowing me to participate in the care of your patient.        Sincerely,        BMT Advanced Practice Provider

## 2021-01-04 ENCOUNTER — HEALTH MAINTENANCE LETTER (OUTPATIENT)
Age: 76
End: 2021-01-04

## 2021-01-06 ENCOUNTER — OFFICE VISIT (OUTPATIENT)
Dept: TRANSPLANT | Facility: CLINIC | Age: 76
End: 2021-01-06
Attending: INTERNAL MEDICINE
Payer: COMMERCIAL

## 2021-01-06 ENCOUNTER — APPOINTMENT (OUTPATIENT)
Dept: LAB | Facility: CLINIC | Age: 76
End: 2021-01-06
Attending: INTERNAL MEDICINE
Payer: COMMERCIAL

## 2021-01-06 VITALS
TEMPERATURE: 98.3 F | OXYGEN SATURATION: 100 % | WEIGHT: 174.1 LBS | DIASTOLIC BLOOD PRESSURE: 72 MMHG | RESPIRATION RATE: 18 BRPM | BODY MASS INDEX: 27.98 KG/M2 | HEART RATE: 84 BPM | SYSTOLIC BLOOD PRESSURE: 133 MMHG

## 2021-01-06 VITALS — DIASTOLIC BLOOD PRESSURE: 83 MMHG | SYSTOLIC BLOOD PRESSURE: 145 MMHG

## 2021-01-06 DIAGNOSIS — C92.01 ACUTE MYELOID LEUKEMIA IN REMISSION (H): ICD-10-CM

## 2021-01-06 DIAGNOSIS — C92.01 ACUTE MYELOID LEUKEMIA IN REMISSION (H): Primary | ICD-10-CM

## 2021-01-06 DIAGNOSIS — Z94.81 STATUS POST BONE MARROW TRANSPLANT (H): ICD-10-CM

## 2021-01-06 DIAGNOSIS — C92.01 AML (ACUTE MYELOID LEUKEMIA) IN REMISSION (H): ICD-10-CM

## 2021-01-06 DIAGNOSIS — Z94.81 STATUS POST BONE MARROW TRANSPLANT (H): Primary | ICD-10-CM

## 2021-01-06 LAB
ALBUMIN SERPL-MCNC: 4.1 G/DL (ref 3.4–5)
ALP SERPL-CCNC: 111 U/L (ref 40–150)
ALT SERPL W P-5'-P-CCNC: 29 U/L (ref 0–50)
ANION GAP SERPL CALCULATED.3IONS-SCNC: 4 MMOL/L (ref 3–14)
AST SERPL W P-5'-P-CCNC: 27 U/L (ref 0–45)
BASOPHILS # BLD AUTO: 0 10E9/L (ref 0–0.2)
BASOPHILS NFR BLD AUTO: 0.7 %
BILIRUB SERPL-MCNC: 0.3 MG/DL (ref 0.2–1.3)
BUN SERPL-MCNC: 19 MG/DL (ref 7–30)
CALCIUM SERPL-MCNC: 9.3 MG/DL (ref 8.5–10.1)
CHLORIDE SERPL-SCNC: 101 MMOL/L (ref 94–109)
CO2 SERPL-SCNC: 28 MMOL/L (ref 20–32)
CREAT SERPL-MCNC: 0.98 MG/DL (ref 0.52–1.04)
DIFFERENTIAL METHOD BLD: ABNORMAL
EOSINOPHIL # BLD AUTO: 0 10E9/L (ref 0–0.7)
EOSINOPHIL NFR BLD AUTO: 0.9 %
ERYTHROCYTE [DISTWIDTH] IN BLOOD BY AUTOMATED COUNT: 16.4 % (ref 10–15)
GFR SERPL CREATININE-BSD FRML MDRD: 57 ML/MIN/{1.73_M2}
GLUCOSE SERPL-MCNC: 99 MG/DL (ref 70–99)
HCT VFR BLD AUTO: 30.8 % (ref 35–47)
HGB BLD-MCNC: 10 G/DL (ref 11.7–15.7)
IMM GRANULOCYTES # BLD: 0 10E9/L (ref 0–0.4)
IMM GRANULOCYTES NFR BLD: 0.4 %
LYMPHOCYTES # BLD AUTO: 1.8 10E9/L (ref 0.8–5.3)
LYMPHOCYTES NFR BLD AUTO: 40.4 %
MAGNESIUM SERPL-MCNC: 1.7 MG/DL (ref 1.6–2.3)
MCH RBC QN AUTO: 32.9 PG (ref 26.5–33)
MCHC RBC AUTO-ENTMCNC: 32.5 G/DL (ref 31.5–36.5)
MCV RBC AUTO: 101 FL (ref 78–100)
MONOCYTES # BLD AUTO: 0.5 10E9/L (ref 0–1.3)
MONOCYTES NFR BLD AUTO: 11.2 %
NEUTROPHILS # BLD AUTO: 2.1 10E9/L (ref 1.6–8.3)
NEUTROPHILS NFR BLD AUTO: 46.4 %
NRBC # BLD AUTO: 0 10*3/UL
NRBC BLD AUTO-RTO: 0 /100
PLATELET # BLD AUTO: 151 10E9/L (ref 150–450)
POTASSIUM SERPL-SCNC: 4.3 MMOL/L (ref 3.4–5.3)
PROT SERPL-MCNC: 7.4 G/DL (ref 6.8–8.8)
RBC # BLD AUTO: 3.04 10E12/L (ref 3.8–5.2)
SODIUM SERPL-SCNC: 133 MMOL/L (ref 133–144)
TACROLIMUS BLD-MCNC: 8.2 UG/L (ref 5–15)
TME LAST DOSE: NORMAL H
WBC # BLD AUTO: 4.6 10E9/L (ref 4–11)

## 2021-01-06 PROCEDURE — G0463 HOSPITAL OUTPT CLINIC VISIT: HCPCS | Mod: 25

## 2021-01-06 PROCEDURE — 250N000011 HC RX IP 250 OP 636: Performed by: INTERNAL MEDICINE

## 2021-01-06 PROCEDURE — 258N000003 HC RX IP 258 OP 636: Performed by: INTERNAL MEDICINE

## 2021-01-06 PROCEDURE — 99213 OFFICE O/P EST LOW 20 MIN: CPT

## 2021-01-06 PROCEDURE — 96365 THER/PROPH/DIAG IV INF INIT: CPT

## 2021-01-06 PROCEDURE — 85025 COMPLETE CBC W/AUTO DIFF WBC: CPT | Performed by: PHYSICIAN ASSISTANT

## 2021-01-06 PROCEDURE — 250N000009 HC RX 250: Performed by: INTERNAL MEDICINE

## 2021-01-06 PROCEDURE — 83735 ASSAY OF MAGNESIUM: CPT | Performed by: PHYSICIAN ASSISTANT

## 2021-01-06 PROCEDURE — 80053 COMPREHEN METABOLIC PANEL: CPT | Performed by: PHYSICIAN ASSISTANT

## 2021-01-06 PROCEDURE — 80197 ASSAY OF TACROLIMUS: CPT | Performed by: PHYSICIAN ASSISTANT

## 2021-01-06 PROCEDURE — 999N000104 HC STATISTIC NO CHARGE

## 2021-01-06 RX ORDER — HEPARIN SODIUM,PORCINE 10 UNIT/ML
5 VIAL (ML) INTRAVENOUS ONCE
Status: COMPLETED | OUTPATIENT
Start: 2021-01-06 | End: 2021-01-06

## 2021-01-06 RX ORDER — HEPARIN SODIUM,PORCINE 10 UNIT/ML
2 VIAL (ML) INTRAVENOUS
Status: CANCELLED | OUTPATIENT
Start: 2021-02-03

## 2021-01-06 RX ADMIN — Medication 5 ML: at 10:23

## 2021-01-06 RX ADMIN — PENTAMIDINE ISETHIONATE 300 MG: 300 INHALANT RESPIRATORY (INHALATION) at 11:18

## 2021-01-06 ASSESSMENT — PAIN SCALES - GENERAL: PAINLEVEL: NO PAIN (0)

## 2021-01-06 NOTE — NURSING NOTE
"Oncology Rooming Note    January 6, 2021 10:41 AM   Florencia Gao is a 75 year old female who presents for:    Chief Complaint   Patient presents with     Blood Draw     Labs drawn from CVC by RN in lab. Vitals taken. Checked into next appointments.      RECHECK     provider appt, med review, s/p BMT for AML     Initial Vitals: /72 (BP Location: Right arm, Patient Position: Sitting, Cuff Size: Adult Regular)   Pulse 84   Temp 98.3  F (36.8  C) (Oral)   Resp 18   Wt 79 kg (174 lb 1.6 oz)   SpO2 100%   BMI 27.98 kg/m   Estimated body mass index is 27.98 kg/m  as calculated from the following:    Height as of 11/3/20: 1.68 m (5' 6.14\").    Weight as of this encounter: 79 kg (174 lb 1.6 oz). Body surface area is 1.92 meters squared.  No Pain (0) Comment: Data Unavailable   No LMP recorded. Patient is postmenopausal.  Allergies reviewed: Yes  Medications reviewed: Yes    Medications: Medication refills not needed today.  Pharmacy name entered into [facility]:    [id number] [place], [place] - [address] [place] [address] AT [facility] [address]  [facility] [place], [place] - [address] [facility] [phone]    Clinical concerns: none     SAMIA STERN RN              "

## 2021-01-06 NOTE — PROGRESS NOTES
Infusion Nursing Note:  Florencia Gao presents today for pentamidine.    Patient seen by provider today: Yes: HAI Razo   present during visit today: Not Applicable.    Note: Patient given IV pentamidine over 1 hour without difficulty.  No other needs based on today's labs.     Intravenous Access:  Padilla.    Treatment Conditions:  Lab Results   Component Value Date    HGB 10.0 01/06/2021     Lab Results   Component Value Date    WBC 4.6 01/06/2021      Lab Results   Component Value Date    ANEU 2.1 01/06/2021     Lab Results   Component Value Date     01/06/2021      Lab Results   Component Value Date     01/06/2021                   Lab Results   Component Value Date    POTASSIUM 4.3 01/06/2021           Lab Results   Component Value Date    MAG 1.7 01/06/2021            Lab Results   Component Value Date    CR 0.98 01/06/2021                   Lab Results   Component Value Date    SANDRA 9.3 01/06/2021                Lab Results   Component Value Date    BILITOTAL 0.3 01/06/2021           Lab Results   Component Value Date    ALBUMIN 4.1 01/06/2021                    Lab Results   Component Value Date    ALT 29 01/06/2021           Lab Results   Component Value Date    AST 27 01/06/2021           Post Infusion Assessment:  Patient tolerated infusion without incident.       Discharge Plan:   Copy of AVS reviewed with patient and/or family.  Patient will return as scheduled for next appointment.  Patient discharged in stable condition accompanied by: self.  Departure Mode: Ambulatory.    SAMIA STERN RN

## 2021-01-06 NOTE — NURSING NOTE
Chief Complaint   Patient presents with     Blood Draw     Labs drawn from CVC by RN in lab. Vitals taken. Checked into next appointments.      CVC accessed by RN in lab, labs drawn.  Both lines flushed with heparin. Both caps changed.   Vital signs taken.  Pt checked in to next appointments.     Marilee Menjivar RN

## 2021-01-06 NOTE — LETTER
1/6/2021         RE: Florencia Gao  [address] [place] [address]        Dear Colleague,    Thank you for referring your patient, Florencia Gao, to the [facility] BLOOD AND MARROW TRANSPLANT PROGRAM [place]. Please see a copy of my visit note below.    Infusion Nursing Note:  Florencia Gao presents today for pentamidine.    Patient seen by provider today: Yes: HIA Razo   present during visit today: Not Applicable.    Note: Patient given IV pentamidine over 1 hour without difficulty.  No other needs based on today's labs.     Intravenous Access:  Padilla.    Treatment Conditions:  Lab Results   Component Value Date    HGB 10.0 01/06/2021     Lab Results   Component Value Date    WBC 4.6 01/06/2021      Lab Results   Component Value Date    ANEU 2.1 01/06/2021     Lab Results   Component Value Date     01/06/2021      Lab Results   Component Value Date     01/06/2021                   Lab Results   Component Value Date    POTASSIUM 4.3 01/06/2021           Lab Results   Component Value Date    MAG 1.7 01/06/2021            Lab Results   Component Value Date    CR 0.98 01/06/2021                   Lab Results   Component Value Date    SANDRA 9.3 01/06/2021                Lab Results   Component Value Date    BILITOTAL 0.3 01/06/2021           Lab Results   Component Value Date    ALBUMIN 4.1 01/06/2021                    Lab Results   Component Value Date    ALT 29 01/06/2021           Lab Results   Component Value Date    AST 27 01/06/2021           Post Infusion Assessment:  Patient tolerated infusion without incident.       Discharge Plan:   Copy of AVS reviewed with patient and/or family.  Patient will return as scheduled for next appointment.  Patient discharged in stable condition accompanied by: self.  Departure Mode: Ambulatory.    SAMIA STERN, CASSIDY                            Again, thank you for allowing me to participate in the care of your  patient.        Sincerely,        [facility]

## 2021-01-06 NOTE — LETTER
1/6/2021         RE: Florencia Gao  [address] [place] [address]        Dear Colleague,    Thank you for referring your patient, Florencia Gao, to the [facility] BLOOD AND MARROW TRANSPLANT PROGRAM [place]. Please see a copy of my visit note below.    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       Diagnosis AMLM1 Acute myelogeneous leukemia, M1, myeloblastic  HCT Type Allogeneic    Prep Regimen Fludarabine  Cytoxan  TBI   Donor Source Sibling    GVHD Prophylaxis Post transplant cytoxan  Tac/MMF  Primary BMT Provider [facility]       Interval history:      No acute new medical complaints. Sodium stable--she now drinks bloody annabelle mix which has been working. No N/V/D/Rash or infectious symptoms. Getting IV pentamidine.     ROS: 8 ponit negative except as above.      Physical Exam:     Date of GVH Score: 1/6/21  S 0, LGI 0, UGI 0, Liver 0          KPS: 90  Blood pressure 133/72, pulse 84, temperature 98.3  F (36.8  C), temperature source Oral, resp. rate 18, weight 79 kg (174 lb 1.6 oz), SpO2 100 %.    Constitutional: NAD, pleasant and appropriate  HEENT:  NC/AT, no icterus; bilat periorbital edema fluctuates  Lungs: CTAB  Cardiovascular: RRR, no m/r/g  Skin: no rashes or petechiae. Small patch of dry skin on left forearm   MSK/Extremities  No edema  Neurologic: non-focal  Psych: appropriate affect  Access: right upper chest CVC, c/d/i   Labs:     Lab Results   Component Value Date    WBC 4.6 01/06/2021    ANEU 2.1 01/06/2021    HGB 10.0 (L) 01/06/2021    HCT 30.8 (L) 01/06/2021     01/06/2021     01/06/2021    POTASSIUM 4.3 01/06/2021    CHLORIDE 101 01/06/2021    CO2 28 01/06/2021    GLC 99 01/06/2021    BUN 19 01/06/2021    CR 0.98 01/06/2021    MAG 1.7 01/06/2021    INR 1.01 10/26/2020    BILITOTAL 0.3 01/06/2021    AST 27 01/06/2021    ALT 29 01/06/2021    ALKPHOS 111 01/06/2021    PROTTOTAL 7.4  01/06/2021    ALBUMIN 4.1 01/06/2021         Assessment and Plan:   Florencia Gao is a 75 year old female with AML in CR1, day +91 s/p JCARLOS Allo sib pbsct per protocol 2019-10, randomized to Post transplant cytoxan/Tac/MMF.       1.  BMT: Cy/flu/TBI prep.   - Received 4.19 x10(6) CD34/kg. Donor B pos and recipient B neg.     - Restaging day 100. I haven't really seen Florencia before but I think if she comes next Friday and everything stable and no needs we should request line out.     2.  HEME: Keep Hgb>7g/dL and plts>10K.     3.  ID: Afebrile.   - Prophy: Vfend and HD ACV    - IV pentamdine as PJP prophylaxis 12/9, 1/6. Can discuss for next month if we continue this vs alt like mepron/dapsone.  - CMV pending.   - Flu shot 12/2/20                              4.  GI:  No complaints.    5.  GVHD: none to date  S/p MMF; Day +3 and day+4 cytoxan  On tac 1.5 mg BID, level pending. Will be starting taper soon.     6.  FEN/Renal:   Hyponatremia: seems consistent with tac induced hypernatremia as potassium has been on the high side as well. Pt asymptomatic. Now stable.   - tac-induced hypoMg: cont mgOx 1/d (aids mild constipation)     7. Hypothyroidism:  Continue levothyroxine.     8. Psych: Situational Depression continue effexor 75mg daily.   - Zyprexa for sleep    RTC: 1/15 for labs, provider visit, survivorship, if okay request line out.    Naomy Cisneros PA-C  #[id number]

## 2021-01-06 NOTE — PROGRESS NOTES
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       Diagnosis AMLM1 Acute myelogeneous leukemia, M1, myeloblastic  HCT Type Allogeneic    Prep Regimen Fludarabine  Cytoxan  TBI   Donor Source Sibling    GVHD Prophylaxis Post transplant cytoxan  Tac/MMF  Primary BMT Provider [facility]       Interval history:      No acute new medical complaints. Sodium stable--she now drinks bloody annabelle mix which has been working. No N/V/D/Rash or infectious symptoms. Getting IV pentamidine.     ROS: 8 ponit negative except as above.      Physical Exam:     Date of GVH Score: 1/6/21  S 0, LGI 0, UGI 0, Liver 0          KPS: 90  Blood pressure 133/72, pulse 84, temperature 98.3  F (36.8  C), temperature source Oral, resp. rate 18, weight 79 kg (174 lb 1.6 oz), SpO2 100 %.    Constitutional: NAD, pleasant and appropriate  HEENT:  NC/AT, no icterus; bilat periorbital edema fluctuates  Lungs: CTAB  Cardiovascular: RRR, no m/r/g  Skin: no rashes or petechiae. Small patch of dry skin on left forearm   MSK/Extremities  No edema  Neurologic: non-focal  Psych: appropriate affect  Access: right upper chest CVC, c/d/i   Labs:     Lab Results   Component Value Date    WBC 4.6 01/06/2021    ANEU 2.1 01/06/2021    HGB 10.0 (L) 01/06/2021    HCT 30.8 (L) 01/06/2021     01/06/2021     01/06/2021    POTASSIUM 4.3 01/06/2021    CHLORIDE 101 01/06/2021    CO2 28 01/06/2021    GLC 99 01/06/2021    BUN 19 01/06/2021    CR 0.98 01/06/2021    MAG 1.7 01/06/2021    INR 1.01 10/26/2020    BILITOTAL 0.3 01/06/2021    AST 27 01/06/2021    ALT 29 01/06/2021    ALKPHOS 111 01/06/2021    PROTTOTAL 7.4 01/06/2021    ALBUMIN 4.1 01/06/2021         Assessment and Plan:   Florencia Gao is a 75 year old female with AML in CR1, day +91 s/p JCARLOS Allo sib pbsct per protocol 2019-10, randomized to Post transplant cytoxan/Tac/MMF.       1.  BMT: Cy/flu/TBI prep.   - Received 4.19  x10(6) CD34/kg. Donor B pos and recipient B neg.     - Restaging day 100. I haven't really seen Florencia before but I think if she comes next Friday and everything stable and no needs we should request line out.     2.  HEME: Keep Hgb>7g/dL and plts>10K.     3.  ID: Afebrile.   - Prophy: Vfend and HD ACV    - IV pentamdine as PJP prophylaxis 12/9, 1/6. Can discuss for next month if we continue this vs alt like mepron/dapsone.  - CMV pending.   - Flu shot 12/2/20                              4.  GI:  No complaints.    5.  GVHD: none to date  S/p MMF; Day +3 and day+4 cytoxan  On tac 1.5 mg BID, level pending. Will be starting taper soon.     6.  FEN/Renal:   Hyponatremia: seems consistent with tac induced hypernatremia as potassium has been on the high side as well. Pt asymptomatic. Now stable.   - tac-induced hypoMg: cont mgOx 1/d (aids mild constipation)     7. Hypothyroidism:  Continue levothyroxine.     8. Psych: Situational Depression continue effexor 75mg daily.   - Zyprexa for sleep    RTC: 1/15 for labs, provider visit, survivorship, if okay request line out.    Naomy Cisneros PA-C  #[id number]

## 2021-01-15 ENCOUNTER — APPOINTMENT (OUTPATIENT)
Dept: LAB | Facility: CLINIC | Age: 76
End: 2021-01-15
Attending: PHYSICIAN ASSISTANT
Payer: COMMERCIAL

## 2021-01-15 ENCOUNTER — ONCOLOGY VISIT (OUTPATIENT)
Dept: TRANSPLANT | Facility: CLINIC | Age: 76
End: 2021-01-15
Attending: PHYSICIAN ASSISTANT
Payer: COMMERCIAL

## 2021-01-15 VITALS
SYSTOLIC BLOOD PRESSURE: 129 MMHG | RESPIRATION RATE: 16 BRPM | HEART RATE: 79 BPM | DIASTOLIC BLOOD PRESSURE: 83 MMHG | TEMPERATURE: 97 F | BODY MASS INDEX: 28.03 KG/M2 | OXYGEN SATURATION: 97 % | WEIGHT: 174.4 LBS

## 2021-01-15 DIAGNOSIS — C92.01 AML (ACUTE MYELOID LEUKEMIA) IN REMISSION (H): ICD-10-CM

## 2021-01-15 DIAGNOSIS — C92.01 ACUTE MYELOID LEUKEMIA IN REMISSION (H): ICD-10-CM

## 2021-01-15 DIAGNOSIS — Z94.81 STATUS POST BONE MARROW TRANSPLANT (H): Primary | ICD-10-CM

## 2021-01-15 LAB
ALBUMIN SERPL-MCNC: 4.1 G/DL (ref 3.4–5)
ALP SERPL-CCNC: 119 U/L (ref 40–150)
ALT SERPL W P-5'-P-CCNC: 29 U/L (ref 0–50)
ANION GAP SERPL CALCULATED.3IONS-SCNC: 2 MMOL/L (ref 3–14)
AST SERPL W P-5'-P-CCNC: 31 U/L (ref 0–45)
BASOPHILS # BLD AUTO: 0 10E9/L (ref 0–0.2)
BASOPHILS NFR BLD AUTO: 0.3 %
BILIRUB SERPL-MCNC: 0.3 MG/DL (ref 0.2–1.3)
BUN SERPL-MCNC: 20 MG/DL (ref 7–30)
CALCIUM SERPL-MCNC: 9.4 MG/DL (ref 8.5–10.1)
CHLORIDE SERPL-SCNC: 100 MMOL/L (ref 94–109)
CMV DNA SPEC NAA+PROBE-ACNC: NORMAL [IU]/ML
CMV DNA SPEC NAA+PROBE-LOG#: NORMAL {LOG_IU}/ML
CO2 SERPL-SCNC: 29 MMOL/L (ref 20–32)
CREAT SERPL-MCNC: 0.95 MG/DL (ref 0.52–1.04)
DIFFERENTIAL METHOD BLD: ABNORMAL
EOSINOPHIL # BLD AUTO: 0 10E9/L (ref 0–0.7)
EOSINOPHIL NFR BLD AUTO: 1 %
ERYTHROCYTE [DISTWIDTH] IN BLOOD BY AUTOMATED COUNT: 14.7 % (ref 10–15)
GFR SERPL CREATININE-BSD FRML MDRD: 59 ML/MIN/{1.73_M2}
GLUCOSE SERPL-MCNC: 89 MG/DL (ref 70–99)
HCT VFR BLD AUTO: 31.6 % (ref 35–47)
HGB BLD-MCNC: 10.4 G/DL (ref 11.7–15.7)
IMM GRANULOCYTES # BLD: 0 10E9/L (ref 0–0.4)
IMM GRANULOCYTES NFR BLD: 0.5 %
LYMPHOCYTES # BLD AUTO: 1.7 10E9/L (ref 0.8–5.3)
LYMPHOCYTES NFR BLD AUTO: 44.2 %
MAGNESIUM SERPL-MCNC: 1.9 MG/DL (ref 1.6–2.3)
MCH RBC QN AUTO: 32.9 PG (ref 26.5–33)
MCHC RBC AUTO-ENTMCNC: 32.9 G/DL (ref 31.5–36.5)
MCV RBC AUTO: 100 FL (ref 78–100)
MONOCYTES # BLD AUTO: 0.5 10E9/L (ref 0–1.3)
MONOCYTES NFR BLD AUTO: 11.9 %
NEUTROPHILS # BLD AUTO: 1.7 10E9/L (ref 1.6–8.3)
NEUTROPHILS NFR BLD AUTO: 42.1 %
NRBC # BLD AUTO: 0 10*3/UL
NRBC BLD AUTO-RTO: 0 /100
PLATELET # BLD AUTO: 142 10E9/L (ref 150–450)
POTASSIUM SERPL-SCNC: 4.5 MMOL/L (ref 3.4–5.3)
PROT SERPL-MCNC: 7.5 G/DL (ref 6.8–8.8)
RBC # BLD AUTO: 3.16 10E12/L (ref 3.8–5.2)
SODIUM SERPL-SCNC: 132 MMOL/L (ref 133–144)
SPECIMEN SOURCE: NORMAL
TACROLIMUS BLD-MCNC: 8.6 UG/L (ref 5–15)
TME LAST DOSE: NORMAL H
WBC # BLD AUTO: 3.9 10E9/L (ref 4–11)

## 2021-01-15 PROCEDURE — 81268 CHIMERISM ANAL W/CELL SELECT: CPT

## 2021-01-15 PROCEDURE — 36592 COLLECT BLOOD FROM PICC: CPT

## 2021-01-15 PROCEDURE — 80053 COMPREHEN METABOLIC PANEL: CPT | Performed by: PHYSICIAN ASSISTANT

## 2021-01-15 PROCEDURE — 83735 ASSAY OF MAGNESIUM: CPT | Performed by: PHYSICIAN ASSISTANT

## 2021-01-15 PROCEDURE — G0452 MOLECULAR PATHOLOGY INTERPR: HCPCS | Mod: 26 | Performed by: PATHOLOGY

## 2021-01-15 PROCEDURE — 250N000011 HC RX IP 250 OP 636: Performed by: PHYSICIAN ASSISTANT

## 2021-01-15 PROCEDURE — 99214 OFFICE O/P EST MOD 30 MIN: CPT

## 2021-01-15 PROCEDURE — 85025 COMPLETE CBC W/AUTO DIFF WBC: CPT | Performed by: PHYSICIAN ASSISTANT

## 2021-01-15 PROCEDURE — G0463 HOSPITAL OUTPT CLINIC VISIT: HCPCS

## 2021-01-15 PROCEDURE — 80197 ASSAY OF TACROLIMUS: CPT | Performed by: PHYSICIAN ASSISTANT

## 2021-01-15 PROCEDURE — 81268 CHIMERISM ANAL W/CELL SELECT: CPT | Performed by: PHYSICIAN ASSISTANT

## 2021-01-15 RX ORDER — HEPARIN SODIUM,PORCINE 10 UNIT/ML
5 VIAL (ML) INTRAVENOUS
Status: DISCONTINUED | OUTPATIENT
Start: 2021-01-15 | End: 2021-01-15 | Stop reason: HOSPADM

## 2021-01-15 RX ADMIN — Medication 5 ML: at 09:41

## 2021-01-15 ASSESSMENT — PAIN SCALES - GENERAL: PAINLEVEL: NO PAIN (0)

## 2021-01-15 NOTE — NURSING NOTE
Chief Complaint   Patient presents with     Blood Draw     labs drawn from cvc by rn in lab.  vital signs taken.     CVC accessed by RN in lab, labs drawn.  Both lines flushed with heparin.    Vital signs taken.  Pt checked in to next appointment.    Brittney Adamson RN

## 2021-01-15 NOTE — NURSING NOTE
"Oncology Rooming Note    January 15, 2021 10:15 AM   Florencia Gao is a 75 year old female who presents for:    Chief Complaint   Patient presents with     Blood Draw     labs drawn from cvc by rn in lab.  vital signs taken.     RECHECK     AML     Initial Vitals: /83 (BP Location: Right arm, Patient Position: Sitting, Cuff Size: Adult Regular)   Pulse 79   Temp 97  F (36.1  C) (Tympanic)   Resp 16   Wt 79.1 kg (174 lb 6.4 oz)   SpO2 97%   BMI 28.03 kg/m   Estimated body mass index is 28.03 kg/m  as calculated from the following:    Height as of 11/3/20: 1.68 m (5' 6.14\").    Weight as of this encounter: 79.1 kg (174 lb 6.4 oz). Body surface area is 1.92 meters squared.  No Pain (0) Comment: Data Unavailable   No LMP recorded. Patient is postmenopausal.  Allergies reviewed: Yes  Medications reviewed: Yes    Medications: Medication refills not needed today.  Pharmacy name entered into [facility]:    [facility] #[id number] [place], [place] - [address] [place] [address] AT [facility] [address]  [facility] [place], [place] - [address] [facility] [phone]    Clinical concerns: NONE       Meme Klein CMA              "

## 2021-01-15 NOTE — NURSING NOTE
Dressing change completed sterile technique used. Site WDL. Patient requested stat lock be removed. Patient tolerated dressing change. See Dock Flowsheet.     EFRAIN Moss

## 2021-01-15 NOTE — LETTER
1/15/2021         RE: Florencia Gao  [address] [place] [address]        Dear Colleague,    Thank you for referring your patient, Florencia Gao, to the [facility] BLOOD AND MARROW TRANSPLANT PROGRAM [place]. Please see a copy of my visit note below.    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       Diagnosis AMLM1 Acute myelogeneous leukemia, M1, myeloblastic  HCT Type Allogeneic    Prep Regimen Fludarabine  Cytoxan  TBI   Donor Source Sibling    GVHD Prophylaxis Post transplant cytoxan  Tac/MMF  Primary BMT Provider [facility]       Interval history:      No acute new medical complaints. Sodium stable--she now drinks bloody annabelle mix which has been working. No N/V/D/Rash or infectious symptoms.     ROS: 8 ponit negative except as above.      Physical Exam:   Date of GVH Score: 1/15/21  S 0, LGI 0, UGI 0, Liver 0          KPS: 90  Blood pressure 129/83, pulse 79, temperature 97  F (36.1  C), temperature source Tympanic, resp. rate 16, weight 79.1 kg (174 lb 6.4 oz), SpO2 97 %.    Constitutional: NAD, pleasant and appropriate  HEENT:  NC/AT, no icterus; bilat periorbital edema fluctuates  Lungs: CTAB  Cardiovascular: RRR, no m/r/g  Skin: no rashes or petechiae. Small patch of dry skin on left forearm   MSK/Extremities  No edema  Neurologic: non-focal  Psych: appropriate affect  Access: right upper chest CVC, c/d/i   Labs:     Lab Results   Component Value Date    WBC 3.9 (L) 01/15/2021    ANEU 1.7 01/15/2021    HGB 10.4 (L) 01/15/2021    HCT 31.6 (L) 01/15/2021     (L) 01/15/2021     01/06/2021    POTASSIUM 4.3 01/06/2021    CHLORIDE 101 01/06/2021    CO2 28 01/06/2021    GLC 99 01/06/2021    BUN 19 01/06/2021    CR 0.98 01/06/2021    MAG 1.7 01/06/2021    INR 1.01 10/26/2020    BILITOTAL 0.3 01/06/2021    AST 27 01/06/2021    ALT 29 01/06/2021    ALKPHOS 111 01/06/2021    PROTTOTAL 7.4 01/06/2021     ALBUMIN 4.1 01/06/2021         Assessment and Plan:   Florencia Gao is a 75 year old female with AML in CR1, day +100 s/p JCARLOS Allo sib pbsct per protocol 2019-10, randomized to Post transplant cytoxan/Tac/MMF.       1.  BMT: Cy/flu/TBI prep.   - Received 4.19 x10(6) CD34/kg. Donor B pos and recipient B neg.     - Restaging day 100. Next week.   - requested CVC removal.   - Day +100 PB RFLP pending.     2.  HEME: Keep Hgb>7g/dL and plts>10K.     3.  ID: Afebrile.   - Prophy: Vfend and HD ACV    - IV pentamdine as PJP prophylaxis 12/9, 1/6. Can discuss for next month if we continue this vs alt like mepron/dapsone.  - CMV pending.   - Flu shot 12/2/20                              4.  GI:  No complaints.    5.  GVHD: none to date  S/p MMF; Day +3 and day+4 cytoxan  On tac 1.5 mg BID, level pending. Will be starting taper next week so if comes back on higher side on Sat would rec pharm D call her to decrease.      6.  FEN/Renal:   Hyponatremia: seems consistent with tac induced hypernatremia as potassium has been on the high side as well. Pt asymptomatic. Now stable.   - tac-induced hypoMg: cont mgOx 1/d (aids mild constipation)     7. Hypothyroidism:  Continue levothyroxine.     8. Psych: Situational Depression continue effexor 75mg daily.   - Zyprexa for sleep    RTC: next week bmbx and provider visit, survivorship, Dr. Moncada visit, line removal requested. I also requested a visit 1/26 (1 week after 1/19 visit) can change if needed. Ordered labs for 1/19 and 1/26. COVID not ordered yet as no line removal date yet.     Naomy Cisneros PA-C  #[id number]

## 2021-01-15 NOTE — PROGRESS NOTES
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       Diagnosis AMLM1 Acute myelogeneous leukemia, M1, myeloblastic  HCT Type Allogeneic    Prep Regimen Fludarabine  Cytoxan  TBI   Donor Source Sibling    GVHD Prophylaxis Post transplant cytoxan  Tac/MMF  Primary BMT Provider [facility]       Interval history:      No acute new medical complaints. Sodium stable--she now drinks bloody annabelle mix which has been working. No N/V/D/Rash or infectious symptoms.     ROS: 8 ponit negative except as above.      Physical Exam:   Date of GVH Score: 1/15/21  S 0, LGI 0, UGI 0, Liver 0          KPS: 90  Blood pressure 129/83, pulse 79, temperature 97  F (36.1  C), temperature source Tympanic, resp. rate 16, weight 79.1 kg (174 lb 6.4 oz), SpO2 97 %.    Constitutional: NAD, pleasant and appropriate  HEENT:  NC/AT, no icterus; bilat periorbital edema fluctuates  Lungs: CTAB  Cardiovascular: RRR, no m/r/g  Skin: no rashes or petechiae. Small patch of dry skin on left forearm   MSK/Extremities  No edema  Neurologic: non-focal  Psych: appropriate affect  Access: right upper chest CVC, c/d/i   Labs:     Lab Results   Component Value Date    WBC 3.9 (L) 01/15/2021    ANEU 1.7 01/15/2021    HGB 10.4 (L) 01/15/2021    HCT 31.6 (L) 01/15/2021     (L) 01/15/2021     01/06/2021    POTASSIUM 4.3 01/06/2021    CHLORIDE 101 01/06/2021    CO2 28 01/06/2021    GLC 99 01/06/2021    BUN 19 01/06/2021    CR 0.98 01/06/2021    MAG 1.7 01/06/2021    INR 1.01 10/26/2020    BILITOTAL 0.3 01/06/2021    AST 27 01/06/2021    ALT 29 01/06/2021    ALKPHOS 111 01/06/2021    PROTTOTAL 7.4 01/06/2021    ALBUMIN 4.1 01/06/2021         Assessment and Plan:   Florencia Gao is a 75 year old female with AML in CR1, day +100 s/p JCARLOS Allo sib pbsct per protocol 2019-10, randomized to Post transplant cytoxan/Tac/MMF.       1.  BMT: Cy/flu/TBI prep.   - Received 4.19 x10(6) CD34/kg.  Donor B pos and recipient B neg.     - Restaging day 100. Next week.   - requested CVC removal.   - Day +100 PB RFLP pending.     2.  HEME: Keep Hgb>7g/dL and plts>10K.     3.  ID: Afebrile.   - Prophy: Vfend and HD ACV    - IV pentamdine as PJP prophylaxis 12/9, 1/6. Can discuss for next month if we continue this vs alt like mepron/dapsone.  - CMV pending.   - Flu shot 12/2/20                              4.  GI:  No complaints.    5.  GVHD: none to date  S/p MMF; Day +3 and day+4 cytoxan  On tac 1.5 mg BID, level pending. Will be starting taper next week so if comes back on higher side on Sat would rec pharm D call her to decrease.      6.  FEN/Renal:   Hyponatremia: seems consistent with tac induced hypernatremia as potassium has been on the high side as well. Pt asymptomatic. Now stable.   - tac-induced hypoMg: cont mgOx 1/d (aids mild constipation)     7. Hypothyroidism:  Continue levothyroxine.     8. Psych: Situational Depression continue effexor 75mg daily.   - Zyprexa for sleep    RTC: next week bmbx and provider visit, survivorship, Dr. Moncada visit, line removal requested. I also requested a visit 1/26 (1 week after 1/19 visit) can change if needed. Ordered labs for 1/19 and 1/26. COVID not ordered yet as no line removal date yet.     JAGRUTI RazoC  #[id number]

## 2021-01-18 LAB
COPATH REPORT: NORMAL
COPATH REPORT: NORMAL

## 2021-01-19 ENCOUNTER — ONCOLOGY VISIT (OUTPATIENT)
Dept: TRANSPLANT | Facility: CLINIC | Age: 76
End: 2021-01-19
Attending: PHYSICIAN ASSISTANT
Payer: COMMERCIAL

## 2021-01-19 ENCOUNTER — APPOINTMENT (OUTPATIENT)
Dept: LAB | Facility: CLINIC | Age: 76
End: 2021-01-19
Attending: PHYSICIAN ASSISTANT
Payer: COMMERCIAL

## 2021-01-19 VITALS
OXYGEN SATURATION: 95 % | WEIGHT: 174.3 LBS | HEART RATE: 89 BPM | BODY MASS INDEX: 28.01 KG/M2 | TEMPERATURE: 98.3 F | DIASTOLIC BLOOD PRESSURE: 87 MMHG | RESPIRATION RATE: 18 BRPM | SYSTOLIC BLOOD PRESSURE: 150 MMHG

## 2021-01-19 DIAGNOSIS — Z92.25 PERSONAL HISTORY OF IMMUNOSUPPRESSIVE THERAPY: ICD-10-CM

## 2021-01-19 DIAGNOSIS — K59.00 CONSTIPATION, UNSPECIFIED CONSTIPATION TYPE: ICD-10-CM

## 2021-01-19 DIAGNOSIS — Z94.81 STATUS POST BONE MARROW TRANSPLANT (H): ICD-10-CM

## 2021-01-19 DIAGNOSIS — D61.810 ANTINEOPLASTIC CHEMOTHERAPY INDUCED PANCYTOPENIA (H): ICD-10-CM

## 2021-01-19 DIAGNOSIS — T45.1X5A ANTINEOPLASTIC CHEMOTHERAPY INDUCED PANCYTOPENIA (H): ICD-10-CM

## 2021-01-19 DIAGNOSIS — E87.8 ELECTROLYTE DISTURBANCE: ICD-10-CM

## 2021-01-19 DIAGNOSIS — Z94.81 STATUS POST BONE MARROW TRANSPLANT (H): Primary | ICD-10-CM

## 2021-01-19 DIAGNOSIS — Z11.59 ENCOUNTER FOR SCREENING FOR OTHER VIRAL DISEASES: Primary | ICD-10-CM

## 2021-01-19 DIAGNOSIS — C92.01 AML (ACUTE MYELOID LEUKEMIA) IN REMISSION (H): Primary | ICD-10-CM

## 2021-01-19 DIAGNOSIS — C92.01 AML (ACUTE MYELOID LEUKEMIA) IN REMISSION (H): ICD-10-CM

## 2021-01-19 LAB
ALBUMIN SERPL-MCNC: 3.8 G/DL (ref 3.4–5)
ALP SERPL-CCNC: 108 U/L (ref 40–150)
ALT SERPL W P-5'-P-CCNC: 29 U/L (ref 0–50)
ANION GAP SERPL CALCULATED.3IONS-SCNC: 4 MMOL/L (ref 3–14)
AST SERPL W P-5'-P-CCNC: 29 U/L (ref 0–45)
BASOPHILS # BLD AUTO: 0 10E9/L (ref 0–0.2)
BASOPHILS NFR BLD AUTO: 0.3 %
BILIRUB SERPL-MCNC: 0.3 MG/DL (ref 0.2–1.3)
BUN SERPL-MCNC: 14 MG/DL (ref 7–30)
CALCIUM SERPL-MCNC: 8.9 MG/DL (ref 8.5–10.1)
CHLORIDE SERPL-SCNC: 105 MMOL/L (ref 94–109)
CMV DNA SPEC NAA+PROBE-ACNC: NORMAL [IU]/ML
CMV DNA SPEC NAA+PROBE-LOG#: NORMAL {LOG_IU}/ML
CO2 SERPL-SCNC: 28 MMOL/L (ref 20–32)
COPATH REPORT: NORMAL
CREAT SERPL-MCNC: 0.89 MG/DL (ref 0.52–1.04)
DIFFERENTIAL METHOD BLD: ABNORMAL
EOSINOPHIL # BLD AUTO: 0.1 10E9/L (ref 0–0.7)
EOSINOPHIL NFR BLD AUTO: 1.3 %
ERYTHROCYTE [DISTWIDTH] IN BLOOD BY AUTOMATED COUNT: 14.2 % (ref 10–15)
GFR SERPL CREATININE-BSD FRML MDRD: 64 ML/MIN/{1.73_M2}
GLUCOSE SERPL-MCNC: 91 MG/DL (ref 70–99)
HCT VFR BLD AUTO: 30.6 % (ref 35–47)
HGB BLD-MCNC: 10.1 G/DL (ref 11.7–15.7)
IMM GRANULOCYTES # BLD: 0 10E9/L (ref 0–0.4)
IMM GRANULOCYTES NFR BLD: 0.3 %
INR PPP: 0.91 (ref 0.86–1.14)
LYMPHOCYTES # BLD AUTO: 1.7 10E9/L (ref 0.8–5.3)
LYMPHOCYTES NFR BLD AUTO: 43.3 %
MAGNESIUM SERPL-MCNC: 1.7 MG/DL (ref 1.6–2.3)
MCH RBC QN AUTO: 33.3 PG (ref 26.5–33)
MCHC RBC AUTO-ENTMCNC: 33 G/DL (ref 31.5–36.5)
MCV RBC AUTO: 101 FL (ref 78–100)
MONOCYTES # BLD AUTO: 0.4 10E9/L (ref 0–1.3)
MONOCYTES NFR BLD AUTO: 10.9 %
NEUTROPHILS # BLD AUTO: 1.7 10E9/L (ref 1.6–8.3)
NEUTROPHILS NFR BLD AUTO: 43.9 %
NRBC # BLD AUTO: 0 10*3/UL
NRBC BLD AUTO-RTO: 0 /100
PLATELET # BLD AUTO: 141 10E9/L (ref 150–450)
POTASSIUM SERPL-SCNC: 4.3 MMOL/L (ref 3.4–5.3)
PROT SERPL-MCNC: 7.1 G/DL (ref 6.8–8.8)
RBC # BLD AUTO: 3.03 10E12/L (ref 3.8–5.2)
SODIUM SERPL-SCNC: 137 MMOL/L (ref 133–144)
SPECIMEN SOURCE: NORMAL
TACROLIMUS BLD-MCNC: 8 UG/L (ref 5–15)
TME LAST DOSE: NORMAL H
WBC # BLD AUTO: 3.9 10E9/L (ref 4–11)

## 2021-01-19 PROCEDURE — 88264 CHROMOSOME ANALYSIS 20-25: CPT | Performed by: PHYSICIAN ASSISTANT

## 2021-01-19 PROCEDURE — 88305 TISSUE EXAM BY PATHOLOGIST: CPT | Mod: TC | Performed by: PHYSICIAN ASSISTANT

## 2021-01-19 PROCEDURE — 88185 FLOWCYTOMETRY/TC ADD-ON: CPT | Mod: TC | Performed by: PHYSICIAN ASSISTANT

## 2021-01-19 PROCEDURE — 88305 TISSUE EXAM BY PATHOLOGIST: CPT | Mod: 26 | Performed by: STUDENT IN AN ORGANIZED HEALTH CARE EDUCATION/TRAINING PROGRAM

## 2021-01-19 PROCEDURE — 88189 FLOWCYTOMETRY/READ 16 & >: CPT | Performed by: PATHOLOGY

## 2021-01-19 PROCEDURE — 88184 FLOWCYTOMETRY/ TC 1 MARKER: CPT | Mod: TC | Performed by: PHYSICIAN ASSISTANT

## 2021-01-19 PROCEDURE — 250N000011 HC RX IP 250 OP 636: Performed by: PHYSICIAN ASSISTANT

## 2021-01-19 PROCEDURE — 88305 TISSUE EXAM BY PATHOLOGIST: CPT | Performed by: PHYSICIAN ASSISTANT

## 2021-01-19 PROCEDURE — 81268 CHIMERISM ANAL W/CELL SELECT: CPT | Performed by: PHYSICIAN ASSISTANT

## 2021-01-19 PROCEDURE — 999N001086 HC STATISTIC MORPHOLOGY W/INTERP HEMEPATH TC 85060: Performed by: PHYSICIAN ASSISTANT

## 2021-01-19 PROCEDURE — 38222 DX BONE MARROW BX & ASPIR: CPT

## 2021-01-19 PROCEDURE — 88280 CHROMOSOME KARYOTYPE STUDY: CPT | Performed by: PHYSICIAN ASSISTANT

## 2021-01-19 PROCEDURE — 85025 COMPLETE CBC W/AUTO DIFF WBC: CPT | Performed by: PHYSICIAN ASSISTANT

## 2021-01-19 PROCEDURE — 85097 BONE MARROW INTERPRETATION: CPT | Performed by: PHYSICIAN ASSISTANT

## 2021-01-19 PROCEDURE — 83735 ASSAY OF MAGNESIUM: CPT | Performed by: PHYSICIAN ASSISTANT

## 2021-01-19 PROCEDURE — G0463 HOSPITAL OUTPT CLINIC VISIT: HCPCS | Mod: 25

## 2021-01-19 PROCEDURE — 81403 MOPATH PROCEDURE LEVEL 4: CPT | Mod: XU | Performed by: PHYSICIAN ASSISTANT

## 2021-01-19 PROCEDURE — 88311 DECALCIFY TISSUE: CPT | Mod: 26 | Performed by: STUDENT IN AN ORGANIZED HEALTH CARE EDUCATION/TRAINING PROGRAM

## 2021-01-19 PROCEDURE — 80197 ASSAY OF TACROLIMUS: CPT | Performed by: PHYSICIAN ASSISTANT

## 2021-01-19 PROCEDURE — 88275 CYTOGENETICS 100-300: CPT | Mod: XU | Performed by: PHYSICIAN ASSISTANT

## 2021-01-19 PROCEDURE — 88161 CYTOPATH SMEAR OTHER SOURCE: CPT | Mod: TC,XU | Performed by: PHYSICIAN ASSISTANT

## 2021-01-19 PROCEDURE — 88237 TISSUE CULTURE BONE MARROW: CPT | Performed by: PHYSICIAN ASSISTANT

## 2021-01-19 PROCEDURE — 999N001068 HC STATISTIC BONE MARROW CORE PERF TC 38221: Performed by: PHYSICIAN ASSISTANT

## 2021-01-19 PROCEDURE — 999N001126 HC STATISTIC BONE MARROW INTERP TC 85097: Performed by: PHYSICIAN ASSISTANT

## 2021-01-19 PROCEDURE — 99214 OFFICE O/P EST MOD 30 MIN: CPT | Mod: 25

## 2021-01-19 PROCEDURE — G0452 MOLECULAR PATHOLOGY INTERPR: HCPCS | Mod: 26 | Performed by: PATHOLOGY

## 2021-01-19 PROCEDURE — 80053 COMPREHEN METABOLIC PANEL: CPT | Performed by: PHYSICIAN ASSISTANT

## 2021-01-19 PROCEDURE — 85610 PROTHROMBIN TIME: CPT | Performed by: PHYSICIAN ASSISTANT

## 2021-01-19 PROCEDURE — 88271 CYTOGENETICS DNA PROBE: CPT | Performed by: PHYSICIAN ASSISTANT

## 2021-01-19 PROCEDURE — 999N001022 HC STATISTIC H-SPHEME PROCESS B/S: Performed by: PHYSICIAN ASSISTANT

## 2021-01-19 PROCEDURE — 999N001137 HC STATISTIC FLOW >15 ABY TC 88189: Performed by: PHYSICIAN ASSISTANT

## 2021-01-19 PROCEDURE — 88311 DECALCIFY TISSUE: CPT | Mod: TC | Performed by: PHYSICIAN ASSISTANT

## 2021-01-19 RX ORDER — HYDROMORPHONE HYDROCHLORIDE 1 MG/ML
0.3 INJECTION, SOLUTION INTRAMUSCULAR; INTRAVENOUS; SUBCUTANEOUS ONCE
Status: COMPLETED | OUTPATIENT
Start: 2021-01-19 | End: 2021-01-19

## 2021-01-19 RX ORDER — HEPARIN SODIUM,PORCINE 10 UNIT/ML
5 VIAL (ML) INTRAVENOUS
Status: DISCONTINUED | OUTPATIENT
Start: 2021-01-19 | End: 2021-01-19 | Stop reason: HOSPADM

## 2021-01-19 RX ORDER — HEPARIN SODIUM (PORCINE) LOCK FLUSH IV SOLN 100 UNIT/ML 100 UNIT/ML
5 SOLUTION INTRAVENOUS
Status: CANCELLED | OUTPATIENT
Start: 2021-01-19

## 2021-01-19 RX ORDER — HEPARIN SODIUM,PORCINE 10 UNIT/ML
5 VIAL (ML) INTRAVENOUS
Status: CANCELLED | OUTPATIENT
Start: 2021-01-19

## 2021-01-19 RX ADMIN — Medication 5 ML: at 08:55

## 2021-01-19 RX ADMIN — Medication 5 ML: at 08:54

## 2021-01-19 RX ADMIN — HYDROMORPHONE HYDROCHLORIDE 0.3 MG: 1 INJECTION, SOLUTION INTRAMUSCULAR; INTRAVENOUS; SUBCUTANEOUS at 09:37

## 2021-01-19 ASSESSMENT — PAIN SCALES - GENERAL
PAINLEVEL: NO PAIN (0)
PAINLEVEL: NO PAIN (0)

## 2021-01-19 NOTE — LETTER
"    1/19/2021         RE: Florencia Gao  [address] [place] [address]        Dear Colleague,    Thank you for referring your patient, Florencia Gao, to the [facility] BLOOD AND MARROW TRANSPLANT PROGRAM [place]. Please see a copy of my visit note below.        Assessment & Plan     AML (acute myeloid leukemia) in remission (H)  Day +104 s/p reduced intensity Allo Sib PBSCT for AML (trisomy 8, IDH2).  Bone marrow biopsy today    - IR CVC Tunnel Removal Right; Future (order placed in epic and scheduling requested 1/19/2021)    Status post bone marrow transplant (H)    Antineoplastic chemotherapy induced pancytopenia (H)  -CBC reviewed today reveals ongoing mild pancytopenia secondary to chemotherapy and stem cell transplant.  No transfusions needed.       Personal history of immunosuppressive therapy  -continue on prophylactic acyclovir, pentamidine (given 1/6; consider alternative in February to reduce clinic visit burden), voriconazole (which can be discontinued after tacrolimus taper is complete).  -tacrolimus taper supplied today.  She will not need liquid tacrolimus yet, so that rx has not yet been sent.    Electrolyte Disturbance  - hyponatremia has resolved with drinking bloody annabelle mix with an olive  - continue magnesium oxide 1 tab per day to manage tacrolimus induced hypomagnesemia (side effect of treatment of chronic medical condition)    Constipation  - managed with her oral magnesium      Review of the result(s) of each unique test - CBC, CMP, magnesium level  Assessment requiring an independent historian(s) - family - \"Makenna\" via telephone       20 minutes spent on the date of the encounter doing chart review, history and exam, documentation and further activities as noted above         BMI:   Estimated body mass index is 28.01 kg/m  as calculated from the following:    Height as of 11/3/20: 1.68 m (5' 6.14\").    Weight as of an earlier encounter on 1/19/21: 79.1 kg (174 lb 4.8 " oz).       MEDICATIONS:        - reduce tacrolimus per taper calendar supplied today (1/19/2021)  FUTURE LABS:       - CBC, CMP, magnesium monitoring at next visit  FUTURE APPOINTMENTS:       - Follow-up office or E-visit in 2 days with Dr. Moncada  Regular exercise      Subjective     Florencia is a 75 year old who presents to clinic today for the following health issues.  Her caregiver was present via speaker phone to discuss medication changes.    HPI   Florencia does not have any nausea, vomiting, diarrhea nor rash.  She has some constipation, but bowels are moving well lately.  She continues to drink bloody annabelle mix with an olive to keep her sodium level high enough.      Review of Systems   CONSTITUTIONAL:NEGATIVE for fever, chills, change in weight  INTEGUMENTARY/SKIN: NEGATIVE for worrisome rashes, moles or lesions  GI: NEGATIVE for nausea, abdominal pain, heartburn, or change in bowel habits  MUSCULOSKELETAL: osteoporosis and some weakness; not exercising regularly  NEURO: NEGATIVE for dizziness/lightheadedness  HEME/ALLERGY/IMMUNE: NEGATIVE for bleeding problems      Objective    Vital signs reviewed and acceptable.    Physical Exam   GENERAL: healthy, alert and no distress  RESP: lungs clear to auscultation - no rales, rhonchi or wheezes  CV: regular rate and rhythm, normal S1 S2, no S3 or S4, no murmur, click or rub, no peripheral edema and peripheral pulses strong  SKIN: no suspicious lesions or rashes  NEURO: mentation intact and normal gait    Results for orders placed or performed in visit on 01/19/21 (from the past 24 hour(s))   INR   Result Value Ref Range    INR 0.91 0.86 - 1.14   Magnesium   Result Value Ref Range    Magnesium 1.7 1.6 - 2.3 mg/dL   Comprehensive metabolic panel   Result Value Ref Range    Sodium 137 133 - 144 mmol/L    Potassium 4.3 3.4 - 5.3 mmol/L    Chloride 105 94 - 109 mmol/L    Carbon Dioxide 28 20 - 32 mmol/L    Anion Gap 4 3 - 14 mmol/L    Glucose 91 70 - 99 mg/dL    Urea  Nitrogen 14 7 - 30 mg/dL    Creatinine 0.89 0.52 - 1.04 mg/dL    GFR Estimate 64 >60 mL/min/[1.73_m2]    GFR Estimate If Black 74 >60 mL/min/[1.73_m2]    Calcium 8.9 8.5 - 10.1 mg/dL    Bilirubin Total 0.3 0.2 - 1.3 mg/dL    Albumin 3.8 3.4 - 5.0 g/dL    Protein Total 7.1 6.8 - 8.8 g/dL    Alkaline Phosphatase 108 40 - 150 U/L    ALT 29 0 - 50 U/L    AST 29 0 - 45 U/L   CBC with platelets differential   Result Value Ref Range    WBC 3.9 (L) 4.0 - 11.0 10e9/L    RBC Count 3.03 (L) 3.8 - 5.2 10e12/L    Hemoglobin 10.1 (L) 11.7 - 15.7 g/dL    Hematocrit 30.6 (L) 35.0 - 47.0 %     (H) 78 - 100 fl    MCH 33.3 (H) 26.5 - 33.0 pg    MCHC 33.0 31.5 - 36.5 g/dL    RDW 14.2 10.0 - 15.0 %    Platelet Count 141 (L) 150 - 450 10e9/L    Diff Method Automated Method     % Neutrophils 43.9 %    % Lymphocytes 43.3 %    % Monocytes 10.9 %    % Eosinophils 1.3 %    % Basophils 0.3 %    % Immature Granulocytes 0.3 %    Nucleated RBCs 0 0 /100    Absolute Neutrophil 1.7 1.6 - 8.3 10e9/L    Absolute Lymphocytes 1.7 0.8 - 5.3 10e9/L    Absolute Monocytes 0.4 0.0 - 1.3 10e9/L    Absolute Eosinophils 0.1 0.0 - 0.7 10e9/L    Absolute Basophils 0.0 0.0 - 0.2 10e9/L    Abs Immature Granulocytes 0.0 0 - 0.4 10e9/L    Absolute Nucleated RBC 0.0        I have assessed all abnormal lab values for their clinical significance and any values considered clinically significant have been addressed in the assessment and plan.      I spent a total of 20 minutes face-to-face with Florencia Gao during today's office visit.  Over 50% of this time was spent counseling the patient and/or coordinating care regarding immunosuppression.  See note for details.      Mally Dimas PA-C  1/19/2021        Again, thank you for allowing me to participate in the care of your patient.        Sincerely,        BMT Advanced Practice Provider

## 2021-01-19 NOTE — NURSING NOTE
Patient alert and oriented upon arrival for today's BMBX.  Consents obtained prior to administration of 0.3mg IVP Dilaudid.  Patient supine for 30 minutes following biopsy with both side rails up and call light within reach. After 30 minutes, dressing clean, dry and intact. Vital signs stable. See DOC flow sheets for details. Left via wheelchair, escorted by RN to daughter in law's vehicle as patient has been noted to fall post sedation.

## 2021-01-19 NOTE — LETTER
1/19/2021         RE: Florencia Gao  [address] [place] [address]        Dear Colleague,    Thank you for referring your patient, Florencia Gao, to the [facility] BLOOD AND MARROW TRANSPLANT PROGRAM [place]. Please see a copy of my visit note below.    BMT ONC Adult Bone Marrow Biopsy Procedure Note  January 19, 2021  /83 (BP Location: Right arm, Patient Position: Sitting, Cuff Size: Adult Regular)   Pulse 89   Temp 98.3  F (36.8  C) (Oral)   Resp 18   Wt 79.1 kg (174 lb 4.8 oz)   SpO2 95%   BMI 28.01 kg/m       Learning needs assessment complete within 12 months? YES    DIAGNOSIS: AML     PROCEDURE: Unilateral Bone Marrow Biopsy and Unilateral Aspirate    LOCATION: [facility] 2nd Floor    Patient s identification was positively verified by verbal identification and invasive procedure safety checklist was completed. Informed consent was obtained. Following the administration of dilaudid 0.3mg IV as pre-medication, patient was placed in the prone position and prepped and draped in a sterile manner. Approximately 20 cc of 1% Lidocaine was used over the right posterior iliac spine. Following this a 3 mm incision was made. Trephine bone marrow core(s) was (were) obtained from the [facility]. Bone marrow aspirates were obtained from the [facility]. Aspirates were sent for morphology, immunophenotyping, cytogenetics and molecular diagnostics RFLP and next gen. A total of approximately 20 ml of marrow was aspirated. Following this procedure a sterile dressing was applied to the bone marrow biopsy site(s). The patient was placed in the supine position to maintain pressure on the biopsy site. Post-procedure wound care instructions were given.     Complications: YES, bones are incredibly soft.      Pre-procedural pain: 0 out of 10 on the numeric pain rating scale.     Procedural pain: 3 out of 10 on the numeric pain rating scale.     Post-procedural pain assessment: 0 out of 10 on the numeric pain rating  scale.     Interventions: YES; changed to 11 gauge coring needle to procure sample, given how soft bones are    Length of procedure:20 minutes or less      Procedure performed by: Mally Dimas PA-C        BMT Teaching Flowsheet   Teaching Topic: post biopsy instructions    Person(s) involved in teaching: Patient  Motivation Level: High  Asks Questions: Yes  Eager to Learn: Yes  Cooperative: Yes  Receptive (willing/able to accept information): Yes    Patient demonstrates understanding of the following:   - Reason for the appointment, diagnosis and treatment plan: Yes  - Knowledge of proper use of medications and conditions for which they are ordered (with special attention to potential side effects or drug interactions): Yes  - Which situations necessitate calling provider and whom to contact: Yes    Teaching concerns addressed: reviewed activity restrictions if received premeds, potential for bleeding and actions to take if develops any of the issues below    Proper use and care of (medical equipment, care aids, etc.) Yes  Pain management techniques: Yes  Patient instructed on hand hygiene: Yes  How and/when to access community resources: Yes    Infection Control:  Patient demonstrates understanding of the following:   Surgical procedure site care taught NA  Signs and symptoms of infection taught Yes  Wound care taught Yes  Central venous catheter care taught NA    Instructional Materials Used/Given: verbal, print out of post biopsy instructions.     Time spent with patient: 0 minutes.    Specific Concerns: NA      Again, thank you for allowing me to participate in the care of your patient.        Sincerely,        [facility]ARR[facility]     F21

## 2021-01-19 NOTE — PROGRESS NOTES
BMT Teaching Flowsheet   Teaching Topic: post biopsy instructions    Person(s) involved in teaching: Patient  Motivation Level: High  Asks Questions: Yes  Eager to Learn: Yes  Cooperative: Yes  Receptive (willing/able to accept information): Yes    Patient demonstrates understanding of the following:   - Reason for the appointment, diagnosis and treatment plan: Yes  - Knowledge of proper use of medications and conditions for which they are ordered (with special attention to potential side effects or drug interactions): Yes  - Which situations necessitate calling provider and whom to contact: Yes    Teaching concerns addressed: reviewed activity restrictions if received premeds, potential for bleeding and actions to take if develops any of the issues below    Proper use and care of (medical equipment, care aids, etc.) Yes  Pain management techniques: Yes  Patient instructed on hand hygiene: Yes  How and/when to access community resources: Yes    Infection Control:  Patient demonstrates understanding of the following:   Surgical procedure site care taught NA  Signs and symptoms of infection taught Yes  Wound care taught Yes  Central venous catheter care taught NA    Instructional Materials Used/Given: verbal, print out of post biopsy instructions.     Time spent with patient: 0 minutes.    Specific Concerns: NA

## 2021-01-19 NOTE — PROGRESS NOTES
BMT ONC Adult Bone Marrow Biopsy Procedure Note  January 19, 2021  /83 (BP Location: Right arm, Patient Position: Sitting, Cuff Size: Adult Regular)   Pulse 89   Temp 98.3  F (36.8  C) (Oral)   Resp 18   Wt 79.1 kg (174 lb 4.8 oz)   SpO2 95%   BMI 28.01 kg/m       Learning needs assessment complete within 12 months? YES    DIAGNOSIS: AML     PROCEDURE: Unilateral Bone Marrow Biopsy and Unilateral Aspirate    LOCATION: [facility] 2nd Floor    Patient s identification was positively verified by verbal identification and invasive procedure safety checklist was completed. Informed consent was obtained. Following the administration of dilaudid 0.3mg IV as pre-medication, patient was placed in the prone position and prepped and draped in a sterile manner. Approximately 20 cc of 1% Lidocaine was used over the right posterior iliac spine. Following this a 3 mm incision was made. Trephine bone marrow core(s) was (were) obtained from the [facility]. Bone marrow aspirates were obtained from the [facility]. Aspirates were sent for morphology, immunophenotyping, cytogenetics and molecular diagnostics RFLP and next gen. A total of approximately 20 ml of marrow was aspirated. Following this procedure a sterile dressing was applied to the bone marrow biopsy site(s). The patient was placed in the supine position to maintain pressure on the biopsy site. Post-procedure wound care instructions were given.     Complications: YES, bones are incredibly soft.      Pre-procedural pain: 0 out of 10 on the numeric pain rating scale.     Procedural pain: 3 out of 10 on the numeric pain rating scale.     Post-procedural pain assessment: 0 out of 10 on the numeric pain rating scale.     Interventions: YES; changed to 11 gauge coring needle to procure sample, given how soft bones are    Length of procedure:20 minutes or less      Procedure performed by: Mally Dimas PA-C

## 2021-01-19 NOTE — NURSING NOTE
"Oncology Rooming Note    January 19, 2021 9:05 AM   Florencia Gao is a 75 year old female who presents for:    Chief Complaint   Patient presents with     RECHECK     Provider visit, hx AML s/p transplant.     Initial Vitals: There were no vitals taken for this visit. Estimated body mass index is 28.01 kg/m  as calculated from the following:    Height as of 11/3/20: 1.68 m (5' 6.14\").    Weight as of an earlier encounter on 1/19/21: 79.1 kg (174 lb 4.8 oz). There is no height or weight on file to calculate BSA.  Data Unavailable Comment: Data Unavailable   No LMP recorded. Patient is postmenopausal.  Allergies reviewed: Yes  Medications reviewed: Yes    Medications: Medication refills not needed today.  Pharmacy name entered into eLama:    [facility] #[id number] [address] AT [facility] [address]  [place], [place] - [phone]    Clinical concerns: None      Annie Orellana RN              "

## 2021-01-19 NOTE — PROGRESS NOTES
"    Assessment & Plan     AML (acute myeloid leukemia) in remission (H)  Day +104 s/p reduced intensity Allo Sib PBSCT for AML (trisomy 8, IDH2).  Bone marrow biopsy today    - IR CVC Tunnel Removal Right; Future (order placed in epic and scheduling requested 1/19/2021)    Status post bone marrow transplant (H)    Antineoplastic chemotherapy induced pancytopenia (H)  -CBC reviewed today reveals ongoing mild pancytopenia secondary to chemotherapy and stem cell transplant.  No transfusions needed.       Personal history of immunosuppressive therapy  -continue on prophylactic acyclovir, pentamidine (given 1/6; consider alternative in February to reduce clinic visit burden), voriconazole (which can be discontinued after tacrolimus taper is complete).  -tacrolimus taper supplied today.  She will not need liquid tacrolimus yet, so that rx has not yet been sent.    Electrolyte Disturbance  - hyponatremia has resolved with drinking bloody annabelle mix with an olive  - continue magnesium oxide 1 tab per day to manage tacrolimus induced hypomagnesemia (side effect of treatment of chronic medical condition)    Constipation  - managed with her oral magnesium      Review of the result(s) of each unique test - CBC, CMP, magnesium level  Assessment requiring an independent historian(s) - family - \"Makenna\" via telephone       20 minutes spent on the date of the encounter doing chart review, history and exam, documentation and further activities as noted above         BMI:   Estimated body mass index is 28.01 kg/m  as calculated from the following:    Height as of 11/3/20: 1.68 m (5' 6.14\").    Weight as of an earlier encounter on 1/19/21: 79.1 kg (174 lb 4.8 oz).       MEDICATIONS:        - reduce tacrolimus per taper calendar supplied today (1/19/2021)  FUTURE LABS:       - CBC, CMP, magnesium monitoring at next visit  FUTURE APPOINTMENTS:       - Follow-up office or E-visit in 2 days with Dr. Moncada  Regular exercise      Subjective "     Florencia is a 75 year old who presents to clinic today for the following health issues.  Her caregiver was present via speaker phone to discuss medication changes.    HPI   Florencia does not have any nausea, vomiting, diarrhea nor rash.  She has some constipation, but bowels are moving well lately.  She continues to drink bloody annabelle mix with an olive to keep her sodium level high enough.      Review of Systems   CONSTITUTIONAL:NEGATIVE for fever, chills, change in weight  INTEGUMENTARY/SKIN: NEGATIVE for worrisome rashes, moles or lesions  GI: NEGATIVE for nausea, abdominal pain, heartburn, or change in bowel habits  MUSCULOSKELETAL: osteoporosis and some weakness; not exercising regularly  NEURO: NEGATIVE for dizziness/lightheadedness  HEME/ALLERGY/IMMUNE: NEGATIVE for bleeding problems      Objective    Vital signs reviewed and acceptable.    Physical Exam   GENERAL: healthy, alert and no distress  RESP: lungs clear to auscultation - no rales, rhonchi or wheezes  CV: regular rate and rhythm, normal S1 S2, no S3 or S4, no murmur, click or rub, no peripheral edema and peripheral pulses strong  SKIN: no suspicious lesions or rashes  NEURO: mentation intact and normal gait    Results for orders placed or performed in visit on 01/19/21 (from the past 24 hour(s))   INR   Result Value Ref Range    INR 0.91 0.86 - 1.14   Magnesium   Result Value Ref Range    Magnesium 1.7 1.6 - 2.3 mg/dL   Comprehensive metabolic panel   Result Value Ref Range    Sodium 137 133 - 144 mmol/L    Potassium 4.3 3.4 - 5.3 mmol/L    Chloride 105 94 - 109 mmol/L    Carbon Dioxide 28 20 - 32 mmol/L    Anion Gap 4 3 - 14 mmol/L    Glucose 91 70 - 99 mg/dL    Urea Nitrogen 14 7 - 30 mg/dL    Creatinine 0.89 0.52 - 1.04 mg/dL    GFR Estimate 64 >60 mL/min/[1.73_m2]    GFR Estimate If Black 74 >60 mL/min/[1.73_m2]    Calcium 8.9 8.5 - 10.1 mg/dL    Bilirubin Total 0.3 0.2 - 1.3 mg/dL    Albumin 3.8 3.4 - 5.0 g/dL    Protein Total 7.1 6.8 - 8.8  g/dL    Alkaline Phosphatase 108 40 - 150 U/L    ALT 29 0 - 50 U/L    AST 29 0 - 45 U/L   CBC with platelets differential   Result Value Ref Range    WBC 3.9 (L) 4.0 - 11.0 10e9/L    RBC Count 3.03 (L) 3.8 - 5.2 10e12/L    Hemoglobin 10.1 (L) 11.7 - 15.7 g/dL    Hematocrit 30.6 (L) 35.0 - 47.0 %     (H) 78 - 100 fl    MCH 33.3 (H) 26.5 - 33.0 pg    MCHC 33.0 31.5 - 36.5 g/dL    RDW 14.2 10.0 - 15.0 %    Platelet Count 141 (L) 150 - 450 10e9/L    Diff Method Automated Method     % Neutrophils 43.9 %    % Lymphocytes 43.3 %    % Monocytes 10.9 %    % Eosinophils 1.3 %    % Basophils 0.3 %    % Immature Granulocytes 0.3 %    Nucleated RBCs 0 0 /100    Absolute Neutrophil 1.7 1.6 - 8.3 10e9/L    Absolute Lymphocytes 1.7 0.8 - 5.3 10e9/L    Absolute Monocytes 0.4 0.0 - 1.3 10e9/L    Absolute Eosinophils 0.1 0.0 - 0.7 10e9/L    Absolute Basophils 0.0 0.0 - 0.2 10e9/L    Abs Immature Granulocytes 0.0 0 - 0.4 10e9/L    Absolute Nucleated RBC 0.0        I have assessed all abnormal lab values for their clinical significance and any values considered clinically significant have been addressed in the assessment and plan.      I spent a total of 20 minutes face-to-face with Florencia Gao during today's office visit.  Over 50% of this time was spent counseling the patient and/or coordinating care regarding immunosuppression.  See note for details.      Mally Dimas PA-C  1/19/2021

## 2021-01-19 NOTE — PHARMACY-TAPERING SERVICE
Tac Taper    Sunday Monday Tuesday Wednesday Thursday Friday Saturday 17-Jan-2021 18-Jan-2021 19-Jan-2021 20-Jan-2021 21-Jan-2021 22-Jan-2021 23-Jan-2021   1.5 mg AM             1.5 mg PM 1.5 mg AM             1.5 mg PM 1.5 mg AM             1.5 mg PM 1 mg AM             1.5 mg PM 1 mg AM             1.5 mg PM 1 mg AM             1.5 mg PM 1 mg AM             1.5 mg PM   24-Jan-2021 25-Jan-2021 26-Jan-2021 27-Jan-2021 28-Jan-2021 29-Jan-2021 30-Jan-2021   1 mg AM             1 mg PM 1 mg AM             1.5 mg PM 1 mg AM             1.5 mg PM 1 mg AM             1 mg PM 1 mg AM             1 mg PM 1 mg AM             1 mg PM 1 mg AM             1 mg PM   31-Jan-2021 1-Feb-2021 2-Feb-2021 3-Feb-2021 4-Feb-2021 5-Feb-2021 6-Feb-2021   1 mg AM             1 mg PM 1 mg AM             1 mg PM 1 mg AM             1 mg PM 0.5 mg AM             1 mg PM 0.5 mg AM             1 mg PM 0.5 mg AM             1 mg PM 0.5 mg AM             1 mg PM   7-Feb-2021 8-Feb-2021 9-Feb-2021 10-Feb-2021 11-Feb-2021 12-Feb-2021 13-Feb-2021   0.5 mg AM             1 mg PM 0.5 mg AM             1 mg PM 0.5 mg AM             1 mg PM 0.7 mg AM             0.7 mg PM 0.7 mg AM             0.7 mg PM 0.7 mg AM             0.7 mg PM 0.7 mg AM             0.7 mg PM   14-Feb-2021 15-Feb-2021 16-Feb-2021 17-Feb-2021 18-Feb-2021 19-Feb-2021 20-Feb-2021   0.7 mg AM             0.7 mg PM 0.7 mg AM             0.7 mg PM 0.7 mg AM             0.7 mg PM 0.6 mg AM             0.6 mg PM 0.6 mg AM             0.6 mg PM 0.6 mg AM             0.6 mg PM 0.6 mg AM             0.6 mg PM   21-Feb-2021 22-Feb-2021 23-Feb-2021 24-Feb-2021 25-Feb-2021 26-Feb-2021 27-Feb-2021   0.6 mg AM             0.6 mg PM 0.6 mg AM             0.6 mg PM 0.6 mg AM             0.6 mg PM 0.5 mg AM             0.5 mg PM 0.5 mg AM             0.5 mg PM 0.5 mg AM             0.5 mg PM 0.5 mg AM             0.5 mg PM   28-Feb-2021 1-Mar-2021 2-Mar-2021 3-Mar-2021 4-Mar-2021 5-Mar-2021  6-Mar-2021   0.5 mg AM             0.5 mg PM 0.5 mg AM             0.5 mg PM 0.5 mg AM             0.5 mg PM 0.4 mg AM             0.4 mg PM 0.4 mg AM             0.4 mg PM 0.4 mg AM             0.4 mg PM 0.4 mg AM             0.4 mg PM   7-Mar-2021 8-Mar-2021 9-Mar-2021 10-Mar-2021 11-Mar-2021 12-Mar-2021 13-Mar-2021   0.4 mg AM             0.4 mg PM 0.4 mg AM             0.4 mg PM 0.4 mg AM             0.4 mg PM 0.3mg AM             0.3 mg PM 0.3mg AM             0.3 mg PM 0.3mg AM             0.3 mg PM 0.3mg AM             0.3 mg PM   14-Mar-2021 15-Mar-2021 16-Mar-2021 17-Mar-2021 18-Mar-2021 19-Mar-2021 20-Mar-2021   0.3mg AM             0.3 mg PM 0.3mg AM             0.3 mg PM 0.3mg AM             0.3 mg PM 0.2mg AM             0.2 mg PM 0.2mg AM             0.2 mg PM 0.2mg AM             0.2 mg PM 0.2mg AM             0.2 mg PM   21-Mar-2021 22-Mar-2021 23-Mar-2021 24-Mar-2021 25-Mar-2021 26-Mar-2021 27-Mar-2021   0.2mg AM             0.2 mg PM 0.2mg AM             0.2 mg PM 0.2mg AM             0.2 mg PM 0.1mg AM             0.1 mg PM 0.1mg AM             0.1 mg PM 0.1mg AM             0.1 mg PM 0.1mg AM             0.1 mg PM   28-Mar-2021 29-Mar-2021 30-Mar-2021 31-Mar-2021 1-Apr-2021 2-Apr-2021 3-Apr-2021   0.1mg AM             0.1 mg PM 0.1mg AM             0.1 mg PM 0.1mg AM             0.1 mg PM stop

## 2021-01-19 NOTE — NURSING NOTE
Chief Complaint   Patient presents with     Bone Marrow Biopsy     VS done, labs collected via CVC in clinic by BMT RN, caps changed and heparin locked x2.

## 2021-01-20 LAB — COPATH REPORT: NORMAL

## 2021-01-21 ENCOUNTER — VIRTUAL VISIT (OUTPATIENT)
Dept: TRANSPLANT | Facility: CLINIC | Age: 76
End: 2021-01-21
Attending: INTERNAL MEDICINE
Payer: COMMERCIAL

## 2021-01-21 ENCOUNTER — AMBULATORY - HEALTHEAST (OUTPATIENT)
Dept: LAB | Facility: CLINIC | Age: 76
End: 2021-01-21

## 2021-01-21 DIAGNOSIS — E03.9 HYPOTHYROIDISM, UNSPECIFIED TYPE: ICD-10-CM

## 2021-01-21 DIAGNOSIS — C92.01 AML (ACUTE MYELOID LEUKEMIA) IN REMISSION (H): Primary | ICD-10-CM

## 2021-01-21 DIAGNOSIS — Z94.81 STATUS POST BONE MARROW TRANSPLANT (H): ICD-10-CM

## 2021-01-21 DIAGNOSIS — C92.01 ACUTE MYELOID LEUKEMIA IN REMISSION (H): ICD-10-CM

## 2021-01-21 LAB — COPATH REPORT: NORMAL

## 2021-01-21 PROCEDURE — 99214 OFFICE O/P EST MOD 30 MIN: CPT | Mod: 95 | Performed by: INTERNAL MEDICINE

## 2021-01-21 PROCEDURE — 999N001193 HC VIDEO/TELEPHONE VISIT; NO CHARGE

## 2021-01-21 RX ORDER — DAPSONE 100 MG/1
100 TABLET ORAL DAILY
Qty: 60 TABLET | Refills: 3 | Status: SHIPPED | OUTPATIENT
Start: 2021-01-21 | End: 2021-07-21

## 2021-01-21 NOTE — PROGRESS NOTES
"Florencia is a 75 year old who is being evaluated via a billable video visit.      How would you like to obtain your AVS? MyChart  If the video visit is dropped, the invitation should be resent by: Text to cell phone: [phone]  Will anyone else be joining your video visit? No      Vitals - Patient Reported  Weight (Patient Reported): 78.9 kg (174 lb)  Height (Patient Reported): 169.5 cm (5' 6.75\")  BMI (Based on Pt Reported Ht/Wt): 27.46  Pain Score: No Pain (0)      I have reviewed and updated patient's allergy and medication list.    Concerns: NONE  Refills: NONE      Mayte Valdez CMA    Video-Visit Details    Type of service:  Video Visit    Originating Location (pt. 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  She is feeling well.  She is eating well with occasional constipation, but has had no recent fever chills sweats bleeding or bruising.  Her energy is not 100% but stable over the last few weeks.  She has no new GI  or respiratory symptomatology and no dry eyes or dry mouth.    We reviewed her laboratory testing this week which confirmed a 30-40% cellular bone marrow with no signs of leukemia.  Her blood and marrow were both 100% donor DNA.  Cytogenetics and molecular testing is pending.  Laboratory studies showed good blood counts and chemistries though she is still mildly anemic.    We reviewed her medications and I suggested she can discontinue acyclovir and voriconazole at this point and she has had an outline of a tacrolimus taper which she initiated this week so we will no longer monitor tacrolimus levels.    She has been receiving intravenous pentamadine for pneumocystis prophylaxis but her IV access line is coming out next week and will substitute dapsone 100 mg daily as " alternate prophylaxis.  I told her that the hazards might only be she could develop a rash but will observe her carefully after that.    I reviewed with her the signs or symptoms of [id number] disease with rash, GI upset, dry eyes or dry mouth that could develop as we taper the tacrolimus but statistically with no GVHD to date, her chances of developing it subsequently are relatively small.    Therefore she will return next week for a brief visit, laboratory testing including assessment of her thyroid function; and removal of her IV  line.  We will continue the tacrolimus taper and initiate dapsone prophylaxis and we also discussed reducing her magnesium dosing to 2 instead of 4 magnesium glycinate  pills per day and continue the half pill of magnesium oxide (200 mg) which she has been taking because a previously higher dose did produce diarrhea .    Overall it is good to see how well she is doing and will see her in roughly 5 weeks; 1 month after her line is removed.  She knows to call if additional problems develop.    Yong Moncada MD    Professor of medicine    Results for AME ROSENTHAL (MRN [id number]) as of 1/21/2021 20:20   Ref. Range 1/15/2021 09:52 1/15/2021 09:53 1/15/2021 09:54 1/15/2021 09:54 1/19/2021 08:57 1/19/2021 09:56 1/19/2021 10:05   Sodium Latest Ref Range: 133 - 144 mmol/L  132 (L)   137     Potassium Latest Ref Range: 3.4 - 5.3 mmol/L  4.5   4.3     Chloride Latest Ref Range: 94 - 109 mmol/L  100   105     Carbon Dioxide Latest Ref Range: 20 - 32 mmol/L  29   28     Urea Nitrogen Latest Ref Range: 7 - 30 mg/dL  20   14     Creatinine Latest Ref Range: 0.52 - 1.04 mg/dL  0.95   0.89     GFR Estimate Latest Ref Range: >60 mL/min/1.73_m2  59 (L)   64     GFR Estimate If Black Latest Ref Range: >60 mL/min/1.73_m2  69   74     Calcium Latest Ref Range: 8.5 - 10.1 mg/dL  9.4   8.9     Anion Gap Latest Ref Range: 3 - 14 mmol/L  2 (L)   4     Magnesium Latest Ref Range: 1.6 - 2.3 mg/dL   1.9   1.7     Albumin Latest Ref Range: 3.4 - 5.0 g/dL  4.1   3.8     Protein Total Latest Ref Range: 6.8 - 8.8 g/dL  7.5   7.1     Bilirubin Total Latest Ref Range: 0.2 - 1.3 mg/dL  0.3   0.3     Alkaline Phosphatase Latest Ref Range: 40 - 150 U/L  119   108     ALT Latest Ref Range: 0 - 50 U/L  29   29     AST Latest Ref Range: 0 - 45 U/L  31   29     Glucose Latest Ref Range: 70 - 99 mg/dL  89   91     WBC Latest Ref Range: 4.0 - 11.0 10e9/L  3.9 (L)   3.9 (L)     Hemoglobin Latest Ref Range: 11.7 - 15.7 g/dL  10.4 (L)   10.1 (L)     Hematocrit Latest Ref Range: 35.0 - 47.0 %  31.6 (L)   30.6 (L)     Platelet Count Latest Ref Range: 150 - 450 10e9/L  142 (L)   141 (L)     RBC Count Latest Ref Range: 3.8 - 5.2 10e12/L  3.16 (L)   3.03 (L)     MCV Latest Ref Range: 78 - 100 fl  100   101 (H)     MCH Latest Ref Range: 26.5 - 33.0 pg  32.9   33.3 (H)     MCHC Latest Ref Range: 31.5 - 36.5 g/dL  32.9   33.0     RDW Latest Ref Range: 10.0 - 15.0 %  14.7   14.2     Diff Method Unknown  Automated Method   Automated Method     % Neutrophils Latest Units: %  42.1   43.9     % Lymphocytes Latest Units: %  44.2   43.3     % Monocytes Latest Units: %  11.9   10.9     % Eosinophils Latest Units: %  1.0   1.3     % Basophils Latest Units: %  0.3   0.3     % Immature Granulocytes Latest Units: %  0.5   0.3     Nucleated RBCs Latest Ref Range: 0 /100  0   0     Absolute Neutrophil Latest Ref Range: 1.6 - 8.3 10e9/L  1.7   1.7     Absolute Lymphocytes Latest Ref Range: 0.8 - 5.3 10e9/L  1.7   1.7     Absolute Monocytes Latest Ref Range: 0.0 - 1.3 10e9/L  0.5   0.4     Absolute Eosinophils Latest Ref Range: 0.0 - 0.7 10e9/L  0.0   0.1     Absolute Basophils Latest Ref Range: 0.0 - 0.2 10e9/L  0.0   0.0     Abs Immature Granulocytes Latest Ref Range: 0 - 0.4 10e9/L  0.0   0.0     Absolute Nucleated RBC Unknown  0.0   0.0     INR Latest Ref Range: 0.86 - 1.14      0.91     CMV DNA Quantitation Specimen Unknown Plasma, EDTA  anticoagulant    Plasma, EDTA anticoagulant     CMV Quant IU/mL Latest Ref Range: CMVND^CMV DNA Not Detected [IU]/mL CMV DNA Not Detected    CMV DNA Not Detected     Log IU/mL of CMVQNT Latest Ref Range: <2.1 Log_IU/mL Not Calculated    Not Calculated     Tacrolimus Last Dose Unknown 2000 1/14    LAST DOSE 1/18@2030     Tacrolimus Level Latest Ref Range: 5.0 - 15.0 ug/L 8.6    8.0     CHROMOSOME BONE MARROW Unknown       Rpt   DNA MARKER POST BMT ENGRAFTMENT BLOOD Unknown   Rpt Rpt      DNA MARKER POST BMT ENGRAFTMENT BONE MARROW Unknown       Rpt   FISH Unknown       Rpt   LEUKEMIA LYMPHOMA EVALUATION (FLOW CYTOMETRY) Unknown       Rpt   NEXT GENERATION SEQUENCING ONCOLOGY Unknown       Rpt   BONE MARROW BIOPSY Unknown      Rpt    Received: 1/19/2021   Reported: 1/21/2021 12:57   Ordering Phy(s): SUHA CASTILLO   Additional Phy(s): Copy to Cytogenetics     For improved result formatting, select 'View Enhanced Report Format' under    Linked Documents section.     TEST(S):   Unilateral Bone Marrow Biopsy/Aspiration     FINAL DIAGNOSIS:   Bone marrow, posterior iliac crest, right decalcified trephine biopsy and   touch imprint; right aspirate clot,   direct aspirate smear, and concentrated aspirate smear; and peripheral   blood smear:     - No morphologic or immunophenotypic evidence of leukemia     - Normocellular marrow (cellularity estimated at approximately 30-40%)   with trilineage hematopoietic   maturation and no increase in blasts     - Peripheral blood showing slight normochromic, macrocytic anemia;   slight leukopenia; slight   thrombocytopenia     - See comment     COMMENT:   The core biopsy shows crush artifact, which limits accurate assessment of   marrow cellularity; the cellularity   is generally estimated at 30-40%. Concurrent flow cytometry ([id number])   reported no increase in myeloid blasts   and no abnormal myeloid blast population. Please correlate these findings   with the results of other  ancillary   studies and the clinical presentation.

## 2021-01-21 NOTE — LETTER
"    1/21/2021         RE: Florencia Gao  [address] [place] [address]        Dear Colleague,    Thank you for referring your patient, Florencia Gao, to the [facility] BLOOD AND MARROW TRANSPLANT PROGRAM [place]. Please see a copy of my visit note below.    Florencia is a 75 year old who is being evaluated via a billable video visit.      How would you like to obtain your AVS? MyChart  If the video visit is dropped, the invitation should be resent by: Text to cell phone: [phone]  Will anyone else be joining your video visit? No      Vitals - Patient Reported  Weight (Patient Reported): 78.9 kg (174 lb)  Height (Patient Reported): 169.5 cm (5' 6.75\")  BMI (Based on Pt Reported Ht/Wt): 27.46  Pain Score: No Pain (0)      I have reviewed and updated patient's allergy and medication list.    Concerns: NONE  Refills: NONE      Mayte Valdez CMA    Video-Visit Details    Type of service:  Video Visit    Originating Location (pt. 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  She is feeling well.  She is eating well with occasional constipation, but has had no recent fever chills sweats bleeding or bruising.  Her energy is not 100% but stable over the last few weeks.  She has no new GI  or respiratory symptomatology and no dry eyes or dry mouth.    We reviewed her laboratory testing this week which confirmed a 30-40% cellular bone marrow with no signs of leukemia.  Her blood and marrow were both 100% donor DNA.  Cytogenetics and molecular testing is pending.  Laboratory studies showed good blood counts and chemistries though she is still mildly anemic.    We reviewed her medications and I suggested she can discontinue acyclovir and voriconazole at this point and she has had an " outline of a tacrolimus taper which she initiated this week so we will no longer monitor tacrolimus levels.    She has been receiving intravenous pentamadine for pneumocystis prophylaxis but her IV access line is coming out next week and will substitute dapsone 100 mg daily as alternate prophylaxis.  I told her that the hazards might only be she could develop a rash but will observe her carefully after that.    I reviewed with her the signs or symptoms of cmius-[id number]-pkta disease with rash, GI upset, dry eyes or dry mouth that could develop as we taper the tacrolimus but statistically with no GVHD to date, her chances of developing it subsequently are relatively small.    Therefore she will return next week for a brief visit, laboratory testing including assessment of her thyroid function; and removal of her IV  line.  We will continue the tacrolimus taper and initiate dapsone prophylaxis and we also discussed reducing her magnesium dosing to 2 instead of 4 magnesium glycinate  pills per day and continue the half pill of magnesium oxide (200 mg) which she has been taking because a previously higher dose did produce diarrhea .    Overall it is good to see how well she is doing and will see her in roughly 5 weeks; 1 month after her line is removed.  She knows to call if additional problems develop.    Yong Moncada MD    Professor of medicine    Results for AME ROSENTHAL (MRN [id number]) as of 1/21/2021 20:20   Ref. Range 1/15/2021 09:52 1/15/2021 09:53 1/15/2021 09:54 1/15/2021 09:54 1/19/2021 08:57 1/19/2021 09:56 1/19/2021 10:05   Sodium Latest Ref Range: 133 - 144 mmol/L  132 (L)   137     Potassium Latest Ref Range: 3.4 - 5.3 mmol/L  4.5   4.3     Chloride Latest Ref Range: 94 - 109 mmol/L  100   105     Carbon Dioxide Latest Ref Range: 20 - 32 mmol/L  29   28     Urea Nitrogen Latest Ref Range: 7 - 30 mg/dL  20   14     Creatinine Latest Ref Range: 0.52 - 1.04 mg/dL  0.95   0.89     GFR Estimate Latest  Ref Range: >60 mL/min/1.73_m2  59 (L)   64     GFR Estimate If Black Latest Ref Range: >60 mL/min/1.73_m2  69   74     Calcium Latest Ref Range: 8.5 - 10.1 mg/dL  9.4   8.9     Anion Gap Latest Ref Range: 3 - 14 mmol/L  2 (L)   4     Magnesium Latest Ref Range: 1.6 - 2.3 mg/dL  1.9   1.7     Albumin Latest Ref Range: 3.4 - 5.0 g/dL  4.1   3.8     Protein Total Latest Ref Range: 6.8 - 8.8 g/dL  7.5   7.1     Bilirubin Total Latest Ref Range: 0.2 - 1.3 mg/dL  0.3   0.3     Alkaline Phosphatase Latest Ref Range: 40 - 150 U/L  119   108     ALT Latest Ref Range: 0 - 50 U/L  29   29     AST Latest Ref Range: 0 - 45 U/L  31   29     Glucose Latest Ref Range: 70 - 99 mg/dL  89   91     WBC Latest Ref Range: 4.0 - 11.0 10e9/L  3.9 (L)   3.9 (L)     Hemoglobin Latest Ref Range: 11.7 - 15.7 g/dL  10.4 (L)   10.1 (L)     Hematocrit Latest Ref Range: 35.0 - 47.0 %  31.6 (L)   30.6 (L)     Platelet Count Latest Ref Range: 150 - 450 10e9/L  142 (L)   141 (L)     RBC Count Latest Ref Range: 3.8 - 5.2 10e12/L  3.16 (L)   3.03 (L)     MCV Latest Ref Range: 78 - 100 fl  100   101 (H)     MCH Latest Ref Range: 26.5 - 33.0 pg  32.9   33.3 (H)     MCHC Latest Ref Range: 31.5 - 36.5 g/dL  32.9   33.0     RDW Latest Ref Range: 10.0 - 15.0 %  14.7   14.2     Diff Method Unknown  Automated Method   Automated Method     % Neutrophils Latest Units: %  42.1   43.9     % Lymphocytes Latest Units: %  44.2   43.3     % Monocytes Latest Units: %  11.9   10.9     % Eosinophils Latest Units: %  1.0   1.3     % Basophils Latest Units: %  0.3   0.3     % Immature Granulocytes Latest Units: %  0.5   0.3     Nucleated RBCs Latest Ref Range: 0 /100  0   0     Absolute Neutrophil Latest Ref Range: 1.6 - 8.3 10e9/L  1.7   1.7     Absolute Lymphocytes Latest Ref Range: 0.8 - 5.3 10e9/L  1.7   1.7     Absolute Monocytes Latest Ref Range: 0.0 - 1.3 10e9/L  0.5   0.4     Absolute Eosinophils Latest Ref Range: 0.0 - 0.7 10e9/L  0.0   0.1     Absolute Basophils  Latest Ref Range: 0.0 - 0.2 10e9/L  0.0   0.0     Abs Immature Granulocytes Latest Ref Range: 0 - 0.4 10e9/L  0.0   0.0     Absolute Nucleated RBC Unknown  0.0   0.0     INR Latest Ref Range: 0.86 - 1.14      0.91     CMV DNA Quantitation Specimen Unknown Plasma, EDTA anticoagulant    Plasma, EDTA anticoagulant     CMV Quant IU/mL Latest Ref Range: CMVND^CMV DNA Not Detected [IU]/mL CMV DNA Not Detected    CMV DNA Not Detected     Log IU/mL of CMVQNT Latest Ref Range: <2.1 Log_IU/mL Not Calculated    Not Calculated     Tacrolimus Last Dose Unknown 2000 1/14    LAST DOSE 1/18@2030     Tacrolimus Level Latest Ref Range: 5.0 - 15.0 ug/L 8.6    8.0     CHROMOSOME BONE MARROW Unknown       Rpt   DNA MARKER POST BMT ENGRAFTMENT BLOOD Unknown   Rpt Rpt      DNA MARKER POST BMT ENGRAFTMENT BONE MARROW Unknown       Rpt   FISH Unknown       Rpt   LEUKEMIA LYMPHOMA EVALUATION (FLOW CYTOMETRY) Unknown       Rpt   NEXT GENERATION SEQUENCING ONCOLOGY Unknown       Rpt   BONE MARROW BIOPSY Unknown      Rpt    Received: 1/19/2021   Reported: 1/21/2021 12:57   Ordering Phy(s): SUHA CASTILLO   Additional Phy(s): Copy to Cytogenetics     For improved result formatting, select 'View Enhanced Report Format' under    Linked Documents section.     TEST(S):   Unilateral Bone Marrow Biopsy/Aspiration     FINAL DIAGNOSIS:   Bone marrow, posterior iliac crest, right decalcified trephine biopsy and   touch imprint; right aspirate clot,   direct aspirate smear, and concentrated aspirate smear; and peripheral   blood smear:     - No morphologic or immunophenotypic evidence of leukemia     - Normocellular marrow (cellularity estimated at approximately 30-40%)   with trilineage hematopoietic   maturation and no increase in blasts     - Peripheral blood showing slight normochromic, macrocytic anemia;   slight leukopenia; slight   thrombocytopenia     - See comment     COMMENT:   The core biopsy shows crush artifact, which limits accurate  assessment of   marrow cellularity; the cellularity   is generally estimated at 30-40%. Concurrent flow cytometry ([id number])   reported no increase in myeloid blasts   and no abnormal myeloid blast population. Please correlate these findings   with the results of other ancillary   studies and the clinical presentation.      Again, thank you for allowing me to participate in the care of your patient.        Sincerely,        Yong Moncada MD

## 2021-01-22 ENCOUNTER — AMBULATORY - HEALTHEAST (OUTPATIENT)
Dept: LAB | Facility: CLINIC | Age: 76
End: 2021-01-22

## 2021-01-22 ENCOUNTER — VIRTUAL VISIT (OUTPATIENT)
Dept: TRANSPLANT | Facility: CLINIC | Age: 76
End: 2021-01-22
Attending: PHYSICIAN ASSISTANT
Payer: COMMERCIAL

## 2021-01-22 DIAGNOSIS — C92.01 ACUTE MYELOID LEUKEMIA IN REMISSION (H): ICD-10-CM

## 2021-01-22 DIAGNOSIS — Z94.81 STATUS POST BONE MARROW TRANSPLANT (H): Primary | ICD-10-CM

## 2021-01-22 PROCEDURE — 999N001193 HC VIDEO/TELEPHONE VISIT; NO CHARGE

## 2021-01-22 PROCEDURE — 99212 OFFICE O/P EST SF 10 MIN: CPT | Mod: 95

## 2021-01-22 NOTE — PROGRESS NOTES
"Florencia is a 75 year old who is being evaluated via a billable video visit.      How would you like to obtain your AVS? MyChart  If the video visit is dropped, the invitation should be resent by: Text to cell phone: [phone]  Will anyone else be joining your video visit? No      Vitals - Patient Reported  Weight (Patient Reported): 78.9 kg (174 lb)  Height (Patient Reported): 170.2 cm (5' 7\")  BMI (Based on Pt Reported Ht/Wt): 27.25  Pain Score: No Pain (0)      I have reviewed and updated patient's allergy and medication list.    Concerns: NONE  Refills: NONE      Mayte Valdez [facility]    Video Start Time: Tried to connect x2 starting at 10:04, then called patient and she was still in the waiting room.  Unclear why were able to see her on video, so spoke with her on the telephone    Video-Visit Details    Type of service:  Video Visit        Originating Location (pt. Location): Home    Distant Location (provider location):  [facility] BLOOD AND MARROW TRANSPLANT PROGRAM [place]     Platform used for Video Visit: Tosin    "

## 2021-01-22 NOTE — PROGRESS NOTES
BMT Day + 100 Post-Allogeneic BMT   Survivorship Care Plan      Date: January 22, 2021    Treatment Team:   Patient Care Team:  Yong Moncada MD as PCP - General (Internal Medicine)  Natalia Pereira MSW as   Britany Finch, RN as BMT Nurse Coordinator (Transplant)  Yong Moncada MD as BMT Physician (Transplant)  Yong Moncada MD as Assigned Cancer Care Provider  Dejan Wesley MD as Assigned Infectious Disease Provider    Date of Transplant: 10/7/2021  Transplant Essential Data:  Diagnosis AMLM1 Acute myelogeneous leukemia, M1, myeloblastic  HCT Type Allogeneic    Prep Regimen Fludarabine  Cytoxan  TBI   Donor Source Sibling     GVHD Prophylaxis Post transplant cytoxan  Tac/MMF  Clinical Trials ACE      Oncology Treatment History:   Florencia Gao was well until April or May 2020 when she developed progressive fatigue and some easy bruising.  By mid June, pancytopenia and the diagnosis of AML was recognized. Initially Hb 8.1  WBC 1.4, Plts 29,000.    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  Flt 3 and NPM1 and CEBPalpha mutations were negative.     She was treated with idarubicin plus cytarabine (7+3) and achieved complete remission.  A day 14 marrow was empty and recovery of her counts at roughly 6 weeks --July 31 bone marrow was cellular with no morphologic signs of leukemia and the trisomy 8 was no longer detectable.   Treatment/Chemotherapy Number of Cycles Date Range Outcomes & Complications   7+3 1 6/18- Day 14 bmbx no blast but no count recovery. Dya+28 bmbx CR          Diagnosis AMLM1 Acute myelogeneous leukemia, M1, myeloblastic  HCT Type Allogeneic    Prep Regimen Fludarabine  Cytoxan  TBI   Donor Source Sibling    GVHD Prophylaxis Post transplant cytoxan  Tac/MMF  Primary BMT Provider Antwan           Survivorship Self-Management Goals:  After review, has no self managed goals except  eye doctor if not visited in last year and dentist if not visited in last 6 months.  General Recommendations:     Ask your physician if you can return to work and usual activity. If you need more time for recovery, please let your physician know.    Vaccinations will be given at your 1- and 2-year anniversary visits in the BMT clinic.  An exception is the influenza vaccine, which can be given after day +60 post-transplant during influenza season.    For general health concerns you can be seen by your primary care provider.     If you have questions about your transplant or follow up tests contact your BMT RN coordinator.    Call the BMT clinic if you develop a skin rash, oral sores, eye pain or excessive dryness, shortness of breath, new cough, bleeding, diarrhea, nausea, or vomiting.     You can resume a regular diet.    You can stop wearing your blue N95 mask after your first day + 100 anniversary visit as long as you are no longer taking steroids and your ANC is higher than 1.0     Avoid large crowds and exposure to friends or family members with cold like symptoms while your immune system continues to rebuild.    You can resume sexual activity with your partner, although condoms should be used until immunosuppressive medications, such as cyclosporine, tacrolimus, or sirolimus, are discontinued.    You can drive without limitations as long as:  o Your platelet count is > 50,000 and your neutrophil count is >1,000.  o You are not taking any narcotics or sedative medications  o You feel well enough to preform driving activities     Survivorship Care Plan:     This individualized care plan is designed to inform you and your healthcare team of the recommended follow-up visits, tests, health maintenance activities, and cancer screening you should receive after transplant.     Possible Late-Term Effects:  Possible late complications include infections, erdda-td-icgm disease, cataracts, other cancers (mouth, skin,  blood, solid tumors), cavities, changes in lung function, pneumonia, heart failure, low thyroid and other gland function, cancer relapse, graft failure, kidney and liver disease, nerve pain/neuropathy, altered cognitive function, muscle weakness, osteoporosis/weak bones, stress, depression, anxiety, sexual dysfunction, and infertility.    Health Monitoring: In the months following allogeneic stem cell transplant you may experience health conditions that require further evaluation by your healthcare team.     Contact your provider if you experience any of the following conditions or symptoms:   ? Cold symptoms or fever higher than 100.5 F  ? Skin rash or change in texture/firmness  ? Nausea, vomiting, or diarrhea   ? Mouth pain, oral lesions, bleeding gums, or chronic dry mouth   ? Red, dry, or irritated eyes  ? Blurry or double vision, eye pain  ? Persistent or recurrent headaches  ? High blood pressure or high blood sugar   ? Difficulty breathing, cough, shortness of breath  ? Chest pain or palpitations  ? Swelling in legs or extremities    ? Unusual bruising or bleeding  ? Symptoms of cancer recurrence   ? Changes in moles or new skin lesions   ? Increased or worsening fatigue  ? Heat or cold intolerance  ? Loss of interest in sex or erectile dysfunction  ? Muscle weakness or increasing difficulty with daily activates   ? Pain or tingling in hands or feet  ? Changes in memory or difficulty concentrating (chemo brain) that does not improve within 3 months after your transplant  ? Abdominal pain on the upper right side or yellowing of the skin  ? Stress, depression, or anxiety  ? Inability to stop using narcotic pain medications or addictive substances    Healthy Behaviors & Lifestyle:    Schedule a full dental exam. Your mouth should be evaluated for oral cancer yearly. Follow your dentist's recommendations for cleanings. If you are on immunosuppression or still have your central line in place a prophylactic dose  of antibiotics is recommended prior to your procedure.    Schedule an eye exam yearly. After transplant your risk for cataracts is higher. Let your eye doctor know you have had cancer treatment.    Avoid smoking or tobacco products.    Wear sunscreen and protect skin from sunburns.     Limit daily alcohol intake to less than 1 drink for women and 2 for men.    Follow general guidelines for physical activity as recommended by the [place] Office of Disease Prevention & Health Promotion:    Avoid Inactivity: Some physical activity is better than none -- any amount has benefits.  Do Aerobic Activity: Do aerobic physical activity in episodes of at least 10 minutes, as many times as possible per day. This could include going for walks or using the elliptical or stationary bike.  Ask your doctor what aerobic activities would be safe and helpful for you, and set a goal for yourself!  Strengthen Muscles: Do muscle-strengthening activities (such as lifting light weights or using resistance bands and/or going up and down stairs) that are moderate or high intensity and involve all major muscle groups at least 4 days a week.      Recommendations & Follow-Up:    Referrals & tests placed during this visit: No orders of the defined types were placed in this encounter.      When to see your BMT Provider: Dr Moncada, saw him yesterday, 1/21/2021          Chel Oneal      I spent 15 minutes with the patient and family, over half of which was spent discussing preventative care strategies, self-management practices, and potential complications after transplant.      Information used for these recommendations was obtained from: Chika N. S., SONIDO Sanchez, ANANYA Frederick, MEGHAN Nick., APOLINAR Chacon., Apple, JMamadou L.,   Dwight, K. M. (2013). Prevalence of Hematopoietic Cell Transplant Survivors in the [place]. Biology of Blood and Marrow Transplantation?: Journal of the American Society for Blood and Marrow Transplantation, 19(10),  6460-3762. doi 10.1016/j.bbmt.2013.07.020

## 2021-01-22 NOTE — LETTER
"    1/22/2021         RE: Florencia Gao  [address] [place] [address]        Dear Colleague,    Thank you for referring your patient, Florencia Gao, to the [facility] BLOOD AND MARROW TRANSPLANT PROGRAM [place]. Please see a copy of my visit note below.    Florencia is a 75 year old who is being evaluated via a billable video visit.      How would you like to obtain your AVS? MyChart  If the video visit is dropped, the invitation should be resent by: Text to cell phone: [phone]  Will anyone else be joining your video visit? No      Vitals - Patient Reported  Weight (Patient Reported): 78.9 kg (174 lb)  Height (Patient Reported): 170.2 cm (5' 7\")  BMI (Based on Pt Reported Ht/Wt): 27.25  Pain Score: No Pain (0)      I have reviewed and updated patient's allergy and medication list.    Concerns: NONE  Refills: NONE      Mayte Valdez CMA    Video Start Time: Tried to connect x2 starting at 10:04, then called patient and she was still in the waiting room.  Unclear why were able to see her on video, so spoke with her on the telephone    Video-Visit Details    Type of service:  Video Visit        Originating Location (pt. Location): Home    Distant Location (provider location):  [facility] BLOOD AND MARROW TRANSPLANT [facility] [place]     Platform used for Video Visit: Simple-Fill          BMT Day + 100 Post-Allogeneic BMT   Survivorship Care Plan      Date: January 22, 2021    Treatment Team:   Patient Care Team:  Yong Moncada MD as PCP - General (Internal Medicine)  Natalia Pereira MSW as   Britany Finch, RN as BMT Nurse Coordinator (Transplant)  Yong Moncada MD as BMT Physician (Transplant)  Yong Moncada MD as Assigned Cancer Care Provider  Dejan Wesley MD as Assigned Infectious Disease Provider    Date of Transplant: 10/7/2021  Transplant Essential Data:  Diagnosis AMLM1 Acute myelogeneous leukemia, M1, myeloblastic  HCT Type " Allogeneic    Prep Regimen Fludarabine  Cytoxan  TBI   Donor Source Sibling     GVHD Prophylaxis Post transplant cytoxan  Tac/MMF  Clinical Trials ACE      Oncology Treatment History:   Florencia Gao was well until April or May 2020 when she developed progressive fatigue and some easy bruising.  By mid June, pancytopenia and the diagnosis of AML was recognized. Initially Hb 8.1  WBC 1.4, Plts 29,000.    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  Flt 3 and NPM1 and CEBPalpha mutations were negative.     She was treated with idarubicin plus cytarabine (7+3) and achieved complete remission.  A day 14 marrow was empty and recovery of her counts at roughly 6 weeks --July 31 bone marrow was cellular with no morphologic signs of leukemia and the trisomy 8 was no longer detectable.   Treatment/Chemotherapy Number of Cycles Date Range Outcomes & Complications   7+3 1 6/18- Day 14 bmbx no blast but no count recovery. Dya+28 bmbx CR          Diagnosis AMLM1 Acute myelogeneous leukemia, M1, myeloblastic  HCT Type Allogeneic    Prep Regimen Fludarabine  Cytoxan  TBI   Donor Source Sibling    GVHD Prophylaxis Post transplant cytoxan  Tac/MMF  Primary BMT Provider [facility]           Survivorship Self-Management Goals:  After review, has no self managed goals except eye doctor if not visited in last year and dentist if not visited in last 6 months.  General Recommendations:     Ask your physician if you can return to work and usual activity. If you need more time for recovery, please let your physician know.    Vaccinations will be given at your 1- and 2-year anniversary visits in the BMT clinic.  An exception is the influenza vaccine, which can be given after day +60 post-transplant during influenza season.    For general health concerns you can be seen by your primary care provider.     If you have questions about your transplant or follow up tests contact your BMT  RN coordinator.    Call the BMT clinic if you develop a skin rash, oral sores, eye pain or excessive dryness, shortness of breath, new cough, bleeding, diarrhea, nausea, or vomiting.     You can resume a regular diet.    You can stop wearing your blue N95 mask after your first day + 100 anniversary visit as long as you are no longer taking steroids and your ANC is higher than 1.0     Avoid large crowds and exposure to friends or family members with cold like symptoms while your immune system continues to rebuild.    You can resume sexual activity with your partner, although condoms should be used until immunosuppressive medications, such as cyclosporine, tacrolimus, or sirolimus, are discontinued.    You can drive without limitations as long as:  o Your platelet count is > 50,000 and your neutrophil count is >1,000.  o You are not taking any narcotics or sedative medications  o You feel well enough to preform driving activities     Survivorship Care Plan:     This individualized care plan is designed to inform you and your healthcare team of the recommended follow-up visits, tests, health maintenance activities, and cancer screening you should receive after transplant.     Possible Late-Term Effects:  Possible late complications include infections, thqgn-pb-duah disease, cataracts, other cancers (mouth, skin, blood, solid tumors), cavities, changes in lung function, pneumonia, heart failure, low thyroid and other gland function, cancer relapse, graft failure, kidney and liver disease, nerve pain/neuropathy, altered cognitive function, muscle weakness, osteoporosis/weak bones, stress, depression, anxiety, sexual dysfunction, and infertility.    Health Monitoring: In the months following allogeneic stem cell transplant you may experience health conditions that require further evaluation by your healthcare team.     Contact your provider if you experience any of the following conditions or symptoms:   ? Cold symptoms  or fever higher than 100.5 F  ? Skin rash or change in texture/firmness  ? Nausea, vomiting, or diarrhea   ? Mouth pain, oral lesions, bleeding gums, or chronic dry mouth   ? Red, dry, or irritated eyes  ? Blurry or double vision, eye pain  ? Persistent or recurrent headaches  ? High blood pressure or high blood sugar   ? Difficulty breathing, cough, shortness of breath  ? Chest pain or palpitations  ? Swelling in legs or extremities    ? Unusual bruising or bleeding  ? Symptoms of cancer recurrence   ? Changes in moles or new skin lesions   ? Increased or worsening fatigue  ? Heat or cold intolerance  ? Loss of interest in sex or erectile dysfunction  ? Muscle weakness or increasing difficulty with daily activates   ? Pain or tingling in hands or feet  ? Changes in memory or difficulty concentrating (chemo brain) that does not improve within 3 months after your transplant  ? Abdominal pain on the upper right side or yellowing of the skin  ? Stress, depression, or anxiety  ? Inability to stop using narcotic pain medications or addictive substances    Healthy Behaviors & Lifestyle:    Schedule a full dental exam. Your mouth should be evaluated for oral cancer yearly. Follow your dentist's recommendations for cleanings. If you are on immunosuppression or still have your central line in place a prophylactic dose of antibiotics is recommended prior to your procedure.    Schedule an eye exam yearly. After transplant your risk for cataracts is higher. Let your eye doctor know you have had cancer treatment.    Avoid smoking or tobacco products.    Wear sunscreen and protect skin from sunburns.     Limit daily alcohol intake to less than 1 drink for women and 2 for men.    Follow general guidelines for physical activity as recommended by the [place] Office of Disease Prevention & Health Promotion:    Avoid Inactivity: Some physical activity is better than none -- any amount has benefits.  Do Aerobic Activity: Do  aerobic physical activity in episodes of at least 10 minutes, as many times as possible per day. This could include going for walks or using the elliptical or stationary bike.  Ask your doctor what aerobic activities would be safe and helpful for you, and set a goal for yourself!  Strengthen Muscles: Do muscle-strengthening activities (such as lifting light weights or using resistance bands and/or going up and down stairs) that are moderate or high intensity and involve all major muscle groups at least 4 days a week.      Recommendations & Follow-Up:    Referrals & tests placed during this visit: No orders of the defined types were placed in this encounter.      When to see your BMT Provider: Dr Moncada, saw him yesterday, 1/21/2021      Chel Monzon Jm      I spent 15 minutes with the patient and family, over half of which was spent discussing preventative care strategies, self-management practices, and potential complications after transplant.      Information used for these recommendations was obtained from: ELISE Farr., SONIDO Sanchez, DERRICK Frederick., Park S., APOLINAR Chacon., LINA Chiu.,   Dwight, K. M. (2013). Prevalence of Hematopoietic Cell Transplant Survivors in the [place]. Biology of Blood and Marrow Transplantation?: Journal of the American Society for Blood and Marrow Transplantation, 19(10), 9429-9488. doi 10.1016/j.bbmt.2013.07.020        Again, thank you for allowing me to participate in the care of your patient.        Sincerely,        BMT Advanced Practice Provider

## 2021-01-23 LAB
SARS-COV-2 PCR COMMENT: NORMAL
SARS-COV-2 RNA SPEC QL NAA+PROBE: NEGATIVE
SARS-COV-2 VIRUS SPECIMEN SOURCE: NORMAL

## 2021-01-24 ENCOUNTER — COMMUNICATION - HEALTHEAST (OUTPATIENT)
Dept: SCHEDULING | Facility: CLINIC | Age: 76
End: 2021-01-24

## 2021-01-25 LAB — COPATH REPORT: NORMAL

## 2021-01-26 ENCOUNTER — ANCILLARY PROCEDURE (OUTPATIENT)
Dept: ULTRASOUND IMAGING | Facility: CLINIC | Age: 76
End: 2021-01-26
Attending: PHYSICIAN ASSISTANT
Payer: COMMERCIAL

## 2021-01-26 ENCOUNTER — ONCOLOGY VISIT (OUTPATIENT)
Dept: TRANSPLANT | Facility: CLINIC | Age: 76
End: 2021-01-26
Attending: PHYSICIAN ASSISTANT
Payer: COMMERCIAL

## 2021-01-26 ENCOUNTER — APPOINTMENT (OUTPATIENT)
Dept: LAB | Facility: CLINIC | Age: 76
End: 2021-01-26
Attending: PHYSICIAN ASSISTANT
Payer: COMMERCIAL

## 2021-01-26 VITALS
OXYGEN SATURATION: 96 % | SYSTOLIC BLOOD PRESSURE: 123 MMHG | BODY MASS INDEX: 27.95 KG/M2 | TEMPERATURE: 98.3 F | DIASTOLIC BLOOD PRESSURE: 70 MMHG | RESPIRATION RATE: 18 BRPM | HEART RATE: 92 BPM | WEIGHT: 173.9 LBS

## 2021-01-26 DIAGNOSIS — E03.9 HYPOTHYROIDISM, UNSPECIFIED TYPE: ICD-10-CM

## 2021-01-26 DIAGNOSIS — C92.01 AML (ACUTE MYELOID LEUKEMIA) IN REMISSION (H): ICD-10-CM

## 2021-01-26 DIAGNOSIS — Z94.81 STATUS POST BONE MARROW TRANSPLANT (H): Primary | ICD-10-CM

## 2021-01-26 LAB
ALBUMIN SERPL-MCNC: 3.6 G/DL (ref 3.4–5)
ALP SERPL-CCNC: 110 U/L (ref 40–150)
ALT SERPL W P-5'-P-CCNC: 32 U/L (ref 0–50)
ANION GAP SERPL CALCULATED.3IONS-SCNC: 3 MMOL/L (ref 3–14)
AST SERPL W P-5'-P-CCNC: 29 U/L (ref 0–45)
BASOPHILS # BLD AUTO: 0 10E9/L (ref 0–0.2)
BASOPHILS NFR BLD AUTO: 0.4 %
BILIRUB SERPL-MCNC: 0.1 MG/DL (ref 0.2–1.3)
BUN SERPL-MCNC: 16 MG/DL (ref 7–30)
CALCIUM SERPL-MCNC: 8.9 MG/DL (ref 8.5–10.1)
CHLORIDE SERPL-SCNC: 106 MMOL/L (ref 94–109)
CO2 SERPL-SCNC: 30 MMOL/L (ref 20–32)
CREAT SERPL-MCNC: 0.75 MG/DL (ref 0.52–1.04)
DIFFERENTIAL METHOD BLD: ABNORMAL
EOSINOPHIL # BLD AUTO: 0 10E9/L (ref 0–0.7)
EOSINOPHIL NFR BLD AUTO: 0.6 %
ERYTHROCYTE [DISTWIDTH] IN BLOOD BY AUTOMATED COUNT: 13.4 % (ref 10–15)
GFR SERPL CREATININE-BSD FRML MDRD: 79 ML/MIN/{1.73_M2}
GLUCOSE SERPL-MCNC: 109 MG/DL (ref 70–99)
HCT VFR BLD AUTO: 31.1 % (ref 35–47)
HGB BLD-MCNC: 10.2 G/DL (ref 11.7–15.7)
IMM GRANULOCYTES # BLD: 0 10E9/L (ref 0–0.4)
IMM GRANULOCYTES NFR BLD: 0.4 %
LYMPHOCYTES # BLD AUTO: 1.6 10E9/L (ref 0.8–5.3)
LYMPHOCYTES NFR BLD AUTO: 34.8 %
MAGNESIUM SERPL-MCNC: 2 MG/DL (ref 1.6–2.3)
MCH RBC QN AUTO: 33.8 PG (ref 26.5–33)
MCHC RBC AUTO-ENTMCNC: 32.8 G/DL (ref 31.5–36.5)
MCV RBC AUTO: 103 FL (ref 78–100)
MONOCYTES # BLD AUTO: 0.5 10E9/L (ref 0–1.3)
MONOCYTES NFR BLD AUTO: 10.3 %
NEUTROPHILS # BLD AUTO: 2.5 10E9/L (ref 1.6–8.3)
NEUTROPHILS NFR BLD AUTO: 53.5 %
NRBC # BLD AUTO: 0 10*3/UL
NRBC BLD AUTO-RTO: 0 /100
PLATELET # BLD AUTO: 166 10E9/L (ref 150–450)
POTASSIUM SERPL-SCNC: 4 MMOL/L (ref 3.4–5.3)
PROT SERPL-MCNC: 7.1 G/DL (ref 6.8–8.8)
RBC # BLD AUTO: 3.02 10E12/L (ref 3.8–5.2)
SODIUM SERPL-SCNC: 139 MMOL/L (ref 133–144)
T4 FREE SERPL-MCNC: 1.41 NG/DL (ref 0.76–1.46)
TSH SERPL DL<=0.005 MIU/L-ACNC: 6.9 MU/L (ref 0.4–4)
WBC # BLD AUTO: 4.7 10E9/L (ref 4–11)

## 2021-01-26 PROCEDURE — 84443 ASSAY THYROID STIM HORMONE: CPT | Performed by: INTERNAL MEDICINE

## 2021-01-26 PROCEDURE — 83735 ASSAY OF MAGNESIUM: CPT | Performed by: INTERNAL MEDICINE

## 2021-01-26 PROCEDURE — 250N000011 HC RX IP 250 OP 636: Performed by: PHYSICIAN ASSISTANT

## 2021-01-26 PROCEDURE — 36592 COLLECT BLOOD FROM PICC: CPT

## 2021-01-26 PROCEDURE — 85025 COMPLETE CBC W/AUTO DIFF WBC: CPT | Performed by: INTERNAL MEDICINE

## 2021-01-26 PROCEDURE — 84439 ASSAY OF FREE THYROXINE: CPT | Performed by: INTERNAL MEDICINE

## 2021-01-26 PROCEDURE — G0463 HOSPITAL OUTPT CLINIC VISIT: HCPCS

## 2021-01-26 PROCEDURE — 80053 COMPREHEN METABOLIC PANEL: CPT | Performed by: INTERNAL MEDICINE

## 2021-01-26 PROCEDURE — 36589 REMOVAL TUNNELED CV CATH: CPT | Performed by: PHYSICIAN ASSISTANT

## 2021-01-26 PROCEDURE — 99214 OFFICE O/P EST MOD 30 MIN: CPT

## 2021-01-26 RX ORDER — LIDOCAINE HYDROCHLORIDE 10 MG/ML
5 INJECTION, SOLUTION EPIDURAL; INFILTRATION; INTRACAUDAL; PERINEURAL ONCE
Status: COMPLETED | OUTPATIENT
Start: 2021-01-26 | End: 2021-01-26

## 2021-01-26 RX ORDER — HEPARIN SODIUM,PORCINE 10 UNIT/ML
5 VIAL (ML) INTRAVENOUS
Status: DISCONTINUED | OUTPATIENT
Start: 2021-01-26 | End: 2021-01-26 | Stop reason: HOSPADM

## 2021-01-26 RX ADMIN — Medication 5 ML: at 12:42

## 2021-01-26 RX ADMIN — LIDOCAINE HYDROCHLORIDE 5 ML: 10 INJECTION, SOLUTION EPIDURAL; INFILTRATION; INTRACAUDAL; PERINEURAL at 14:06

## 2021-01-26 RX ADMIN — Medication 5 ML: at 12:41

## 2021-01-26 ASSESSMENT — PAIN SCALES - GENERAL: PAINLEVEL: NO PAIN (0)

## 2021-01-26 NOTE — LETTER
1/26/2021         RE: Florencia Gao  [address] [place] [address]        Dear Colleague,    Thank you for referring your patient, Florencia Gao, to the [facility] BLOOD AND MARROW TRANSPLANT PROGRAM [place]. Please see a copy of my visit note below.    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       Diagnosis AMLM1 Acute myelogeneous leukemia, M1, myeloblastic  HCT Type Allogeneic    Prep Regimen Fludarabine  Cytoxan  TBI   Donor Source Sibling    GVHD Prophylaxis Post transplant cytoxan  Tac/MMF  Primary BMT Provider [facility]       Interval history:      No fevers.  No cough or sob. No bleeding.  No n,v,d, or rash on tac taper. Sodium improved--likely from tac and now tapering. She can decrease the amt of bloody annabelle mix. No fevers.  They are removing her line today. Denies dry eyes or dry mouth.    ROS: 8 ponit negative except as above.      Physical Exam:   Date of GVH Score: 1/26/21  S 0, LGI 0, UGI 0, Liver 0          KPS: 90    Wt Readings from Last 4 Encounters:   01/26/21 78.9 kg (173 lb 14.4 oz)   01/19/21 79.1 kg (174 lb 4.8 oz)   01/15/21 79.1 kg (174 lb 6.4 oz)   01/06/21 79 kg (174 lb 1.6 oz)     Blood pressure 123/70, pulse 92, temperature 98.3  F (36.8  C), resp. rate 18, weight 78.9 kg (173 lb 14.4 oz), SpO2 96 %.      Constitutional: NAD, pleasant and appropriate  HEENT:  NC/AT, no icterus; bilat periorbital edema fluctuates, not bad today  Lungs: CTAB  Cardiovascular: RRR, no m/r/g  Skin: no rashes or petechiae.   MSK/Extremities  No edema  Neurologic: non-focal  Psych: appropriate affect  Access: right upper chest CVC, c/d/i   Labs:     Lab Results   Component Value Date    WBC 4.7 01/26/2021    ANEU 2.5 01/26/2021    HGB 10.2 (L) 01/26/2021    HCT 31.1 (L) 01/26/2021     01/26/2021     01/26/2021    POTASSIUM 4.0 01/26/2021    CHLORIDE 106 01/26/2021    CO2 30 01/26/2021    GLC  109 (H) 01/26/2021    BUN 16 01/26/2021    CR 0.75 01/26/2021    MAG 2.0 01/26/2021    INR 0.91 01/19/2021    BILITOTAL 0.1 (L) 01/26/2021    AST 29 01/26/2021    ALT 32 01/26/2021    ALKPHOS 110 01/26/2021    PROTTOTAL 7.1 01/26/2021    ALBUMIN 3.6 01/26/2021         Assessment and Plan:   Florencia Gao is a 75 year old female with AML in CR1, day +1111 s/p JCARLOS Allo sib pbsct per protocol 2019-10, randomized to Post transplant cytoxan/Tac/MMF.       1.  BMT: Cy/flu/TBI prep.   - Received 4.19 x10(6) CD34/kg. Donor B pos and recipient B neg.     - 1/19: BM: No morphologic or immunophenotypic evidence of leukemia. 100% donor in BM and PB both fractions.Next Generation Sequencing Oncology-IDH2=neg     .     2.  HEME: Keep Hgb>7g/dL and plts>10K.   -no tranfusions today  -remains anemic from transplant  3.  ID: Afebrile.   - Prophy: Per Dr Moncada's note on 1/21: discontinue acyclovir and voriconazole at this point    -Stopped  IV pentamdine as PJP prophylaxis, started dapsone, 1/26.G6PD next visit.  - CMV 1/19=neg. Pending today  - Flu shot 12/2/20  -ok to get COVID vaccine                            4.  GI:  No complaints.    5.  GVHD: none to date  S/p MMF; Day +3 and day+4 cytoxan  On tac 1.5 mg BID,1/19 tac level=8.0. tac taper started on 1/20.  Sent RX to compounding pharmacy for suspension.  They will call Florencia and ask where to send drug- will need suspension starting on  2/10-no more tac levels needed  6.  FEN/Renal:   Hyponatremia: seems consistent with tac induced hypernatremia as potassium has been on the high side as well. Pt asymptomatic. Now improved, fp=378, on tac taper-decrfease blood annabelle mix  - tac-induced hypoMg: cont mgOx 1/d (aids mild constipation)     7. Hypothyroidism:  Continue levothyroxine.   TSH and free T4 are pending  8. Psych: Situational Depression continue effexor 75mg daily.   - Zyprexa for sleep  9. CVAD:  Line removal today    RTC: Patient tells me that Dr Moncada said she  did not need to come back for a month.  Scheduled to see Dax Moncada on 2/25.  Told her she needed to call with any n,v,d,rash with tac taper.  She agreed.  Sent rx for tac suspension to [place] [facility]  Call sooner with issues    Chel Oneal PA-C  [id number]                Again, thank you for allowing me to participate in the care of your patient.        Sincerely,        BMT Advanced Practice Provider

## 2021-01-26 NOTE — LETTER
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       Diagnosis AMLM1 Acute myelogeneous leukemia, M1, myeloblastic  HCT Type Allogeneic    Prep Regimen Fludarabine  Cytoxan  TBI   Donor Source Sibling    GVHD Prophylaxis Post transplant cytoxan  Tac/MMF  Primary BMT Provider [facility]       Interval history:      No fevers.  No cough or sob. No bleeding.  No n,v,d, or rash on tac taper. Sodium improved--likely from tac and now tapering. She can decrease the amt of bloody annabelle mix. No fevers.  They are removing her line today. Denies dry eyes or dry mouth.    ROS: 8 ponit negative except as above.      Physical Exam:   Date of GVH Score: 1/26/21  S 0, LGI 0, UGI 0, Liver 0          KPS: 90    Wt Readings from Last 4 Encounters:   01/26/21 78.9 kg (173 lb 14.4 oz)   01/19/21 79.1 kg (174 lb 4.8 oz)   01/15/21 79.1 kg (174 lb 6.4 oz)   01/06/21 79 kg (174 lb 1.6 oz)     Blood pressure 123/70, pulse 92, temperature 98.3  F (36.8  C), resp. rate 18, weight 78.9 kg (173 lb 14.4 oz), SpO2 96 %.      Constitutional: NAD, pleasant and appropriate  HEENT:  NC/AT, no icterus; bilat periorbital edema fluctuates, not bad today  Lungs: CTAB  Cardiovascular: RRR, no m/r/g  Skin: no rashes or petechiae.   MSK/Extremities  No edema  Neurologic: non-focal  Psych: appropriate affect  Access: right upper chest CVC, c/d/i   Labs:     Lab Results   Component Value Date    WBC 4.7 01/26/2021    ANEU 2.5 01/26/2021    HGB 10.2 (L) 01/26/2021    HCT 31.1 (L) 01/26/2021     01/26/2021     01/26/2021    POTASSIUM 4.0 01/26/2021    CHLORIDE 106 01/26/2021    CO2 30 01/26/2021     (H) 01/26/2021    BUN 16 01/26/2021    CR 0.75 01/26/2021    MAG 2.0 01/26/2021    INR 0.91 01/19/2021    BILITOTAL 0.1 (L) 01/26/2021    AST 29 01/26/2021    ALT 32 01/26/2021    ALKPHOS 110  01/26/2021    PROTTOTAL 7.1 01/26/2021    ALBUMIN 3.6 01/26/2021         Assessment and Plan:   Florencia Gao is a 75 year old female with AML in CR1, day +1111 s/p JCARLOS Allo sib pbsct per protocol 2019-10, randomized to Post transplant cytoxan/Tac/MMF.       1.  BMT: Cy/flu/TBI prep.   - Received 4.19 x10(6) CD34/kg. Donor B pos and recipient B neg.     - 1/19: BM: No morphologic or immunophenotypic evidence of leukemia. 100% donor in BM and PB both fractions.Next Generation Sequencing Oncology-IDH2=neg     .     2.  HEME: Keep Hgb>7g/dL and plts>10K.   -no tranfusions today  -remains anemic from transplant  3.  ID: Afebrile.   - Prophy: Per Dr Moncada's note on 1/21: discontinue acyclovir and voriconazole at this point    -Stopped  IV pentamdine as PJP prophylaxis, started dapsone, 1/26.G6PD next visit.  - CMV 1/19=neg. Pending today  - Flu shot 12/2/20  -ok to get COVID vaccine                            4.  GI:  No complaints.    5.  GVHD: none to date  S/p MMF; Day +3 and day+4 cytoxan  On tac 1.5 mg BID,1/19 tac level=8.0. tac taper started on 1/20.  Sent RX to compounding pharmacy for suspension.  They will call Florencia and ask where to send drug- will need suspension starting on  2/10-no more tac levels needed  6.  FEN/Renal:   Hyponatremia: seems consistent with tac induced hypernatremia as potassium has been on the high side as well. Pt asymptomatic. Now improved, ar=865, on tac taper-decrfease blood annabelle mix  - tac-induced hypoMg: cont mgOx 1/d (aids mild constipation)     7. Hypothyroidism:  Continue levothyroxine.   TSH and free T4 are pending  8. Psych: Situational Depression continue effexor 75mg daily.   - Zyprexa for sleep  9. CVAD:  Line removal today    RTC: Patient tells me that Dr Moncada said she did not need to come back for a month.  Scheduled to see Dax Moncada on 2/25.  Told her she needed to call with any n,v,d,rash with tac taper.  She agreed.  [facility]  Call sooner with issues    Chel Oneal PA-C  [phone]                BMT Advanced Practice Provider

## 2021-01-26 NOTE — PROGRESS NOTES
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       Diagnosis AMLM1 Acute myelogeneous leukemia, M1, myeloblastic  HCT Type Allogeneic    Prep Regimen Fludarabine  Cytoxan  TBI   Donor Source Sibling    GVHD Prophylaxis Post transplant cytoxan  Tac/MMF  Primary BMT Provider [facility]       Interval history:      No fevers.  No cough or sob. No bleeding.  No n,v,d, or rash on tac taper. Sodium improved--likely from tac and now tapering. She can decrease the amt of bloody annabelle mix. No fevers.  They are removing her line today. Denies dry eyes or dry mouth.    ROS: 8 ponit negative except as above.      Physical Exam:   Date of GVH Score: 1/26/21  S 0, LGI 0, UGI 0, Liver 0          KPS: 90    Wt Readings from Last 4 Encounters:   01/26/21 78.9 kg (173 lb 14.4 oz)   01/19/21 79.1 kg (174 lb 4.8 oz)   01/15/21 79.1 kg (174 lb 6.4 oz)   01/06/21 79 kg (174 lb 1.6 oz)     Blood pressure 123/70, pulse 92, temperature 98.3  F (36.8  C), resp. rate 18, weight 78.9 kg (173 lb 14.4 oz), SpO2 96 %.      Constitutional: NAD, pleasant and appropriate  HEENT:  NC/AT, no icterus; bilat periorbital edema fluctuates, not bad today  Lungs: CTAB  Cardiovascular: RRR, no m/r/g  Skin: no rashes or petechiae.   MSK/Extremities  No edema  Neurologic: non-focal  Psych: appropriate affect  Access: right upper chest CVC, c/d/i   Labs:     Lab Results   Component Value Date    WBC 4.7 01/26/2021    ANEU 2.5 01/26/2021    HGB 10.2 (L) 01/26/2021    HCT 31.1 (L) 01/26/2021     01/26/2021     01/26/2021    POTASSIUM 4.0 01/26/2021    CHLORIDE 106 01/26/2021    CO2 30 01/26/2021     (H) 01/26/2021    BUN 16 01/26/2021    CR 0.75 01/26/2021    MAG 2.0 01/26/2021    INR 0.91 01/19/2021    BILITOTAL 0.1 (L) 01/26/2021    AST 29 01/26/2021    ALT 32 01/26/2021    ALKPHOS 110 01/26/2021    PROTTOTAL 7.1 01/26/2021    ALBUMIN 3.6 01/26/2021         Assessment  and Plan:   Florencia Gao is a 75 year old female with AML in CR1, day +1111 s/p JCARLOS Allo sib pbsct per protocol 2019-10, randomized to Post transplant cytoxan/Tac/MMF.       1.  BMT: Cy/flu/TBI prep.   - Received 4.19 x10(6) CD34/kg. Donor B pos and recipient B neg.     - 1/19: BM: No morphologic or immunophenotypic evidence of leukemia. 100% donor in BM and PB both fractions.Next Generation Sequencing Oncology-IDH2=neg     .     2.  HEME: Keep Hgb>7g/dL and plts>10K.   -no tranfusions today  -remains anemic from transplant  3.  ID: Afebrile.   - Prophy: Per Dr Moncada's note on 1/21: discontinue acyclovir and voriconazole at this point    -Stopped  IV pentamdine as PJP prophylaxis, started dapsone, 1/26.G6PD next visit.  - CMV 1/19=neg. Pending today  - Flu shot 12/2/20  -ok to get COVID vaccine                            4.  GI:  No complaints.    5.  GVHD: none to date  S/p MMF; Day +3 and day+4 cytoxan  On tac 1.5 mg BID,1/19 tac level=8.0. tac taper started on 1/20.  [facility].  They will call Florencia and ask where to send drug- will need suspension starting on  2/10-no more tac levels needed  6.  FEN/Renal:   Hyponatremia: seems consistent with tac induced hypernatremia as potassium has been on the high side as well. Pt asymptomatic. Now improved, ti=379, on tac taper-decrfease blood annabelle mix  - tac-induced hypoMg: cont mgOx 1/d (aids mild constipation)     7. Hypothyroidism:  Continue levothyroxine.   TSH and free T4 are pending  8. Psych: Situational Depression continue effexor 75mg daily.   - Zyprexa for sleep  9. CVAD:  Line removal today    RTC: Patient tells me that Dr Moncada said she did not need to come back for a month.  Scheduled to see Dax Moncada on 2/25.  Told her she needed to call with any n,v,d,rash with tac taper.  She agreed.  Sent rx for tac suspension to [place] [facility]  Call sooner with issues    Annabelle Oneal PA-C  [phone]

## 2021-01-26 NOTE — NURSING NOTE
"Oncology Rooming Note    January 26, 2021 12:55 PM   Florencia Gao is a 75 year old female who presents for:    Chief Complaint   Patient presents with     RECHECK     AML     Initial Vitals: /70 (BP Location: Right arm, Patient Position: Sitting, Cuff Size: Adult Large)   Pulse 92   Temp 98.3  F (36.8  C)   Resp 18   Wt 78.9 kg (173 lb 14.4 oz)   SpO2 96%   BMI 27.95 kg/m   Estimated body mass index is 27.95 kg/m  as calculated from the following:    Height as of 11/3/20: 1.68 m (5' 6.14\").    Weight as of this encounter: 78.9 kg (173 lb 14.4 oz). Body surface area is 1.92 meters squared.  No Pain (0) Comment: Data Unavailable   No LMP recorded. Patient is postmenopausal.  Allergies reviewed: Yes  Medications reviewed: Yes    Medications: Medication refills not needed today.  Pharmacy name entered into [facility]:    [id number] [place], [place] - [facility] [facility] AT [facility] [address]  [facility] [place], [place] - [phone]  [facility] - [place], [place] - [address]     Clinical concerns: NONE       Meme Klein CMA              "

## 2021-01-26 NOTE — DISCHARGE INSTRUCTIONS
A collaboration between [facility] and [facility]  Experts in minimally invasive, targeted treatments performed using imaging guidance    Venous Access Device, Port Catheter or Tunneled Central Line Removal    Today you had your existing venous access device removed, either because it was no longer needed or because there was malfunction or infection issues.    After you go home:  - Drink plenty of fluids.  Generally 6-8 (8 ounce) glasses a day is recommended.  - Resume your regular diet unless otherwise ordered by a medical provider.  - Keep any applied tape/gauze dressings clean and dry.  Change tape/gauze dressings if they get wet or soiled.  - You may shower the following day after procedure, however cover and protect from moisture any tape/gauze dressings.  You may let water hit and run over dried skin glue, but do not scrub.  Pat the area dry after showering.  - Port removal incisions are closed with absorbable suture, meaning they do not need to be removed at a later date, and a topical skin adhesive (skin glue).  This glue will wear off in 7-14 days.  Do not remove before this time.  If 14 days have passed and residual glue is present, you may gently remove it.  - You may remove tape/gauze dressings after 5 days if the site looks closed and in the process of healing.  - Do not apply gels, lotions, or ointments to the glue site for the first 10 days as this may cause the glue to prematurely soften and fail.  - Do not perform strenuous activities or lift greater than 10 pounds for the next three days.  - If there is bleeding or oozing from the procedure site, apply firm pressure to the area for 5-10 minutes.  If the bleeding continues seek medical advice at the numbers below.  - Mild procedure site discomfort can be treated with an ice pack and over-the-counter pain relievers.              For 24 hours after any sedation used:  - Relax and take it easy.  No  strenuous activities.  - Do not drive or operate machines at home or at work.  - No alcohol consumption.  - Do not make any important or legal decisions.    Call our Interventional Radiology (IR) service if:  - If you start bleeding from the procedure site.  If you do start to bleed from the site, lie down and hold some pressure on the site.  Our radiology provider can help you decide if you need to return to the hospital.  - If you have new or worsening pain related to the procedure.  - If you have concerning swelling at the procedure site.  - If you develop persistent nausea or vomiting.  - If you develop hives or a rash or any unexplained itching.  - If you have a fever of greater than 100.5  F and chills in the first 5 days after procedure.  - Any other concerns related to your procedure.      [facility]  Interventional Radiology (IR)  [address] [place] [address]    Contact Number:  [phone]  (IR control desk)  - Monday - Friday 8:00 am - 4:30 pm    After hours for urgent concerns:  [phone]  - After 4:30 pm Monday - Friday, Weekends and Holidays.   - Ask for Interventional Radiology on-call.  Someone is available 24 hours a day.  - [facility] toll free number:  [phone]

## 2021-02-02 ENCOUNTER — HOME INFUSION (PRE-WILLOW HOME INFUSION) (OUTPATIENT)
Dept: PHARMACY | Facility: CLINIC | Age: 76
End: 2021-02-02

## 2021-02-02 ENCOUNTER — TELEPHONE (OUTPATIENT)
Dept: TRANSPLANT | Facility: CLINIC | Age: 76
End: 2021-02-02

## 2021-02-02 NOTE — TELEPHONE ENCOUNTER
A prior authorization is needed for the following medication prescribed.  Please complete a prior authorization with the information included below.    Medication: FV-TACROLIMUS 1MG/ML SUSP (COMPOUND)    Ingredients: Tacrolimus 5mg caps  [address]-78, Ora-Plus Liqd [address]-71 and Simple Syrup [address]-65    RX #: [id number]  Reason for Rejection: MED B/D DETERMINATION REQUIRED MD CALL    Pharmacy Insurance plan: Med[employer] Part D  BIN #: [id number]  ID #: [id number]  PCN #: MD  Phone #: [phone]      Pharmacy NPI:[id number]      Please advise the pharmacy when the prior authorization is approved or denied.     Thank you for your time.     [facility]  [phone]

## 2021-02-04 NOTE — PROGRESS NOTES
This is a recent snapshot of the patient's [facility] medical record.  For current drug dose and complete information and questions, call [phone] or In Basket pool, [facility] home infusion ([id number])  CSN Number:  [id number]

## 2021-02-04 NOTE — TELEPHONE ENCOUNTER
PA Initiation    Medication: FV-TACROLIMUS 1MG/ML SUSP (COMPOUND) - PA Initiated  Insurance Company: MEDICA - Phone [phone] Fax [phone]  Pharmacy Filling the Rx:    Filling Pharmacy Phone:    Filling Pharmacy Fax:    Start Date: 2/4/2021

## 2021-02-05 LAB — COPATH REPORT: NORMAL

## 2021-02-08 LAB — COPATH REPORT: NORMAL

## 2021-02-10 NOTE — TELEPHONE ENCOUNTER
Prior Authorization Approval    Authorization Effective Date: 1/11/2021  Authorization Expiration Date: 2/10/2024  Medication: FV-TACROLIMUS 1MG/ML SUSP (COMPOUND) - PA Approved  Approved Dose/Quantity:   Reference #: [id number]   Insurance Company: MEDICA - Phone [phone] Fax [phone]  Expected CoPay:       CoPay Card Available:      Foundation Assistance Needed:    Which Pharmacy is filling the prescription (Not needed for infusion/clinic administered):   [facility]  Pharmacy Notified:  Yes  Patient Notified:      (awaiting approval letter to be faxe)

## 2021-02-12 PROBLEM — C92.00 AML (ACUTE MYELOBLASTIC LEUKEMIA) (H): Status: ACTIVE | Noted: 2020-06-18

## 2021-02-25 ENCOUNTER — APPOINTMENT (OUTPATIENT)
Dept: LAB | Facility: CLINIC | Age: 76
End: 2021-02-25
Attending: INTERNAL MEDICINE
Payer: COMMERCIAL

## 2021-02-25 ENCOUNTER — ONCOLOGY VISIT (OUTPATIENT)
Dept: TRANSPLANT | Facility: CLINIC | Age: 76
End: 2021-02-25
Attending: INTERNAL MEDICINE
Payer: COMMERCIAL

## 2021-02-25 VITALS
TEMPERATURE: 97.6 F | SYSTOLIC BLOOD PRESSURE: 118 MMHG | RESPIRATION RATE: 16 BRPM | WEIGHT: 176.8 LBS | HEART RATE: 84 BPM | DIASTOLIC BLOOD PRESSURE: 73 MMHG | BODY MASS INDEX: 28.42 KG/M2 | HEIGHT: 66 IN | OXYGEN SATURATION: 93 %

## 2021-02-25 DIAGNOSIS — E03.9 HYPOTHYROIDISM, UNSPECIFIED TYPE: ICD-10-CM

## 2021-02-25 DIAGNOSIS — C92.01 AML (ACUTE MYELOID LEUKEMIA) IN REMISSION (H): ICD-10-CM

## 2021-02-25 DIAGNOSIS — Z94.81 STATUS POST BONE MARROW TRANSPLANT (H): ICD-10-CM

## 2021-02-25 LAB
ALBUMIN SERPL-MCNC: 4 G/DL (ref 3.4–5)
ALP SERPL-CCNC: 96 U/L (ref 40–150)
ALT SERPL W P-5'-P-CCNC: 27 U/L (ref 0–50)
ANION GAP SERPL CALCULATED.3IONS-SCNC: 4 MMOL/L (ref 3–14)
AST SERPL W P-5'-P-CCNC: 25 U/L (ref 0–45)
BASOPHILS # BLD AUTO: 0 10E9/L (ref 0–0.2)
BASOPHILS NFR BLD AUTO: 0.4 %
BILIRUB SERPL-MCNC: 0.6 MG/DL (ref 0.2–1.3)
BUN SERPL-MCNC: 17 MG/DL (ref 7–30)
CALCIUM SERPL-MCNC: 9.3 MG/DL (ref 8.5–10.1)
CHLORIDE SERPL-SCNC: 106 MMOL/L (ref 94–109)
CO2 SERPL-SCNC: 28 MMOL/L (ref 20–32)
CREAT SERPL-MCNC: 0.88 MG/DL (ref 0.52–1.04)
DIFFERENTIAL METHOD BLD: ABNORMAL
EOSINOPHIL # BLD AUTO: 0 10E9/L (ref 0–0.7)
EOSINOPHIL NFR BLD AUTO: 0.8 %
ERYTHROCYTE [DISTWIDTH] IN BLOOD BY AUTOMATED COUNT: 15.2 % (ref 10–15)
GFR SERPL CREATININE-BSD FRML MDRD: 65 ML/MIN/{1.73_M2}
GLUCOSE SERPL-MCNC: 88 MG/DL (ref 70–99)
HCT VFR BLD AUTO: 31.4 % (ref 35–47)
HGB BLD-MCNC: 9.7 G/DL (ref 11.7–15.7)
IMM GRANULOCYTES # BLD: 0 10E9/L (ref 0–0.4)
IMM GRANULOCYTES NFR BLD: 0.4 %
LYMPHOCYTES # BLD AUTO: 2.1 10E9/L (ref 0.8–5.3)
LYMPHOCYTES NFR BLD AUTO: 42.5 %
MAGNESIUM SERPL-MCNC: 2.3 MG/DL (ref 1.6–2.3)
MCH RBC QN AUTO: 31.4 PG (ref 26.5–33)
MCHC RBC AUTO-ENTMCNC: 30.9 G/DL (ref 31.5–36.5)
MCV RBC AUTO: 102 FL (ref 78–100)
MONOCYTES # BLD AUTO: 0.7 10E9/L (ref 0–1.3)
MONOCYTES NFR BLD AUTO: 13.3 %
NEUTROPHILS # BLD AUTO: 2.2 10E9/L (ref 1.6–8.3)
NEUTROPHILS NFR BLD AUTO: 42.6 %
NRBC # BLD AUTO: 0 10*3/UL
NRBC BLD AUTO-RTO: 0 /100
PLATELET # BLD AUTO: 203 10E9/L (ref 150–450)
POTASSIUM SERPL-SCNC: 5.2 MMOL/L (ref 3.4–5.3)
PROT SERPL-MCNC: 7.4 G/DL (ref 6.8–8.8)
RBC # BLD AUTO: 3.09 10E12/L (ref 3.8–5.2)
SODIUM SERPL-SCNC: 138 MMOL/L (ref 133–144)
WBC # BLD AUTO: 5 10E9/L (ref 4–11)

## 2021-02-25 PROCEDURE — 82955 ASSAY OF G6PD ENZYME: CPT | Performed by: PHYSICIAN ASSISTANT

## 2021-02-25 PROCEDURE — 83735 ASSAY OF MAGNESIUM: CPT | Performed by: PHYSICIAN ASSISTANT

## 2021-02-25 PROCEDURE — 99215 OFFICE O/P EST HI 40 MIN: CPT | Performed by: INTERNAL MEDICINE

## 2021-02-25 PROCEDURE — 85025 COMPLETE CBC W/AUTO DIFF WBC: CPT | Performed by: PHYSICIAN ASSISTANT

## 2021-02-25 PROCEDURE — G0463 HOSPITAL OUTPT CLINIC VISIT: HCPCS

## 2021-02-25 PROCEDURE — 80053 COMPREHEN METABOLIC PANEL: CPT | Performed by: PHYSICIAN ASSISTANT

## 2021-02-25 PROCEDURE — 36415 COLL VENOUS BLD VENIPUNCTURE: CPT

## 2021-02-25 ASSESSMENT — PAIN SCALES - GENERAL: PAINLEVEL: NO PAIN (0)

## 2021-02-25 ASSESSMENT — MIFFLIN-ST. JEOR: SCORE: 1325.96

## 2021-02-25 NOTE — NURSING NOTE
Chief Complaint   Patient presents with     Blood Draw     Labs drawn via  by RN in lab. VS taken.      Labs drawn with vpt by rn.  Pt tolerated well.  VS taken.  Pt checked in for next appt.    Francoise Abdi RN

## 2021-02-25 NOTE — LETTER
"    2/25/2021         RE: Florencia Gao  [address] [place] [address]        Dear Colleague,    Thank you for referring your patient, Florencia Gao, to the [facility]LOOD AND MARROW TRANSPLANT PROGRAM [place]. Please see a copy of my visit note below.    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  She has been doing well.  She said no fever chills rash bleeding or bruising.  Her energy is good she has no GI  ENT or respiratory symptomatology.  She has no bone or joint symptoms.  The rest of her review of systems is unrevealing.    Her exam shows her weight stable over the last month and her vital signs are acceptable.  She has no rash.  Her oropharynx is clear without mucosal lesions.  She has no bone tenderness or lower extremity edema.  Her lungs are clear heart tones are regular without a gallop or murmur.  Her abdomen is soft and nontender without palpable masses or hepatosplenomegaly.  She denies paresthesias or other neurologic symptoms so no additional neuro exam was done.    Laboratory testing showed good blood counts and chemistries though her potassium was slightly elevated at 5.2.  Magnesium was acceptable and needs no further supplementation.    She is doing well with no acute problems, recent infections or signs of GVHD.  She has had no GVH to date.  She continues to take dapsone daily for pneumocystis prophylaxis and her thyroid replacement and if XR.  She wants to start calcium and vitamin D which I told her was fine.    In the " absence of new problems we will see her again at the 6-month visit, roughly 6 weeks from now but she knows to call if additional problems arise it is great to see how well she is doing    Yong Moncada MD    Professor of medicine    Results for AME ROSENTHAL (MRN [id number]) as of 2/25/2021 17:58   Ref. Range 2/25/2021 14:26   Sodium Latest Ref Range: 133 - 144 mmol/L 138   Potassium Latest Ref Range: 3.4 - 5.3 mmol/L 5.2   Chloride Latest Ref Range: 94 - 109 mmol/L 106   Carbon Dioxide Latest Ref Range: 20 - 32 mmol/L 28   Urea Nitrogen Latest Ref Range: 7 - 30 mg/dL 17   Creatinine Latest Ref Range: 0.52 - 1.04 mg/dL 0.88   GFR Estimate Latest Ref Range: >60 mL/min/1.73_m2 65   GFR Estimate If Black Latest Ref Range: >60 mL/min/1.73_m2 76   Calcium Latest Ref Range: 8.5 - 10.1 mg/dL 9.3   Anion Gap Latest Ref Range: 3 - 14 mmol/L 4   Magnesium Latest Ref Range: 1.6 - 2.3 mg/dL 2.3   Albumin Latest Ref Range: 3.4 - 5.0 g/dL 4.0   Protein Total Latest Ref Range: 6.8 - 8.8 g/dL 7.4   Bilirubin Total Latest Ref Range: 0.2 - 1.3 mg/dL 0.6   Alkaline Phosphatase Latest Ref Range: 40 - 150 U/L 96   ALT Latest Ref Range: 0 - 50 U/L 27   AST Latest Ref Range: 0 - 45 U/L 25   Glucose Latest Ref Range: 70 - 99 mg/dL 88   WBC Latest Ref Range: 4.0 - 11.0 10e9/L 5.0   Hemoglobin Latest Ref Range: 11.7 - 15.7 g/dL 9.7 (L)   Hematocrit Latest Ref Range: 35.0 - 47.0 % 31.4 (L)   Platelet Count Latest Ref Range: 150 - 450 10e9/L 203   RBC Count Latest Ref Range: 3.8 - 5.2 10e12/L 3.09 (L)   MCV Latest Ref Range: 78 - 100 fl 102 (H)   MCH Latest Ref Range: 26.5 - 33.0 pg 31.4   MCHC Latest Ref Range: 31.5 - 36.5 g/dL 30.9 (L)   RDW Latest Ref Range: 10.0 - 15.0 % 15.2 (H)   Diff Method Unknown Automated Method   % Neutrophils Latest Units: % 42.6   % Lymphocytes Latest Units: % 42.5   % Monocytes Latest Units: % 13.3   % Eosinophils Latest Units: % 0.8   % Basophils Latest Units: % 0.4   % Immature Granulocytes Latest Units:  % 0.4   Nucleated RBCs Latest Ref Range: 0 /100 0   Absolute Neutrophil Latest Ref Range: 1.6 - 8.3 10e9/L 2.2   Absolute Lymphocytes Latest Ref Range: 0.8 - 5.3 10e9/L 2.1   Absolute Monocytes Latest Ref Range: 0.0 - 1.3 10e9/L 0.7   Absolute Eosinophils Latest Ref Range: 0.0 - 0.7 10e9/L 0.0   Absolute Basophils Latest Ref Range: 0.0 - 0.2 10e9/L 0.0   Abs Immature Granulocytes Latest Ref Range: 0 - 0.4 10e9/L 0.0   Absolute Nucleated RBC Unknown 0.0       Again, thank you for allowing me to participate in the care of your patient.        Sincerely,        Yong Moncada MD

## 2021-02-25 NOTE — PROGRESS NOTES
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  She has been doing well.  She said no fever chills rash bleeding or bruising.  Her energy is good she has no GI  ENT or respiratory symptomatology.  She has no bone or joint symptoms.  The rest of her review of systems is unrevealing.    Her exam shows her weight stable over the last month and her vital signs are acceptable.  She has no rash.  Her oropharynx is clear without mucosal lesions.  She has no bone tenderness or lower extremity edema.  Her lungs are clear heart tones are regular without a gallop or murmur.  Her abdomen is soft and nontender without palpable masses or hepatosplenomegaly.  She denies paresthesias or other neurologic symptoms so no additional neuro exam was done.    Laboratory testing showed good blood counts and chemistries though her potassium was slightly elevated at 5.2.  Magnesium was acceptable and needs no further supplementation.    She is doing well with no acute problems, recent infections or signs of GVHD.  She has had no GVH to date.  She continues to take dapsone daily for pneumocystis prophylaxis and her thyroid replacement and if XR.  She wants to start calcium and vitamin D which I told her was fine.    In the absence of new problems we will see her again at the 6-month visit, roughly 6 weeks from now but she knows to call if additional problems arise it is great to see how well she is doing    Yong Moncada MD    Professor of medicine  [id number]}  40 minute visit including time for review " of records, labs and notes; confirmation of current medications    Results for AME ROSENTHAL (MRN [id number]) as of 2/25/2021 17:58   Ref. Range 2/25/2021 14:26   Sodium Latest Ref Range: 133 - 144 mmol/L 138   Potassium Latest Ref Range: 3.4 - 5.3 mmol/L 5.2   Chloride Latest Ref Range: 94 - 109 mmol/L 106   Carbon Dioxide Latest Ref Range: 20 - 32 mmol/L 28   Urea Nitrogen Latest Ref Range: 7 - 30 mg/dL 17   Creatinine Latest Ref Range: 0.52 - 1.04 mg/dL 0.88   GFR Estimate Latest Ref Range: >60 mL/min/1.73_m2 65   GFR Estimate If Black Latest Ref Range: >60 mL/min/1.73_m2 76   Calcium Latest Ref Range: 8.5 - 10.1 mg/dL 9.3   Anion Gap Latest Ref Range: 3 - 14 mmol/L 4   Magnesium Latest Ref Range: 1.6 - 2.3 mg/dL 2.3   Albumin Latest Ref Range: 3.4 - 5.0 g/dL 4.0   Protein Total Latest Ref Range: 6.8 - 8.8 g/dL 7.4   Bilirubin Total Latest Ref Range: 0.2 - 1.3 mg/dL 0.6   Alkaline Phosphatase Latest Ref Range: 40 - 150 U/L 96   ALT Latest Ref Range: 0 - 50 U/L 27   AST Latest Ref Range: 0 - 45 U/L 25   Glucose Latest Ref Range: 70 - 99 mg/dL 88   WBC Latest Ref Range: 4.0 - 11.0 10e9/L 5.0   Hemoglobin Latest Ref Range: 11.7 - 15.7 g/dL 9.7 (L)   Hematocrit Latest Ref Range: 35.0 - 47.0 % 31.4 (L)   Platelet Count Latest Ref Range: 150 - 450 10e9/L 203   RBC Count Latest Ref Range: 3.8 - 5.2 10e12/L 3.09 (L)   MCV Latest Ref Range: 78 - 100 fl 102 (H)   MCH Latest Ref Range: 26.5 - 33.0 pg 31.4   MCHC Latest Ref Range: 31.5 - 36.5 g/dL 30.9 (L)   RDW Latest Ref Range: 10.0 - 15.0 % 15.2 (H)   Diff Method Unknown Automated Method   % Neutrophils Latest Units: % 42.6   % Lymphocytes Latest Units: % 42.5   % Monocytes Latest Units: % 13.3   % Eosinophils Latest Units: % 0.8   % Basophils Latest Units: % 0.4   % Immature Granulocytes Latest Units: % 0.4   Nucleated RBCs Latest Ref Range: 0 /100 0   Absolute Neutrophil Latest Ref Range: 1.6 - 8.3 10e9/L 2.2   Absolute Lymphocytes Latest Ref Range: 0.8 - 5.3 10e9/L  2.1   Absolute Monocytes Latest Ref Range: 0.0 - 1.3 10e9/L 0.7   Absolute Eosinophils Latest Ref Range: 0.0 - 0.7 10e9/L 0.0   Absolute Basophils Latest Ref Range: 0.0 - 0.2 10e9/L 0.0   Abs Immature Granulocytes Latest Ref Range: 0 - 0.4 10e9/L 0.0   Absolute Nucleated RBC Unknown 0.0

## 2021-02-25 NOTE — LETTER
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  She has been doing well.  She said no fever chills rash bleeding or bruising.  Her energy is good she has no GI  ENT or respiratory symptomatology.  She has no bone or joint symptoms.  The rest of her review of systems is unrevealing.    Her exam shows her weight stable over the last month and her vital signs are acceptable.  She has no rash.  Her oropharynx is clear without mucosal lesions.  She has no bone tenderness or lower extremity edema.  Her lungs are clear heart tones are regular without a gallop or murmur.  Her abdomen is soft and nontender without palpable masses or hepatosplenomegaly.  She denies paresthesias or other neurologic symptoms so no additional neuro exam was done.    Laboratory testing showed good blood counts and chemistries though her potassium was slightly elevated at 5.2.  Magnesium was acceptable and needs no further supplementation.    She is doing well with no acute problems, recent infections or signs of GVHD.  She has had no GVH to date.  She continues to take dapsone daily for pneumocystis prophylaxis and her thyroid replacement and if XR.  She wants to start calcium and vitamin D which I told her was fine.    In the absence of new problems we will see her again at the 6-month visit, roughly 6 weeks from now but she knows to call if additional problems arise it is great to see how well she is doing    Yong " Antwan TURCIOS    Professor of medicine    Results for AME ROSENTHAL (MRN [id number]) as of 2/25/2021 17:58   Ref. Range 2/25/2021 14:26   Sodium Latest Ref Range: 133 - 144 mmol/L 138   Potassium Latest Ref Range: 3.4 - 5.3 mmol/L 5.2   Chloride Latest Ref Range: 94 - 109 mmol/L 106   Carbon Dioxide Latest Ref Range: 20 - 32 mmol/L 28   Urea Nitrogen Latest Ref Range: 7 - 30 mg/dL 17   Creatinine Latest Ref Range: 0.52 - 1.04 mg/dL 0.88   GFR Estimate Latest Ref Range: >60 mL/min/1.73_m2 65   GFR Estimate If Black Latest Ref Range: >60 mL/min/1.73_m2 76   Calcium Latest Ref Range: 8.5 - 10.1 mg/dL 9.3   Anion Gap Latest Ref Range: 3 - 14 mmol/L 4   Magnesium Latest Ref Range: 1.6 - 2.3 mg/dL 2.3   Albumin Latest Ref Range: 3.4 - 5.0 g/dL 4.0   Protein Total Latest Ref Range: 6.8 - 8.8 g/dL 7.4   Bilirubin Total Latest Ref Range: 0.2 - 1.3 mg/dL 0.6   Alkaline Phosphatase Latest Ref Range: 40 - 150 U/L 96   ALT Latest Ref Range: 0 - 50 U/L 27   AST Latest Ref Range: 0 - 45 U/L 25   Glucose Latest Ref Range: 70 - 99 mg/dL 88   WBC Latest Ref Range: 4.0 - 11.0 10e9/L 5.0   Hemoglobin Latest Ref Range: 11.7 - 15.7 g/dL 9.7 (L)   Hematocrit Latest Ref Range: 35.0 - 47.0 % 31.4 (L)   Platelet Count Latest Ref Range: 150 - 450 10e9/L 203   RBC Count Latest Ref Range: 3.8 - 5.2 10e12/L 3.09 (L)   MCV Latest Ref Range: 78 - 100 fl 102 (H)   MCH Latest Ref Range: 26.5 - 33.0 pg 31.4   MCHC Latest Ref Range: 31.5 - 36.5 g/dL 30.9 (L)   RDW Latest Ref Range: 10.0 - 15.0 % 15.2 (H)   Diff Method Unknown Automated Method   % Neutrophils Latest Units: % 42.6   % Lymphocytes Latest Units: % 42.5   % Monocytes Latest Units: % 13.3   % Eosinophils Latest Units: % 0.8   % Basophils Latest Units: % 0.4   % Immature Granulocytes Latest Units: % 0.4   Nucleated RBCs Latest Ref Range: 0 /100 0   Absolute Neutrophil Latest Ref Range: 1.6 - 8.3 10e9/L 2.2   Absolute Lymphocytes Latest Ref Range: 0.8 - 5.3 10e9/L 2.1   Absolute Monocytes  Latest Ref Range: 0.0 - 1.3 10e9/L 0.7   Absolute Eosinophils Latest Ref Range: 0.0 - 0.7 10e9/L 0.0   Absolute Basophils Latest Ref Range: 0.0 - 0.2 10e9/L 0.0   Abs Immature Granulocytes Latest Ref Range: 0 - 0.4 10e9/L 0.0   Absolute Nucleated RBC Unknown 0.0         Yong Moncada MD

## 2021-02-25 NOTE — NURSING NOTE
"Oncology Rooming Note    February 25, 2021 2:30 PM   Florencia Gao is a 75 year old female who presents for:    Chief Complaint   Patient presents with     Blood Draw     Labs drawn via  by RN in lab. VS taken.      Oncology Clinic Visit     [facility] RETURN - AML     Initial Vitals: /73 (BP Location: Right arm, Patient Position: Sitting, Cuff Size: Adult Regular)   Pulse 84   Temp 97.6  F (36.4  C) (Tympanic)   Resp 16   Ht 1.68 m (5' 6.14\")   Wt 80.2 kg (176 lb 12.8 oz)   SpO2 93%   BMI 28.41 kg/m   Estimated body mass index is 28.41 kg/m  as calculated from the following:    Height as of this encounter: 1.68 m (5' 6.14\").    Weight as of this encounter: 80.2 kg (176 lb 12.8 oz). Body surface area is 1.93 meters squared.  No Pain (0) Comment: Data Unavailable   No LMP recorded. Patient is postmenopausal.  Allergies reviewed: Yes  Medications reviewed: Yes    Medications: Medication refills not needed today.  Pharmacy name entered into [employer]:    [id number] [place], [place] - [address] [place] [address] AT [facility] [address]  [place] [facility] [place], [place] - [id number]  [facility] - [place], [place] - [address] [place] [address]    Clinical concerns: Refill on Magnesium Oxide. Antwan was notified.      Sp Wisdom LPN            "

## 2021-02-27 LAB — G6PD RBC-CCNC: 12.7 U/G HB (ref 9.9–16.6)

## 2021-04-06 ENCOUNTER — OFFICE VISIT (OUTPATIENT)
Dept: TRANSPLANT | Facility: CLINIC | Age: 76
End: 2021-04-06
Attending: PHYSICIAN ASSISTANT
Payer: COMMERCIAL

## 2021-04-06 VITALS
TEMPERATURE: 98.6 F | DIASTOLIC BLOOD PRESSURE: 75 MMHG | BODY MASS INDEX: 28.69 KG/M2 | RESPIRATION RATE: 16 BRPM | SYSTOLIC BLOOD PRESSURE: 125 MMHG | HEART RATE: 73 BPM | OXYGEN SATURATION: 93 % | WEIGHT: 178.5 LBS

## 2021-04-06 DIAGNOSIS — C92.01 AML (ACUTE MYELOID LEUKEMIA) IN REMISSION (H): Primary | ICD-10-CM

## 2021-04-06 LAB
ALBUMIN SERPL-MCNC: 4 G/DL (ref 3.4–5)
ALP SERPL-CCNC: 80 U/L (ref 40–150)
ALT SERPL W P-5'-P-CCNC: 33 U/L (ref 0–50)
ANION GAP SERPL CALCULATED.3IONS-SCNC: 5 MMOL/L (ref 3–14)
AST SERPL W P-5'-P-CCNC: 29 U/L (ref 0–45)
BASOPHILS # BLD AUTO: 0 10E9/L (ref 0–0.2)
BASOPHILS NFR BLD AUTO: 0.4 %
BILIRUB SERPL-MCNC: 0.5 MG/DL (ref 0.2–1.3)
BUN SERPL-MCNC: 16 MG/DL (ref 7–30)
CALCIUM SERPL-MCNC: 9 MG/DL (ref 8.5–10.1)
CHLORIDE SERPL-SCNC: 104 MMOL/L (ref 94–109)
CO2 SERPL-SCNC: 27 MMOL/L (ref 20–32)
CREAT SERPL-MCNC: 0.87 MG/DL (ref 0.52–1.04)
DIFFERENTIAL METHOD BLD: ABNORMAL
EOSINOPHIL # BLD AUTO: 0.1 10E9/L (ref 0–0.7)
EOSINOPHIL NFR BLD AUTO: 2.5 %
ERYTHROCYTE [DISTWIDTH] IN BLOOD BY AUTOMATED COUNT: 14.5 % (ref 10–15)
GFR SERPL CREATININE-BSD FRML MDRD: 66 ML/MIN/{1.73_M2}
GLUCOSE SERPL-MCNC: 86 MG/DL (ref 70–99)
HCT VFR BLD AUTO: 32 % (ref 35–47)
HGB BLD-MCNC: 10.1 G/DL (ref 11.7–15.7)
IMM GRANULOCYTES # BLD: 0 10E9/L (ref 0–0.4)
IMM GRANULOCYTES NFR BLD: 0.6 %
LYMPHOCYTES # BLD AUTO: 2.2 10E9/L (ref 0.8–5.3)
LYMPHOCYTES NFR BLD AUTO: 45.4 %
MCH RBC QN AUTO: 31.7 PG (ref 26.5–33)
MCHC RBC AUTO-ENTMCNC: 31.6 G/DL (ref 31.5–36.5)
MCV RBC AUTO: 100 FL (ref 78–100)
MONOCYTES # BLD AUTO: 0.5 10E9/L (ref 0–1.3)
MONOCYTES NFR BLD AUTO: 10.7 %
NEUTROPHILS # BLD AUTO: 2 10E9/L (ref 1.6–8.3)
NEUTROPHILS NFR BLD AUTO: 40.4 %
NRBC # BLD AUTO: 0 10*3/UL
NRBC BLD AUTO-RTO: 0 /100
PLATELET # BLD AUTO: 201 10E9/L (ref 150–450)
POTASSIUM SERPL-SCNC: 4.4 MMOL/L (ref 3.4–5.3)
PROT SERPL-MCNC: 7 G/DL (ref 6.8–8.8)
RBC # BLD AUTO: 3.19 10E12/L (ref 3.8–5.2)
SODIUM SERPL-SCNC: 136 MMOL/L (ref 133–144)
WBC # BLD AUTO: 4.9 10E9/L (ref 4–11)

## 2021-04-06 PROCEDURE — 88189 FLOWCYTOMETRY/READ 16 & >: CPT | Performed by: PATHOLOGY

## 2021-04-06 PROCEDURE — 88237 TISSUE CULTURE BONE MARROW: CPT | Performed by: PHYSICIAN ASSISTANT

## 2021-04-06 PROCEDURE — 85097 BONE MARROW INTERPRETATION: CPT | Performed by: PATHOLOGY

## 2021-04-06 PROCEDURE — 88264 CHROMOSOME ANALYSIS 20-25: CPT | Performed by: PHYSICIAN ASSISTANT

## 2021-04-06 PROCEDURE — 88275 CYTOGENETICS 100-300: CPT | Performed by: PHYSICIAN ASSISTANT

## 2021-04-06 PROCEDURE — 999N001068 HC STATISTIC BONE MARROW CORE PERF TC 38221: Performed by: PHYSICIAN ASSISTANT

## 2021-04-06 PROCEDURE — 88311 DECALCIFY TISSUE: CPT | Mod: TC | Performed by: PHYSICIAN ASSISTANT

## 2021-04-06 PROCEDURE — 88305 TISSUE EXAM BY PATHOLOGIST: CPT | Mod: TC | Performed by: PHYSICIAN ASSISTANT

## 2021-04-06 PROCEDURE — 85025 COMPLETE CBC W/AUTO DIFF WBC: CPT | Performed by: PHYSICIAN ASSISTANT

## 2021-04-06 PROCEDURE — 99207 PR NO BILLABLE SERVICE THIS VISIT: CPT | Mod: 26 | Performed by: PATHOLOGY

## 2021-04-06 PROCEDURE — 38222 DX BONE MARROW BX & ASPIR: CPT

## 2021-04-06 PROCEDURE — 250N000011 HC RX IP 250 OP 636: Performed by: PHYSICIAN ASSISTANT

## 2021-04-06 PROCEDURE — 88161 CYTOPATH SMEAR OTHER SOURCE: CPT | Mod: TC | Performed by: PHYSICIAN ASSISTANT

## 2021-04-06 PROCEDURE — 80053 COMPREHEN METABOLIC PANEL: CPT | Performed by: PHYSICIAN ASSISTANT

## 2021-04-06 PROCEDURE — 999N001022 HC STATISTIC H-SPHEME PROCESS B/S: Performed by: PHYSICIAN ASSISTANT

## 2021-04-06 PROCEDURE — G0452 MOLECULAR PATHOLOGY INTERPR: HCPCS | Mod: 26 | Performed by: PATHOLOGY

## 2021-04-06 PROCEDURE — 999N001137 HC STATISTIC FLOW >15 ABY TC 88189: Performed by: PHYSICIAN ASSISTANT

## 2021-04-06 PROCEDURE — 88312 SPECIAL STAINS GROUP 1: CPT | Mod: 26 | Performed by: PATHOLOGY

## 2021-04-06 PROCEDURE — 88184 FLOWCYTOMETRY/ TC 1 MARKER: CPT | Mod: TC | Performed by: PHYSICIAN ASSISTANT

## 2021-04-06 PROCEDURE — 999N001086 HC STATISTIC MORPHOLOGY W/INTERP HEMEPATH TC 85060: Performed by: PHYSICIAN ASSISTANT

## 2021-04-06 PROCEDURE — 88305 TISSUE EXAM BY PATHOLOGIST: CPT | Mod: 26 | Performed by: PATHOLOGY

## 2021-04-06 PROCEDURE — 88311 DECALCIFY TISSUE: CPT | Mod: 26 | Performed by: PATHOLOGY

## 2021-04-06 PROCEDURE — 88312 SPECIAL STAINS GROUP 1: CPT | Mod: TC | Performed by: PHYSICIAN ASSISTANT

## 2021-04-06 PROCEDURE — 81403 MOPATH PROCEDURE LEVEL 4: CPT | Performed by: PHYSICIAN ASSISTANT

## 2021-04-06 PROCEDURE — 81310 NPM1 GENE: CPT | Performed by: PHYSICIAN ASSISTANT

## 2021-04-06 PROCEDURE — 999N001126 HC STATISTIC BONE MARROW INTERP TC 85097: Performed by: PHYSICIAN ASSISTANT

## 2021-04-06 PROCEDURE — 88185 FLOWCYTOMETRY/TC ADD-ON: CPT | Mod: TC | Performed by: PHYSICIAN ASSISTANT

## 2021-04-06 PROCEDURE — 81268 CHIMERISM ANAL W/CELL SELECT: CPT | Performed by: PHYSICIAN ASSISTANT

## 2021-04-06 PROCEDURE — 88271 CYTOGENETICS DNA PROBE: CPT | Performed by: PHYSICIAN ASSISTANT

## 2021-04-06 PROCEDURE — 88280 CHROMOSOME KARYOTYPE STUDY: CPT | Performed by: PHYSICIAN ASSISTANT

## 2021-04-06 PROCEDURE — 999N000127 HC STATISTIC PERIPHERAL IV START W US GUIDANCE

## 2021-04-06 RX ADMIN — MIDAZOLAM HYDROCHLORIDE 2 MG: 1 INJECTION, SOLUTION INTRAMUSCULAR; INTRAVENOUS at 12:27

## 2021-04-06 ASSESSMENT — PAIN SCALES - GENERAL: PAINLEVEL: NO PAIN (0)

## 2021-04-06 NOTE — NURSING NOTE
Pt here for BMBx, Hx AML. VSS, A&Ox4. RN and provider explained procedure, no questions at this time. Provider to obtain consent prior to procedure. Provider ordered versed prior to procedure. Pt verbally consents to use of versed. 2 mg versed administered prior to procedure. Pt lying prone, call light within reach. Provider and hem tech at bedside.

## 2021-04-06 NOTE — NURSING NOTE
"Chief Complaint   Patient presents with     RECHECK     Bmbx, AML     Oncology Rooming Note    April 6, 2021 11:55 AM   Florencia Gao is a 75 year old female who presents for:    Chief Complaint   Patient presents with     RECHECK     Bmbx, AML     Initial Vitals: /76   Pulse 89   Temp 98.6  F (37  C) (Oral)   Resp 16   Wt 81 kg (178 lb 8 oz)   SpO2 95%   BMI 28.69 kg/m   Estimated body mass index is 28.69 kg/m  as calculated from the following:    Height as of 2/25/21: 1.68 m (5' 6.14\").    Weight as of this encounter: 81 kg (178 lb 8 oz). Body surface area is 1.94 meters squared.  No Pain (0) Comment: Data Unavailable   No LMP recorded. Patient is postmenopausal.  Allergies reviewed: Yes  Medications reviewed: Yes    Medications: Medication refills not needed today.  Pharmacy name entered into [employer]:    [facility] #[id number] [address]  [place] [facility] [place], [place] - [address]  [facility] [place], [place] - [address] [place] [address]    Clinical concerns: None      Rosina Montiel, RN              "

## 2021-04-06 NOTE — LETTER
4/6/2021         RE: Florencia Gao  [address] [place] [address]        Dear Colleague,    Thank you for referring your patient, Florencia Gao, to the [facility] BLOOD AND MARROW TRANSPLANT PROGRAM [place]. Please see a copy of my visit note below.    BMT ONC Adult Bone Marrow Biopsy Procedure Note  April 6, 2021  /76   Pulse 89   Temp 98.6  F (37  C) (Oral)   Resp 16   Wt 81 kg (178 lb 8 oz)   SpO2 95%   BMI 28.69 kg/m       Learning needs assessment complete within 12 months? YES    DIAGNOSIS: AML    PROCEDURE: Unilateral Bone Marrow Biopsy and Unilateral Aspirate    LOCATION: [facility] 2nd Floor    Patient s identification was positively verified by verbal identification and invasive procedure safety checklist was completed. Informed consent was obtained. Following the administration of Midazolam 2mg as pre-medication, patient was placed in the prone position and prepped and draped in a sterile manner. Approximately 20 cc of 1% Lidocaine was used over the left posterior iliac spine. Following this a 3 mm incision was made. Trephine bone marrow core(s) was (were) obtained from the [facility]. Bone marrow aspirates were obtained from the [facility]. Aspirates were sent for morphology, immunophenotyping, cytogenetics and molecular diagnostics RFLP and IDH2. A total of approximately 20 ml of marrow was aspirated. Following this procedure a sterile dressing was applied to the bone marrow biopsy site(s). The patient was placed in the supine position to maintain pressure on the biopsy site. Post-procedure wound care instructions were given.     Complications: NO    Pre-procedural pain: 0 out of 10 on the numeric pain rating scale.     Procedural pain: 5 out of 10 on the numeric pain rating scale.     Post-procedural pain assessment: 0 out of 10 on the numeric pain rating scale.     Interventions: NO    Length of procedure:20 minutes or less      Procedure performed by: Melanie Carpio PA-C

## 2021-04-07 LAB
CMV DNA SPEC NAA+PROBE-ACNC: NORMAL [IU]/ML
CMV DNA SPEC NAA+PROBE-LOG#: NORMAL {LOG_IU}/ML
COPATH REPORT: NORMAL
SPECIMEN SOURCE: NORMAL

## 2021-04-08 ENCOUNTER — VIRTUAL VISIT (OUTPATIENT)
Dept: TRANSPLANT | Facility: CLINIC | Age: 76
End: 2021-04-08
Attending: INTERNAL MEDICINE
Payer: COMMERCIAL

## 2021-04-08 DIAGNOSIS — Z94.81 STATUS POST BONE MARROW TRANSPLANT (H): ICD-10-CM

## 2021-04-08 DIAGNOSIS — C92.01 AML (ACUTE MYELOID LEUKEMIA) IN REMISSION (H): Primary | ICD-10-CM

## 2021-04-08 DIAGNOSIS — E03.9 HYPOTHYROIDISM, UNSPECIFIED TYPE: ICD-10-CM

## 2021-04-08 LAB
COPATH REPORT: NORMAL
COPATH REPORT: NORMAL

## 2021-04-08 PROCEDURE — 99214 OFFICE O/P EST MOD 30 MIN: CPT | Mod: 95 | Performed by: INTERNAL MEDICINE

## 2021-04-08 NOTE — LETTER
4/8/2021         RE: Florencia Gao  [address] [place] [address]        Dear Colleague,    Thank you for referring your patient, Florencia Gao, to the [facility] BLOOD AND MARROW TRANSPLANT PROGRAM [place]. Please see a copy of my visit note below.    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  Florencia is doing well.  She feels good and has had no recent infections bleeding GI  respiratory or ENT symptoms.  She has no dry eyes or dry mouth and no skin rash.  She completed her 2 Covid vaccines without complication.    Her current medications include only dapsone daily for pneumocystis prophylaxis, Synthroid and Effexor. These are all medicine she has been taking continuously and not causing trouble.  An extended review of systems identifies no other new issues.    The laboratory testing done when she was here several days ago all looks good.  A bone marrow biopsy was 20% cellular with no morphologic or immunophenotypic evidence of leukemia.  Chimerism studies show that the bone marrow was 100% donor.    She remains mildly anemic but her white count and platelets are fine.  Chemistry studies showed normal electrolytes renal and liver function and there was no evidence of residual infections or other complications.    We discussed things to pay attention to over the next few months in her ongoing recovery.  There is not any particular need for ongoing blood count screening.    I told her she could have a planned dental visit and eye exam.  She asked whether she could have eyelid plastic surgery and I told her all of those would be appropriate as long as her caregivers are aware of her previous history and recent blood counts.    I told her that with completion of her Covid vaccines, she could also get the shingles (Shingrix recombinant) vaccine given twice, a few weeks apart and she could receive that here or at her primary care office closer to home.  We  will defer other vaccines until 6 months from now, her 1 year post transplant visit when we begin the series of revaccinations.    I told her that I would see her again in 3 months to check her counts and her other status but since she is in remission and has no signs of ndfdi-pwruot-siyd disease or infection I was pleased overall with how well she was doing.  She was urged to call if additional issues arise    Yong Moncada MD    Professor of medicine    Results for AME ROSENTHAL (MRN [id number]) as of 4/8/2021 13:40   Ref. Range 4/6/2021 12:36 4/6/2021 12:40 4/6/2021 12:44 4/6/2021 12:48   Sodium Latest Ref Range: 133 - 144 mmol/L 136      Potassium Latest Ref Range: 3.4 - 5.3 mmol/L 4.4      Chloride Latest Ref Range: 94 - 109 mmol/L 104      Carbon Dioxide Latest Ref Range: 20 - 32 mmol/L 27      Urea Nitrogen Latest Ref Range: 7 - 30 mg/dL 16      Creatinine Latest Ref Range: 0.52 - 1.04 mg/dL 0.87      GFR Estimate Latest Ref Range: >60 mL/min/1.73_m2 66      GFR Estimate If Black Latest Ref Range: >60 mL/min/1.73_m2 76      Calcium Latest Ref Range: 8.5 - 10.1 mg/dL 9.0      Anion Gap Latest Ref Range: 3 - 14 mmol/L 5      Albumin Latest Ref Range: 3.4 - 5.0 g/dL 4.0      Protein Total Latest Ref Range: 6.8 - 8.8 g/dL 7.0      Bilirubin Total Latest Ref Range: 0.2 - 1.3 mg/dL 0.5      Alkaline Phosphatase Latest Ref Range: 40 - 150 U/L 80      ALT Latest Ref Range: 0 - 50 U/L 33      AST Latest Ref Range: 0 - 45 U/L 29      Glucose Latest Ref Range: 70 - 99 mg/dL 86      WBC Latest Ref Range: 4.0 - 11.0 10e9/L 4.9      Hemoglobin Latest Ref Range: 11.7 - 15.7 g/dL 10.1 (L)      Hematocrit Latest Ref Range: 35.0 - 47.0 % 32.0 (L)      Platelet Count Latest Ref Range: 150 - 450 10e9/L 201      RBC Count Latest Ref Range: 3.8 - 5.2 10e12/L 3.19 (L)      MCV Latest Ref Range: 78 - 100 fl 100      MCH Latest Ref Range: 26.5 - 33.0 pg 31.7      MCHC Latest Ref Range: 31.5 - 36.5 g/dL 31.6      RDW Latest Ref  Range: 10.0 - 15.0 % 14.5      Diff Method Unknown Automated Method      % Neutrophils Latest Units: % 40.4      % Lymphocytes Latest Units: % 45.4      % Monocytes Latest Units: % 10.7      % Eosinophils Latest Units: % 2.5      % Basophils Latest Units: % 0.4      % Immature Granulocytes Latest Units: % 0.6      Nucleated RBCs Latest Ref Range: 0 /100 0      Absolute Neutrophil Latest Ref Range: 1.6 - 8.3 10e9/L 2.0      Absolute Lymphocytes Latest Ref Range: 0.8 - 5.3 10e9/L 2.2      Absolute Monocytes Latest Ref Range: 0.0 - 1.3 10e9/L 0.5      Absolute Eosinophils Latest Ref Range: 0.0 - 0.7 10e9/L 0.1      Absolute Basophils Latest Ref Range: 0.0 - 0.2 10e9/L 0.0      Abs Immature Granulocytes Latest Ref Range: 0 - 0.4 10e9/L 0.0      Absolute Nucleated RBC Unknown 0.0      CMV DNA Quantitation Specimen Unknown Blood      CMV Quant IU/mL Latest Ref Range: CMVND^CMV DNA Not Detected [IU]/mL CMV DNA Not Detected      Log IU/mL of CMVQNT Latest Ref Range: <2.1 Log_IU/mL Not Calculated      CHROMOSOME BONE MARROW Unknown    Rpt   DNA MARKER POST BMT ENGRAFTMENT BONE MARROW Unknown    Rpt   FISH Unknown    Rpt   LEUKEMIA LYMPHOMA EVALUATION (FLOW CYTOMETRY) Unknown  Rpt     NEXT GENERATION SEQUENCING ONCOLOGY Unknown    Rpt   PROCESS AND HOLD DNA Unknown    Rpt   BONE MARROW BIOPSY Unknown   Rpt    Patient Name: AME ROSENTHAL   MR#: [id number]   Specimen #: [id number]   Collected: 4/6/2021   Received: 4/6/2021   Reported: 4/7/2021 17:39   Ordering Phy(s): HUGO AGUAYO   Additional Phy(s): Copy to [employer]     For improved result formatting, select 'View Enhanced Report Format' under    Linked Documents section.     +ORIGINAL REPORT FOLLOWS ADDENDUM   ADDENDUM     TO ORIGINAL REPORT   Status: Signed Out   Date Ordered:4/8/2021   Date Complete:4/8/2021   Date Reported:4/8/2021 13:56   Signed Out By: Allison Freed M.D. Physicialeena     INTERPRETATION:   The purpose of this addendum is to report  additional special stains for   infectious organisms. See comments.     The final diagnosis remains the same.     COMMENTS:   GMS and AFB-Estefanía are performed with appropriately reacting controls and   are negative for fungal elements and   acid-fast organisms.     __________________________________________     ORIGINAL REPORT:     TEST(S):   Unilateral Bone Marrow Biopsy/Aspiration     FINAL DIAGNOSIS:   Bone marrow, posterior iliac crest, left decalcified trephine biopsy,   touch imprint; left and direct aspirate   smear, concentrate aspirate smear, peripheral blood smear:       - No morphologic or immunophenotypic evidence of acute myeloid leukemia   (see comment)     - Normocellular marrow for age (20% cellularity) with trilineage   hematopoiesis     - Multiple small lipogranulomas (see comment)     - Peripheral blood showing moderate normocytic normochromic anemia with   no increase in red cell regeneration     COMMENT:   By separate report ([id number]), flow cytometric immunophenotyping   performed on a marrow aspirate sample shows   no increase in blasts and no abnormal myeloid blast population.     Multiple small lipogranulomas are noted on the routinely stained trephine   core biopsy. AFB-Estefanía and GMS have   been ordered to assess for infectious organisms and will be reported in an    addendum.     I have personally reviewed all specimens and/or slides, including the   listed special stains, and used them   with my medical judgment to determine the final diagnosis.     Electronically signed out by:   SARAHI Gonzales       Again, thank you for allowing me to participate in the care of your patient.      Sincerely,    Yong Moncada MD

## 2021-04-08 NOTE — PROGRESS NOTES
Florencia is a 75 year old who is being evaluated via a billable video visit.      How would you like to obtain your AVS? MyChart  If the video visit is dropped, the invitation should be resent by: Send to e-mail at: [email or web address]  Will anyone else be joining your video visit? No      Video-Visit Details    Type of service:  Video Visit    Originating Location (pt. 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  Florencia is doing well.  She feels good and has had no recent infections bleeding GI  respiratory or ENT symptoms.  She has no dry eyes or dry mouth and no skin rash.  She completed her 2 Covid vaccines without complication.    Her current medications include only dapsone daily for pneumocystis prophylaxis, Synthroid and Effexor. These are all medicine she has been taking continuously and not causing trouble.  An extended review of systems identifies no other new issues.    The laboratory testing done when she was here several days ago all looks good.  A bone marrow biopsy was 20% cellular with no morphologic or immunophenotypic evidence of leukemia.  Chimerism studies show that the bone marrow was 100% donor.    She remains mildly anemic but her white count and platelets are fine.  Chemistry studies showed normal electrolytes renal and liver function and there was no evidence of residual infections or other complications.    We discussed things to pay attention to over the next few months in her ongoing recovery.  There is not any particular need for ongoing blood count screening.    I told her she could have a planned dental visit and eye exam.  She asked whether she could have eyelid plastic surgery and I told her all of those would be appropriate as  long as her caregivers are aware of her previous history and recent blood counts.    I told her that with completion of her Covid vaccines, she could also get the shingles (Shingrix recombinant) vaccine given twice, a few weeks apart and she could receive that here or at her primary care office closer to home.  We will defer other vaccines until 6 months from now, her 1 year post transplant visit when we begin the series of revaccinations.    I told her that I would see her again in 3 months to check her counts and her other status but since she is in remission and has no signs of tzphc-ggvtvt-qmmo disease or infection I was pleased overall with how well she was doing.  She was urged to call if additional issues arise    Yong Moncada MD    Professor of medicine    Results for AME ROSENTHAL (MRN [id number]) as of 4/8/2021 13:40   Ref. Range 4/6/2021 12:36 4/6/2021 12:40 4/6/2021 12:44 4/6/2021 12:48   Sodium Latest Ref Range: 133 - 144 mmol/L 136      Potassium Latest Ref Range: 3.4 - 5.3 mmol/L 4.4      Chloride Latest Ref Range: 94 - 109 mmol/L 104      Carbon Dioxide Latest Ref Range: 20 - 32 mmol/L 27      Urea Nitrogen Latest Ref Range: 7 - 30 mg/dL 16      Creatinine Latest Ref Range: 0.52 - 1.04 mg/dL 0.87      GFR Estimate Latest Ref Range: >60 mL/min/1.73_m2 66      GFR Estimate If Black Latest Ref Range: >60 mL/min/1.73_m2 76      Calcium Latest Ref Range: 8.5 - 10.1 mg/dL 9.0      Anion Gap Latest Ref Range: 3 - 14 mmol/L 5      Albumin Latest Ref Range: 3.4 - 5.0 g/dL 4.0      Protein Total Latest Ref Range: 6.8 - 8.8 g/dL 7.0      Bilirubin Total Latest Ref Range: 0.2 - 1.3 mg/dL 0.5      Alkaline Phosphatase Latest Ref Range: 40 - 150 U/L 80      ALT Latest Ref Range: 0 - 50 U/L 33      AST Latest Ref Range: 0 - 45 U/L 29      Glucose Latest Ref Range: 70 - 99 mg/dL 86      WBC Latest Ref Range: 4.0 - 11.0 10e9/L 4.9      Hemoglobin Latest Ref Range: 11.7 - 15.7 g/dL 10.1 (L)      Hematocrit  Latest Ref Range: 35.0 - 47.0 % 32.0 (L)      Platelet Count Latest Ref Range: 150 - 450 10e9/L 201      RBC Count Latest Ref Range: 3.8 - 5.2 10e12/L 3.19 (L)      MCV Latest Ref Range: 78 - 100 fl 100      MCH Latest Ref Range: 26.5 - 33.0 pg 31.7      MCHC Latest Ref Range: 31.5 - 36.5 g/dL 31.6      RDW Latest Ref Range: 10.0 - 15.0 % 14.5      Diff Method Unknown Automated Method      % Neutrophils Latest Units: % 40.4      % Lymphocytes Latest Units: % 45.4      % Monocytes Latest Units: % 10.7      % Eosinophils Latest Units: % 2.5      % Basophils Latest Units: % 0.4      % Immature Granulocytes Latest Units: % 0.6      Nucleated RBCs Latest Ref Range: 0 /100 0      Absolute Neutrophil Latest Ref Range: 1.6 - 8.3 10e9/L 2.0      Absolute Lymphocytes Latest Ref Range: 0.8 - 5.3 10e9/L 2.2      Absolute Monocytes Latest Ref Range: 0.0 - 1.3 10e9/L 0.5      Absolute Eosinophils Latest Ref Range: 0.0 - 0.7 10e9/L 0.1      Absolute Basophils Latest Ref Range: 0.0 - 0.2 10e9/L 0.0      Abs Immature Granulocytes Latest Ref Range: 0 - 0.4 10e9/L 0.0      Absolute Nucleated RBC Unknown 0.0      CMV DNA Quantitation Specimen Unknown Blood      CMV Quant IU/mL Latest Ref Range: CMVND^CMV DNA Not Detected [IU]/mL CMV DNA Not Detected      Log IU/mL of CMVQNT Latest Ref Range: <2.1 Log_IU/mL Not Calculated      CHROMOSOME BONE MARROW Unknown    Rpt   DNA MARKER POST BMT ENGRAFTMENT BONE MARROW Unknown    Rpt   FISH Unknown    Rpt   LEUKEMIA LYMPHOMA EVALUATION (FLOW CYTOMETRY) Unknown  Rpt     NEXT GENERATION SEQUENCING ONCOLOGY Unknown    Rpt   PROCESS AND HOLD DNA Unknown    Rpt   BONE MARROW BIOPSY Unknown   Rpt    Patient Name: AME ROSENTHAL   MR#: [id number]   Specimen #: [id number]   Collected: 4/6/2021   Received: 4/6/2021   Reported: 4/7/2021 17:39   Ordering Phy(s): HUGO AGUAYO   Additional Phy(s): Copy to [employer]     For improved result formatting, select 'View Enhanced Report Format' under     Linked Documents section.     +ORIGINAL REPORT FOLLOWS ADDENDUM   ADDENDUM     TO ORIGINAL REPORT   Status: Signed Out   Date Ordered:4/8/2021   Date Complete:4/8/2021   Date Reported:4/8/2021 13:56   Signed Out By: SARAHI Gonzales     INTERPRETATION:   The purpose of this addendum is to report additional special stains for   infectious organisms. See comments.     The final diagnosis remains the same.     COMMENTS:   GMS and AFB-Estefanía are performed with appropriately reacting controls and   are negative for fungal elements and   acid-fast organisms.     __________________________________________     ORIGINAL REPORT:     TEST(S):   Unilateral Bone Marrow Biopsy/Aspiration     FINAL DIAGNOSIS:   Bone marrow, posterior iliac crest, left decalcified trephine biopsy,   touch imprint; left and direct aspirate   smear, concentrate aspirate smear, peripheral blood smear:       - No morphologic or immunophenotypic evidence of acute myeloid leukemia   (see comment)     - Normocellular marrow for age (20% cellularity) with trilineage   hematopoiesis     - Multiple small lipogranulomas (see comment)     - Peripheral blood showing moderate normocytic normochromic anemia with   no increase in red cell regeneration     COMMENT:   By separate report ([id number]), flow cytometric immunophenotyping   performed on a marrow aspirate sample shows   no increase in blasts and no abnormal myeloid blast population.     Multiple small lipogranulomas are noted on the routinely stained trephine   core biopsy. AFB-Estefanía and GMS have   been ordered to assess for infectious organisms and will be reported in an    addendum.     I have personally reviewed all specimens and/or slides, including the   listed special stains, and used them   with my medical judgment to determine the final diagnosis.     Electronically signed out by:   SARAHI Gonzales

## 2021-04-08 NOTE — LETTER
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  Florencia is doing well.  She feels good and has had no recent infections bleeding GI  respiratory or ENT symptoms.  She has no dry eyes or dry mouth and no skin rash.  She completed her 2 Covid vaccines without complication.    Her current medications include only dapsone daily for pneumocystis prophylaxis, Synthroid and Effexor. These are all medicine she has been taking continuously and not causing trouble.  An extended review of systems identifies no other new issues.    The laboratory testing done when she was here several days ago all looks good.  A bone marrow biopsy was 20% cellular with no morphologic or immunophenotypic evidence of leukemia.  Chimerism studies show that the bone marrow was 100% donor.    She remains mildly anemic but her white count and platelets are fine.  Chemistry studies showed normal electrolytes renal and liver function and there was no evidence of residual infections or other complications.    We discussed things to pay attention to over the next few months in her ongoing recovery.  There is not any particular need for ongoing blood count screening.    I told her she could have a planned dental visit and eye exam.  She asked whether she could have eyelid plastic surgery and I told her all of those would be appropriate as long as her caregivers are aware of her previous history and recent blood counts.    I told her that with completion of her Covid vaccines, she could also get the shingles (Shingrix recombinant) vaccine given twice, a few weeks apart and she could receive that here or at her primary care office closer to home.  We will defer other vaccines until 6 months from now, her 1 year post transplant visit when we begin the series of revaccinations.    I told her that I would see her again in 3 months to check her  counts and her other status but since she is in remission and has no signs of mcoid-wiiste-zseg disease or infection I was pleased overall with how well she was doing.  She was urged to call if additional issues arise    Yong Moncada MD    Professor of medicine    Results for AME ROSENTHAL (MRN [id number]) as of 4/8/2021 13:40   Ref. Range 4/6/2021 12:36 4/6/2021 12:40 4/6/2021 12:44 4/6/2021 12:48   Sodium Latest Ref Range: 133 - 144 mmol/L 136      Potassium Latest Ref Range: 3.4 - 5.3 mmol/L 4.4      Chloride Latest Ref Range: 94 - 109 mmol/L 104      Carbon Dioxide Latest Ref Range: 20 - 32 mmol/L 27      Urea Nitrogen Latest Ref Range: 7 - 30 mg/dL 16      Creatinine Latest Ref Range: 0.52 - 1.04 mg/dL 0.87      GFR Estimate Latest Ref Range: >60 mL/min/1.73_m2 66      GFR Estimate If Black Latest Ref Range: >60 mL/min/1.73_m2 76      Calcium Latest Ref Range: 8.5 - 10.1 mg/dL 9.0      Anion Gap Latest Ref Range: 3 - 14 mmol/L 5      Albumin Latest Ref Range: 3.4 - 5.0 g/dL 4.0      Protein Total Latest Ref Range: 6.8 - 8.8 g/dL 7.0      Bilirubin Total Latest Ref Range: 0.2 - 1.3 mg/dL 0.5      Alkaline Phosphatase Latest Ref Range: 40 - 150 U/L 80      ALT Latest Ref Range: 0 - 50 U/L 33      AST Latest Ref Range: 0 - 45 U/L 29      Glucose Latest Ref Range: 70 - 99 mg/dL 86      WBC Latest Ref Range: 4.0 - 11.0 10e9/L 4.9      Hemoglobin Latest Ref Range: 11.7 - 15.7 g/dL 10.1 (L)      Hematocrit Latest Ref Range: 35.0 - 47.0 % 32.0 (L)      Platelet Count Latest Ref Range: 150 - 450 10e9/L 201      RBC Count Latest Ref Range: 3.8 - 5.2 10e12/L 3.19 (L)      MCV Latest Ref Range: 78 - 100 fl 100      MCH Latest Ref Range: 26.5 - 33.0 pg 31.7      MCHC Latest Ref Range: 31.5 - 36.5 g/dL 31.6      RDW Latest Ref Range: 10.0 - 15.0 % 14.5      Diff Method Unknown Automated Method      % Neutrophils Latest Units: % 40.4      % Lymphocytes Latest Units: % 45.4      % Monocytes Latest Units: % 10.7      %  Eosinophils Latest Units: % 2.5      % Basophils Latest Units: % 0.4      % Immature Granulocytes Latest Units: % 0.6      Nucleated RBCs Latest Ref Range: 0 /100 0      Absolute Neutrophil Latest Ref Range: 1.6 - 8.3 10e9/L 2.0      Absolute Lymphocytes Latest Ref Range: 0.8 - 5.3 10e9/L 2.2      Absolute Monocytes Latest Ref Range: 0.0 - 1.3 10e9/L 0.5      Absolute Eosinophils Latest Ref Range: 0.0 - 0.7 10e9/L 0.1      Absolute Basophils Latest Ref Range: 0.0 - 0.2 10e9/L 0.0      Abs Immature Granulocytes Latest Ref Range: 0 - 0.4 10e9/L 0.0      Absolute Nucleated RBC Unknown 0.0      CMV DNA Quantitation Specimen Unknown Blood      CMV Quant IU/mL Latest Ref Range: CMVND^CMV DNA Not Detected [IU]/mL CMV DNA Not Detected      Log IU/mL of CMVQNT Latest Ref Range: <2.1 Log_IU/mL Not Calculated      CHROMOSOME BONE MARROW Unknown    Rpt   DNA MARKER POST BMT ENGRAFTMENT BONE MARROW Unknown    Rpt   FISH Unknown    Rpt   LEUKEMIA LYMPHOMA EVALUATION (FLOW CYTOMETRY) Unknown  Rpt     NEXT GENERATION SEQUENCING ONCOLOGY Unknown    Rpt   PROCESS AND HOLD DNA Unknown    Rpt   BONE MARROW BIOPSY Unknown   Rpt    Patient Name: AME ROSENTHAL   MR#: [id number]   Specimen #: [id number]   Collected: 4/6/2021   Received: 4/6/2021   Reported: 4/7/2021 17:39   Ordering Phy(s): HUGO AGUAYO   Additional Phy(s): Copy to [employer]     For improved result formatting, select 'View Enhanced Report Format' under    Linked Documents section.     +ORIGINAL REPORT FOLLOWS ADDENDUM   ADDENDUM     TO ORIGINAL REPORT   Status: Signed Out   Date Ordered:4/8/2021   Date Complete:4/8/2021   Date Reported:4/8/2021 13:56   Signed Out By: SARAHI Gonzales     INTERPRETATION:   The purpose of this addendum is to report additional special stains for   infectious organisms. See comments.     The final diagnosis remains the same.     COMMENTS:   GMS and AFB-Estefanía are performed with appropriately reacting controls and    are negative for fungal elements and   acid-fast organisms.     __________________________________________     ORIGINAL REPORT:     TEST(S):   Unilateral Bone Marrow Biopsy/Aspiration     FINAL DIAGNOSIS:   Bone marrow, posterior iliac crest, left decalcified trephine biopsy,   touch imprint; left and direct aspirate   smear, concentrate aspirate smear, peripheral blood smear:       - No morphologic or immunophenotypic evidence of acute myeloid leukemia   (see comment)     - Normocellular marrow for age (20% cellularity) with trilineage   hematopoiesis     - Multiple small lipogranulomas (see comment)     - Peripheral blood showing moderate normocytic normochromic anemia with   no increase in red cell regeneration     COMMENT:   By separate report ([id number]), flow cytometric immunophenotyping   performed on a marrow aspirate sample shows   no increase in blasts and no abnormal myeloid blast population.     Multiple small lipogranulomas are noted on the routinely stained trephine   core biopsy. AFB-Estefanía and GMS have   been ordered to assess for infectious organisms and will be reported in an    addendum.     I have personally reviewed all specimens and/or slides, including the   listed special stains, and used them   with my medical judgment to determine the final diagnosis.     Electronically signed out by:   SARAHI Gonzales MD

## 2021-04-12 LAB
COPATH REPORT: NORMAL
COPATH REPORT: NORMAL

## 2021-04-13 LAB — COPATH REPORT: NORMAL

## 2021-06-30 ENCOUNTER — TELEPHONE (OUTPATIENT)
Dept: TRANSPLANT | Facility: CLINIC | Age: 76
End: 2021-06-30
Payer: COMMERCIAL

## 2021-06-30 ENCOUNTER — TELEPHONE (OUTPATIENT)
Dept: TRANSPLANT | Facility: CLINIC | Age: 76
End: 2021-06-30

## 2021-06-30 NOTE — TELEPHONE ENCOUNTER
Called Florencia.  She received her second shingles shot yesterday and developed dizziness, headache, likely fever( did not take temp) and general feeling of unwellness that caused her to lay on her bathroom floor.  Her friend/former caretaker called an ambulance after she received 2 text messages from Florencia saying she feels terrible.  Called Florencia back after the friend called the BMT office.  Florencia says the ambulance showed up.  They took her temperature she was afebrile.  And her blood pressure which was normal.  She feels better now.  No longer dizzy. No n,v,d. No cough.  She is going to eat something.  Feel like these are common SE profile after the Shingrix shot although she now says she is not sure which shingles shot she got.  She is scheduled tomorrow to see Dr Moncada.  She is not on any immunosuppression. She will keep her appointment for tomorrow, 7/1 in person with Dr Vera tomorrow   She gave me permission to call Gaye back and talk to her about this.    Chel Oneal PA-C  [phone]

## 2021-07-01 ENCOUNTER — ONCOLOGY VISIT (OUTPATIENT)
Dept: TRANSPLANT | Facility: CLINIC | Age: 76
End: 2021-07-01
Attending: INTERNAL MEDICINE
Payer: COMMERCIAL

## 2021-07-01 ENCOUNTER — APPOINTMENT (OUTPATIENT)
Dept: LAB | Facility: CLINIC | Age: 76
End: 2021-07-01
Attending: INTERNAL MEDICINE
Payer: COMMERCIAL

## 2021-07-01 VITALS
BODY MASS INDEX: 28.88 KG/M2 | HEIGHT: 66 IN | WEIGHT: 179.68 LBS | RESPIRATION RATE: 12 BRPM | OXYGEN SATURATION: 92 % | SYSTOLIC BLOOD PRESSURE: 104 MMHG | HEART RATE: 92 BPM | DIASTOLIC BLOOD PRESSURE: 69 MMHG | TEMPERATURE: 99.1 F

## 2021-07-01 DIAGNOSIS — Z94.81 STATUS POST BONE MARROW TRANSPLANT (H): ICD-10-CM

## 2021-07-01 DIAGNOSIS — E03.9 HYPOTHYROIDISM, UNSPECIFIED TYPE: ICD-10-CM

## 2021-07-01 DIAGNOSIS — C92.01 AML (ACUTE MYELOID LEUKEMIA) IN REMISSION (H): ICD-10-CM

## 2021-07-01 LAB
ALBUMIN SERPL-MCNC: 3.9 G/DL (ref 3.4–5)
ALP SERPL-CCNC: 66 U/L (ref 40–150)
ALT SERPL W P-5'-P-CCNC: 30 U/L (ref 0–50)
ANION GAP SERPL CALCULATED.3IONS-SCNC: 4 MMOL/L (ref 3–14)
AST SERPL W P-5'-P-CCNC: 33 U/L (ref 0–45)
BASOPHILS # BLD AUTO: 0 10E9/L (ref 0–0.2)
BASOPHILS NFR BLD AUTO: 0.6 %
BILIRUB SERPL-MCNC: 0.9 MG/DL (ref 0.2–1.3)
BUN SERPL-MCNC: 16 MG/DL (ref 7–30)
CALCIUM SERPL-MCNC: 8.8 MG/DL (ref 8.5–10.1)
CHLORIDE SERPL-SCNC: 104 MMOL/L (ref 94–109)
CO2 SERPL-SCNC: 28 MMOL/L (ref 20–32)
CREAT SERPL-MCNC: 0.98 MG/DL (ref 0.52–1.04)
DIFFERENTIAL METHOD BLD: ABNORMAL
EOSINOPHIL # BLD AUTO: 0.1 10E9/L (ref 0–0.7)
EOSINOPHIL NFR BLD AUTO: 1.3 %
ERYTHROCYTE [DISTWIDTH] IN BLOOD BY AUTOMATED COUNT: 15.5 % (ref 10–15)
GFR SERPL CREATININE-BSD FRML MDRD: 57 ML/MIN/{1.73_M2}
GLUCOSE SERPL-MCNC: 96 MG/DL (ref 70–99)
HCT VFR BLD AUTO: 32.2 % (ref 35–47)
HGB BLD-MCNC: 10.1 G/DL (ref 11.7–15.7)
IMM GRANULOCYTES # BLD: 0 10E9/L (ref 0–0.4)
IMM GRANULOCYTES NFR BLD: 0.5 %
LYMPHOCYTES # BLD AUTO: 2.4 10E9/L (ref 0.8–5.3)
LYMPHOCYTES NFR BLD AUTO: 37.2 %
MCH RBC QN AUTO: 32.1 PG (ref 26.5–33)
MCHC RBC AUTO-ENTMCNC: 31.4 G/DL (ref 31.5–36.5)
MCV RBC AUTO: 102 FL (ref 78–100)
MONOCYTES # BLD AUTO: 0.9 10E9/L (ref 0–1.3)
MONOCYTES NFR BLD AUTO: 14.6 %
NEUTROPHILS # BLD AUTO: 2.9 10E9/L (ref 1.6–8.3)
NEUTROPHILS NFR BLD AUTO: 45.8 %
NRBC # BLD AUTO: 0 10*3/UL
NRBC BLD AUTO-RTO: 0 /100
PLATELET # BLD AUTO: 180 10E9/L (ref 150–450)
POTASSIUM SERPL-SCNC: 4.2 MMOL/L (ref 3.4–5.3)
PROT SERPL-MCNC: 7.2 G/DL (ref 6.8–8.8)
RBC # BLD AUTO: 3.15 10E12/L (ref 3.8–5.2)
SODIUM SERPL-SCNC: 137 MMOL/L (ref 133–144)
WBC # BLD AUTO: 6.3 10E9/L (ref 4–11)

## 2021-07-01 PROCEDURE — 99214 OFFICE O/P EST MOD 30 MIN: CPT | Mod: GC | Performed by: INTERNAL MEDICINE

## 2021-07-01 PROCEDURE — 81268 CHIMERISM ANAL W/CELL SELECT: CPT | Performed by: INTERNAL MEDICINE

## 2021-07-01 PROCEDURE — G0452 MOLECULAR PATHOLOGY INTERPR: HCPCS | Mod: 26 | Performed by: PATHOLOGY

## 2021-07-01 PROCEDURE — 85025 COMPLETE CBC W/AUTO DIFF WBC: CPT | Performed by: INTERNAL MEDICINE

## 2021-07-01 PROCEDURE — 80053 COMPREHEN METABOLIC PANEL: CPT | Performed by: INTERNAL MEDICINE

## 2021-07-01 PROCEDURE — G0463 HOSPITAL OUTPT CLINIC VISIT: HCPCS

## 2021-07-01 ASSESSMENT — PAIN SCALES - GENERAL: PAINLEVEL: NO PAIN (0)

## 2021-07-01 ASSESSMENT — MIFFLIN-ST. JEOR: SCORE: 1338.97

## 2021-07-01 NOTE — PROGRESS NOTES
/69   Pulse 92   Temp 99.1  F (37.3  C) (Oral)   Resp 12   Wt 81.5 kg (179 lb 10.8 oz)   SpO2 92%   BMI 28.88 kg/m    Wt Readings from Last 4 Encounters:   07/01/21 81.5 kg (179 lb 10.8 oz)   04/06/21 81 kg (178 lb 8 oz)   02/25/21 80.2 kg (176 lb 12.8 oz)   01/26/21 78.9 kg (173 lb 14.4 oz)     Patient seen and examined by me.  She was reviewed with the resident and our combined assessment and plan is outlined in the accompanying note.  Other than her post Shingrix reaction from 2 days ago she has had no recent complications.  There is no signs of recurrent disease and no evidence of rthhd-rqdmlx-qhbd disease.  Her energy and functional status continue to improve and there are no other new issues of concern.    Laboratory testing to date confirms continued complete remission and 100% donor chimerism studies.     Overall, she is doing well.    I will see her again in 3 months time but she knows to call if new problems arise    Yong Moncada MD    Professor of medicine    Results for AME ROSENTHAL (MRN [id number]) as of 7/5/2021 16:50   Ref. Range 7/1/2021 13:50 7/1/2021 13:51 7/1/2021 13:51   Sodium Latest Ref Range: 133 - 144 mmol/L 137     Potassium Latest Ref Range: 3.4 - 5.3 mmol/L 4.2     Chloride Latest Ref Range: 94 - 109 mmol/L 104     Carbon Dioxide Latest Ref Range: 20 - 32 mmol/L 28     Urea Nitrogen Latest Ref Range: 7 - 30 mg/dL 16     Creatinine Latest Ref Range: 0.52 - 1.04 mg/dL 0.98     GFR Estimate Latest Ref Range: >60 mL/min/1.73_m2 57 (L)     GFR Estimate If Black Latest Ref Range: >60 mL/min/1.73_m2 66     Calcium Latest Ref Range: 8.5 - 10.1 mg/dL 8.8     Anion Gap Latest Ref Range: 3 - 14 mmol/L 4     Albumin Latest Ref Range: 3.4 - 5.0 g/dL 3.9     Protein Total Latest Ref Range: 6.8 - 8.8 g/dL 7.2     Bilirubin Total Latest Ref Range: 0.2 - 1.3 mg/dL 0.9     Alkaline Phosphatase Latest Ref Range: 40 - 150 U/L 66     ALT Latest Ref Range: 0 - 50 U/L 30     AST Latest Ref  Range: 0 - 45 U/L 33     Glucose Latest Ref Range: 70 - 99 mg/dL 96     WBC Latest Ref Range: 4.0 - 11.0 10e9/L 6.3     Hemoglobin Latest Ref Range: 11.7 - 15.7 g/dL 10.1 (L)     Hematocrit Latest Ref Range: 35.0 - 47.0 % 32.2 (L)     Platelet Count Latest Ref Range: 150 - 450 10e9/L 180     RBC Count Latest Ref Range: 3.8 - 5.2 10e12/L 3.15 (L)     MCV Latest Ref Range: 78 - 100 fl 102 (H)     MCH Latest Ref Range: 26.5 - 33.0 pg 32.1     MCHC Latest Ref Range: 31.5 - 36.5 g/dL 31.4 (L)     RDW Latest Ref Range: 10.0 - 15.0 % 15.5 (H)     Diff Method Unknown Automated Method     % Neutrophils Latest Units: % 45.8     % Lymphocytes Latest Units: % 37.2     % Monocytes Latest Units: % 14.6     % Eosinophils Latest Units: % 1.3     % Basophils Latest Units: % 0.6     % Immature Granulocytes Latest Units: % 0.5     Nucleated RBCs Latest Ref Range: 0 /100 0     Absolute Neutrophil Latest Ref Range: 1.6 - 8.3 10e9/L 2.9     Absolute Lymphocytes Latest Ref Range: 0.8 - 5.3 10e9/L 2.4     Absolute Monocytes Latest Ref Range: 0.0 - 1.3 10e9/L 0.9     Absolute Eosinophils Latest Ref Range: 0.0 - 0.7 10e9/L 0.1     Absolute Basophils Latest Ref Range: 0.0 - 0.2 10e9/L 0.0     Abs Immature Granulocytes Latest Ref Range: 0 - 0.4 10e9/L 0.0     Absolute Nucleated RBC Unknown 0.0     CMV DNA Quantitation Specimen Unknown Plasma, EDTA anticoagulant     CMV Quant IU/mL Latest Ref Range: CMVND^CMV DNA Not Detected [IU]/mL CMV DNA Not Detected     Log IU/mL of CMVQNT Latest Ref Range: <2.1 Log_IU/mL Not Calculated     DNA MARKER POST BMT ENGRAFTMENT BLOOD Unknown  Rpt Rpt   100% donor in myeloid and lymphoid fractions.

## 2021-07-01 NOTE — LETTER
7/1/2021         RE: Florencia Rosenthal  [address] [place] [address]        Dear Colleague,    Thank you for referring your patient, Florencia Rosenthal, to the [facility] [place]. Please see a copy of my visit note below.    /69   Pulse 92   Temp 99.1  F (37.3  C) (Oral)   Resp 12   Wt 81.5 kg (179 lb 10.8 oz)   SpO2 92%   BMI 28.88 kg/m    Wt Readings from Last 4 Encounters:   07/01/21 81.5 kg (179 lb 10.8 oz)   04/06/21 81 kg (178 lb 8 oz)   02/25/21 80.2 kg (176 lb 12.8 oz)   01/26/21 78.9 kg (173 lb 14.4 oz)     Patient seen and examined by me.  She was reviewed with the resident and our combined assessment and plan is outlined in the accompanying note.  Other than her post Shingrix reaction from 2 days ago she has had no recent complications.  There is no signs of recurrent disease and no evidence of hrzjg-qhkqtf-wmdb disease.  Her energy and functional status continue to improve and there are no other new issues of concern.    Laboratory testing to date confirms continued complete remission and 100% donor chimerism studies.     Overall, she is doing well.    I will see her again in 3 months time but she knows to call if new problems arise    Yong Moncada MD    Professor of medicine    Results for FLORENCIA ROSENTHAL (MRN [id number]) as of 7/5/2021 16:50   Ref. Range 7/1/2021 13:50 7/1/2021 13:51 7/1/2021 13:51   Sodium Latest Ref Range: 133 - 144 mmol/L 137     Potassium Latest Ref Range: 3.4 - 5.3 mmol/L 4.2     Chloride Latest Ref Range: 94 - 109 mmol/L 104     Carbon Dioxide Latest Ref Range: 20 - 32 mmol/L 28     Urea Nitrogen Latest Ref Range: 7 - 30 mg/dL 16     Creatinine Latest Ref Range: 0.52 - 1.04 mg/dL 0.98     GFR Estimate Latest Ref Range: >60 mL/min/1.73_m2 57 (L)     GFR Estimate If Black Latest Ref Range: >60 mL/min/1.73_m2 66     Calcium Latest Ref Range: 8.5 - 10.1 mg/dL 8.8     Anion Gap Latest Ref Range: 3 - 14 mmol/L 4      Albumin Latest Ref Range: 3.4 - 5.0 g/dL 3.9     Protein Total Latest Ref Range: 6.8 - 8.8 g/dL 7.2     Bilirubin Total Latest Ref Range: 0.2 - 1.3 mg/dL 0.9     Alkaline Phosphatase Latest Ref Range: 40 - 150 U/L 66     ALT Latest Ref Range: 0 - 50 U/L 30     AST Latest Ref Range: 0 - 45 U/L 33     Glucose Latest Ref Range: 70 - 99 mg/dL 96     WBC Latest Ref Range: 4.0 - 11.0 10e9/L 6.3     Hemoglobin Latest Ref Range: 11.7 - 15.7 g/dL 10.1 (L)     Hematocrit Latest Ref Range: 35.0 - 47.0 % 32.2 (L)     Platelet Count Latest Ref Range: 150 - 450 10e9/L 180     RBC Count Latest Ref Range: 3.8 - 5.2 10e12/L 3.15 (L)     MCV Latest Ref Range: 78 - 100 fl 102 (H)     MCH Latest Ref Range: 26.5 - 33.0 pg 32.1     MCHC Latest Ref Range: 31.5 - 36.5 g/dL 31.4 (L)     RDW Latest Ref Range: 10.0 - 15.0 % 15.5 (H)     Diff Method Unknown Automated Method     % Neutrophils Latest Units: % 45.8     % Lymphocytes Latest Units: % 37.2     % Monocytes Latest Units: % 14.6     % Eosinophils Latest Units: % 1.3     % Basophils Latest Units: % 0.6     % Immature Granulocytes Latest Units: % 0.5     Nucleated RBCs Latest Ref Range: 0 /100 0     Absolute Neutrophil Latest Ref Range: 1.6 - 8.3 10e9/L 2.9     Absolute Lymphocytes Latest Ref Range: 0.8 - 5.3 10e9/L 2.4     Absolute Monocytes Latest Ref Range: 0.0 - 1.3 10e9/L 0.9     Absolute Eosinophils Latest Ref Range: 0.0 - 0.7 10e9/L 0.1     Absolute Basophils Latest Ref Range: 0.0 - 0.2 10e9/L 0.0     Abs Immature Granulocytes Latest Ref Range: 0 - 0.4 10e9/L 0.0     Absolute Nucleated RBC Unknown 0.0     CMV DNA Quantitation Specimen Unknown Plasma, EDTA anticoagulant     CMV Quant IU/mL Latest Ref Range: CMVND^CMV DNA Not Detected [IU]/mL CMV DNA Not Detected     Log IU/mL of CMVQNT Latest Ref Range: <2.1 Log_IU/mL Not Calculated     DNA MARKER POST BMT ENGRAFTMENT BLOOD Unknown  Rpt Rpt   100% donor in myeloid and lymphoid fractions.      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    The patient had a transient episode of fever, chills, malaise following her second Shingrix vaccine on 6/29/2021.  She reports feeling very awful and actually called an ambulance, however her fever resolved by the time they arrived.  Today she feels back to normal.  She never had a reaction like this to the vaccine in the past.  Otherwise she has no acute complaints today.  She has been feeling well and is planning to travel to [place] in the upcoming months.    She denies new fever, chills, bruising, bleeding, rashes, shortness of breath, diarrhea, abdominal pain, oral ulcers, hyper or hypopigmentation of the skin, myalgias, headaches, xerostomia, xeropthalmia, nail changes.  Energy level is good.    She continues to take her dapsone for PGP prophylaxis, as well as levothyroxine and Effexor.  She has a 20+ year history of hypothyroidism and has been on thyroid replacement hormone for many years.    ROS: 14 point ROS neg other than the symptoms noted above in the HPI.    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  A delay in compatible RBCs may occur.     Sulfa Drugs Rash  "    Acetaminophen-Codeine Dizziness     could not sit up after surgery until off all narcotics, even Tylenol #3     Chlorhexidine Rash         Current Outpatient Medications:      dapsone (ACZONE) 100 MG tablet, Take 1 tablet (100 mg) by mouth daily, Disp: 60 tablet, Rfl: 3     levothyroxine (SYNTHROID/LEVOTHROID) 88 MCG tablet, Take 1 tablet (88 mcg) by mouth daily, Disp: 90 tablet, Rfl: 4     venlafaxine (EFFEXOR) 75 MG tablet, Take 1 tablet (75 mg) by mouth daily, Disp: 90 tablet, Rfl: 4      Physical Examination:   ECOG performance status of 1.   Vital signs: /69   Pulse 92   Temp 99.1  F (37.3  C) (Oral)   Resp 12   Ht 1.68 m (5' 6.14\")   Wt 81.5 kg (179 lb 10.8 oz)   SpO2 92%   BMI 28.88 kg/m    HEENT: No icterus, no pallor. Moist mucous membranes. Oropharynx is clear, no ulcers  NECK: Supple  LUNGS: Bilateral clear to ausculation  CARDIOVASCULAR: Regular rate and rhythm, no murmurs, gallops or rubs.   ABDOMEN: Soft, nontender and nondistended.   EXTREMITIES: No edema  NEUROLOGIC: No focal deficits.  LYMPH NODE EXAM: No palpable adenopathy; cervical LAD    LABORATORY AND IMAGING DATA   Reviewed   CBC RESULTS:   Recent Labs   Lab Test 07/01/21  1350   WBC 6.3   RBC 3.15*   HGB 10.1*   HCT 32.2*   *   MCH 32.1   MCHC 31.4*   RDW 15.5*          Last Comprehensive Metabolic Panel:  Sodium   Date Value Ref Range Status   07/01/2021 137 133 - 144 mmol/L Final     Potassium   Date Value Ref Range Status   07/01/2021 4.2 3.4 - 5.3 mmol/L Final     Chloride   Date Value Ref Range Status   07/01/2021 104 94 - 109 mmol/L Final     Carbon Dioxide   Date Value Ref Range Status   07/01/2021 28 20 - 32 mmol/L Final     Anion Gap   Date Value Ref Range Status   07/01/2021 4 3 - 14 mmol/L Final     Glucose   Date Value Ref Range Status   07/01/2021 96 70 - 99 mg/dL Final     Urea Nitrogen   Date Value Ref Range Status   07/01/2021 16 7 - 30 mg/dL Final     Creatinine   Date Value Ref Range Status "   07/01/2021 0.98 0.52 - 1.04 mg/dL Final     GFR Estimate   Date Value Ref Range Status   07/01/2021 57 (L) >60 mL/min/[1.73_m2] Final     Comment:     Non  GFR Calc  Starting 12/18/2018, serum creatinine based estimated GFR (eGFR) will be   calculated using the Chronic Kidney Disease Epidemiology Collaboration   (CKD-EPI) equation.       Calcium   Date Value Ref Range Status   07/01/2021 8.8 8.5 - 10.1 mg/dL Final     Bilirubin Total   Date Value Ref Range Status   07/01/2021 0.9 0.2 - 1.3 mg/dL Final     Alkaline Phosphatase   Date Value Ref Range Status   07/01/2021 66 40 - 150 U/L Final     ALT   Date Value Ref Range Status   07/01/2021 30 0 - 50 U/L Final     AST   Date Value Ref Range Status   07/01/2021 33 0 - 45 U/L Final     Most Recent Immunizations   Administered Date(s) Administered     COVID-19,PF,Pfizer 03/20/2021     Influenza, Quad, High Dose, Pf, 65yr + 12/02/2020     Zoster vaccine recombinant adjuvanted (SHINGRIX) 06/29/2021         ASSESSMENT AND PLAN:    AML M1 (trisomy 8, IDH2) s/p Allo Sib PBSCT  Patient feels well, review of symptoms were negative. No evidence of recent infection or GVHD. Likely recent transient fever/chills episode was from robust immune response to Shingrix vaccine series.  Low concern for infection. Has also completed influenza and COVID-19 vaccinations. Continues to remain in remission and good health. Labs are stable, no evidence of pancytopenia or electrolyte imbalances.    - Follow up in 3 months with Dr. Dennis  - CMV DNA quant and DNA Markers pending  - Patient will be due to begin re-vaccination series at next visit (12 months post-transplant)  - Continue dapsone for PJP ppx for 3 additional months, can discontinue at next visit    Hypothyroidism, chronic  - continue synthroid    Staffed with Dr. Antwan Gale MD  PGY-5 Medicine-Dermatology  Pager [phone]        Again, thank you for allowing me to participate in the care of your  patient.        Sincerely,        Yong Moncada MD

## 2021-07-01 NOTE — LETTER
7/1/2021         RE: Florencia Rosenthal  [address] [place] [address]      /69   Pulse 92   Temp 99.1  F (37.3  C) (Oral)   Resp 12   Wt 81.5 kg (179 lb 10.8 oz)   SpO2 92%   BMI 28.88 kg/m    Wt Readings from Last 4 Encounters:   07/01/21 81.5 kg (179 lb 10.8 oz)   04/06/21 81 kg (178 lb 8 oz)   02/25/21 80.2 kg (176 lb 12.8 oz)   01/26/21 78.9 kg (173 lb 14.4 oz)     Patient seen and examined by me.  She was reviewed with the resident and our combined assessment and plan is outlined in the accompanying note.  Other than her post Shingrix reaction from 2 days ago she has had no recent complications.  There is no signs of recurrent disease and no evidence of qqcfu-scyroy-gaal disease.  Her energy and functional status continue to improve and there are no other new issues of concern.    Laboratory testing to date confirms continued complete remission and 100% donor chimerism studies.     Overall, she is doing well.    I will see her again in 3 months time but she knows to call if new problems arise    Yong Moncada MD    Professor of medicine    Results for FLORENCIA ROSENTHAL (MRN [id number]) as of 7/5/2021 16:50   Ref. Range 7/1/2021 13:50 7/1/2021 13:51 7/1/2021 13:51   Sodium Latest Ref Range: 133 - 144 mmol/L 137     Potassium Latest Ref Range: 3.4 - 5.3 mmol/L 4.2     Chloride Latest Ref Range: 94 - 109 mmol/L 104     Carbon Dioxide Latest Ref Range: 20 - 32 mmol/L 28     Urea Nitrogen Latest Ref Range: 7 - 30 mg/dL 16     Creatinine Latest Ref Range: 0.52 - 1.04 mg/dL 0.98     GFR Estimate Latest Ref Range: >60 mL/min/1.73_m2 57 (L)     GFR Estimate If Black Latest Ref Range: >60 mL/min/1.73_m2 66     Calcium Latest Ref Range: 8.5 - 10.1 mg/dL 8.8     Anion Gap Latest Ref Range: 3 - 14 mmol/L 4     Albumin Latest Ref Range: 3.4 - 5.0 g/dL 3.9     Protein Total Latest Ref Range: 6.8 - 8.8 g/dL 7.2     Bilirubin Total Latest Ref Range: 0.2 - 1.3 mg/dL 0.9     Alkaline Phosphatase Latest Ref  Range: 40 - 150 U/L 66     ALT Latest Ref Range: 0 - 50 U/L 30     AST Latest Ref Range: 0 - 45 U/L 33     Glucose Latest Ref Range: 70 - 99 mg/dL 96     WBC Latest Ref Range: 4.0 - 11.0 10e9/L 6.3     Hemoglobin Latest Ref Range: 11.7 - 15.7 g/dL 10.1 (L)     Hematocrit Latest Ref Range: 35.0 - 47.0 % 32.2 (L)     Platelet Count Latest Ref Range: 150 - 450 10e9/L 180     RBC Count Latest Ref Range: 3.8 - 5.2 10e12/L 3.15 (L)     MCV Latest Ref Range: 78 - 100 fl 102 (H)     MCH Latest Ref Range: 26.5 - 33.0 pg 32.1     MCHC Latest Ref Range: 31.5 - 36.5 g/dL 31.4 (L)     RDW Latest Ref Range: 10.0 - 15.0 % 15.5 (H)     Diff Method Unknown Automated Method     % Neutrophils Latest Units: % 45.8     % Lymphocytes Latest Units: % 37.2     % Monocytes Latest Units: % 14.6     % Eosinophils Latest Units: % 1.3     % Basophils Latest Units: % 0.6     % Immature Granulocytes Latest Units: % 0.5     Nucleated RBCs Latest Ref Range: 0 /100 0     Absolute Neutrophil Latest Ref Range: 1.6 - 8.3 10e9/L 2.9     Absolute Lymphocytes Latest Ref Range: 0.8 - 5.3 10e9/L 2.4     Absolute Monocytes Latest Ref Range: 0.0 - 1.3 10e9/L 0.9     Absolute Eosinophils Latest Ref Range: 0.0 - 0.7 10e9/L 0.1     Absolute Basophils Latest Ref Range: 0.0 - 0.2 10e9/L 0.0     Abs Immature Granulocytes Latest Ref Range: 0 - 0.4 10e9/L 0.0     Absolute Nucleated RBC Unknown 0.0     CMV DNA Quantitation Specimen Unknown Plasma, EDTA anticoagulant     CMV Quant IU/mL Latest Ref Range: CMVND^CMV DNA Not Detected [IU]/mL CMV DNA Not Detected     Log IU/mL of CMVQNT Latest Ref Range: <2.1 Log_IU/mL Not Calculated     DNA MARKER POST BMT ENGRAFTMENT BLOOD Unknown  Rpt Rpt   100% donor in myeloid and lymphoid fractions.      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    The patient had a transient episode of fever, chills,  malaise following her second Shingrix vaccine on 6/29/2021.  She reports feeling very awful and actually called an ambulance, however her fever resolved by the time they arrived.  Today she feels back to normal.  She never had a reaction like this to the vaccine in the past.  Otherwise she has no acute complaints today.  She has been feeling well and is planning to travel to [place] in the upcoming months.    She denies new fever, chills, bruising, bleeding, rashes, shortness of breath, diarrhea, abdominal pain, oral ulcers, hyper or hypopigmentation of the skin, myalgias, headaches, xerostomia, xeropthalmia, nail changes.  Energy level is good.    She continues to take her dapsone for PGP prophylaxis, as well as levothyroxine and Effexor.  She has a 20+ year history of hypothyroidism and has been on thyroid replacement hormone for many years.    ROS: 14 point ROS neg other than the symptoms noted above in the HPI.    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  A delay in compatible RBCs may occur.     Sulfa Drugs Rash     Acetaminophen-Codeine Dizziness     could not sit up after surgery until off all narcotics, even Tylenol #3     Chlorhexidine Rash         Current Outpatient Medications:      dapsone (ACZONE)  "100 MG tablet, Take 1 tablet (100 mg) by mouth daily, Disp: 60 tablet, Rfl: 3     levothyroxine (SYNTHROID/LEVOTHROID) 88 MCG tablet, Take 1 tablet (88 mcg) by mouth daily, Disp: 90 tablet, Rfl: 4     venlafaxine (EFFEXOR) 75 MG tablet, Take 1 tablet (75 mg) by mouth daily, Disp: 90 tablet, Rfl: 4      Physical Examination:   ECOG performance status of 1.   Vital signs: /69   Pulse 92   Temp 99.1  F (37.3  C) (Oral)   Resp 12   Ht 1.68 m (5' 6.14\")   Wt 81.5 kg (179 lb 10.8 oz)   SpO2 92%   BMI 28.88 kg/m    HEENT: No icterus, no pallor. Moist mucous membranes. Oropharynx is clear, no ulcers  NECK: Supple  LUNGS: Bilateral clear to ausculation  CARDIOVASCULAR: Regular rate and rhythm, no murmurs, gallops or rubs.   ABDOMEN: Soft, nontender and nondistended.   EXTREMITIES: No edema  NEUROLOGIC: No focal deficits.  LYMPH NODE EXAM: No palpable adenopathy; cervical LAD    LABORATORY AND IMAGING DATA   Reviewed   CBC RESULTS:   Recent Labs   Lab Test 07/01/21  1350   WBC 6.3   RBC 3.15*   HGB 10.1*   HCT 32.2*   *   MCH 32.1   MCHC 31.4*   RDW 15.5*          Last Comprehensive Metabolic Panel:  Sodium   Date Value Ref Range Status   07/01/2021 137 133 - 144 mmol/L Final     Potassium   Date Value Ref Range Status   07/01/2021 4.2 3.4 - 5.3 mmol/L Final     Chloride   Date Value Ref Range Status   07/01/2021 104 94 - 109 mmol/L Final     Carbon Dioxide   Date Value Ref Range Status   07/01/2021 28 20 - 32 mmol/L Final     Anion Gap   Date Value Ref Range Status   07/01/2021 4 3 - 14 mmol/L Final     Glucose   Date Value Ref Range Status   07/01/2021 96 70 - 99 mg/dL Final     Urea Nitrogen   Date Value Ref Range Status   07/01/2021 16 7 - 30 mg/dL Final     Creatinine   Date Value Ref Range Status   07/01/2021 0.98 0.52 - 1.04 mg/dL Final     GFR Estimate   Date Value Ref Range Status   07/01/2021 57 (L) >60 mL/min/[1.73_m2] Final     Comment:     Non  GFR Calc  Starting " 12/18/2018, serum creatinine based estimated GFR (eGFR) will be   calculated using the Chronic Kidney Disease Epidemiology Collaboration   (CKD-EPI) equation.       Calcium   Date Value Ref Range Status   07/01/2021 8.8 8.5 - 10.1 mg/dL Final     Bilirubin Total   Date Value Ref Range Status   07/01/2021 0.9 0.2 - 1.3 mg/dL Final     Alkaline Phosphatase   Date Value Ref Range Status   07/01/2021 66 40 - 150 U/L Final     ALT   Date Value Ref Range Status   07/01/2021 30 0 - 50 U/L Final     AST   Date Value Ref Range Status   07/01/2021 33 0 - 45 U/L Final     Most Recent Immunizations   Administered Date(s) Administered     COVID-19,PF,Pfizer 03/20/2021     Influenza, Quad, High Dose, Pf, 65yr + 12/02/2020     Zoster vaccine recombinant adjuvanted (SHINGRIX) 06/29/2021         ASSESSMENT AND PLAN:    AML M1 (trisomy 8, IDH2) s/p Allo Sib PBSCT  Patient feels well, review of symptoms were negative. No evidence of recent infection or GVHD. Likely recent transient fever/chills episode was from robust immune response to Shingrix vaccine series.  Low concern for infection. Has also completed influenza and COVID-19 vaccinations. Continues to remain in remission and good health. Labs are stable, no evidence of pancytopenia or electrolyte imbalances.    - Follow up in 3 months with Dr. Dennis  - CMV DNA quant and DNA Markers pending  - Patient will be due to begin re-vaccination series at next visit (12 months post-transplant)  - Continue dapsone for PJP ppx for 3 additional months, can discontinue at next visit    Hypothyroidism, chronic  - continue synthroid    Staffed with Dr. Antwan Gale MD  PGY-5 Medicine-Dermatology  Pager [phone]          Yong Moncada MD

## 2021-07-01 NOTE — PROGRESS NOTES
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    The patient had a transient episode of fever, chills, malaise following her second Shingrix vaccine on 6/29/2021.  She reports feeling very awful and actually called an ambulance, however her fever resolved by the time they arrived.  Today she feels back to normal.  She never had a reaction like this to the vaccine in the past.  Otherwise she has no acute complaints today.  She has been feeling well and is planning to travel to [place] in the upcoming months.    She denies new fever, chills, bruising, bleeding, rashes, shortness of breath, diarrhea, abdominal pain, oral ulcers, hyper or hypopigmentation of the skin, myalgias, headaches, xerostomia, xeropthalmia, nail changes.  Energy level is good.    She continues to take her dapsone for PGP prophylaxis, as well as levothyroxine and Effexor.  She has a 20+ year history of hypothyroidism and has been on thyroid replacement hormone for many years.    ROS: 14 point ROS neg other than the symptoms noted above in the HPI.    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  A delay in compatible  "RBCs may occur.     Sulfa Drugs Rash     Acetaminophen-Codeine Dizziness     could not sit up after surgery until off all narcotics, even Tylenol #3     Chlorhexidine Rash         Current Outpatient Medications:      dapsone (ACZONE) 100 MG tablet, Take 1 tablet (100 mg) by mouth daily, Disp: 60 tablet, Rfl: 3     levothyroxine (SYNTHROID/LEVOTHROID) 88 MCG tablet, Take 1 tablet (88 mcg) by mouth daily, Disp: 90 tablet, Rfl: 4     venlafaxine (EFFEXOR) 75 MG tablet, Take 1 tablet (75 mg) by mouth daily, Disp: 90 tablet, Rfl: 4      Physical Examination:   ECOG performance status of 1.   Vital signs: /69   Pulse 92   Temp 99.1  F (37.3  C) (Oral)   Resp 12   Ht 1.68 m (5' 6.14\")   Wt 81.5 kg (179 lb 10.8 oz)   SpO2 92%   BMI 28.88 kg/m    HEENT: No icterus, no pallor. Moist mucous membranes. Oropharynx is clear, no ulcers  NECK: Supple  LUNGS: Bilateral clear to ausculation  CARDIOVASCULAR: Regular rate and rhythm, no murmurs, gallops or rubs.   ABDOMEN: Soft, nontender and nondistended.   EXTREMITIES: No edema  NEUROLOGIC: No focal deficits.  LYMPH NODE EXAM: No palpable adenopathy; cervical LAD    LABORATORY AND IMAGING DATA   Reviewed   CBC RESULTS:   Recent Labs   Lab Test 07/01/21  1350   WBC 6.3   RBC 3.15*   HGB 10.1*   HCT 32.2*   *   MCH 32.1   MCHC 31.4*   RDW 15.5*          Last Comprehensive Metabolic Panel:  Sodium   Date Value Ref Range Status   07/01/2021 137 133 - 144 mmol/L Final     Potassium   Date Value Ref Range Status   07/01/2021 4.2 3.4 - 5.3 mmol/L Final     Chloride   Date Value Ref Range Status   07/01/2021 104 94 - 109 mmol/L Final     Carbon Dioxide   Date Value Ref Range Status   07/01/2021 28 20 - 32 mmol/L Final     Anion Gap   Date Value Ref Range Status   07/01/2021 4 3 - 14 mmol/L Final     Glucose   Date Value Ref Range Status   07/01/2021 96 70 - 99 mg/dL Final     Urea Nitrogen   Date Value Ref Range Status   07/01/2021 16 7 - 30 mg/dL Final "     Creatinine   Date Value Ref Range Status   07/01/2021 0.98 0.52 - 1.04 mg/dL Final     GFR Estimate   Date Value Ref Range Status   07/01/2021 57 (L) >60 mL/min/[1.73_m2] Final     Comment:     Non  GFR Calc  Starting 12/18/2018, serum creatinine based estimated GFR (eGFR) will be   calculated using the Chronic Kidney Disease Epidemiology Collaboration   (CKD-EPI) equation.       Calcium   Date Value Ref Range Status   07/01/2021 8.8 8.5 - 10.1 mg/dL Final     Bilirubin Total   Date Value Ref Range Status   07/01/2021 0.9 0.2 - 1.3 mg/dL Final     Alkaline Phosphatase   Date Value Ref Range Status   07/01/2021 66 40 - 150 U/L Final     ALT   Date Value Ref Range Status   07/01/2021 30 0 - 50 U/L Final     AST   Date Value Ref Range Status   07/01/2021 33 0 - 45 U/L Final     Most Recent Immunizations   Administered Date(s) Administered     COVID-19,PF,Pfizer 03/20/2021     Influenza, Quad, High Dose, Pf, 65yr + 12/02/2020     Zoster vaccine recombinant adjuvanted (SHINGRIX) 06/29/2021         ASSESSMENT AND PLAN:    AML M1 (trisomy 8, IDH2) s/p Allo Sib PBSCT  Patient feels well, review of symptoms were negative. No evidence of recent infection or GVHD. Likely recent transient fever/chills episode was from robust immune response to Shingrix vaccine series.  Low concern for infection. Has also completed influenza and COVID-19 vaccinations. Continues to remain in remission and good health. Labs are stable, no evidence of pancytopenia or electrolyte imbalances.    - Follow up in 3 months with Dr. Dennis  - CMV DNA quant and DNA Markers pending  - Patient will be due to begin re-vaccination series at next visit (12 months post-transplant)  - Continue dapsone for PJP ppx for 3 additional months, can discontinue at next visit    Hypothyroidism, chronic  - continue synthroid    Staffed with Dr. Antwan Gale MD  PGY-5 Medicine-Dermatology  Pager [phone]

## 2021-07-01 NOTE — NURSING NOTE
Pt received her Shingles vaccine on Tuesday and developed a fever that night. Denies any fevers since. Feeling well today. Temp 99.1 upon check-in.     Chief Complaint   Patient presents with     Blood Draw     Labs drawn by RN in Lab via Right Wrist VPT.      Jaclyn Heart RN

## 2021-07-01 NOTE — NURSING NOTE
"Oncology Rooming Note    July 1, 2021 2:09 PM   Florencia Gao is a 75 year old female who presents for:    Chief Complaint   Patient presents with     Blood Draw     Labs drawn by RN in Lab via Right Wrist VPT.      Oncology Clinic Visit     BMT  BAN REVIEW-RTN     Initial Vitals: /69   Pulse 92   Temp 99.1  F (37.3  C) (Oral)   Resp 12   Ht 1.68 m (5' 6.14\")   Wt 81.5 kg (179 lb 10.8 oz)   SpO2 92%   BMI 28.88 kg/m   Estimated body mass index is 28.88 kg/m  as calculated from the following:    Height as of this encounter: 1.68 m (5' 6.14\").    Weight as of this encounter: 81.5 kg (179 lb 10.8 oz). Body surface area is 1.95 meters squared.  No Pain (0) Comment: Data Unavailable   No LMP recorded. Patient is postmenopausal.  Allergies reviewed: Yes  Medications reviewed: Yes    Medications: Medication refills not needed today.  Pharmacy name entered into Vignani:    [id number] [place], [place] - [address] [place] [address] AT [facility] [address]  [facility] - [place], [place] - [phone]  [facility] - [place], [place] - [facility] [address]     Clinical concerns: Patient presents for BMT  BAN REVIEW-RTN         Ajit Maurer MA            "

## 2021-07-05 LAB — COPATH REPORT: NORMAL

## 2021-07-06 LAB — COPATH REPORT: NORMAL

## 2021-07-21 DIAGNOSIS — Z94.81 STATUS POST BONE MARROW TRANSPLANT (H): ICD-10-CM

## 2021-07-21 DIAGNOSIS — C92.01 AML (ACUTE MYELOID LEUKEMIA) IN REMISSION (H): ICD-10-CM

## 2021-07-21 RX ORDER — DAPSONE 100 MG/1
100 TABLET ORAL DAILY
Qty: 60 TABLET | Refills: 1 | Status: SHIPPED | OUTPATIENT
Start: 2021-07-21 | End: 2021-10-07

## 2021-07-28 RX ORDER — HEPARIN SODIUM (PORCINE) LOCK FLUSH IV SOLN 100 UNIT/ML 100 UNIT/ML
5 SOLUTION INTRAVENOUS
Status: CANCELLED | OUTPATIENT
Start: 2021-07-28

## 2021-07-28 RX ORDER — HEPARIN SODIUM,PORCINE 10 UNIT/ML
5 VIAL (ML) INTRAVENOUS
Status: CANCELLED | OUTPATIENT
Start: 2021-07-28

## 2021-09-30 ENCOUNTER — VIRTUAL VISIT (OUTPATIENT)
Dept: TRANSPLANT | Facility: CLINIC | Age: 76
End: 2021-09-30
Attending: STUDENT IN AN ORGANIZED HEALTH CARE EDUCATION/TRAINING PROGRAM
Payer: COMMERCIAL

## 2021-09-30 DIAGNOSIS — C92.01 AML (ACUTE MYELOID LEUKEMIA) IN REMISSION (H): Primary | ICD-10-CM

## 2021-09-30 PROCEDURE — 99214 OFFICE O/P EST MOD 30 MIN: CPT | Mod: 95

## 2021-09-30 NOTE — LETTER
9/30/2021         RE: Florencia Gao  [address] [place] [address]        Dear Colleague,    Thank you for referring your patient, Florencia Gao, to the [facility] BLOOD AND MARROW TRANSPLANT PROGRAM [place]. Please see a copy of my visit note below.        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   Survivorship Care Plan    Date: September 30, 2021    Treatment Team:   Patient Care Team:  Thalia Wade PA-C as PCP - General  Natalia Pereira MSW as   Britany Finch, RN as BMT Nurse Coordinator (Transplant)  Yong Moncada MD as BMT Physician (Transplant)  Dejan Wesley MD as Assigned Infectious Disease Provider  Yong Moncada MD as Assigned Cancer Care Provider    Date of Transplant: 10/7/2020      Transplant Essential Data:  Diagnosis AMLM1 Acute myelogeneous leukemia, M1, myeloblastic  HCT Type Allogeneic    Prep Regimen Fludarabine  Cytoxan  TBI   Donor Source Sibling     GVHD Prophylaxis Post transplant cytoxan  Tac/MMF  Clinical Trials ACE      Oncology Treatment History:   Florencia Gao was well until April or May 2020 when she developed progressive fatigue and some easy bruising.  By mid June, pancytopenia and the diagnosis of AML was recognized. Initially Hb 8.1  WBC 1.4, Plts 29,000.    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  Flt 3 and NPM1 and CEBPalpha mutations were negative.     She was treated with idarubicin plus cytarabine (7+3) and achieved complete remission.  A day 14 marrow was empty and recovery of her counts at roughly 6 weeks --July 31 bone marrow was cellular with no morphologic signs of leukemia and the trisomy 8 was no longer detectable.   Treatment/Chemotherapy Number of Cycles Date Range Outcomes & Complications   7+3 1 6/18- Day 14 bmbx no blast but no count recovery. Dya+28 bmbx CR        Survivorship Self-Management Goals:  Goal  Activities Resources    Primary Care well visit Pt has n/a               General Health Maintenance:     Call the BMT clinic if you develop a skin rash, oral sores, eye pain or excessive dryness, shortness of breath, new cough, bleeding, diarrhea, nausea, or vomiting.     Vaccinations should be given at 1 and 2 years after your transplant, these may be given at your annual anniversary visits in the BMT clinic, by your primary care provider, or your local oncologist. An exception is the influenza vaccine, which can be given after day +60 post-transplant during influenza season. See table below for more information.     You can receive the COVID19 vaccine if you have not already, for more information on how to sign up for an appointment you can the [employer] vaccine website at [email or web address]/[email or web address] or the        [place] Department of Health Vaccine Connector portal at https://[place].gov/covid19/vaccine/connector/connector.jsp    For general health concerns you can be seen by your primary care provider.     If you have questions about your transplant or follow up tests contact your BMT RN coordinator.      Vaccine Administration Schedule       Vaccinations Post-BMT: Please refer to our [employer] BMT Vaccination Guidelines updated in March of 2021.         Hzadb-wf-Pokm-Disease Screening:    Has Florencia Gao been diagnosed with chronic GVHD?    No  Current Systemic Immunosuppression:  (list) none    Chronic GVHD NIH Score At Today's Visit: (Provider insert NIHcGVHD grid SmartPhrase here if applicable) n/a    Survivorship Care Plan:     This individualized care plan is designed to inform you and your healthcare team of the recommended follow-up visits, tests, health maintenance activities, and cancer screening you should receive after transplant.     Immune System:  Risks Preventative Measures Recommendations   Infections, viral reactivations   Continue to take prescribed medications to prevent infection  if you are treated for fmkgh-uh-pmyk disease    Symptoms of a cold such as fever, cough, congestion, and shortness of breath should be reported to your primary care provider    Immunizations will be administered at 1 & 2 years after transplant. See schedule above. Vaccines to be administered by PCP or local oncologist     Eyes:   Risks Preventative Measures Recommendations   Cataracts, dry eyes, GVH of the eyes, viral infections, and other eye changes   Yearly eye exam     Screening and treatment for high blood pressure or diabetes    Call if you have eye pain, visual changes, or floaters immediately    Call If you are experiencing dry eyes and symptoms do not improve with Refresh eye drops  Patient will schedule an annual routine exam with community ophthalmologist     Mouth:  Risks Preventative Measures Recommendations   Dry mouth, oral GVH, cavities, and oral cancer   Report any new symptoms such as dry mouth or painful sores     You can use over the counter Biotene for dry mouth or try sucking on sour candy before meals    Get a dental checkup every year and a cleaning every 6 months    If you have a prosthetic heart valve, central venous catheter or  port , or are still taking immunosuppression you may need to take an antibiotic before your dental visits. None at this time, patient has dental provider and will schedule routine exam       Lungs  Risks Preventative Measures Recommendations   Changes in function from chemotherapy or radiation; lung infections (pneumonia)   Tell your provider about difficulty breathing, a cough, or new shortness of breath     Avoid use of tobacco products or smoking    Routine lung exams   None at this time       Heart and Blood Vessels  Heart Disease Risk Score: The ASCVD Risk score (Anuj MEDINA Jr., et al., 2013) failed to calculate for the following reasons:    Cannot find a previous HDL lab    Cannot find a previous total cholesterol lab  Risks Preventative Measures Recommendations    Damage from chemotherapy and/or radiation; early development of heart valve disease    Ask your provider if you should receive consultation from a cardiologist (heart doctor) or have special screening    Follow a  heart healthy  lifestyle. For more information visit the National Heart, Lung, and Blood [place] website at: https://www.nhlbi.nih.gov/health/health-topics/topics/heart-healthy-lifestyle-changes     Don't smoke. If you currently smoke and are ready to quit, we can help you find ways to quit    Maintain a healthy weight    Exercise regularly    Avoid foods that have high amounts of:  o Salt/sodium (less than 2,300 mg of sodium per day)  o Saturated and trans fats  o Limit alcohol to less than 1 drink for women and 2 drinks for men per day  o Sugar such as soft drinks or sugary snacks None at this time       Hormones  Risks Preventative Measures Recommendations   Low thyroid function, low function of other glands (adrenals and others)   Certain endocrine/hormone disorders are more common after transplant. For this reason, talk to your provider about:  o Fatigue  o Muscle weakness  o Changes in cold tolerance  o Reduced interest in sex  o Erectile dysfunction     Certain tests may be used to monitor for hormone changes if you are experiencing symptoms. These tests are done at 1 year    If you have been on steroids you will need to slowly taper or decrease the dose as recommended by your provider/pharmacist. Do not stop taking steroids without consulting with your provider.     If you have been on steroids in the past, you may need steroids during periods of illness None at this time  TSH checked by primary care just this month 9/2021     Liver:  Risks Preventative Measures Recommendations      Damage from chemotherapy or other drugs, buildup of iron from blood transfusions, infections, and GVHD     Certain tests may be ordered at your next survivorship visit to evaluate liver function based on your  individual risk factors.    Talk to your provider before taking herbal supplements or over the counter drugs like Tylenol.    Ask your doctor if you should be treated for iron overload    Avoid alcohol in excess     Serum Ferritin (Recommended 1 year post-transplant)       Kidneys and Bladder:  Risks Preventative Measures Recommendations   Damage from chemotherapy or other drugs, infections, high blood pressure   Monitoring blood tests of kidney function during follow up visits    Treating high blood pressure and diabetes     Drink adequate amounts of water    Report symptoms of infection such as frequent urination, pain with urination, foul odor to urine, or blood in the urine    Talk to your provider before taking herbal supplements or over the counter drugs like Ibuprofen Serum creatinine checked as part of anniversary labs or at least annually by primary provider       Nervous System:  Risks Preventative Measures Recommendations   Neuropathy from chemotherapy, changes in cognitive function   Report changes in sensation or discomfort in feet or hands    Tell your provider about ongoing changes in memory, ability to concentrate, or inability to make decisions   None at this time       Muscle & Connective Tissue  Risks Preventative Measures Recommendations   Reduced muscle strength, reduced stamina, GVHD causing hardening of muscle tissues (scleroderma) or inflammation of tissue over muscles (fascitis)  Let your provider know if:    You notice changes in your muscle strength    Require extra assistance with daily activities    Experience pain or difficult bending or moving joints    If you have been on steroids and are experiencing weakness particularly when standing up from a chair     Need help creating an exercise routine    Notice reduced range of motion of the arms, hips, or legs  Follow general guidelines for physical activity as recommended by the Office of Disease Prevention & Health Promotion:    Avoid  Inactivity  Some physical activity is better than none -- any amount has benefits.    Do Aerobic Activity  Do aerobic physical activity in episodes of at least 10 minutes, as many times as possible per day. This could include going for walks or using the elliptical or stationary bike.  Ask your doctor what aerobic activities would be safe and helpful for you, and set a goal for yourself!    Strengthen Muscles  Do muscle-strengthening activities (such as lifting light weights or using resistance bands and/or going up and down stairs) that are moderate or high intensity and involve all major muscle groups at least 4 days a week. Calcium & Vitamin D Levels (Recommended annually)   Already taking Ca/D     Emotional Health  Risks Preventative Measures Recommendations   Stress, depression, anxiety Talk to your provider about:    Changes in feelings, mood, or emotional wellbeing    Interest in support groups    If you are concerned about your caregivers emotional wellbeing    Desire to speak with a counselor for ongoing support  None at this time       Sexual Health  Risks Preventative Measures Recommendations   Reduced libido due to hormonal changes, erectile dysfunction, vaginal dryness, vaginal rtgwv-ee-dsec disease, vaginal infections, sexually transmitted diseases Talk to your provider about:    Reduced libido or concerns regarding your sexual health    Men: Erectile dysfunction     Women: Vaginal dryness, pain during intercourse, vaginal bleeding after intercourse, changes in vaginal discharge that may indicate infection (green/white/foul odor)     General Recommendations:    It is safe to have sex if your platelet count is > 50,000 and you feel physically and emotionally ready     Women can use water-based lubricants to reduce discomfort from dryness. Prescription topical estrogen may help as well.    Use barrier protection such as condoms to prevent sexually transmitted diseases, you are at higher risk for  infections due to a weakened immune system    For more information check out the National Marrow Donor Program web page on this topic:  https://bethematch.org/patients-and-families/life-after-transplant/coping-with-life-after-transplant/relationships-and-sexual-health/  None at this time         Cancer Screening:  Risks Preventative Measures Recommendations   Higher risk for the development of solid tumors, PTLD, and blood cancers   Preform a full self skin exam monthly to assess for any changes in moles or signs of skin cancer    Minimize excessive sun exposure and wear sunscreen    Women should preform breast-self exams and report any changes in such as a new lump, discharge from nipples, and red or dimpled areas of the breast.     Imaging for breast cancer known as mammography is recommended for women starting at age 40 or 8 years after radiation exposure.     Screening for colorectal cancer should begin at age 50.     All women should have a pap smear every 1-3 years beginning at age 21    Men should perform a monthly testicular self-exam if they have been exposed to radiation as part of their cancer treatment.    Mammogram (Women, 1 year and annually based on radiation exposure/age) and Colonoscopy (Recommended every 10 years after the age of 50)         Recommended Tests & Follow-Up:  Referrals & Tests Ordered Today:  Your BMT physician will review these tests and referral results. If you require treatment based on the results, a member of the BMT team will contact you.  Orders placed today:  No orders of the defined types were placed in this encounter.    (Note to providers: After signing orders use the refresh tool in the note window to display results)   Future Tests/Referrals:   All recommendations above should be completed within 2 months either by your primary care provider or local oncologist (cancer doctor).   Recommendations that were not ordered today should be completed by your:  Primary Care  Provider   Follow Up Care:  Continue to see your care team members routinely after transplant.  BMT Provider: Dr Moncada  Hematologist/Oncologist:n/a  Primary Care Provider: Thalia Wade PA-C as PCP      Gloria Ryan    I spent 30 minutes with the patient and family, over half of which was spent discussing preventative care strategies, self-management practices, and potential complications after transplant.      Information used for these recommendations was obtained from: ELISE Farr., SONIDO Sanchez, ANANYA Frederick, JANE Nick, LINA Chacon, LINA Chiu.,   Dwight, KMamadou KAUFMAN. (2013). Prevalence of Hematopoietic Cell Transplant Survivors in the [place]. Biology of Blood and Marrow Transplantation?: Journal of the American Society for Blood and Marrow Transplantation, 19(10), 9178-8394. doi 10.1016/j.bbmt.2013.07.020

## 2021-09-30 NOTE — PROGRESS NOTES
Florencia is a 76 year old who is being evaluated via a billable video visit.      How would you like to obtain your AVS? MyChart  If the video visit is dropped, the invitation should be resent by: Text to cell phone: [phone]  Will anyone else be joining your video visit? Yes      Video Start Time: 1230  Video-Visit Details    Type of service:  Video Visit    Video End Time:1257    Originating Location (pt. Location): Home    Distant Location (provider location):  [facility] BLOOD AND MARROW TRANSPLANT PROGRAM [place]     Platform used for Video Visit: [employer]

## 2021-09-30 NOTE — PROGRESS NOTES
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   Survivorship Care Plan    Date: September 30, 2021    Treatment Team:   Patient Care Team:  Thalia Wade PA-C as PCP - General  Natalia Pereira MSW as   Britany Finch, RN as BMT Nurse Coordinator (Transplant)  Yong Moncada MD as BMT Physician (Transplant)  Dejan Wesley MD as Assigned Infectious Disease Provider  Yong Moncada MD as Assigned Cancer Care Provider    Date of Transplant: 10/7/2020      Transplant Essential Data:  Diagnosis AMLM1 Acute myelogeneous leukemia, M1, myeloblastic  HCT Type Allogeneic    Prep Regimen Fludarabine  Cytoxan  TBI   Donor Source Sibling     GVHD Prophylaxis Post transplant cytoxan  Tac/MMF  Clinical Trials ACE      Oncology Treatment History:   Florencia Gao was well until April or May 2020 when she developed progressive fatigue and some easy bruising.  By mid June, pancytopenia and the diagnosis of AML was recognized. Initially Hb 8.1  WBC 1.4, Plts 29,000.    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  Flt 3 and NPM1 and CEBPalpha mutations were negative.     She was treated with idarubicin plus cytarabine (7+3) and achieved complete remission.  A day 14 marrow was empty and recovery of her counts at roughly 6 weeks --July 31 bone marrow was cellular with no morphologic signs of leukemia and the trisomy 8 was no longer detectable.   Treatment/Chemotherapy Number of Cycles Date Range Outcomes & Complications   7+3 1 6/18- Day 14 bmbx no blast but no count recovery. Dya+28 bmbx CR        Survivorship Self-Management Goals:  Goal  Activities Resources   Primary Care well visit Pt has n/a               General Health Maintenance:     Call the BMT clinic if you develop a skin rash, oral sores, eye pain or excessive dryness, shortness of breath, new cough, bleeding, diarrhea, nausea, or vomiting.     Vaccinations should be given at 1  and 2 years after your transplant, these may be given at your annual anniversary visits in the BMT clinic, by your primary care provider, or your local oncologist. An exception is the influenza vaccine, which can be given after day +60 post-transplant during influenza season. See table below for more information.     You can receive the COVID19 vaccine if you have not already, for more information on how to sign up for an appointment you can the [employer] vaccine website at [email or web address] or the        [place] Department of Health Vaccine Connector portal at https://[place].gov/covid19/vaccine/connector/connector.jsp    For general health concerns you can be seen by your primary care provider.     If you have questions about your transplant or follow up tests contact your BMT RN coordinator.      Vaccine Administration Schedule       Vaccinations Post-BMT: Please refer to our [facility] BMT Vaccination Guidelines updated in March of 2021.         Poiti-aq-Wupt-Disease Screening:    Has Florencia Gao been diagnosed with chronic GVHD?    No  Current Systemic Immunosuppression:  (list) none    Chronic GVHD NIH Score At Today's Visit: (Provider insert NIHcGVHD grid SmartPhrase here if applicable) n/a    Survivorship Care Plan:     This individualized care plan is designed to inform you and your healthcare team of the recommended follow-up visits, tests, health maintenance activities, and cancer screening you should receive after transplant.     Immune System:  Risks Preventative Measures Recommendations   Infections, viral reactivations   Continue to take prescribed medications to prevent infection if you are treated for fnhyy-gf-vldx disease    Symptoms of a cold such as fever, cough, congestion, and shortness of breath should be reported to your primary care provider    Immunizations will be administered at 1 & 2 years after transplant. See schedule above. Vaccines to be  administered by PCP or local oncologist     Eyes:   Risks Preventative Measures Recommendations   Cataracts, dry eyes, GVH of the eyes, viral infections, and other eye changes   Yearly eye exam     Screening and treatment for high blood pressure or diabetes    Call if you have eye pain, visual changes, or floaters immediately    Call If you are experiencing dry eyes and symptoms do not improve with Refresh eye drops  Patient will schedule an annual routine exam with community ophthalmologist     Mouth:  Risks Preventative Measures Recommendations   Dry mouth, oral GVH, cavities, and oral cancer   Report any new symptoms such as dry mouth or painful sores     You can use over the counter Biotene for dry mouth or try sucking on sour candy before meals    Get a dental checkup every year and a cleaning every 6 months    If you have a prosthetic heart valve, central venous catheter or  port , or are still taking immunosuppression you may need to take an antibiotic before your dental visits. None at this time, patient has dental provider and will schedule routine exam       Lungs  Risks Preventative Measures Recommendations   Changes in function from chemotherapy or radiation; lung infections (pneumonia)   Tell your provider about difficulty breathing, a cough, or new shortness of breath     Avoid use of tobacco products or smoking    Routine lung exams   None at this time       Heart and Blood Vessels  Heart Disease Risk Score: The ASCVD Risk score ([place] DC Jr., et al., 2013) failed to calculate for the following reasons:    Cannot find a previous HDL lab    Cannot find a previous total cholesterol lab  Risks Preventative Measures Recommendations   Damage from chemotherapy and/or radiation; early development of heart valve disease    Ask your provider if you should receive consultation from a cardiologist (heart doctor) or have special screening    Follow a  heart healthy  lifestyle. For more information visit the  National Heart, Lung, and Blood [place] website at: https://www.nhlbi.nih.gov/health/health-topics/topics/heart-healthy-lifestyle-changes     Don't smoke. If you currently smoke and are ready to quit, we can help you find ways to quit    Maintain a healthy weight    Exercise regularly    Avoid foods that have high amounts of:  o Salt/sodium (less than 2,300 mg of sodium per day)  o Saturated and trans fats  o Limit alcohol to less than 1 drink for women and 2 drinks for men per day  o Sugar such as soft drinks or sugary snacks None at this time       Hormones  Risks Preventative Measures Recommendations   Low thyroid function, low function of other glands (adrenals and others)   Certain endocrine/hormone disorders are more common after transplant. For this reason, talk to your provider about:  o Fatigue  o Muscle weakness  o Changes in cold tolerance  o Reduced interest in sex  o Erectile dysfunction     Certain tests may be used to monitor for hormone changes if you are experiencing symptoms. These tests are done at 1 year    If you have been on steroids you will need to slowly taper or decrease the dose as recommended by your provider/pharmacist. Do not stop taking steroids without consulting with your provider.     If you have been on steroids in the past, you may need steroids during periods of illness None at this time  TSH checked by primary care just this month 9/2021     Liver:  Risks Preventative Measures Recommendations      Damage from chemotherapy or other drugs, buildup of iron from blood transfusions, infections, and GVHD     Certain tests may be ordered at your next survivorship visit to evaluate liver function based on your individual risk factors.    Talk to your provider before taking herbal supplements or over the counter drugs like Tylenol.    Ask your doctor if you should be treated for iron overload    Avoid alcohol in excess     Serum Ferritin (Recommended 1 year post-transplant)        Kidneys and Bladder:  Risks Preventative Measures Recommendations   Damage from chemotherapy or other drugs, infections, high blood pressure   Monitoring blood tests of kidney function during follow up visits    Treating high blood pressure and diabetes     Drink adequate amounts of water    Report symptoms of infection such as frequent urination, pain with urination, foul odor to urine, or blood in the urine    Talk to your provider before taking herbal supplements or over the counter drugs like Ibuprofen Serum creatinine checked as part of anniversary labs or at least annually by primary provider       Nervous System:  Risks Preventative Measures Recommendations   Neuropathy from chemotherapy, changes in cognitive function   Report changes in sensation or discomfort in feet or hands    Tell your provider about ongoing changes in memory, ability to concentrate, or inability to make decisions   None at this time       Muscle & Connective Tissue  Risks Preventative Measures Recommendations   Reduced muscle strength, reduced stamina, GVHD causing hardening of muscle tissues (scleroderma) or inflammation of tissue over muscles (fascitis)  Let your provider know if:    You notice changes in your muscle strength    Require extra assistance with daily activities    Experience pain or difficult bending or moving joints    If you have been on steroids and are experiencing weakness particularly when standing up from a chair     Need help creating an exercise routine    Notice reduced range of motion of the arms, hips, or legs  Follow general guidelines for physical activity as recommended by the Office of Disease Prevention & Health Promotion:    Avoid Inactivity  Some physical activity is better than none -- any amount has benefits.    Do Aerobic Activity  Do aerobic physical activity in episodes of at least 10 minutes, as many times as possible per day. This could include going for walks or using the elliptical or  stationary bike.  Ask your doctor what aerobic activities would be safe and helpful for you, and set a goal for yourself!    Strengthen Muscles  Do muscle-strengthening activities (such as lifting light weights or using resistance bands and/or going up and down stairs) that are moderate or high intensity and involve all major muscle groups at least 4 days a week. Calcium & Vitamin D Levels (Recommended annually)   Already taking Ca/D     Emotional Health  Risks Preventative Measures Recommendations   Stress, depression, anxiety Talk to your provider about:    Changes in feelings, mood, or emotional wellbeing    Interest in support groups    If you are concerned about your caregivers emotional wellbeing    Desire to speak with a counselor for ongoing support  None at this time       Sexual Health  Risks Preventative Measures Recommendations   Reduced libido due to hormonal changes, erectile dysfunction, vaginal dryness, vaginal hjafe-wj-edap disease, vaginal infections, sexually transmitted diseases Talk to your provider about:    Reduced libido or concerns regarding your sexual health    Men: Erectile dysfunction     Women: Vaginal dryness, pain during intercourse, vaginal bleeding after intercourse, changes in vaginal discharge that may indicate infection (green/white/foul odor)     General Recommendations:    It is safe to have sex if your platelet count is > 50,000 and you feel physically and emotionally ready     Women can use water-based lubricants to reduce discomfort from dryness. Prescription topical estrogen may help as well.    Use barrier protection such as condoms to prevent sexually transmitted diseases, you are at higher risk for infections due to a weakened immune system    For more information check out the National Marrow Donor Program web page on this topic:  https://bethematch.org/patients-and-families/life-after-transplant/coping-with-life-after-transplant/relationships-and-sexual-health/  None  at this time         Cancer Screening:  Risks Preventative Measures Recommendations   Higher risk for the development of solid tumors, PTLD, and blood cancers   Preform a full self skin exam monthly to assess for any changes in moles or signs of skin cancer    Minimize excessive sun exposure and wear sunscreen    Women should preform breast-self exams and report any changes in such as a new lump, discharge from nipples, and red or dimpled areas of the breast.     Imaging for breast cancer known as mammography is recommended for women starting at age 40 or 8 years after radiation exposure.     Screening for colorectal cancer should begin at age 50.     All women should have a pap smear every 1-3 years beginning at age 21    Men should perform a monthly testicular self-exam if they have been exposed to radiation as part of their cancer treatment.    Mammogram (Women, 1 year and annually based on radiation exposure/age) and Colonoscopy (Recommended every 10 years after the age of 50)         Recommended Tests & Follow-Up:  Referrals & Tests Ordered Today:  Your BMT physician will review these tests and referral results. If you require treatment based on the results, a member of the BMT team will contact you.  Orders placed today:  No orders of the defined types were placed in this encounter.    (Note to providers: After signing orders use the refresh tool in the note window to display results)   Future Tests/Referrals:   All recommendations above should be completed within 2 months either by your primary care provider or local oncologist (cancer doctor).   Recommendations that were not ordered today should be completed by your:  Primary Care Provider   Follow Up Care:  Continue to see your care team members routinely after transplant.  BMT Provider: Dr Moncada  Hematologist/Oncologist:n/a  Primary Care Provider: Thalia Wade PA-C as PCP      Gloria Ryan    I spent 30 minutes with the patient and family, over  half of which was spent discussing preventative care strategies, self-management practices, and potential complications after transplant.      Information used for these recommendations was obtained from: ELISE Farr., SONIDO Sanchez, ANANYA Frederick, JANE Nick, LINA Chacon, LINA Chiu.,   JIMI Quintanilla. (2013). Prevalence of Hematopoietic Cell Transplant Survivors in the [place]. Biology of Blood and Marrow Transplantation?: Journal of the American Society for Blood and Marrow Transplantation, 19(10), 9850-4581. doi 10.1016/j.bbmt.2013.07.020

## 2021-10-04 ENCOUNTER — APPOINTMENT (OUTPATIENT)
Dept: LAB | Facility: CLINIC | Age: 76
End: 2021-10-04
Attending: NURSE PRACTITIONER
Payer: COMMERCIAL

## 2021-10-04 ENCOUNTER — OFFICE VISIT (OUTPATIENT)
Dept: TRANSPLANT | Facility: CLINIC | Age: 76
End: 2021-10-04
Attending: NURSE PRACTITIONER
Payer: COMMERCIAL

## 2021-10-04 VITALS
HEART RATE: 84 BPM | RESPIRATION RATE: 18 BRPM | WEIGHT: 182.6 LBS | DIASTOLIC BLOOD PRESSURE: 78 MMHG | BODY MASS INDEX: 29.35 KG/M2 | OXYGEN SATURATION: 90 % | TEMPERATURE: 99.4 F | SYSTOLIC BLOOD PRESSURE: 121 MMHG

## 2021-10-04 DIAGNOSIS — C92.01 AML (ACUTE MYELOID LEUKEMIA) IN REMISSION (H): Primary | ICD-10-CM

## 2021-10-04 LAB
ALBUMIN SERPL-MCNC: 4.2 G/DL (ref 3.4–5)
ALP SERPL-CCNC: 86 U/L (ref 40–150)
ALT SERPL W P-5'-P-CCNC: 65 U/L (ref 0–50)
ANION GAP SERPL CALCULATED.3IONS-SCNC: 7 MMOL/L (ref 3–14)
AST SERPL W P-5'-P-CCNC: 54 U/L (ref 0–45)
BASOPHILS # BLD AUTO: 0.1 10E3/UL (ref 0–0.2)
BASOPHILS NFR BLD AUTO: 1 %
BILIRUB SERPL-MCNC: 0.8 MG/DL (ref 0.2–1.3)
BUN SERPL-MCNC: 14 MG/DL (ref 7–30)
CALCIUM SERPL-MCNC: 9.2 MG/DL (ref 8.5–10.1)
CHLORIDE BLD-SCNC: 104 MMOL/L (ref 94–109)
CO2 SERPL-SCNC: 26 MMOL/L (ref 20–32)
CREAT SERPL-MCNC: 0.86 MG/DL (ref 0.52–1.04)
EOSINOPHIL # BLD AUTO: 0.1 10E3/UL (ref 0–0.7)
EOSINOPHIL NFR BLD AUTO: 2 %
ERYTHROCYTE [DISTWIDTH] IN BLOOD BY AUTOMATED COUNT: 15 % (ref 10–15)
GFR SERPL CREATININE-BSD FRML MDRD: 67 ML/MIN/1.73M2
GLUCOSE BLD-MCNC: 108 MG/DL (ref 70–99)
HCT VFR BLD AUTO: 34.5 % (ref 35–47)
HGB BLD-MCNC: 10.9 G/DL (ref 11.7–15.7)
IMM GRANULOCYTES # BLD: 0.1 10E3/UL
IMM GRANULOCYTES NFR BLD: 1 %
INTERPRETATION: NORMAL
LAB DIRECTOR DISCLAIMER: NORMAL
LAB DIRECTOR INTERPRETATION: NORMAL
LAB DIRECTOR METHODOLOGY: NORMAL
LAB DIRECTOR RESULTS: NORMAL
LYMPHOCYTES # BLD AUTO: 2.9 10E3/UL (ref 0.8–5.3)
LYMPHOCYTES NFR BLD AUTO: 40 %
MCH RBC QN AUTO: 31.5 PG (ref 26.5–33)
MCHC RBC AUTO-ENTMCNC: 31.6 G/DL (ref 31.5–36.5)
MCV RBC AUTO: 100 FL (ref 78–100)
MONOCYTES # BLD AUTO: 0.7 10E3/UL (ref 0–1.3)
MONOCYTES NFR BLD AUTO: 9 %
NEUTROPHILS # BLD AUTO: 3.3 10E3/UL (ref 1.6–8.3)
NEUTROPHILS NFR BLD AUTO: 47 %
NRBC # BLD AUTO: 0 10E3/UL
NRBC BLD AUTO-RTO: 0 /100
PLATELET # BLD AUTO: 208 10E3/UL (ref 150–450)
POTASSIUM BLD-SCNC: 3.9 MMOL/L (ref 3.4–5.3)
PROT SERPL-MCNC: 7.8 G/DL (ref 6.8–8.8)
RBC # BLD AUTO: 3.46 10E6/UL (ref 3.8–5.2)
SIGNIFICANT RESULTS: NORMAL
SODIUM SERPL-SCNC: 137 MMOL/L (ref 133–144)
SPECIMEN DESCRIPTION: NORMAL
SPECIMEN DESCRIPTION: NORMAL
TEST DETAILS, MDL: NORMAL
WBC # BLD AUTO: 7.1 10E3/UL (ref 4–11)

## 2021-10-04 PROCEDURE — 88189 FLOWCYTOMETRY/READ 16 & >: CPT | Performed by: STUDENT IN AN ORGANIZED HEALTH CARE EDUCATION/TRAINING PROGRAM

## 2021-10-04 PROCEDURE — 36415 COLL VENOUS BLD VENIPUNCTURE: CPT | Performed by: NURSE PRACTITIONER

## 2021-10-04 PROCEDURE — 88185 FLOWCYTOMETRY/TC ADD-ON: CPT

## 2021-10-04 PROCEDURE — 88311 DECALCIFY TISSUE: CPT | Mod: TC | Performed by: STUDENT IN AN ORGANIZED HEALTH CARE EDUCATION/TRAINING PROGRAM

## 2021-10-04 PROCEDURE — 81121 IDH2 COMMON VARIANTS: CPT

## 2021-10-04 PROCEDURE — 85025 COMPLETE CBC W/AUTO DIFF WBC: CPT | Performed by: NURSE PRACTITIONER

## 2021-10-04 PROCEDURE — 88275 CYTOGENETICS 100-300: CPT | Mod: XU | Performed by: STUDENT IN AN ORGANIZED HEALTH CARE EDUCATION/TRAINING PROGRAM

## 2021-10-04 PROCEDURE — 88184 FLOWCYTOMETRY/ TC 1 MARKER: CPT | Performed by: STUDENT IN AN ORGANIZED HEALTH CARE EDUCATION/TRAINING PROGRAM

## 2021-10-04 PROCEDURE — 250N000011 HC RX IP 250 OP 636: Performed by: STUDENT IN AN ORGANIZED HEALTH CARE EDUCATION/TRAINING PROGRAM

## 2021-10-04 PROCEDURE — 88237 TISSUE CULTURE BONE MARROW: CPT | Performed by: STUDENT IN AN ORGANIZED HEALTH CARE EDUCATION/TRAINING PROGRAM

## 2021-10-04 PROCEDURE — 80053 COMPREHEN METABOLIC PANEL: CPT | Performed by: NURSE PRACTITIONER

## 2021-10-04 PROCEDURE — 38222 DX BONE MARROW BX & ASPIR: CPT

## 2021-10-04 PROCEDURE — 88271 CYTOGENETICS DNA PROBE: CPT | Performed by: STUDENT IN AN ORGANIZED HEALTH CARE EDUCATION/TRAINING PROGRAM

## 2021-10-04 PROCEDURE — 81267 CHIMERISM ANAL NO CELL SELEC: CPT | Performed by: STUDENT IN AN ORGANIZED HEALTH CARE EDUCATION/TRAINING PROGRAM

## 2021-10-04 RX ADMIN — MIDAZOLAM 2 MG: 1 INJECTION INTRAMUSCULAR; INTRAVENOUS at 12:27

## 2021-10-04 ASSESSMENT — PAIN SCALES - GENERAL: PAINLEVEL: NO PAIN (0)

## 2021-10-04 NOTE — LETTER
10/4/2021         RE: Florencia Gao  [address] [place] [address]        Dear Colleague,    Thank you for referring your patient, Florencia Gao, to the [facility]LOOD [facility] MARR[facility] TRANSPLANT PRO[facility]. Please see a copy of my visit note below.    BMT ONC Adult Bone Marrow Biopsy Procedure Note  October 4, 2021  /78   Pulse 73   Temp 99.4  F (37.4  C) (Oral)   Resp 18   Wt 82.8 kg (182 lb 9.6 oz)   SpO2 92%   BMI 29.35 kg/m       Learning needs assessment complete within 12 months? YES    DIAGNOSIS: AML     PROCEDURE: Unilateral Bone Marrow Biopsy and Unilateral Aspirate    [place] [facility] [place]    Patient s identification was positively verified by verbal identification and invasive procedure safety checklist was completed. Informed consent was obtained. Following the administration of 2mg Midazolam as pre-medication (discussed risks as this is over age-appropriated dose, pt and caregiver expressed understanding of risk and that 2mg had been given last procedure) patient was placed in the prone position and prepped and draped in a sterile manner. Approximately 10 cc of 1% Lidocaine was used over the left posterior iliac spine. Following this a 3 mm incision was made. Trephine bone marrow core(s) was (were) obtained from the IC. Bone marrow aspirates were obtained from the [facility]. Aspirates were sent for morphology, immunophenotyping, cytogenetics and molecular diagnostics RFLP. A total of approximately 20 ml of marrow was aspirated. Following this procedure a sterile dressing was applied to the bone marrow biopsy site(s). The patient was placed in the supine position to maintain pressure on the biopsy site. Post-procedure wound care instructions were given.     Complications: NO    Pre-procedural pain: 0 out of 10 on the numeric pain rating scale.     Procedural pain: 1 out of 10 on the numeric pain rating scale.     Post-procedural pain assessment: 0 out of 10 on the  numeric pain rating scale.     Interventions: NO    Length of procedure:20 minutes or less      Procedure performed by: Gloria CRAWFORD    Next bmbx to be done under sedation, avoid over-dosing versed and good pain control, per pt preference (she was unaware propofol was an option at the [facility]).        Again, thank you for allowing me to participate in the care of your patient.        Sincerely,        [facility]

## 2021-10-04 NOTE — NURSING NOTE
BMT Teaching Flowsheet   Teaching Topic: post biopsy instructions    Person(s) involved in teaching: Patient and daughter.  Motivation Level  Asks Questions: Yes  Eager to Learn: Yes  Cooperative: Yes  Receptive (willing/able to accept information): Yes    Patient demonstrates understanding of the following:   - Reason for the appointment, diagnosis and treatment plan: Yes  - Knowledge of proper use of medications and conditions for which they are ordered (with special attention to potential side effects or drug interactions): Yes  - Which situations necessitate calling provider and whom to contact: Yes    Teaching concerns addressed: reviewed activity restrictions if received premeds, potential for bleeding and actions to take if develops any of the issues below    Proper use and care of (medical equipment, care aids, etc.) Yes  Pain management techniques: Yes  Patient instructed on hand hygiene: Yes  How and/when to access community resources: Yes    Infection Control:  Patient demonstrates understanding of the following:   Surgical procedure site care taught NA  Signs and symptoms of infection taught Yes  Wound care taught Yes  Central venous catheter care taught NA    Instructional Materials Used/Given: verbal, print out of post biopsy instructions.     Pt arrived to clinic with daughter. Wt and vitals obtained. O2 92% but no complaints of SOB. Allergies and medications reviewed. PIV placed and labs drawns.     Provider order received to administer Versed 2 mg IVP as premed for BMBX. Procedural consent discussed and pt's signature obtained.  Allergies reviewed.  PT currently alert and oriented to plan of care.  Pt lying prone in stretcher.  Call light w/in reach.  Provider and  at bedside.    Pt and daughter educated on BMBX instructions including keeping bandage dry and intact for 24 hours. Pt instructed to watch for signs and symptoms of infection and to call triage if fever greater than 100.3. Pt  instructed to take OTC pain medication as needed.

## 2021-10-04 NOTE — PROGRESS NOTES
BMT ONC Adult Bone Marrow Biopsy Procedure Note  October 4, 2021  /78   Pulse 73   Temp 99.4  F (37.4  C) (Oral)   Resp 18   Wt 82.8 kg (182 lb 9.6 oz)   SpO2 92%   BMI 29.35 kg/m       Learning needs assessment complete within 12 months? YES    DIAGNOSIS: AML     PROCEDURE: Unilateral Bone Marrow Biopsy and Unilateral Aspirate    [place] [facility] [place] [facility] [place]    Patient s identification was positively verified by verbal identification and invasive procedure safety checklist was completed. Informed consent was obtained. Following the administration of 2mg Midazolam as pre-medication (discussed risks as this is over age-appropriated dose, pt and caregiver expressed understanding of risk and that 2mg had been given last procedure) patient was placed in the prone position and prepped and draped in a sterile manner. Approximately 10 cc of 1% Lidocaine was used over the left posterior iliac spine. Following this a 3 mm incision was made. Trephine bone marrow core(s) was (were) obtained from the [facility]. Bone marrow aspirates were obtained from the [facility]. Aspirates were sent for morphology, immunophenotyping, cytogenetics and molecular diagnostics RFLP. A total of approximately 20 ml of marrow was aspirated. Following this procedure a sterile dressing was applied to the bone marrow biopsy site(s). The patient was placed in the supine position to maintain pressure on the biopsy site. Post-procedure wound care instructions were given.     Complications: NO    Pre-procedural pain: 0 out of 10 on the numeric pain rating scale.     Procedural pain: 1 out of 10 on the numeric pain rating scale.     Post-procedural pain assessment: 0 out of 10 on the numeric pain rating scale.     Interventions: NO    Length of procedure:20 minutes or less      Procedure performed by: Gloria Ryan PAC    Next bmbx to be done under sedation, avoid over-dosing versed and good pain control, per pt preference (she was unaware propofol was an option  at the [facility]).

## 2021-10-04 NOTE — NURSING NOTE
"Oncology Rooming Note    October 4, 2021 11:57 AM   Florencia Gao is a 76 year old female who presents for:    Chief Complaint   Patient presents with     Bone Marrow Biopsy     BMBX r/t AML     Initial Vitals: /78   Pulse 73   Temp 99.4  F (37.4  C) (Oral)   Resp 18   Wt 82.8 kg (182 lb 9.6 oz)   SpO2 92%   BMI 29.35 kg/m   Estimated body mass index is 29.35 kg/m  as calculated from the following:    Height as of 7/1/21: 1.68 m (5' 6.14\").    Weight as of this encounter: 82.8 kg (182 lb 9.6 oz). Body surface area is 1.97 meters squared.  No Pain (0) Comment: Data Unavailable   No LMP recorded. Patient is postmenopausal.  Allergies reviewed: Yes  Medications reviewed: Yes    Medications: Medication refills not needed today.  Pharmacy name entered into [employer]:    [id number] [address]  [facility] [place], [place] - [phone]  [facility] [place] [place] [facility] [address] [place] [address]    Clinical concerns: VSS. O2 level 92% but not complaints of SOB.     Dulce Lee RN              "

## 2021-10-05 LAB
PATH REPORT.COMMENTS IMP SPEC: NORMAL
PATH REPORT.FINAL DX SPEC: NORMAL
PATH REPORT.FINAL DX SPEC: NORMAL
PATH REPORT.GROSS SPEC: NORMAL
PATH REPORT.MICROSCOPIC SPEC OTHER STN: NORMAL
PATH REPORT.RELEVANT HX SPEC: NORMAL
PATH REPORT.RELEVANT HX SPEC: NORMAL

## 2021-10-05 PROCEDURE — 85097 BONE MARROW INTERPRETATION: CPT | Performed by: STUDENT IN AN ORGANIZED HEALTH CARE EDUCATION/TRAINING PROGRAM

## 2021-10-05 PROCEDURE — 88311 DECALCIFY TISSUE: CPT | Mod: 26 | Performed by: STUDENT IN AN ORGANIZED HEALTH CARE EDUCATION/TRAINING PROGRAM

## 2021-10-05 PROCEDURE — 88305 TISSUE EXAM BY PATHOLOGIST: CPT | Mod: 26 | Performed by: STUDENT IN AN ORGANIZED HEALTH CARE EDUCATION/TRAINING PROGRAM

## 2021-10-06 PROCEDURE — G0452 MOLECULAR PATHOLOGY INTERPR: HCPCS | Mod: 26 | Performed by: PATHOLOGY

## 2021-10-07 ENCOUNTER — OFFICE VISIT (OUTPATIENT)
Dept: TRANSPLANT | Facility: CLINIC | Age: 76
End: 2021-10-07
Attending: INTERNAL MEDICINE
Payer: COMMERCIAL

## 2021-10-07 ENCOUNTER — APPOINTMENT (OUTPATIENT)
Dept: LAB | Facility: CLINIC | Age: 76
End: 2021-10-07
Attending: INTERNAL MEDICINE
Payer: COMMERCIAL

## 2021-10-07 VITALS
SYSTOLIC BLOOD PRESSURE: 129 MMHG | TEMPERATURE: 98 F | RESPIRATION RATE: 16 BRPM | OXYGEN SATURATION: 92 % | WEIGHT: 182.7 LBS | DIASTOLIC BLOOD PRESSURE: 66 MMHG | HEART RATE: 89 BPM | BODY MASS INDEX: 29.36 KG/M2

## 2021-10-07 DIAGNOSIS — Z94.81 BONE MARROW TRANSPLANT STATUS (H): ICD-10-CM

## 2021-10-07 DIAGNOSIS — C92.01 AML (ACUTE MYELOID LEUKEMIA) IN REMISSION (H): Primary | ICD-10-CM

## 2021-10-07 DIAGNOSIS — E03.9 HYPOTHYROIDISM, UNSPECIFIED TYPE: ICD-10-CM

## 2021-10-07 DIAGNOSIS — Z23 ENCOUNTER FOR IMMUNIZATION: ICD-10-CM

## 2021-10-07 PROCEDURE — G0008 ADMIN INFLUENZA VIRUS VAC: HCPCS | Performed by: INTERNAL MEDICINE

## 2021-10-07 PROCEDURE — 99001 SPECIMEN HANDLING PT-LAB: CPT | Performed by: INTERNAL MEDICINE

## 2021-10-07 PROCEDURE — 99214 OFFICE O/P EST MOD 30 MIN: CPT

## 2021-10-07 PROCEDURE — 90662 IIV NO PRSV INCREASED AG IM: CPT | Performed by: INTERNAL MEDICINE

## 2021-10-07 PROCEDURE — G0463 HOSPITAL OUTPT CLINIC VISIT: HCPCS | Mod: 25

## 2021-10-07 PROCEDURE — 90723 DTAP-HEP B-IPV VACCINE IM: CPT | Performed by: INTERNAL MEDICINE

## 2021-10-07 PROCEDURE — 90670 PCV13 VACCINE IM: CPT | Performed by: INTERNAL MEDICINE

## 2021-10-07 PROCEDURE — 90472 IMMUNIZATION ADMIN EACH ADD: CPT | Performed by: INTERNAL MEDICINE

## 2021-10-07 PROCEDURE — G0009 ADMIN PNEUMOCOCCAL VACCINE: HCPCS | Performed by: INTERNAL MEDICINE

## 2021-10-07 PROCEDURE — 90648 HIB PRP-T VACCINE 4 DOSE IM: CPT | Performed by: INTERNAL MEDICINE

## 2021-10-07 PROCEDURE — 250N000021 HC RX MED A9270 GY (STAT IND- M) 250: Performed by: INTERNAL MEDICINE

## 2021-10-07 PROCEDURE — 36415 COLL VENOUS BLD VENIPUNCTURE: CPT | Performed by: INTERNAL MEDICINE

## 2021-10-07 PROCEDURE — 250N000011 HC RX IP 250 OP 636: Performed by: INTERNAL MEDICINE

## 2021-10-07 RX ADMIN — INFLUENZA A VIRUS A/VICTORIA/2570/2019 IVR-215 (H1N1) ANTIGEN (FORMALDEHYDE INACTIVATED), INFLUENZA A VIRUS A/TASMANIA/503/2020 IVR-221 (H3N2) ANTIGEN (FORMALDEHYDE INACTIVATED), INFLUENZA B VIRUS B/PHUKET/3073/2013 ANTIGEN (FORMALDEHYDE INACTIVATED), AND INFLUENZA B VIRUS B/WASHINGTON/02/2019 ANTIGEN (FORMALDEHYDE INACTIVATED) 0.7 ML: 60; 60; 60; 60 INJECTION, SUSPENSION INTRAMUSCULAR at 13:14

## 2021-10-07 RX ADMIN — HAEMOPHILUS B POLYSACCHARIDE CONJUGATE VACCINE FOR INJ 0.5 ML: RECON SOLN at 13:14

## 2021-10-07 RX ADMIN — PNEUMOCOCCAL 13-VALENT CONJUGATE VACCINE 0.5 ML: 2.2; 2.2; 2.2; 2.2; 2.2; 4.4; 2.2; 2.2; 2.2; 2.2; 2.2; 2.2; 2.2 INJECTION, SUSPENSION INTRAMUSCULAR at 13:12

## 2021-10-07 RX ADMIN — DIPHTHERIA AND TETANUS TOXOIDS AND ACELLULAR PERTUSSIS ADSORBED, HEPATITIS B (RECOMBINANT) AND INACTIVATED POLIOVIRUS VACCINE COMBINED 0.5 ML: 25; 10; 25; 25; 8; 10; 40; 8; 32 INJECTION, SUSPENSION INTRAMUSCULAR at 13:13

## 2021-10-07 ASSESSMENT — PAIN SCALES - GENERAL: PAINLEVEL: NO PAIN (0)

## 2021-10-07 NOTE — NURSING NOTE
Chief Complaint   Patient presents with     Blood Draw     Labs drawn via  by RN. VS taken.     Labs drawn with vpt by rn.  Pt tolerated well.  VS taken.  Pt checked in for next appt.    Jorge Baker RN

## 2021-10-07 NOTE — LETTER
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  She has been doing well.  She has had no fever chills rash bleeding bruising or infection.  She has no GI or  upset and no ENT bone joint or skin symptoms.  She has subtle dry eyes but she has had them for many years and has no dry mouth.  The rest of her review of systems is unrevealing.    Her exam shows her weight is unchanged in 6 months her vital signs are acceptable.  She has no cervical or supraclavicular lymphadenopathy.  Her oropharynx is clear without mucosal changes.  Her eyes are not injected or inflamed.  Her lungs are clear without rales or wheezing her heart tones are regular there is no gallop rhythm or murmur though her heart tones are very soft and hard to hear.  Her abdomen is soft and nontender without palpable masses, hepatosplenomegaly. She has no bone tenderness or edema.    Laboratory testing showed no notable complications.  A bone marrow was 20% cellular with no morphologic or flow cytometric evidence of leukemia.  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      She was mildly anemic with bite cells apparent in her red cells; they are highly likely to be attributable to her dapsone which we are stopping today.  Chemistries were acceptable    Her medications will continue with only thyroid replacement and effexor which she has been taking for some time.    She has had 3 Covid vaccinations and 4  Shingrix injections in fact.  So today she will receive the beginning of her reimmunization schedule with [employer] supplying injectable polio, hepatitis B diphtheria tetanus pertussis, Haemophilus influenza pneumococcus and a flu shot.  She will return in 2 months for the next phase of those recent immunizations and she knows she will need them again in a year.    Overall she has no signs of recurrent leukemia or dpekq-uwdtgg-lbey disease or other ongoing complications and we are pleased that she is doing so well.    Yong Moncada MD    Professor of medicine    Results for AME ROSENTHAL (MRN [id number]) as of 10/7/2021 12:52   Ref. Range 10/4/2021 12:23 10/4/2021 12:24 10/4/2021 12:47 10/4/2021 12:48   Sodium Latest Ref Range: 133 - 144 mmol/L 137      Potassium Latest Ref Range: 3.4 - 5.3 mmol/L 3.9      Chloride Latest Ref Range: 94 - 109 mmol/L 104      Carbon Dioxide Latest Ref Range: 20 - 32 mmol/L 26      Urea Nitrogen Latest Ref Range: 7 - 30 mg/dL 14      Creatinine Latest Ref Range: 0.52 - 1.04 mg/dL 0.86      GFR Estimate Latest Ref Range: >60 mL/min/1.73m2 67      Calcium Latest Ref Range: 8.5 - 10.1 mg/dL 9.2      Anion Gap Latest Ref Range: 3 - 14 mmol/L 7      Albumin Latest Ref Range: 3.4 - 5.0 g/dL 4.2      Protein Total Latest Ref Range: 6.8 - 8.8 g/dL 7.8      Bilirubin Total Latest Ref Range: 0.2 - 1.3 mg/dL 0.8      Alkaline Phosphatase Latest Ref Range: 40 - 150 U/L 86      ALT Latest Ref Range: 0 - 50 U/L 65 (H)      AST Latest Ref Range: 0 - 45 U/L 54 (H)      Glucose Latest Ref Range: 70 - 99 mg/dL 108 (H)      WBC Latest Ref Range: 4.0 - 11.0 10e3/uL  7.1     Hemoglobin Latest Ref Range: 11.7 - 15.7 g/dL  10.9 (L)     Hematocrit Latest Ref Range: 35.0 - 47.0 %  34.5 (L)     Platelet Count Latest Ref Range: 150 - 450 10e3/uL  208     RBC Count Latest Ref Range: 3.80 - 5.20 10e6/uL  3.46 (L)     MCV Latest Ref Range: 78 - 100 fL  100     MCH Latest Ref Range: 26.5 - 33.0 pg  31.5    "  MCHC Latest Ref Range: 31.5 - 36.5 g/dL  31.6     RDW Latest Ref Range: 10.0 - 15.0 %  15.0     % Neutrophils Latest Units: %  47     % Lymphocytes Latest Units: %  40     % Monocytes Latest Units: %  9     % Eosinophils Latest Units: %  2     Absolute Basophils Latest Ref Range: 0.0 - 0.2 10e3/uL  0.1     % Basophils Latest Units: %  1     Absolute Eosinophils Latest Ref Range: 0.0 - 0.7 10e3/uL  0.1     Absolute Immature Granulocytes Latest Ref Range: <=0.0 10e3/uL  0.1 (H)     Absolute Lymphocytes Latest Ref Range: 0.8 - 5.3 10e3/uL  2.9     Absolute Monocytes Latest Ref Range: 0.0 - 1.3 10e3/uL  0.7     % Immature Granulocytes Latest Units: %  1     Absolute Neutrophils Latest Ref Range: 1.6 - 8.3 10e3/uL  3.3     Absolute NRBCs Latest Units: 10e3/uL  0.0     NRBCs per 100 WBC Latest Ref Range: <1 /100  0     CHROMOSOME ANALYSIS, BONE MARROW, DIAGNOSIS/RELAPSE Unknown    Rpt   DNA MARKER POST BMT ENGRAFTMENT BONE MARROW Unknown    Attch   FISH Unknown    Rpt   ONCOLOGY SINGLE GENE NGS BLOOD BM Unknown    Rpt   FLOW CYTOMETRY Unknown    Attch   DISCLAIMER Unknown    This result conta...   Gross Description Unknown   This result conta...    BONE MARROW BIOPSY Unknown   Attch    Performing Labs Unknown   This result conta... This result conta...   INTERPRETATION Unknown    This result conta...   METHODOLOGY Unknown    This result conta...   RESULTS Unknown    This result conta...     Final Diagnosis   Bone marrow, posterior iliac crest, left decalcified trephine biopsy and touch imprint; left direct aspirate smear and concentrated aspirate smear; and peripheral blood smear:    No morphologic evidence of acute myeloid leukemia    Normocellular marrow (cellularity estimated at 20%) with trilineage hematopoietic maturation and no increase in blasts    Peripheral blood showing normochromic normocytic anemia with numerous \"bite\" cells, suggestive of oxidant hemolysis    See comment   Electronically signed by Martell, " "Courtney Cook MD on 10/5/2021 at  4:23 PM   Comment      The presence of \"bite\" cells on the peripheral blood smear suggests oxidant (Dewayne body) hemolysis. Oxidant hemolysis is a common complication of dapsone therapy even in the absence of G6PD deficiency. An LDH, haptoglobin, fractionated bilirubin, and reticulocyte count could be considered if not already performed.    Please correlate these findings with the results of flow cytometry ([id number], pending at the time of this report), other ancillary studies, and the clinical presentation.               BMT DOM  "

## 2021-10-07 NOTE — PROGRESS NOTES
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  She has been doing well.  She has had no fever chills rash bleeding bruising or infection.  She has no GI or  upset and no ENT bone joint or skin symptoms.  She has subtle dry eyes but she has had them for many years and has no dry mouth.  The rest of her review of systems is unrevealing.    Her exam shows her weight is unchanged in 6 months her vital signs are acceptable.  She has no cervical or supraclavicular lymphadenopathy.  Her oropharynx is clear without mucosal changes.  Her eyes are not injected or inflamed.  Her lungs are clear without rales or wheezing her heart tones are regular there is no gallop rhythm or murmur though her heart tones are very soft and hard to hear.  Her abdomen is soft and nontender without palpable masses, hepatosplenomegaly. She has no bone tenderness or edema.    Laboratory testing showed no notable complications.  A bone marrow was 20% cellular with no morphologic or flow cytometric evidence of leukemia.  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      She was mildly anemic with bite cells apparent in her red cells; they are highly likely to be attributable to her dapsone which we are stopping today.  Chemistries were acceptable    Her medications will continue with only thyroid replacement and effexor which she has been taking for some time.    She has had 3 Covid vaccinations and 4 Shingrix injections in fact.  So today she will receive the beginning of her reimmunization  schedule with [employer] supplying injectable polio, hepatitis B diphtheria tetanus pertussis, Haemophilus influenza pneumococcus and a flu shot.  She will return in 2 months for the next phase of those recent immunizations and she knows she will need them again in a year.    Overall she has no signs of recurrent leukemia or rnqpf-uotnxv-oxio disease or other ongoing complications and we are pleased that she is doing so well.    Yong Moncada MD    Professor of medicine    Results for AME ROSENTHAL (MRN [id number]) as of 10/7/2021 12:52   Ref. Range 10/4/2021 12:23 10/4/2021 12:24 10/4/2021 12:47 10/4/2021 12:48   Sodium Latest Ref Range: 133 - 144 mmol/L 137      Potassium Latest Ref Range: 3.4 - 5.3 mmol/L 3.9      Chloride Latest Ref Range: 94 - 109 mmol/L 104      Carbon Dioxide Latest Ref Range: 20 - 32 mmol/L 26      Urea Nitrogen Latest Ref Range: 7 - 30 mg/dL 14      Creatinine Latest Ref Range: 0.52 - 1.04 mg/dL 0.86      GFR Estimate Latest Ref Range: >60 mL/min/1.73m2 67      Calcium Latest Ref Range: 8.5 - 10.1 mg/dL 9.2      Anion Gap Latest Ref Range: 3 - 14 mmol/L 7      Albumin Latest Ref Range: 3.4 - 5.0 g/dL 4.2      Protein Total Latest Ref Range: 6.8 - 8.8 g/dL 7.8      Bilirubin Total Latest Ref Range: 0.2 - 1.3 mg/dL 0.8      Alkaline Phosphatase Latest Ref Range: 40 - 150 U/L 86      ALT Latest Ref Range: 0 - 50 U/L 65 (H)      AST Latest Ref Range: 0 - 45 U/L 54 (H)      Glucose Latest Ref Range: 70 - 99 mg/dL 108 (H)      WBC Latest Ref Range: 4.0 - 11.0 10e3/uL  7.1     Hemoglobin Latest Ref Range: 11.7 - 15.7 g/dL  10.9 (L)     Hematocrit Latest Ref Range: 35.0 - 47.0 %  34.5 (L)     Platelet Count Latest Ref Range: 150 - 450 10e3/uL  208     RBC Count Latest Ref Range: 3.80 - 5.20 10e6/uL  3.46 (L)     MCV Latest Ref Range: 78 - 100 fL  100     MCH Latest Ref Range: 26.5 - 33.0 pg  31.5     MCHC Latest Ref Range: 31.5 - 36.5 g/dL  31.6     RDW Latest Ref Range: 10.0 - 15.0 %  15.0    "  % Neutrophils Latest Units: %  47     % Lymphocytes Latest Units: %  40     % Monocytes Latest Units: %  9     % Eosinophils Latest Units: %  2     Absolute Basophils Latest Ref Range: 0.0 - 0.2 10e3/uL  0.1     % Basophils Latest Units: %  1     Absolute Eosinophils Latest Ref Range: 0.0 - 0.7 10e3/uL  0.1     Absolute Immature Granulocytes Latest Ref Range: <=0.0 10e3/uL  0.1 (H)     Absolute Lymphocytes Latest Ref Range: 0.8 - 5.3 10e3/uL  2.9     Absolute Monocytes Latest Ref Range: 0.0 - 1.3 10e3/uL  0.7     % Immature Granulocytes Latest Units: %  1     Absolute Neutrophils Latest Ref Range: 1.6 - 8.3 10e3/uL  3.3     Absolute NRBCs Latest Units: 10e3/uL  0.0     NRBCs per 100 WBC Latest Ref Range: <1 /100  0     CHROMOSOME ANALYSIS, BONE MARROW, DIAGNOSIS/RELAPSE Unknown    Rpt   DNA MARKER POST BMT ENGRAFTMENT BONE MARROW Unknown    Attch   FISH Unknown    Rpt   ONCOLOGY SINGLE GENE NGS BLOOD BM Unknown    Rpt   FLOW CYTOMETRY Unknown    Attch   DISCLAIMER Unknown    This result conta...   Gross Description Unknown   This result conta...    BONE MARROW BIOPSY Unknown   Attch    Performing Labs Unknown   This result conta... This result conta...   INTERPRETATION Unknown    This result conta...   METHODOLOGY Unknown    This result conta...   RESULTS Unknown    This result conta...     Final Diagnosis   Bone marrow, posterior iliac crest, left decalcified trephine biopsy and touch imprint; left direct aspirate smear and concentrated aspirate smear; and peripheral blood smear:    No morphologic evidence of acute myeloid leukemia    Normocellular marrow (cellularity estimated at 20%) with trilineage hematopoietic maturation and no increase in blasts    Peripheral blood showing normochromic normocytic anemia with numerous \"bite\" cells, suggestive of oxidant hemolysis    See comment   Electronically signed by Courtney Moura MD on 10/5/2021 at  4:23 PM   Comment      The presence of \"bite\" cells on the " peripheral blood smear suggests oxidant (Dewayne body) hemolysis. Oxidant hemolysis is a common complication of dapsone therapy even in the absence of G6PD deficiency. An LDH, haptoglobin, fractionated bilirubin, and reticulocyte count could be considered if not already performed.    Please correlate these findings with the results of flow cytometry ([id number], pending at the time of this report), other ancillary studies, and the clinical presentation.

## 2021-10-07 NOTE — NURSING NOTE
"Oncology Rooming Note    October 7, 2021 12:06 PM   Florencia Gao is a 76 year old female who presents for:    Chief Complaint   Patient presents with     Blood Draw     Labs drawn via  by RN. VS taken.     Oncology Clinic Visit     AML     Initial Vitals: /66   Pulse 89   Temp 98  F (36.7  C) (Tympanic)   Resp 16   Wt 82.9 kg (182 lb 11.2 oz)   SpO2 92%   BMI 29.36 kg/m   Estimated body mass index is 29.36 kg/m  as calculated from the following:    Height as of 7/1/21: 1.68 m (5' 6.14\").    Weight as of this encounter: 82.9 kg (182 lb 11.2 oz). Body surface area is 1.97 meters squared.  No Pain (0) Comment: Data Unavailable   No LMP recorded. Patient is postmenopausal.  Allergies reviewed: Yes  Medications reviewed: Yes    Medications: Medication refills not needed today.  Pharmacy name entered into [facility]:    [facility][id number] [place], [place] - [address] [place] [address] AT [facility] [address]  [facility] [place], [place] - [phone]  [facility] [place], [place] - [address] [place] [address]    Clinical concerns: none       Natalia Starr CMA            "

## 2021-10-07 NOTE — LETTER
10/7/2021         RE: Florencia Gao  [address] [place] [address]        Dear Colleague,    Thank you for referring your patient, Florencia Gao, to the [facility]ARR[facility]ANSPL[facility] [place]. Please see a copy of my visit note below.    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  She has been doing well.  She has had no fever chills rash bleeding bruising or infection.  She has no GI or  upset and no ENT bone joint or skin symptoms.  She has subtle dry eyes but she has had them for many years and has no dry mouth.  The rest of her review of systems is unrevealing.    Her exam shows her weight is unchanged in 6 months her vital signs are acceptable.  She has no cervical or supraclavicular lymphadenopathy.  Her oropharynx is clear without mucosal changes.  Her eyes are not injected or inflamed.  Her lungs are clear without rales or wheezing her heart tones are regular there is no gallop rhythm or murmur though her heart tones are very soft and hard to hear.  Her abdomen is soft and nontender without palpable masses, hepatosplenomegaly. She has no bone tenderness or edema.    Laboratory testing showed no notable complications.  A bone marrow was 20% cellular with no morphologic or flow cytometric evidence of leukemia.  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      She was mildly anemic with bite cells apparent in her red cells; they are highly likely to be attributable to her dapsone which we are stopping  today.  Chemistries were acceptable    Her medications will continue with only thyroid replacement and effexor which she has been taking for some time.    She has had 3 Covid vaccinations and 4 Shingrix injections in fact.  So today she will receive the beginning of her reimmunization schedule with [employer] supplying injectable polio, hepatitis B diphtheria tetanus pertussis, Haemophilus influenza pneumococcus and a flu shot.  She will return in 2 months for the next phase of those recent immunizations and she knows she will need them again in a year.    Overall she has no signs of recurrent leukemia or aomws-pjfuws-vjwv disease or other ongoing complications and we are pleased that she is doing so well.    Yong Moncada MD    Professor of medicine    Results for AME ROSENTHAL (MRN [id number]) as of 10/7/2021 12:52   Ref. Range 10/4/2021 12:23 10/4/2021 12:24 10/4/2021 12:47 10/4/2021 12:48   Sodium Latest Ref Range: 133 - 144 mmol/L 137      Potassium Latest Ref Range: 3.4 - 5.3 mmol/L 3.9      Chloride Latest Ref Range: 94 - 109 mmol/L 104      Carbon Dioxide Latest Ref Range: 20 - 32 mmol/L 26      Urea Nitrogen Latest Ref Range: 7 - 30 mg/dL 14      Creatinine Latest Ref Range: 0.52 - 1.04 mg/dL 0.86      GFR Estimate Latest Ref Range: >60 mL/min/1.73m2 67      Calcium Latest Ref Range: 8.5 - 10.1 mg/dL 9.2      Anion Gap Latest Ref Range: 3 - 14 mmol/L 7      Albumin Latest Ref Range: 3.4 - 5.0 g/dL 4.2      Protein Total Latest Ref Range: 6.8 - 8.8 g/dL 7.8      Bilirubin Total Latest Ref Range: 0.2 - 1.3 mg/dL 0.8      Alkaline Phosphatase Latest Ref Range: 40 - 150 U/L 86      ALT Latest Ref Range: 0 - 50 U/L 65 (H)      AST Latest Ref Range: 0 - 45 U/L 54 (H)      Glucose Latest Ref Range: 70 - 99 mg/dL 108 (H)      WBC Latest Ref Range: 4.0 - 11.0 10e3/uL  7.1     Hemoglobin Latest Ref Range: 11.7 - 15.7 g/dL  10.9 (L)     Hematocrit Latest Ref Range: 35.0 - 47.0 %  34.5 (L)     Platelet Count Latest  Ref Range: 150 - 450 10e3/uL  208     RBC Count Latest Ref Range: 3.80 - 5.20 10e6/uL  3.46 (L)     MCV Latest Ref Range: 78 - 100 fL  100     MCH Latest Ref Range: 26.5 - 33.0 pg  31.5     MCHC Latest Ref Range: 31.5 - 36.5 g/dL  31.6     RDW Latest Ref Range: 10.0 - 15.0 %  15.0     % Neutrophils Latest Units: %  47     % Lymphocytes Latest Units: %  40     % Monocytes Latest Units: %  9     % Eosinophils Latest Units: %  2     Absolute Basophils Latest Ref Range: 0.0 - 0.2 10e3/uL  0.1     % Basophils Latest Units: %  1     Absolute Eosinophils Latest Ref Range: 0.0 - 0.7 10e3/uL  0.1     Absolute Immature Granulocytes Latest Ref Range: <=0.0 10e3/uL  0.1 (H)     Absolute Lymphocytes Latest Ref Range: 0.8 - 5.3 10e3/uL  2.9     Absolute Monocytes Latest Ref Range: 0.0 - 1.3 10e3/uL  0.7     % Immature Granulocytes Latest Units: %  1     Absolute Neutrophils Latest Ref Range: 1.6 - 8.3 10e3/uL  3.3     Absolute NRBCs Latest Units: 10e3/uL  0.0     NRBCs per 100 WBC Latest Ref Range: <1 /100  0     CHROMOSOME ANALYSIS, BONE MARROW, DIAGNOSIS/RELAPSE Unknown    Rpt   DNA MARKER POST BMT ENGRAFTMENT BONE MARROW Unknown    Attch   FISH Unknown    Rpt   ONCOLOGY SINGLE GENE NGS BLOOD BM Unknown    Rpt   FLOW CYTOMETRY Unknown    Attch   DISCLAIMER Unknown    This result conta...   Gross Description Unknown   This result conta...    BONE MARROW BIOPSY Unknown   Attch    Performing Labs Unknown   This result conta... This result conta...   INTERPRETATION Unknown    This result conta...   METHODOLOGY Unknown    This result conta...   RESULTS Unknown    This result conta...     Final Diagnosis   Bone marrow, posterior iliac crest, left decalcified trephine biopsy and touch imprint; left direct aspirate smear and concentrated aspirate smear; and peripheral blood smear:    No morphologic evidence of acute myeloid leukemia    Normocellular marrow (cellularity estimated at 20%) with trilineage hematopoietic maturation and no  "increase in blasts    Peripheral blood showing normochromic normocytic anemia with numerous \"bite\" cells, suggestive of oxidant hemolysis    See comment   Electronically signed by Courtney Moura MD on 10/5/2021 at  4:23 PM   Comment      The presence of \"bite\" cells on the peripheral blood smear suggests oxidant (Dewayne body) hemolysis. Oxidant hemolysis is a common complication of dapsone therapy even in the absence of G6PD deficiency. An LDH, haptoglobin, fractionated bilirubin, and reticulocyte count could be considered if not already performed.    Please correlate these findings with the results of flow cytometry ([id number], pending at the time of this report), other ancillary studies, and the clinical presentation.             Again, thank you for allowing me to participate in the care of your patient.        Sincerely,        BMT DOM    "

## 2021-10-07 NOTE — NURSING NOTE
1 year and influenza vaccines administered per orders from Dr. Moncada. Patient tolerated well and was discharged. VIS sheets given. See MAR for details.      Marisol Alarcon, CMA on 10/7/2021 at 1:25 PM

## 2021-10-12 PROCEDURE — G0452 MOLECULAR PATHOLOGY INTERPR: HCPCS | Mod: 26 | Performed by: PATHOLOGY

## 2021-11-03 ENCOUNTER — TELEPHONE (OUTPATIENT)
Dept: ONCOLOGY | Facility: CLINIC | Age: 76
End: 2021-11-03

## 2021-11-04 ENCOUNTER — ONCOLOGY VISIT (OUTPATIENT)
Dept: TRANSPLANT | Facility: CLINIC | Age: 76
End: 2021-11-04
Attending: INTERNAL MEDICINE
Payer: COMMERCIAL

## 2021-11-04 VITALS
WEIGHT: 181.3 LBS | DIASTOLIC BLOOD PRESSURE: 82 MMHG | RESPIRATION RATE: 16 BRPM | HEART RATE: 95 BPM | SYSTOLIC BLOOD PRESSURE: 147 MMHG | BODY MASS INDEX: 29.14 KG/M2 | OXYGEN SATURATION: 97 %

## 2021-11-04 DIAGNOSIS — Z94.81 STATUS POST BONE MARROW TRANSPLANT (H): ICD-10-CM

## 2021-11-04 DIAGNOSIS — B37.2 CANDIDIASIS OF SKIN: Primary | ICD-10-CM

## 2021-11-04 DIAGNOSIS — B37.0 CANDIDIASIS OF MOUTH: ICD-10-CM

## 2021-11-04 PROCEDURE — 99214 OFFICE O/P EST MOD 30 MIN: CPT

## 2021-11-04 RX ORDER — FLUCONAZOLE 200 MG/1
200 TABLET ORAL DAILY
Qty: 7 TABLET | Refills: 0 | Status: SHIPPED | OUTPATIENT
Start: 2021-11-04 | End: 2021-12-02

## 2021-11-04 RX ORDER — NYSTATIN 10B UNIT
POWDER (EA) MISCELLANEOUS
Qty: 1 EACH | Refills: 0 | Status: SHIPPED | OUTPATIENT
Start: 2021-11-04 | End: 2021-12-02

## 2021-11-04 ASSESSMENT — PAIN SCALES - GENERAL: PAINLEVEL: NO PAIN (0)

## 2021-11-04 NOTE — NURSING NOTE
"Oncology Rooming Note    November 4, 2021 12:24 PM   Florencia Gao is a 76 year old female who presents for:    No chief complaint on file.    Initial Vitals: Blood Pressure (Abnormal) 147/82   Pulse 95   Respiration 16   Weight 82.2 kg (181 lb 4.8 oz)   Oxygen Saturation 97%   Body Mass Index 29.14 kg/m   Estimated body mass index is 29.14 kg/m  as calculated from the following:    Height as of 7/1/21: 1.68 m (5' 6.14\").    Weight as of this encounter: 82.2 kg (181 lb 4.8 oz). Body surface area is 1.96 meters squared.  No Pain (0) Comment: Data Unavailable   No LMP recorded. Patient is postmenopausal.  Allergies reviewed: Yes  Medications reviewed: Yes    Medications: Medication refills not needed today.  Pharmacy name entered into "[employer]:    [facility] #[id number] [address]  [place] [facility] - [place], [place] - [address] [place] [address] [phone]  [facility] - [place], [place] - [address] [place] [address]    Clinical concerns: Rash on her face, crease of buttocks, under breast. Very itchy.      Zenobiaye De Leon MA              "

## 2021-11-04 NOTE — LETTER
2021         RE: Florencia Gao  [address] [place] [address]        Dear Colleague,    Thank you for referring your patient, Florencia Gao, to the [facility] BLOOD AND MARROW TRANSPLANT PROGRAM [place]. Please see a copy of my visit note below.    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 Off immunosuppression.     Interval Hx: Patient is seen today for new rash that started 6 days ago. It involves her intertrigonous area bilaterally, forehead, right arm pit,  cleft, and gluteal cleft. Rash is very pruritic and seems to be worsening. She also notes a small ulceration on the roof of her mouth. She has been using salt water rinses without improvement. No fevers, chills or other infectious symptoms. Denies worsening dry mouth, dry eyes, SOB, scleroderma, or other stigmata of GVHD. She has been off all immunosuppression since April.    ROS: as noted above.     Physical Exam:   BP (!) 147/82   Pulse 95   Resp 16   Wt 82.2 kg (181 lb 4.8 oz)   SpO2 97%   BMI 29.14 kg/m      General: Alert, engaged, and in NAD  ENT: Sclera anicteric, oral mucosa semi dry with linear ulceration on hard palate with surrounding erythema. No lichenoid changes appreciated.  Skin: Pinpoint lesions in the intertrigonous regions bilaterally with confluent erythematous patches inferior to this that are about 2-3inches in diameter that are brightly erythematous and angry in appearance. Macular papular rash present in right axilla and along scalp/forehead. Scattered macules on neck. No scleroderma appreciated.   Extremities: Trace edema.     Assessment/Plan:     1. 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 In CR.   - Off IS since 2021. No hx of GVHD  - See below regarding new rash 21. Rash not consistent with classic GVHD however with multiple areas involved and small oral ulceration question if this is indeed GVHD. If no  improvement or rash spreads recommend skin biopsy and trial of topical steroids.     2. Skin/ID:  Possible candidiasis of skin and mouth:   - rash present in intertrigonous, axillary, gluteal cleft and  cleft which argues in favor of candida. However the presence of multiple regions at once in addition to scalp line and oral ulceration beg the question of possible alternative diagnosis such as GVHD. Discussed with colleagues. Trial of fluconazole 200mg daily x7 days +topical nystatin powder for intertrigonous regions. If no improvement will consider biopsy and possible topical steroids.       RTC: 1 week for provider visit and possible skin biopsy. Okay to cancel if symptoms resolve.      30 minutes spent on the date of the encounter doing chart review, history and exam, documentation and further activities per the note      Denzel Magallon PA-C  [id number]

## 2021-11-04 NOTE — PROGRESS NOTES
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 Off immunosuppression.     Interval Hx: Patient is seen today for new rash that started 6 days ago. It involves her intertrigonous area bilaterally, forehead, right arm pit, bina cleft, and gluteal cleft. Rash is very pruritic and seems to be worsening. She also notes a small ulceration on the roof of her mouth. She has been using salt water rinses without improvement. No fevers, chills or other infectious symptoms. Denies worsening dry mouth, dry eyes, SOB, scleroderma, or other stigmata of GVHD. She has been off all immunosuppression since April.    ROS: as noted above.     Physical Exam:   BP (!) 147/82   Pulse 95   Resp 16   Wt 82.2 kg (181 lb 4.8 oz)   SpO2 97%   BMI 29.14 kg/m      General: Alert, engaged, and in NAD  ENT: Sclera anicteric, oral mucosa semi dry with linear ulceration on hard palate with surrounding erythema. No lichenoid changes appreciated.  Skin: Pinpoint lesions in the intertrigonous regions bilaterally with confluent erythematous patches inferior to this that are about 2-3inches in diameter that are brightly erythematous and angry in appearance. Macular papular rash present in right axilla and along scalp/forehead. Scattered macules on neck. No scleroderma appreciated.   Extremities: Trace edema.     Assessment/Plan:     1. 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 In CR.   - Off IS since 2021. No hx of GVHD  - See below regarding new rash 21. Rash not consistent with classic GVHD however with multiple areas involved and small oral ulceration question if this is indeed GVHD. If no improvement or rash spreads recommend skin biopsy and trial of topical steroids.     2. Skin/ID:  Possible candidiasis of skin and mouth:   - rash present in intertrigonous, axillary, gluteal cleft and bina cleft which argues in favor of candida. However the presence of multiple regions  at once in addition to scalp line and oral ulceration beg the question of possible alternative diagnosis such as GVHD. Discussed with colleagues. Trial of fluconazole 200mg daily x7 days +topical nystatin powder for intertrigonous regions. If no improvement will consider biopsy and possible topical steroids.       RTC: 1 week for provider visit and possible skin biopsy. Okay to cancel if symptoms resolve.      30 minutes spent on the date of the encounter doing chart review, history and exam, documentation and further activities per the note      Denzel Magallon PA-C  [id number]

## 2021-11-04 NOTE — TELEPHONE ENCOUNTER
This writer was called by patient in capacity of BMT on-call fellow. Ms. Gao reports a new rash that started about 6 days ago - no prior precipitating factors known. The rash is described as pruritic, erythematous and possibly papular, located primarily in the bilateral intertriginous regions of the bilateral breasts, abdomen, forehead and bina cleft. No skin peeling noted. No associated pain or swelling. She also notes a worsening ulcer in her buccal mucosa. She denies any jaundice, pain in her abdomen or diarrhea. No eye irritation or redness. She has attempted to treat with several moisturizing creams, antihistamines (benadryl/cetrizine) and a topical steroids (has hydrocortisone at home) but without any relief. She denies any recent change in soap, perfume or any skin applicant or other concerning sensitizing exposure.     Patient is s/p allo-sib SCT for AML, currently more than a year out. Latest BMBX on 10/5/21 showed continued CR. , Last visit with BMT was on 10/7/21 with Dr. Moncada. Labs not very remarkable. She has recently stopped dapsone therapy. She is not on any GVHD ppx at this time.     It is unclear the exact nature of this rash. The intertriginous location may indicate a simple yeast but this is the wrong season for same and she does not describe a persistent risk-factor for yeast. Does not describe any systemic symptoms or rash description suggestive of a viral exanthem. The mucosal involvement (with a new buccal ulcer) concerns me for a possible late incipient GVHD - although this seems unlikely. In any event, she should have a provider examine the rash and ulcer to determine course. Would recommend she come in to the BMT clinic for a visit in the next few days if possible. She is agreeable with this. She stated she would call the clinic in the AM to schedule a visit. I will try to leave a message for BMT staff.       Ciaran Villaseñor   Hematology/Oncology/BMT Fellow   Pager No:  [phone]

## 2021-11-08 LAB
ADDITIONAL COMMENTS: NORMAL
CULTURE HARVEST COMPLETE DATE: NORMAL
CULTURE HARVEST COMPLETE DATE: NORMAL
INTERPRETATION: NORMAL
ISCN: NORMAL
METHODS: NORMAL

## 2021-11-08 PROCEDURE — 88291 CYTO/MOLECULAR REPORT: CPT | Performed by: MEDICAL GENETICS

## 2021-11-09 ENCOUNTER — TELEPHONE (OUTPATIENT)
Dept: TRANSPLANT | Facility: CLINIC | Age: 76
End: 2021-11-09
Payer: COMMERCIAL

## 2021-11-09 NOTE — TELEPHONE ENCOUNTER
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     She called back to the triage number. She was just seen on Friday, 11/4/2021 for a new rash.  She thinks the fluconazole is making her rash worse. The rash has spread from her axilla down her arm, consisting of pin point spots.  She has more rash on her neck and chest.  She has more pin point spots in her groin. The rash is very itchy and she is using benadryl.    She has an appointment on Friday, 11/12 for follow up.  She wants to be seen today.  She does not want to see her primary care provider.  She does not think it can wait until Friday.  She is > 1 year from transplant so this is not acute GVH.    Will schedule her tomorrow 11/10/2021 at 4:00 pm      Chel Oneal PA-C  [id number]  11/9/2021

## 2021-11-10 ENCOUNTER — ONCOLOGY VISIT (OUTPATIENT)
Dept: TRANSPLANT | Facility: CLINIC | Age: 76
End: 2021-11-10
Attending: PHYSICIAN ASSISTANT
Payer: COMMERCIAL

## 2021-11-10 ENCOUNTER — OFFICE VISIT (OUTPATIENT)
Dept: TRANSPLANT | Facility: CLINIC | Age: 76
End: 2021-11-10
Attending: NURSE PRACTITIONER
Payer: COMMERCIAL

## 2021-11-10 VITALS
HEART RATE: 97 BPM | BODY MASS INDEX: 29.22 KG/M2 | TEMPERATURE: 98.4 F | RESPIRATION RATE: 16 BRPM | SYSTOLIC BLOOD PRESSURE: 140 MMHG | WEIGHT: 181.8 LBS | OXYGEN SATURATION: 95 % | DIASTOLIC BLOOD PRESSURE: 80 MMHG

## 2021-11-10 DIAGNOSIS — R21 RASH: ICD-10-CM

## 2021-11-10 DIAGNOSIS — R21 RASH: Primary | ICD-10-CM

## 2021-11-10 DIAGNOSIS — Z94.81 STATUS POST BONE MARROW TRANSPLANT (H): Primary | ICD-10-CM

## 2021-11-10 ASSESSMENT — PAIN SCALES - GENERAL: PAINLEVEL: NO PAIN (0)

## 2021-11-10 NOTE — LETTER
11/10/2021         RE: Florencia Gao  [address] [place] [address]        Dear Colleague,    Thank you for referring your patient, Florencia Gao, to the [facility] BLOOD AND MARROW TRANSPLANT PROGRAM [place]. Please see a copy of my visit note below.    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 Off immunosuppression.     Interval Hx: Patient is seen today for worsening rash that started 11 days ago.SHe was seen last Friday and is back again today.  Rash involves her intertrigonous area bilaterally, forehead,neck, right arm pit, bina cleft, and gluteal cleft. Rash is very pruritic and seems to be worsening. The fluconazole has not helped.She also noted a small ulceration on the roof of her mouth last visit that has disappeared.  She denies dry mouth.  She has chronic dry eyes that have not changed.. She denies eye irritation. S. No fevers, chills or other infectious symptoms. Denies, SOB, scleroderma, or other stigmata of GVHD.She has no skin tightening.  She has no new detergent, lotions, soaps or medications including over the counter meds. She has been off all immunosuppression since April. No muscle or bone pain.  No weight loss.  No fatigue.    ROS: as noted above.     Physical Exam:   BP (!) 140/80   Pulse 97   Temp 98.4  F (36.9  C) (Oral)   Resp 16   Wt 82.5 kg (181 lb 12.8 oz)   SpO2 95%   BMI 29.22 kg/m        Wt Readings from Last 4 Encounters:   11/10/21 82.5 kg (181 lb 12.8 oz)   21 82.2 kg (181 lb 4.8 oz)   10/07/21 82.9 kg (182 lb 11.2 oz)   10/04/21 82.8 kg (182 lb 9.6 oz)       General: Alert, engaged, and in NAD  ENT: Sclera anicteric,sclera without injection   oral mucosa without lichenoid changes appreciated.No ulcers or erythema.  Skin: Pinpoint lesions in the intertrigonous regions bilaterally with confluent erythematous patches inferior to this that are about 2-3inches in diameter that are  brightly erythematous  rash present in right axilla and along scalp/forehead. Scattered macules on neck, more confluent in back of neck.Some scattered macular lesions on right arm. No scleroderma appreciated.   Extremities: Trace edema.     Assessment/Plan:     1. 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 In CR.   - Off IS since 2021. No hx of GVHD  - See below regarding new rash 21 and again on 11/10. Rash not consistent with classic GVHD. Discussed with 2 colleagues who looked at rash last visit.      2. Skin/ID:  Possible candidiasis of skin versus some sort of dermatitis:   - rash present in intertrigonous, axillary, gluteal cleft and  cleft which argues in favor of candida. However the presence of multiple regions at once in addition to scalp line could be some other kind of rash but less likely GVHD. Discussed with colleagues. Trial of fluconazole did not help but only took for 3-4 days. Since no other signs of chronic GVH will refer to dermatology and maybe she can get in sooner to see her primary care provider.     RTC: call if rash does not improve.  Refer to dermatology    Chel Oneal PA-C  [id number]      30 minutes spent on the date of the encounter doing chart review, history and exam, documentation and further activities per the note      Denzel Magallon PA-C  [id number]

## 2021-11-10 NOTE — PROGRESS NOTES
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 Off immunosuppression.     Interval Hx: Patient is seen today for worsening rash that started 11 days ago.SHe was seen last Friday and is back again today.  Rash involves her intertrigonous area bilaterally, forehead,neck, right arm pit,  cleft, and gluteal cleft. Rash is very pruritic and seems to be worsening. The fluconazole has not helped.She also noted a small ulceration on the roof of her mouth last visit that has disappeared.  She denies dry mouth.  She has chronic dry eyes that have not changed.. She denies eye irritation. S. No fevers, chills or other infectious symptoms. Denies, SOB, scleroderma, or other stigmata of GVHD.She has no skin tightening.  She has no new detergent, lotions, soaps or medications including over the counter meds. She has been off all immunosuppression since April. No muscle or bone pain.  No weight loss.  No fatigue.    ROS: as noted above.     Physical Exam:   BP (!) 140/80   Pulse 97   Temp 98.4  F (36.9  C) (Oral)   Resp 16   Wt 82.5 kg (181 lb 12.8 oz)   SpO2 95%   BMI 29.22 kg/m        Wt Readings from Last 4 Encounters:   11/10/21 82.5 kg (181 lb 12.8 oz)   21 82.2 kg (181 lb 4.8 oz)   10/07/21 82.9 kg (182 lb 11.2 oz)   10/04/21 82.8 kg (182 lb 9.6 oz)       General: Alert, engaged, and in NAD  ENT: Sclera anicteric,sclera without injection   oral mucosa without lichenoid changes appreciated.No ulcers or erythema.  Skin: Pinpoint lesions in the intertrigonous regions bilaterally with confluent erythematous patches inferior to this that are about 2-3inches in diameter that are brightly erythematous  rash present in right axilla and along scalp/forehead. Scattered macules on neck, more confluent in back of neck.Some scattered macular lesions on right arm. No scleroderma appreciated.   Extremities: Trace edema.     Assessment/Plan:     1. BMT/GVHD: Hx of  AML, 1 year s/p allo sib PBSCT. In CR.   - Off IS since 2021. No hx of GVHD  - See below regarding new rash 21 and again on 11/10. Rash not consistent with classic GVHD. Discussed with 2 colleagues who looked at rash last visit.      2. Skin/ID:  Possible candidiasis of skin versus some sort of dermatitis:   - rash present in intertrigonous, axillary, gluteal cleft and  cleft which argues in favor of candida. However the presence of multiple regions at once in addition to scalp line could be some other kind of rash but less likely GVHD. Discussed with colleagues. Trial of fluconazole did not help but only took for 3-4 days. Since no other signs of chronic GVH will refer to dermatology and maybe she can get in sooner to see her primary care provider.     RTC: call if rash does not improve.  Refer to dermatology    Chel Oneal PA-C  [id number]        30 minutes spent on the date of the encounter doing chart review, history and exam, documentation and further activities per the note      Denzel Magallon PA-C  [id number]

## 2021-11-10 NOTE — PATIENT INSTRUCTIONS
Please schedule the patient with dermatology within the next 3-5 days, put in referral for dermatology

## 2021-11-18 ENCOUNTER — OFFICE VISIT (OUTPATIENT)
Dept: DERMATOLOGY | Facility: CLINIC | Age: 76
End: 2021-11-18
Payer: COMMERCIAL

## 2021-11-18 DIAGNOSIS — R21 RASH AND NONSPECIFIC SKIN ERUPTION: ICD-10-CM

## 2021-11-18 PROCEDURE — 88312 SPECIAL STAINS GROUP 1: CPT | Mod: 26 | Performed by: DERMATOLOGY

## 2021-11-18 PROCEDURE — 88305 TISSUE EXAM BY PATHOLOGIST: CPT | Mod: 26 | Performed by: DERMATOLOGY

## 2021-11-18 PROCEDURE — 99204 OFFICE O/P NEW MOD 45 MIN: CPT | Mod: 25

## 2021-11-18 PROCEDURE — 88312 SPECIAL STAINS GROUP 1: CPT | Mod: TC | Performed by: DERMATOLOGY

## 2021-11-18 PROCEDURE — 11104 PUNCH BX SKIN SINGLE LESION: CPT | Mod: GC

## 2021-11-18 RX ORDER — TRIAMCINOLONE ACETONIDE 0.25 MG/G
OINTMENT TOPICAL 2 TIMES DAILY
Qty: 80 G | Refills: 3 | Status: SHIPPED | OUTPATIENT
Start: 2021-11-18 | End: 2022-04-14

## 2021-11-18 RX ORDER — GABAPENTIN 300 MG/1
CAPSULE ORAL
Qty: 30 CAPSULE | Refills: 3 | Status: SHIPPED | OUTPATIENT
Start: 2021-11-18 | End: 2022-04-14

## 2021-11-18 ASSESSMENT — PAIN SCALES - GENERAL: PAINLEVEL: NO PAIN (0)

## 2021-11-18 NOTE — PROGRESS NOTES
Lidocaine-epinephrine 1-1:919225 % injection   2mL once for one use, starting 11/18/2021 ending 11/18/2021,  2mL disp, R-0, injection  Injected by Dr. Husain

## 2021-11-18 NOTE — NURSING NOTE
Chief Complaint   Patient presents with     Rash     Florencia, is here for a rash, she states it is on most parts of her body, and she states that it is very itchy that is constant and she noticed it around halloween, no other concerns     Tahir Patel EMT

## 2021-11-18 NOTE — PATIENT INSTRUCTIONS
-Discontinue the vistaril and start the gabapentin before bed every night for itch.     -Start the triamcinolone ointment to your itchy, rashy areas twice daily for up to two weeks.     -Continue to use the nystatin powder in these areas.     Wound Care After a Biopsy    What is a skin biopsy?  A skin biopsy allows the doctor to examine a very small piece of tissue under the microscope to determine the diagnosis and the best treatment for the skin condition. A local anesthetic (numbing medicine)  is injected with a very small needle into the skin area to be tested. A small piece of skin is taken from the area. Sometimes a suture (stitch) is used.     What are the risks of a skin biopsy?  I will experience scar, bleeding, swelling, pain, crusting and redness. I may experience incomplete removal or recurrence. Risks of this procedure are excessive bleeding, bruising, infection, nerve damage, numbness, thick (hypertrophic or keloidal) scar and non-diagnostic biopsy.    How should I care for my wound for the first 24 hours?    Keep the wound dry and covered for 24 hours    If it bleeds, hold direct pressure on the area for 15 minutes. If bleeding does not stop then go to the emergency room    Avoid strenuous exercise the first 1-2 days or as your doctor instructs you    How should I care for the wound after 24 hours?    After 24 hours, remove the bandage    You may bathe or shower as normal    If you had a scalp biopsy, you can shampoo as usual and can use shower water to clean the biopsy site daily    Clean the wound twice a day with gentle soap and water    Do not scrub, be gentle    Apply white petroleum/Vaseline after cleaning the wound with a cotton swab or a clean finger, and keep the site covered with a Bandaid /bandage. Bandages are not necessary with a scalp biopsy    If you are unable to cover the site with a Bandaid /bandage, re-apply ointment 2-3 times a day to keep the site moist. Moisture will help with  healing    Avoid strenuous activity for first 1-2 days    Avoid lakes, rivers, pools, and oceans until the stitches are removed or the site is healed    How do I clean my wound?    Wash hands thoroughly with soap or use hand  before all wound care    Clean the wound with gentle soap and water    Apply white petroleum/Vaseline  to wound after it is clean    Replace the Bandaid /bandage to keep the wound covered for the first few days or as instructed by your doctor    If you had a scalp biopsy, warm shower water to the area on a daily basis should suffice    What should I use to clean my wound?     Cotton-tipped applicators (Qtips )    White petroleum jelly (Vaseline ). Use a clean new container and use Q-tips to apply.    Bandaids   as needed    Gentle soap     How should I care for my wound long term?    Do not get your wound dirty    Keep up with wound care for one week or until the area is healed.    A small scab will form and fall off by itself when the area is completely healed. The area will be red and will become pink in color as it heals. Sun protection is very important for how your scar will turn out. Sunscreen with an SPF 30 or greater is recommended once the area is healed.    If you have stitches, stitches need to be removed in 14 days. You may return to our clinic for this or you may have it done locally at your doctor s office.    You should have some soreness but it should be mild and slowly go away over several days. Talk to your doctor about using tylenol for pain,    When should I call my doctor?  If you have increased:     Pain or swelling    Pus or drainage (clear or slightly yellow drainage is ok)    Temperature over 100F    Spreading redness or warmth around wound    When will I hear about my results?  The biopsy results can take 2 weeks to come back.  Your results will automatically release to [facility] before your provider has even reviewed them.  The clinic will call you with the  results, send you a [email or web address] message, or have you schedule a follow-up clinic or phone time to discuss the results.  Contact our clinics if you do not hear from us in 2 weeks.    Who should I call with questions?    [facility]: [phone]    [facility]: [phone]    For urgent needs outside of business hours call the [facility] at [phone] and ask for the dermatology resident on call

## 2021-11-18 NOTE — PROGRESS NOTES
Formerly [facility] Dermatology Note  Encounter Date: Nov 18, 2021  Date of Last Dermatology Office Visit: Visit date not found     Dermatology Problem List:  Rash at the intertriginous folds  -Start gabapentin 300 mg nightly for itch.   -Start triamcinolone 0.025% for focal inflammation and irritation.   ____________________________________________    Assessment & Plan:     # Rash at the intertriginous folds  Unclear etiology at this juncture however suspect inflammatory source without fungal component given lack of response to fluconazole and notable improvement following prednisone. Punch biopsy may help elucidate possible source and narrow this differential.   -Start gabapentin 300 mg nightly for itch.   -Start triamcinolone 0.025% for focal inflammation and irritation.     Procedures Performed:   - Punch biopsy procedure note, location(s): Right chest. After discussion of benefits and risks including but not limited to bleeding, infection, scar, incomplete removal, recurrence, and non-diagnostic biopsy, written consent and photographs were obtained. The area was cleaned with isopropyl alcohol. 0.5mL of 1% lidocaine with epinephrine was injected to obtain adequate anesthesia and a 3 mm punch biopsy was performed at site(s). 4-0 Prolene sutures were utilized to approximate the epidermal edges. White petrolatum ointment and a bandage was applied to the wound. Explicit verbal and written wound care instructions were provided. The patient left the dermatology clinic in good condition.     Follow-up: pending path results    Staff and Resident:     Staff: Dr. Keke Husain MD  PGY-2, Internal Medicine-Dermatology  Pager: TextPage Link    Patient was seen and examined with the medicine/dermatology resident. I agree with the history, review of systems, physical examination, assessments and plan. I waes present for the key portion of the biopsy procedure.      Keke Neville  "MD  Professor and  Chair  Department of Dermatology  [facility]    ____________________________________________    CC: Rash (Florencia, is here for a rash, she states it is on most parts of her body, and she states that it is very itchy that is constant and she noticed it around halloween, no other concerns )      HPI:  Ms. Florencia Gao is a(n) 76 year old female who presents today as a new patient for the following chief complaint: \"Patient presents with: Rash: Florencia, is here for a rash, she states it is on most parts of her body, and she states that it is very itchy that is constant and she noticed it around halloween, no other concerns.\"      -Patient presents with her daughter, who assists in providing history.   -Rash appeared around Halloweeen.   -On November 04, was prescribed 7 day course of fluconazole by her BMT team, as well as nystatin powder.  -Neither of the above helped.   -On November 11, she presented to her primary care provider, who started her on a prednisone taper, starting at 40mg.   -Reports the prednisone helped tremendously.   -Still has terrible itching, which she controls with Vistaril.     Patient is otherwise feeling well, without additional skin concerns.    Labs Reviewed:  -Recent CBC, LFT, CMP reviewed.   -Slight elevation in AST and ALT but not likely clinically significant.     Physical Exam:  Vitals: There were no vitals taken for this visit.  General: Well developed female. Resting comfortably; no acute distress. Conversational and cooperative with exam.  HEENT: Anicteric sclera. EOMI. Mucous membranes moist.   Skin Exam: A total skin excluding the undergarment areas was performed. The exam included the head/face, neck, both arms, chest, back, abdomen, both legs, digits and/or nails.   General skin features: hair color: silver  eye color: blue.  - Carrasco Type II: Sun-occluded skin with minimal background pigmentation. Slightly tanned in sun-exposed areas.. "   - Patient with <100 nevi  - At the axillary folds are faint mauve-colored patches of discoloration consistent with post-inflammatory hyperpigmentation.   - At the breast folds are similarly described patches.   - Deep at the axillary and breast folds is a slight band of faint erythema.   - At the posterior neck and shoulders are scattered skin colored papules with hemorrhagic crusting, with evidence of recent excoriation.   - No other lesions of concern on areas examined.             Medications:  Current Outpatient Medications   Medication     fluconazole (DIFLUCAN) 200 MG tablet     levothyroxine (SYNTHROID/LEVOTHROID) 88 MCG tablet     nystatin POWD     venlafaxine (EFFEXOR) 75 MG tablet     No current facility-administered medications for this visit.        Past Medical History:   Patient Active Problem List   Diagnosis     AML (acute myeloblastic leukemia) (H)     AML (acute myeloid leukemia) in remission (H)     Status post bone marrow transplant (H)     Past Medical History:   Diagnosis Date     AML (acute myeloblastic leukemia) (H)      Anxiety      Hypothyroidism 2000     Osteoporosis        CC Chel Oneal PA-C  [address] [place] [address] on close of this encounter.

## 2021-11-18 NOTE — LETTER
11/18/2021       RE: Florencia Gao  [address] [place] [address]     Dear Colleague,    Thank you for referring your patient, Florencia Gao, to the [facility] DERMATOLOGY [facility]IC [place] at [facility]. Please see a copy of my visit note below.    [facility] Dermatology Note  Encounter Date: Nov 18, 2021  Date of Last Dermatology Office Visit: Visit date not found     Dermatology Problem List:  Rash at the intertriginous folds  -Start gabapentin 300 mg nightly for itch.   -Start triamcinolone 0.025% for focal inflammation and irritation.   ____________________________________________    Assessment & Plan:     # Rash at the intertriginous folds  Unclear etiology at this juncture however suspect inflammatory source without fungal component given lack of response to fluconazole and notable improvement following prednisone. Punch biopsy may help elucidate possible source and narrow this differential.   -Start gabapentin 300 mg nightly for itch.   -Start triamcinolone 0.025% for focal inflammation and irritation.     Procedures Performed:   - Punch biopsy procedure note, location(s): Right chest. After discussion of benefits and risks including but not limited to bleeding, infection, scar, incomplete removal, recurrence, and non-diagnostic biopsy, written consent and photographs were obtained. The area was cleaned with isopropyl alcohol. 0.5mL of 1% lidocaine with epinephrine was injected to obtain adequate anesthesia and a 3 mm punch biopsy was performed at site(s). 4-0 Prolene sutures were utilized to approximate the epidermal edges. White petrolatum ointment and a bandage was applied to the wound. Explicit verbal and written wound care instructions were provided. The patient left the dermatology clinic in good condition.     Follow-up: pending path results    Staff and Resident:     Staff: Dr. Keke Holliday  "MD Lisha  PGY-2, Internal Medicine-Dermatology  Pager: TextPage Link    Patient was seen and examined with the medicine/dermatology resident. I agree with the history, review of systems, physical examination, assessments and plan. I waes present for the key portion of the biopsy procedure.      Keke Neville MD  Professor and  Chair  Department of Dermatology  [facility]    ____________________________________________    CC: Rash (Florencia, is here for a rash, she states it is on most parts of her body, and she states that it is very itchy that is constant and she noticed it around halloween, no other concerns )      HPI:  Ms. Florencia Gao is a(n) 76 year old female who presents today as a new patient for the following chief complaint: \"Patient presents with: Rash: Florencia, is here for a rash, she states it is on most parts of her body, and she states that it is very itchy that is constant and she noticed it around halloween, no other concerns.\"      -Patient presents with her daughter, who assists in providing history.   -Rash appeared around Halloweeen.   -On November 04, was prescribed 7 day course of fluconazole by her BMT team, as well as nystatin powder.  -Neither of the above helped.   -On November 11, she presented to her primary care provider, who started her on a prednisone taper, starting at 40mg.   -Reports the prednisone helped tremendously.   -Still has terrible itching, which she controls with Vistaril.     Patient is otherwise feeling well, without additional skin concerns.    Labs Reviewed:  -Recent CBC, LFT, CMP reviewed.   -Slight elevation in AST and ALT but not likely clinically significant.     Physical Exam:  Vitals: There were no vitals taken for this visit.  General: Well developed female. Resting comfortably; no acute distress. Conversational and cooperative with exam.  HEENT: Anicteric sclera. EOMI. Mucous membranes moist.   Skin Exam: A total skin excluding the " undergarment areas was performed. The exam included the head/face, neck, both arms, chest, back, abdomen, both legs, digits and/or nails.   General skin features: hair color: silver  eye color: blue.  - Carrasco Type II: Sun-occluded skin with minimal background pigmentation. Slightly tanned in sun-exposed areas..   - Patient with <100 nevi  - At the axillary folds are faint mauve-colored patches of discoloration consistent with post-inflammatory hyperpigmentation.   - At the breast folds are similarly described patches.   - Deep at the axillary and breast folds is a slight band of faint erythema.   - At the posterior neck and shoulders are scattered skin colored papules with hemorrhagic crusting, with evidence of recent excoriation.   - No other lesions of concern on areas examined.             Medications:  Current Outpatient Medications   Medication     fluconazole (DIFLUCAN) 200 MG tablet     levothyroxine (SYNTHROID/LEVOTHROID) 88 MCG tablet     nystatin POWD     venlafaxine (EFFEXOR) 75 MG tablet     No current facility-administered medications for this visit.        Past Medical History:   Patient Active Problem List   Diagnosis     AML (acute myeloblastic leukemia) (H)     AML (acute myeloid leukemia) in remission (H)     Status post bone marrow transplant (H)     Past Medical History:   Diagnosis Date     AML (acute myeloblastic leukemia) (H)      Anxiety      Hypothyroidism 2000     Osteoporosis         Chel Oneal PA-C  [address] [place] [address] on close of this encounter.    Lidocaine-epinephrine 1-1:838421 % injection   2mL once for one use, starting 11/18/2021 ending 11/18/2021,  2mL disp, R-0, injection  Injected by Dr. Husain

## 2021-11-22 LAB
PATH REPORT.COMMENTS IMP SPEC: NORMAL
PATH REPORT.FINAL DX SPEC: NORMAL
PATH REPORT.GROSS SPEC: NORMAL
PATH REPORT.MICROSCOPIC SPEC OTHER STN: NORMAL
PATH REPORT.RELEVANT HX SPEC: NORMAL

## 2021-12-02 ENCOUNTER — APPOINTMENT (OUTPATIENT)
Dept: LAB | Facility: CLINIC | Age: 76
End: 2021-12-02
Attending: INTERNAL MEDICINE
Payer: COMMERCIAL

## 2021-12-02 ENCOUNTER — ONCOLOGY VISIT (OUTPATIENT)
Dept: TRANSPLANT | Facility: CLINIC | Age: 76
End: 2021-12-02
Attending: INTERNAL MEDICINE
Payer: COMMERCIAL

## 2021-12-02 VITALS
WEIGHT: 183.4 LBS | SYSTOLIC BLOOD PRESSURE: 127 MMHG | BODY MASS INDEX: 29.48 KG/M2 | OXYGEN SATURATION: 97 % | TEMPERATURE: 98.3 F | DIASTOLIC BLOOD PRESSURE: 82 MMHG | HEART RATE: 81 BPM

## 2021-12-02 DIAGNOSIS — R21 RASH AND NONSPECIFIC SKIN ERUPTION: Primary | ICD-10-CM

## 2021-12-02 DIAGNOSIS — Z23 ENCOUNTER FOR IMMUNIZATION: ICD-10-CM

## 2021-12-02 DIAGNOSIS — E03.9 HYPOTHYROIDISM, UNSPECIFIED TYPE: ICD-10-CM

## 2021-12-02 DIAGNOSIS — Z94.81 BONE MARROW TRANSPLANT STATUS (H): ICD-10-CM

## 2021-12-02 DIAGNOSIS — C92.01 AML (ACUTE MYELOID LEUKEMIA) IN REMISSION (H): ICD-10-CM

## 2021-12-02 DIAGNOSIS — Z94.81 STATUS POST BONE MARROW TRANSPLANT (H): ICD-10-CM

## 2021-12-02 LAB
ALBUMIN SERPL-MCNC: 3.7 G/DL (ref 3.4–5)
ALP SERPL-CCNC: 90 U/L (ref 40–150)
ALT SERPL W P-5'-P-CCNC: 35 U/L (ref 0–50)
ANION GAP SERPL CALCULATED.3IONS-SCNC: 8 MMOL/L (ref 3–14)
AST SERPL W P-5'-P-CCNC: 23 U/L (ref 0–45)
BASOPHILS # BLD AUTO: 0 10E3/UL (ref 0–0.2)
BASOPHILS NFR BLD AUTO: 1 %
BILIRUB SERPL-MCNC: 0.3 MG/DL (ref 0.2–1.3)
BUN SERPL-MCNC: 13 MG/DL (ref 7–30)
CALCIUM SERPL-MCNC: 9.8 MG/DL (ref 8.5–10.1)
CHLORIDE BLD-SCNC: 103 MMOL/L (ref 94–109)
CO2 SERPL-SCNC: 28 MMOL/L (ref 20–32)
CREAT SERPL-MCNC: 0.73 MG/DL (ref 0.52–1.04)
EOSINOPHIL # BLD AUTO: 0.4 10E3/UL (ref 0–0.7)
EOSINOPHIL NFR BLD AUTO: 7 %
ERYTHROCYTE [DISTWIDTH] IN BLOOD BY AUTOMATED COUNT: 13.2 % (ref 10–15)
GFR SERPL CREATININE-BSD FRML MDRD: 81 ML/MIN/1.73M2
GLUCOSE BLD-MCNC: 102 MG/DL (ref 70–99)
HCT VFR BLD AUTO: 41.4 % (ref 35–47)
HGB BLD-MCNC: 13.1 G/DL (ref 11.7–15.7)
IMM GRANULOCYTES # BLD: 0 10E3/UL
IMM GRANULOCYTES NFR BLD: 1 %
LYMPHOCYTES # BLD AUTO: 2.3 10E3/UL (ref 0.8–5.3)
LYMPHOCYTES NFR BLD AUTO: 36 %
MCH RBC QN AUTO: 30 PG (ref 26.5–33)
MCHC RBC AUTO-ENTMCNC: 31.6 G/DL (ref 31.5–36.5)
MCV RBC AUTO: 95 FL (ref 78–100)
MONOCYTES # BLD AUTO: 0.8 10E3/UL (ref 0–1.3)
MONOCYTES NFR BLD AUTO: 12 %
NEUTROPHILS # BLD AUTO: 2.8 10E3/UL (ref 1.6–8.3)
NEUTROPHILS NFR BLD AUTO: 43 %
NRBC # BLD AUTO: 0 10E3/UL
NRBC BLD AUTO-RTO: 0 /100
PLATELET # BLD AUTO: 231 10E3/UL (ref 150–450)
POTASSIUM BLD-SCNC: 3.9 MMOL/L (ref 3.4–5.3)
PROT SERPL-MCNC: 7.4 G/DL (ref 6.8–8.8)
RBC # BLD AUTO: 4.36 10E6/UL (ref 3.8–5.2)
SODIUM SERPL-SCNC: 139 MMOL/L (ref 133–144)
WBC # BLD AUTO: 6.3 10E3/UL (ref 4–11)

## 2021-12-02 PROCEDURE — 90472 IMMUNIZATION ADMIN EACH ADD: CPT | Performed by: INTERNAL MEDICINE

## 2021-12-02 PROCEDURE — 36415 COLL VENOUS BLD VENIPUNCTURE: CPT | Performed by: INTERNAL MEDICINE

## 2021-12-02 PROCEDURE — 90670 PCV13 VACCINE IM: CPT | Performed by: INTERNAL MEDICINE

## 2021-12-02 PROCEDURE — G0009 ADMIN PNEUMOCOCCAL VACCINE: HCPCS | Performed by: INTERNAL MEDICINE

## 2021-12-02 PROCEDURE — 85004 AUTOMATED DIFF WBC COUNT: CPT | Performed by: INTERNAL MEDICINE

## 2021-12-02 PROCEDURE — G0463 HOSPITAL OUTPT CLINIC VISIT: HCPCS | Mod: 25

## 2021-12-02 PROCEDURE — 250N000011 HC RX IP 250 OP 636: Performed by: INTERNAL MEDICINE

## 2021-12-02 PROCEDURE — 90723 DTAP-HEP B-IPV VACCINE IM: CPT | Performed by: INTERNAL MEDICINE

## 2021-12-02 PROCEDURE — 999N000127 HC STATISTIC PERIPHERAL IV START W US GUIDANCE

## 2021-12-02 PROCEDURE — 250N000021 HC RX MED A9270 GY (STAT IND- M) 250: Performed by: INTERNAL MEDICINE

## 2021-12-02 PROCEDURE — 90648 HIB PRP-T VACCINE 4 DOSE IM: CPT | Performed by: INTERNAL MEDICINE

## 2021-12-02 PROCEDURE — 99214 OFFICE O/P EST MOD 30 MIN: CPT | Performed by: INTERNAL MEDICINE

## 2021-12-02 PROCEDURE — 80053 COMPREHEN METABOLIC PANEL: CPT | Performed by: INTERNAL MEDICINE

## 2021-12-02 PROCEDURE — G0463 HOSPITAL OUTPT CLINIC VISIT: HCPCS

## 2021-12-02 RX ORDER — HYDROCORTISONE 1.12 G/100G
2 CREAM TOPICAL DAILY
Qty: 57 G | Refills: 11 | Status: SHIPPED | OUTPATIENT
Start: 2021-12-02 | End: 2022-04-14

## 2021-12-02 RX ADMIN — PNEUMOCOCCAL 13-VALENT CONJUGATE VACCINE 0.5 ML: 2.2; 2.2; 2.2; 2.2; 2.2; 4.4; 2.2; 2.2; 2.2; 2.2; 2.2; 2.2; 2.2 INJECTION, SUSPENSION INTRAMUSCULAR at 14:33

## 2021-12-02 RX ADMIN — HAEMOPHILUS B POLYSACCHARIDE CONJUGATE VACCINE FOR INJ 0.5 ML: RECON SOLN at 14:35

## 2021-12-02 RX ADMIN — DIPHTHERIA AND TETANUS TOXOIDS AND ACELLULAR PERTUSSIS ADSORBED, HEPATITIS B (RECOMBINANT) AND INACTIVATED POLIOVIRUS VACCINE COMBINED 0.5 ML: 25; 10; 25; 25; 8; 10; 40; 8; 32 INJECTION, SUSPENSION INTRAMUSCULAR at 14:34

## 2021-12-02 ASSESSMENT — PAIN SCALES - GENERAL: PAINLEVEL: NO PAIN (0)

## 2021-12-02 NOTE — PROGRESS NOTES
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  Mar returns to assess her rash.  It is improved but not resolved.  It still itchy though its extent has diminished.  A biopsy showed an eczematous-like process without signs of GVH though it was done after steroid therapy which could compromise the interpretation of the histology.  Gabapentin and an increase in her Effexor have improved things and she reports no new GI  respiratory ENT bone joint vision or neurologic symptoms.    Her exam shows her weight stable in the last 2 months and her vital signs are acceptable.  She has subtle slightly puffy slightly urticarial-like erythema on the tops of her shoulders, the base of her hairline in the back of her scalp, some under her breasts, some on her hips left greater than right, but not much on her trunk arms and none on her legs.  Her oropharynx is clear.  There is no conjunctival irritation and her lungs are clear.  She has no peripheral edema or bone tenderness.    Laboratory testing is unrevealing with good CBC and chemistry..    Her medications were reviewed and there is no obvious drug that is responsible for this pruritic eruption.  With triamcinolone and hydrocortisone cream it has improved and she will continue those with follow-up by her primary care provider.    Today she received 3 vaccinations for: Tdap polio hepatitis B, Haemophilus influenza and pneumococcus.  Those will be due again 10 months from now.    She has already had a flu shot and a Covid booster (her third immunization).    Overall she is doing well.  We will have her return at the 18-month steff, 5 months from now unless  she wishes to return for further follow-up of her rash.    She knows to call if new problems arise.    Yong Moncada MD    Professor of medicine    Results for AME ROSENTHAL (MRN [id number]) as of 12/2/2021 20:28   Ref. Range 12/2/2021 14:21 12/2/2021 14:22   Sodium Latest Ref Range: 133 - 144 mmol/L 139    Potassium Latest Ref Range: 3.4 - 5.3 mmol/L 3.9    Chloride Latest Ref Range: 94 - 109 mmol/L 103    Carbon Dioxide Latest Ref Range: 20 - 32 mmol/L 28    Urea Nitrogen Latest Ref Range: 7 - 30 mg/dL 13    Creatinine Latest Ref Range: 0.52 - 1.04 mg/dL 0.73    GFR Estimate Latest Ref Range: >60 mL/min/1.73m2 81    Calcium Latest Ref Range: 8.5 - 10.1 mg/dL 9.8    Anion Gap Latest Ref Range: 3 - 14 mmol/L 8    Albumin Latest Ref Range: 3.4 - 5.0 g/dL 3.7    Protein Total Latest Ref Range: 6.8 - 8.8 g/dL 7.4    Bilirubin Total Latest Ref Range: 0.2 - 1.3 mg/dL 0.3    Alkaline Phosphatase Latest Ref Range: 40 - 150 U/L 90    ALT Latest Ref Range: 0 - 50 U/L 35    AST Latest Ref Range: 0 - 45 U/L 23    Glucose Latest Ref Range: 70 - 99 mg/dL 102 (H)    WBC Latest Ref Range: 4.0 - 11.0 10e3/uL  6.3   Hemoglobin Latest Ref Range: 11.7 - 15.7 g/dL  13.1   Hematocrit Latest Ref Range: 35.0 - 47.0 %  41.4   Platelet Count Latest Ref Range: 150 - 450 10e3/uL  231   RBC Count Latest Ref Range: 3.80 - 5.20 10e6/uL  4.36   MCV Latest Ref Range: 78 - 100 fL  95   MCH Latest Ref Range: 26.5 - 33.0 pg  30.0   MCHC Latest Ref Range: 31.5 - 36.5 g/dL  31.6   RDW Latest Ref Range: 10.0 - 15.0 %  13.2   % Neutrophils Latest Units: %  43   % Lymphocytes Latest Units: %  36   % Monocytes Latest Units: %  12   % Eosinophils Latest Units: %  7   % Basophils Latest Units: %  1   Absolute Basophils Latest Ref Range: 0.0 - 0.2 10e3/uL  0.0   Absolute Eosinophils Latest Ref Range: 0.0 - 0.7 10e3/uL  0.4   Absolute Immature Granulocytes Latest Ref Range: <=0.4 10e3/uL  0.0   Absolute Lymphocytes Latest Ref Range: 0.8 - 5.3 10e3/uL   2.3   Absolute Monocytes Latest Ref Range: 0.0 - 1.3 10e3/uL  0.8   % Immature Granulocytes Latest Units: %  1   Absolute Neutrophils Latest Ref Range: 1.6 - 8.3 10e3/uL  2.8   Absolute NRBCs Latest Units: 10e3/uL  0.0   NRBCs per 100 WBC Latest Ref Range: <1 /100  0     A. RIGHT CHEST:  - Slight scale crust, mild epidermal hyperplasia and sparse perivascular inflammation - (see comment)   Electronically signed by Yong Quigley MD on 11/22/2021 at 12:19 PM   Comment  UUMAYO   These changes are quite subtle, but suggest a partially-treated or resolving eczematous process such as irritant or seborrheic dermatitis.  PAS stain is negative for fungal elements, and no definite features of cutaneous [id number] disease are identified.  Clinical correlation is recommended.

## 2021-12-02 NOTE — LETTER
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  Mar returns to assess her rash.  It is improved but not resolved.  It still itchy though its extent has diminished.  A biopsy showed an eczematous-like process without signs of GVH though it was done after steroid therapy which could compromise the interpretation of the histology.  Gabapentin and an increase in her Effexor have improved things and she reports no new GI  respiratory ENT bone joint vision or neurologic symptoms.    Her exam shows her weight stable in the last 2 months and her vital signs are acceptable.  She has subtle slightly puffy slightly urticarial-like erythema on the tops of her shoulders, the base of her hairline in the back of her scalp, some under her breasts, some on her hips left greater than right, but not much on her trunk arms and none on her legs.  Her oropharynx is clear.  There is no conjunctival irritation and her lungs are clear.  She has no peripheral edema or bone tenderness.    Laboratory testing is unrevealing with good CBC and chemistry..    Her medications were reviewed and there is no obvious drug that is responsible for this pruritic eruption.  With triamcinolone and hydrocortisone cream it has improved and she will continue those with follow-up by her primary care provider.    Today she received 3 vaccinations for: Tdap polio hepatitis B, Haemophilus influenza and pneumococcus.  Those will be due again 10 months from now.    She has already had a flu shot and a Covid booster (her third immunization).    Overall she is  doing well.  We will have her return at the 18-month steff, 5 months from now unless she wishes to return for further follow-up of her rash.    She knows to call if new problems arise.    Yong Moncada MD    Professor of medicine    Results for AME ROSENTHAL (MRN [id number]) as of 12/2/2021 20:28   Ref. Range 12/2/2021 14:21 12/2/2021 14:22   Sodium Latest Ref Range: 133 - 144 mmol/L 139    Potassium Latest Ref Range: 3.4 - 5.3 mmol/L 3.9    Chloride Latest Ref Range: 94 - 109 mmol/L 103    Carbon Dioxide Latest Ref Range: 20 - 32 mmol/L 28    Urea Nitrogen Latest Ref Range: 7 - 30 mg/dL 13    Creatinine Latest Ref Range: 0.52 - 1.04 mg/dL 0.73    GFR Estimate Latest Ref Range: >60 mL/min/1.73m2 81    Calcium Latest Ref Range: 8.5 - 10.1 mg/dL 9.8    Anion Gap Latest Ref Range: 3 - 14 mmol/L 8    Albumin Latest Ref Range: 3.4 - 5.0 g/dL 3.7    Protein Total Latest Ref Range: 6.8 - 8.8 g/dL 7.4    Bilirubin Total Latest Ref Range: 0.2 - 1.3 mg/dL 0.3    Alkaline Phosphatase Latest Ref Range: 40 - 150 U/L 90    ALT Latest Ref Range: 0 - 50 U/L 35    AST Latest Ref Range: 0 - 45 U/L 23    Glucose Latest Ref Range: 70 - 99 mg/dL 102 (H)    WBC Latest Ref Range: 4.0 - 11.0 10e3/uL  6.3   Hemoglobin Latest Ref Range: 11.7 - 15.7 g/dL  13.1   Hematocrit Latest Ref Range: 35.0 - 47.0 %  41.4   Platelet Count Latest Ref Range: 150 - 450 10e3/uL  231   RBC Count Latest Ref Range: 3.80 - 5.20 10e6/uL  4.36   MCV Latest Ref Range: 78 - 100 fL  95   MCH Latest Ref Range: 26.5 - 33.0 pg  30.0   MCHC Latest Ref Range: 31.5 - 36.5 g/dL  31.6   RDW Latest Ref Range: 10.0 - 15.0 %  13.2   % Neutrophils Latest Units: %  43   % Lymphocytes Latest Units: %  36   % Monocytes Latest Units: %  12   % Eosinophils Latest Units: %  7   % Basophils Latest Units: %  1   Absolute Basophils Latest Ref Range: 0.0 - 0.2 10e3/uL  0.0   Absolute Eosinophils Latest Ref Range: 0.0 - 0.7 10e3/uL  0.4   Absolute Immature Granulocytes Latest Ref  Range: <=0.4 10e3/uL  0.0   Absolute Lymphocytes Latest Ref Range: 0.8 - 5.3 10e3/uL  2.3   Absolute Monocytes Latest Ref Range: 0.0 - 1.3 10e3/uL  0.8   % Immature Granulocytes Latest Units: %  1   Absolute Neutrophils Latest Ref Range: 1.6 - 8.3 10e3/uL  2.8   Absolute NRBCs Latest Units: 10e3/uL  0.0   NRBCs per 100 WBC Latest Ref Range: <1 /100  0     A. RIGHT CHEST:  - Slight scale crust, mild epidermal hyperplasia and sparse perivascular inflammation - (see comment)   Electronically signed by Yong Quigley MD on 11/22/2021 at 12:19 PM   Comment  UUMAYO   These changes are quite subtle, but suggest a partially-treated or resolving eczematous process such as irritant or seborrheic dermatitis.  PAS stain is negative for fungal elements, and no definite features of cutaneous ynrsm-ldhyxf-dngs disease are identified.  Clinical correlation is recommended.         Yong Moncada MD

## 2021-12-02 NOTE — NURSING NOTE
"Oncology Rooming Note    December 2, 2021 1:55 PM   Florencia Gao is a 76 year old female who presents for:    Chief Complaint   Patient presents with     RECHECK     AML     Initial Vitals: /82   Pulse 81   Temp 98.3  F (36.8  C) (Oral)   Wt 83.2 kg (183 lb 6.4 oz)   SpO2 97%   BMI 29.48 kg/m   Estimated body mass index is 29.48 kg/m  as calculated from the following:    Height as of 7/1/21: 1.68 m (5' 6.14\").    Weight as of this encounter: 83.2 kg (183 lb 6.4 oz). Body surface area is 1.97 meters squared.  No Pain (0) Comment: Data Unavailable   No LMP recorded. Patient is postmenopausal.  Allergies reviewed: Yes  Medications reviewed: Yes    Medications: Medication refills not needed today.  Pharmacy name entered into Wanderio:    [id number] [address]  [facility] [place], [place] - [phone]  [facility] - [place], [place] - [address]     Clinical concerns: NONE       Meme Klein CMA              "

## 2021-12-02 NOTE — LETTER
12/2/2021         RE: Florencia Gao  [address] [place] [address]        Dear Colleague,    Thank you for referring your patient, Florencia Gao, to the [facility] BLOOD AND MARROW TRANSPLANT PROGRAM [place]. Please see a copy of my visit note below.    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  Mar returns to assess her rash.  It is improved but not resolved.  It still itchy though its extent has diminished.  A biopsy showed an eczematous-like process without signs of GVH though it was done after steroid therapy which could compromise the interpretation of the histology.  Gabapentin and an increase in her Effexor have improved things and she reports no new GI  respiratory ENT bone joint vision or neurologic symptoms.    Her exam shows her weight stable in the last 2 months and her vital signs are acceptable.  She has subtle slightly puffy slightly urticarial-like erythema on the tops of her shoulders, the base of her hairline in the back of her scalp, some under her breasts, some on her hips left greater than right, but not much on her trunk arms and none on her legs.  Her oropharynx is clear.  There is no conjunctival irritation and her lungs are clear.  She has no peripheral edema or bone tenderness.    Laboratory testing is unrevealing with good CBC and chemistry..    Her medications were reviewed and there is no obvious drug that is responsible for this pruritic eruption.  With triamcinolone and hydrocortisone cream it has improved and she will continue those with follow-up by her primary care provider.    Today she received 3 vaccinations for: Tdap polio  hepatitis B, Haemophilus influenza and pneumococcus.  Those will be due again 10 months from now.    She has already had a flu shot and a Covid booster (her third immunization).    Overall she is doing well.  We will have her return at the 18-month steff, 5 months from now unless she wishes to return for further follow-up of her rash.    She knows to call if new problems arise.    Yong Moncada MD    Professor of medicine    Results for AME ROSENTHAL (MRN [id number]) as of 12/2/2021 20:28   Ref. Range 12/2/2021 14:21 12/2/2021 14:22   Sodium Latest Ref Range: 133 - 144 mmol/L 139    Potassium Latest Ref Range: 3.4 - 5.3 mmol/L 3.9    Chloride Latest Ref Range: 94 - 109 mmol/L 103    Carbon Dioxide Latest Ref Range: 20 - 32 mmol/L 28    Urea Nitrogen Latest Ref Range: 7 - 30 mg/dL 13    Creatinine Latest Ref Range: 0.52 - 1.04 mg/dL 0.73    GFR Estimate Latest Ref Range: >60 mL/min/1.73m2 81    Calcium Latest Ref Range: 8.5 - 10.1 mg/dL 9.8    Anion Gap Latest Ref Range: 3 - 14 mmol/L 8    Albumin Latest Ref Range: 3.4 - 5.0 g/dL 3.7    Protein Total Latest Ref Range: 6.8 - 8.8 g/dL 7.4    Bilirubin Total Latest Ref Range: 0.2 - 1.3 mg/dL 0.3    Alkaline Phosphatase Latest Ref Range: 40 - 150 U/L 90    ALT Latest Ref Range: 0 - 50 U/L 35    AST Latest Ref Range: 0 - 45 U/L 23    Glucose Latest Ref Range: 70 - 99 mg/dL 102 (H)    WBC Latest Ref Range: 4.0 - 11.0 10e3/uL  6.3   Hemoglobin Latest Ref Range: 11.7 - 15.7 g/dL  13.1   Hematocrit Latest Ref Range: 35.0 - 47.0 %  41.4   Platelet Count Latest Ref Range: 150 - 450 10e3/uL  231   RBC Count Latest Ref Range: 3.80 - 5.20 10e6/uL  4.36   MCV Latest Ref Range: 78 - 100 fL  95   MCH Latest Ref Range: 26.5 - 33.0 pg  30.0   MCHC Latest Ref Range: 31.5 - 36.5 g/dL  31.6   RDW Latest Ref Range: 10.0 - 15.0 %  13.2   % Neutrophils Latest Units: %  43   % Lymphocytes Latest Units: %  36   % Monocytes Latest Units: %  12   % Eosinophils Latest Units: %  7   %  Basophils Latest Units: %  1   Absolute Basophils Latest Ref Range: 0.0 - 0.2 10e3/uL  0.0   Absolute Eosinophils Latest Ref Range: 0.0 - 0.7 10e3/uL  0.4   Absolute Immature Granulocytes Latest Ref Range: <=0.4 10e3/uL  0.0   Absolute Lymphocytes Latest Ref Range: 0.8 - 5.3 10e3/uL  2.3   Absolute Monocytes Latest Ref Range: 0.0 - 1.3 10e3/uL  0.8   % Immature Granulocytes Latest Units: %  1   Absolute Neutrophils Latest Ref Range: 1.6 - 8.3 10e3/uL  2.8   Absolute NRBCs Latest Units: 10e3/uL  0.0   NRBCs per 100 WBC Latest Ref Range: <1 /100  0     A. RIGHT CHEST:  - Slight scale crust, mild epidermal hyperplasia and sparse perivascular inflammation - (see comment)   Electronically signed by Yong Quigley MD on 11/22/2021 at 12:19 PM   Comment  [id number]   These changes are quite subtle, but suggest a partially-treated or resolving eczematous process such as irritant or seborrheic dermatitis.  PAS stain is negative for fungal elements, and no definite features of cutaneous rqydu-eakliu-wdli disease are identified.  Clinical correlation is recommended.       Yong Moncada MD

## 2021-12-02 NOTE — NURSING NOTE
ACTHIB administered in left deltoid.    PEDIARIX administered in right deltoid.    PREVNAR 13 administered in right deltoid.    Patient was given all vaccine information sheets prior to injections.    Patient tolerated all vaccines well with no issues.    Sp Wisdom LPN

## 2021-12-02 NOTE — RESULT ENCOUNTER NOTE
Biopsy results notable for nonspecific, partially treated or resolving eczematous process. Given the patient was completing a prednisone taper at the time of biopsy, this result is not unexpected. No definite features of cutaneous GVHD identified. Plan to continue with symptomatic treatment with topical steroids, and monitor for repeat flare.     Attending dermatologist Yisel'denise as FYI only.

## 2021-12-15 LAB — CULTURE HARVEST COMPLETE DATE: NORMAL

## 2021-12-22 RX ORDER — HEPARIN SODIUM,PORCINE 10 UNIT/ML
2 VIAL (ML) INTRAVENOUS
Status: CANCELLED | OUTPATIENT
Start: 2021-12-22

## 2022-01-30 ENCOUNTER — HEALTH MAINTENANCE LETTER (OUTPATIENT)
Age: 77
End: 2022-01-30

## 2022-04-14 ENCOUNTER — OFFICE VISIT (OUTPATIENT)
Dept: TRANSPLANT | Facility: CLINIC | Age: 77
End: 2022-04-14
Attending: INTERNAL MEDICINE
Payer: COMMERCIAL

## 2022-04-14 ENCOUNTER — APPOINTMENT (OUTPATIENT)
Dept: LAB | Facility: CLINIC | Age: 77
End: 2022-04-14
Attending: INTERNAL MEDICINE
Payer: COMMERCIAL

## 2022-04-14 VITALS
TEMPERATURE: 98.3 F | SYSTOLIC BLOOD PRESSURE: 144 MMHG | BODY MASS INDEX: 28.45 KG/M2 | RESPIRATION RATE: 18 BRPM | DIASTOLIC BLOOD PRESSURE: 75 MMHG | OXYGEN SATURATION: 97 % | HEART RATE: 82 BPM | WEIGHT: 177 LBS

## 2022-04-14 DIAGNOSIS — E03.9 HYPOTHYROIDISM, UNSPECIFIED TYPE: ICD-10-CM

## 2022-04-14 DIAGNOSIS — C92.01 AML (ACUTE MYELOID LEUKEMIA) IN REMISSION (H): ICD-10-CM

## 2022-04-14 DIAGNOSIS — Z94.81 STATUS POST BONE MARROW TRANSPLANT (H): ICD-10-CM

## 2022-04-14 LAB
ALBUMIN SERPL-MCNC: 3.8 G/DL (ref 3.4–5)
ALP SERPL-CCNC: 86 U/L (ref 40–150)
ALT SERPL W P-5'-P-CCNC: 26 U/L (ref 0–50)
ANION GAP SERPL CALCULATED.3IONS-SCNC: 6 MMOL/L (ref 3–14)
AST SERPL W P-5'-P-CCNC: 29 U/L (ref 0–45)
BASOPHILS # BLD AUTO: 0 10E3/UL (ref 0–0.2)
BASOPHILS NFR BLD AUTO: 1 %
BILIRUB SERPL-MCNC: 0.3 MG/DL (ref 0.2–1.3)
BUN SERPL-MCNC: 11 MG/DL (ref 7–30)
CALCIUM SERPL-MCNC: 9.2 MG/DL (ref 8.5–10.1)
CHLORIDE BLD-SCNC: 102 MMOL/L (ref 94–109)
CO2 SERPL-SCNC: 28 MMOL/L (ref 20–32)
CREAT SERPL-MCNC: 0.72 MG/DL (ref 0.52–1.04)
EOSINOPHIL # BLD AUTO: 0.1 10E3/UL (ref 0–0.7)
EOSINOPHIL NFR BLD AUTO: 1 %
ERYTHROCYTE [DISTWIDTH] IN BLOOD BY AUTOMATED COUNT: 13.8 % (ref 10–15)
GFR SERPL CREATININE-BSD FRML MDRD: 87 ML/MIN/1.73M2
GLUCOSE BLD-MCNC: 96 MG/DL (ref 70–99)
HCT VFR BLD AUTO: 39.2 % (ref 35–47)
HGB BLD-MCNC: 12.7 G/DL (ref 11.7–15.7)
IMM GRANULOCYTES # BLD: 0 10E3/UL
IMM GRANULOCYTES NFR BLD: 0 %
LYMPHOCYTES # BLD AUTO: 3 10E3/UL (ref 0.8–5.3)
LYMPHOCYTES NFR BLD AUTO: 42 %
MAGNESIUM SERPL-MCNC: 1.9 MG/DL (ref 1.6–2.3)
MCH RBC QN AUTO: 28.9 PG (ref 26.5–33)
MCHC RBC AUTO-ENTMCNC: 32.4 G/DL (ref 31.5–36.5)
MCV RBC AUTO: 89 FL (ref 78–100)
MONOCYTES # BLD AUTO: 0.7 10E3/UL (ref 0–1.3)
MONOCYTES NFR BLD AUTO: 9 %
NEUTROPHILS # BLD AUTO: 3.3 10E3/UL (ref 1.6–8.3)
NEUTROPHILS NFR BLD AUTO: 47 %
NRBC # BLD AUTO: 0 10E3/UL
NRBC BLD AUTO-RTO: 0 /100
PLATELET # BLD AUTO: 238 10E3/UL (ref 150–450)
POTASSIUM BLD-SCNC: 4 MMOL/L (ref 3.4–5.3)
PROT SERPL-MCNC: 7.3 G/DL (ref 6.8–8.8)
RBC # BLD AUTO: 4.4 10E6/UL (ref 3.8–5.2)
SODIUM SERPL-SCNC: 136 MMOL/L (ref 133–144)
WBC # BLD AUTO: 7.1 10E3/UL (ref 4–11)

## 2022-04-14 PROCEDURE — 80053 COMPREHEN METABOLIC PANEL: CPT | Performed by: INTERNAL MEDICINE

## 2022-04-14 PROCEDURE — G0463 HOSPITAL OUTPT CLINIC VISIT: HCPCS

## 2022-04-14 PROCEDURE — 36415 COLL VENOUS BLD VENIPUNCTURE: CPT | Performed by: INTERNAL MEDICINE

## 2022-04-14 PROCEDURE — 83735 ASSAY OF MAGNESIUM: CPT | Performed by: INTERNAL MEDICINE

## 2022-04-14 PROCEDURE — 85025 COMPLETE CBC W/AUTO DIFF WBC: CPT | Performed by: INTERNAL MEDICINE

## 2022-04-14 PROCEDURE — 99214 OFFICE O/P EST MOD 30 MIN: CPT | Performed by: INTERNAL MEDICINE

## 2022-04-14 ASSESSMENT — PAIN SCALES - GENERAL: PAINLEVEL: NO PAIN (0)

## 2022-04-14 NOTE — LETTER
4/14/2022         RE: Florencia Gao  [address] [place] [address]      BP (!) 144/75 (BP Location: Right arm, Patient Position: Sitting, Cuff Size: Adult Regular)   Pulse 82   Temp 98.3  F (36.8  C) (Oral)   Resp 18   Wt 80.3 kg (177 lb)   SpO2 97%   BMI 28.45 kg/m    Wt Readings from Last 4 Encounters:   04/14/22 80.3 kg (177 lb)   12/02/21 83.2 kg (183 lb 6.4 oz)   11/10/21 82.5 kg (181 lb 12.8 oz)   11/04/21 82.2 kg (181 lb 4.8 oz)     Florencia returns 1-1/2 years after her allo transplant for AML.  In the last few months she has felt very well.  She has had no fever chills rash bleeding bruising bone joint or other problems.  She discontinued gabapentin without persistence of any prior neuropathic or other symptoms and her energy and general health has been great.  The rest of her review of systems is unrevealing.    Her exam shows her alert and comfortable.  She has no edema or skin rash.  She has no conjunctival irritation or oral lesions.  Her lungs are clear.  Her heart tones are regular without a gallop or murmur.  There is no carotid or abdominal bruit.  Her abdomen is soft and nontender without palpable masses hepatosplenomegaly.    Laboratory testing showed good blood counts and chemistries.  CMV DNA PCR is pending.    Overall she appears well in continued clinical complete remission 18 months after transplant.    I reviewed with her the summary of her 1 year survivorship plan visit from last September and gave her a copy as she had not recalled much of that discussion.    She has completed her vaccines including a fourth Covid vaccine last September and another last week and she is due only for the 2-year vaccines when she returns in October.    In October at her 2-year visit we will do a bone marrow aspiration biopsy and other scheduled testing.  She requests sedation for the biopsy procedure.      She is aware that I will not be seeing patients after June 30 and therefore her  followup will be with one of my [facility] BMT colleagues.    Overall it is good to see how well she is doing.    Yong Moncada MD    Professor of medicine     Latest Reference Range & Units 12/02/21 14:22 04/14/22 13:32   Albumin 3.4 - 5.0 g/dL  3.8   Protein Total 6.8 - 8.8 g/dL  7.3   Bilirubin Total 0.2 - 1.3 mg/dL  0.3   Alkaline Phosphatase 40 - 150 U/L  86   ALT 0 - 50 U/L  26   AST 0 - 45 U/L  29   Glucose 70 - 99 mg/dL  96   WBC 4.0 - 11.0 10e3/uL 6.3 7.1   Hemoglobin 11.7 - 15.7 g/dL 13.1 12.7   Hematocrit 35.0 - 47.0 % 41.4 39.2   Platelet Count 150 - 450 10e3/uL 231 238   RBC Count 3.80 - 5.20 10e6/uL 4.36 4.40   MCV 78 - 100 fL 95 89   MCH 26.5 - 33.0 pg 30.0 28.9   MCHC 31.5 - 36.5 g/dL 31.6 32.4   RDW 10.0 - 15.0 % 13.2 13.8   % Neutrophils % 43 47   % Lymphocytes % 36 42   % Monocytes % 12 9   % Eosinophils % 7 1   % Basophils % 1 1   Absolute Basophils 0.0 - 0.2 10e3/uL 0.0 0.0   Absolute Eosinophils 0.0 - 0.7 10e3/uL 0.4 0.1   Absolute Immature Granulocytes <=0.4 10e3/uL 0.0 0.0   Absolute Lymphocytes 0.8 - 5.3 10e3/uL 2.3 3.0   Absolute Monocytes 0.0 - 1.3 10e3/uL 0.8 0.7   % Immature Granulocytes % 1 0   Absolute Neutrophils 1.6 - 8.3 10e3/uL 2.8 3.3   Absolute NRBCs 10e3/uL 0.0 0.0   NRBCs per 100 WBC <1 /100 0 0         Yong Moncada MD

## 2022-04-14 NOTE — PROGRESS NOTES
BP (!) 144/75 (BP Location: Right arm, Patient Position: Sitting, Cuff Size: Adult Regular)   Pulse 82   Temp 98.3  F (36.8  C) (Oral)   Resp 18   Wt 80.3 kg (177 lb)   SpO2 97%   BMI 28.45 kg/m    Wt Readings from Last 4 Encounters:   04/14/22 80.3 kg (177 lb)   12/02/21 83.2 kg (183 lb 6.4 oz)   11/10/21 82.5 kg (181 lb 12.8 oz)   11/04/21 82.2 kg (181 lb 4.8 oz)     Florencia returns 1-1/2 years after her allo transplant for AML.  In the last few months she has felt very well.  She has had no fever chills rash bleeding bruising bone joint or other problems.  She discontinued gabapentin without persistence of any prior neuropathic or other symptoms and her energy and general health has been great.  The rest of her review of systems is unrevealing.    Her exam shows her alert and comfortable.  She has no edema or skin rash.  She has no conjunctival irritation or oral lesions.  Her lungs are clear.  Her heart tones are regular without a gallop or murmur.  There is no carotid or abdominal bruit.  Her abdomen is soft and nontender without palpable masses hepatosplenomegaly.    Laboratory testing showed good blood counts and chemistries.  CMV DNA PCR is pending.    Overall she appears well in continued clinical complete remission 18 months after transplant.    I reviewed with her the summary of her 1 year survivorship plan visit from last September and gave her a copy as she had not recalled much of that discussion.    She has completed her vaccines including a fourth Covid vaccine last September and another last week and she is due only for the 2-year vaccines when she returns in October.    In October at her 2-year visit we will do a bone marrow aspiration biopsy and other scheduled testing.  She requests sedation for the biopsy procedure.      She is aware that I will not be seeing patients after June 30 and therefore her followup will be with one of my [facility] BMT colleagues.    Overall it is good to see how well  she is doing.    Yong Moncada MD    Professor of medicine     Latest Reference Range & Units 12/02/21 14:22 04/14/22 13:32   Albumin 3.4 - 5.0 g/dL  3.8   Protein Total 6.8 - 8.8 g/dL  7.3   Bilirubin Total 0.2 - 1.3 mg/dL  0.3   Alkaline Phosphatase 40 - 150 U/L  86   ALT 0 - 50 U/L  26   AST 0 - 45 U/L  29   Glucose 70 - 99 mg/dL  96   WBC 4.0 - 11.0 10e3/uL 6.3 7.1   Hemoglobin 11.7 - 15.7 g/dL 13.1 12.7   Hematocrit 35.0 - 47.0 % 41.4 39.2   Platelet Count 150 - 450 10e3/uL 231 238   RBC Count 3.80 - 5.20 10e6/uL 4.36 4.40   MCV 78 - 100 fL 95 89   MCH 26.5 - 33.0 pg 30.0 28.9   MCHC 31.5 - 36.5 g/dL 31.6 32.4   RDW 10.0 - 15.0 % 13.2 13.8   % Neutrophils % 43 47   % Lymphocytes % 36 42   % Monocytes % 12 9   % Eosinophils % 7 1   % Basophils % 1 1   Absolute Basophils 0.0 - 0.2 10e3/uL 0.0 0.0   Absolute Eosinophils 0.0 - 0.7 10e3/uL 0.4 0.1   Absolute Immature Granulocytes <=0.4 10e3/uL 0.0 0.0   Absolute Lymphocytes 0.8 - 5.3 10e3/uL 2.3 3.0   Absolute Monocytes 0.0 - 1.3 10e3/uL 0.8 0.7   % Immature Granulocytes % 1 0   Absolute Neutrophils 1.6 - 8.3 10e3/uL 2.8 3.3   Absolute NRBCs 10e3/uL 0.0 0.0   NRBCs per 100 WBC <1 /100 0 0

## 2022-04-14 NOTE — NURSING NOTE
"Oncology Rooming Note    April 14, 2022 1:47 PM   Florencia Gao is a 76 year old female who presents for:    Chief Complaint   Patient presents with     Blood Draw     Labs drawn via vascular ultrasound EMT. VS taken.      Oncology Clinic Visit     AML in remission     Initial Vitals: BP (!) 144/75 (BP Location: Right arm, Patient Position: Sitting, Cuff Size: Adult Regular)   Pulse 82   Temp 98.3  F (36.8  C) (Oral)   Resp 18   Wt 80.3 kg (177 lb)   SpO2 97%   BMI 28.45 kg/m   Estimated body mass index is 28.45 kg/m  as calculated from the following:    Height as of 7/1/21: 1.68 m (5' 6.14\").    Weight as of this encounter: 80.3 kg (177 lb). Body surface area is 1.94 meters squared.  No Pain (0) Comment: Data Unavailable   No LMP recorded. Patient is postmenopausal.  Allergies reviewed: Yes  Medications reviewed: Yes    Medications: Medication refills not needed today.  Pharmacy name entered into [facility]:    [id number] [place], [place] - [facility] [facility] [address] [facility] [address]  [facility] [place], [place] - [id number]  [facility] - [place], [place] - [facility] [address]     Clinical concerns: none       Celso Scales            "

## 2022-04-14 NOTE — LETTER
4/14/2022         RE: Florencia Gao  [address] [place] [address]        Dear Colleague,    Thank you for referring your patient, Florencia Gao, to the [facility] BLOOD AND MARROW TRANSPLANT PROGRAM [place]. Please see a copy of my visit note below.    BP (!) 144/75 (BP Location: Right arm, Patient Position: Sitting, Cuff Size: Adult Regular)   Pulse 82   Temp 98.3  F (36.8  C) (Oral)   Resp 18   Wt 80.3 kg (177 lb)   SpO2 97%   BMI 28.45 kg/m    Wt Readings from Last 4 Encounters:   04/14/22 80.3 kg (177 lb)   12/02/21 83.2 kg (183 lb 6.4 oz)   11/10/21 82.5 kg (181 lb 12.8 oz)   11/04/21 82.2 kg (181 lb 4.8 oz)     Florencia returns 1-1/2 years after her allo transplant for AML.  In the last few months she has felt very well.  She has had no fever chills rash bleeding bruising bone joint or other problems.  She discontinued gabapentin without persistence of any prior neuropathic or other symptoms and her energy and general health has been great.  The rest of her review of systems is unrevealing.    Her exam shows her alert and comfortable.  She has no edema or skin rash.  She has no conjunctival irritation or oral lesions.  Her lungs are clear.  Her heart tones are regular without a gallop or murmur.  There is no carotid or abdominal bruit.  Her abdomen is soft and nontender without palpable masses hepatosplenomegaly.    Laboratory testing showed good blood counts and chemistries.  CMV DNA PCR is pending.    Overall she appears well in continued clinical complete remission 18 months after transplant.    I reviewed with her the summary of her 1 year survivorship plan visit from last September and gave her a copy as she had not recalled much of that discussion.    She has completed her vaccines including a fourth Covid vaccine last September and another last week and she is due only for the 2-year vaccines when she returns in October.    In October at her 2-year visit we will do a  bone marrow aspiration biopsy and other scheduled testing.  She requests sedation for the biopsy procedure.      She is aware that I will not be seeing patients after June 30 and therefore her followup will be with one of my [facility] BMT colleagues.    Overall it is good to see how well she is doing.    Yong Moncada MD    Professor of medicine     Latest Reference Range & Units 12/02/21 14:22 04/14/22 13:32   Albumin 3.4 - 5.0 g/dL  3.8   Protein Total 6.8 - 8.8 g/dL  7.3   Bilirubin Total 0.2 - 1.3 mg/dL  0.3   Alkaline Phosphatase 40 - 150 U/L  86   ALT 0 - 50 U/L  26   AST 0 - 45 U/L  29   Glucose 70 - 99 mg/dL  96   WBC 4.0 - 11.0 10e3/uL 6.3 7.1   Hemoglobin 11.7 - 15.7 g/dL 13.1 12.7   Hematocrit 35.0 - 47.0 % 41.4 39.2   Platelet Count 150 - 450 10e3/uL 231 238   RBC Count 3.80 - 5.20 10e6/uL 4.36 4.40   MCV 78 - 100 fL 95 89   MCH 26.5 - 33.0 pg 30.0 28.9   MCHC 31.5 - 36.5 g/dL 31.6 32.4   RDW 10.0 - 15.0 % 13.2 13.8   % Neutrophils % 43 47   % Lymphocytes % 36 42   % Monocytes % 12 9   % Eosinophils % 7 1   % Basophils % 1 1   Absolute Basophils 0.0 - 0.2 10e3/uL 0.0 0.0   Absolute Eosinophils 0.0 - 0.7 10e3/uL 0.4 0.1   Absolute Immature Granulocytes <=0.4 10e3/uL 0.0 0.0   Absolute Lymphocytes 0.8 - 5.3 10e3/uL 2.3 3.0   Absolute Monocytes 0.0 - 1.3 10e3/uL 0.8 0.7   % Immature Granulocytes % 1 0   Absolute Neutrophils 1.6 - 8.3 10e3/uL 2.8 3.3   Absolute NRBCs 10e3/uL 0.0 0.0   NRBCs per 100 WBC <1 /100 0 0       Again, thank you for allowing me to participate in the care of your patient.      Sincerely,    Yong Moncada MD

## 2022-04-14 NOTE — NURSING NOTE
Chief Complaint   Patient presents with     Blood Draw     Labs drawn via vascular ultrasound EMT. VS taken.      Labs collected from venipuncture by vascular access EMT. Vitals taken. Checked in for appointment(s).    Courtney Law RN

## 2022-04-16 LAB — CMV DNA SPEC NAA+PROBE-ACNC: NOT DETECTED IU/ML

## 2022-09-24 ENCOUNTER — HEALTH MAINTENANCE LETTER (OUTPATIENT)
Age: 77
End: 2022-09-24

## 2022-09-30 DIAGNOSIS — C92.01 AML (ACUTE MYELOID LEUKEMIA) IN REMISSION (H): Primary | ICD-10-CM

## 2022-10-03 ENCOUNTER — OFFICE VISIT (OUTPATIENT)
Dept: TRANSPLANT | Facility: CLINIC | Age: 77
End: 2022-10-03
Attending: INTERNAL MEDICINE
Payer: COMMERCIAL

## 2022-10-03 ENCOUNTER — LAB (OUTPATIENT)
Dept: LAB | Facility: CLINIC | Age: 77
End: 2022-10-03
Attending: INTERNAL MEDICINE
Payer: COMMERCIAL

## 2022-10-03 VITALS
HEART RATE: 85 BPM | WEIGHT: 175.5 LBS | OXYGEN SATURATION: 100 % | RESPIRATION RATE: 18 BRPM | TEMPERATURE: 97.7 F | DIASTOLIC BLOOD PRESSURE: 86 MMHG | SYSTOLIC BLOOD PRESSURE: 134 MMHG | BODY MASS INDEX: 28.21 KG/M2

## 2022-10-03 DIAGNOSIS — C92.01 AML (ACUTE MYELOID LEUKEMIA) IN REMISSION (H): Primary | ICD-10-CM

## 2022-10-03 DIAGNOSIS — C92.01 ACUTE MYELOID LEUKEMIA IN REMISSION (H): Primary | ICD-10-CM

## 2022-10-03 LAB
ALBUMIN SERPL BCG-MCNC: 4.4 G/DL (ref 3.5–5.2)
ALP SERPL-CCNC: 85 U/L (ref 35–104)
ALT SERPL W P-5'-P-CCNC: 15 U/L (ref 10–35)
ANION GAP SERPL CALCULATED.3IONS-SCNC: 9 MMOL/L (ref 7–15)
AST SERPL W P-5'-P-CCNC: 26 U/L (ref 10–35)
BASOPHILS # BLD AUTO: 0 10E3/UL (ref 0–0.2)
BASOPHILS NFR BLD AUTO: 1 %
BILIRUB SERPL-MCNC: 0.4 MG/DL
BUN SERPL-MCNC: 12.7 MG/DL (ref 8–23)
CALCIUM SERPL-MCNC: 9.7 MG/DL (ref 8.8–10.2)
CHLORIDE SERPL-SCNC: 100 MMOL/L (ref 98–107)
CREAT SERPL-MCNC: 0.74 MG/DL (ref 0.51–0.95)
DEPRECATED HCO3 PLAS-SCNC: 26 MMOL/L (ref 22–29)
EOSINOPHIL # BLD AUTO: 0.1 10E3/UL (ref 0–0.7)
EOSINOPHIL NFR BLD AUTO: 1 %
ERYTHROCYTE [DISTWIDTH] IN BLOOD BY AUTOMATED COUNT: 13.2 % (ref 10–15)
GFR SERPL CREATININE-BSD FRML MDRD: 84 ML/MIN/1.73M2
GLUCOSE SERPL-MCNC: 96 MG/DL (ref 70–99)
HCT VFR BLD AUTO: 41.3 % (ref 35–47)
HGB BLD-MCNC: 13.7 G/DL (ref 11.7–15.7)
IMM GRANULOCYTES # BLD: 0 10E3/UL
IMM GRANULOCYTES NFR BLD: 0 %
INTERPRETATION: NORMAL
LAB DIRECTOR DISCLAIMER: NORMAL
LAB DIRECTOR INTERPRETATION: NORMAL
LAB DIRECTOR METHODOLOGY: NORMAL
LAB DIRECTOR RESULTS: NORMAL
LYMPHOCYTES # BLD AUTO: 2.9 10E3/UL (ref 0.8–5.3)
LYMPHOCYTES NFR BLD AUTO: 47 %
MCH RBC QN AUTO: 29.5 PG (ref 26.5–33)
MCHC RBC AUTO-ENTMCNC: 33.2 G/DL (ref 31.5–36.5)
MCV RBC AUTO: 89 FL (ref 78–100)
MONOCYTES # BLD AUTO: 0.5 10E3/UL (ref 0–1.3)
MONOCYTES NFR BLD AUTO: 9 %
NEUTROPHILS # BLD AUTO: 2.6 10E3/UL (ref 1.6–8.3)
NEUTROPHILS NFR BLD AUTO: 42 %
NRBC # BLD AUTO: 0 10E3/UL
NRBC BLD AUTO-RTO: 0 /100
PLATELET # BLD AUTO: 221 10E3/UL (ref 150–450)
POTASSIUM SERPL-SCNC: 4.2 MMOL/L (ref 3.4–5.3)
PROT SERPL-MCNC: 7.2 G/DL (ref 6.4–8.3)
RBC # BLD AUTO: 4.64 10E6/UL (ref 3.8–5.2)
SIGNIFICANT RESULTS: NORMAL
SODIUM SERPL-SCNC: 135 MMOL/L (ref 136–145)
SPECIMEN DESCRIPTION: NORMAL
TEST DETAILS, MDL: NORMAL
WBC # BLD AUTO: 6.1 10E3/UL (ref 4–11)

## 2022-10-03 PROCEDURE — 81268 CHIMERISM ANAL W/CELL SELECT: CPT

## 2022-10-03 PROCEDURE — 38222 DX BONE MARROW BX & ASPIR: CPT | Mod: LT | Performed by: PHYSICIAN ASSISTANT

## 2022-10-03 PROCEDURE — 250N000011 HC RX IP 250 OP 636: Performed by: PHYSICIAN ASSISTANT

## 2022-10-03 PROCEDURE — 80053 COMPREHEN METABOLIC PANEL: CPT

## 2022-10-03 PROCEDURE — 88311 DECALCIFY TISSUE: CPT | Mod: TC | Performed by: INTERNAL MEDICINE

## 2022-10-03 PROCEDURE — 258N000003 HC RX IP 258 OP 636: Performed by: PHYSICIAN ASSISTANT

## 2022-10-03 PROCEDURE — 85025 COMPLETE CBC W/AUTO DIFF WBC: CPT

## 2022-10-03 PROCEDURE — 82040 ASSAY OF SERUM ALBUMIN: CPT

## 2022-10-03 PROCEDURE — 88275 CYTOGENETICS 100-300: CPT | Performed by: INTERNAL MEDICINE

## 2022-10-03 PROCEDURE — 999N000285 HC STATISTIC VASC ACCESS LAB DRAW WITH PIV START

## 2022-10-03 PROCEDURE — 38222 DX BONE MARROW BX & ASPIR: CPT | Performed by: PHYSICIAN ASSISTANT

## 2022-10-03 PROCEDURE — 88237 TISSUE CULTURE BONE MARROW: CPT | Performed by: INTERNAL MEDICINE

## 2022-10-03 PROCEDURE — 88305 TISSUE EXAM BY PATHOLOGIST: CPT | Mod: TC | Performed by: INTERNAL MEDICINE

## 2022-10-03 PROCEDURE — 88189 FLOWCYTOMETRY/READ 16 & >: CPT | Mod: GC | Performed by: STUDENT IN AN ORGANIZED HEALTH CARE EDUCATION/TRAINING PROGRAM

## 2022-10-03 PROCEDURE — 81121 IDH2 COMMON VARIANTS: CPT

## 2022-10-03 PROCEDURE — 88184 FLOWCYTOMETRY/ TC 1 MARKER: CPT

## 2022-10-03 PROCEDURE — 88264 CHROMOSOME ANALYSIS 20-25: CPT | Performed by: INTERNAL MEDICINE

## 2022-10-03 PROCEDURE — 999N000127 HC STATISTIC PERIPHERAL IV START W US GUIDANCE

## 2022-10-03 PROCEDURE — 81267 CHIMERISM ANAL NO CELL SELEC: CPT | Mod: XU | Performed by: INTERNAL MEDICINE

## 2022-10-03 PROCEDURE — 88185 FLOWCYTOMETRY/TC ADD-ON: CPT

## 2022-10-03 RX ORDER — HYDROMORPHONE HYDROCHLORIDE 1 MG/ML
0.5 INJECTION, SOLUTION INTRAMUSCULAR; INTRAVENOUS; SUBCUTANEOUS ONCE
Status: COMPLETED | OUTPATIENT
Start: 2022-10-03 | End: 2022-10-03

## 2022-10-03 RX ORDER — NALOXONE HYDROCHLORIDE 0.4 MG/ML
0.2 INJECTION, SOLUTION INTRAMUSCULAR; INTRAVENOUS; SUBCUTANEOUS ONCE
Status: COMPLETED | OUTPATIENT
Start: 2022-10-03 | End: 2022-10-03

## 2022-10-03 RX ORDER — POLYETHYLENE GLYCOL 3350 17 G/17G
1 POWDER, FOR SOLUTION ORAL DAILY
COMMUNITY

## 2022-10-03 RX ADMIN — NALOXONE HYDROCHLORIDE 0.2 MG: 0.4 INJECTION, SOLUTION INTRAMUSCULAR; INTRAVENOUS; SUBCUTANEOUS at 10:04

## 2022-10-03 RX ADMIN — HYDROMORPHONE HYDROCHLORIDE 0.5 MG: 1 INJECTION, SOLUTION INTRAMUSCULAR; INTRAVENOUS; SUBCUTANEOUS at 09:14

## 2022-10-03 RX ADMIN — SODIUM CHLORIDE 1000 ML: 9 INJECTION, SOLUTION INTRAVENOUS at 10:04

## 2022-10-03 ASSESSMENT — PAIN SCALES - GENERAL: PAINLEVEL: NO PAIN (0)

## 2022-10-03 NOTE — PROGRESS NOTES
BMT ONC Adult Bone Marrow Biopsy Procedure Note  October 3, 2022  BP (!) 60/38   Pulse 86   Temp 97.7  F (36.5  C) (Oral)   Resp 18   Wt 79.6 kg (175 lb 8 oz)   SpO2 98%   BMI 28.21 kg/m       Learning needs assessment complete within 12 months? YES    DIAGNOSIS: AML      PROCEDURE: Unilateral Bone Marrow Biopsy and Unilateral Aspirate    LOCATION: [facility] 2nd Floor    Patient s identification was positively verified by verbal identification and invasive procedure safety checklist was completed. Informed consent was obtained. Following the administration of 0.5mg dilaudid (0.5mg of dilaudid was to much. After procedure patient felt dizzy, sweaty, and became hypotensive. We chose dilaudid as versed 1mg in the past was not enough and she had tolerated dilaudid previously. Florencia was given 1L IVF post procedure and 0.2mg narcan to be on safe side) as pre-medication, patient was placed in the prone position and prepped and draped in a sterile manner. Approximately 15 cc of 1% Lidocaine was used over the left posterior iliac spine. Following this a 3 mm incision was made. Trephine bone marrow core(s) was (were) obtained from the [facility]. Bone marrow aspirates were obtained from the IC. Aspirates were sent for morphology, immunophenotyping, cytogenetics and molecular diagnostics gene rearrangement. A total of approximately 20 ml of marrow was aspirated. Following this procedure a sterile dressing was applied to the bone marrow biopsy site(s). The patient was placed in the supine position to maintain pressure on the biopsy site. Post-procedure wound care instructions were given.     Complications: YES. After procedure patient felt dizzy, sweaty, and had low BP. This was during peak of dilaudid. BG normal on chem panel at 96.    Interventions: YES. 1L IVF and chose to give 0.2mg IV narcan to be on safe side. Pt remained alert and conversational during entire procedure and post.     Length of procedure:20 minutes or less       Procedure performed by: Naomy Cisneros PA-C [phone]

## 2022-10-03 NOTE — NURSING NOTE
Post BMBx and dilaudid administration, patient became sweaty and manual blood pressure was 60/38- 0.2 mg narcan and 1 L NS bolus given per provider's request. BP improved to 134/86. Stable upon discharge.

## 2022-10-03 NOTE — NURSING NOTE
"Oncology Rooming Note    October 3, 2022 8:40 AM   Florencia Gao is a 77 year old female who presents for:    Chief Complaint   Patient presents with     Bone Marrow Biopsy     Bmbx; hx AML s/p BMT     Initial Vitals: /79 (BP Location: Left arm, Patient Position: Sitting)   Pulse 86   Temp 97.7  F (36.5  C) (Oral)   Resp 18   Wt 79.6 kg (175 lb 8 oz)   SpO2 98%   BMI 28.21 kg/m   Estimated body mass index is 28.21 kg/m  as calculated from the following:    Height as of 7/1/21: 1.68 m (5' 6.14\").    Weight as of this encounter: 79.6 kg (175 lb 8 oz). Body surface area is 1.93 meters squared.  No Pain (0) Comment: Data Unavailable   No LMP recorded. Patient is postmenopausal.  Allergies reviewed: Yes  Medications reviewed: Yes    Medications: Medication refills not needed today.  Pharmacy name entered into [facility]:    [id number] [address] AT [facility] [address]  [place] [facility] [place], [place] - [address]  [facility] - [place], [place] - [address] [place] [address]    Clinical concerns: None.     Aliyah Garcia RN              "

## 2022-10-03 NOTE — LETTER
10/3/2022         RE: Florencia Gao  [address] [place] [address]        Dear Colleague,    Thank you for referring your patient, Florencia Gao, to the [facility] BLOOD AND MARROW TRANSPLANT PROGRAM [place]. Please see a copy of my visit note below.    BMT ONC Adult Bone Marrow Biopsy Procedure Note  October 3, 2022  BP (!) 60/38   Pulse 86   Temp 97.7  F (36.5  C) (Oral)   Resp 18   Wt 79.6 kg (175 lb 8 oz)   SpO2 98%   BMI 28.21 kg/m       Learning needs assessment complete within 12 months? YES    DIAGNOSIS: AML      PROCEDURE: Unilateral Bone Marrow Biopsy and Unilateral Aspirate    LOCATION: [facility] 2nd Floor    Patient s identification was positively verified by verbal identification and invasive procedure safety checklist was completed. Informed consent was obtained. Following the administration of 0.5mg dilaudid (0.5mg of dilaudid was to much. After procedure patient felt dizzy, sweaty, and became hypotensive. We chose dilaudid as versed 1mg in the past was not enough and she had tolerated dilaudid previously. Florencia was given 1L IVF post procedure and 0.2mg narcan to be on safe side) as pre-medication, patient was placed in the prone position and prepped and draped in a sterile manner. Approximately 15 cc of 1% Lidocaine was used over the left posterior iliac spine. Following this a 3 mm incision was made. Trephine bone marrow core(s) was (were) obtained from the [facility]. Bone marrow aspirates were obtained from the [facility]. Aspirates were sent for morphology, immunophenotyping, cytogenetics and molecular diagnostics gene rearrangement. A total of approximately 20 ml of marrow was aspirated. Following this procedure a sterile dressing was applied to the bone marrow biopsy site(s). The patient was placed in the supine position to maintain pressure on the biopsy site. Post-procedure wound care instructions were given.     Complications: YES. After procedure patient felt dizzy, sweaty,  and had low BP. This was during peak of dilaudid. BG normal on chem panel at 96.    Interventions: YES. 1L IVF and chose to give 0.2mg IV narcan to be on safe side. Pt remained alert and conversational during entire procedure and post.     Length of procedure:20 minutes or less      Procedure performed by: Naomy Cisneros PA-C [phone]        Again, thank you for allowing me to participate in the care of your patient.      Sincerely,    [facility] BONE MARROW BIOPSY

## 2022-10-03 NOTE — NURSING NOTE
BMT Teaching Flowsheet   Teaching Topic: post biopsy instructions    Person(s) involved in teaching: Patient  Motivation Level  Asks Questions: Yes  Eager to Learn: Yes  Cooperative: Yes  Receptive (willing/able to accept information): Yes    Patient demonstrates understanding of the following:   - Reason for the appointment, diagnosis and treatment plan: Yes  - Knowledge of proper use of medications and conditions for which they are ordered (with special attention to potential side effects or drug interactions): Yes  - Which situations necessitate calling provider and whom to contact: Yes    Teaching concerns addressed: reviewed activity restrictions if received premeds, potential for bleeding and actions to take if develops any of the issues below    Proper use and care of (medical equipment, care aids, etc.) Yes  Pain management techniques: Yes  Patient instructed on hand hygiene: Yes  How and/when to access community resources: Yes    Infection Control:  Patient demonstrates understanding of the following:   Surgical procedure site care taught NA  Signs and symptoms of infection taught Yes  Wound care taught Yes  Central venous catheter care taught NA    Instructional Materials Used/Given: verbal, print out of post biopsy instructions.       Pt here today for 2 year bmbx. Meds and allergies reviewed. Weight and VS obtained. VSS. Labs drawn and PIV placed with vascular access assistance. Aftercare instructions reviewed including leaving dressing dry and intact for 24 hours, to report bleeding from bx site, fevers 100.4 or higher or other signs of infection, and to take Tylenol as needed for pain. All questions answered and pt verbalized understanding.     Provider order received to administer Dilaudid 0.5 mg IVP as premed for BMBX. Procedural consent discussed and pt's signature obtained.  Allergies reviewed.  PT currently alert and oriented to plan of care.  Pt lying prone in stretcher.  Call light w/in reach.   Provider and  at bedside.    Patient supine for 30 minutes following biopsy. After 30 minutes, dressing clean, dry and intact. Vital signs stable. PIV removed. See DOC flow sheets for details. Left ambulatory with family member.    Aliyah Garcia RN

## 2022-10-05 LAB
PATH REPORT.COMMENTS IMP SPEC: NORMAL
PATH REPORT.FINAL DX SPEC: NORMAL
PATH REPORT.MICROSCOPIC SPEC OTHER STN: NORMAL
PATH REPORT.RELEVANT HX SPEC: NORMAL

## 2022-10-05 PROCEDURE — G0452 MOLECULAR PATHOLOGY INTERPR: HCPCS | Mod: 26 | Performed by: STUDENT IN AN ORGANIZED HEALTH CARE EDUCATION/TRAINING PROGRAM

## 2022-10-06 LAB
PATH REPORT.COMMENTS IMP SPEC: NORMAL
PATH REPORT.COMMENTS IMP SPEC: NORMAL
PATH REPORT.FINAL DX SPEC: NORMAL
PATH REPORT.GROSS SPEC: NORMAL
PATH REPORT.MICROSCOPIC SPEC OTHER STN: NORMAL
PATH REPORT.MICROSCOPIC SPEC OTHER STN: NORMAL
PATH REPORT.RELEVANT HX SPEC: NORMAL

## 2022-10-06 PROCEDURE — 88311 DECALCIFY TISSUE: CPT | Mod: 26 | Performed by: STUDENT IN AN ORGANIZED HEALTH CARE EDUCATION/TRAINING PROGRAM

## 2022-10-06 PROCEDURE — 88305 TISSUE EXAM BY PATHOLOGIST: CPT | Mod: 26 | Performed by: STUDENT IN AN ORGANIZED HEALTH CARE EDUCATION/TRAINING PROGRAM

## 2022-10-06 PROCEDURE — 85097 BONE MARROW INTERPRETATION: CPT | Performed by: STUDENT IN AN ORGANIZED HEALTH CARE EDUCATION/TRAINING PROGRAM

## 2022-10-06 NOTE — PLAN OF CARE
"/70 (BP Location: Right arm)   Pulse 86   Temp 96.7  F (35.9  C) (Axillary)   Resp 16   Ht 1.68 m (5' 6.14\")   Wt 75.9 kg (167 lb 6.4 oz)   SpO2 95%   BMI 26.90 kg/m       VSS on RA sating>95%. Good UO, BMx0, denies n/v and pain. Has a fair appetite on a regular diet with asa counts. Up independently, redness and bruising around sim site getting better, no other skin concerns.  Potassium and magnesium replaced.  Continue with POC.      Problem: Adult Inpatient Plan of Care  Goal: Plan of Care Review  Outcome: No Change     Problem: Adult Inpatient Plan of Care  Goal: Patient-Specific Goal (Individualization)  Outcome: No Change     Problem: Adult Inpatient Plan of Care  Goal: Absence of Hospital-Acquired Illness or Injury  Outcome: No Change     Problem: Adult Inpatient Plan of Care  Goal: Absence of Hospital-Acquired Illness or Injury  Intervention: Identify and Manage Fall Risk  Recent Flowsheet Documentation  Taken 10/8/2020 1919 by Melanie Centeno RN  Safety Promotion/Fall Prevention:   fall prevention program maintained   lighting adjusted   nonskid shoes/slippers when out of bed   safety round/check completed     Problem: Adult Inpatient Plan of Care  Goal: Optimal Comfort and Wellbeing  Outcome: No Change     Problem: Adult Inpatient Plan of Care  Goal: Readiness for Transition of Care  Outcome: No Change     Problem: Adult Inpatient Plan of Care  Goal: Rounds/Family Conference  Outcome: No Change     Problem: Adjustment to Transplant (Stem Cell/Bone Marrow Transplant)  Goal: Optimal Coping with Transplant  Outcome: No Change     Problem: Bladder Irritation (Stem Cell/Bone Marrow Transplant)  Goal: Symptom-Free Urinary Elimination  Outcome: No Change     Problem: Bladder Irritation (Stem Cell/Bone Marrow Transplant)  Goal: Symptom-Free Urinary Elimination  Intervention: Monitor and Manage Bladder Irritation  Recent Flowsheet Documentation  Taken 10/8/2020 1919 by Melanie Centeno" RN  Hyperhydration Management: fluids provided     Problem: Diarrhea (Stem Cell/Bone Marrow Transplant)  Goal: Diarrhea Symptom Control  Outcome: No Change     Problem: Diarrhea (Stem Cell/Bone Marrow Transplant)  Goal: Diarrhea Symptom Control  Intervention: Manage Diarrhea  Recent Flowsheet Documentation  Taken 10/8/2020 1919 by Melanie Centeno RN  Fluid/Electrolyte Management: fluids provided     Problem: Fatigue (Stem Cell/Bone Marrow Transplant)  Goal: Energy Level Supports Daily Activity  Outcome: No Change     Problem: Hematologic Alteration (Stem Cell/Bone Marrow Transplant)  Goal: Blood Counts Within Acceptable Range  Outcome: No Change     Problem: Hypersensitivity Reaction (Stem Cell/Bone Marrow Transplant)  Goal: Absence of Hypersensitivity Reaction  Outcome: No Change     Problem: Infection Risk (Stem Cell/Bone Marrow Transplant)  Goal: Absence of Infection Signs/Symptoms  Outcome: No Change     Problem: Mucositis (Stem Cell/Bone Marrow Transplant)  Goal: Mucous Membrane Health and Integrity  Outcome: No Change     Problem: Nausea and Vomiting (Stem Cell/Bone Marrow Transplant)  Goal: Nausea and Vomiting Symptom Relief  Outcome: No Change     Problem: Nausea and Vomiting (Stem Cell/Bone Marrow Transplant)  Goal: Nausea and Vomiting Symptom Relief  Intervention: Prevent and Manage Nausea and Vomiting  Recent Flowsheet Documentation  Taken 10/8/2020 1919 by Melanie Centeno RN  Nausea/Vomiting Interventions: antiemetic     Problem: Nutrition Intake Altered (Stem Cell/Bone Marrow Transplant)  Goal: Optimal Nutrition Intake  Outcome: No Change          Normal

## 2022-10-13 PROCEDURE — G0452 MOLECULAR PATHOLOGY INTERPR: HCPCS | Mod: 26 | Performed by: PATHOLOGY

## 2022-10-17 NOTE — PROGRESS NOTES
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  She reports feeling great, plans for a trip to [place] in 2 weeks. Active, independent, can not believe how well she actually feels.  She has had no fever chills rash bleeding bruising bone joint or other problems. No dry eyes, no oral sores, no The rest of her review of systems is unrevealing.    /83   Pulse 82   Temp 98.1  F (36.7  C) (Oral)   Wt 79.6 kg (175 lb 8 oz)   SpO2 97%   BMI 28.21 kg/m      Her exam shows her alert and comfortable.  She has no edema or skin rash.  She has no conjunctival irritation or oral lesions.  Her lungs are clear.  Her heart tones are regular without a gallop or murmur.  There is no carotid or abdominal bruit.  Her abdomen is soft and nontender without palpable masses hepatosplenomegaly.    Laboratory testing showed WNL blood counts and chemistries except for mild hyponatremia.      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 FISH/CGH pending    Normocellular marrow (cellularity estimated at  20-30%) with trilineage hematopoietic maturation and no increase in blasts    Small lymphoid aggregate with reactive features     Peripheral blood showing a normal hemogram and normal white blood cell differential    BM % donor, CD3/CD33 100% donor    NGS: No mutations were identified in IDH2.    FISH/CG pending  -No current stigmata of chronic ikite-ctqioa-rfsk disease or ongoing transplant complications    ID  She has completed her 1 year vaccines including a fourth Covid vaccine, discussed bivalent booster, yearly influenza vaccine.  We will start 2-year vaccines today, she will be given the schedule to receive her 2-month boosters locally in [place] per her preference.      HCM/Survivorship  -Discussed with the patient that she needs a primary care physician to follow-up on her routine annual exam and healthcare  maintenance including at least once a year diabetes screening, lipid profile testing, vitamin D testing, thyroid testing, age-appropriate cancer screening including mammograms and Pap smear, and other additional tests as needed.  -Discussed with the patient the need for at least an annual dermatology exam, ophthalmology exam, twice a year dental exams, following up with gynecology for screening and other hormone related testing  -We will check pulmonary function test per her preference not till she comes back in April.   -She prefers to receive the DEXA scan locally    Will refer patient to oncology for follow-up.  For her for her and she would like to be seen in April as she will be gone to [place] in the meantime.  While in [place] she will continue close follow-up with primary care per her preference with monthly labs and call with any questions or concerns.    50 minutes spent on the date of the encounter doing chart review, history and exam, documentation and further activities per the note.    11/2: CG result  //46,XY[20]   Interpretation    No recipient (female) cells or clonal chromosomal abnormality were detected among the 20 metaphase cells analyzed. No cytogenetic evidence of a malignant process was found. Further, these findings are consistent with the reported findings from the [facility] of 100% donor cell engraftment.

## 2022-10-18 ENCOUNTER — OFFICE VISIT (OUTPATIENT)
Dept: TRANSPLANT | Facility: CLINIC | Age: 77
End: 2022-10-18
Attending: INTERNAL MEDICINE
Payer: COMMERCIAL

## 2022-10-18 VITALS
HEART RATE: 82 BPM | SYSTOLIC BLOOD PRESSURE: 136 MMHG | BODY MASS INDEX: 28.21 KG/M2 | TEMPERATURE: 98.1 F | WEIGHT: 175.5 LBS | OXYGEN SATURATION: 97 % | DIASTOLIC BLOOD PRESSURE: 83 MMHG

## 2022-10-18 DIAGNOSIS — Z94.81 STATUS POST BONE MARROW TRANSPLANT (H): Primary | ICD-10-CM

## 2022-10-18 DIAGNOSIS — C92.01 AML (ACUTE MYELOID LEUKEMIA) IN REMISSION (H): ICD-10-CM

## 2022-10-18 DIAGNOSIS — Z78.0 ASYMPTOMATIC MENOPAUSAL STATE: ICD-10-CM

## 2022-10-18 PROCEDURE — 250N000021 HC RX MED A9270 GY (STAT IND- M) 250: Performed by: INTERNAL MEDICINE

## 2022-10-18 PROCEDURE — 90670 PCV13 VACCINE IM: CPT | Performed by: INTERNAL MEDICINE

## 2022-10-18 PROCEDURE — G0009 ADMIN PNEUMOCOCCAL VACCINE: HCPCS | Performed by: INTERNAL MEDICINE

## 2022-10-18 PROCEDURE — 90472 IMMUNIZATION ADMIN EACH ADD: CPT | Performed by: INTERNAL MEDICINE

## 2022-10-18 PROCEDURE — 90707 MMR VACCINE SC: CPT | Performed by: INTERNAL MEDICINE

## 2022-10-18 PROCEDURE — G0463 HOSPITAL OUTPT CLINIC VISIT: HCPCS | Mod: 25

## 2022-10-18 PROCEDURE — G0463 HOSPITAL OUTPT CLINIC VISIT: HCPCS

## 2022-10-18 PROCEDURE — 90723 DTAP-HEP B-IPV VACCINE IM: CPT | Performed by: INTERNAL MEDICINE

## 2022-10-18 PROCEDURE — 250N000011 HC RX IP 250 OP 636: Performed by: INTERNAL MEDICINE

## 2022-10-18 PROCEDURE — 99215 OFFICE O/P EST HI 40 MIN: CPT | Performed by: INTERNAL MEDICINE

## 2022-10-18 PROCEDURE — 90648 HIB PRP-T VACCINE 4 DOSE IM: CPT | Performed by: INTERNAL MEDICINE

## 2022-10-18 RX ADMIN — PNEUMOCOCCAL 13-VALENT CONJUGATE VACCINE 0.5 ML: 2.2; 2.2; 2.2; 2.2; 2.2; 4.4; 2.2; 2.2; 2.2; 2.2; 2.2; 2.2; 2.2 INJECTION, SUSPENSION INTRAMUSCULAR at 11:33

## 2022-10-18 RX ADMIN — DIPHTHERIA AND TETANUS TOXOIDS AND ACELLULAR PERTUSSIS ADSORBED, HEPATITIS B (RECOMBINANT) AND INACTIVATED POLIOVIRUS VACCINE COMBINED 0.5 ML: 25; 10; 25; 25; 8; 10; 40; 8; 32 INJECTION, SUSPENSION INTRAMUSCULAR at 11:34

## 2022-10-18 RX ADMIN — HAEMOPHILUS B POLYSACCHARIDE CONJUGATE VACCINE FOR INJ 0.5 ML: RECON SOLN at 11:36

## 2022-10-18 RX ADMIN — MEASLES, MUMPS, AND RUBELLA VIRUS VACCINE LIVE 0.5 ML: 1000; 12500; 1000 INJECTION, POWDER, LYOPHILIZED, FOR SUSPENSION SUBCUTANEOUS at 11:35

## 2022-10-18 ASSESSMENT — PAIN SCALES - GENERAL: PAINLEVEL: NO PAIN (0)

## 2022-10-18 NOTE — LETTER
10/18/2022         RE: Florencia Gao  [address] [place] [address]        Dear Colleague,    Thank you for referring your patient, Florencia Gao, to the [facility] [place]. Please see a copy of my visit note below.    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  She reports feeling great, plans for a trip to [place] in 2 weeks. Active, independent, can not believe how well she actually feels.  She has had no fever chills rash bleeding bruising bone joint or other problems. No dry eyes, no oral sores, no The rest of her review of systems is unrevealing.    /83   Pulse 82   Temp 98.1  F (36.7  C) (Oral)   Wt 79.6 kg (175 lb 8 oz)   SpO2 97%   BMI 28.21 kg/m      Her exam shows her alert and comfortable.  She has no edema or skin rash.  She has no conjunctival irritation or oral lesions.  Her lungs are clear.  Her heart tones are regular without a gallop or murmur.  There is no carotid or abdominal bruit.  Her abdomen is soft and nontender without palpable masses hepatosplenomegaly.    Laboratory testing showed WNL blood counts and chemistries except for mild hyponatremia.      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 FISH/CGH pending    Normocellular marrow (cellularity estimated at  20-30%) with trilineage hematopoietic maturation and no increase in blasts    Small lymphoid aggregate with reactive features     Peripheral blood showing a normal hemogram and normal white blood cell differential    BM % donor, CD3/CD33 100% donor    NGS: No mutations were identified in IDH2.    FISH/CG pending  -No current stigmata of chronic eqpmr-crzlyf-eorg disease or ongoing transplant complications    ID  She has completed her 1 year vaccines including a fourth Covid vaccine, discussed bivalent booster, yearly influenza vaccine.  We  will start 2-year vaccines today, she will be given the schedule to receive her 2-month boosters locally in [place] per her preference.      HCM/Survivorship  -Discussed with the patient that she needs a primary care physician to follow-up on her routine annual exam and healthcare maintenance including at least once a year diabetes screening, lipid profile testing, vitamin D testing, thyroid testing, age-appropriate cancer screening including mammograms and Pap smear, and other additional tests as needed.  -Discussed with the patient the need for at least an annual dermatology exam, ophthalmology exam, twice a year dental exams, following up with gynecology for screening and other hormone related testing  -We will check pulmonary function test per her preference not till she comes back in April.   -She prefers to receive the DEXA scan locally    Will refer patient to oncology for follow-up.  For her for her and she would like to be seen in April as she will be gone to [place] in the meantime.  While in [place] she will continue close follow-up with primary care per her preference with monthly labs and call with any questions or concerns.    50 minutes spent on the date of the encounter doing chart review, history and exam, documentation and further activities per the note.    Chito Bautista MD

## 2022-10-18 NOTE — NURSING NOTE
Pt received her 2 year Vaccines:  Pediarix  Prevnar-13  MMR  HIB    Pt will get her Influenza injection elsewhere at a later date.      Vaccine information supplied.  See MAYANK De Leon, RMA

## 2022-10-18 NOTE — NURSING NOTE
"Oncology Rooming Note    October 18, 2022 10:32 AM   Florencia Gao is a 77 year old female who presents for:    Chief Complaint   Patient presents with     Oncology Clinic Visit     AML in remission     Initial Vitals: /83   Pulse 82   Temp 98.1  F (36.7  C) (Oral)   Wt 79.6 kg (175 lb 8 oz)   SpO2 97%   BMI 28.21 kg/m   Estimated body mass index is 28.21 kg/m  as calculated from the following:    Height as of 7/1/21: 1.68 m (5' 6.14\").    Weight as of this encounter: 79.6 kg (175 lb 8 oz). Body surface area is 1.93 meters squared.  No Pain (0) Comment: Data Unavailable   No LMP recorded. Patient is postmenopausal.  Allergies reviewed: Yes  Medications reviewed: Yes    Medications: Medication refills not needed today.  Pharmacy name entered into [facility]:    [id number] [address]  [facility] [place], [place] - [id number]  [facility] [place], [place] - [facility] [address]     Clinical concerns: none       Marilee Navarrete            "

## 2022-11-01 LAB
CULTURE HARVEST COMPLETE DATE: NORMAL
INTERPRETATION: NORMAL
ISCN: NORMAL
METHODS: NORMAL

## 2022-11-02 LAB — INTERPRETATION: NORMAL

## 2023-01-29 ENCOUNTER — HEALTH MAINTENANCE LETTER (OUTPATIENT)
Age: 78
End: 2023-01-29

## 2023-04-26 NOTE — TELEPHONE ENCOUNTER
RECORDS STATUS - ALL OTHER DIAGNOSIS      Action    Action Taken 4/26/23  Spoke w/ [place] @ [facility] - pt last seen 8/4/2020 - note in Media Tab.       [facility]   DATE RECEIVED: 4/26   NOTES STATUS DETAILS   OFFICE NOTE from referring provider Marilyn Bautista: 10/26/22   OFFICE NOTE from medical oncologist [facility] Dr. Melanie Bloom: 8/4/20   OFFICE NOTE from Radiation Oncology [facility] Dr. Nathan: 10/8/20   DISCHARGE SUMMARY from hospital [facility]/CE - [facility] 9/30/20: [facility]    6/25/20, 6/16/20: [facility]   DISCHARGE REPORT from the ER [facility] - [facility] 6/16/20   OPERATIVE REPORT CE - [facility] 7/31/20, 7/10/20, 6/18/20: [facility]   MEDICATION LIST [facility]   LABS     PATHOLOGY REPORTS [facility] 10/3/22, 10/4/21, 4/6/21, 1/19/21, 10/28/20, 9/10/20: [facility]  9/10/20: [facility]   ANYTHING RELATED TO DIAGNOSIS Epic 10/3/22   GENONOMIC TESTING     TYPE: Epic 10/3/22, 10/4/21, 4/6/21, 1/19/21, 10/28/20   IMAGING (NEED IMAGES & REPORT)     CT SCANS PACS 9/8/20: [facility]

## 2023-05-04 ENCOUNTER — PRE VISIT (OUTPATIENT)
Dept: ONCOLOGY | Facility: CLINIC | Age: 78
End: 2023-05-04
Payer: COMMERCIAL

## 2023-05-04 ENCOUNTER — APPOINTMENT (OUTPATIENT)
Dept: LAB | Facility: CLINIC | Age: 78
End: 2023-05-04
Attending: STUDENT IN AN ORGANIZED HEALTH CARE EDUCATION/TRAINING PROGRAM
Payer: COMMERCIAL

## 2023-05-04 ENCOUNTER — ONCOLOGY VISIT (OUTPATIENT)
Dept: ONCOLOGY | Facility: CLINIC | Age: 78
End: 2023-05-04
Attending: STUDENT IN AN ORGANIZED HEALTH CARE EDUCATION/TRAINING PROGRAM
Payer: COMMERCIAL

## 2023-05-04 VITALS
HEART RATE: 92 BPM | SYSTOLIC BLOOD PRESSURE: 132 MMHG | TEMPERATURE: 98.6 F | DIASTOLIC BLOOD PRESSURE: 75 MMHG | BODY MASS INDEX: 28.09 KG/M2 | OXYGEN SATURATION: 97 % | WEIGHT: 174.8 LBS | RESPIRATION RATE: 16 BRPM

## 2023-05-04 DIAGNOSIS — C92.01 AML (ACUTE MYELOID LEUKEMIA) IN REMISSION (H): Primary | ICD-10-CM

## 2023-05-04 DIAGNOSIS — E03.9 HYPOTHYROIDISM, UNSPECIFIED TYPE: ICD-10-CM

## 2023-05-04 LAB
ALBUMIN SERPL BCG-MCNC: 4.4 G/DL (ref 3.5–5.2)
ALP SERPL-CCNC: 104 U/L (ref 35–104)
ALT SERPL W P-5'-P-CCNC: 18 U/L (ref 10–35)
ANION GAP SERPL CALCULATED.3IONS-SCNC: 9 MMOL/L (ref 7–15)
AST SERPL W P-5'-P-CCNC: 24 U/L (ref 10–35)
BASOPHILS # BLD AUTO: 0 10E3/UL (ref 0–0.2)
BASOPHILS NFR BLD AUTO: 1 %
BILIRUB SERPL-MCNC: 0.2 MG/DL
BUN SERPL-MCNC: 15 MG/DL (ref 8–23)
CALCIUM SERPL-MCNC: 9.5 MG/DL (ref 8.8–10.2)
CHLORIDE SERPL-SCNC: 101 MMOL/L (ref 98–107)
CREAT SERPL-MCNC: 0.8 MG/DL (ref 0.51–0.95)
DEPRECATED HCO3 PLAS-SCNC: 27 MMOL/L (ref 22–29)
EOSINOPHIL # BLD AUTO: 0.1 10E3/UL (ref 0–0.7)
EOSINOPHIL NFR BLD AUTO: 2 %
ERYTHROCYTE [DISTWIDTH] IN BLOOD BY AUTOMATED COUNT: 13.2 % (ref 10–15)
FERRITIN SERPL-MCNC: 120 NG/ML (ref 11–328)
GFR SERPL CREATININE-BSD FRML MDRD: 76 ML/MIN/1.73M2
GLUCOSE SERPL-MCNC: 88 MG/DL (ref 70–99)
HCT VFR BLD AUTO: 40.3 % (ref 35–47)
HGB BLD-MCNC: 13.3 G/DL (ref 11.7–15.7)
HOLD SPECIMEN: NORMAL
IMM GRANULOCYTES # BLD: 0 10E3/UL
IMM GRANULOCYTES NFR BLD: 1 %
LYMPHOCYTES # BLD AUTO: 2.9 10E3/UL (ref 0.8–5.3)
LYMPHOCYTES NFR BLD AUTO: 43 %
MCH RBC QN AUTO: 30.4 PG (ref 26.5–33)
MCHC RBC AUTO-ENTMCNC: 33 G/DL (ref 31.5–36.5)
MCV RBC AUTO: 92 FL (ref 78–100)
MONOCYTES # BLD AUTO: 0.6 10E3/UL (ref 0–1.3)
MONOCYTES NFR BLD AUTO: 9 %
NEUTROPHILS # BLD AUTO: 3 10E3/UL (ref 1.6–8.3)
NEUTROPHILS NFR BLD AUTO: 44 %
NRBC # BLD AUTO: 0 10E3/UL
NRBC BLD AUTO-RTO: 0 /100
PLATELET # BLD AUTO: 206 10E3/UL (ref 150–450)
POTASSIUM SERPL-SCNC: 4.1 MMOL/L (ref 3.4–5.3)
PROT SERPL-MCNC: 7.2 G/DL (ref 6.4–8.3)
RBC # BLD AUTO: 4.38 10E6/UL (ref 3.8–5.2)
SODIUM SERPL-SCNC: 137 MMOL/L (ref 136–145)
TSH SERPL DL<=0.005 MIU/L-ACNC: 1.01 UIU/ML (ref 0.3–4.2)
WBC # BLD AUTO: 6.7 10E3/UL (ref 4–11)

## 2023-05-04 PROCEDURE — 36415 COLL VENOUS BLD VENIPUNCTURE: CPT | Performed by: STUDENT IN AN ORGANIZED HEALTH CARE EDUCATION/TRAINING PROGRAM

## 2023-05-04 PROCEDURE — 91313 HC RX IP 250 OP 636: CPT | Performed by: STUDENT IN AN ORGANIZED HEALTH CARE EDUCATION/TRAINING PROGRAM

## 2023-05-04 PROCEDURE — 84443 ASSAY THYROID STIM HORMONE: CPT | Performed by: STUDENT IN AN ORGANIZED HEALTH CARE EDUCATION/TRAINING PROGRAM

## 2023-05-04 PROCEDURE — 250N000011 HC RX IP 250 OP 636: Performed by: STUDENT IN AN ORGANIZED HEALTH CARE EDUCATION/TRAINING PROGRAM

## 2023-05-04 PROCEDURE — G0463 HOSPITAL OUTPT CLINIC VISIT: HCPCS | Performed by: STUDENT IN AN ORGANIZED HEALTH CARE EDUCATION/TRAINING PROGRAM

## 2023-05-04 PROCEDURE — 82728 ASSAY OF FERRITIN: CPT | Performed by: STUDENT IN AN ORGANIZED HEALTH CARE EDUCATION/TRAINING PROGRAM

## 2023-05-04 PROCEDURE — 90471 IMMUNIZATION ADMIN: CPT | Performed by: STUDENT IN AN ORGANIZED HEALTH CARE EDUCATION/TRAINING PROGRAM

## 2023-05-04 PROCEDURE — 99204 OFFICE O/P NEW MOD 45 MIN: CPT | Performed by: STUDENT IN AN ORGANIZED HEALTH CARE EDUCATION/TRAINING PROGRAM

## 2023-05-04 PROCEDURE — 85025 COMPLETE CBC W/AUTO DIFF WBC: CPT | Performed by: STUDENT IN AN ORGANIZED HEALTH CARE EDUCATION/TRAINING PROGRAM

## 2023-05-04 PROCEDURE — 0134A HC ADMIN COVID VAC MODERNA BIVAL 0.5ML ADDITIONAL: CPT | Performed by: STUDENT IN AN ORGANIZED HEALTH CARE EDUCATION/TRAINING PROGRAM

## 2023-05-04 PROCEDURE — 80053 COMPREHEN METABOLIC PANEL: CPT | Performed by: STUDENT IN AN ORGANIZED HEALTH CARE EDUCATION/TRAINING PROGRAM

## 2023-05-04 PROCEDURE — 90707 MMR VACCINE SC: CPT | Performed by: STUDENT IN AN ORGANIZED HEALTH CARE EDUCATION/TRAINING PROGRAM

## 2023-05-04 RX ORDER — BUSPIRONE HYDROCHLORIDE 5 MG/1
TABLET ORAL
COMMUNITY
Start: 2023-05-03 | End: 2024-05-08

## 2023-05-04 RX ADMIN — MODERNA COVID-19 VACCINE, BIVALENT 50 MCG: 25; 25 INJECTION, SUSPENSION INTRAMUSCULAR at 15:50

## 2023-05-04 RX ADMIN — MEASLES, MUMPS, AND RUBELLA VIRUS VACCINE LIVE 0.5 ML: 1000; 12500; 1000 INJECTION, POWDER, LYOPHILIZED, FOR SUSPENSION SUBCUTANEOUS at 15:51

## 2023-05-04 ASSESSMENT — PAIN SCALES - GENERAL: PAINLEVEL: NO PAIN (0)

## 2023-05-04 NOTE — NURSING NOTE
Pt here for MMR and COVID vaccine. VIS given to patient regarding what vaccines they will be receiving today. Pt showed understanding. Last name and  verified with pt prior to administration. Vaccines verified and charted in the MAR. Vitals taken prior to administration. Pt is afebrile. See MAR for more details on vaccines.     /75 (BP Location: Left arm, Patient Position: Sitting, Cuff Size: Adult Large)   Pulse 92   Temp 98.6  F (37  C) (Oral)   Resp 16   Wt 79.3 kg (174 lb 12.8 oz)   SpO2 97%   BMI 28.09 kg/m        Immunizations Administered     Name Date Dose VIS Date Route    COVID-19 Bivalent 18+ (Moderna) 23  3:50 PM 50 mcg EUA,2023,Given today Intramuscular    MMR 23  3:51 PM 0.5 mL 2021, Given Today Subcutaneous        Brook Giang, CMA on 2023 at 3:57 PM

## 2023-05-04 NOTE — NURSING NOTE
Chief Complaint   Patient presents with     Blood Draw     Vitals taken, venipuncture, JIC tube drawn for outside provider plus todays labs, checked into next appt     /75 (BP Location: Left arm, Patient Position: Sitting, Cuff Size: Adult Large)   Pulse 92   Temp 98.6  F (37  C) (Oral)   Resp 16   Wt 79.3 kg (174 lb 12.8 oz)   SpO2 97%   BMI 28.09 kg/m    Miller Pereyra RN on 5/4/2023 at 2:25 PM

## 2023-05-04 NOTE — PROGRESS NOTES
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    1. 9/10/2020 Diagnosed with AML  -- BMBx showing 60% cellularity, 26% blasts with increase ring sideroblasts.  -- Karyotype showing Trisomy 8  -- NGS: IDH2 mutation  2. Treated with 7+3 and achieved a CR1  3. 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 10/3/21 - 2 year BMBx showing 20% cellularity with trilineage hematopoiesis, no evidence of AML  - Flow negative.   - NGS negative for IDH1 mutation  - 100% donor    SUBJECTIVE  Brianna presents to clinic for follow-up and is feeling well.  Energy levels has been good and has been having a good appetite. She has been taking Effexor for her neuropathy, her PCP is going to stop the Effexor due to excessive dreaming, plan is to change her to Buspirone.  She is no longer having neuropathy.    Denies fever/chills, rash, s/s GVHD, GI upset, dry eyes, dry mouth.    Plans to go to [place] after Thanksgiving and come back around March.    PAST MEDICAL HISTORY  Past Medical History:   Diagnosis Date     AML (acute myeloblastic leukemia) (H)      Anxiety      Hypothyroidism 2000     Osteoporosis        FAMILY HISTORY  Family History   Problem Relation Age of Onset     Cerebrovascular Disease Mother      Prostate Cancer Father        SOCIAL HISTORY  Social History     Socioeconomic History     Marital status:      Spouse name: Not on file     Number of children: Not on file     Years of education: Not on file     Highest education level: Not on file   Occupational History     Not on file   Tobacco Use     Smoking status:  Never     Smokeless tobacco: Never   Vaping Use     Vaping status: Not on file   Substance and Sexual Activity     Alcohol use: Not Currently     Drug use: Never     Sexual activity: Not on file   Other Topics Concern     Parent/sibling w/ CABG, MI or angioplasty before 65F 55M? Not Asked   Social History Narrative     Not on file     Social Determinants of Health     Financial Resource Strain: Not on file   Food Insecurity: Not on file   Transportation Needs: Not on file   Physical Activity: Not on file   Stress: Not on file   Social Connections: Not on file   Intimate Partner Violence: Not on file   Housing Stability: Not on file       CURRENT OUTPATIENT MEDICATIONS  Current Outpatient Medications   Medication Sig Dispense Refill     levothyroxine (SYNTHROID/LEVOTHROID) 88 MCG tablet Take 1 tablet (88 mcg) by mouth daily 90 tablet 4     polyethylene glycol (MIRALAX) 17 g packet Take 1 packet by mouth daily       busPIRone (BUSPAR) 5 MG tablet        venlafaxine (EFFEXOR) 75 MG tablet Take 1 tablet (75 mg) by mouth daily (Patient not taking: Reported on 5/4/2023) 90 tablet 4       ALLERGIES  Allergies   Allergen Reactions     Blood Transfusion Related (Informational Only)      Patient has a history of a clinically significant antibody against RBC antigens.  A delay in compatible RBCs may occur.     Sulfa Antibiotics Rash     Acetaminophen-Codeine Dizziness     could not sit up after surgery until off all narcotics, even Tylenol #3     Chlorhexidine Rash       REVIEW OF SYSTEMS  A complete ROS was performed and was negative except as mentioned in HPI    PHYSICAL EXAM  /75 (BP Location: Left arm, Patient Position: Sitting, Cuff Size: Adult Large)   Pulse 92   Temp 98.6  F (37  C) (Oral)   Resp 16   Wt 79.3 kg (174 lb 12.8 oz)   SpO2 97%   BMI 28.09 kg/m    @LASTSAO2(4)@  Wt Readings from Last 3 Encounters:   05/04/23 79.3 kg (174 lb 12.8 oz)   10/18/22 79.6 kg (175 lb 8 oz)   10/03/22 79.6 kg (175 lb 8  oz)       Gen: alert, pleasant and conversational, NAD  HEET: NC/AT, EOMI w/ PERRL, anicteric sclera. OP clear. MMM.   Neck: supple no JVP  Lymph: No cervical, supraclavicular, axillary or inguinal LAD  CV: normal S1,S2 with RRR no m/r/g  Resp: lungs CTA bilaterally with adequate air movement to bases. No wheezes or crackles  Abd: soft NTND no organomegaly or masses. BS normoactive.   Ext: WWP no edema or cyanosis  Skin: no concerning lesions or rashes  Neuro: A&Ox4, no lateralizing sx. Grossly nonfocal.      LABORATORY AND IMAGING STUDIES  Most Recent 3 CBC's:Recent Labs   Lab Test 05/04/23  1419 10/03/22  0844 04/14/22  1332   WBC 6.7 6.1 7.1   HGB 13.3 13.7 12.7   MCV 92 89 89    221 238   ANEUTAUTO 3.0 2.6 3.3     Most Recent 3 BMP's:  Recent Labs   Lab Test 05/04/23  1419 10/03/22  0844 04/14/22  1332    135* 136   POTASSIUM 4.1 4.2 4.0   CHLORIDE 101 100 102   CO2 27 26 28   BUN 15.0 12.7 11   CR 0.80 0.74 0.72   ANIONGAP 9 9 6   SANDRA 9.5 9.7 9.2   GLC 88 96 96   PROTTOTAL 7.2 7.2 7.3   ALBUMIN 4.4 4.4 3.8    Most Recent 3 LFT's:  Recent Labs   Lab Test 05/04/23  1419 10/03/22  0844 04/14/22  1332   AST 24 26 29   ALT 18 15 26   ALKPHOS 104 85 86   BILITOTAL 0.2 0.4 0.3    Most Recent 2 TSH and T4:  Recent Labs   Lab Test 01/26/21  1252   TSH 6.90*   T4 1.41     I reviewed the above labs today.      2 year BMB 10/3/2022; MRD-ve remission    No morphologic or immunophenotypic evidence of acute myeloid leukemia. FISH/CGH pending    Normocellular marrow (cellularity estimated at  20-30%) with trilineage hematopoietic maturation and no increase in blasts    Small lymphoid aggregate with reactive features     Peripheral blood showing a normal hemogram and normal white blood cell differential    BM % donor, CD3/CD33 100% donor    NGS: No mutations were identified in IDH2.    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    # AML in remisson - diagnosed 2020 with intermediate risk cytogenetics and IDH2 mutation. Achieved CR1 after 7+3 and consolidated with matched sib allogeneic transplant using a JCARLOS prep and PTCy/Tac/MMF GVHD prophylaxis. 1 year and 2 year marrows showing ongoing CR. Peripheral blood today showing no evidence of relapse.     Followed with Dr. Nilay Bautista for BMT but is essentially discharged from their clinic after 2 year marrow showing ongoing CR.     Recommended ongoing surveillance with q3 month CBCs. Will have her see ISABEL in 6 months and me in 1 year.     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 Continue to monitor.  Discussed s/s to watch for regarding GVHD.  Will plan to set up with dermatology at [facility] for yearly skin checks    # Hypothyroid - on synthroid, management per PCP- requesting TSH/FT4 drawn today per PCP.  Instructed patient that results will be managed by her PCP    # Neuropathy - stable. Managed by PCP.  Is currently tapering off Effexor and will be on Buspirone    # ID - completed 1 year and 2 year vaccines. Has received COVID x4, last 2022  - Covid vaccines UTD  - Received Covid booster and 2nd MMR vaccine today    HCM/Survivorship  -Previously discussed with the patient that she needs a primary care physician to follow-up on her routine annual exam and healthcare maintenance including at least once a year diabetes screening, lipid profile testing, vitamin D testing, thyroid testing, age-appropriate cancer screening including mammograms and Pap smear, and other additional tests as needed.  -Previously discussed with the patient the need for at least an annual dermatology exam, ophthalmology exam, twice a year dental exams, following up with  gynecology for screening and other hormone related testing  -We will check pulmonary function test per her preference not till she comes back in April.   -She prefers to receive the DEXA scan locally     Total time spent on this encounter today including reviewing the EMR, documentation and direct patient care counselin minutes    Patric Rodriguez MD    Division of Hematology, Oncology and Transplantation  [facility]  P: [phone]

## 2023-05-04 NOTE — LETTER
5/4/2023         RE: Florencia Gao  [address] [place] [address]        Dear Colleague,    Thank you for referring your patient, Florencia Gao, to the [facility]. Please see a copy of my visit note below.    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    1. 9/10/2020 Diagnosed with AML  -- BMBx showing 60% cellularity, 26% blasts with increase ring sideroblasts.  -- Karyotype showing Trisomy 8  -- NGS: IDH2 mutation  2. Treated with 7+3 and achieved a CR1  3. 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 10/3/21 - 2 year BMBx showing 20% cellularity with trilineage hematopoiesis, no evidence of AML  - Flow negative.   - NGS negative for IDH1 mutation  - 100% donor    SUBJECTIVE  Brianna presents to clinic for follow-up and is feeling well.  Energy levels has been good and has been having a good appetite. She has been taking Effexor for her neuropathy, her PCP is going to stop the Effexor due to excessive dreaming, plan is to change her to Buspirone.  She is no longer having neuropathy.    Denies fever/chills, rash, s/s GVHD, GI upset, dry eyes, dry mouth.    Plans to go to [place] after Thanksgiving and come back around March.    PAST MEDICAL HISTORY  Past Medical History:   Diagnosis Date    AML (acute myeloblastic leukemia) (H)     Anxiety     Hypothyroidism 2000    Osteoporosis        FAMILY HISTORY  Family History   Problem Relation Age of Onset    Cerebrovascular Disease Mother     Prostate Cancer Father        SOCIAL HISTORY  Social History     Socioeconomic History     Marital status:      Spouse name: Not on file    Number of children: Not on file    Years of education: Not on file    Highest education level: Not on file   Occupational History    Not on file   Tobacco Use    Smoking status: Never    Smokeless tobacco: Never   Vaping Use    Vaping status: Not on file   Substance and Sexual Activity    Alcohol use: Not Currently    Drug use: Never    Sexual activity: Not on file   Other Topics Concern    Parent/sibling w/ CABG, MI or angioplasty before 65F 55M? Not Asked   Social History Narrative    Not on file     Social Determinants of Health     Financial Resource Strain: Not on file   Food Insecurity: Not on file   Transportation Needs: Not on file   Physical Activity: Not on file   Stress: Not on file   Social Connections: Not on file   Intimate Partner Violence: Not on file   Housing Stability: Not on file       CURRENT OUTPATIENT MEDICATIONS  Current Outpatient Medications   Medication Sig Dispense Refill    levothyroxine (SYNTHROID/LEVOTHROID) 88 MCG tablet Take 1 tablet (88 mcg) by mouth daily 90 tablet 4    polyethylene glycol (MIRALAX) 17 g packet Take 1 packet by mouth daily      busPIRone (BUSPAR) 5 MG tablet       venlafaxine (EFFEXOR) 75 MG tablet Take 1 tablet (75 mg) by mouth daily (Patient not taking: Reported on 5/4/2023) 90 tablet 4       ALLERGIES  Allergies   Allergen Reactions    Blood Transfusion Related (Informational Only)      Patient has a history of a clinically significant antibody against RBC antigens.  A delay in compatible RBCs may occur.    Sulfa Antibiotics Rash    Acetaminophen-Codeine Dizziness     could not sit up after surgery until off all narcotics, even Tylenol #3    Chlorhexidine Rash       REVIEW OF SYSTEMS  A complete ROS was performed and was negative except as mentioned in HPI    PHYSICAL EXAM  /75 (BP Location: Left arm, Patient Position: Sitting, Cuff Size: Adult Large)   Pulse 92   Temp 98.6  F (37  C) (Oral)    Resp 16   Wt 79.3 kg (174 lb 12.8 oz)   SpO2 97%   BMI 28.09 kg/m    @LASTSAO2(4)@  Wt Readings from Last 3 Encounters:   05/04/23 79.3 kg (174 lb 12.8 oz)   10/18/22 79.6 kg (175 lb 8 oz)   10/03/22 79.6 kg (175 lb 8 oz)       Gen: alert, pleasant and conversational, NAD  HEET: NC/AT, EOMI w/ PERRL, anicteric sclera. OP clear. MMM.   Neck: supple no JVP  Lymph: No cervical, supraclavicular, axillary or inguinal LAD  CV: normal S1,S2 with RRR no m/r/g  Resp: lungs CTA bilaterally with adequate air movement to bases. No wheezes or crackles  Abd: soft NTND no organomegaly or masses. BS normoactive.   Ext: WWP no edema or cyanosis  Skin: no concerning lesions or rashes  Neuro: A&Ox4, no lateralizing sx. Grossly nonfocal.      LABORATORY AND IMAGING STUDIES  Most Recent 3 CBC's:Recent Labs   Lab Test 05/04/23  1419 10/03/22  0844 04/14/22  1332   WBC 6.7 6.1 7.1   HGB 13.3 13.7 12.7   MCV 92 89 89    221 238   ANEUTAUTO 3.0 2.6 3.3     Most Recent 3 BMP's:  Recent Labs   Lab Test 05/04/23  1419 10/03/22  0844 04/14/22  1332    135* 136   POTASSIUM 4.1 4.2 4.0   CHLORIDE 101 100 102   CO2 27 26 28   BUN 15.0 12.7 11   CR 0.80 0.74 0.72   ANIONGAP 9 9 6   SANDRA 9.5 9.7 9.2   GLC 88 96 96   PROTTOTAL 7.2 7.2 7.3   ALBUMIN 4.4 4.4 3.8    Most Recent 3 LFT's:  Recent Labs   Lab Test 05/04/23  1419 10/03/22  0844 04/14/22  1332   AST 24 26 29   ALT 18 15 26   ALKPHOS 104 85 86   BILITOTAL 0.2 0.4 0.3    Most Recent 2 TSH and T4:  Recent Labs   Lab Test 01/26/21  1252   TSH 6.90*   T4 1.41     I reviewed the above labs today.      2 year BMB 10/3/2022; MRD-ve remission  No morphologic or immunophenotypic evidence of acute myeloid leukemia. FISH/CGH pending  Normocellular marrow (cellularity estimated at  20-30%) with trilineage hematopoietic maturation and no increase in blasts  Small lymphoid aggregate with reactive features   Peripheral blood showing a normal hemogram and normal white blood cell  differential  BM % donor, CD3/CD33 100% donor  NGS: No mutations were identified in IDH2.  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    # AML in remisson - diagnosed 2020 with intermediate risk cytogenetics and IDH2 mutation. Achieved CR1 after 7+3 and consolidated with matched sib allogeneic transplant using a JCARLOS prep and PTCy/Tac/MMF GVHD prophylaxis. 1 year and 2 year marrows showing ongoing CR. Peripheral blood today showing no evidence of relapse.     Followed with Dr. Nilay Bautista for BMT but is essentially discharged from their clinic after 2 year marrow showing ongoing CR.     Recommended ongoing surveillance with q3 month CBCs. Will have her see ISABEL in 6 months and me in 1 year.     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 Continue to monitor.  Discussed s/s to watch for regarding GVHD.  Will plan to set up with dermatology at [facility] for yearly skin checks    # Hypothyroid - on synthroid, management per PCP- requesting TSH/FT4 drawn today per PCP.  Instructed patient that results will be managed by her PCP    # Neuropathy - stable. Managed by PCP.  Is currently tapering off Effexor and will be on Buspirone    # ID - completed 1 year and 2 year vaccines. Has received COVID x4, last 2022  - Covid vaccines UTD  - Received Covid booster and 2nd MMR vaccine today    HCM/Survivorship  -Previously discussed with the patient that she needs a primary care physician to follow-up on her routine annual exam and healthcare maintenance including at least once a year diabetes screening, lipid profile testing, vitamin D testing, thyroid testing, age-appropriate cancer screening including mammograms  and Pap smear, and other additional tests as needed.  -Previously discussed with the patient the need for at least an annual dermatology exam, ophthalmology exam, twice a year dental exams, following up with gynecology for screening and other hormone related testing  -We will check pulmonary function test per her preference not till she comes back in April.   -She prefers to receive the DEXA scan locally     Total time spent on this encounter today including reviewing the EMR, documentation and direct patient care counselin minutes    Patric Rodriguez MD    Division of Hematology, Oncology and Transplantation  [facility]  P: [phone]

## 2023-05-04 NOTE — NURSING NOTE
"Oncology Rooming Note    May 4, 2023 2:35 PM   Florencia Gao is a 77 year old female who presents for:    Chief Complaint   Patient presents with     Blood Draw     Vitals taken, venipuncture, JIC tube drawn for outside provider plus todays labs, checked into next appt     Oncology Clinic Visit     RTN: Acute Myeloid Luekemia     Initial Vitals: /75 (BP Location: Left arm, Patient Position: Sitting, Cuff Size: Adult Large)   Pulse 92   Temp 98.6  F (37  C) (Oral)   Resp 16   Wt 79.3 kg (174 lb 12.8 oz)   SpO2 97%   BMI 28.09 kg/m   Estimated body mass index is 28.09 kg/m  as calculated from the following:    Height as of 7/1/21: 1.68 m (5' 6.14\").    Weight as of this encounter: 79.3 kg (174 lb 12.8 oz). Body surface area is 1.92 meters squared.  No Pain (0) Comment: Data Unavailable   No LMP recorded. Patient is postmenopausal.  Allergies reviewed: Yes  Medications reviewed: Yes    Medications: Medication refills not needed today.  Pharmacy name entered into [facility]:    [id number] [place], [place] - [address] [place] [address] ST AT [facility] [address]  [facility] - [place], [place] - [address] [facility] [facility] [phone]  [facility] - [place], [place] - [address] [place] [address]    Clinical concerns: none    Rudy Nunes            "

## 2023-05-08 ENCOUNTER — HEALTH MAINTENANCE LETTER (OUTPATIENT)
Age: 78
End: 2023-05-08

## 2023-06-09 NOTE — PROVIDER NOTIFICATION
Notified MD regarding critical L.A value: 2.5     RRT called, MD ordered a 1L NS bolus  
menopause/unknown

## 2023-07-19 ENCOUNTER — PATIENT OUTREACH (OUTPATIENT)
Dept: ONCOLOGY | Facility: CLINIC | Age: 78
End: 2023-07-19
Payer: COMMERCIAL

## 2023-07-19 NOTE — PROGRESS NOTES
[facility]: Cancer Care                                                                                          RNCC recieved my chart requesting lab orders be faxed to [facility] in [place] for her labs every 3 months.     RNCC faxed orders to [facility] per her request. RNCC replied to patient my chart stating this was completed.     Signature:  Gabbie Presley RN

## 2023-08-01 ENCOUNTER — PATIENT OUTREACH (OUTPATIENT)
Dept: ONCOLOGY | Facility: CLINIC | Age: 78
End: 2023-08-01
Payer: COMMERCIAL

## 2023-08-01 NOTE — PROGRESS NOTES
[facility]: Cancer Care                                                                                          RNCC received voicemail from patient stating that [facility] in [place] had not received her lab orders yet via fax.     RNCC sent previously on 7/19/23, RNCC called patient back to discuss but was unable to reach her. RNCC sent new orders over to [facility]. RNCC left voicemail for patient stating orders had been sent over again and to call with any questions. RNCC will await a call back from patient, call back number left in voice mail.     Signature:  Gabbie Presley RN

## 2023-11-08 NOTE — PROGRESS NOTES
Virtual Visit Details    Type of service:  Video Visit   Video Start Time:  1010  Video End Time: 1022    Originating Location (pt. 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    1. 9/10/2020 Diagnosed with AML  -- BMBx showing 60% cellularity, 26% blasts with increase ring sideroblasts.  -- Karyotype showing Trisomy 8  -- NGS: IDH2 mutation  2. Treated with 7+3 and achieved a CR1  3. 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 10/3/21 - 2 year BMBx showing 20% cellularity with trilineage hematopoiesis, no evidence of AML  - Flow negative.   - NGS negative for IDH1 mutation  - 100% donor    SUBJECTIVE  Brianna presents to clinic for follow-up   She continues to feel well. Energy is stable, appetite is good. She has not had recent / recurrent infections. No fevers or chills. No rash or skin concerns. No stomach issues. No bleeding. No oral/ eye dryness. She is going to [place] for the winter months, to return mid- March. Next labs mid March in [place].     ROS otherwise neg.     PAST MEDICAL HISTORY  Past Medical History:   Diagnosis Date    AML (acute myeloblastic leukemia) (H)     Anxiety     Hypothyroidism 2000    Osteoporosis        FAMILY HISTORY  Family History   Problem Relation Age of Onset    Cerebrovascular Disease Mother     Prostate Cancer Father        SOCIAL HISTORY  Social History     Socioeconomic History    Marital status:      Spouse name: Not on file    Number of children: Not on  file    Years of education: Not on file    Highest education level: Not on file   Occupational History    Not on file   Tobacco Use    Smoking status: Never    Smokeless tobacco: Never   Substance and Sexual Activity    Alcohol use: Not Currently    Drug use: Never    Sexual activity: Not on file   Other Topics Concern    Parent/sibling w/ CABG, MI or angioplasty before 65F 55M? Not Asked   Social History Narrative    Not on file     Social Determinants of Health     Financial Resource Strain: Not on file   Food Insecurity: Not on file   Transportation Needs: Not on file   Physical Activity: Not on file   Stress: Not on file   Social Connections: Not on file   Interpersonal Safety: Not on file   Housing Stability: Not on file       CURRENT OUTPATIENT MEDICATIONS  Current Outpatient Medications   Medication Sig Dispense Refill    levothyroxine (SYNTHROID/LEVOTHROID) 88 MCG tablet Take 1 tablet (88 mcg) by mouth daily 90 tablet 4    polyethylene glycol (MIRALAX) 17 g packet Take 1 packet by mouth daily      venlafaxine (EFFEXOR) 75 MG tablet Take 1 tablet (75 mg) by mouth daily 90 tablet 4    busPIRone (BUSPAR) 5 MG tablet  (Patient not taking: Reported on 11/9/2023)         ALLERGIES  Allergies   Allergen Reactions    Blood Transfusion Related (Informational Only)      Patient has a history of a clinically significant antibody against RBC antigens.  A delay in compatible RBCs may occur.    Sulfa Antibiotics Rash    Acetaminophen-Codeine Dizziness     could not sit up after surgery until off all narcotics, even Tylenol #3    Chlorhexidine Rash       REVIEW OF SYSTEMS  A complete ROS was performed and was negative except as mentioned in HPI    PHYSICAL EXAM    Video physical exam  General: Patient appears well in no acute distress.   Skin: No visualized rash or lesions on visualized skin  Eyes: EOMI, no erythema, sclera icterus or discharge noted  Resp: Appears to be breathing comfortably without accessory muscle  usage, speaking in full sentences, no cough  MSK: Appears to have normal range of motion based on visualized movements  Neurologic: No apparent tremors, facial movements symmetric  Psych: affect pleasant, alert and oriented    Wt Readings from Last 4 Encounters:   11/09/23 79.8 kg (176 lb)   05/04/23 79.3 kg (174 lb 12.8 oz)   10/18/22 79.6 kg (175 lb 8 oz)   10/03/22 79.6 kg (175 lb 8 oz)           LABORATORY AND IMAGING STUDIES  Most Recent 3 CBC's:  Recent Labs   Lab Test 05/04/23  1419 10/03/22  0844 04/14/22  1332   WBC 6.7 6.1 7.1   HGB 13.3 13.7 12.7   MCV 92 89 89    221 238   ANEUTAUTO 3.0 2.6 3.3     Most Recent 3 BMP's:  Recent Labs   Lab Test 05/04/23  1419 10/03/22  0844 04/14/22  1332    135* 136   POTASSIUM 4.1 4.2 4.0   CHLORIDE 101 100 102   CO2 27 26 28   BUN 15.0 12.7 11   CR 0.80 0.74 0.72   ANIONGAP 9 9 6   SANDRA 9.5 9.7 9.2   GLC 88 96 96   PROTTOTAL 7.2 7.2 7.3   ALBUMIN 4.4 4.4 3.8    Most Recent 3 LFT's:  Recent Labs   Lab Test 05/04/23  1419 10/03/22  0844 04/14/22  1332   AST 24 26 29   ALT 18 15 26   ALKPHOS 104 85 86   BILITOTAL 0.2 0.4 0.3    Most Recent 2 TSH and T4:  Recent Labs   Lab Test 05/04/23  1422 01/26/21  1252   TSH 1.01 6.90*   T4  --  1.41     I reviewed the above labs today.      2 year BMB 10/3/2022; MRD-ve remission  No morphologic or immunophenotypic evidence of acute myeloid leukemia. FISH/CGH pending  Normocellular marrow (cellularity estimated at  20-30%) with trilineage hematopoietic maturation and no increase in blasts  Small lymphoid aggregate with reactive features   Peripheral blood showing a normal hemogram and normal white blood cell differential  BM % donor, CD3/CD33 100% donor  NGS: No mutations were identified in IDH2.  FISH/CG pending  -No current stigmata of chronic krxmt-uxladt-kvei disease or ongoing transplant complications       ASSESSMENT AND PLAN  Florencia Gao is a 77 year old female with PMH significant for Axiety,  osteoporosis, hypothyroid, ABEL (on CPAP) and AML in remission currently 3y 1 month s/p allogeneic transplant (matched sibling donor) D0 = 10/7/20 who presents for long term surveillance follow up.     AML in remisson   - diagnosed 2020 with intermediate risk cytogenetics and IDH2 mutation. Achieved CR1 after 7+3 and consolidated with matched sib allogeneic transplant using a JCARLOS prep and PTCy/Tac/MMF GVHD prophylaxis. 1 year and 2 year marrows showing ongoing CR.   Followed with Dr. Nilay Bautista for BMT but is essentially discharged from their clinic after 2 year marrow showing ongoing CR.   - October labs reviewed today in Care Everywhere- no cytopenias, abnormal cells. She is feeling well, no symptoms to suggest GVH-- will continue plan as is--   - Will be in [place] until mid- March, will get labs next when she returns    Plan--   - Recommended ongoing surveillance with q3 month CBCs  - Dr Rodriguez in 6 months    GVHD   - No evidence of GVHD. Continue to monitor.  Discussed s/s to watch for regarding GVHD.  Will plan to set up with dermatology at [facility] for yearly skin checks    Hypothyroid   - on synthroid, management per PCP- requesting TSH/FT4 drawn today per PCP.  Instructed patient that results will be managed by her PCP    Neuropathy   - stable. Managed by PCP.  Is currently tapering off Effexor and will be on Buspirone    ID   - completed 1 year and 2 year vaccines. Has received COVID x4, last 2022  - Covid vaccines UTD  - Received Covid booster and 2nd MMR vaccine today    HCM/ Survivorship  - Florencia is up to date with her health maintenance- recent colonoscopy, mammogram yesterday. Up to date with vaccines and preventative health. Established with PCP locally in [place].   -Previously discussed with the patient that she needs a primary care physician to follow-up on her routine annual exam and healthcare maintenance including at least once a year diabetes screening, lipid profile testing, vitamin D testing,  thyroid testing, age-appropriate cancer screening including mammograms and Pap smear, and other additional tests as needed.  -Previously discussed with the patient the need for at least an annual dermatology exam, ophthalmology exam, twice a year dental exams, following up with gynecology for screening and other hormone related testing  -We will check pulmonary function test per her preference not till she comes back in April.   -She prefers to receive the DEXA scan locally       Екатерина Carroll [facility]-BC    35 minutes spent on the date of the encounter doing chart review, review of test results, interpretation of tests, patient visit, and documentation

## 2023-11-09 ENCOUNTER — VIRTUAL VISIT (OUTPATIENT)
Dept: ONCOLOGY | Facility: CLINIC | Age: 78
End: 2023-11-09
Attending: NURSE PRACTITIONER
Payer: COMMERCIAL

## 2023-11-09 VITALS — BODY MASS INDEX: 28.28 KG/M2 | WEIGHT: 176 LBS | HEIGHT: 66 IN

## 2023-11-09 DIAGNOSIS — C92.01 AML (ACUTE MYELOID LEUKEMIA) IN REMISSION (H): ICD-10-CM

## 2023-11-09 PROCEDURE — 99214 OFFICE O/P EST MOD 30 MIN: CPT | Mod: VID | Performed by: NURSE PRACTITIONER

## 2023-11-09 ASSESSMENT — PAIN SCALES - GENERAL: PAINLEVEL: NO PAIN (0)

## 2023-11-09 NOTE — LETTER
11/9/2023         RE: Florencia Gao  [address] [place] [address]        Dear Colleague,    Thank you for referring your patient, Florencia Gao, to the [facility]. Please see a copy of my visit note below.    Virtual Visit Details    Type of service:  Video Visit   Video Start Time:  1010  Video End Time: 1022    Originating Location (pt. 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    1. 9/10/2020 Diagnosed with AML  -- BMBx showing 60% cellularity, 26% blasts with increase ring sideroblasts.  -- Karyotype showing Trisomy 8  -- NGS: IDH2 mutation  2. Treated with 7+3 and achieved a CR1  3. 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 10/3/21 - 2 year BMBx showing 20% cellularity with trilineage hematopoiesis, no evidence of AML  - Flow negative.   - NGS negative for IDH1 mutation  - 100% donor    SUBJECTIVE  Brianna presents to clinic for follow-up   She continues to feel well. Energy is stable, appetite is good. She has not had recent / recurrent infections. No fevers or chills. No rash or skin concerns. No stomach issues. No bleeding. No oral/ eye dryness. She is going to [place] for the winter months, to return mid- March. Next labs mid March in [place].     ROS otherwise neg.     PAST MEDICAL HISTORY  Past Medical History:   Diagnosis Date    AML (acute myeloblastic leukemia) (H)     Anxiety     Hypothyroidism 2000    Osteoporosis        FAMILY  HISTORY  Family History   Problem Relation Age of Onset    Cerebrovascular Disease Mother     Prostate Cancer Father        SOCIAL HISTORY  Social History     Socioeconomic History    Marital status:      Spouse name: Not on file    Number of children: Not on file    Years of education: Not on file    Highest education level: Not on file   Occupational History    Not on file   Tobacco Use    Smoking status: Never    Smokeless tobacco: Never   Substance and Sexual Activity    Alcohol use: Not Currently    Drug use: Never    Sexual activity: Not on file   Other Topics Concern    Parent/sibling w/ CABG, MI or angioplasty before 65F 55M? Not Asked   Social History Narrative    Not on file     Social Determinants of Health     Financial Resource Strain: Not on file   Food Insecurity: Not on file   Transportation Needs: Not on file   Physical Activity: Not on file   Stress: Not on file   Social Connections: Not on file   Interpersonal Safety: Not on file   Housing Stability: Not on file       CURRENT OUTPATIENT MEDICATIONS  Current Outpatient Medications   Medication Sig Dispense Refill    levothyroxine (SYNTHROID/LEVOTHROID) 88 MCG tablet Take 1 tablet (88 mcg) by mouth daily 90 tablet 4    polyethylene glycol (MIRALAX) 17 g packet Take 1 packet by mouth daily      venlafaxine (EFFEXOR) 75 MG tablet Take 1 tablet (75 mg) by mouth daily 90 tablet 4    busPIRone (BUSPAR) 5 MG tablet  (Patient not taking: Reported on 11/9/2023)         ALLERGIES  Allergies   Allergen Reactions    Blood Transfusion Related (Informational Only)      Patient has a history of a clinically significant antibody against RBC antigens.  A delay in compatible RBCs may occur.    Sulfa Antibiotics Rash    Acetaminophen-Codeine Dizziness     could not sit up after surgery until off all narcotics, even Tylenol #3    Chlorhexidine Rash       REVIEW OF SYSTEMS  A complete ROS was performed and was negative except as mentioned in HPI    PHYSICAL  EXAM    Video physical exam  General: Patient appears well in no acute distress.   Skin: No visualized rash or lesions on visualized skin  Eyes: EOMI, no erythema, sclera icterus or discharge noted  Resp: Appears to be breathing comfortably without accessory muscle usage, speaking in full sentences, no cough  MSK: Appears to have normal range of motion based on visualized movements  Neurologic: No apparent tremors, facial movements symmetric  Psych: affect pleasant, alert and oriented    Wt Readings from Last 4 Encounters:   11/09/23 79.8 kg (176 lb)   05/04/23 79.3 kg (174 lb 12.8 oz)   10/18/22 79.6 kg (175 lb 8 oz)   10/03/22 79.6 kg (175 lb 8 oz)           LABORATORY AND IMAGING STUDIES  Most Recent 3 CBC's:  Recent Labs   Lab Test 05/04/23  1419 10/03/22  0844 04/14/22  1332   WBC 6.7 6.1 7.1   HGB 13.3 13.7 12.7   MCV 92 89 89    221 238   ANEUTAUTO 3.0 2.6 3.3     Most Recent 3 BMP's:  Recent Labs   Lab Test 05/04/23  1419 10/03/22  0844 04/14/22  1332    135* 136   POTASSIUM 4.1 4.2 4.0   CHLORIDE 101 100 102   CO2 27 26 28   BUN 15.0 12.7 11   CR 0.80 0.74 0.72   ANIONGAP 9 9 6   SANDRA 9.5 9.7 9.2   GLC 88 96 96   PROTTOTAL 7.2 7.2 7.3   ALBUMIN 4.4 4.4 3.8    Most Recent 3 LFT's:  Recent Labs   Lab Test 05/04/23  1419 10/03/22  0844 04/14/22  1332   AST 24 26 29   ALT 18 15 26   ALKPHOS 104 85 86   BILITOTAL 0.2 0.4 0.3    Most Recent 2 TSH and T4:  Recent Labs   Lab Test 05/04/23  1422 01/26/21  1252   TSH 1.01 6.90*   T4  --  1.41     I reviewed the above labs today.      2 year BMB 10/3/2022; MRD-ve remission  No morphologic or immunophenotypic evidence of acute myeloid leukemia. FISH/CGH pending  Normocellular marrow (cellularity estimated at  20-30%) with trilineage hematopoietic maturation and no increase in blasts  Small lymphoid aggregate with reactive features   Peripheral blood showing a normal hemogram and normal white blood cell differential  BM % donor, CD3/CD33 100%  donor  NGS: No mutations were identified in IDH2.  FISH/CG pending  -No current stigmata of chronic yrjik-ipuowx-bnxc disease or ongoing transplant complications       ASSESSMENT AND PLAN  Florencia Gao is a 77 year old female with PMH significant for Axiety, osteoporosis, hypothyroid, ABEL (on CPAP) and AML in remission currently 3y 1 month s/p allogeneic transplant (matched sibling donor) D0 = 10/7/20 who presents for long term surveillance follow up.     AML in remisson   - diagnosed 2020 with intermediate risk cytogenetics and IDH2 mutation. Achieved CR1 after 7+3 and consolidated with matched sib allogeneic transplant using a JCARLOS prep and PTCy/Tac/MMF GVHD prophylaxis. 1 year and 2 year marrows showing ongoing CR.   Followed with Dr. Nilay Bautista for BMT but is essentially discharged from their clinic after 2 year marrow showing ongoing CR.   - October labs reviewed today in Care Everywhere- no cytopenias, abnormal cells. She is feeling well, no symptoms to suggest GVH-- will continue plan as is--   - Will be in [place] until mid- March, will get labs next when she returns    Plan--   - Recommended ongoing surveillance with q3 month CBCs  - Dr Rodriguez in 6 months    GVHD   - No evidence of GVHD. Continue to monitor.  Discussed s/s to watch for regarding GVHD.  Will plan to set up with dermatology at [facility] for yearly skin checks    Hypothyroid   - on synthroid, management per PCP- requesting TSH/FT4 drawn today per PCP.  Instructed patient that results will be managed by her PCP    Neuropathy   - stable. Managed by PCP.  Is currently tapering off Effexor and will be on Buspirone    ID   - completed 1 year and 2 year vaccines. Has received COVID x4, last 2022  - Covid vaccines UTD  - Received Covid booster and 2nd MMR vaccine today    HCM/ Survivorship  - Florencia is up to date with her health maintenance- recent colonoscopy, mammogram yesterday. Up to date with vaccines and preventative health. Established with PCP  locally in [place].   -Previously discussed with the patient that she needs a primary care physician to follow-up on her routine annual exam and healthcare maintenance including at least once a year diabetes screening, lipid profile testing, vitamin D testing, thyroid testing, age-appropriate cancer screening including mammograms and Pap smear, and other additional tests as needed.  -Previously discussed with the patient the need for at least an annual dermatology exam, ophthalmology exam, twice a year dental exams, following up with gynecology for screening and other hormone related testing  -We will check pulmonary function test per her preference not till she comes back in April.   -She prefers to receive the DEXA scan locally       Екатерина Carroll ACNP-BC    35 minutes spent on the date of the encounter doing chart review, review of test results, interpretation of tests, patient visit, and documentation

## 2023-11-09 NOTE — NURSING NOTE
Is the patient currently in the state of [place]? YES    Visit mode:VIDEO    If the visit is dropped, the patient can be reconnected by: VIDEO VISIT: Send to e-mail at: [email or web address]    Will anyone else be joining the visit? NO  (If patient encounters technical issues they should call [phone]    How would you like to obtain your AVS? MyChart    Are changes needed to the allergy or medication list? No    Reason for visit: NAOMI JO

## 2024-05-07 NOTE — PROGRESS NOTES
Virtual Visit Details    Type of service:  Video Visit   Video Start Time: 10:10 AM  Video End Time:10:20 AM    Originating Location (pt. 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    1. 9/10/2020 Diagnosed with AML  -- BMBx showing 60% cellularity, 26% blasts with increase ring sideroblasts.  -- Karyotype showing Trisomy 8  -- NGS: IDH2 mutation  2. Treated with 7+3 and achieved a CR1  3. 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 10/3/21 - 2 year BMBx showing 20% cellularity with trilineage hematopoiesis, no evidence of AML  - Flow negative.   - NGS negative for IDH1 mutation  - 100% donor    SUBJECTIVE  Mar is doing well overall.  She reports excellent energy and appetite.  Weights have been stable.  Has been traveling frequently recently and enjoying her trips.  Denies any fevers, chills, sweats or nausea, vomiting, diarrhea.  No skin rash or skin thickening.  Denies any dry eyes, dry mouth, trouble swallowing.  No recent illnesses or new medications.      PAST MEDICAL HISTORY  Past Medical History:   Diagnosis Date    AML (acute myeloblastic leukemia) (H)     Anxiety     Hypothyroidism 2000    Osteoporosis          CURRENT OUTPATIENT MEDICATIONS  Current Outpatient Medications   Medication Sig Dispense Refill    busPIRone (BUSPAR) 5 MG tablet  (Patient not taking: Reported on 11/9/2023)      levothyroxine (SYNTHROID/LEVOTHROID) 88 MCG tablet Take 1 tablet (88 mcg) by mouth daily 90 tablet 4     polyethylene glycol (MIRALAX) 17 g packet Take 1 packet by mouth daily      venlafaxine (EFFEXOR) 75 MG tablet Take 1 tablet (75 mg) by mouth daily 90 tablet 4       ALLERGIES  Allergies   Allergen Reactions    Blood Transfusion Related (Informational Only)      Patient has a history of a clinically significant antibody against RBC antigens.  A delay in compatible RBCs may occur.    Sulfa Antibiotics Rash    Acetaminophen-Codeine Dizziness     could not sit up after surgery until off all narcotics, even Tylenol #3    Chlorhexidine Rash       REVIEW OF SYSTEMS  A complete ROS was performed and was negative except as mentioned in HPI    PHYSICAL EXAM  There were no vitals taken for this visit.  @LASTSAO2(4)@  Wt Readings from Last 3 Encounters:   11/09/23 79.8 kg (176 lb)   05/04/23 79.3 kg (174 lb 12.8 oz)   10/18/22 79.6 kg (175 lb 8 oz)       Gen: alert, pleasant and conversational, NAD    LABORATORY AND IMAGING STUDIES     Latest Reference Range & Units 05/04/23 14:19 05/04/23 14:22   Sodium 136 - 145 mmol/L 137    Potassium 3.4 - 5.3 mmol/L 4.1    Chloride 98 - 107 mmol/L 101    Carbon Dioxide (CO2) 22 - 29 mmol/L 27    Urea Nitrogen 8.0 - 23.0 mg/dL 15.0    Creatinine 0.51 - 0.95 mg/dL 0.80    GFR Estimate >60 mL/min/1.73m2 76    Calcium 8.8 - 10.2 mg/dL 9.5    Anion Gap 7 - 15 mmol/L 9    Albumin 3.5 - 5.2 g/dL 4.4    Protein Total 6.4 - 8.3 g/dL 7.2    Alkaline Phosphatase 35 - 104 U/L 104    ALT 10 - 35 U/L 18    AST 10 - 35 U/L 24    Bilirubin Total <=1.2 mg/dL 0.2    Ferritin 11 - 328 ng/mL 120    Glucose 70 - 99 mg/dL 88    TSH 0.30 - 4.20 uIU/mL  1.01   WBC 4.0 - 11.0 10e3/uL 6.7    Hemoglobin 11.7 - 15.7 g/dL 13.3    Hematocrit 35.0 - 47.0 % 40.3    Platelet Count 150 - 450 10e3/uL 206    RBC Count 3.80 - 5.20 10e6/uL 4.38    MCV 78 - 100 fL 92    MCH 26.5 - 33.0 pg 30.4    MCHC 31.5 - 36.5 g/dL 33.0    RDW 10.0 - 15.0 % 13.2    % Neutrophils % 44    % Lymphocytes % 43    % Monocytes % 9    % Eosinophils  % 2    % Basophils % 1    Absolute Basophils 0.0 - 0.2 10e3/uL 0.0    Absolute Eosinophils 0.0 - 0.7 10e3/uL 0.1    Absolute Immature Granulocytes <=0.4 10e3/uL 0.0    Absolute Lymphocytes 0.8 - 5.3 10e3/uL 2.9    Absolute Monocytes 0.0 - 1.3 10e3/uL 0.6    % Immature Granulocytes % 1    Absolute Neutrophils 1.6 - 8.3 10e3/uL 3.0    Absolute NRBCs 10e3/uL 0.0    NRBCs per 100 WBC <1 /100 0          I reviewed the above labs today.      2 year BMB 10/3/2022; MRD-ve remission  No morphologic or immunophenotypic evidence of acute myeloid leukemia. FISH/CGH pending  Normocellular marrow (cellularity estimated at  20-30%) with trilineage hematopoietic maturation and no increase in blasts  Small lymphoid aggregate with reactive features   Peripheral blood showing a normal hemogram and normal white blood cell differential  BM % donor, CD3/CD33 100% donor  NGS: No mutations were identified in IDH2.  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    # AML in remisson - diagnosed 2020 with intermediate risk cytogenetics and IDH2 mutation. Achieved CR1 after 7+3 and consolidated with matched sib allogeneic transplant using a JCARLOS prep and PTCy/Tac/MMF GVHD prophylaxis. 1 year and 2 year marrows showing ongoing CR. Peripheral blood today showing no evidence of relapse.     Followed with Dr. Nilay Bautista for BMT but is essentially discharged from their clinic after 2 year marrow showing ongoing CR.     Recommended ongoing surveillance with q6 month CBCs to complete 5 years. Michael in 12 months.     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 Continue to monitor.  Discussed s/s to watch for regarding GVHD.  Will plan to  set up with dermatology at [facility] for yearly skin checks    # Hypothyroid - on synthroid, management per PCP- requesting TSH/FT4 drawn today per PCP.  Instructed patient that results will be managed by her PCP    # Neuropathy - stable. Managed by PCP.  Is currently tapering off Effexor and will be on Buspirone    # ID - completed 1 year and 2 year vaccines. Has received COVID x4, last   - Recommended 2023 COVID booster in ~1 month.  - Received Covid booster and 2nd MMR vaccine today    HCM/Survivorship  -Previously discussed with the patient that she needs a primary care physician to follow-up on her routine annual exam and healthcare maintenance including at least once a year diabetes screening, lipid profile testing, vitamin D testing, thyroid testing, age-appropriate cancer screening including mammograms and Pap smear, and other additional tests as needed.  -Previously discussed with the patient the need for at least an annual dermatology exam, ophthalmology exam, twice a year dental exams, following up with gynecology for screening and other hormone related testing  - Ordered PFTs.   - prefers to get DEXA scan locally with PCP    Final plan  - Labs in 6 months  - Michael in 1 year  - screening PFTs    The longitudinal plan of care for the diagnosis(es)/condition(s) as documented were addressed during this visit. Due to the added complexity in care, I will continue to support Florencia in the subsequent management and with ongoing continuity of care.      Total time spent on this encounter today including reviewing the EMR, documentation and direct patient care counselin minutes    Patric Rodriguez MD    Division of Hematology, Oncology and Transplantation  [facility]  P: [phone]

## 2024-05-08 ENCOUNTER — VIRTUAL VISIT (OUTPATIENT)
Dept: ONCOLOGY | Facility: CLINIC | Age: 79
End: 2024-05-08
Attending: STUDENT IN AN ORGANIZED HEALTH CARE EDUCATION/TRAINING PROGRAM
Payer: COMMERCIAL

## 2024-05-08 DIAGNOSIS — C92.01 AML (ACUTE MYELOID LEUKEMIA) IN REMISSION (H): Primary | ICD-10-CM

## 2024-05-08 DIAGNOSIS — Z94.81 STATUS POST BONE MARROW TRANSPLANT (H): ICD-10-CM

## 2024-05-08 PROCEDURE — 99213 OFFICE O/P EST LOW 20 MIN: CPT | Mod: 95 | Performed by: STUDENT IN AN ORGANIZED HEALTH CARE EDUCATION/TRAINING PROGRAM

## 2024-05-08 PROCEDURE — G2211 COMPLEX E/M VISIT ADD ON: HCPCS | Mod: 95 | Performed by: STUDENT IN AN ORGANIZED HEALTH CARE EDUCATION/TRAINING PROGRAM

## 2024-05-08 RX ORDER — VENLAFAXINE 37.5 MG/1
TABLET ORAL
COMMUNITY
Start: 2024-04-22

## 2024-05-08 ASSESSMENT — PAIN SCALES - GENERAL: PAINLEVEL: NO PAIN (0)

## 2024-05-08 NOTE — LETTER
5/8/2024         RE: Florencia Gao  [address] [place] [address]        Dear Colleague,    Thank you for referring your patient, Florencia Gao, to the [facility]. Please see a copy of my visit note below.    Virtual Visit Details    Type of service:  Video Visit   Video Start Time: 10:10 AM  Video End Time:10:20 AM    Originating Location (pt. Location): Home    Distant Location (provider location):  On-site  Platform used for Video Visit: 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    1. 9/10/2020 Diagnosed with AML  -- BMBx showing 60% cellularity, 26% blasts with increase ring sideroblasts.  -- Karyotype showing Trisomy 8  -- NGS: IDH2 mutation  2. Treated with 7+3 and achieved a CR1  3. 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 10/3/21 - 2 year BMBx showing 20% cellularity with trilineage hematopoiesis, no evidence of AML  - Flow negative.   - NGS negative for IDH1 mutation  - 100% donor    SUBJECTIVE  Mar is doing well overall.  She reports excellent energy and appetite.  Weights have been stable.  Has been traveling frequently recently and enjoying her trips.  Denies any fevers, chills, sweats or nausea, vomiting, diarrhea.  No skin rash or skin thickening.  Denies any dry eyes, dry mouth, trouble swallowing.  No recent illnesses or new medications.      PAST MEDICAL HISTORY  Past Medical History:   Diagnosis Date    AML (acute myeloblastic leukemia) (H)     Anxiety     Hypothyroidism 2000    Osteoporosis          CURRENT OUTPATIENT  MEDICATIONS  Current Outpatient Medications   Medication Sig Dispense Refill    busPIRone (BUSPAR) 5 MG tablet  (Patient not taking: Reported on 11/9/2023)      levothyroxine (SYNTHROID/LEVOTHROID) 88 MCG tablet Take 1 tablet (88 mcg) by mouth daily 90 tablet 4    polyethylene glycol (MIRALAX) 17 g packet Take 1 packet by mouth daily      venlafaxine (EFFEXOR) 75 MG tablet Take 1 tablet (75 mg) by mouth daily 90 tablet 4       ALLERGIES  Allergies   Allergen Reactions    Blood Transfusion Related (Informational Only)      Patient has a history of a clinically significant antibody against RBC antigens.  A delay in compatible RBCs may occur.    Sulfa Antibiotics Rash    Acetaminophen-Codeine Dizziness     could not sit up after surgery until off all narcotics, even Tylenol #3    Chlorhexidine Rash       REVIEW OF SYSTEMS  A complete ROS was performed and was negative except as mentioned in HPI    PHYSICAL EXAM  There were no vitals taken for this visit.  @LASTSAO2(4)@  Wt Readings from Last 3 Encounters:   11/09/23 79.8 kg (176 lb)   05/04/23 79.3 kg (174 lb 12.8 oz)   10/18/22 79.6 kg (175 lb 8 oz)       Gen: alert, pleasant and conversational, NAD    LABORATORY AND IMAGING STUDIES     Latest Reference Range & Units 05/04/23 14:19 05/04/23 14:22   Sodium 136 - 145 mmol/L 137    Potassium 3.4 - 5.3 mmol/L 4.1    Chloride 98 - 107 mmol/L 101    Carbon Dioxide (CO2) 22 - 29 mmol/L 27    Urea Nitrogen 8.0 - 23.0 mg/dL 15.0    Creatinine 0.51 - 0.95 mg/dL 0.80    GFR Estimate >60 mL/min/1.73m2 76    Calcium 8.8 - 10.2 mg/dL 9.5    Anion Gap 7 - 15 mmol/L 9    Albumin 3.5 - 5.2 g/dL 4.4    Protein Total 6.4 - 8.3 g/dL 7.2    Alkaline Phosphatase 35 - 104 U/L 104    ALT 10 - 35 U/L 18    AST 10 - 35 U/L 24    Bilirubin Total <=1.2 mg/dL 0.2    Ferritin 11 - 328 ng/mL 120    Glucose 70 - 99 mg/dL 88    TSH 0.30 - 4.20 uIU/mL  1.01   WBC 4.0 - 11.0 10e3/uL 6.7    Hemoglobin 11.7 - 15.7 g/dL 13.3    Hematocrit 35.0 - 47.0 %  40.3    Platelet Count 150 - 450 10e3/uL 206    RBC Count 3.80 - 5.20 10e6/uL 4.38    MCV 78 - 100 fL 92    MCH 26.5 - 33.0 pg 30.4    MCHC 31.5 - 36.5 g/dL 33.0    RDW 10.0 - 15.0 % 13.2    % Neutrophils % 44    % Lymphocytes % 43    % Monocytes % 9    % Eosinophils % 2    % Basophils % 1    Absolute Basophils 0.0 - 0.2 10e3/uL 0.0    Absolute Eosinophils 0.0 - 0.7 10e3/uL 0.1    Absolute Immature Granulocytes <=0.4 10e3/uL 0.0    Absolute Lymphocytes 0.8 - 5.3 10e3/uL 2.9    Absolute Monocytes 0.0 - 1.3 10e3/uL 0.6    % Immature Granulocytes % 1    Absolute Neutrophils 1.6 - 8.3 10e3/uL 3.0    Absolute NRBCs 10e3/uL 0.0    NRBCs per 100 WBC <1 /100 0          I reviewed the above labs today.      2 year BMB 10/3/2022; MRD-ve remission  No morphologic or immunophenotypic evidence of acute myeloid leukemia. FISH/CGH pending  Normocellular marrow (cellularity estimated at  20-30%) with trilineage hematopoietic maturation and no increase in blasts  Small lymphoid aggregate with reactive features   Peripheral blood showing a normal hemogram and normal white blood cell differential  BM % donor, CD3/CD33 100% donor  NGS: No mutations were identified in IDH2.  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    # AML in remisson - diagnosed 2020 with intermediate risk cytogenetics and IDH2 mutation. Achieved CR1 after 7+3 and consolidated with matched sib allogeneic transplant using a JCARLOS prep and PTCy/Tac/MMF GVHD prophylaxis. 1 year and 2 year marrows showing ongoing CR. Peripheral blood today showing no evidence of relapse.     Followed with Dr. Nilay Bautista for BMT but is essentially discharged  from their clinic after 2 year marrow showing ongoing CR.     Recommended ongoing surveillance with q6 month CBCs to complete 5 years. Michael in 12 months.     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 Continue to monitor.  Discussed s/s to watch for regarding GVHD.  Will plan to set up with dermatology at [facility] for yearly skin checks    # Hypothyroid - on synthroid, management per PCP- requesting TSH/FT4 drawn today per PCP.  Instructed patient that results will be managed by her PCP    # Neuropathy - stable. Managed by PCP.  Is currently tapering off Effexor and will be on Buspirone    # ID - completed 1 year and 2 year vaccines. Has received COVID x4, last   - Recommended 2023 COVID booster in ~1 month.  - Received Covid booster and 2nd MMR vaccine today    HCM/Survivorship  -Previously discussed with the patient that she needs a primary care physician to follow-up on her routine annual exam and healthcare maintenance including at least once a year diabetes screening, lipid profile testing, vitamin D testing, thyroid testing, age-appropriate cancer screening including mammograms and Pap smear, and other additional tests as needed.  -Previously discussed with the patient the need for at least an annual dermatology exam, ophthalmology exam, twice a year dental exams, following up with gynecology for screening and other hormone related testing  - Ordered PFTs.   - prefers to get DEXA scan locally with PCP    Final plan  - Labs in 6 months  - Michael in 1 year  - screening PFTs    The longitudinal plan of care for the diagnosis(es)/condition(s) as documented were addressed during this visit. Due to the added complexity in care, I will continue to support Florencia in the subsequent management and with ongoing continuity of care.      Total time spent on this encounter today including reviewing the EMR, documentation and direct patient care counselin minutes    Patric Rodriguez MD    Division of  Hematology, Oncology and Transplantation  [facility]  P: [phone]

## 2024-05-08 NOTE — NURSING NOTE
Is the patient currently in the state of [place]? YES    Visit mode:VIDEO    If the visit is dropped, the patient can be reconnected by: VIDEO VISIT: Text to cell phone:   Telephone Information:   Mobile [phone]    and VIDEO VISIT: Send to e-mail at: [email or web address]    Will anyone else be joining the visit?  Nataliia Manzo's Daughter  (If patient encounters technical issues they should call [phone])    How would you like to obtain your AVS? MyChart    Are changes needed to the allergy or medication list? No    Are refills needed on medications prescribed by this physician? NO    Reason for visit: No chief complaint on file.    Brook ROYALF

## 2024-08-15 ENCOUNTER — PATIENT OUTREACH (OUTPATIENT)
Dept: ONCOLOGY | Facility: CLINIC | Age: 79
End: 2024-08-15
Payer: COMMERCIAL

## 2024-08-15 NOTE — PROGRESS NOTES
[facility]: Cancer Care                                                                                          Patient left  for RNCC stating that she needed her lab orders faxed over to the [facility].     RNCC faxed over orders per patient request.    RNCC sending patient my chart message with update of orders being sent.   RNCC encouraged her to reach out with any questions or concerns.     Signature:  Gabbie Presley RN

## 2024-08-20 ENCOUNTER — PATIENT OUTREACH (OUTPATIENT)
Dept: ONCOLOGY | Facility: CLINIC | Age: 79
End: 2024-08-20
Payer: COMMERCIAL

## 2024-08-20 NOTE — PROGRESS NOTES
[facility]: Cancer Care                                                                                          RNCC received a voicemail from patient requesting a call back.     RNCC called back, she Needed lab orders. RNCC faxed over lab orders on the 15th okay for patient to call and schedule. She stated appreciation for the call and had no other questions or concerns.       Signature:  Gabbie Presley RN

## 2024-09-17 ENCOUNTER — PATIENT OUTREACH (OUTPATIENT)
Dept: ONCOLOGY | Facility: CLINIC | Age: 79
End: 2024-09-17
Payer: COMMERCIAL

## 2024-09-17 DIAGNOSIS — C92.01 AML (ACUTE MYELOID LEUKEMIA) IN REMISSION (H): Primary | ICD-10-CM

## 2024-09-17 NOTE — PROGRESS NOTES
[facility]/Voicemail    Clinical Data: Care Coordinator Outreach- Patient left RNCC a message stating [facility] needed new lab orders, RNCC placed new orders as old are . RNCC Confirmed fax was received ar 11:40 am via right fax.     Outreach attempted x 1.  Left message on patient's voicemail with call back information and requested return call if patient has any questions, updated on faxing over new orders.     Plan: Care Coordinator will do no further outreaches at this time.

## 2025-01-12 ENCOUNTER — HEALTH MAINTENANCE LETTER (OUTPATIENT)
Age: 80
End: 2025-01-12

## 2025-08-03 ENCOUNTER — NURSE TRIAGE (OUTPATIENT)
Dept: NURSING | Facility: CLINIC | Age: 80
End: 2025-08-03
Payer: COMMERCIAL

## 2025-08-12 ENCOUNTER — PATIENT OUTREACH (OUTPATIENT)
Dept: ONCOLOGY | Facility: CLINIC | Age: 80
End: 2025-08-12
Payer: COMMERCIAL

## (undated) RX ORDER — CEFAZOLIN SODIUM 2 G/100ML
INJECTION, SOLUTION INTRAVENOUS
Status: DISPENSED
Start: 2020-09-30

## (undated) RX ORDER — HEPARIN SODIUM (PORCINE) LOCK FLUSH IV SOLN 100 UNIT/ML 100 UNIT/ML
SOLUTION INTRAVENOUS
Status: DISPENSED
Start: 2020-09-30

## (undated) RX ORDER — LIDOCAINE HYDROCHLORIDE 10 MG/ML
INJECTION, SOLUTION EPIDURAL; INFILTRATION; INTRACAUDAL; PERINEURAL
Status: DISPENSED
Start: 2020-09-30

## (undated) RX ORDER — LIDOCAINE HYDROCHLORIDE 10 MG/ML
INJECTION, SOLUTION EPIDURAL; INFILTRATION; INTRACAUDAL; PERINEURAL
Status: DISPENSED
Start: 2020-09-16

## (undated) RX ORDER — LIDOCAINE HYDROCHLORIDE 10 MG/ML
INJECTION, SOLUTION EPIDURAL; INFILTRATION; INTRACAUDAL; PERINEURAL
Status: DISPENSED
Start: 2021-01-26

## (undated) RX ORDER — SODIUM CHLORIDE 9 MG/ML
INJECTION, SOLUTION INTRAVENOUS
Status: DISPENSED
Start: 2020-09-30

## (undated) RX ORDER — NALOXONE HYDROCHLORIDE 0.4 MG/ML
INJECTION, SOLUTION INTRAMUSCULAR; INTRAVENOUS; SUBCUTANEOUS
Status: DISPENSED
Start: 2022-10-03

## (undated) RX ORDER — FENTANYL CITRATE 50 UG/ML
INJECTION, SOLUTION INTRAMUSCULAR; INTRAVENOUS
Status: DISPENSED
Start: 2020-09-30